# Patient Record
Sex: FEMALE | Race: WHITE | NOT HISPANIC OR LATINO | Employment: OTHER | URBAN - METROPOLITAN AREA
[De-identification: names, ages, dates, MRNs, and addresses within clinical notes are randomized per-mention and may not be internally consistent; named-entity substitution may affect disease eponyms.]

---

## 2017-01-03 ENCOUNTER — GENERIC CONVERSION - ENCOUNTER (OUTPATIENT)
Dept: OTHER | Facility: OTHER | Age: 77
End: 2017-01-03

## 2017-01-09 ENCOUNTER — APPOINTMENT (OUTPATIENT)
Dept: PREADMISSION TESTING | Facility: HOSPITAL | Age: 77
End: 2017-01-09
Payer: MEDICARE

## 2017-01-09 ENCOUNTER — TRANSCRIBE ORDERS (OUTPATIENT)
Dept: LAB | Facility: HOSPITAL | Age: 77
End: 2017-01-09

## 2017-01-09 ENCOUNTER — GENERIC CONVERSION - ENCOUNTER (OUTPATIENT)
Dept: OTHER | Facility: OTHER | Age: 77
End: 2017-01-09

## 2017-01-09 VITALS — WEIGHT: 148 LBS | BODY MASS INDEX: 23.78 KG/M2 | HEIGHT: 66 IN

## 2017-01-09 DIAGNOSIS — C50.911 MALIGNANT NEOPLASM OF RIGHT FEMALE BREAST, UNSPECIFIED SITE OF BREAST: Primary | ICD-10-CM

## 2017-01-09 DIAGNOSIS — C50.919 MALIGNANT NEOPLASM OF FEMALE BREAST (HCC): ICD-10-CM

## 2017-01-09 DIAGNOSIS — C50.911 MALIGNANT NEOPLASM OF RIGHT FEMALE BREAST, UNSPECIFIED SITE OF BREAST: ICD-10-CM

## 2017-01-09 LAB
ABO GROUP BLD: NORMAL
ALBUMIN SERPL BCP-MCNC: 3.8 G/DL (ref 3.5–5)
ALP SERPL-CCNC: 102 U/L (ref 46–116)
ALT SERPL W P-5'-P-CCNC: 20 U/L (ref 12–78)
ANION GAP SERPL CALCULATED.3IONS-SCNC: 7 MMOL/L (ref 4–13)
AST SERPL W P-5'-P-CCNC: 15 U/L (ref 5–45)
ATRIAL RATE: 60 BPM
BASOPHILS # BLD AUTO: 0.05 THOUSANDS/ΜL (ref 0–0.1)
BASOPHILS NFR BLD AUTO: 1 % (ref 0–1)
BILIRUB SERPL-MCNC: 0.63 MG/DL (ref 0.2–1)
BLD GP AB SCN SERPL QL: NEGATIVE
BUN SERPL-MCNC: 16 MG/DL (ref 5–25)
CALCIUM SERPL-MCNC: 9.5 MG/DL (ref 8.3–10.1)
CHLORIDE SERPL-SCNC: 101 MMOL/L (ref 100–108)
CO2 SERPL-SCNC: 30 MMOL/L (ref 21–32)
CREAT SERPL-MCNC: 0.86 MG/DL (ref 0.6–1.3)
EOSINOPHIL # BLD AUTO: 0.27 THOUSAND/ΜL (ref 0–0.61)
EOSINOPHIL NFR BLD AUTO: 3 % (ref 0–6)
ERYTHROCYTE [DISTWIDTH] IN BLOOD BY AUTOMATED COUNT: 14.6 % (ref 11.6–15.1)
EST. AVERAGE GLUCOSE BLD GHB EST-MCNC: 137 MG/DL
GFR SERPL CREATININE-BSD FRML MDRD: >60 ML/MIN/1.73SQ M
GLUCOSE SERPL-MCNC: 155 MG/DL (ref 65–140)
HBA1C MFR BLD: 6.4 % (ref 4.2–6.3)
HCT VFR BLD AUTO: 40.8 % (ref 34.8–46.1)
HGB BLD-MCNC: 13.8 G/DL (ref 11.5–15.4)
LYMPHOCYTES # BLD AUTO: 1.61 THOUSANDS/ΜL (ref 0.6–4.47)
LYMPHOCYTES NFR BLD AUTO: 19 % (ref 14–44)
MCH RBC QN AUTO: 29.3 PG (ref 26.8–34.3)
MCHC RBC AUTO-ENTMCNC: 33.8 G/DL (ref 31.4–37.4)
MCV RBC AUTO: 87 FL (ref 82–98)
MONOCYTES # BLD AUTO: 0.74 THOUSAND/ΜL (ref 0.17–1.22)
MONOCYTES NFR BLD AUTO: 9 % (ref 4–12)
NEUTROPHILS # BLD AUTO: 5.84 THOUSANDS/ΜL (ref 1.85–7.62)
NEUTS SEG NFR BLD AUTO: 68 % (ref 43–75)
NRBC BLD AUTO-RTO: 0 /100 WBCS
P AXIS: 91 DEGREES
PLATELET # BLD AUTO: 257 THOUSANDS/UL (ref 149–390)
PMV BLD AUTO: 10.5 FL (ref 8.9–12.7)
POTASSIUM SERPL-SCNC: 3.7 MMOL/L (ref 3.5–5.3)
PR INTERVAL: 234 MS
PROT SERPL-MCNC: 7.8 G/DL (ref 6.4–8.2)
QRS AXIS: 27 DEGREES
QRSD INTERVAL: 104 MS
QT INTERVAL: 434 MS
QTC INTERVAL: 434 MS
RBC # BLD AUTO: 4.71 MILLION/UL (ref 3.81–5.12)
RH BLD: POSITIVE
SODIUM SERPL-SCNC: 138 MMOL/L (ref 136–145)
T WAVE AXIS: 59 DEGREES
VENTRICULAR RATE: 60 BPM
WBC # BLD AUTO: 8.55 THOUSAND/UL (ref 4.31–10.16)

## 2017-01-09 PROCEDURE — 83036 HEMOGLOBIN GLYCOSYLATED A1C: CPT

## 2017-01-09 PROCEDURE — 86900 BLOOD TYPING SEROLOGIC ABO: CPT

## 2017-01-09 PROCEDURE — 93005 ELECTROCARDIOGRAM TRACING: CPT

## 2017-01-09 PROCEDURE — 80053 COMPREHEN METABOLIC PANEL: CPT

## 2017-01-09 PROCEDURE — 86901 BLOOD TYPING SEROLOGIC RH(D): CPT

## 2017-01-09 PROCEDURE — 86850 RBC ANTIBODY SCREEN: CPT

## 2017-01-09 PROCEDURE — 36415 COLL VENOUS BLD VENIPUNCTURE: CPT

## 2017-01-09 PROCEDURE — 85025 COMPLETE CBC W/AUTO DIFF WBC: CPT

## 2017-01-11 ENCOUNTER — GENERIC CONVERSION - ENCOUNTER (OUTPATIENT)
Dept: OTHER | Facility: OTHER | Age: 77
End: 2017-01-11

## 2017-01-11 ENCOUNTER — ANESTHESIA EVENT (OUTPATIENT)
Dept: PERIOP | Facility: HOSPITAL | Age: 77
End: 2017-01-11
Payer: MEDICARE

## 2017-01-13 ENCOUNTER — ANESTHESIA (OUTPATIENT)
Dept: PERIOP | Facility: HOSPITAL | Age: 77
End: 2017-01-13
Payer: MEDICARE

## 2017-01-13 ENCOUNTER — CONVERSION ENCOUNTER (OUTPATIENT)
Dept: RADIOLOGY | Facility: HOSPITAL | Age: 77
End: 2017-01-13

## 2017-01-13 ENCOUNTER — HOSPITAL ENCOUNTER (OUTPATIENT)
Dept: RADIOLOGY | Facility: HOSPITAL | Age: 77
Discharge: HOME | End: 2017-01-13
Attending: SURGERY
Payer: MEDICARE

## 2017-01-13 ENCOUNTER — HOSPITAL ENCOUNTER (OUTPATIENT)
Facility: HOSPITAL | Age: 77
Discharge: HOME | End: 2017-01-14
Attending: SURGERY | Admitting: SURGERY
Payer: MEDICARE

## 2017-01-13 ENCOUNTER — GENERIC CONVERSION - ENCOUNTER (OUTPATIENT)
Dept: OTHER | Facility: OTHER | Age: 77
End: 2017-01-13

## 2017-01-13 DIAGNOSIS — C77.9 RIGHT BREAST CANCER WITH MALIGNANT CELLS IN REGIONAL LYMPH NODES NO GREATER THAN 0.2 MM AND NO MORE THAN 200 CELLS (HCC): ICD-10-CM

## 2017-01-13 DIAGNOSIS — C50.911 RIGHT BREAST CANCER WITH MALIGNANT CELLS IN REGIONAL LYMPH NODES NO GREATER THAN 0.2 MM AND NO MORE THAN 200 CELLS (HCC): ICD-10-CM

## 2017-01-13 DIAGNOSIS — C50.919 MALIGNANT NEOPLASM OF FEMALE BREAST (HCC): ICD-10-CM

## 2017-01-13 PROBLEM — C50.111 MALIGNANT NEOPLASM OF CENTRAL PORTION OF RIGHT FEMALE BREAST (HCC): Status: ACTIVE | Noted: 2017-01-13

## 2017-01-13 LAB
GLUCOSE SERPL-MCNC: 123 MG/DL (ref 65–140)
GLUCOSE SERPL-MCNC: 127 MG/DL (ref 65–140)
GLUCOSE SERPL-MCNC: 139 MG/DL (ref 65–140)
GLUCOSE SERPL-MCNC: 141 MG/DL (ref 65–140)

## 2017-01-13 PROCEDURE — 88333 PATH CONSLTJ SURG CYTO XM 1: CPT | Performed by: SURGERY

## 2017-01-13 PROCEDURE — 78195 LYMPH SYSTEM IMAGING: CPT

## 2017-01-13 PROCEDURE — 88307 TISSUE EXAM BY PATHOLOGIST: CPT | Performed by: SURGERY

## 2017-01-13 PROCEDURE — 88334 PATH CONSLTJ SURG CYTO XM EA: CPT | Performed by: SURGERY

## 2017-01-13 PROCEDURE — 88342 IMHCHEM/IMCYTCHM 1ST ANTB: CPT | Performed by: SURGERY

## 2017-01-13 PROCEDURE — 88341 IMHCHEM/IMCYTCHM EA ADD ANTB: CPT | Performed by: SURGERY

## 2017-01-13 PROCEDURE — 94760 N-INVAS EAR/PLS OXIMETRY 1: CPT

## 2017-01-13 PROCEDURE — 82948 REAGENT STRIP/BLOOD GLUCOSE: CPT

## 2017-01-13 PROCEDURE — A9541 TC99M SULFUR COLLOID: HCPCS

## 2017-01-13 RX ORDER — SODIUM CHLORIDE, SODIUM LACTATE, POTASSIUM CHLORIDE, CALCIUM CHLORIDE 600; 310; 30; 20 MG/100ML; MG/100ML; MG/100ML; MG/100ML
50 INJECTION, SOLUTION INTRAVENOUS CONTINUOUS
Status: DISCONTINUED | OUTPATIENT
Start: 2017-01-13 | End: 2017-01-14 | Stop reason: HOSPADM

## 2017-01-13 RX ORDER — LABETALOL HYDROCHLORIDE 5 MG/ML
5 INJECTION, SOLUTION INTRAVENOUS
Status: DISCONTINUED | OUTPATIENT
Start: 2017-01-13 | End: 2017-01-13 | Stop reason: HOSPADM

## 2017-01-13 RX ORDER — ASCORBIC ACID 500 MG
500 TABLET ORAL DAILY
Status: DISCONTINUED | OUTPATIENT
Start: 2017-01-13 | End: 2017-01-14 | Stop reason: HOSPADM

## 2017-01-13 RX ORDER — ONDANSETRON 2 MG/ML
INJECTION INTRAMUSCULAR; INTRAVENOUS AS NEEDED
Status: DISCONTINUED | OUTPATIENT
Start: 2017-01-13 | End: 2017-01-13 | Stop reason: SURG

## 2017-01-13 RX ORDER — MAGNESIUM HYDROXIDE 1200 MG/15ML
LIQUID ORAL AS NEEDED
Status: DISCONTINUED | OUTPATIENT
Start: 2017-01-13 | End: 2017-01-13 | Stop reason: HOSPADM

## 2017-01-13 RX ORDER — HEPARIN SODIUM 5000 [USP'U]/ML
5000 INJECTION, SOLUTION INTRAVENOUS; SUBCUTANEOUS EVERY 8 HOURS SCHEDULED
Status: DISCONTINUED | OUTPATIENT
Start: 2017-01-13 | End: 2017-01-13

## 2017-01-13 RX ORDER — ZINC GLUCONATE 50 MG
50 TABLET ORAL DAILY
Status: DISCONTINUED | OUTPATIENT
Start: 2017-01-13 | End: 2017-01-14 | Stop reason: HOSPADM

## 2017-01-13 RX ORDER — FENTANYL CITRATE/PF 50 MCG/ML
25 SYRINGE (ML) INJECTION
Status: DISCONTINUED | OUTPATIENT
Start: 2017-01-13 | End: 2017-01-13 | Stop reason: HOSPADM

## 2017-01-13 RX ORDER — ONDANSETRON 2 MG/ML
4 INJECTION INTRAMUSCULAR; INTRAVENOUS ONCE AS NEEDED
Status: DISCONTINUED | OUTPATIENT
Start: 2017-01-13 | End: 2017-01-13 | Stop reason: HOSPADM

## 2017-01-13 RX ORDER — FENTANYL CITRATE 50 UG/ML
INJECTION, SOLUTION INTRAMUSCULAR; INTRAVENOUS AS NEEDED
Status: DISCONTINUED | OUTPATIENT
Start: 2017-01-13 | End: 2017-01-13 | Stop reason: SURG

## 2017-01-13 RX ORDER — MELATONIN
5000 DAILY
Status: DISCONTINUED | OUTPATIENT
Start: 2017-01-14 | End: 2017-01-14 | Stop reason: HOSPADM

## 2017-01-13 RX ORDER — LIDOCAINE HYDROCHLORIDE 10 MG/ML
INJECTION, SOLUTION INFILTRATION; PERINEURAL AS NEEDED
Status: DISCONTINUED | OUTPATIENT
Start: 2017-01-13 | End: 2017-01-13 | Stop reason: SURG

## 2017-01-13 RX ORDER — LISINOPRIL 20 MG/1
20 TABLET ORAL 2 TIMES DAILY
Status: DISCONTINUED | OUTPATIENT
Start: 2017-01-13 | End: 2017-01-14 | Stop reason: HOSPADM

## 2017-01-13 RX ORDER — HYDROCODONE BITARTRATE AND ACETAMINOPHEN 5; 325 MG/1; MG/1
1 TABLET ORAL EVERY 6 HOURS PRN
Status: DISCONTINUED | OUTPATIENT
Start: 2017-01-13 | End: 2017-01-14 | Stop reason: HOSPADM

## 2017-01-13 RX ORDER — ISOSULFAN BLUE 50 MG/5ML
INJECTION, SOLUTION SUBCUTANEOUS AS NEEDED
Status: DISCONTINUED | OUTPATIENT
Start: 2017-01-13 | End: 2017-01-13 | Stop reason: HOSPADM

## 2017-01-13 RX ORDER — MEPERIDINE HYDROCHLORIDE 25 MG/ML
12.5 INJECTION INTRAMUSCULAR; INTRAVENOUS; SUBCUTANEOUS AS NEEDED
Status: DISCONTINUED | OUTPATIENT
Start: 2017-01-13 | End: 2017-01-13 | Stop reason: HOSPADM

## 2017-01-13 RX ORDER — AMLODIPINE BESYLATE 5 MG/1
5 TABLET ORAL DAILY
Status: DISCONTINUED | OUTPATIENT
Start: 2017-01-14 | End: 2017-01-14 | Stop reason: HOSPADM

## 2017-01-13 RX ORDER — PROPRANOLOL HYDROCHLORIDE 20 MG/1
80 TABLET ORAL 3 TIMES DAILY
Status: DISCONTINUED | OUTPATIENT
Start: 2017-01-13 | End: 2017-01-14 | Stop reason: HOSPADM

## 2017-01-13 RX ORDER — CETIRIZINE HYDROCHLORIDE 10 MG/1
5 TABLET ORAL DAILY
Status: DISCONTINUED | OUTPATIENT
Start: 2017-01-13 | End: 2017-01-14

## 2017-01-13 RX ORDER — CLINDAMYCIN PHOSPHATE 900 MG/50ML
900 INJECTION INTRAVENOUS ONCE
Status: COMPLETED | OUTPATIENT
Start: 2017-01-13 | End: 2017-01-13

## 2017-01-13 RX ORDER — LORAZEPAM 2 MG/ML
0.5 INJECTION INTRAMUSCULAR ONCE
Status: COMPLETED | OUTPATIENT
Start: 2017-01-13 | End: 2017-01-13

## 2017-01-13 RX ORDER — SODIUM CHLORIDE, SODIUM LACTATE, POTASSIUM CHLORIDE, CALCIUM CHLORIDE 600; 310; 30; 20 MG/100ML; MG/100ML; MG/100ML; MG/100ML
INJECTION, SOLUTION INTRAVENOUS CONTINUOUS PRN
Status: DISCONTINUED | OUTPATIENT
Start: 2017-01-13 | End: 2017-01-13 | Stop reason: SURG

## 2017-01-13 RX ORDER — ONDANSETRON 2 MG/ML
4 INJECTION INTRAMUSCULAR; INTRAVENOUS EVERY 6 HOURS PRN
Status: DISCONTINUED | OUTPATIENT
Start: 2017-01-13 | End: 2017-01-14 | Stop reason: HOSPADM

## 2017-01-13 RX ORDER — CLONIDINE HYDROCHLORIDE 0.1 MG/1
0.1 TABLET, EXTENDED RELEASE ORAL DAILY
Status: DISCONTINUED | OUTPATIENT
Start: 2017-01-13 | End: 2017-01-13

## 2017-01-13 RX ORDER — EPHEDRINE SULFATE 50 MG/ML
INJECTION, SOLUTION INTRAVENOUS AS NEEDED
Status: DISCONTINUED | OUTPATIENT
Start: 2017-01-13 | End: 2017-01-13 | Stop reason: SURG

## 2017-01-13 RX ORDER — LORAZEPAM 0.5 MG/1
0.5 TABLET ORAL DAILY PRN
Status: DISCONTINUED | OUTPATIENT
Start: 2017-01-13 | End: 2017-01-14 | Stop reason: HOSPADM

## 2017-01-13 RX ORDER — PROPOFOL 10 MG/ML
INJECTION, EMULSION INTRAVENOUS AS NEEDED
Status: DISCONTINUED | OUTPATIENT
Start: 2017-01-13 | End: 2017-01-13 | Stop reason: SURG

## 2017-01-13 RX ORDER — CLONIDINE HYDROCHLORIDE 0.1 MG/1
0.1 TABLET ORAL EVERY 12 HOURS SCHEDULED
Status: DISCONTINUED | OUTPATIENT
Start: 2017-01-13 | End: 2017-01-14 | Stop reason: HOSPADM

## 2017-01-13 RX ADMIN — LIDOCAINE HYDROCHLORIDE 50 MG: 10 INJECTION, SOLUTION INFILTRATION; PERINEURAL at 11:01

## 2017-01-13 RX ADMIN — EPHEDRINE SULFATE 10 MG: 50 INJECTION, SOLUTION INTRAMUSCULAR; INTRAVENOUS; SUBCUTANEOUS at 11:32

## 2017-01-13 RX ADMIN — OXYCODONE HYDROCHLORIDE AND ACETAMINOPHEN 500 MG: 500 TABLET ORAL at 17:11

## 2017-01-13 RX ADMIN — PROPOFOL 50 MG: 10 INJECTION, EMULSION INTRAVENOUS at 11:02

## 2017-01-13 RX ADMIN — LISINOPRIL 20 MG: 20 TABLET ORAL at 17:09

## 2017-01-13 RX ADMIN — SODIUM CHLORIDE, SODIUM LACTATE, POTASSIUM CHLORIDE, AND CALCIUM CHLORIDE: .6; .31; .03; .02 INJECTION, SOLUTION INTRAVENOUS at 10:53

## 2017-01-13 RX ADMIN — METFORMIN HYDROCHLORIDE 500 MG: 500 TABLET ORAL at 17:08

## 2017-01-13 RX ADMIN — ENOXAPARIN SODIUM 40 MG: 40 INJECTION SUBCUTANEOUS at 22:27

## 2017-01-13 RX ADMIN — PROPRANOLOL HYDROCHLORIDE 80 MG: 20 TABLET ORAL at 23:07

## 2017-01-13 RX ADMIN — CLINDAMYCIN PHOSPHATE 900 MG: 18 INJECTION, SOLUTION INTRAVENOUS at 11:05

## 2017-01-13 RX ADMIN — PROPOFOL 150 MG: 10 INJECTION, EMULSION INTRAVENOUS at 11:01

## 2017-01-13 RX ADMIN — EPHEDRINE SULFATE 15 MG: 50 INJECTION, SOLUTION INTRAMUSCULAR; INTRAVENOUS; SUBCUTANEOUS at 11:16

## 2017-01-13 RX ADMIN — FENTANYL CITRATE 25 MCG: 50 INJECTION, SOLUTION INTRAMUSCULAR; INTRAVENOUS at 11:26

## 2017-01-13 RX ADMIN — EPHEDRINE SULFATE 5 MG: 50 INJECTION, SOLUTION INTRAMUSCULAR; INTRAVENOUS; SUBCUTANEOUS at 11:12

## 2017-01-13 RX ADMIN — ONDANSETRON 4 MG: 2 INJECTION INTRAMUSCULAR; INTRAVENOUS at 12:02

## 2017-01-13 RX ADMIN — FENTANYL CITRATE 25 MCG: 50 INJECTION, SOLUTION INTRAMUSCULAR; INTRAVENOUS at 11:11

## 2017-01-13 RX ADMIN — FENTANYL CITRATE 25 MCG: 50 INJECTION INTRAMUSCULAR; INTRAVENOUS at 13:15

## 2017-01-13 RX ADMIN — LORAZEPAM 0.5 MG: 2 INJECTION INTRAMUSCULAR; INTRAVENOUS at 12:38

## 2017-01-13 RX ADMIN — CLONIDINE HYDROCHLORIDE 0.1 MG: 0.1 TABLET ORAL at 22:27

## 2017-01-13 RX ADMIN — Medication 50 MG: at 18:54

## 2017-01-13 RX ADMIN — PROPRANOLOL HYDROCHLORIDE 80 MG: 20 TABLET ORAL at 18:53

## 2017-01-14 VITALS
WEIGHT: 145 LBS | HEIGHT: 67 IN | DIASTOLIC BLOOD PRESSURE: 69 MMHG | BODY MASS INDEX: 22.76 KG/M2 | HEART RATE: 76 BPM | OXYGEN SATURATION: 96 % | RESPIRATION RATE: 18 BRPM | TEMPERATURE: 97.9 F | SYSTOLIC BLOOD PRESSURE: 150 MMHG

## 2017-01-14 LAB — GLUCOSE SERPL-MCNC: 171 MG/DL (ref 65–140)

## 2017-01-14 PROCEDURE — 82948 REAGENT STRIP/BLOOD GLUCOSE: CPT

## 2017-01-14 RX ORDER — LORATADINE 10 MG/1
10 TABLET ORAL DAILY
Status: DISCONTINUED | OUTPATIENT
Start: 2017-01-14 | End: 2017-01-14 | Stop reason: HOSPADM

## 2017-01-14 RX ADMIN — LISINOPRIL 20 MG: 20 TABLET ORAL at 08:24

## 2017-01-14 RX ADMIN — OXYCODONE HYDROCHLORIDE AND ACETAMINOPHEN 500 MG: 500 TABLET ORAL at 08:24

## 2017-01-14 RX ADMIN — CLONIDINE HYDROCHLORIDE 0.1 MG: 0.1 TABLET ORAL at 08:26

## 2017-01-14 RX ADMIN — METFORMIN HYDROCHLORIDE 500 MG: 500 TABLET ORAL at 08:24

## 2017-01-14 RX ADMIN — AMLODIPINE BESYLATE 5 MG: 5 TABLET ORAL at 08:26

## 2017-01-14 RX ADMIN — LORAZEPAM 0.5 MG: 0.5 TABLET ORAL at 09:39

## 2017-01-14 RX ADMIN — LEVOTHYROXINE SODIUM 137 MCG: 112 TABLET ORAL at 05:18

## 2017-01-14 RX ADMIN — VITAMIN D, TAB 1000IU (100/BT) 5000 UNITS: 25 TAB at 08:24

## 2017-01-14 RX ADMIN — Medication 50 MG: at 08:24

## 2017-02-01 ENCOUNTER — ALLSCRIPTS OFFICE VISIT (OUTPATIENT)
Dept: OTHER | Facility: OTHER | Age: 77
End: 2017-02-01

## 2017-02-08 ENCOUNTER — GENERIC CONVERSION - ENCOUNTER (OUTPATIENT)
Dept: OTHER | Facility: OTHER | Age: 77
End: 2017-02-08

## 2017-02-08 ENCOUNTER — ALLSCRIPTS OFFICE VISIT (OUTPATIENT)
Dept: OTHER | Facility: OTHER | Age: 77
End: 2017-02-08

## 2017-02-08 DIAGNOSIS — C50.911 MALIGNANT NEOPLASM OF RIGHT FEMALE BREAST (HCC): ICD-10-CM

## 2017-02-22 ENCOUNTER — ALLSCRIPTS OFFICE VISIT (OUTPATIENT)
Dept: OTHER | Facility: OTHER | Age: 77
End: 2017-02-22

## 2017-02-24 ENCOUNTER — GENERIC CONVERSION - ENCOUNTER (OUTPATIENT)
Dept: OTHER | Facility: OTHER | Age: 77
End: 2017-02-24

## 2017-03-01 ENCOUNTER — ALLSCRIPTS OFFICE VISIT (OUTPATIENT)
Dept: OTHER | Facility: OTHER | Age: 77
End: 2017-03-01

## 2017-03-24 ENCOUNTER — ALLSCRIPTS OFFICE VISIT (OUTPATIENT)
Dept: OTHER | Facility: OTHER | Age: 77
End: 2017-03-24

## 2017-04-05 ENCOUNTER — ALLSCRIPTS OFFICE VISIT (OUTPATIENT)
Dept: OTHER | Facility: OTHER | Age: 77
End: 2017-04-05

## 2017-07-26 ENCOUNTER — ALLSCRIPTS OFFICE VISIT (OUTPATIENT)
Dept: OTHER | Facility: OTHER | Age: 77
End: 2017-07-26

## 2017-09-12 ENCOUNTER — HOSPITAL ENCOUNTER (OUTPATIENT)
Dept: RADIOLOGY | Facility: HOSPITAL | Age: 77
Discharge: HOME | End: 2017-09-12
Payer: MEDICARE

## 2017-09-12 ENCOUNTER — TRANSCRIBE ORDERS (OUTPATIENT)
Dept: RADIOLOGY | Facility: HOSPITAL | Age: 77
End: 2017-09-12

## 2017-09-12 DIAGNOSIS — M54.5 LOW BACK PAIN, UNSPECIFIED BACK PAIN LATERALITY, UNSPECIFIED CHRONICITY, WITH SCIATICA PRESENCE UNSPECIFIED: ICD-10-CM

## 2017-09-12 DIAGNOSIS — M54.6 PAIN IN THORACIC SPINE: Primary | ICD-10-CM

## 2017-09-12 DIAGNOSIS — M54.6 PAIN IN THORACIC SPINE: ICD-10-CM

## 2017-09-12 PROCEDURE — 72110 X-RAY EXAM L-2 SPINE 4/>VWS: CPT

## 2017-09-12 PROCEDURE — 72070 X-RAY EXAM THORAC SPINE 2VWS: CPT

## 2018-01-10 NOTE — MISCELLANEOUS
Message  Received a call from Dr Regino King regarding patients abnormal EKG  She will have to get a clearance from her PCP she has no Cardiologist at this time  Spoke with patient is she will call to make an appointment asap and I will fax over the clearance form, labs and EKG to PCP's office  Active Problems    1  Depression (311) (F32 9)   2  Diabetes mellitus (250 00) (E11 9)   3  Hypertension (401 9) (I10)   4  Hypothyroid (244 9) (E03 9)   5  Malignant neoplasm of breast (174 9) (C50 919)   6  Ventral hernia (553 20) (K43 9)    Current Meds   1  AmLODIPine Besylate 5 MG Oral Tablet; TAKE 1 TABLET DAILY AS DIRECTED; Therapy: (Recorded:30Trz2417) to Recorded   2  CloNIDine HCl - 0 1 MG Oral Tablet; daily; Therapy: (Recorded:62Lci9968) to Recorded   3  Levothyroxine Sodium 137 MCG Oral Tablet; TAKE 1 TABLET DAILY; Therapy: (Recorded:65Wpz0780) to Recorded   4  Lisinopril 20 MG Oral Tablet; TAKE 1 TABLET TWICE DAILY; Therapy: (Recorded:09Zcq9916) to Recorded   5  LORazepam 0 5 MG Oral Tablet; TAKE 1 TABLET TWICE DAILY; Therapy: (Recorded:74Ksq1137) to Recorded   6  MetFORMIN HCl - 500 MG Oral Tablet; TAKE 1 TABLET EVERY 12 HOURS WITH FOOD; Therapy: (Recorded:07Omh3362) to Recorded   7  Nortriptyline HCl - 25 MG Oral Capsule; (3)TABS TO EQUAL 75MG DAILY; Therapy: (Recorded:52Bsn1692) to Recorded   8  Propranolol HCl - 40 MG Oral Tablet; 2TABS, TID; Therapy: (Recorded:11Izg8308) to Recorded   9  Vitamin D3 1000 UNIT Oral Capsule; TAKE AS DIRECTED; Therapy: (Recorded:31Ajn4410) to Recorded    Allergies    1   No Known Drug Allergies    Signatures   Electronically signed by : Aria Katz OM; Jan 9 2017  3:57PM EST                       (Author)

## 2018-01-12 VITALS
DIASTOLIC BLOOD PRESSURE: 64 MMHG | WEIGHT: 146.13 LBS | HEIGHT: 66 IN | SYSTOLIC BLOOD PRESSURE: 126 MMHG | BODY MASS INDEX: 23.48 KG/M2

## 2018-01-12 VITALS — WEIGHT: 146 LBS | BODY MASS INDEX: 23.46 KG/M2 | HEIGHT: 66 IN

## 2018-01-13 VITALS
SYSTOLIC BLOOD PRESSURE: 158 MMHG | HEART RATE: 80 BPM | DIASTOLIC BLOOD PRESSURE: 72 MMHG | WEIGHT: 146.13 LBS | HEIGHT: 66 IN | TEMPERATURE: 98.2 F | RESPIRATION RATE: 12 BRPM | BODY MASS INDEX: 23.48 KG/M2

## 2018-01-13 VITALS
RESPIRATION RATE: 12 BRPM | BODY MASS INDEX: 23.5 KG/M2 | DIASTOLIC BLOOD PRESSURE: 64 MMHG | HEART RATE: 68 BPM | WEIGHT: 146.25 LBS | HEIGHT: 66 IN | SYSTOLIC BLOOD PRESSURE: 126 MMHG | TEMPERATURE: 97.2 F

## 2018-01-13 VITALS
WEIGHT: 145 LBS | BODY MASS INDEX: 21.98 KG/M2 | SYSTOLIC BLOOD PRESSURE: 108 MMHG | DIASTOLIC BLOOD PRESSURE: 62 MMHG | HEIGHT: 68 IN

## 2018-01-14 VITALS
RESPIRATION RATE: 16 BRPM | WEIGHT: 146.25 LBS | TEMPERATURE: 97.9 F | SYSTOLIC BLOOD PRESSURE: 128 MMHG | BODY MASS INDEX: 22.17 KG/M2 | DIASTOLIC BLOOD PRESSURE: 58 MMHG | HEART RATE: 76 BPM | HEIGHT: 68 IN

## 2018-01-14 VITALS
WEIGHT: 146 LBS | DIASTOLIC BLOOD PRESSURE: 64 MMHG | SYSTOLIC BLOOD PRESSURE: 162 MMHG | BODY MASS INDEX: 23.46 KG/M2 | TEMPERATURE: 97.6 F | HEIGHT: 66 IN

## 2018-01-15 NOTE — MISCELLANEOUS
Message  Patient had questions regarding radiation with her surgery type  Per Coit Fidencio if she has a lumpectomy she would need radiation if mastectomy usually no radiation and Tamoxafin would be taken after surgery  Informed patient she is aware and understood  Active Problems    1  Depression (311) (F32 9)   2  Diabetes mellitus (250 00) (E11 9)   3  Hypertension (401 9) (I10)   4  Hypothyroid (244 9) (E03 9)   5  Malignant neoplasm of breast (174 9) (C50 919)   6  Ventral hernia (553 20) (K43 9)    Current Meds   1  AmLODIPine Besylate 5 MG Oral Tablet; TAKE 1 TABLET DAILY AS DIRECTED; Therapy: (Recorded:52Bmo1118) to Recorded   2  CloNIDine HCl - 0 1 MG Oral Tablet; daily; Therapy: (Recorded:70Mqz0255) to Recorded   3  Levothyroxine Sodium 137 MCG Oral Tablet; TAKE 1 TABLET DAILY; Therapy: (Recorded:04Afb0496) to Recorded   4  Lisinopril 20 MG Oral Tablet; TAKE 1 TABLET TWICE DAILY; Therapy: (Recorded:00Shu7170) to Recorded   5  LORazepam 0 5 MG Oral Tablet; TAKE 1 TABLET TWICE DAILY; Therapy: (Recorded:07Lqx7558) to Recorded   6  MetFORMIN HCl - 500 MG Oral Tablet; TAKE 1 TABLET EVERY 12 HOURS WITH FOOD; Therapy: (Recorded:15Qqm1331) to Recorded   7  Nortriptyline HCl - 25 MG Oral Capsule; (3)TABS TO EQUAL 75MG DAILY; Therapy: (Recorded:74Goc5706) to Recorded   8  Propranolol HCl - 40 MG Oral Tablet; 2TABS, TID; Therapy: (Recorded:26Ykl4169) to Recorded   9  Vitamin D3 1000 UNIT Oral Capsule; TAKE AS DIRECTED; Therapy: (Recorded:91Qqk5269) to Recorded    Allergies    1   No Known Drug Allergies    Signatures   Electronically signed by : Helena Hammer OM; Andrew  3 2017  3:49PM EST                       (Author)

## 2018-01-15 NOTE — RESULT NOTES
Verified Results  NM LYMPHATIC BREAST [id number] 09:01AM Nasrin Johnson     Test Name Result Flag Reference   NM LYMPHATIC BREAST (Report)     SENTINEL NODE LYMPHOSCINTIGRAPHY     INDICATION: Right breast carcinoma     FINDINGS:     0 44 mCi Tc-99m sulfur colloid (0 8 cc volume) was administered in divided doses by Dr Fredy Vargas in the right periareolar region  Scintigraphic images were obtained over the right hemithorax and axilla in multiple projections  Right axillary sentinel node    identified  Using scintigraphic guidance, the corresponding skin site was marked with an indelible marker  The patient was transferred to the operating room in satisfactory condition  IMPRESSION:     [place] lymph node localized to right axilla         Workstation performed: [id number]     Signed by:   Aislinn Fernandez MD   1/13/17

## 2018-01-15 NOTE — MISCELLANEOUS
Message   Recorded as Task   Date: 02/24/2017 11:44 AM, Created By: Macie Spear   Task Name: Call Back   Assigned To: Nora Sanabria   Regarding Patient: Max Downs, Status: Active   Comment:    Rossi Cummins - 24 Feb 2017 11:44 AM     TASK CREATED  Caller: Self; [phone] (Home); [phone] x,,,,, (Work)  Pt said since she has been on the Anastrazole her b/p is up, her blood sugars are all over, legs & feet are numb, she is nauseous and depressed and no energy  Would like to discuss  Nora Sanabria - 24 Feb 2017 11:52 AM     TASK EDITED  spoke with patient  her b/p is 176/114  legs and feet are numb and swollen  blood sugar 200 fasting  nauseated  patient instructed to go to ED, she verbalizes understanding        Active Problems    1  Depression (311) (F32 9)   2  Diabetes mellitus (250 00) (E11 9)   3  Hypertension (401 9) (I10)   4  Hypothyroid (244 9) (E03 9)   5  Malignant neoplasm of breast (174 9) (C50 919)   6  Malignant neoplasm of right breast, stage 1, estrogen receptor positive (174 9,V86 0)   (C50 911,Z17 0)   7  Postoperative examination (V67 00) (Z09)   8  Ventral hernia (553 20) (K43 9)    Current Meds   1  AmLODIPine Besylate 5 MG Oral Tablet; TAKE 1 TABLET DAILY AS DIRECTED; Therapy: (Recorded:73Zot4323) to Recorded   2  Anastrozole 1 MG Oral Tablet; TAKE 1 TABLET DAILY; Therapy: 89NMU7324 to (Evaluate:29Rwm8418)  Requested for: 32FLG9432; Last   Rx:36Cqm1177 Ordered   3  CloNIDine HCl - 0 1 MG Oral Tablet; daily; Therapy: (Recorded:58Tys4935) to Recorded   4  Levothyroxine Sodium 137 MCG Oral Tablet; TAKE 1 TABLET DAILY; Therapy: (Recorded:47Nup4961) to Recorded   5  Lisinopril 20 MG Oral Tablet; TAKE 1 TABLET TWICE DAILY; Therapy: (Recorded:73Mke9164) to Recorded   6  LORazepam 0 5 MG Oral Tablet; TAKE 1 TABLET TWICE DAILY; Therapy: (Recorded:49Dtj6379) to Recorded   7  MetFORMIN HCl - 500 MG Oral Tablet; TAKE 1 TABLET EVERY 12 HOURS WITH FOOD;    Therapy: (Recorded:74Oms5095) to Recorded   8  Nortriptyline HCl - 25 MG Oral Capsule; (3)TABS TO EQUAL 75MG DAILY; Therapy: (Recorded:13Nqk5896) to Recorded   9  Propranolol HCl - 40 MG Oral Tablet; 2TABS, TID; Therapy: (Recorded:89Cjj9596) to Recorded   10  Vitamin D3 1000 UNIT Oral Capsule; TAKE AS DIRECTED; Therapy: (Recorded:37Ujt4326) to Recorded    Allergies    1   No Known Drug Allergies    Signatures   Electronically signed by : Esther Pérez, ; Feb 24 2017 11:53AM EST                       (Author)

## 2018-01-17 NOTE — PROGRESS NOTES
Discussion/Summary  Social Work-Discussion Summary [facility]: Patient is being seen for a distress screenning assessment  LSW reviewed pt's distress thermometer completed by pt on 2/8/2017  Pt rated their distress as a 8/10 and denied psychosocial problems  LSW introduced her role and discussed available cancer support services  LSW assessed pt's distress  Pt denied current issues coping with her recent diagnosis of breast cancer for the second time  Pt appeared clear about that types of cancer treatment she would refuse  Pt reports she does not want chemotherapy or radiation treatment  Pt stated she was "grateful" she lived in remission of her breast cancer for 22 years  Pt reports her family is supportive  LSW provided pt supportive counseling and contact information for cancer care counselors  Pt denied requiring social work assistance at this time  LSW is available to provide assistance as needed          Signatures   Electronically signed by : JONNY Velez; Feb 8 2017  2:13PM EST                       (Author)

## 2018-02-01 ENCOUNTER — APPOINTMENT (EMERGENCY)
Dept: RADIOLOGY | Facility: HOSPITAL | Age: 78
DRG: 329 | End: 2018-02-01
Payer: MEDICARE

## 2018-02-01 ENCOUNTER — HOSPITAL ENCOUNTER (INPATIENT)
Facility: HOSPITAL | Age: 78
LOS: 16 days | Discharge: HOME HEALTH | DRG: 329 | End: 2018-02-17
Attending: EMERGENCY MEDICINE | Admitting: SURGERY
Payer: MEDICARE

## 2018-02-01 DIAGNOSIS — K56.609 SMALL BOWEL OBSTRUCTION (HCC): ICD-10-CM

## 2018-02-01 DIAGNOSIS — R00.2 HEART PALPITATIONS: ICD-10-CM

## 2018-02-01 DIAGNOSIS — K56.609 BOWEL OBSTRUCTION (HCC): ICD-10-CM

## 2018-02-01 DIAGNOSIS — R41.0 DELIRIUM: ICD-10-CM

## 2018-02-01 DIAGNOSIS — I48.91 TRANSIENT ATRIAL FIBRILLATION (HCC): Primary | ICD-10-CM

## 2018-02-01 LAB
ALBUMIN SERPL BCP-MCNC: 3.8 G/DL (ref 3.5–5)
ALP SERPL-CCNC: 65 U/L (ref 46–116)
ALT SERPL W P-5'-P-CCNC: 21 U/L (ref 12–78)
ANION GAP SERPL CALCULATED.3IONS-SCNC: 10 MMOL/L (ref 4–13)
AST SERPL W P-5'-P-CCNC: 23 U/L (ref 5–45)
BASOPHILS # BLD AUTO: 0.02 THOUSANDS/ΜL (ref 0–0.1)
BASOPHILS NFR BLD AUTO: 0 % (ref 0–1)
BILIRUB SERPL-MCNC: 1 MG/DL (ref 0.2–1)
BUN SERPL-MCNC: 60 MG/DL (ref 5–25)
CALCIUM SERPL-MCNC: 9.9 MG/DL (ref 8.3–10.1)
CHLORIDE SERPL-SCNC: 94 MMOL/L (ref 100–108)
CO2 SERPL-SCNC: 30 MMOL/L (ref 21–32)
CREAT SERPL-MCNC: 1.57 MG/DL (ref 0.6–1.3)
EOSINOPHIL # BLD AUTO: 0.05 THOUSAND/ΜL (ref 0–0.61)
EOSINOPHIL NFR BLD AUTO: 0 % (ref 0–6)
ERYTHROCYTE [DISTWIDTH] IN BLOOD BY AUTOMATED COUNT: 14 % (ref 11.6–15.1)
GFR SERPL CREATININE-BSD FRML MDRD: 31 ML/MIN/1.73SQ M
GLUCOSE SERPL-MCNC: 129 MG/DL (ref 65–140)
HCT VFR BLD AUTO: 37 % (ref 34.8–46.1)
HGB BLD-MCNC: 13.1 G/DL (ref 11.5–15.4)
LIPASE SERPL-CCNC: 128 U/L (ref 73–393)
LYMPHOCYTES # BLD AUTO: 1.31 THOUSANDS/ΜL (ref 0.6–4.47)
LYMPHOCYTES NFR BLD AUTO: 12 % (ref 14–44)
MCH RBC QN AUTO: 30.3 PG (ref 26.8–34.3)
MCHC RBC AUTO-ENTMCNC: 35.4 G/DL (ref 31.4–37.4)
MCV RBC AUTO: 86 FL (ref 82–98)
MONOCYTES # BLD AUTO: 1.37 THOUSAND/ΜL (ref 0.17–1.22)
MONOCYTES NFR BLD AUTO: 12 % (ref 4–12)
NEUTROPHILS # BLD AUTO: 8.53 THOUSANDS/ΜL (ref 1.85–7.62)
NEUTS SEG NFR BLD AUTO: 76 % (ref 43–75)
NRBC BLD AUTO-RTO: 0 /100 WBCS
PLATELET # BLD AUTO: 256 THOUSANDS/UL (ref 149–390)
PMV BLD AUTO: 10 FL (ref 8.9–12.7)
POTASSIUM SERPL-SCNC: 4 MMOL/L (ref 3.5–5.3)
PROT SERPL-MCNC: 7.7 G/DL (ref 6.4–8.2)
RBC # BLD AUTO: 4.33 MILLION/UL (ref 3.81–5.12)
SODIUM SERPL-SCNC: 134 MMOL/L (ref 136–145)
WBC # BLD AUTO: 11.31 THOUSAND/UL (ref 4.31–10.16)

## 2018-02-01 PROCEDURE — 83690 ASSAY OF LIPASE: CPT | Performed by: EMERGENCY MEDICINE

## 2018-02-01 PROCEDURE — 99222 1ST HOSP IP/OBS MODERATE 55: CPT | Performed by: SURGERY

## 2018-02-01 PROCEDURE — 85025 COMPLETE CBC W/AUTO DIFF WBC: CPT | Performed by: EMERGENCY MEDICINE

## 2018-02-01 PROCEDURE — 96361 HYDRATE IV INFUSION ADD-ON: CPT

## 2018-02-01 PROCEDURE — 93005 ELECTROCARDIOGRAM TRACING: CPT | Performed by: EMERGENCY MEDICINE

## 2018-02-01 PROCEDURE — 96375 TX/PRO/DX INJ NEW DRUG ADDON: CPT

## 2018-02-01 PROCEDURE — 74176 CT ABD & PELVIS W/O CONTRAST: CPT

## 2018-02-01 PROCEDURE — 80053 COMPREHEN METABOLIC PANEL: CPT | Performed by: EMERGENCY MEDICINE

## 2018-02-01 PROCEDURE — 36415 COLL VENOUS BLD VENIPUNCTURE: CPT

## 2018-02-01 PROCEDURE — 96374 THER/PROPH/DIAG INJ IV PUSH: CPT

## 2018-02-01 RX ORDER — MIDAZOLAM HYDROCHLORIDE 1 MG/ML
1 INJECTION INTRAMUSCULAR; INTRAVENOUS ONCE
Status: COMPLETED | OUTPATIENT
Start: 2018-02-01 | End: 2018-02-01

## 2018-02-01 RX ORDER — LIDOCAINE HYDROCHLORIDE 20 MG/ML
JELLY TOPICAL ONCE
Status: COMPLETED | OUTPATIENT
Start: 2018-02-01 | End: 2018-02-01

## 2018-02-01 RX ORDER — ONDANSETRON 2 MG/ML
4 INJECTION INTRAMUSCULAR; INTRAVENOUS ONCE
Status: COMPLETED | OUTPATIENT
Start: 2018-02-01 | End: 2018-02-01

## 2018-02-01 RX ADMIN — MIDAZOLAM 1 MG: 1 INJECTION INTRAMUSCULAR; INTRAVENOUS at 22:40

## 2018-02-01 RX ADMIN — LIDOCAINE HYDROCHLORIDE: 20 JELLY TOPICAL at 22:41

## 2018-02-01 RX ADMIN — SODIUM CHLORIDE 1000 ML: 0.9 INJECTION, SOLUTION INTRAVENOUS at 19:48

## 2018-02-01 RX ADMIN — ONDANSETRON 4 MG: 2 INJECTION INTRAMUSCULAR; INTRAVENOUS at 19:48

## 2018-02-02 PROBLEM — N17.9 ACUTE KIDNEY INJURY (HCC): Status: ACTIVE | Noted: 2018-02-02

## 2018-02-02 PROBLEM — K56.609 SMALL BOWEL OBSTRUCTION (HCC): Status: ACTIVE | Noted: 2018-02-02

## 2018-02-02 LAB
ANION GAP SERPL CALCULATED.3IONS-SCNC: 8 MMOL/L (ref 4–13)
ATRIAL RATE: 73 BPM
BASOPHILS # BLD AUTO: 0.02 THOUSANDS/ΜL (ref 0–0.1)
BASOPHILS NFR BLD AUTO: 0 % (ref 0–1)
BUN SERPL-MCNC: 57 MG/DL (ref 5–25)
CALCIUM SERPL-MCNC: 8.7 MG/DL (ref 8.3–10.1)
CHLORIDE SERPL-SCNC: 98 MMOL/L (ref 100–108)
CO2 SERPL-SCNC: 29 MMOL/L (ref 21–32)
CREAT SERPL-MCNC: 1.18 MG/DL (ref 0.6–1.3)
EOSINOPHIL # BLD AUTO: 0.09 THOUSAND/ΜL (ref 0–0.61)
EOSINOPHIL NFR BLD AUTO: 1 % (ref 0–6)
ERYTHROCYTE [DISTWIDTH] IN BLOOD BY AUTOMATED COUNT: 14.1 % (ref 11.6–15.1)
GFR SERPL CREATININE-BSD FRML MDRD: 44 ML/MIN/1.73SQ M
GLUCOSE SERPL-MCNC: 152 MG/DL (ref 65–140)
GLUCOSE SERPL-MCNC: 79 MG/DL (ref 65–140)
GLUCOSE SERPL-MCNC: 93 MG/DL (ref 65–140)
HCT VFR BLD AUTO: 36.6 % (ref 34.8–46.1)
HGB BLD-MCNC: 12.5 G/DL (ref 11.5–15.4)
LYMPHOCYTES # BLD AUTO: 1.65 THOUSANDS/ΜL (ref 0.6–4.47)
LYMPHOCYTES NFR BLD AUTO: 14 % (ref 14–44)
MAGNESIUM SERPL-MCNC: 1.9 MG/DL (ref 1.6–2.6)
MCH RBC QN AUTO: 29.6 PG (ref 26.8–34.3)
MCHC RBC AUTO-ENTMCNC: 34.2 G/DL (ref 31.4–37.4)
MCV RBC AUTO: 87 FL (ref 82–98)
MONOCYTES # BLD AUTO: 1.84 THOUSAND/ΜL (ref 0.17–1.22)
MONOCYTES NFR BLD AUTO: 16 % (ref 4–12)
NEUTROPHILS # BLD AUTO: 7.85 THOUSANDS/ΜL (ref 1.85–7.62)
NEUTS SEG NFR BLD AUTO: 69 % (ref 43–75)
NRBC BLD AUTO-RTO: 0 /100 WBCS
P AXIS: 88 DEGREES
PHOSPHATE SERPL-MCNC: 3.3 MG/DL (ref 2.3–4.1)
PLATELET # BLD AUTO: 232 THOUSANDS/UL (ref 149–390)
PLATELET # BLD AUTO: 245 THOUSANDS/UL (ref 149–390)
PMV BLD AUTO: 10 FL (ref 8.9–12.7)
PMV BLD AUTO: 9.5 FL (ref 8.9–12.7)
POTASSIUM SERPL-SCNC: 3.5 MMOL/L (ref 3.5–5.3)
PR INTERVAL: 184 MS
QRS AXIS: 62 DEGREES
QRSD INTERVAL: 98 MS
QT INTERVAL: 402 MS
QTC INTERVAL: 442 MS
RBC # BLD AUTO: 4.22 MILLION/UL (ref 3.81–5.12)
SODIUM SERPL-SCNC: 135 MMOL/L (ref 136–145)
T WAVE AXIS: 67 DEGREES
VENTRICULAR RATE: 73 BPM
WBC # BLD AUTO: 11.48 THOUSAND/UL (ref 4.31–10.16)

## 2018-02-02 PROCEDURE — 85049 AUTOMATED PLATELET COUNT: CPT | Performed by: STUDENT IN AN ORGANIZED HEALTH CARE EDUCATION/TRAINING PROGRAM

## 2018-02-02 PROCEDURE — 85025 COMPLETE CBC W/AUTO DIFF WBC: CPT | Performed by: STUDENT IN AN ORGANIZED HEALTH CARE EDUCATION/TRAINING PROGRAM

## 2018-02-02 PROCEDURE — 84100 ASSAY OF PHOSPHORUS: CPT | Performed by: STUDENT IN AN ORGANIZED HEALTH CARE EDUCATION/TRAINING PROGRAM

## 2018-02-02 PROCEDURE — 93010 ELECTROCARDIOGRAM REPORT: CPT | Performed by: INTERNAL MEDICINE

## 2018-02-02 PROCEDURE — 83735 ASSAY OF MAGNESIUM: CPT | Performed by: STUDENT IN AN ORGANIZED HEALTH CARE EDUCATION/TRAINING PROGRAM

## 2018-02-02 PROCEDURE — C9113 INJ PANTOPRAZOLE SODIUM, VIA: HCPCS | Performed by: STUDENT IN AN ORGANIZED HEALTH CARE EDUCATION/TRAINING PROGRAM

## 2018-02-02 PROCEDURE — 99285 EMERGENCY DEPT VISIT HI MDM: CPT

## 2018-02-02 PROCEDURE — 82948 REAGENT STRIP/BLOOD GLUCOSE: CPT

## 2018-02-02 PROCEDURE — 99232 SBSQ HOSP IP/OBS MODERATE 35: CPT | Performed by: SURGERY

## 2018-02-02 PROCEDURE — 80048 BASIC METABOLIC PNL TOTAL CA: CPT | Performed by: STUDENT IN AN ORGANIZED HEALTH CARE EDUCATION/TRAINING PROGRAM

## 2018-02-02 RX ORDER — LORAZEPAM 2 MG/ML
0.5 INJECTION INTRAMUSCULAR DAILY PRN
Status: DISCONTINUED | OUTPATIENT
Start: 2018-02-02 | End: 2018-02-03

## 2018-02-02 RX ORDER — DEXTROSE, SODIUM CHLORIDE, AND POTASSIUM CHLORIDE 5; .45; .15 G/100ML; G/100ML; G/100ML
36 INJECTION INTRAVENOUS CONTINUOUS
Status: DISCONTINUED | OUTPATIENT
Start: 2018-02-02 | End: 2018-02-10

## 2018-02-02 RX ORDER — POTASSIUM CHLORIDE 14.9 MG/ML
20 INJECTION INTRAVENOUS ONCE
Status: COMPLETED | OUTPATIENT
Start: 2018-02-02 | End: 2018-02-02

## 2018-02-02 RX ORDER — DEXTROSE, SODIUM CHLORIDE, AND POTASSIUM CHLORIDE 5; .45; .15 G/100ML; G/100ML; G/100ML
100 INJECTION INTRAVENOUS CONTINUOUS
Status: DISCONTINUED | OUTPATIENT
Start: 2018-02-02 | End: 2018-02-02

## 2018-02-02 RX ORDER — PANTOPRAZOLE SODIUM 40 MG/1
40 INJECTION, POWDER, FOR SOLUTION INTRAVENOUS
Status: DISCONTINUED | OUTPATIENT
Start: 2018-02-02 | End: 2018-02-17 | Stop reason: HOSPADM

## 2018-02-02 RX ORDER — MAGNESIUM SULFATE HEPTAHYDRATE 40 MG/ML
2 INJECTION, SOLUTION INTRAVENOUS ONCE
Status: COMPLETED | OUTPATIENT
Start: 2018-02-02 | End: 2018-02-02

## 2018-02-02 RX ORDER — DEXTROSE, SODIUM CHLORIDE, AND POTASSIUM CHLORIDE 5; .9; .15 G/100ML; G/100ML; G/100ML
100 INJECTION INTRAVENOUS CONTINUOUS
Status: DISCONTINUED | OUTPATIENT
Start: 2018-02-02 | End: 2018-02-02

## 2018-02-02 RX ORDER — POTASSIUM CHLORIDE 14.9 MG/ML
20 INJECTION INTRAVENOUS
Status: DISPENSED | OUTPATIENT
Start: 2018-02-02 | End: 2018-02-02

## 2018-02-02 RX ORDER — HEPARIN SODIUM 5000 [USP'U]/ML
5000 INJECTION, SOLUTION INTRAVENOUS; SUBCUTANEOUS EVERY 8 HOURS SCHEDULED
Status: DISCONTINUED | OUTPATIENT
Start: 2018-02-02 | End: 2018-02-17 | Stop reason: HOSPADM

## 2018-02-02 RX ORDER — SODIUM CHLORIDE 9 MG/ML
125 INJECTION, SOLUTION INTRAVENOUS CONTINUOUS
Status: DISCONTINUED | OUTPATIENT
Start: 2018-02-02 | End: 2018-02-02

## 2018-02-02 RX ORDER — METOPROLOL TARTRATE 5 MG/5ML
5 INJECTION INTRAVENOUS EVERY 6 HOURS PRN
Status: DISCONTINUED | OUTPATIENT
Start: 2018-02-02 | End: 2018-02-14

## 2018-02-02 RX ORDER — LEVOTHYROXINE SODIUM ANHYDROUS 100 UG/5ML
70 INJECTION, POWDER, LYOPHILIZED, FOR SOLUTION INTRAVENOUS DAILY
Status: DISCONTINUED | OUTPATIENT
Start: 2018-02-02 | End: 2018-02-09 | Stop reason: ALTCHOICE

## 2018-02-02 RX ORDER — SODIUM CHLORIDE, SODIUM LACTATE, POTASSIUM CHLORIDE, CALCIUM CHLORIDE 600; 310; 30; 20 MG/100ML; MG/100ML; MG/100ML; MG/100ML
100 INJECTION, SOLUTION INTRAVENOUS CONTINUOUS
Status: DISCONTINUED | OUTPATIENT
Start: 2018-02-02 | End: 2018-02-02

## 2018-02-02 RX ORDER — ONDANSETRON 2 MG/ML
4 INJECTION INTRAMUSCULAR; INTRAVENOUS EVERY 6 HOURS PRN
Status: DISCONTINUED | OUTPATIENT
Start: 2018-02-02 | End: 2018-02-12

## 2018-02-02 RX ADMIN — METOPROLOL TARTRATE 5 MG: 1 INJECTION, SOLUTION INTRAVENOUS at 01:59

## 2018-02-02 RX ADMIN — METOPROLOL TARTRATE 5 MG: 1 INJECTION, SOLUTION INTRAVENOUS at 18:38

## 2018-02-02 RX ADMIN — POTASSIUM CHLORIDE 20 MEQ: 200 INJECTION, SOLUTION INTRAVENOUS at 08:46

## 2018-02-02 RX ADMIN — POTASSIUM CHLORIDE, DEXTROSE MONOHYDRATE AND SODIUM CHLORIDE 100 ML/HR: 150; 5; 450 INJECTION, SOLUTION INTRAVENOUS at 19:59

## 2018-02-02 RX ADMIN — POTASSIUM CHLORIDE 20 MEQ: 200 INJECTION, SOLUTION INTRAVENOUS at 13:40

## 2018-02-02 RX ADMIN — HEPARIN SODIUM 5000 UNITS: 5000 INJECTION, SOLUTION INTRAVENOUS; SUBCUTANEOUS at 01:59

## 2018-02-02 RX ADMIN — HEPARIN SODIUM 5000 UNITS: 5000 INJECTION, SOLUTION INTRAVENOUS; SUBCUTANEOUS at 08:52

## 2018-02-02 RX ADMIN — SODIUM CHLORIDE 1000 ML: 0.9 INJECTION, SOLUTION INTRAVENOUS at 07:07

## 2018-02-02 RX ADMIN — PANTOPRAZOLE SODIUM 40 MG: 40 INJECTION, POWDER, FOR SOLUTION INTRAVENOUS at 08:47

## 2018-02-02 RX ADMIN — MAGNESIUM SULFATE HEPTAHYDRATE 2 G: 40 INJECTION, SOLUTION INTRAVENOUS at 11:24

## 2018-02-02 RX ADMIN — POTASSIUM CHLORIDE 20 MEQ: 200 INJECTION, SOLUTION INTRAVENOUS at 17:20

## 2018-02-02 RX ADMIN — LEVOTHYROXINE SODIUM ANHYDROUS 70 MCG: 100 INJECTION, POWDER, LYOPHILIZED, FOR SOLUTION INTRAVENOUS at 08:47

## 2018-02-02 RX ADMIN — HEPARIN SODIUM 5000 UNITS: 5000 INJECTION, SOLUTION INTRAVENOUS; SUBCUTANEOUS at 14:59

## 2018-02-02 RX ADMIN — HEPARIN SODIUM 5000 UNITS: 5000 INJECTION, SOLUTION INTRAVENOUS; SUBCUTANEOUS at 21:56

## 2018-02-02 RX ADMIN — SODIUM CHLORIDE 125 ML/HR: 0.9 INJECTION, SOLUTION INTRAVENOUS at 01:25

## 2018-02-02 RX ADMIN — POTASSIUM CHLORIDE, DEXTROSE MONOHYDRATE AND SODIUM CHLORIDE 100 ML/HR: 150; 5; 450 INJECTION, SOLUTION INTRAVENOUS at 09:02

## 2018-02-02 RX ADMIN — LORAZEPAM 0.5 MG: 2 INJECTION INTRAMUSCULAR; INTRAVENOUS at 21:56

## 2018-02-02 NOTE — CASE MANAGEMENT
Initial Clinical Review    Admission: Date/Time/Statement: 2/1/18 @ 2328     Orders Placed This Encounter   Procedures    Inpatient Admission     Standing Status:   Standing     Number of Occurrences:   1     Order Specific Question:   Admitting Physician     Answer:   Elyse Lagos     Order Specific Question:   Level of Care     Answer:   Med Surg [16]     Order Specific Question:   Estimated length of stay     Answer:   More than 2 Midnights     Order Specific Question:   Certification     Answer:   I certify that inpatient services are medically necessary for this patient for a duration of greater than two midnights  See H&P and MD Progress Notes for additional information about the patient's course of treatment  ED: Date/Time/Mode of Arrival:   ED Arrival Information     Expected Arrival Acuity Means of Arrival Escorted By Service Admission Type    - 2/1/2018 17:20 Urgent Walk-In Self Surgery-General Urgent    Arrival Complaint    flu like           Chief Complaint:   Chief Complaint   Patient presents with    Vomiting     Pt has vomiting for the last couple days unable to keep anything down, pt states shes been vomiting as much as 7 times a day for the last 3 days  Pt denies abdominal pain, complains of generalized weakness        History of Illness: 76 y o  female who presents with pmh Left breast cancer s/p mastectomy, Depression, DM, HTN, hypothyroid, ventral hernia, cholecystectomy, knee replacement, 1/13 Right Simple mastectomy, [facility]  She has been having nausea and vomiting is starting three days ago  It has progressed to the point where she can't keep anything down, she is tender in her right lower quadrant  Her vomitus is brown in color, and she finally felt that enough that she presented to the emergency department  Last flatus and bowel movement was 3 days ago    She has past medical history of an open cholecystectomy and abdominal aortic surgery which she has a healed midline laparotomy scar from xiphoid to pubis she said was 10 years ago  CT scan shows a high-grade small-bowel obstruction with a transition point in the right lower quadrant  ED Vital Signs:   ED Triage Vitals [02/01/18 1732]   Temperature Pulse Respirations Blood Pressure SpO2   97 7 °F (36 5 °C) 75 18 118/62 95 %      Temp Source Heart Rate Source Patient Position - Orthostatic VS BP Location FiO2 (%)   Oral Monitor Sitting Left arm --      Pain Score       No Pain        Wt Readings from Last 1 Encounters:   02/02/18 61 5 kg (135 lb 9 3 oz)       Vital Signs (abnormal): B/P = 176/79, 172/74    Abnormal Labs:    02/01/18 1742    WBC 4 31 - 10 16 Thousand/uL 11 31        02/01/18 1742    Sodium 136 - 145 mmol/L 134     Potassium 3 5 - 5 3 mmol/L 4 0    Chloride 100 - 108 mmol/L 94     CO2 21 - 32 mmol/L 30    Anion Gap 4 - 13 mmol/L 10    BUN 5 - 25 mg/dL 60     Creatinine 0 60 - 1 30 mg/dL 1 57       Diagnostic Test Results: CT Abd/ Pelvis - Findings consistent with high-grade small bowel obstruction with transition in the right lower pelvis  ED Treatment:   Medication Administration from 02/01/2018 1719 to 02/02/2018 0103       Date/Time Order Dose Route Action     02/01/2018 1948 sodium chloride 0 9 % bolus 1,000 mL 1,000 mL Intravenous New Bag     02/01/2018 1948 ondansetron (ZOFRAN) injection 4 mg 4 mg Intravenous Given     02/01/2018 2241 lidocaine (URO-JET) 2 % topical gel   Topical Given     02/01/2018 2240 midazolam (VERSED) injection 1 mg 1 mg Intravenous Given          Past Medical/Surgical History:    Active Ambulatory Problems     Diagnosis Date Noted    Heart palpitations 07/11/2016    Nicotine abuse 07/11/2016    Transient atrial fibrillation ([facility] ) 07/11/2016    Diabetes mellitus type 2 in nonobese ([facility] ) 07/11/2016    Hypertension, essential, benign 07/11/2016    Acquired hypothyroidism 07/11/2016    Malignant neoplasm of central portion of right female breast ([facility] ) 01/13/2017     Resolved Ambulatory Problems     Diagnosis Date Noted    No Resolved Ambulatory Problems     Past Medical History:   Diagnosis Date    Anxiety     [facility]     Depression     Diabetes mellitus ([facility] )     Hypertension     Hypothyroidism        Admitting Diagnosis: Vomiting [R11 10]    Age/Sex: 76 y o  female    Assessment:  Patient is 49-year-old female with a high-grade small-bowel obstruction  Plan:  NPO/IVF  FERNANDO [email or web address]  Prn pain control  Replete electrolytes  SQH  Admit to general surgery service       Admission Orders:  NPO; Sips with meds  NGT    Scheduled Meds:   Current Facility-Administered Medications:  dextrose 5 % and sodium chloride 0 45 % with KCl 20 mEq/L 100 mL/hr Intravenous Continuous   heparin (porcine) 5,000 Units Subcutaneous Q8H [address]   HYDROmorphone 0 2 mg Intravenous Q3H PRN   levothyroxine 70 mcg Intravenous Daily   metoprolol 5 mg Intravenous Q6H PRN   ondansetron 4 mg Intravenous Q6H PRN   pantoprazole 40 mg Intravenous Q24H YDAIRA   phenol 1 spray Mouth/Throat Q2H PRN     Continuous Infusions:   dextrose 5 % and sodium chloride 0 45 % with KCl 20 mEq/L 100 mL/hr Last Rate: 100 mL/hr (02/02/18 0902)     PRN Meds: HYDROmorphone    metoprolol    ondansetron    phenol

## 2018-02-02 NOTE — ED NOTES
Patient not tolerating PO fluids  Dr Martínez Offer aware, stated to inform CT that patient will not require PO contrast  CT aware        Ingrid Ambrocio RN  02/01/18 0922

## 2018-02-02 NOTE — PROGRESS NOTES
Patient care rounds were completed with the patient's nurse today, Aditya Mejia  We discussed the plan is to keep her NPO with an NG tube in place to suction for bowel decompression  Continue IV fluid resuscitation/hydration; monitor BMP further renal function which is improving  Monitor abdominal exam and await return of bowel function  Replete potassium today and continue to monitor electrolytes  Monitor strict I&Os  We reviewed all of the invasive devices/lines/telemetry orders  N/A  Pain Assessment / Plan:  - Continue current pain medication regimen  Mobility Assessment / Plan:  - Out of bed as tolerated  - May clamp NG tube for ambulation  Goals / Barriers for discharge:  - Awaiting resolution of bowel obstruction   - Case management following  All questions and concerns were addressed  I spent greater than 10 minutes reviewing the plan with the patient and the nurse, and coordinating her care for the day      Aniceto Pereyra PA-C  2/2/2018 08:15 AM

## 2018-02-02 NOTE — PLAN OF CARE
Problem: PAIN - ADULT  Goal: Verbalizes/displays adequate comfort level or baseline comfort level  Interventions:  - Encourage patient to monitor pain and request assistance  - Assess pain using appropriate pain scale  - Administer analgesics based on type and severity of pain and evaluate response  - Implement non-pharmacological measures as appropriate and evaluate response  - Consider cultural and social influences on pain and pain management  - Notify physician/advanced practitioner if interventions unsuccessful or patient reports new pain  Outcome: Progressing      Problem: INFECTION - ADULT  Goal: Absence or prevention of progression during hospitalization  INTERVENTIONS:  - Assess and monitor for signs and symptoms of infection  - Monitor lab/diagnostic results  - Monitor all insertion sites, i e  indwelling lines, tubes, and drains  - Monitor endotracheal (as able) and nasal secretions for changes in amount and color  - [place] appropriate cooling/warming therapies per order  - Administer medications as ordered  - Instruct and encourage patient and family to use good hand hygiene technique  - Identify and instruct in appropriate isolation precautions for identified infection/condition  Outcome: Progressing    Goal: Absence of fever/infection during neutropenic period  INTERVENTIONS:  - Monitor WBC  - Implement neutropenic guidelines  Outcome: Progressing      Problem: SAFETY ADULT  Goal: Patient will remain free of falls  INTERVENTIONS:  - Assess patient frequently for physical needs  -  Identify cognitive and physical deficits and behaviors that affect risk of falls    -  [place] fall precautions as indicated by assessment   - Educate patient/family on patient safety including physical limitations  - Instruct patient to call for assistance with activity based on assessment  - Modify environment to reduce risk of injury  - Consider OT/PT consult to assist with strengthening/mobility  Outcome: Progressing    Goal: Maintain or return to baseline ADL function  INTERVENTIONS:  -  Assess patient's ability to carry out ADLs; assess patient's baseline for ADL function and identify physical deficits which impact ability to perform ADLs (bathing, care of mouth/teeth, toileting, grooming, dressing, etc )  - Assess/evaluate cause of self-care deficits   - Assess range of motion  - Assess patient's mobility; develop plan if impaired  - Assess patient's need for assistive devices and provide as appropriate  - Encourage maximum independence but intervene and supervise when necessary  ¯ Involve family in performance of ADLs  ¯ Assess for home care needs following discharge   ¯ Request OT consult to assist with ADL evaluation and planning for discharge  ¯ Provide patient education as appropriate  Outcome: Progressing    Goal: Maintain or return mobility status to optimal level  INTERVENTIONS:  - Assess patient's baseline mobility status (ambulation, transfers, stairs, etc )    - Identify cognitive and physical deficits and behaviors that affect mobility  - Identify mobility aids required to assist with transfers and/or ambulation (gait belt, sit-to-stand, lift, walker, cane, etc )  - [place] fall precautions as indicated by assessment  - Record patient progress and toleration of activity level on Mobility SBAR; progress patient to next Phase/Stage  - Instruct patient to call for assistance with activity based on assessment  - Request Rehabilitation consult to assist with strengthening/weightbearing, etc   Outcome: Progressing

## 2018-02-02 NOTE — ED PROVIDER NOTES
History  Chief Complaint   Patient presents with    Vomiting     Pt has vomiting for the last couple days unable to keep anything down, pt states shes been vomiting as much as 7 times a day for the last 3 days  Pt denies abdominal pain, complains of generalized weakness      78-year-old female presenting to the ER today with a chief complaint of nausea and vomiting  Patient stated she began to experience the symptoms on Monday  States she is vomiting multiple times  States she is unable to keep any liquids down  When symptoms worsened patient came to the ER today to be evaluated  Complains of some abdominal cramping with vomiting  Denies fevers, chills  Denies diarrhea  Acknowledges decreased urination during this time  History provided by:  Patient   used: No    Vomiting   Severity:  Severe  Duration:  4 days  Timing:  Constant  Quality:  Unable to specify  Progression:  Worsening  Chronicity:  New  Recent urination:  Decreased  Relieved by:  Nothing  Worsened by:  Nothing  Associated symptoms: abdominal pain    Associated symptoms: no chills, no diarrhea and no fever        Prior to Admission Medications   Prescriptions Last Dose Informant Patient Reported? Taking? Cholecalciferol (VITAMIN D3) 5000 UNITS TABS   Yes Yes   Sig: Take 5,000 Units by mouth daily  CloNIDine HCl ER 0 1 MG TB12   Yes Yes   Sig: Take by mouth   LEVOTHYROXINE SODIUM PO   Yes Yes   Sig: Take 135 mcg by mouth daily  LORazepam (ATIVAN) 0 5 mg tablet   Yes Yes   Sig: Take 0 5 mg by mouth daily as needed for anxiety  Zinc 50 MG CAPS   Yes Yes   Sig: Take by mouth   amLODIPine (NORVASC) 5 mg tablet   Yes Yes   Sig: Take 5 mg by mouth daily  ascorbic acid (VITAMIN C) 500 mg tablet   Yes Yes   Sig: Take 500 mg by mouth daily   lisinopril (ZESTRIL) 20 mg tablet   Yes Yes   Sig: Take 20 mg by mouth 2 (two) times a day     metFORMIN (GLUCOPHAGE) 500 mg tablet   Yes Yes   Sig: Take 500 mg by mouth 2 (two) times a day with meals  propranolol (INDERAL) 40 mg tablet   Yes Yes   Sig: Take 80 mg by mouth 3 (three) times a day Indications: High Blood Pressure  Facility-Administered Medications: None       Past Medical History:   Diagnosis Date    Anxiety     Cancer ([facility] )     LEFT BREAST CA 22 YEARS AGO     Depression     Diabetes mellitus ([facility] )     Hypertension     Hypothyroidism        Past Surgical History:   Procedure Laterality Date    BREAST SURGERY      CHOLECYSTECTOMY      JOINT REPLACEMENT      LEFT KNEE REPLACEMENT     MASTECTOMY      NH BIOPSY/EXCISION, LYMPH NODE(S) Right 1/13/2017    Procedure: SENTINEL LYMPH NODE BIOPSY RIGHT AXILLA; Surgeon: Garret Borrego MD;  [facility];  Service: General    NH MASTECTOMY, SIMPLE, COMPLETE Right 1/13/2017    Procedure: MASTECTOMY SIMPLE;  Surgeon: Garret Borrego MD;  [facility];  Service: General       History reviewed  No pertinent family history  I have reviewed and agree with the history as documented  Social History   Substance Use Topics    Smoking status: Current Every Day Smoker     Packs/day: 1 00     Types: Cigarettes    Smokeless tobacco: Never Used    Alcohol use No        Review of Systems   Constitutional: Negative  Negative for appetite change, chills, diaphoresis, fatigue and fever  HENT: Negative  Eyes: Negative  Respiratory: Negative  Cardiovascular: Negative  Gastrointestinal: Positive for abdominal pain, nausea and vomiting  Negative for blood in stool, constipation and diarrhea  Endocrine: Negative  Genitourinary: Negative for decreased urine volume, difficulty urinating, dyspareunia, dysuria, flank pain, frequency, hematuria, pelvic pain, urgency, vaginal bleeding, vaginal discharge and vaginal pain  Musculoskeletal: Negative  Skin: Negative  Allergic/Immunologic: Negative  Neurological: Negative  Psychiatric/Behavioral: Negative      All other systems reviewed and are negative  Physical Exam  ED Triage Vitals [02/01/18 1732]   Temperature Pulse Respirations Blood Pressure SpO2   97 7 °F (36 5 °C) 75 18 118/62 95 %      Temp Source Heart Rate Source Patient Position - Orthostatic VS BP Location FiO2 (%)   Oral Monitor Sitting Left arm --      Pain Score       No Pain           Orthostatic Vital Signs  Vitals:    02/01/18 2238 02/01/18 2245 02/01/18 2300 02/01/18 2330   BP: 169/74 (!) 179/71 151/74 (!) 177/73   Pulse: 84 86 86 84   Patient Position - Orthostatic VS: Lying Sitting Sitting Lying       Physical Exam   Constitutional: She is oriented to person, place, and time  She appears well-developed and well-nourished  HENT:   Head: Normocephalic and atraumatic  Right Ear: External ear normal    Left Ear: External ear normal    Nose: Nose normal    Mouth/Throat: Oropharynx is clear and moist    Eyes: Conjunctivae and EOM are normal  Pupils are equal, round, and reactive to light  Neck: Normal range of motion  Neck supple  No JVD present  No tracheal deviation present  No thyromegaly present  Cardiovascular: Normal rate, regular rhythm, normal heart sounds and intact distal pulses  Exam reveals no gallop and no friction rub  No murmur heard  Pulmonary/Chest: Effort normal and breath sounds normal  No stridor  No respiratory distress  She has no wheezes  She has no rales  She exhibits no tenderness  Abdominal: Soft  Bowel sounds are normal  She exhibits distension  She exhibits no mass  There is tenderness  There is no rebound and no guarding  No hernia  Musculoskeletal: Normal range of motion  She exhibits no edema, tenderness or deformity  Lymphadenopathy:     She has no cervical adenopathy  Neurological: She is alert and oriented to person, place, and time  She has normal reflexes  She displays normal reflexes  No cranial nerve deficit  She exhibits normal muscle tone  Coordination normal    Skin: Skin is warm  No rash noted  No erythema  No pallor  Psychiatric: She has a normal mood and affect  Her behavior is normal  Judgment and thought content normal    Nursing note and vitals reviewed  ED Medications  Medications   ondansetron (ZOFRAN) injection 4 mg (not administered)   sodium chloride 0 9 % infusion (not administered)   heparin (porcine) subcutaneous injection 5,000 Units (not administered)   HYDROmorphone (DILAUDID) injection 0 2 mg (not administered)   levothyroxine injection 70 mcg (not administered)   metoprolol (LOPRESSOR) injection 5 mg (not administered)   sodium chloride 0 9 % bolus 1,000 mL (0 mL Intravenous Stopped 2/1/18 2048)   ondansetron (ZOFRAN) injection 4 mg (4 mg Intravenous Given 2/1/18 1948)   lidocaine (URO-JET) 2 % topical gel ( Topical Given 2/1/18 2241)   midazolam (VERSED) injection 1 mg (1 mg Intravenous Given 2/1/18 2240)       Diagnostic Studies  Results Reviewed     Procedure Component Value Units Date/Time    Comprehensive metabolic panel [[id number]]  (Abnormal) Collected:  02/01/18 1742    Lab Status:  Final result Specimen:  Blood from Arm, Left Updated:  02/01/18 1814     Sodium 134 (L) mmol/L      Potassium 4 0 mmol/L      Chloride 94 (L) mmol/L      CO2 30 mmol/L      Anion Gap 10 mmol/L      BUN 60 (H) mg/dL      Creatinine 1 57 (H) mg/dL      Glucose 129 mg/dL      Calcium 9 9 mg/dL      AST 23 U/L      ALT 21 U/L      Alkaline Phosphatase 65 U/L      Total Protein 7 7 g/dL      Albumin 3 8 g/dL      Total Bilirubin 1 00 mg/dL      eGFR 31 ml/min/1 73sq m     Narrative:         National Kidney Disease Education Program recommendations are as follows:  GFR calculation is accurate only with a steady state creatinine  Chronic Kidney disease less than 60 ml/min/1 73 sq  meters  Kidney failure less than 15 ml/min/1 73 sq  meters      Lipase [[id number]]  (Normal) Collected:  02/01/18 1742    Lab Status:  Final result Specimen:  Blood from Arm, Left Updated:  02/01/18 1814     Lipase 128 u/L     CBC and differential [[id number]]  (Abnormal) Collected:  02/01/18 1742    Lab Status:  Final result Specimen:  Blood from Arm, Left Updated:  02/01/18 1759     WBC 11 31 (H) Thousand/uL      RBC 4 33 Million/uL      Hemoglobin 13 1 g/dL      Hematocrit 37 0 %      MCV 86 fL      MCH 30 3 pg      MCHC 35 4 g/dL      RDW 14 0 %      MPV 10 0 fL      Platelets 579 Thousands/uL      nRBC 0 /100 WBCs      Neutrophils Relative 76 (H) %      Lymphocytes Relative 12 (L) %      Monocytes Relative 12 %      Eosinophils Relative 0 %      Basophils Relative 0 %      Neutrophils Absolute 8 53 (H) Thousands/µL      Lymphocytes Absolute 1 31 Thousands/µL      Monocytes Absolute 1 37 (H) Thousand/µL      Eosinophils Absolute 0 05 Thousand/µL      Basophils Absolute 0 02 Thousands/µL                  CT abdomen pelvis wo contrast   Final Result by Tonya Stone DO (02/01 2212)      Findings consistent with high-grade small bowel obstruction with transition in the right lower pelvis  Limited study without oral or IV contrast        I personally discussed this study with Trinidad Benjamin on 2/1/2018 10:12 PM          Workstation performed: [id number]               Procedures  Procedures      Phone Consults  ED Phone Contact    ED Course  ED Course as of Feb 02 0109   Thu Feb 01, 2018   1934 BUN: (!) 60   1935 Creatinine: (!) 1 57   1935 WBC: (!) 11 31   2221   Red surgery is been paged  NG tube will be going in  Identification of Seniors at [address] [place] [address] Most Recent Value   (ISAR) Identification of Seniors at Risk   Before the illness or injury that brought you to the Emergency, did you need someone to help you on a regular basis? 0 Filed at: 02/01/2018 1733   In the last 24 hours, have you needed more help than usual?  1 Filed at: 02/01/2018 1733   Have you been hospitalized for one or more nights during the past 6 months?   0 Filed at: 02/01/2018 1733   In general, do you see well?  0 Filed at: 02/01/2018 1733   In general, do you have serious problems with your memory? 0 Filed at: 02/01/2018 1733   Do you take more than three different medications every day? 1 Filed at: 02/01/2018 1733   ISAR Score  2 Filed at: 02/01/2018 1733                          [facility]  Number of Diagnoses or Management Options  Bowel obstruction: new and requires workup  Diagnosis management comments: Assessment:  69-year-old female presenting to the ER today with nausea and vomiting x4 days  Plan:  -CT scan to rule out bowel obstruction  - CBC, CMP, urinalysis  - will treat patient's symptoms IV fluids and Zofran  - re-evaluation         Amount and/or Complexity of Data Reviewed  Clinical lab tests: ordered and reviewed  Tests in the radiology section of CPT®: ordered and reviewed  Tests in the medicine section of CPT®: reviewed and ordered  Decide to obtain previous medical records or to obtain history from someone other than the patient: yes  Independent visualization of images, tracings, or specimens: yes    Patient Progress  Patient progress: stable    CritCare Time    Disposition  Final diagnoses: Bowel obstruction     Time reflects when diagnosis was documented in both MDM as applicable and the Disposition within this note     Time User Action Codes Description Comment    2/2/2018  1:09 AM Andrea Pompa Add [K58 609] Bowel obstruction       ED Disposition     ED Disposition Condition Comment    Admit  Case was discussed with Michelle and the patient's admission status was agreed to be Admission Status: observation status to the service of Dr Harry Trotter   Follow-up Information    None       Current Discharge Medication List      CONTINUE these medications which have NOT CHANGED    Details   amLODIPine (NORVASC) 5 mg tablet Take 5 mg by mouth daily  ascorbic acid (VITAMIN C) 500 mg tablet Take 500 mg by mouth daily      Cholecalciferol (VITAMIN D3) 5000 UNITS TABS Take 5,000 Units by mouth daily        CloNIDine HCl ER 0 1 MG TB12 Take by mouth LEVOTHYROXINE SODIUM PO Take 135 mcg by mouth daily  lisinopril (ZESTRIL) 20 mg tablet Take 20 mg by mouth 2 (two) times a day  LORazepam (ATIVAN) 0 5 mg tablet Take 0 5 mg by mouth daily as needed for anxiety  metFORMIN (GLUCOPHAGE) 500 mg tablet Take 500 mg by mouth 2 (two) times a day with meals  propranolol (INDERAL) 40 mg tablet Take 80 mg by mouth 3 (three) times a day Indications: High Blood Pressure  Zinc 50 MG CAPS Take by mouth           No discharge procedures on file  ED Provider  Attending physically available and evaluated Tess Sat  I managed the patient along with the ED Attending      Electronically Signed by         Jose F Casas DO  02/02/18 8901

## 2018-02-02 NOTE — ED ATTENDING ATTESTATION
Coty Quintanilla DO, saw and evaluated the patient  I have discussed the patient with the resident/non-physician practitioner and agree with the resident's/non-physician practitioner's findings, Plan of Care, and MDM as documented in the resident's/non-physician practitioner's note, except where noted  All available labs and Radiology studies were reviewed  At this point I agree with the current assessment done in the Emergency Department  I have conducted an independent evaluation of this patient a history and physical is as follows:    77 yo female presents for evaluation of n/v starting Monday  States she is unable to tolerate PO  Describes emesis as brown  Has some associated abd distention, diffuse pain  Has hx of multiple abd surgeries  Imp: abd pain and distention, vomiting  Concern for possible obstruction  plan:  CT abd/pelvis, labs, IVF, reassess        Critical Care Time  CritCare Time    Procedures

## 2018-02-02 NOTE — PLAN OF CARE
Problem: PAIN - ADULT  Goal: Verbalizes/displays adequate comfort level or baseline comfort level  Interventions:  - Encourage patient to monitor pain and request assistance  - Assess pain using appropriate pain scale  - Administer analgesics based on type and severity of pain and evaluate response  - Implement non-pharmacological measures as appropriate and evaluate response  - Consider cultural and social influences on pain and pain management  - Notify physician/advanced practitioner if interventions unsuccessful or patient reports new pain   Outcome: Progressing      Problem: INFECTION - ADULT  Goal: Absence or prevention of progression during hospitalization  INTERVENTIONS:  - Assess and monitor for signs and symptoms of infection  - Monitor lab/diagnostic results  - Monitor all insertion sites, i e  indwelling lines, tubes, and drains  - Monitor endotracheal (as able) and nasal secretions for changes in amount and color  - Errol appropriate cooling/warming therapies per order  - Administer medications as ordered  - Instruct and encourage patient and family to use good hand hygiene technique  - Identify and instruct in appropriate isolation precautions for identified infection/condition   Outcome: Progressing    Goal: Absence of fever/infection during neutropenic period  INTERVENTIONS:  - Monitor WBC  - Implement neutropenic guidelines   Outcome: Progressing      Problem: SAFETY ADULT  Goal: Patient will remain free of falls  INTERVENTIONS:  - Assess patient frequently for physical needs  -  Identify cognitive and physical deficits and behaviors that affect risk of falls    -  Errol fall precautions as indicated by assessment   - Educate patient/family on patient safety including physical limitations  - Instruct patient to call for assistance with activity based on assessment  - Modify environment to reduce risk of injury  - Consider OT/PT consult to assist with strengthening/mobility   Outcome: Progressing    Goal: Maintain or return to baseline ADL function  INTERVENTIONS:  -  Assess patient's ability to carry out ADLs; assess patient's baseline for ADL function and identify physical deficits which impact ability to perform ADLs (bathing, care of mouth/teeth, toileting, grooming, dressing, etc )  - Assess/evaluate cause of self-care deficits   - Assess range of motion  - Assess patient's mobility; develop plan if impaired  - Assess patient's need for assistive devices and provide as appropriate  - Encourage maximum independence but intervene and supervise when necessary  ¯ Involve family in performance of ADLs  ¯ Assess for home care needs following discharge   ¯ Request OT consult to assist with ADL evaluation and planning for discharge  ¯ Provide patient education as appropriate   Outcome: Progressing    Goal: Maintain or return mobility status to optimal level  INTERVENTIONS:  - Assess patient's baseline mobility status (ambulation, transfers, stairs, etc )    - Identify cognitive and physical deficits and behaviors that affect mobility  - Identify mobility aids required to assist with transfers and/or ambulation (gait belt, sit-to-stand, lift, walker, cane, etc )  - Agency fall precautions as indicated by assessment  - Record patient progress and toleration of activity level on Mobility SBAR; progress patient to next Phase/Stage  - Instruct patient to call for assistance with activity based on assessment  - Request Rehabilitation consult to assist with strengthening/weightbearing, etc    Outcome: Progressing

## 2018-02-02 NOTE — RESTORATIVE TECHNICIAN NOTE
Restorative Specialist Mobility Note       Activity: Ambulate in caldera, Chair     Assistive Device: Other (Comment) (Pt pushed IV pole for support)     Ambulation Response: Tolerated fairly well (Pt felt a little weak and unsteady while ambulating, but did well )  Repositioned: Sitting, Up in chair     Range of Motion: Active, All extremities     Anti-Embolism Device Off: Other (Comment) (pt currently washing up)  Pt left sitting up in chair, currently washing  Pt aware she must ambulate 3x/day, in agreement

## 2018-02-02 NOTE — PROGRESS NOTES
Pt care rounding with Saud James- plan is to continue ng tube   Encourage walking and replace electrolytes iv

## 2018-02-02 NOTE — H&P
H&P Exam - General Surgery   Sayda Varma 76 y o  female MRN: [id number]  Unit/Bed#: ED 11 Encounter: [id number]    Assessment/Plan     Assessment:  Patient is 13-year-old female with a high-grade small-bowel obstruction  Plan:  NPO/IVF  FERNANDO [email or web address]  Prn pain control  Replete electrolytes  SQH  Admit to general surgery service    History of Present Illness      HPI:  Sayda Varma is a 76 y o  female who presents with pmh Left breast cancer s/p mastectomy, Depression, DM, HTN, hypothyroid, ventral hernia, cholecystectomy, knee replacement, 1/13 Right Simple mastectomy, [facility]  She has been having nausea and vomiting is starting three days ago  It has progressed to the point where she can't keep anything down, she is tender in her right lower quadrant  Her vomitus is brown in color, and she finally felt that enough that she presented to the emergency department  Last flatus and bowel movement was 3 days ago  She has past medical history of an open cholecystectomy and abdominal aortic surgery which she has a healed midline laparotomy scar from xiphoid to pubis she said was 10 years ago  CT scan shows a high-grade small-bowel obstruction with a transition point in the right lower quadrant  Review of Systems   Constitutional: Positive for appetite change and fatigue  Negative for activity change  HENT: Negative  Eyes: Negative  Respiratory: Negative  Cardiovascular: Negative  Gastrointestinal: Positive for abdominal distention, abdominal pain, nausea and vomiting  Negative for diarrhea  Endocrine: Negative  Genitourinary: Negative  Musculoskeletal: Negative  Neurological: Negative  Hematological: Negative  Psychiatric/Behavioral: Negative          Historical Information   Past Medical History:   Diagnosis Date    Anxiety     Cancer ([facility] )     LEFT BREAST CA 22 YEARS AGO     Depression     [facility]     Hypertension     Hypothyroidism      Past Surgical History:   Procedure Laterality Date    BREAST SURGERY      CHOLECYSTECTOMY      JOINT REPLACEMENT      LEFT KNEE REPLACEMENT     MASTECTOMY      OR BIOPSY/EXCISION, LYMPH NODE(S) Right 1/13/2017    Procedure: SENTINEL LYMPH NODE BIOPSY RIGHT AXILLA;   Surgeon: Garret Borrego MD;  [facility];  Service: General    OR MASTECTOMY, SIMPLE, COMPLETE Right 1/13/2017    Procedure: MASTECTOMY SIMPLE;  Surgeon: Garret Borrego MD;  [facility];  Service: General     Social History   History   Alcohol Use No     History   Drug Use No     History   Smoking Status    Current Every Day Smoker    Packs/day: 1 00    Types: Cigarettes   Smokeless Tobacco    Never Used     Family History: non-contributory    Meds/Allergies   all medications and allergies reviewed  Allergies   Allergen Reactions    Morphine GI Intolerance    Penicillins     Percocet [Oxycodone-Acetaminophen]        Objective   First Vitals:   Blood Pressure: 118/62 (02/01/18 1732)  Pulse: 75 (02/01/18 1732)  Temperature: 97 7 °F (36 5 °C) (02/01/18 1732)  Temp Source: Oral (02/01/18 1732)  Respirations: 18 (02/01/18 1732)  Height: 5' 7 5" (171 5 cm) (02/01/18 1732)  Weight - Scale: 65 8 kg (145 lb) (02/01/18 1948)  SpO2: 95 % (02/01/18 1732)    Current Vitals:   Blood Pressure: (!) 179/71 (02/01/18 2245)  Pulse: 86 (02/01/18 2245)  Temperature: 97 7 °F (36 5 °C) (02/01/18 1732)  Temp Source: Oral (02/01/18 1732)  Respirations: 18 (02/01/18 2245)  Height: 5' 7 5" (171 5 cm) (02/01/18 1732)  Weight - Scale: 65 8 kg (145 lb) (02/01/18 1948)  SpO2: 94 % (02/01/18 2245)      Intake/Output Summary (Last 24 hours) at 02/01/18 2256  Last data filed at 02/01/18 2048   Gross per 24 hour   Intake             1000 ml   Output                0 ml   Net             1000 ml       Invasive Devices     Peripheral Intravenous Line            Peripheral IV 02/01/18 Left Forearm less than 1 day          Drain            NG/OG/Enteral Tube 18 Fr Right nares less than 1 day                Physical Exam   Constitutional: She is oriented to person, place, and time  She appears well-developed and well-nourished  HENT:   Head: Normocephalic  Eyes: Pupils are equal, round, and reactive to light  Neck: Normal range of motion  Cardiovascular: Normal rate  Pulmonary/Chest: Effort normal  No respiratory distress  Abdominal: Soft  She exhibits distension  There is tenderness  There is no rebound and no guarding  RLQ abdominal pain to palpation, mild left sided abdominal pain   Musculoskeletal: She exhibits no edema or deformity  Neurological: She is alert and oriented to person, place, and time  Skin: Skin is warm and dry  Psychiatric: She has a normal mood and affect  Lab Results:   I have personally reviewed pertinent lab results  , CBC:   Lab Results   Component Value Date    WBC 11 31 (H) 02/01/2018    HGB 13 1 02/01/2018    HCT 37 0 02/01/2018    MCV 86 02/01/2018     02/01/2018    MCH 30 3 02/01/2018    MCHC 35 4 02/01/2018    RDW 14 0 02/01/2018    MPV 10 0 02/01/2018    NRBC 0 02/01/2018   , CMP:   Lab Results   Component Value Date     (L) 02/01/2018    K 4 0 02/01/2018    CL 94 (L) 02/01/2018    CO2 30 02/01/2018    ANIONGAP 10 02/01/2018    BUN 60 (H) 02/01/2018    CREATININE 1 57 (H) 02/01/2018    GLUCOSE 129 02/01/2018    CALCIUM 9 9 02/01/2018    AST 23 02/01/2018    ALT 21 02/01/2018    ALKPHOS 65 02/01/2018    PROT 7 7 02/01/2018    BILITOT 1 00 02/01/2018    EGFR 31 02/01/2018     Imaging: I have personally reviewed pertinent reports  EKG, Pathology, and Other Studies: I have personally reviewed pertinent reports        Code Status: Prior  Advance Directive and Living Will:      Power of :    POLST:

## 2018-02-02 NOTE — PROGRESS NOTES
Progress Note - General Surgery   Alex Hubbard 76 y o  female MRN: [id number]  Unit/Bed#: [facility] 923-01 Encounter: [id number]    Assessment:  77F w/high grade SBO    Plan:  - NPO/NGT  - switch to maintenance IVF  - prn pain control  - OOB/ambulate  - PT/OT  - SQH/SCDs      Subjective/Objective   Subjective: feels better since NGT inserted, no flatus or BM    Objective:    Blood pressure (!) 172/74, pulse 75, temperature 98 8 °F (37 1 °C), temperature source Oral, resp  rate 18, height 5' 6" (1 676 m), weight 61 5 kg (135 lb 9 3 oz), SpO2 92 %  ,Body mass index is 21 88 kg/m²  I/O last 24 hours: In: 1520 8 [I V :520 8;  IV Piggyback:1000]  Out: 1375 [Emesis/NG output:1375]    Invasive Devices     Peripheral Intravenous Line            Peripheral IV 02/01/18 Left Forearm less than 1 day          Drain            NG/OG/Enteral Tube 18 Fr Right nares less than 1 day                Physical Exam:   NAD  Norm resp effort  RRR  Abd soft, mildly tender to palpation, ND  NGT in place    Lab, Imaging and other studies:  Lab Results   Component Value Date    WBC 11 48 (H) 02/02/2018    HGB 12 5 02/02/2018    HCT 36 6 02/02/2018    MCV 87 02/02/2018     02/02/2018     02/02/2018      Lab Results   Component Value Date    GLUCOSE 79 02/02/2018    CALCIUM 8 7 02/02/2018     (L) 02/02/2018    K 3 5 02/02/2018    CO2 29 02/02/2018    CL 98 (L) 02/02/2018    BUN 57 (H) 02/02/2018    CREATININE 1 18 02/02/2018       VTE Pharmacologic Prophylaxis: Heparin  VTE Mechanical Prophylaxis: sequential compression device

## 2018-02-03 ENCOUNTER — APPOINTMENT (INPATIENT)
Dept: RADIOLOGY | Facility: HOSPITAL | Age: 78
DRG: 329 | End: 2018-02-03
Payer: MEDICARE

## 2018-02-03 LAB
ANION GAP SERPL CALCULATED.3IONS-SCNC: 6 MMOL/L (ref 4–13)
ATRIAL RATE: 108 BPM
BUN SERPL-MCNC: 26 MG/DL (ref 5–25)
CALCIUM SERPL-MCNC: 8.2 MG/DL (ref 8.3–10.1)
CHLORIDE SERPL-SCNC: 101 MMOL/L (ref 100–108)
CO2 SERPL-SCNC: 28 MMOL/L (ref 21–32)
CREAT SERPL-MCNC: 0.75 MG/DL (ref 0.6–1.3)
GFR SERPL CREATININE-BSD FRML MDRD: 76 ML/MIN/1.73SQ M
GLUCOSE SERPL-MCNC: 134 MG/DL (ref 65–140)
GLUCOSE SERPL-MCNC: 154 MG/DL (ref 65–140)
GLUCOSE SERPL-MCNC: 158 MG/DL (ref 65–140)
GLUCOSE SERPL-MCNC: 172 MG/DL (ref 65–140)
MAGNESIUM SERPL-MCNC: 2.1 MG/DL (ref 1.6–2.6)
P AXIS: 53 DEGREES
POTASSIUM SERPL-SCNC: 4.2 MMOL/L (ref 3.5–5.3)
PR INTERVAL: 202 MS
QRS AXIS: -14 DEGREES
QRSD INTERVAL: 86 MS
QT INTERVAL: 336 MS
QTC INTERVAL: 450 MS
SODIUM SERPL-SCNC: 135 MMOL/L (ref 136–145)
T WAVE AXIS: 68 DEGREES
VENTRICULAR RATE: 108 BPM

## 2018-02-03 PROCEDURE — 93005 ELECTROCARDIOGRAM TRACING: CPT | Performed by: SURGERY

## 2018-02-03 PROCEDURE — 82948 REAGENT STRIP/BLOOD GLUCOSE: CPT

## 2018-02-03 PROCEDURE — 99232 SBSQ HOSP IP/OBS MODERATE 35: CPT | Performed by: SURGERY

## 2018-02-03 PROCEDURE — 80048 BASIC METABOLIC PNL TOTAL CA: CPT | Performed by: STUDENT IN AN ORGANIZED HEALTH CARE EDUCATION/TRAINING PROGRAM

## 2018-02-03 PROCEDURE — 74250 X-RAY XM SM INT 1CNTRST STD: CPT

## 2018-02-03 PROCEDURE — 93010 ELECTROCARDIOGRAM REPORT: CPT | Performed by: INTERNAL MEDICINE

## 2018-02-03 PROCEDURE — C9113 INJ PANTOPRAZOLE SODIUM, VIA: HCPCS | Performed by: STUDENT IN AN ORGANIZED HEALTH CARE EDUCATION/TRAINING PROGRAM

## 2018-02-03 PROCEDURE — 83735 ASSAY OF MAGNESIUM: CPT | Performed by: STUDENT IN AN ORGANIZED HEALTH CARE EDUCATION/TRAINING PROGRAM

## 2018-02-03 RX ORDER — HYDRALAZINE HYDROCHLORIDE 20 MG/ML
5 INJECTION INTRAMUSCULAR; INTRAVENOUS EVERY 6 HOURS PRN
Status: DISCONTINUED | OUTPATIENT
Start: 2018-02-03 | End: 2018-02-09

## 2018-02-03 RX ORDER — HALOPERIDOL 5 MG/ML
1 INJECTION INTRAMUSCULAR ONCE
Status: DISCONTINUED | OUTPATIENT
Start: 2018-02-03 | End: 2018-02-03

## 2018-02-03 RX ADMIN — IOHEXOL 300 ML: 350 INJECTION, SOLUTION INTRAVENOUS at 12:30

## 2018-02-03 RX ADMIN — HYDRALAZINE HYDROCHLORIDE 5 MG: 20 INJECTION INTRAMUSCULAR; INTRAVENOUS at 23:50

## 2018-02-03 RX ADMIN — ONDANSETRON 4 MG: 2 INJECTION INTRAMUSCULAR; INTRAVENOUS at 00:25

## 2018-02-03 RX ADMIN — LEVOTHYROXINE SODIUM ANHYDROUS 70 MCG: 100 INJECTION, POWDER, LYOPHILIZED, FOR SOLUTION INTRAVENOUS at 09:45

## 2018-02-03 RX ADMIN — POTASSIUM CHLORIDE, DEXTROSE MONOHYDRATE AND SODIUM CHLORIDE 100 ML/HR: 150; 5; 450 INJECTION, SOLUTION INTRAVENOUS at 16:10

## 2018-02-03 RX ADMIN — METOPROLOL TARTRATE 5 MG: 1 INJECTION, SOLUTION INTRAVENOUS at 00:25

## 2018-02-03 RX ADMIN — HYDROMORPHONE HYDROCHLORIDE 0.2 MG: 1 INJECTION, SOLUTION INTRAMUSCULAR; INTRAVENOUS; SUBCUTANEOUS at 00:25

## 2018-02-03 RX ADMIN — ONDANSETRON 4 MG: 2 INJECTION INTRAMUSCULAR; INTRAVENOUS at 20:15

## 2018-02-03 RX ADMIN — PANTOPRAZOLE SODIUM 40 MG: 40 INJECTION, POWDER, FOR SOLUTION INTRAVENOUS at 09:45

## 2018-02-03 RX ADMIN — HEPARIN SODIUM 5000 UNITS: 5000 INJECTION, SOLUTION INTRAVENOUS; SUBCUTANEOUS at 23:07

## 2018-02-03 RX ADMIN — HEPARIN SODIUM 5000 UNITS: 5000 INJECTION, SOLUTION INTRAVENOUS; SUBCUTANEOUS at 15:43

## 2018-02-03 RX ADMIN — POTASSIUM CHLORIDE, DEXTROSE MONOHYDRATE AND SODIUM CHLORIDE 100 ML/HR: 150; 5; 450 INJECTION, SOLUTION INTRAVENOUS at 05:29

## 2018-02-03 RX ADMIN — HYDROMORPHONE HYDROCHLORIDE 0.2 MG: 1 INJECTION, SOLUTION INTRAMUSCULAR; INTRAVENOUS; SUBCUTANEOUS at 18:50

## 2018-02-03 NOTE — PROGRESS NOTES
Progress Note - General Surgery  Ebony Pickard 76 y o  female MRN: [id number]  Unit/Bed#: [facility] 923-01 Encounter: [id number]    Assessment:  76y o -year-old female with high-grade SBO secondary to laparotomies in past    Plan:  1  Small bowel obstruction   - small bowel follow through   - cont NGT to sxn for now, ok to clamp during SBFT    2  Delirium    - possibly due to ativan   - will check EKG and write for haldol prn for anxiety    3  Hypertension   - will add hydralazine PRN, continue metoprolol    4  DVT Prophylaxis   - SQH   - SCDs    5  Disposition   - med surg    Freddie Liz MD PGY-4  5:27 AM  02/03/18      Subjective:  No flatus, possibly some belching  Was delirious overnight and it really scared the patient, per the patient herself  Objective:  Patient Vitals for the past 24 hrs:   BP Temp Temp src Pulse Resp SpO2   02/03/18 0318 (!) 174/70 98 °F (36 7 °C) Oral 97 18 95 %   02/03/18 0002 (!) 182/70 98 1 °F (36 7 °C) Oral 99 18 97 %   02/02/18 2056 (!) 188/80 - - - - -   02/02/18 1836 (!) 180/78 - - 92 - -   02/02/18 1450 170/74 97 6 °F (36 4 °C) Oral 98 18 99 %   02/02/18 0722 158/70 98 2 °F (36 8 °C) Oral 91 18 95 %          Diet Orders            Start     Ordered    02/02/18 0105  Diet NPO; Sips with meds  Diet effective now     Question Answer Comment   Diet Type NPO    NPO Except: Sips with meds    RD to adjust diet per protocol?  No        02/02/18 0104        Intake/Output Summary (Last 24 hours) at 02/03/18 0527  Last data filed at 02/02/18 2155   Gross per 24 hour   Intake          2105 41 ml   Output             1140 ml   Net           965 41 ml    + x1   (100 overnight)     Physical Exam:  General: NAD  Cardiovascular: RRR  Respiratory: breath sounds b/l  Abdomen: soft, NT, mild distension  Extremities: no edema    Medications:    Current Facility-Administered Medications:  dextrose 5 % and sodium chloride 0 45 % with KCl 20 mEq/L 100 mL/hr Intravenous Continuous Stefani Kenny MD Last Rate: 100 mL/hr (02/02/18 1959)   heparin (porcine) 5,000 Units Subcutaneous [facility] Kamran León MD    HYDROmorphone 0 2 mg Intravenous Q3H PRN Kamran León MD    levothyroxine 70 mcg Intravenous Daily Kamran León MD    LORazepam 0 5 mg Intravenous Daily PRN Anthony Monae MD    metoprolol 5 mg Intravenous Q6H PRN Kamran León MD    ondansetron 4 mg Intravenous Q6H PRN Kamran León MD    pantoprazole 40 mg Intravenous Q24H Layla Cedeño MD    phenol 1 spray Mouth/Throat Q2H PRN Stefani Kenny MD      dextrose 5 % and sodium chloride 0 45 % with KCl 20 mEq/L 100 mL/hr Last Rate: 100 mL/hr (02/02/18 1959)     HYDROmorphone 0 2 mg Q3H PRN   LORazepam 0 5 mg Daily PRN   metoprolol 5 mg Q6H PRN   ondansetron 4 mg Q6H PRN   phenol 1 spray Q2H PRN     Laboratory results:   CBC: No results found for: WBC, HGB, HCT, MCV, PLT, ADJUSTEDWBC, MCH, MCHC, RDW, MPV, NRBC, CMP:   Lab Results   Component Value Date     (L) 02/03/2018    K 4 2 02/03/2018     02/03/2018    CO2 28 02/03/2018    ANIONGAP 6 02/03/2018    BUN 26 (H) 02/03/2018    CREATININE 0 75 02/03/2018    GLUCOSE 158 (H) 02/03/2018    CALCIUM 8 2 (L) 02/03/2018    EGFR 76 02/03/2018   , Coagulation: No results found for: PT, INR, APTT, Urinalysis: No results found for: COLORU, CLARITYU, SPECGRAV, PHUR, LEUKOCYTESUR, NITRITE, PROTEINUA, GLUCOSEU, KETONESU, BILIRUBINUR, BLOODU, Amylase: No results found for: AMYLASE, Lipase: No results found for: LIPASE    VTE Pharmacologic Prophylaxis: Heparin  VTE Mechanical Prophylaxis: sequential compression device

## 2018-02-04 ENCOUNTER — ANESTHESIA (INPATIENT)
Dept: PERIOP | Facility: HOSPITAL | Age: 78
DRG: 329 | End: 2018-02-04
Payer: MEDICARE

## 2018-02-04 ENCOUNTER — ANESTHESIA EVENT (INPATIENT)
Dept: PERIOP | Facility: HOSPITAL | Age: 78
DRG: 329 | End: 2018-02-04
Payer: MEDICARE

## 2018-02-04 ENCOUNTER — APPOINTMENT (INPATIENT)
Dept: RADIOLOGY | Facility: HOSPITAL | Age: 78
DRG: 329 | End: 2018-02-04
Payer: MEDICARE

## 2018-02-04 PROBLEM — K56.609 BOWEL OBSTRUCTION (HCC): Status: ACTIVE | Noted: 2018-02-01

## 2018-02-04 LAB
ABO GROUP BLD: NORMAL
ANION GAP SERPL CALCULATED.3IONS-SCNC: 6 MMOL/L (ref 4–13)
BASOPHILS # BLD AUTO: 0.03 THOUSANDS/ΜL (ref 0–0.1)
BASOPHILS NFR BLD AUTO: 0 % (ref 0–1)
BLD GP AB SCN SERPL QL: NEGATIVE
BUN SERPL-MCNC: 20 MG/DL (ref 5–25)
CALCIUM SERPL-MCNC: 8.6 MG/DL (ref 8.3–10.1)
CHLORIDE SERPL-SCNC: 101 MMOL/L (ref 100–108)
CO2 SERPL-SCNC: 28 MMOL/L (ref 21–32)
CREAT SERPL-MCNC: 0.89 MG/DL (ref 0.6–1.3)
EOSINOPHIL # BLD AUTO: 0.04 THOUSAND/ΜL (ref 0–0.61)
EOSINOPHIL NFR BLD AUTO: 0 % (ref 0–6)
ERYTHROCYTE [DISTWIDTH] IN BLOOD BY AUTOMATED COUNT: 14.4 % (ref 11.6–15.1)
GFR SERPL CREATININE-BSD FRML MDRD: 62 ML/MIN/1.73SQ M
GLUCOSE SERPL-MCNC: 142 MG/DL (ref 65–140)
GLUCOSE SERPL-MCNC: 146 MG/DL (ref 65–140)
GLUCOSE SERPL-MCNC: 151 MG/DL (ref 65–140)
GLUCOSE SERPL-MCNC: 159 MG/DL (ref 65–140)
GLUCOSE SERPL-MCNC: 237 MG/DL (ref 65–140)
GLUCOSE SERPL-MCNC: 263 MG/DL (ref 65–140)
HCT VFR BLD AUTO: 40.6 % (ref 34.8–46.1)
HGB BLD-MCNC: 13.6 G/DL (ref 11.5–15.4)
LYMPHOCYTES # BLD AUTO: 1.5 THOUSANDS/ΜL (ref 0.6–4.47)
LYMPHOCYTES NFR BLD AUTO: 12 % (ref 14–44)
MCH RBC QN AUTO: 29.8 PG (ref 26.8–34.3)
MCHC RBC AUTO-ENTMCNC: 33.5 G/DL (ref 31.4–37.4)
MCV RBC AUTO: 89 FL (ref 82–98)
MONOCYTES # BLD AUTO: 1.94 THOUSAND/ΜL (ref 0.17–1.22)
MONOCYTES NFR BLD AUTO: 15 % (ref 4–12)
NEUTROPHILS # BLD AUTO: 9.27 THOUSANDS/ΜL (ref 1.85–7.62)
NEUTS SEG NFR BLD AUTO: 73 % (ref 43–75)
NRBC BLD AUTO-RTO: 0 /100 WBCS
PLATELET # BLD AUTO: 253 THOUSANDS/UL (ref 149–390)
PMV BLD AUTO: 9.8 FL (ref 8.9–12.7)
POTASSIUM SERPL-SCNC: 4 MMOL/L (ref 3.5–5.3)
RBC # BLD AUTO: 4.57 MILLION/UL (ref 3.81–5.12)
RH BLD: POSITIVE
SODIUM SERPL-SCNC: 135 MMOL/L (ref 136–145)
SPECIMEN EXPIRATION DATE: NORMAL
WBC # BLD AUTO: 12.9 THOUSAND/UL (ref 4.31–10.16)

## 2018-02-04 PROCEDURE — 82330 ASSAY OF CALCIUM: CPT

## 2018-02-04 PROCEDURE — 88307 TISSUE EXAM BY PATHOLOGIST: CPT | Performed by: PATHOLOGY

## 2018-02-04 PROCEDURE — 82803 BLOOD GASES ANY COMBINATION: CPT

## 2018-02-04 PROCEDURE — 85025 COMPLETE CBC W/AUTO DIFF WBC: CPT | Performed by: SURGERY

## 2018-02-04 PROCEDURE — 84295 ASSAY OF SERUM SODIUM: CPT

## 2018-02-04 PROCEDURE — 88307 TISSUE EXAM BY PATHOLOGIST: CPT | Performed by: SURGERY

## 2018-02-04 PROCEDURE — 86850 RBC ANTIBODY SCREEN: CPT | Performed by: SURGERY

## 2018-02-04 PROCEDURE — 86923 COMPATIBILITY TEST ELECTRIC: CPT

## 2018-02-04 PROCEDURE — 99232 SBSQ HOSP IP/OBS MODERATE 35: CPT | Performed by: SURGERY

## 2018-02-04 PROCEDURE — 74018 RADEX ABDOMEN 1 VIEW: CPT

## 2018-02-04 PROCEDURE — C9113 INJ PANTOPRAZOLE SODIUM, VIA: HCPCS | Performed by: STUDENT IN AN ORGANIZED HEALTH CARE EDUCATION/TRAINING PROGRAM

## 2018-02-04 PROCEDURE — 86901 BLOOD TYPING SEROLOGIC RH(D): CPT | Performed by: SURGERY

## 2018-02-04 PROCEDURE — 82948 REAGENT STRIP/BLOOD GLUCOSE: CPT

## 2018-02-04 PROCEDURE — 86900 BLOOD TYPING SEROLOGIC ABO: CPT | Performed by: SURGERY

## 2018-02-04 PROCEDURE — 80048 BASIC METABOLIC PNL TOTAL CA: CPT | Performed by: SURGERY

## 2018-02-04 PROCEDURE — 84132 ASSAY OF SERUM POTASSIUM: CPT

## 2018-02-04 PROCEDURE — 85014 HEMATOCRIT: CPT

## 2018-02-04 PROCEDURE — 82947 ASSAY GLUCOSE BLOOD QUANT: CPT

## 2018-02-04 RX ORDER — GLYCOPYRROLATE 0.2 MG/ML
INJECTION INTRAMUSCULAR; INTRAVENOUS AS NEEDED
Status: DISCONTINUED | OUTPATIENT
Start: 2018-02-04 | End: 2018-02-04 | Stop reason: SURG

## 2018-02-04 RX ORDER — ONDANSETRON 2 MG/ML
4 INJECTION INTRAMUSCULAR; INTRAVENOUS EVERY 4 HOURS PRN
Status: DISCONTINUED | OUTPATIENT
Start: 2018-02-04 | End: 2018-02-17 | Stop reason: HOSPADM

## 2018-02-04 RX ORDER — ALBUMIN, HUMAN INJ 5% 5 %
SOLUTION INTRAVENOUS CONTINUOUS PRN
Status: DISCONTINUED | OUTPATIENT
Start: 2018-02-04 | End: 2018-02-04 | Stop reason: SURG

## 2018-02-04 RX ORDER — FENTANYL CITRATE/PF 50 MCG/ML
12.5 SYRINGE (ML) INJECTION
Status: DISCONTINUED | OUTPATIENT
Start: 2018-02-04 | End: 2018-02-04 | Stop reason: HOSPADM

## 2018-02-04 RX ORDER — LABETALOL HYDROCHLORIDE 5 MG/ML
INJECTION, SOLUTION INTRAVENOUS AS NEEDED
Status: DISCONTINUED | OUTPATIENT
Start: 2018-02-04 | End: 2018-02-04 | Stop reason: SURG

## 2018-02-04 RX ORDER — ONDANSETRON 2 MG/ML
INJECTION INTRAMUSCULAR; INTRAVENOUS AS NEEDED
Status: DISCONTINUED | OUTPATIENT
Start: 2018-02-04 | End: 2018-02-04 | Stop reason: SURG

## 2018-02-04 RX ORDER — PROPOFOL 10 MG/ML
INJECTION, EMULSION INTRAVENOUS AS NEEDED
Status: DISCONTINUED | OUTPATIENT
Start: 2018-02-04 | End: 2018-02-04 | Stop reason: SURG

## 2018-02-04 RX ORDER — ONDANSETRON 2 MG/ML
4 INJECTION INTRAMUSCULAR; INTRAVENOUS ONCE AS NEEDED
Status: DISCONTINUED | OUTPATIENT
Start: 2018-02-04 | End: 2018-02-04 | Stop reason: HOSPADM

## 2018-02-04 RX ORDER — FENTANYL CITRATE 50 UG/ML
INJECTION, SOLUTION INTRAMUSCULAR; INTRAVENOUS AS NEEDED
Status: DISCONTINUED | OUTPATIENT
Start: 2018-02-04 | End: 2018-02-04 | Stop reason: SURG

## 2018-02-04 RX ORDER — SUCCINYLCHOLINE CHLORIDE 20 MG/ML
INJECTION INTRAMUSCULAR; INTRAVENOUS AS NEEDED
Status: DISCONTINUED | OUTPATIENT
Start: 2018-02-04 | End: 2018-02-04 | Stop reason: SURG

## 2018-02-04 RX ORDER — MAGNESIUM HYDROXIDE 1200 MG/15ML
LIQUID ORAL AS NEEDED
Status: DISCONTINUED | OUTPATIENT
Start: 2018-02-04 | End: 2018-02-04 | Stop reason: HOSPADM

## 2018-02-04 RX ORDER — CALCIUM CHLORIDE 100 MG/ML
INJECTION INTRAVENOUS; INTRAVENTRICULAR AS NEEDED
Status: DISCONTINUED | OUTPATIENT
Start: 2018-02-04 | End: 2018-02-04 | Stop reason: SURG

## 2018-02-04 RX ORDER — METOPROLOL TARTRATE 5 MG/5ML
INJECTION INTRAVENOUS AS NEEDED
Status: DISCONTINUED | OUTPATIENT
Start: 2018-02-04 | End: 2018-02-04 | Stop reason: SURG

## 2018-02-04 RX ORDER — ROCURONIUM BROMIDE 10 MG/ML
INJECTION, SOLUTION INTRAVENOUS AS NEEDED
Status: DISCONTINUED | OUTPATIENT
Start: 2018-02-04 | End: 2018-02-04 | Stop reason: SURG

## 2018-02-04 RX ORDER — SODIUM CHLORIDE, SODIUM LACTATE, POTASSIUM CHLORIDE, CALCIUM CHLORIDE 600; 310; 30; 20 MG/100ML; MG/100ML; MG/100ML; MG/100ML
20 INJECTION, SOLUTION INTRAVENOUS CONTINUOUS
Status: DISCONTINUED | OUTPATIENT
Start: 2018-02-04 | End: 2018-02-04

## 2018-02-04 RX ORDER — SODIUM CHLORIDE, SODIUM LACTATE, POTASSIUM CHLORIDE, CALCIUM CHLORIDE 600; 310; 30; 20 MG/100ML; MG/100ML; MG/100ML; MG/100ML
INJECTION, SOLUTION INTRAVENOUS CONTINUOUS PRN
Status: DISCONTINUED | OUTPATIENT
Start: 2018-02-04 | End: 2018-02-04 | Stop reason: SURG

## 2018-02-04 RX ORDER — SODIUM CHLORIDE 9 MG/ML
INJECTION, SOLUTION INTRAVENOUS CONTINUOUS PRN
Status: DISCONTINUED | OUTPATIENT
Start: 2018-02-04 | End: 2018-02-04 | Stop reason: SURG

## 2018-02-04 RX ADMIN — ROCURONIUM BROMIDE 30 MG: 10 INJECTION INTRAVENOUS at 12:00

## 2018-02-04 RX ADMIN — FENTANYL CITRATE 25 MCG: 50 INJECTION, SOLUTION INTRAMUSCULAR; INTRAVENOUS at 13:30

## 2018-02-04 RX ADMIN — POTASSIUM CHLORIDE, DEXTROSE MONOHYDRATE AND SODIUM CHLORIDE 100 ML/HR: 150; 5; 450 INJECTION, SOLUTION INTRAVENOUS at 14:55

## 2018-02-04 RX ADMIN — FENTANYL CITRATE 12.5 MCG: 50 INJECTION INTRAMUSCULAR; INTRAVENOUS at 14:24

## 2018-02-04 RX ADMIN — SODIUM CHLORIDE: 0.9 INJECTION, SOLUTION INTRAVENOUS at 12:01

## 2018-02-04 RX ADMIN — SODIUM CHLORIDE 500 ML: 0.9 INJECTION, SOLUTION INTRAVENOUS at 15:55

## 2018-02-04 RX ADMIN — LABETALOL HYDROCHLORIDE 10 MG: 5 INJECTION, SOLUTION INTRAVENOUS at 12:19

## 2018-02-04 RX ADMIN — LABETALOL HYDROCHLORIDE 10 MG: 5 INJECTION, SOLUTION INTRAVENOUS at 13:40

## 2018-02-04 RX ADMIN — POTASSIUM CHLORIDE, DEXTROSE MONOHYDRATE AND SODIUM CHLORIDE 100 ML/HR: 150; 5; 450 INJECTION, SOLUTION INTRAVENOUS at 00:57

## 2018-02-04 RX ADMIN — HEPARIN SODIUM 5000 UNITS: 5000 INJECTION, SOLUTION INTRAVENOUS; SUBCUTANEOUS at 23:35

## 2018-02-04 RX ADMIN — FENTANYL CITRATE 25 MCG: 50 INJECTION, SOLUTION INTRAMUSCULAR; INTRAVENOUS at 11:55

## 2018-02-04 RX ADMIN — FENTANYL CITRATE 25 MCG: 50 INJECTION, SOLUTION INTRAMUSCULAR; INTRAVENOUS at 13:13

## 2018-02-04 RX ADMIN — LIDOCAINE HYDROCHLORIDE 100 MG: 20 INJECTION, SOLUTION INTRAVENOUS at 11:50

## 2018-02-04 RX ADMIN — PROPOFOL 150 MG: 10 INJECTION, EMULSION INTRAVENOUS at 11:50

## 2018-02-04 RX ADMIN — LABETALOL HYDROCHLORIDE 10 MG: 5 INJECTION, SOLUTION INTRAVENOUS at 13:55

## 2018-02-04 RX ADMIN — PANTOPRAZOLE SODIUM 40 MG: 40 INJECTION, POWDER, FOR SOLUTION INTRAVENOUS at 08:42

## 2018-02-04 RX ADMIN — METRONIDAZOLE 500 MG: 500 INJECTION, SOLUTION INTRAVENOUS at 12:06

## 2018-02-04 RX ADMIN — FENTANYL CITRATE 25 MCG: 50 INJECTION, SOLUTION INTRAMUSCULAR; INTRAVENOUS at 12:00

## 2018-02-04 RX ADMIN — CEFAZOLIN SODIUM 1000 MG: 1 SOLUTION INTRAVENOUS at 12:00

## 2018-02-04 RX ADMIN — SODIUM CHLORIDE, SODIUM LACTATE, POTASSIUM CHLORIDE, AND CALCIUM CHLORIDE: .6; .31; .03; .02 INJECTION, SOLUTION INTRAVENOUS at 11:30

## 2018-02-04 RX ADMIN — GLYCOPYRROLATE 0.5 MG: 0.2 INJECTION, SOLUTION INTRAMUSCULAR; INTRAVENOUS at 13:40

## 2018-02-04 RX ADMIN — NEOSTIGMINE METHYLSULFATE 3 MG: 1 INJECTION, SOLUTION INTRAMUSCULAR; INTRAVENOUS; SUBCUTANEOUS at 13:40

## 2018-02-04 RX ADMIN — CALCIUM CHLORIDE 0.5 G: 100 INJECTION PARENTERAL at 13:29

## 2018-02-04 RX ADMIN — METOPROLOL TARTRATE 2.5 MG: 1 INJECTION, SOLUTION INTRAVENOUS at 12:16

## 2018-02-04 RX ADMIN — DEXAMETHASONE SODIUM PHOSPHATE 5 MG: 10 INJECTION INTRAMUSCULAR; INTRAVENOUS at 12:20

## 2018-02-04 RX ADMIN — HYDROMORPHONE HYDROCHLORIDE 0.4 MG: 1 INJECTION, SOLUTION INTRAMUSCULAR; INTRAVENOUS; SUBCUTANEOUS at 13:37

## 2018-02-04 RX ADMIN — HEPARIN SODIUM 5000 UNITS: 5000 INJECTION, SOLUTION INTRAVENOUS; SUBCUTANEOUS at 06:04

## 2018-02-04 RX ADMIN — ALBUMIN HUMAN: 0.05 INJECTION, SOLUTION INTRAVENOUS at 12:15

## 2018-02-04 RX ADMIN — HYDROMORPHONE HYDROCHLORIDE 0.6 MG: 1 INJECTION, SOLUTION INTRAMUSCULAR; INTRAVENOUS; SUBCUTANEOUS at 13:45

## 2018-02-04 RX ADMIN — LABETALOL HYDROCHLORIDE 10 MG: 5 INJECTION, SOLUTION INTRAVENOUS at 13:15

## 2018-02-04 RX ADMIN — ONDANSETRON 4 MG: 2 INJECTION INTRAMUSCULAR; INTRAVENOUS at 16:05

## 2018-02-04 RX ADMIN — SUCCINYLCHOLINE CHLORIDE 120 MG: 20 INJECTION, SOLUTION INTRAMUSCULAR; INTRAVENOUS at 11:50

## 2018-02-04 RX ADMIN — INSULIN LISPRO 2 UNITS: 100 INJECTION, SOLUTION INTRAVENOUS; SUBCUTANEOUS at 20:16

## 2018-02-04 RX ADMIN — ONDANSETRON 4 MG: 2 INJECTION INTRAMUSCULAR; INTRAVENOUS at 13:30

## 2018-02-04 RX ADMIN — ALBUMIN HUMAN: 0.05 INJECTION, SOLUTION INTRAVENOUS at 12:30

## 2018-02-04 NOTE — PROGRESS NOTES
Progress Note - General Surgery  Alex Hubbard 76 y o  female MRN: [id number]  Unit/Bed#: [facility] 923-01 Encounter: [id number]    Assessment:  76y o -year-old female with high-grade SBO secondary to laparotomies in past including aorto bi-iliac bypass     Plan:  1  Small bowel obstruction   - increased pain over right lower quadrant   - NG tube continues to drain, and patient vomited when NG tube was not connected to suction   - contrast in SBFT and KUB today showed that contrast is still failing to reach colon   - discussed situation with patient, who is amenable to proceed with operation to address bowel obstruction   - OR today for exploratory laparotomy, lysis of adhesions, possible bowel resection, possible ostomy   - ancef/flagyl on hold for OR   - type and screen    2  Delirium    -  Haldol PRN for agitation   - QT interval 336 on EKG 2/3    3  Hypertension   - hydralazine PRN, continue metoprolol    4  DM2   - add insulin correction scale algorithm 2    5  DVT Prophylaxis   - SQH   - SCDs    6  Disposition   - med surg    Gloria Junior MD PGY-4  10:08 AM  02/04/18      Subjective:  No flatus  Pain is still present, if not increasing  Vomited around NG tube when it was not connected to wall suction  Objective:  Patient Vitals for the past 24 hrs:   BP Temp Temp src Pulse Resp SpO2   02/04/18 0721 (!) 192/74 99 1 °F (37 3 °C) - (!) 117 20 96 %   02/04/18 0325 (!) 175/70 - - - - -   02/04/18 0057 168/72 - - - - -   02/03/18 2335 (!) 180/85 98 3 °F (36 8 °C) Oral 104 18 96 %   02/03/18 1500 170/72 97 8 °F (36 6 °C) Oral 101 18 95 %          Diet Orders            Start     Ordered    02/02/18 0105  Diet NPO; Sips with meds  Diet effective now     Question Answer Comment   Diet Type NPO    NPO Except: Sips with meds    RD to adjust diet per protocol?  No        02/02/18 0104          Intake/Output Summary (Last 24 hours) at 02/04/18 1008  Last data filed at 02/04/18 0900   Gross per 24 hour   Intake 1976 67 ml   Output             1950 ml   Net            26 67 ml    + x2  NGT 1,600    Physical Exam:  General: NAD  Cardiovascular: RRR  Respiratory: breath sounds b/l  Abdomen: soft, tender right lower quadrant, mild distension, healed laparotomy incision  Extremities: no edema    Medications:    Current Facility-Administered Medications:  dextrose 5 % and sodium chloride 0 45 % with KCl 20 mEq/L 100 mL/hr Intravenous Continuous Maureen Fajardo MD Last Rate: 100 mL/hr (02/04/18 0057)   heparin (porcine) 5,000 Units Subcutaneous Q8H [facility] Rico Monroe MD    hydrALAZINE 5 mg Intravenous Q6H PRN Car Novoa MD    HYDROmorphone 0 2 mg Intravenous Q3H PRN Rico Monroe MD    levothyroxine 70 mcg Intravenous Daily Rico Monroe MD    metoprolol 5 mg Intravenous Q6H PRN Rico Monroe MD    ondansetron 4 mg Intravenous Q6H PRN Rico Monroe MD    pantoprazole 40 mg Intravenous Q24H An Swanson MD    phenol 1 spray Mouth/Throat Q2H PRN Maureen Fajardo MD        dextrose 5 % and sodium chloride 0 45 % with KCl 20 mEq/L 100 mL/hr Last Rate: 100 mL/hr (02/04/18 0057)       hydrALAZINE 5 mg Q6H PRN   HYDROmorphone 0 2 mg Q3H PRN   metoprolol 5 mg Q6H PRN   ondansetron 4 mg Q6H PRN   phenol 1 spray Q2H PRN     Laboratory results:   CBC:   Lab Results   Component Value Date    WBC 12 90 (H) 02/04/2018    HGB 13 6 02/04/2018    HCT 40 6 02/04/2018    MCV 89 02/04/2018     02/04/2018    MCH 29 8 02/04/2018    MCHC 33 5 02/04/2018    RDW 14 4 02/04/2018    MPV 9 8 02/04/2018    NRBC 0 02/04/2018   , CMP:   Lab Results   Component Value Date     (L) 02/04/2018    K 4 0 02/04/2018     02/04/2018    CO2 28 02/04/2018    ANIONGAP 6 02/04/2018    BUN 20 02/04/2018    CREATININE 0 89 02/04/2018    GLUCOSE 146 (H) 02/04/2018    CALCIUM 8 6 02/04/2018    EGFR 62 02/04/2018   , Coagulation: No results found for: PT, INR, APTT, Urinalysis: No results found for: Luis Briggs, Dougherty Bath, LEUKOCYTESUR, NITRITE, PROTEINUA, GLUCOSEU, KETONESU, BILIRUBINUR, BLOODU, Amylase: No results found for: AMYLASE, Lipase: No results found for: LIPASE    VTE Pharmacologic Prophylaxis: Heparin  VTE Mechanical Prophylaxis: sequential compression device

## 2018-02-04 NOTE — ANESTHESIA POSTPROCEDURE EVALUATION
Post-Op Assessment Note      CV Status:  Stable    Mental Status:  Alert and awake    Hydration Status:  Euvolemic    PONV Controlled:  Controlled    Airway Patency:  Patent    Post Op Vitals Reviewed: Yes          Staff: JEN           BP (!) 188/60 (02/04/18 1357)    Temp 99 5 °F (37 5 °C) (02/04/18 1357)    Pulse 79 (02/04/18 1357)   Resp 14 (02/04/18 1357)    SpO2 100 % (02/04/18 1357)

## 2018-02-04 NOTE — ANESTHESIA PREPROCEDURE EVALUATION
Review of Systems/Medical History  Patient summary reviewed  Chart reviewed      Cardiovascular  Hypertension controlled,    Pulmonary       GI/Hepatic      Comment: Now with SBO          Endo/Other  Diabetes well controlled type 2 Oral agent, History of thyroid disease , hypothyroidism,      GYN       Hematology   Musculoskeletal       Neurology   Psychology           Physical Exam    Airway    Mallampati score: I  TM Distance: <3 FB  Neck ROM: limited     Dental   No notable dental hx upper dentures and lower dentures,     Cardiovascular  Rhythm: regular, Rate: abnormal,     Pulmonary  Pulmonary exam normal Breath sounds clear to auscultation,     Other Findings        Anesthesia Plan  ASA Score- 3 Emergent    Anesthesia Type- general with ASA Monitors  Additional Monitors:   Airway Plan: ETT  Plan Factors-    Induction- intravenous  Postoperative Plan- Plan for postoperative opioid use  Informed Consent- Anesthetic plan and risks discussed with patient and healthcare power of   I personally reviewed this patient with the CRNA  Discussed and agreed on the Anesthesia Plan with the CRNA  Binta Dunn

## 2018-02-04 NOTE — PROGRESS NOTES
Radiology came back to do final xray of series, asked me why patient was back on suction  Told them the nurse prior to me put her back on  I was not told anything about them coming back

## 2018-02-04 NOTE — PROGRESS NOTES
Progress Note - General Surgery  Chloe Ormond 76 y o  female MRN: [id number]  [facility] Encounter: [id number]    Assessment:  76y o -year-old female with high-grade SBO secondary to laparotomies in past    Plan:  - NPO/NGT  - prn pain control  - f/u geriatrics consult  - KUB at [address]  - IVF  - OOB/ambulate  - SQH/SCDs  - likely to need operative intervention      Subjective:  +Belching/hiccups, -flatus/BM  Did have one episode yesterday of vomiting when off suction for SBFT series  Now c/o sore throat  No respiratory issues  SBFT showed relatively unchanged obstruction  Objective:  Patient Vitals for the past 24 hrs:   BP Temp Temp src Pulse Resp SpO2   02/04/18 0325 (!) 175/70 - - - - -   02/04/18 0057 168/72 - - - - -   02/03/18 2335 (!) 180/85 98 3 °F (36 8 °C) Oral 104 18 96 %   02/03/18 1500 170/72 97 8 °F (36 6 °C) Oral 101 18 95 %   02/03/18 0858 - - - - - 96 %   02/03/18 0726 158/62 98 8 °F (37 1 °C) - 98 18 95 %          Diet Orders            Start     Ordered    02/02/18 0105  Diet NPO; Sips with meds  Diet effective now     Question Answer Comment   Diet Type NPO    NPO Except: Sips with meds    RD to adjust diet per protocol?  No        02/02/18 0104          Intake/Output Summary (Last 24 hours) at 02/04/18 0621  Last data filed at 02/04/18 1194   Gross per 24 hour   Intake          1736 67 ml   Output             1800 ml   Net           -63 33 ml      (100 overnight)     Physical Exam:  NAD  Norm resp effort  RRR  Abd soft, increased distension, NT  -c/c/e  NGT in place to suction      Medications:    Current Facility-Administered Medications:  dextrose 5 % and sodium chloride 0 45 % with KCl 20 mEq/L 100 mL/hr Intravenous Continuous Autumn Arce MD Last Rate: 100 mL/hr (02/04/18 0057)   heparin (porcine) 5,000 Units Subcutaneous [facility] Spencer Velazquez MD    hydrALAZINE 5 mg Intravenous Q6H PRN Abdiel Mccullough MD    HYDROmorphone 0 2 mg Intravenous Q3H PRN Spencer Velazquez, MD    levothyroxine 70 mcg Intravenous Daily Issac Merlin, MD    metoprolol 5 mg Intravenous Q6H PRN Issac Merlin, MD    ondansetron 4 mg Intravenous Q6H PRN Issac Merlin, MD    pantoprazole 40 mg Intravenous Q24H Billy Gaviria MD    phenol 1 spray Mouth/Throat Q2H PRN Za Dewitt MD        dextrose 5 % and sodium chloride 0 45 % with KCl 20 mEq/L 100 mL/hr Last Rate: 100 mL/hr (02/04/18 0057)       hydrALAZINE 5 mg Q6H PRN   HYDROmorphone 0 2 mg Q3H PRN   metoprolol 5 mg Q6H PRN   ondansetron 4 mg Q6H PRN   phenol 1 spray Q2H PRN     Laboratory results:   CBC:   Lab Results   Component Value Date    WBC 12 90 (H) 02/04/2018    HGB 13 6 02/04/2018    HCT 40 6 02/04/2018    MCV 89 02/04/2018     02/04/2018    MCH 29 8 02/04/2018    MCHC 33 5 02/04/2018    RDW 14 4 02/04/2018    MPV 9 8 02/04/2018    NRBC 0 02/04/2018   , CMP:   Lab Results   Component Value Date     (L) 02/04/2018    K 4 0 02/04/2018     02/04/2018    CO2 28 02/04/2018    ANIONGAP 6 02/04/2018    BUN 20 02/04/2018    CREATININE 0 89 02/04/2018    GLUCOSE 146 (H) 02/04/2018    CALCIUM 8 6 02/04/2018    EGFR 62 02/04/2018   , Coagulation: No results found for: PT, INR, APTT, Urinalysis: No results found for: Tedra Corrina, SPECGRAV, PHUR, LEUKOCYTESUR, NITRITE, PROTEINUA, GLUCOSEU, KETONESU, BILIRUBINUR, BLOODU, Amylase: No results found for: AMYLASE, Lipase: No results found for: LIPASE    VTE Pharmacologic Prophylaxis: Heparin  VTE Mechanical Prophylaxis: sequential compression device

## 2018-02-05 PROBLEM — R41.0 DELIRIUM: Status: ACTIVE | Noted: 2018-02-05

## 2018-02-05 PROBLEM — R26.2 AMBULATORY DYSFUNCTION: Status: ACTIVE | Noted: 2018-02-05

## 2018-02-05 PROBLEM — F41.9 ANXIETY: Status: ACTIVE | Noted: 2018-02-05

## 2018-02-05 PROBLEM — Z91.81 HX OF FALL: Status: ACTIVE | Noted: 2018-02-05

## 2018-02-05 PROBLEM — R53.81 PHYSICAL DECONDITIONING: Status: ACTIVE | Noted: 2018-02-05

## 2018-02-05 LAB
ANION GAP SERPL CALCULATED.3IONS-SCNC: 6 MMOL/L (ref 4–13)
BASE EXCESS BLDA CALC-SCNC: 0 MMOL/L (ref -2–3)
BASE EXCESS BLDA CALC-SCNC: 2 MMOL/L (ref -2–3)
BASOPHILS # BLD AUTO: 0.02 THOUSANDS/ΜL (ref 0–0.1)
BASOPHILS NFR BLD AUTO: 0 % (ref 0–1)
BUN SERPL-MCNC: 17 MG/DL (ref 5–25)
CA-I BLD-SCNC: 1.03 MMOL/L (ref 1.12–1.32)
CA-I BLD-SCNC: 1.07 MMOL/L (ref 1.12–1.32)
CALCIUM SERPL-MCNC: 7.9 MG/DL (ref 8.3–10.1)
CHLORIDE SERPL-SCNC: 105 MMOL/L (ref 100–108)
CO2 SERPL-SCNC: 26 MMOL/L (ref 21–32)
CREAT SERPL-MCNC: 0.84 MG/DL (ref 0.6–1.3)
EOSINOPHIL # BLD AUTO: 0 THOUSAND/ΜL (ref 0–0.61)
EOSINOPHIL NFR BLD AUTO: 0 % (ref 0–6)
ERYTHROCYTE [DISTWIDTH] IN BLOOD BY AUTOMATED COUNT: 14.5 % (ref 11.6–15.1)
GFR SERPL CREATININE-BSD FRML MDRD: 66 ML/MIN/1.73SQ M
GLUCOSE SERPL-MCNC: 144 MG/DL (ref 65–140)
GLUCOSE SERPL-MCNC: 149 MG/DL (ref 65–140)
GLUCOSE SERPL-MCNC: 169 MG/DL (ref 65–140)
GLUCOSE SERPL-MCNC: 170 MG/DL (ref 65–140)
GLUCOSE SERPL-MCNC: 193 MG/DL (ref 65–140)
GLUCOSE SERPL-MCNC: 219 MG/DL (ref 65–140)
GLUCOSE SERPL-MCNC: 241 MG/DL (ref 65–140)
HCO3 BLDA-SCNC: 24.9 MMOL/L (ref 22–28)
HCO3 BLDA-SCNC: 26.2 MMOL/L (ref 22–28)
HCT VFR BLD AUTO: 36.2 % (ref 34.8–46.1)
HCT VFR BLD CALC: 29 % (ref 34.8–46.1)
HCT VFR BLD CALC: 32 % (ref 34.8–46.1)
HGB BLD-MCNC: 12 G/DL (ref 11.5–15.4)
HGB BLDA-MCNC: 10.9 G/DL (ref 11.5–15.4)
HGB BLDA-MCNC: 9.9 G/DL (ref 11.5–15.4)
LYMPHOCYTES # BLD AUTO: 1.01 THOUSANDS/ΜL (ref 0.6–4.47)
LYMPHOCYTES NFR BLD AUTO: 6 % (ref 14–44)
MAGNESIUM SERPL-MCNC: 1.8 MG/DL (ref 1.6–2.6)
MCH RBC QN AUTO: 29.7 PG (ref 26.8–34.3)
MCHC RBC AUTO-ENTMCNC: 33.1 G/DL (ref 31.4–37.4)
MCV RBC AUTO: 90 FL (ref 82–98)
MONOCYTES # BLD AUTO: 1.8 THOUSAND/ΜL (ref 0.17–1.22)
MONOCYTES NFR BLD AUTO: 11 % (ref 4–12)
NEUTROPHILS # BLD AUTO: 14.16 THOUSANDS/ΜL (ref 1.85–7.62)
NEUTS SEG NFR BLD AUTO: 83 % (ref 43–75)
NRBC BLD AUTO-RTO: 0 /100 WBCS
PCO2 BLD: 26 MMOL/L (ref 21–32)
PCO2 BLD: 27 MMOL/L (ref 21–32)
PCO2 BLD: 38.4 MM HG (ref 36–44)
PCO2 BLD: 39.3 MM HG (ref 36–44)
PH BLD: 7.42 [PH] (ref 7.35–7.45)
PH BLD: 7.43 [PH] (ref 7.35–7.45)
PHOSPHATE SERPL-MCNC: 1 MG/DL (ref 2.3–4.1)
PLATELET # BLD AUTO: 238 THOUSANDS/UL (ref 149–390)
PMV BLD AUTO: 9.7 FL (ref 8.9–12.7)
PO2 BLD: 207 MM HG (ref 75–129)
PO2 BLD: 257 MM HG (ref 75–129)
POTASSIUM BLD-SCNC: 3.9 MMOL/L (ref 3.5–5.3)
POTASSIUM BLD-SCNC: 4.2 MMOL/L (ref 3.5–5.3)
POTASSIUM SERPL-SCNC: 4.6 MMOL/L (ref 3.5–5.3)
PREALB SERPL-MCNC: 7.6 MG/DL (ref 18–40)
RBC # BLD AUTO: 4.04 MILLION/UL (ref 3.81–5.12)
SAO2 % BLD FROM PO2: 100 % (ref 95–98)
SAO2 % BLD FROM PO2: 100 % (ref 95–98)
SODIUM BLD-SCNC: 137 MMOL/L (ref 136–145)
SODIUM BLD-SCNC: 137 MMOL/L (ref 136–145)
SODIUM SERPL-SCNC: 137 MMOL/L (ref 136–145)
SPECIMEN SOURCE: ABNORMAL
SPECIMEN SOURCE: ABNORMAL
WBC # BLD AUTO: 17.15 THOUSAND/UL (ref 4.31–10.16)

## 2018-02-05 PROCEDURE — 80048 BASIC METABOLIC PNL TOTAL CA: CPT | Performed by: STUDENT IN AN ORGANIZED HEALTH CARE EDUCATION/TRAINING PROGRAM

## 2018-02-05 PROCEDURE — 84100 ASSAY OF PHOSPHORUS: CPT | Performed by: STUDENT IN AN ORGANIZED HEALTH CARE EDUCATION/TRAINING PROGRAM

## 2018-02-05 PROCEDURE — 99024 POSTOP FOLLOW-UP VISIT: CPT | Performed by: SURGERY

## 2018-02-05 PROCEDURE — 85025 COMPLETE CBC W/AUTO DIFF WBC: CPT | Performed by: STUDENT IN AN ORGANIZED HEALTH CARE EDUCATION/TRAINING PROGRAM

## 2018-02-05 PROCEDURE — 84134 ASSAY OF PREALBUMIN: CPT | Performed by: SURGERY

## 2018-02-05 PROCEDURE — 82948 REAGENT STRIP/BLOOD GLUCOSE: CPT

## 2018-02-05 PROCEDURE — 99232 SBSQ HOSP IP/OBS MODERATE 35: CPT | Performed by: FAMILY MEDICINE

## 2018-02-05 PROCEDURE — 0DNB0ZZ RELEASE ILEUM, OPEN APPROACH: ICD-10-PCS | Performed by: SURGERY

## 2018-02-05 PROCEDURE — 83735 ASSAY OF MAGNESIUM: CPT | Performed by: STUDENT IN AN ORGANIZED HEALTH CARE EDUCATION/TRAINING PROGRAM

## 2018-02-05 PROCEDURE — C9113 INJ PANTOPRAZOLE SODIUM, VIA: HCPCS | Performed by: STUDENT IN AN ORGANIZED HEALTH CARE EDUCATION/TRAINING PROGRAM

## 2018-02-05 PROCEDURE — 0DBB0ZZ EXCISION OF ILEUM, OPEN APPROACH: ICD-10-PCS | Performed by: SURGERY

## 2018-02-05 PROCEDURE — 44120 REMOVAL OF SMALL INTESTINE: CPT | Performed by: SURGERY

## 2018-02-05 RX ORDER — MINERAL OIL AND PETROLATUM 150; 830 MG/G; MG/G
OINTMENT OPHTHALMIC 2 TIMES DAILY
Status: DISCONTINUED | OUTPATIENT
Start: 2018-02-05 | End: 2018-02-07

## 2018-02-05 RX ORDER — MAGNESIUM SULFATE HEPTAHYDRATE 40 MG/ML
2 INJECTION, SOLUTION INTRAVENOUS ONCE
Status: COMPLETED | OUTPATIENT
Start: 2018-02-05 | End: 2018-02-05

## 2018-02-05 RX ADMIN — POTASSIUM CHLORIDE, DEXTROSE MONOHYDRATE AND SODIUM CHLORIDE 84 ML/HR: 150; 5; 450 INJECTION, SOLUTION INTRAVENOUS at 10:38

## 2018-02-05 RX ADMIN — INSULIN LISPRO 2 UNITS: 100 INJECTION, SOLUTION INTRAVENOUS; SUBCUTANEOUS at 00:06

## 2018-02-05 RX ADMIN — POTASSIUM CHLORIDE, DEXTROSE MONOHYDRATE AND SODIUM CHLORIDE 84 ML/HR: 150; 5; 450 INJECTION, SOLUTION INTRAVENOUS at 22:06

## 2018-02-05 RX ADMIN — HYDROMORPHONE HYDROCHLORIDE 0.5 MG: 1 INJECTION, SOLUTION INTRAMUSCULAR; INTRAVENOUS; SUBCUTANEOUS at 22:10

## 2018-02-05 RX ADMIN — LEVOTHYROXINE SODIUM ANHYDROUS 70 MCG: 100 INJECTION, POWDER, LYOPHILIZED, FOR SOLUTION INTRAVENOUS at 08:40

## 2018-02-05 RX ADMIN — INSULIN LISPRO 1 UNITS: 100 INJECTION, SOLUTION INTRAVENOUS; SUBCUTANEOUS at 18:26

## 2018-02-05 RX ADMIN — HEPARIN SODIUM 5000 UNITS: 5000 INJECTION, SOLUTION INTRAVENOUS; SUBCUTANEOUS at 21:57

## 2018-02-05 RX ADMIN — INSULIN LISPRO 1 UNITS: 100 INJECTION, SOLUTION INTRAVENOUS; SUBCUTANEOUS at 11:36

## 2018-02-05 RX ADMIN — MAGNESIUM SULFATE HEPTAHYDRATE 2 G: 40 INJECTION, SOLUTION INTRAVENOUS at 08:40

## 2018-02-05 RX ADMIN — POTASSIUM PHOSPHATE, MONOBASIC AND POTASSIUM PHOSPHATE, DIBASIC 30 MMOL: 224; 236 INJECTION, SOLUTION INTRAVENOUS at 11:09

## 2018-02-05 RX ADMIN — MINERAL OIL AND WHITE PETROLATUM: 150; 830 OINTMENT OPHTHALMIC at 08:40

## 2018-02-05 RX ADMIN — POTASSIUM CHLORIDE, DEXTROSE MONOHYDRATE AND SODIUM CHLORIDE 100 ML/HR: 150; 5; 450 INJECTION, SOLUTION INTRAVENOUS at 00:34

## 2018-02-05 RX ADMIN — INSULIN LISPRO 1 UNITS: 100 INJECTION, SOLUTION INTRAVENOUS; SUBCUTANEOUS at 08:40

## 2018-02-05 RX ADMIN — HEPARIN SODIUM 5000 UNITS: 5000 INJECTION, SOLUTION INTRAVENOUS; SUBCUTANEOUS at 13:40

## 2018-02-05 RX ADMIN — HEPARIN SODIUM 5000 UNITS: 5000 INJECTION, SOLUTION INTRAVENOUS; SUBCUTANEOUS at 05:25

## 2018-02-05 RX ADMIN — PANTOPRAZOLE SODIUM 40 MG: 40 INJECTION, POWDER, FOR SOLUTION INTRAVENOUS at 08:40

## 2018-02-05 NOTE — OP NOTE
OPERATIVE REPORT  PATIENT NAME: Trenton Daniels    :  1940  MRN: [id number]  Pt Location: BE OR ROOM 07    SURGERY DATE: 2018    Surgeon(s) and Role:     * Bridget Carias DO - Primary     * Veronica Manzo MD - Assisting    Preop Diagnosis:  Bowel obstruction [K56 609]    Post-Op Diagnosis Codes: * Bowel obstruction [K56 609]    Procedure(s) (LRB):  LAPAROTOMY EXPLORATORY, (N/A)  LYSIS ADHESIONS (N/A)  RESECTION SMALL BOWEL (N/A)    Specimen(s):  ID Type Source Tests Collected by Time Destination   1 : distal small bowel  Tissue Small Bowel, NOS TISSUE EXAM Bridget Carias DO 2018 1252        Estimated Blood Loss:   200 mL    Drains:  NG/OG/Enteral Tube 18 Fr Right nares (Active)   Placement Reverification Auscultation 2018  8:00 PM   Site Assessment Clean;Dry; Intact 2018  7:43 AM   Status Suction-low continuous 2018  7:43 AM   Drainage Appearance Moncho Jhonny; Thick 2018  7:43 AM   Intake (mL) 60 mL 2018 12:20 AM   Output (mL) 100 mL 2018  4:01 AM   Number of days: 4       [REMOVED] Urethral Catheter Latex 16 Fr  (Removed)   Removed 18 0900   Site Assessment Clean;Skin intact 2018  7:43 AM   Collection Container Standard drainage bag 2018  7:43 AM   Securement Method Other (Comment) 2018  7:43 AM   Output (mL) 325 mL 2018  4:01 AM   Number of days: 1       Anesthesia Type:   General    Operative Indications: Bowel obstruction [K56 609]      Operative Findings:  Distal small-bowel obstruction with micro perforation  Extensive adhesions throughout the abdomen    Complications:   None    Procedure and Technique:  Patient is a 23-year-old female he has had several days of abdominal distention and bowel obstruction  She has not resolved with conservative management  Her distention and pain has worsened over last 24 hours  Because of this was felt she needed operative intervention  All risks benefits alternatives were explained to the patient and her children  She is agreeable to proceed with exploration  She was identified in preoperative holding and then brought the operating suite placed under general anesthesia by the anesthesia department  A Stafford catheter was placed  Her entire abdomen was prepped and draped in usual sterile fashion  She already had an NG tube  Preoperative antibiotics were given  At this time all members of the team stopped and a time-out was performed  Everyone was in agreement with the above plan  A midline vertical incision was then made  Subcutaneous tissue was divided using electrocautery  The fascia was identified and grasped with 2 Kocher clamps  It was opened with the Metzenbaum scissors  The peritoneum was identified and opened with the Metzenbaum scissors  There was extensive adhesions in the abdomen  Adhesional lysis took greater than 60 minutes  Ultimately a band on the distal ileum was identified and taken down  The adhesional lysis was performed with sharp and blunt dissection  The distal ileum did have a micro perforation at the level of the adhesive band  Because of this a small-bowel resection was performed  Approximately 4 cm proximal and distal to the micro perforation a small hole was made in the mesentery and a 75 mm GI stapler was placed across the bowel and fired  The anti mesenteric borders were then opened and 1/2 of the KAYLEE stapler was placed in each segment and then fired  A KAYLEE stapler was placed across the ends of the small bowel and fired  This completed the anastomosis  The staple line was reinforced using 3 0 silk sutures  The small bowel was then reinspected where all the adhesiolysis was performed  Du to the density of the adhesions, the overall lysis of adhesions was difficult  As expected, there was serosal tear is during the takedown of the densely adherent adhesions  Two serosal tears were repaired using 3 0 Vicryl sutures  The abdomen was then copiously irrigated with warm saline   The midline incision was then closed using #1  Looped PDS sutures for the fascia and surgical staples for the skin  The fascia was markedly thin and attenuated  A sterile dressing was placed over the wound  She tolerated procedure well there was no apparent complications and she went to the recovery room in satisfactory condition  I was present for the entire procedure  All needle instrument sponge counts were correct at the end of the procedure    I did discuss the findings with the patient's family after the procedure was completed    Patient Disposition:  PACU     SIGNATURE: Puneet Luis DO  DATE: February 5, 2018  TIME: 9:38 AM

## 2018-02-05 NOTE — PLAN OF CARE
Problem: DISCHARGE PLANNING - CARE MANAGEMENT  Goal: Discharge to post-acute care or home with appropriate resources  INTERVENTIONS:  - Conduct assessment to determine patient/family and health care team treatment goals, and need for post-acute services based on payer coverage, community resources, and patient preferences, and barriers to discharge  - Address psychosocial, clinical, and financial barriers to discharge as identified in assessment in conjunction with the patient/family and health care team  - Arrange appropriate level of post-acute services according to patient's   needs and preference and payer coverage in collaboration with the physician and health care team  - Communicate with and update the patient/family, physician, and health care team regarding progress on the discharge plan  - Arrange appropriate transportation to post-acute venues  - Reilly Persaud anticipates returning home independently at time of discharge  PT will evaluate for any therapy needs  CM continues to follow     Outcome: Progressing

## 2018-02-05 NOTE — PROGRESS NOTES
Progress Note - General Surgery  Justin Arteaga 76 y o  female MRN: [id number]  Unit/Bed#: [facility] 802-01 Encounter: [id number]    Assessment:  76y o -year-old female with high-grade SBO secondary to adhesions s/p laparotomy w/ SBR 2/4    No bowel function  NGT output still bilious    Plan:  - NPO/NGT  - prn pain control  - IVF  - F/u geriatrics  - d/c danielle  - Pulm toilet, ambulation     Subjective:  No bowel function    Objective:  Patient Vitals for the past 24 hrs:   BP Temp Temp src Pulse Resp SpO2 Height Weight   02/04/18 2300 151/72 98 3 °F (36 8 °C) Oral 86 18 99 % - -   02/04/18 1830 157/70 98 5 °F (36 9 °C) Oral 84 18 99 % - -   02/04/18 1730 168/66 98 4 °F (36 9 °C) Oral 84 20 98 % - -   02/04/18 1630 130/60 97 7 °F (36 5 °C) Oral 83 16 97 % - -   02/04/18 1530 94/52 97 5 °F (36 4 °C) Oral (!) 128 18 95 % 5' 6 5" (1 689 m) 64 7 kg (142 lb 10 2 oz)   02/04/18 1445 161/76 98 8 °F (37 1 °C) - 76 14 100 % - -   02/04/18 1430 (!) 175/76 - - 78 15 100 % - -   02/04/18 1415 (!) 171/78 - - 76 21 100 % - -   02/04/18 1400 - - - 78 13 100 % - -   02/04/18 1357 (!) 188/60 99 5 °F (37 5 °C) Temporal 79 14 100 % - -   02/04/18 1356 (!) 188/60 99 5 °F (37 5 °C) - 75 14 100 % - -   02/04/18 0848 - - - - - 96 % - -   02/04/18 0721 (!) 192/74 99 1 °F (37 3 °C) - (!) 117 20 96 % - -          Diet Orders            Start     Ordered    02/02/18 0105  Diet NPO; Sips with meds  Diet effective now     Question Answer Comment   Diet Type NPO    NPO Except: Sips with meds    RD to adjust diet per protocol?  No        02/02/18 0104          Intake/Output Summary (Last 24 hours) at 02/05/18 0625  Last data filed at 02/05/18 0401   Gross per 24 hour   Intake             3845 ml   Output             1825 ml   Net             2020 ml       Physical Exam:  NAD  Norm resp effort  RRR  Abd soft, distended, incision c/d/i  NGT in place to suction, bilious output      Medications:    Current Facility-Administered Medications:  artificial tear  Both Eyes BID Jed Abdullahi MD    dextrose 5 % and sodium chloride 0 45 % with KCl 20 mEq/L 100 mL/hr Intravenous Continuous Radha Montelongo MD Last Rate: 100 mL/hr (02/05/18 0034)   heparin (porcine) 5,000 Units Subcutaneous Q8H [facility] Steffi Magdaleno MD    hydrALAZINE 5 mg Intravenous Q6H PRN Jed Abdullahi MD    HYDROmorphone 0 5 mg Intravenous Q2H PRN Magdiel Buchanan MD    HYDROmorphone 1 mg Intravenous Q3H PRN Magdiel Buchanan MD    insulin lispro 1-5 Units Subcutaneous Q6H [facility] Jed Abdullahi MD    levothyroxine 70 mcg Intravenous Daily Steffi Magdaleno MD    metoprolol 5 mg Intravenous Q6H PRN Steffi Magdaleno MD    ondansetron 4 mg Intravenous Q6H PRN Steffi Magdaleno MD    ondansetron 4 mg Intravenous Q4H PRN Jed Abdullahi MD    pantoprazole 40 mg Intravenous Q24H Rufus Rivers MD    phenol 1 spray Mouth/Throat Q2H PRN Radha Montelongo MD    sodium chloride 500 mL Intravenous Once Jed Abdullahi MD        dextrose 5 % and sodium chloride 0 45 % with KCl 20 mEq/L 100 mL/hr Last Rate: 100 mL/hr (02/05/18 0034)       hydrALAZINE 5 mg Q6H PRN   HYDROmorphone 0 5 mg Q2H PRN   HYDROmorphone 1 mg Q3H PRN   metoprolol 5 mg Q6H PRN   ondansetron 4 mg Q6H PRN   ondansetron 4 mg Q4H PRN   phenol 1 spray Q2H PRN     Laboratory results:   CBC:   Lab Results   Component Value Date    WBC 17 15 (H) 02/05/2018    HGB 12 0 02/05/2018    HCT 36 2 02/05/2018    MCV 90 02/05/2018     02/05/2018    MCH 29 7 02/05/2018    MCHC 33 1 02/05/2018    RDW 14 5 02/05/2018    MPV 9 7 02/05/2018    NRBC 0 02/05/2018   , CMP:   No results found for: NA, K, CL, CO2, ANIONGAP, BUN, CREATININE, GLUCOSE, CALCIUM, AST, ALT, ALKPHOS, PROT, ALBUMIN, BILITOT, EGFR, Coagulation: No results found for: PT, INR, APTT, Urinalysis: No results found for: COLORU, CLARITYU, SPECGRAV, PHUR, LEUKOCYTESUR, NITRITE, PROTEINUA, GLUCOSEU, KETONESU, BILIRUBINUR, BLOODU, Amylase: No results found for: AMYLASE, Lipase: No results found for: LIPASE    VTE Pharmacologic Prophylaxis: Heparin  VTE Mechanical Prophylaxis: sequential compression device

## 2018-02-05 NOTE — PLAN OF CARE
DISCHARGE PLANNING     Discharge to home or other facility with appropriate resources Progressing        GASTROINTESTINAL - ADULT     Minimal or absence of nausea and/or vomiting Progressing     Maintains or returns to baseline bowel function Progressing        INFECTION - ADULT     Absence or prevention of progression during hospitalization Progressing     Absence of fever/infection during neutropenic period Progressing        Knowledge Deficit     Patient/family/caregiver demonstrates understanding of disease process, treatment plan, medications, and discharge instructions Progressing        METABOLIC, FLUID AND ELECTROLYTES - ADULT     Electrolytes maintained within normal limits Progressing     Fluid balance maintained Progressing        PAIN - ADULT     Verbalizes/displays adequate comfort level or baseline comfort level Progressing        Potential for Falls     Patient will remain free of falls Progressing        SAFETY ADULT     Patient will remain free of falls Progressing     Maintain or return to baseline ADL function Progressing     Maintain or return mobility status to optimal level Progressing

## 2018-02-05 NOTE — PROGRESS NOTES
Post-Op Check - General Surgery  Khalida Hudson 76 y o  female MRN: [id number]  Unit/Bed#: [facility] 802-01 Encounter: [id number]    Assessment:  76y o -year-old female with high-grade SBO secondary to laparotomies in past including aorto bi-iliac bypass s/p exploratory laparotomy, small bowel resection for adhesions 2/4    Plan:  1  Small bowel obstruction   - cont NGT/NPO   - check labs in AM    2  Delirium    - Haldol PRN for agitation   - QT interval 336 on EKG 2/3    3  Hypertension   - hydralazine PRN, metoprolol PRN    4  DM2   - insulin correction scale algorithm 2    5  DVT Prophylaxis   - SQH   - SCDs    6  Disposition   - med surg    Sy Pedersen MD PGY-4  7:50 PM  02/04/18      Subjective:  Doing well post-op  Was hypotensive initially, but this has improved with a bolus of 500mL normal saline      Objective:  Patient Vitals for the past 24 hrs:   BP Temp Temp src Pulse Resp SpO2 Height Weight   02/04/18 1830 157/70 98 5 °F (36 9 °C) Oral 84 18 99 % - -   02/04/18 1730 168/66 98 4 °F (36 9 °C) Oral 84 20 98 % - -   02/04/18 1630 130/60 97 7 °F (36 5 °C) Oral 83 16 97 % - -   02/04/18 1530 94/52 97 5 °F (36 4 °C) Oral (!) 128 18 95 % 5' 6 5" (1 689 m) 64 7 kg (142 lb 10 2 oz)   02/04/18 1445 161/76 98 8 °F (37 1 °C) - 76 14 100 % - -   02/04/18 1430 (!) 175/76 - - 78 15 100 % - -   02/04/18 1415 (!) 171/78 - - 76 21 100 % - -   02/04/18 1400 - - - 78 13 100 % - -   02/04/18 1357 (!) 188/60 99 5 °F (37 5 °C) Temporal 79 14 100 % - -   02/04/18 1356 (!) 188/60 99 5 °F (37 5 °C) - 75 14 100 % - -   02/04/18 0848 - - - - - 96 % - -   02/04/18 0721 (!) 192/74 99 1 °F (37 3 °C) - (!) 117 20 96 % - -   02/04/18 0325 (!) 175/70 - - - - - - -   02/04/18 0057 168/72 - - - - - - -   02/03/18 2335 (!) 180/85 98 3 °F (36 8 °C) Oral 104 18 96 % - -          Diet Orders            Start     Ordered    02/02/18 0105  Diet NPO; Sips with meds  Diet effective now     Question Answer Comment   Diet Type NPO    NPO Except: Sips with meds    RD to adjust diet per protocol?  No        02/02/18 0104          Intake/Output Summary (Last 24 hours) at 02/04/18 1950  Last data filed at 02/04/18 1457   Gross per 24 hour   Intake             2825 ml   Output             1450 ml   Net             1375 ml       Physical Exam:  General: NAD  Cardiovascular: RRR  Respiratory: breath sounds b/l  Abdomen: soft, ND, mild tenderness, dressing in place  Extremities: no edema    Medications:    Current Facility-Administered Medications:  dextrose 5 % and sodium chloride 0 45 % with KCl 20 mEq/L 100 mL/hr Intravenous Continuous Marlon Deras MD Last Rate: 100 mL/hr (02/04/18 1455)   heparin (porcine) 5,000 Units Subcutaneous [facility] Nely Castillo MD    hydrALAZINE 5 mg Intravenous Q6H PRN Denny Michele MD    HYDROmorphone 0 5 mg Intravenous Q2H PRN Aretha Garces MD    HYDROmorphone 1 mg Intravenous Q3H PRN Aretha Garces MD    insulin lispro 1-5 Units Subcutaneous Q6H [address] Denny Michele MD    lactated ringers 20 mL/hr Intravenous Continuous Joe Barnes CRNA Last Rate: Stopped (02/04/18 1455)   levothyroxine 70 mcg Intravenous Daily Nely Castillo MD    metoprolol 5 mg Intravenous Q6H PRN Nely Castillo MD    ondansetron 4 mg Intravenous Q6H PRN Nely Castillo MD    ondansetron 4 mg Intravenous Q4H PRN Denny Michele MD    pantoprazole 40 mg Intravenous Q24H Bartolome Stern MD    phenol 1 spray Mouth/Throat Q2H PRN Marlon Deras MD    sodium chloride 500 mL Intravenous Once Denny Michele MD        dextrose 5 % and sodium chloride 0 45 % with KCl 20 mEq/L 100 mL/hr Last Rate: 100 mL/hr (02/04/18 1455)   lactated ringers 20 mL/hr Last Rate: Stopped (02/04/18 1455)       hydrALAZINE 5 mg Q6H PRN   HYDROmorphone 0 5 mg Q2H PRN   HYDROmorphone 1 mg Q3H PRN   metoprolol 5 mg Q6H PRN   ondansetron 4 mg Q6H PRN   ondansetron 4 mg Q4H PRN   phenol 1 spray Q2H PRN     Laboratory results:   CBC:   Lab Results   Component Value Date    WBC 12 90 (H) 02/04/2018    HGB 13 6 02/04/2018    HCT 40 6 02/04/2018    MCV 89 02/04/2018     02/04/2018    MCH 29 8 02/04/2018    MCHC 33 5 02/04/2018    RDW 14 4 02/04/2018    MPV 9 8 02/04/2018    NRBC 0 02/04/2018   , CMP:   Lab Results   Component Value Date     (L) 02/04/2018    K 4 0 02/04/2018     02/04/2018    CO2 28 02/04/2018    ANIONGAP 6 02/04/2018    BUN 20 02/04/2018    CREATININE 0 89 02/04/2018    GLUCOSE 146 (H) 02/04/2018    CALCIUM 8 6 02/04/2018    EGFR 62 02/04/2018   , Coagulation: No results found for: PT, INR, APTT, Urinalysis: No results found for: COLORU, CLARITYU, SPECGRAV, PHUR, LEUKOCYTESUR, NITRITE, PROTEINUA, GLUCOSEU, KETONESU, BILIRUBINUR, BLOODU, Amylase: No results found for: AMYLASE, Lipase: No results found for: LIPASE    VTE Pharmacologic Prophylaxis: Heparin  VTE Mechanical Prophylaxis: sequential compression device

## 2018-02-05 NOTE — SOCIAL WORK
CM met with patient this morning to discuss CM's role in her care and DC plans  Patient presents as alert  Patient reports living independently in a 3rd floor senior apartment with elevator access  Patient reports driving herself to doctor appointments and the grocery store  She occasionally uses a cane outside of the home  Preferred pharmacy is [facility]  Hx of VNA s/p knee replacement but she cannot recall the name of the agency  No hx of STR  No living will or POA  No history of D+A treatment  Patient reports having depression s/p divorce 50+ years ago but no hospitalizations  She receives SSI as primary income  Patient educated on the importance of understanding their medical course and encouraged to ask questions of the medical team  [address] [place] [address] discussed  Patient's family available to assist around the home  Patient plans on returning home at time of discharge  DC checklist introduced and provided to patient  Emergency Contact: Justin Marr (Dtr) [phone]    CM continues to follow for any discharge needs

## 2018-02-05 NOTE — CASE MANAGEMENT
Continued Stay Review    Date/POD#: 2/5/18 Operative Day     Vital Signs: /80 (BP Location: Right arm)   Pulse 102   Temp 98 8 °F (37 1 °C) (Oral)   Resp 18   Ht 5' 6 5" (1 689 m)   Wt 64 7 kg (142 lb 10 2 oz)   SpO2 99%   BMI 22 68 kg/m²     Medication:   Scheduled Meds:   Current Facility-Administered Medications:  artificial tear  Both Eyes BID Prisca Tafoya MD    dextrose 5 % and sodium chloride 0 45 % with KCl 20 mEq/L 84 mL/hr Intravenous Continuous Za Dewitt MD Last Rate: 84 mL/hr (02/05/18 1038)   heparin (porcine) 5,000 Units Subcutaneous Q8H [facility] Issac Merlin, MD    hydrALAZINE 5 mg Intravenous Q6H PRN Prisca Tafoya MD    HYDROmorphone 0 5 mg Intravenous Q2H PRN Cachorro Gonzalez MD    HYDROmorphone 1 mg Intravenous Q3H PRN Cachorro Gonzalez MD    insulin lispro 1-5 Units Subcutaneous Q6H [facility] Prisca Tafoya MD    levothyroxine 70 mcg Intravenous Daily Issac Merlin, MD    metoprolol 5 mg Intravenous Q6H PRN Issac Merlin, MD    ondansetron 4 mg Intravenous Q6H PRN Issac Merlin, MD    ondansetron 4 mg Intravenous Q4H PRN Prisca Tafoya MD    pantoprazole 40 mg Intravenous Q24H Billy Gaviria MD    phenol 1 spray Mouth/Throat Q2H PRN Za Dewitt MD    potassium phosphate 30 mmol Intravenous Once Za Dewitt MD Last Rate: 30 mmol (02/05/18 1109)   sodium chloride 500 mL Intravenous Once Prisca Tafoya MD      Continuous Infusions:   dextrose 5 % and sodium chloride 0 45 % with KCl 20 mEq/L 84 mL/hr Last Rate: 84 mL/hr (02/05/18 1038)     PRN Meds: hydrALAZINE    HYDROmorphone    HYDROmorphone    metoprolol    ondansetron    ondansetron    phenol    Abnormal Labs/Diagnostic Results:   Glucose 65 - 140 mg/dL 241     Calcium 8 3 - 10 1 mg/dL 7 9     Phos 1 0  Prealbumin 7 6  WBC 17 15  POC glucose 219, 169, 193    Age/Sex: 76 y o  female     Assessment/Plan:   2/5 Surgery Progress Note  Assessment:  76y o -year-old female with high-grade SBO secondary to adhesions s/p laparotomy w/ SBR 2/4     No bowel function  NGT output still bilious     Plan:  - NPO/NGT  - prn pain control  - IVF  - F/u geriatrics  - d/c danielle  - Pulm toilet, ambulation   ____________________________  2/5 Geriatrics Consult   Assessment/Plan  1  Delirium  Patient noted to be deliriou per EMR  Patient does take Ativan routinely at home, because of this, recommend restarting Ativan 0 25 mg IV p r n  daily, as abrupt discontinuation of benzodiazepines can cause rebound delirium  Avoid use of  tramadol, Benadryl  Avoid Haldol at this time as patient's QTC interval is borderline prolonged  Consider reducing Dilaudid to 0 2 mg IV p r n  Q 4 for moderate pain  Consider adding lidoderm patch if applicable  Refer to geriatric pain med order set   When able to take p o , recommend Tylenol 650 mg Q 8  Monitor for urinary retention, BS/SC PRN in light of catheter being removed this AM  Redirect unwanted patient behaviors as first line tx  Reorient patient frequently  Good sleep hygiene important, limit night time interruptions  Encourage patient to stay awake during the day  Ensure adequate hydration/nutrition  Mobilize often      2  Deconditioning  Mobilize frequently to prevent further decline  PT, OT  Patient will likely need rehab to improve endurance and strength     3  No signs of obvious cognitive impairment  Patient is 10/10 for orientation, 3/5 for abstract thinking, 3/3 on delayed recall, 2/2 on naming, 1 on 1 on language, she denies feeling forgetful  Patient encouraged to keep her mind active to prevent decline  Patient may f/u with [facility] CPA at d/c for formal cognitive assessment if she feels concerned over her memory at any point  Continue supportive care     4  Ambulatory dysfunction  PT/OT  Continue with home vitamin D supplement  Patient's it will need rehab to improve gait stability, endurance, strength upon discharge     5    History of fall  Patient reports suffering fall 2 years ago  Fall precautions  Patient instructed that ideally she should try to wean herself off of the Ativan, recommend 0 25 mg p r n  daily for week, then stop  Patient not agreeable to weaning off of Ativan     6  Anxiety  Patient reports well managed with lorazepam, educated patient on side effects, and risks, she reports that she still wants to continue to take her lorazepam  Recommend restarting lorazepam due to chronic use, recommend restarting at 0 25 mg p r n  daily     7  SBO  S/p ex lap  Lysis of adhesions  Surgery following and managing     8  LITA  Has since resolved  Encourage po food and fluid intake when applicable  ___________________________  2/5 Operative Report   Post-Op Diagnosis Codes: * Bowel obstruction [K56 609]     Procedure(s) (LRB):  LAPAROTOMY EXPLORATORY, (N/A)  LYSIS ADHESIONS (N/A)  RESECTION SMALL BOWEL (N/A)   findings: Distal small-bowel obstruction with micro perforation    Extensive adhesions throughout the abdomen    Discharge Plan: TBD

## 2018-02-05 NOTE — CONSULTS
Consultation - Mu Tanner 76 y o  female MRN: [id number]  Unit/Bed#: [facility] 802-01 Encounter: [id number]      Assessment/Plan  1  Delirium  Patient noted to be deliriou per EMR  Patient does take Ativan routinely at home, because of this, recommend restarting Ativan 0 25 mg IV p r n  daily, as abrupt discontinuation of benzodiazepines can cause rebound delirium  Avoid use of  tramadol, Benadryl  Avoid Haldol at this time as patient's QTC interval is borderline prolonged  Consider reducing Dilaudid to 0 2 mg IV p r n  Q 4 for moderate pain  Consider adding lidoderm patch if applicable  Refer to geriatric pain med order set   When able to take p o , recommend Tylenol 650 mg Q 8  Monitor for urinary retention, BS/SC PRN in light of catheter being removed this AM  Redirect unwanted patient behaviors as first line tx  Reorient patient frequently  Good sleep hygiene important, limit night time interruptions  Encourage patient to stay awake during the day  Ensure adequate hydration/nutrition  Mobilize often     2  Deconditioning  Mobilize frequently to prevent further decline  PT, OT  Patient will likely need rehab to improve endurance and strength    3  No signs of obvious cognitive impairment  Patient is 10/10 for orientation, 3/5 for abstract thinking, 3/3 on delayed recall, 2/2 on naming, 1 on 1 on language, she denies feeling forgetful  Patient encouraged to keep her mind active to prevent decline  Patient may f/u with [facility] at d/c for formal cognitive assessment if she feels concerned over her memory at any point  Continue supportive care    4  Ambulatory dysfunction  PT/OT  Continue with home vitamin D supplement  Patient's it will need rehab to improve gait stability, endurance, strength upon discharge    5  History of fall  Patient reports suffering fall 2 years ago  Fall precautions  Patient instructed that ideally she should try to wean herself off of the Ativan, recommend 0 25 mg p r n  daily for week, then stop  Patient not agreeable to weaning off of Ativan    6  Anxiety  Patient reports well managed with lorazepam, educated patient on side effects, and risks, she reports that she still wants to continue to take her lorazepam  Recommend restarting lorazepam due to chronic use, recommend restarting at 0 25 mg p r n  daily    7  SBO  S/p ex lap  Lysis of adhesions  Surgery following and managing    8  LITA  Has since resolved  Encourage po food and fluid intake when applicable          History of Present Illness   Physician Requesting Consult: Boris Clark MD  Reason for Consult / Principal Problem: delirium  Hx and PE limited by: n/a  HPI: Ramiro Lopez is a 76y o  year old female with pmh depression, hypothyroid, who presents to [facility] with chief complaint of nausea and vomiting  Patient was Found to have a small-bowel obstruction, hyponatremia, azotemia, a KI  She was admitted under the surgery team, NGT placed  Patient received Ativan on 2/2, noted to be delirious on 2/3, was placed on haldol prn but has not received  Patient went to OR on 02/04/2018 for exploratory laparotomy, lysis of the adhesions  Prior to arrival patient lives at home by herself in a senior apartment community  She was independent with ADLs and IADLs  Occasionally she uses a cane  She still drives  She reports 1 fall 2 years ago  She reports no issues with her memory  Inpatient consult to Gerontology  Consult performed by: Abigail Sherman ordered by: Satinder Thomason          Review of Systems   Constitutional: Positive for fatigue  Respiratory: Negative for chest tightness and shortness of breath  Cardiovascular: Negative for chest pain and palpitations  Gastrointestinal: Positive for abdominal pain  Negative for constipation, diarrhea, nausea and vomiting  Musculoskeletal: Positive for myalgias  Neurological: Positive for headaches     Psychiatric/Behavioral: Positive for confusion (Reports some fogginess since being in hospital )  The patient is nervous/anxious  All other systems reviewed and are negative  Historical Information   Past Medical History:   Diagnosis Date    Anxiety     Cancer ([facility] [address] )     LEFT BREAST CA 22 YEARS AGO     Depression     Diabetes mellitus ([facility] [address] )     Hypertension     Hypothyroidism      Past Surgical History:   Procedure Laterality Date    BREAST SURGERY      CHOLECYSTECTOMY      JOINT REPLACEMENT      LEFT KNEE REPLACEMENT     MASTECTOMY      AR BIOPSY/EXCISION, LYMPH NODE(S) Right 1/13/2017    Procedure: SENTINEL LYMPH NODE BIOPSY RIGHT AXILLA;   Surgeon: Tammie Stoddard MD;  Location: [facility];  Service: General    AR MASTECTOMY, SIMPLE, COMPLETE Right 1/13/2017    Procedure: MASTECTOMY SIMPLE;  Surgeon: Tammie Stoddard MD;  Location: [facility];  Service: General     Social History   History   Alcohol Use No     History   Drug Use No     History   Smoking Status    Current Every Day Smoker    Packs/day: 1 00    Types: Cigarettes   Smokeless Tobacco    Never Used         Family History: non-contributory    Meds/Allergies   Current meds:   Current Facility-Administered Medications   Medication Dose Route Frequency    artificial tear (LUBRIFRESH P M ) ophthalmic ointment   Both Eyes BID    dextrose 5 % and sodium chloride 0 45 % with KCl 20 mEq/L infusion  100 mL/hr Intravenous Continuous    heparin (porcine) subcutaneous injection 5,000 Units  5,000 Units Subcutaneous Q8H [address]    hydrALAZINE (APRESOLINE) injection 5 mg  5 mg Intravenous Q6H PRN    HYDROmorphone (DILAUDID) injection 0 5 mg  0 5 mg Intravenous Q2H PRN    HYDROmorphone (DILAUDID) injection 1 mg  1 mg Intravenous Q3H PRN    insulin lispro (HumaLOG) 100 units/mL subcutaneous injection 1-5 Units  1-5 Units Subcutaneous Q6H [address]    levothyroxine injection 70 mcg  70 mcg Intravenous Daily    magnesium sulfate 2 g/50 mL IVPB (premix) 2 g  2 g Intravenous Once  metoprolol (LOPRESSOR) injection 5 mg  5 mg Intravenous Q6H PRN    ondansetron (ZOFRAN) injection 4 mg  4 mg Intravenous Q6H PRN    ondansetron (ZOFRAN) injection 4 mg  4 mg Intravenous Q4H PRN    pantoprazole (PROTONIX) injection 40 mg  40 mg Intravenous Q24H YADIRA    phenol (CHLORASEPTIC) 1 4 % mucosal liquid 1 spray  1 spray Mouth/Throat Q2H PRN    potassium phosphate 30 mmol in sodium chloride 0 9 % 250 mL infusion  30 mmol Intravenous Once    sodium chloride 0 9 % bolus 500 mL  500 mL Intravenous Once      Current PTA meds:  Prescriptions Prior to Admission   Medication    amLODIPine (NORVASC) 5 mg tablet    ascorbic acid (VITAMIN C) 500 mg tablet    Cholecalciferol (VITAMIN D3) 5000 UNITS TABS    CloNIDine HCl ER 0 1 MG TB12    LEVOTHYROXINE SODIUM PO    lisinopril (ZESTRIL) 20 mg tablet    LORazepam (ATIVAN) 0 5 mg tablet    metFORMIN (GLUCOPHAGE) 500 mg tablet    propranolol (INDERAL) 40 mg tablet    Zinc 50 MG CAPS        Allergies   Allergen Reactions    Morphine GI Intolerance    Penicillins     Percocet [Oxycodone-Acetaminophen]        Objective   Vitals: Blood pressure 133/80, pulse 102, temperature 98 8 °F (37 1 °C), temperature source Oral, resp  rate 18, height 5' 6 5" (1 689 m), weight 64 7 kg (142 lb 10 2 oz), SpO2 99 %  ,Body mass index is 22 68 kg/m²  Physical Exam   Constitutional: She is oriented to person, place, and time  She appears well-developed and well-nourished  No distress  HENT:   Head: Normocephalic and atraumatic  Mouth/Throat: No oropharyngeal exudate  Eyes: Conjunctivae and EOM are normal  No scleral icterus  Neck: Neck supple  Cardiovascular: Normal rate  Pulmonary/Chest: Effort normal and breath sounds normal  She has no wheezes  She has no rales  Abdominal: Soft  Bowel sounds are normal  She exhibits distension  There is tenderness  Musculoskeletal: She exhibits no edema     Neurological: She is alert and oriented to person, place, and time    Skin: Skin is warm and dry  Psychiatric: She is not agitated and not actively hallucinating  Patient alert oriented x4  Scores 10 on a 10 on orientation, 3/5 on abstract thinking, 3/3 on delayed recall, 2/2 on naming, 1/1 on language  No obvious sign of cognitive impairment  No signs of delirium  Patient did have delirious episode earlier in hospital stay  Patient reports anxiety  Nursing note and vitals reviewed  Lab Results:   Results from last 7 days  Lab Units 02/05/18  0451   WBC Thousand/uL 17 15*   HEMOGLOBIN g/dL 12 0   HEMATOCRIT % 36 2   PLATELETS Thousands/uL 238        Results from last 7 days  Lab Units 02/05/18  0451  02/01/18  1742   SODIUM mmol/L 137  < > 134*   POTASSIUM mmol/L 4 6  < > 4 0   CHLORIDE mmol/L 105  < > 94*   CO2 mmol/L 26  < > 30   BUN mg/dL 17  < > 60*   CREATININE mg/dL 0 84  < > 1 57*   CALCIUM mg/dL 7 9*  < > 9 9   TOTAL PROTEIN g/dL  --   --  7 7   BILIRUBIN TOTAL mg/dL  --   --  1 00   ALK PHOS U/L  --   --  65   ALT U/L  --   --  21   AST U/L  --   --  23   GLUCOSE RANDOM mg/dL 241*  < > 129   < > = values in this interval not displayed  Imaging Studies: I have personally reviewed pertinent reports  EKG, Pathology, and Other Studies: I have personally reviewed pertinent reports  VTE Prophylaxis: Sequential compression device (Venodyne)     Code Status: Level 1 - Full Code      Counseling/Coordination of Care: Total floor / unit time spent today 25 minutes  Greater than 50% of total time was spent with the patient and / or family counseling and / or coordination of care  A description of the counseling / coordination of care: Assessing and examining the patient, reviewing EMR meds, speaking to nursing staff, assessing patient's cognition  Speaking with patient about anxiety, medications side effects

## 2018-02-05 NOTE — PROGRESS NOTES
Patient had change in vital signs, bp 141/71  RR 22 low grade temp at 100 1 contacted [facility] spoke with Barbara España, patient does have moist productive cough will encourage incentative and obtain sputum culture  Will continue to monitor the patient

## 2018-02-06 ENCOUNTER — APPOINTMENT (INPATIENT)
Dept: RADIOLOGY | Facility: HOSPITAL | Age: 78
DRG: 329 | End: 2018-02-06
Payer: MEDICARE

## 2018-02-06 LAB
ALBUMIN SERPL BCP-MCNC: 2.6 G/DL (ref 3.5–5)
ALP SERPL-CCNC: 64 U/L (ref 46–116)
ALT SERPL W P-5'-P-CCNC: 23 U/L (ref 12–78)
ANION GAP SERPL CALCULATED.3IONS-SCNC: 8 MMOL/L (ref 4–13)
AST SERPL W P-5'-P-CCNC: 25 U/L (ref 5–45)
BACTERIA UR QL AUTO: ABNORMAL /HPF
BASOPHILS # BLD AUTO: 0.06 THOUSANDS/ΜL (ref 0–0.1)
BASOPHILS NFR BLD AUTO: 0 % (ref 0–1)
BILIRUB DIRECT SERPL-MCNC: 0.15 MG/DL (ref 0–0.2)
BILIRUB SERPL-MCNC: 0.67 MG/DL (ref 0.2–1)
BILIRUB UR QL STRIP: ABNORMAL
BUN SERPL-MCNC: 15 MG/DL (ref 5–25)
CALCIUM SERPL-MCNC: 8 MG/DL (ref 8.3–10.1)
CHLORIDE SERPL-SCNC: 106 MMOL/L (ref 100–108)
CLARITY UR: CLEAR
CO2 SERPL-SCNC: 24 MMOL/L (ref 21–32)
COARSE GRAN CASTS URNS QL MICRO: ABNORMAL /LPF
COLOR UR: ABNORMAL
CREAT SERPL-MCNC: 0.68 MG/DL (ref 0.6–1.3)
EOSINOPHIL # BLD AUTO: 0.13 THOUSAND/ΜL (ref 0–0.61)
EOSINOPHIL NFR BLD AUTO: 1 % (ref 0–6)
ERYTHROCYTE [DISTWIDTH] IN BLOOD BY AUTOMATED COUNT: 14.5 % (ref 11.6–15.1)
GFR SERPL CREATININE-BSD FRML MDRD: 83 ML/MIN/1.73SQ M
GLUCOSE SERPL-MCNC: 122 MG/DL (ref 65–140)
GLUCOSE SERPL-MCNC: 124 MG/DL (ref 65–140)
GLUCOSE SERPL-MCNC: 153 MG/DL (ref 65–140)
GLUCOSE SERPL-MCNC: 163 MG/DL (ref 65–140)
GLUCOSE UR STRIP-MCNC: NEGATIVE MG/DL
HCT VFR BLD AUTO: 34.2 % (ref 34.8–46.1)
HGB BLD-MCNC: 11.4 G/DL (ref 11.5–15.4)
HGB UR QL STRIP.AUTO: ABNORMAL
KETONES UR STRIP-MCNC: NEGATIVE MG/DL
LEUKOCYTE ESTERASE UR QL STRIP: NEGATIVE
LYMPHOCYTES # BLD AUTO: 1.74 THOUSANDS/ΜL (ref 0.6–4.47)
LYMPHOCYTES NFR BLD AUTO: 10 % (ref 14–44)
MAGNESIUM SERPL-MCNC: 1.9 MG/DL (ref 1.6–2.6)
MCH RBC QN AUTO: 29.1 PG (ref 26.8–34.3)
MCHC RBC AUTO-ENTMCNC: 33.3 G/DL (ref 31.4–37.4)
MCV RBC AUTO: 87 FL (ref 82–98)
MONOCYTES # BLD AUTO: 1.91 THOUSAND/ΜL (ref 0.17–1.22)
MONOCYTES NFR BLD AUTO: 10 % (ref 4–12)
NEUTROPHILS # BLD AUTO: 14.14 THOUSANDS/ΜL (ref 1.85–7.62)
NEUTS SEG NFR BLD AUTO: 79 % (ref 43–75)
NITRITE UR QL STRIP: NEGATIVE
NON-SQ EPI CELLS URNS QL MICRO: ABNORMAL /HPF
NRBC BLD AUTO-RTO: 0 /100 WBCS
OTHER STN SPEC: ABNORMAL
PH UR STRIP.AUTO: 5.5 [PH] (ref 4.5–8)
PHOSPHATE SERPL-MCNC: 1.3 MG/DL (ref 2.3–4.1)
PLATELET # BLD AUTO: 244 THOUSANDS/UL (ref 149–390)
PMV BLD AUTO: 9.9 FL (ref 8.9–12.7)
POTASSIUM SERPL-SCNC: 4.6 MMOL/L (ref 3.5–5.3)
PROT SERPL-MCNC: 6.1 G/DL (ref 6.4–8.2)
PROT UR STRIP-MCNC: ABNORMAL MG/DL
RBC # BLD AUTO: 3.92 MILLION/UL (ref 3.81–5.12)
RBC #/AREA URNS AUTO: ABNORMAL /HPF
SODIUM SERPL-SCNC: 138 MMOL/L (ref 136–145)
SP GR UR STRIP.AUTO: 1.02 (ref 1–1.03)
TRIGL SERPL-MCNC: 71 MG/DL
UROBILINOGEN UR QL STRIP.AUTO: 0.2 E.U./DL
WBC # BLD AUTO: 18.33 THOUSAND/UL (ref 4.31–10.16)
WBC #/AREA URNS AUTO: ABNORMAL /HPF

## 2018-02-06 PROCEDURE — 74018 RADEX ABDOMEN 1 VIEW: CPT

## 2018-02-06 PROCEDURE — 85025 COMPLETE CBC W/AUTO DIFF WBC: CPT | Performed by: STUDENT IN AN ORGANIZED HEALTH CARE EDUCATION/TRAINING PROGRAM

## 2018-02-06 PROCEDURE — 99024 POSTOP FOLLOW-UP VISIT: CPT | Performed by: SURGERY

## 2018-02-06 PROCEDURE — 36569 INSJ PICC 5 YR+ W/O IMAGING: CPT

## 2018-02-06 PROCEDURE — 80076 HEPATIC FUNCTION PANEL: CPT | Performed by: STUDENT IN AN ORGANIZED HEALTH CARE EDUCATION/TRAINING PROGRAM

## 2018-02-06 PROCEDURE — 84478 ASSAY OF TRIGLYCERIDES: CPT | Performed by: STUDENT IN AN ORGANIZED HEALTH CARE EDUCATION/TRAINING PROGRAM

## 2018-02-06 PROCEDURE — 3E0336Z INTRODUCTION OF NUTRITIONAL SUBSTANCE INTO PERIPHERAL VEIN, PERCUTANEOUS APPROACH: ICD-10-PCS | Performed by: SURGERY

## 2018-02-06 PROCEDURE — 81001 URINALYSIS AUTO W/SCOPE: CPT | Performed by: SURGERY

## 2018-02-06 PROCEDURE — 80048 BASIC METABOLIC PNL TOTAL CA: CPT | Performed by: STUDENT IN AN ORGANIZED HEALTH CARE EDUCATION/TRAINING PROGRAM

## 2018-02-06 PROCEDURE — 82948 REAGENT STRIP/BLOOD GLUCOSE: CPT

## 2018-02-06 PROCEDURE — C9113 INJ PANTOPRAZOLE SODIUM, VIA: HCPCS | Performed by: STUDENT IN AN ORGANIZED HEALTH CARE EDUCATION/TRAINING PROGRAM

## 2018-02-06 PROCEDURE — C1751 CATH, INF, PER/CENT/MIDLINE: HCPCS

## 2018-02-06 PROCEDURE — 05HC33Z INSERTION OF INFUSION DEVICE INTO LEFT BASILIC VEIN, PERCUTANEOUS APPROACH: ICD-10-PCS | Performed by: SURGERY

## 2018-02-06 PROCEDURE — 83735 ASSAY OF MAGNESIUM: CPT | Performed by: STUDENT IN AN ORGANIZED HEALTH CARE EDUCATION/TRAINING PROGRAM

## 2018-02-06 PROCEDURE — 77001 FLUOROGUIDE FOR VEIN DEVICE: CPT

## 2018-02-06 PROCEDURE — 84100 ASSAY OF PHOSPHORUS: CPT | Performed by: STUDENT IN AN ORGANIZED HEALTH CARE EDUCATION/TRAINING PROGRAM

## 2018-02-06 PROCEDURE — 76937 US GUIDE VASCULAR ACCESS: CPT

## 2018-02-06 RX ORDER — MAGNESIUM SULFATE HEPTAHYDRATE 40 MG/ML
2 INJECTION, SOLUTION INTRAVENOUS ONCE
Status: COMPLETED | OUTPATIENT
Start: 2018-02-06 | End: 2018-02-06

## 2018-02-06 RX ORDER — LORAZEPAM 2 MG/ML
0.25 INJECTION INTRAMUSCULAR DAILY PRN
Status: DISCONTINUED | OUTPATIENT
Start: 2018-02-06 | End: 2018-02-08

## 2018-02-06 RX ADMIN — HEPARIN SODIUM 5000 UNITS: 5000 INJECTION, SOLUTION INTRAVENOUS; SUBCUTANEOUS at 05:36

## 2018-02-06 RX ADMIN — HYDRALAZINE HYDROCHLORIDE 5 MG: 20 INJECTION INTRAMUSCULAR; INTRAVENOUS at 07:50

## 2018-02-06 RX ADMIN — POTASSIUM CHLORIDE, DEXTROSE MONOHYDRATE AND SODIUM CHLORIDE 84 ML/HR: 150; 5; 450 INJECTION, SOLUTION INTRAVENOUS at 05:36

## 2018-02-06 RX ADMIN — INSULIN LISPRO 1 UNITS: 100 INJECTION, SOLUTION INTRAVENOUS; SUBCUTANEOUS at 00:07

## 2018-02-06 RX ADMIN — MINERAL OIL AND WHITE PETROLATUM: 150; 830 OINTMENT OPHTHALMIC at 08:03

## 2018-02-06 RX ADMIN — HEPARIN SODIUM 5000 UNITS: 5000 INJECTION, SOLUTION INTRAVENOUS; SUBCUTANEOUS at 13:17

## 2018-02-06 RX ADMIN — CALCIUM GLUCONATE: 94 INJECTION, SOLUTION INTRAVENOUS at 23:29

## 2018-02-06 RX ADMIN — HEPARIN SODIUM 5000 UNITS: 5000 INJECTION, SOLUTION INTRAVENOUS; SUBCUTANEOUS at 22:17

## 2018-02-06 RX ADMIN — PANTOPRAZOLE SODIUM 40 MG: 40 INJECTION, POWDER, FOR SOLUTION INTRAVENOUS at 08:02

## 2018-02-06 RX ADMIN — MAGNESIUM SULFATE HEPTAHYDRATE 2 G: 40 INJECTION, SOLUTION INTRAVENOUS at 07:54

## 2018-02-06 RX ADMIN — INSULIN LISPRO 1 UNITS: 100 INJECTION, SOLUTION INTRAVENOUS; SUBCUTANEOUS at 18:09

## 2018-02-06 RX ADMIN — POTASSIUM CHLORIDE, DEXTROSE MONOHYDRATE AND SODIUM CHLORIDE 84 ML/HR: 150; 5; 450 INJECTION, SOLUTION INTRAVENOUS at 16:51

## 2018-02-06 RX ADMIN — INSULIN LISPRO 1 UNITS: 100 INJECTION, SOLUTION INTRAVENOUS; SUBCUTANEOUS at 12:17

## 2018-02-06 RX ADMIN — SODIUM PHOSPHATE, MONOBASIC, MONOHYDRATE 30 MMOL: 276; 142 INJECTION, SOLUTION INTRAVENOUS at 09:26

## 2018-02-06 RX ADMIN — INSULIN LISPRO 1 UNITS: 100 INJECTION, SOLUTION INTRAVENOUS; SUBCUTANEOUS at 05:37

## 2018-02-06 RX ADMIN — LEVOTHYROXINE SODIUM ANHYDROUS 70 MCG: 100 INJECTION, POWDER, LYOPHILIZED, FOR SOLUTION INTRAVENOUS at 08:02

## 2018-02-06 NOTE — SOCIAL WORK
Cm reviewed patient during care coordination rounds  Pt to IR today to place PICC  OT to be ordered  Medical team aware  Awaiting PT/OT recommendations for home vs STR  Cm continues to follow for discharge plan

## 2018-02-06 NOTE — PROCEDURES
Insert PICC line  Date/Time: 2/6/2018 4:00 PM  Performed by: Moses Sandifer by: Christal Barbosa     Patient location:  IR  Other Assisting Provider: Yes (comment)    Consent:     Consent obtained:  Written    Consent given by:  Patient    Risks discussed:  Arterial puncture, incorrect placement, nerve damage, infection and bleeding    Alternatives discussed:  No treatment, delayed treatment and alternative treatment  Universal protocol:     Procedure explained and questions answered to patient or proxy's satisfaction: yes      Relevant documents present and verified: yes      Test results available and properly labeled: yes      Imaging studies available: yes      Required blood products, implants, devices, and special equipment available: yes      Site/side marked: yes      Immediately prior to procedure, a time out was called: yes      Patient identity confirmed:  Verbally with patient and arm band  Pre-procedure details:     Hand hygiene: Hand hygiene performed prior to insertion      Sterile barrier technique: All elements of maximal sterile technique followed      Skin preparation:  ChloraPrep    Skin preparation agent: Skin preparation agent completely dried prior to procedure    Indications:     PICC line indications: total parenteral nutrition    Anesthesia (see MAR for exact dosages):      Anesthesia method:  Local infiltration    Local anesthetic:  Lidocaine 1% w/o epi  Procedure details:     Location:  Basilic    Vessel type: vein      Laterality:  Left    Approach: percutaneous technique used      Patient position:  Flat    Procedural supplies:  Double lumen    Catheter size:  5 Fr    Landmarks identified: yes      Ultrasound guidance: yes      Sterile ultrasound techniques: Sterile gel and sterile probe covers were used      Number of attempts:  1    Successful placement: yes      Total catheter length (cm):  44    Catheter out on skin (cm):  0    Max flow rate:  999ml/hr    Arm circumference:  30  Post-procedure details:     Post-procedure:  Securement device placed    Assessment:  Blood return through all ports, placement verified by x-ray and free fluid flow    Post-procedure complications: none      Patient tolerance of procedure:   Tolerated well, no immediate complications    Observer: Yes      Observer name:  Simone Stephens

## 2018-02-06 NOTE — PLAN OF CARE
DISCHARGE PLANNING     Discharge to home or other facility with appropriate resources Progressing        DISCHARGE PLANNING - CARE MANAGEMENT     Discharge to post-acute care or home with appropriate resources Progressing        GASTROINTESTINAL - ADULT     Minimal or absence of nausea and/or vomiting Progressing     Maintains or returns to baseline bowel function Progressing        INFECTION - ADULT     Absence or prevention of progression during hospitalization Progressing     Absence of fever/infection during neutropenic period Progressing        Knowledge Deficit     Patient/family/caregiver demonstrates understanding of disease process, treatment plan, medications, and discharge instructions Progressing        METABOLIC, FLUID AND ELECTROLYTES - ADULT     Electrolytes maintained within normal limits Progressing     Fluid balance maintained Progressing        Nutrition/Hydration-ADULT     Nutrient/Hydration intake appropriate for improving, restoring or maintaining nutritional needs Progressing        PAIN - ADULT     Verbalizes/displays adequate comfort level or baseline comfort level Progressing        Potential for Falls     Patient will remain free of falls Progressing        SAFETY ADULT     Patient will remain free of falls Progressing     Maintain or return to baseline ADL function Progressing     Maintain or return mobility status to optimal level Progressing

## 2018-02-06 NOTE — PROGRESS NOTES
Progress Note - General Surgery  Danny Duran 76 y o  female MRN: [id number]  Unit/Bed#: [facility] 802-01 Encounter: [id number]    Assessment:  76y o -year-old female with high-grade SBO secondary to laparotomies in past  Now s/p ex lap w/SBR on 2/4    Plan:  - NPO/NGT  - prn pain control  - ativan restarted  - IVF  - PICC/TPN today  - OOB/ambulate  - SQH/SCDs      Subjective:  Having intermittent belching and hiccups  Denies nausea, but no flatus or BM  Has not been ambulating much  Objective:  Patient Vitals for the past 24 hrs:   BP Temp Temp src Pulse Resp SpO2   02/05/18 2300 150/62 99 6 °F (37 6 °C) Oral 92 20 95 %   02/05/18 2000 - 100 °F (37 8 °C) Oral 88 20 -   02/05/18 1500 141/71 100 1 °F (37 8 °C) Oral (!) 116 22 96 %   02/05/18 0736 133/80 98 8 °F (37 1 °C) Oral 102 18 99 %          Diet Orders            Start     Ordered    02/02/18 0105  Diet NPO; Sips with meds  Diet effective now     Question Answer Comment   Diet Type NPO    NPO Except: Sips with meds    RD to adjust diet per protocol?  No        02/02/18 0104          Intake/Output Summary (Last 24 hours) at 02/06/18 0715  Last data filed at 02/06/18 0535   Gross per 24 hour   Intake           2501 8 ml   Output              700 ml   Net           1801 8 ml       Physical Exam:  NAD  Norm resp effort  RRR  Abd soft, distended, tympanitic at upper quadrants, min tender over incision, midline dsg cdi  -c/c/e  NGT in place to suction, functioning well      Medications:    Current Facility-Administered Medications:  artificial tear  Both Eyes BID Sujit Honeycutt MD    dextrose 5 % and sodium chloride 0 45 % with KCl 20 mEq/L 84 mL/hr Intravenous Continuous Alexx Livingston MD Last Rate: 84 mL/hr (02/06/18 0536)   heparin (porcine) 5,000 Units Subcutaneous Q8H [address] Michelle Levi MD    hydrALAZINE 5 mg Intravenous Q6H PRN Sujit Honeycutt MD    HYDROmorphone 0 5 mg Intravenous Q2H PRN Kirby Mayfield MD    HYDROmorphone 1 mg Intravenous Q3H PRN Nani More Bryce Kulkarni MD    insulin lispro 1-5 Units Subcutaneous Q6H [address] Dillan Face, MD    levothyroxine 70 mcg Intravenous Daily Lamberto Ramos MD    metoprolol 5 mg Intravenous Q6H PRN Lamberto Ramos MD    ondansetron 4 mg Intravenous Q6H PRN Lamberto Ramos MD    ondansetron 4 mg Intravenous Q4H PRN Dillan Peña, MD    pantoprazole 40 mg Intravenous Q24H Alireza Martinez MD    phenol 1 spray Mouth/Throat Q2H PRN Dary Frey MD    sodium chloride 500 mL Intravenous Once Gormania Face, MD        dextrose 5 % and sodium chloride 0 45 % with KCl 20 mEq/L 84 mL/hr Last Rate: 84 mL/hr (02/06/18 0536)       hydrALAZINE 5 mg Q6H PRN   HYDROmorphone 0 5 mg Q2H PRN   HYDROmorphone 1 mg Q3H PRN   metoprolol 5 mg Q6H PRN   ondansetron 4 mg Q6H PRN   ondansetron 4 mg Q4H PRN   phenol 1 spray Q2H PRN     Laboratory results:   CBC:   Lab Results   Component Value Date    WBC 18 33 (H) 02/06/2018    HGB 11 4 (L) 02/06/2018    HCT 34 2 (L) 02/06/2018    MCV 87 02/06/2018     02/06/2018    MCH 29 1 02/06/2018    MCHC 33 3 02/06/2018    RDW 14 5 02/06/2018    MPV 9 9 02/06/2018    NRBC 0 02/06/2018   , CMP:   Lab Results   Component Value Date     02/06/2018    K 4 6 02/06/2018     02/06/2018    CO2 24 02/06/2018    ANIONGAP 8 02/06/2018    BUN 15 02/06/2018    CREATININE 0 68 02/06/2018    GLUCOSE 124 02/06/2018    CALCIUM 8 0 (L) 02/06/2018    EGFR 83 02/06/2018   , Coagulation: No results found for: PT, INR, APTT, Urinalysis:   Lab Results   Component Value Date    COLORU Dk Yellow 02/06/2018    CLARITYU Clear 02/06/2018    SPECGRAV 1 021 02/06/2018    PHUR 5 5 02/06/2018    LEUKOCYTESUR Negative 02/06/2018    NITRITE Negative 02/06/2018    PROTEINUA 100 (2+) (A) 02/06/2018    GLUCOSEU Negative 02/06/2018    KETONESU Negative 02/06/2018    BILIRUBINUR Interference- unable to analyze (A) 02/06/2018    BLOODU Trace (A) 02/06/2018   , Amylase: No results found for: AMYLASE, Lipase: No results found for: LIPASE    VTE Pharmacologic Prophylaxis: Heparin  VTE Mechanical Prophylaxis: sequential compression device

## 2018-02-06 NOTE — PROGRESS NOTES
Pt still awaiting PICC to be placed in IR, called and left message with them inquiring when this would happen  Callback number left also  Awaiting callback

## 2018-02-06 NOTE — PROGRESS NOTES
Evaluated for a bedside PICC line  Pt has a PMH of bilateral mastectomies  Unable to use Right arm Left arm evaluated  Basilic vein is too small for a 5 Fr picc unable to visualize brachial or cephalic veins  Primary RN made aware suggest PICC donna in IR

## 2018-02-07 LAB
ABO GROUP BLD BPU: NORMAL
ABO GROUP BLD BPU: NORMAL
ANION GAP SERPL CALCULATED.3IONS-SCNC: 7 MMOL/L (ref 4–13)
BASOPHILS # BLD AUTO: 0.03 THOUSANDS/ΜL (ref 0–0.1)
BASOPHILS NFR BLD AUTO: 0 % (ref 0–1)
BPU ID: NORMAL
BPU ID: NORMAL
BUN SERPL-MCNC: 11 MG/DL (ref 5–25)
CALCIUM SERPL-MCNC: 8 MG/DL (ref 8.3–10.1)
CHLORIDE SERPL-SCNC: 105 MMOL/L (ref 100–108)
CO2 SERPL-SCNC: 25 MMOL/L (ref 21–32)
CREAT SERPL-MCNC: 0.57 MG/DL (ref 0.6–1.3)
EOSINOPHIL # BLD AUTO: 0.15 THOUSAND/ΜL (ref 0–0.61)
EOSINOPHIL NFR BLD AUTO: 1 % (ref 0–6)
ERYTHROCYTE [DISTWIDTH] IN BLOOD BY AUTOMATED COUNT: 14.5 % (ref 11.6–15.1)
GFR SERPL CREATININE-BSD FRML MDRD: 88 ML/MIN/1.73SQ M
GLUCOSE SERPL-MCNC: 169 MG/DL (ref 65–140)
GLUCOSE SERPL-MCNC: 173 MG/DL (ref 65–140)
GLUCOSE SERPL-MCNC: 174 MG/DL (ref 65–140)
GLUCOSE SERPL-MCNC: 202 MG/DL (ref 65–140)
HCT VFR BLD AUTO: 33.9 % (ref 34.8–46.1)
HGB BLD-MCNC: 11.5 G/DL (ref 11.5–15.4)
LYMPHOCYTES # BLD AUTO: 1.19 THOUSANDS/ΜL (ref 0.6–4.47)
LYMPHOCYTES NFR BLD AUTO: 8 % (ref 14–44)
MAGNESIUM SERPL-MCNC: 2.1 MG/DL (ref 1.6–2.6)
MCH RBC QN AUTO: 29.9 PG (ref 26.8–34.3)
MCHC RBC AUTO-ENTMCNC: 33.9 G/DL (ref 31.4–37.4)
MCV RBC AUTO: 88 FL (ref 82–98)
MONOCYTES # BLD AUTO: 1.75 THOUSAND/ΜL (ref 0.17–1.22)
MONOCYTES NFR BLD AUTO: 11 % (ref 4–12)
NEUTROPHILS # BLD AUTO: 12.09 THOUSANDS/ΜL (ref 1.85–7.62)
NEUTS SEG NFR BLD AUTO: 80 % (ref 43–75)
NRBC BLD AUTO-RTO: 0 /100 WBCS
PHOSPHATE SERPL-MCNC: 1.7 MG/DL (ref 2.3–4.1)
PLATELET # BLD AUTO: 245 THOUSANDS/UL (ref 149–390)
PMV BLD AUTO: 9.8 FL (ref 8.9–12.7)
POTASSIUM SERPL-SCNC: 3.8 MMOL/L (ref 3.5–5.3)
RBC # BLD AUTO: 3.85 MILLION/UL (ref 3.81–5.12)
SODIUM SERPL-SCNC: 137 MMOL/L (ref 136–145)
UNIT DISPENSE STATUS: NORMAL
UNIT DISPENSE STATUS: NORMAL
UNIT PRODUCT CODE: NORMAL
UNIT PRODUCT CODE: NORMAL
UNIT RH: NORMAL
UNIT RH: NORMAL
WBC # BLD AUTO: 15.38 THOUSAND/UL (ref 4.31–10.16)

## 2018-02-07 PROCEDURE — 85025 COMPLETE CBC W/AUTO DIFF WBC: CPT | Performed by: STUDENT IN AN ORGANIZED HEALTH CARE EDUCATION/TRAINING PROGRAM

## 2018-02-07 PROCEDURE — 82948 REAGENT STRIP/BLOOD GLUCOSE: CPT

## 2018-02-07 PROCEDURE — 97163 PT EVAL HIGH COMPLEX 45 MIN: CPT

## 2018-02-07 PROCEDURE — 80048 BASIC METABOLIC PNL TOTAL CA: CPT | Performed by: STUDENT IN AN ORGANIZED HEALTH CARE EDUCATION/TRAINING PROGRAM

## 2018-02-07 PROCEDURE — 84100 ASSAY OF PHOSPHORUS: CPT | Performed by: STUDENT IN AN ORGANIZED HEALTH CARE EDUCATION/TRAINING PROGRAM

## 2018-02-07 PROCEDURE — 97166 OT EVAL MOD COMPLEX 45 MIN: CPT

## 2018-02-07 PROCEDURE — G8979 MOBILITY GOAL STATUS: HCPCS

## 2018-02-07 PROCEDURE — C9113 INJ PANTOPRAZOLE SODIUM, VIA: HCPCS | Performed by: STUDENT IN AN ORGANIZED HEALTH CARE EDUCATION/TRAINING PROGRAM

## 2018-02-07 PROCEDURE — 99024 POSTOP FOLLOW-UP VISIT: CPT | Performed by: SURGERY

## 2018-02-07 PROCEDURE — G8987 SELF CARE CURRENT STATUS: HCPCS

## 2018-02-07 PROCEDURE — G8988 SELF CARE GOAL STATUS: HCPCS

## 2018-02-07 PROCEDURE — G8978 MOBILITY CURRENT STATUS: HCPCS

## 2018-02-07 PROCEDURE — 83735 ASSAY OF MAGNESIUM: CPT | Performed by: STUDENT IN AN ORGANIZED HEALTH CARE EDUCATION/TRAINING PROGRAM

## 2018-02-07 RX ORDER — METOPROLOL TARTRATE 5 MG/5ML
5 INJECTION INTRAVENOUS EVERY 6 HOURS SCHEDULED
Status: DISCONTINUED | OUTPATIENT
Start: 2018-02-07 | End: 2018-02-14

## 2018-02-07 RX ORDER — METOPROLOL TARTRATE 5 MG/5ML
5 INJECTION INTRAVENOUS EVERY 6 HOURS
Status: CANCELLED | OUTPATIENT
Start: 2018-02-07

## 2018-02-07 RX ORDER — POLYVINYL ALCOHOL 14 MG/ML
1 SOLUTION/ DROPS OPHTHALMIC
Status: DISCONTINUED | OUTPATIENT
Start: 2018-02-07 | End: 2018-02-17 | Stop reason: HOSPADM

## 2018-02-07 RX ADMIN — INSULIN LISPRO 1 UNITS: 100 INJECTION, SOLUTION INTRAVENOUS; SUBCUTANEOUS at 18:12

## 2018-02-07 RX ADMIN — POTASSIUM PHOSPHATE, MONOBASIC AND POTASSIUM PHOSPHATE, DIBASIC 30 MMOL: 224; 236 INJECTION, SOLUTION INTRAVENOUS at 09:38

## 2018-02-07 RX ADMIN — METOPROLOL TARTRATE 5 MG: 1 INJECTION, SOLUTION INTRAVENOUS at 18:13

## 2018-02-07 RX ADMIN — HEPARIN SODIUM 5000 UNITS: 5000 INJECTION, SOLUTION INTRAVENOUS; SUBCUTANEOUS at 14:29

## 2018-02-07 RX ADMIN — HEPARIN SODIUM 5000 UNITS: 5000 INJECTION, SOLUTION INTRAVENOUS; SUBCUTANEOUS at 22:28

## 2018-02-07 RX ADMIN — PANTOPRAZOLE SODIUM 40 MG: 40 INJECTION, POWDER, FOR SOLUTION INTRAVENOUS at 08:28

## 2018-02-07 RX ADMIN — METOPROLOL TARTRATE 5 MG: 1 INJECTION, SOLUTION INTRAVENOUS at 05:03

## 2018-02-07 RX ADMIN — POTASSIUM CHLORIDE, DEXTROSE MONOHYDRATE AND SODIUM CHLORIDE 84 ML/HR: 150; 5; 450 INJECTION, SOLUTION INTRAVENOUS at 05:08

## 2018-02-07 RX ADMIN — HYDRALAZINE HYDROCHLORIDE 5 MG: 20 INJECTION INTRAMUSCULAR; INTRAVENOUS at 16:03

## 2018-02-07 RX ADMIN — CALCIUM GLUCONATE: 94 INJECTION, SOLUTION INTRAVENOUS at 22:28

## 2018-02-07 RX ADMIN — POLYVINYL ALCOHOL 1 DROP: 14 SOLUTION/ DROPS OPHTHALMIC at 22:28

## 2018-02-07 RX ADMIN — HEPARIN SODIUM 5000 UNITS: 5000 INJECTION, SOLUTION INTRAVENOUS; SUBCUTANEOUS at 05:06

## 2018-02-07 RX ADMIN — LEVOTHYROXINE SODIUM ANHYDROUS 70 MCG: 100 INJECTION, POWDER, LYOPHILIZED, FOR SOLUTION INTRAVENOUS at 08:28

## 2018-02-07 RX ADMIN — METOPROLOL TARTRATE 5 MG: 1 INJECTION, SOLUTION INTRAVENOUS at 11:29

## 2018-02-07 RX ADMIN — INSULIN LISPRO 1 UNITS: 100 INJECTION, SOLUTION INTRAVENOUS; SUBCUTANEOUS at 12:46

## 2018-02-07 RX ADMIN — INSULIN LISPRO 1 UNITS: 100 INJECTION, SOLUTION INTRAVENOUS; SUBCUTANEOUS at 05:07

## 2018-02-07 NOTE — PLAN OF CARE
Problem: OCCUPATIONAL THERAPY ADULT  Goal: Performs self-care activities at highest level of function for planned discharge setting  See evaluation for individualized goals  Treatment Interventions: ADL retraining, Functional transfer training, UE strengthening/ROM, Endurance training, Patient/family training, Equipment evaluation/education, Compensatory technique education, Continued evaluation, Energy conservation, Activityengagement  Equipment Recommended: Bedside commode       See flowsheet documentation for full assessment, interventions and recommendations  Limitation: Decreased ADL status, Decreased UE strength, Decreased UE ROM, Decreased Safe judgement during ADL, Decreased endurance, Decreased self-care trans, Decreased high-level ADLs  Prognosis: Fair  Assessment: Pt is a 77 yo F admit to [facility] w/ nausea, vomiting x 3 days dx'd w/ small bowel obstruction s/p exploratory laparotomy, small bowel resection seen for OT eval  Pt w/ PMHx significant for breast CA s/p mastectomy, depression, DM, HTN, hypothyroidism, ventral hernia, cholecystectomy, L knee replacement  Pt w/ active OT orders and activity orders (up as tolerated)  PTA, pt was living alone, ambulated w/ cane PRN, no additional DME  Pt w/ Ind w/ ADLs, IADLs, driving  At time of OT eval, pt requiring SBA assistance for grooming, Min A UB self-care, Min A LB self-care, Min A transfers and functional mobility w/ Rw  Pt demonstrating deficits in baseline areas of occupation including decreased ADL performance, IADL performance, and functional mobility including transfers and RW management 2* generalized weakness, deconditioning, decreased mobility status, decreased activity tolerance, decreased dynamic balance, decreased safety w/ RW management, medical status       OT Discharge Recommendation: Home OT  OT - OK to Discharge:  (pending progress and medical stability)

## 2018-02-07 NOTE — PROGRESS NOTES
Progress Note - General Surgery  [place] Median 76 y o  female MRN: [id number]  Unit/Bed#: [facility] 802-01 Encounter: [id number]    Assessment:  76y o -year-old female with high-grade SBO secondary to laparotomies in past  Now s/p ex lap w/SBR on 2/4    Plan:  - NPO/NGT  - prn pain control  - IVF + TPN = 84  - OOB/ambulate  - SQH/SCDs      Subjective: Intermittent nausea  Has been ambulating  Denies BM/flatus/fever/chills    Objective:  Patient Vitals for the past 24 hrs:   BP Temp Temp src Pulse Resp SpO2   02/07/18 0500 (!) 184/72 - - - - -   02/06/18 2300 (!) 180/69 98 3 °F (36 8 °C) Oral (!) 112 18 96 %   02/06/18 1829 - - - (!) 114 - -   02/06/18 1500 154/76 97 9 °F (36 6 °C) Oral (!) 123 18 97 %   02/06/18 0910 170/70 - - - - -   02/06/18 0735 (!) 218/80 99 1 °F (37 3 °C) Oral (!) 111 18 96 %          Diet Orders            Start     Ordered    02/02/18 0105  Diet NPO; Sips with meds  Diet effective now     Question Answer Comment   Diet Type NPO    NPO Except: Sips with meds    RD to adjust diet per protocol?  No        02/02/18 0104          Intake/Output Summary (Last 24 hours) at 02/07/18 0710  Last data filed at 02/07/18 4614   Gross per 24 hour   Intake          2593 27 ml   Output             1475 ml   Net          1118 27 ml       Physical Exam:  NAD  Norm resp effort  RRR  Abd soft, more distended, tympanitic at upper quadrants, min tender over incision, midline dressing cdi  -c/c/e  NGT in place to suction, functioning well, flushing appropriately      Medications:    Current Facility-Administered Medications:  Adult TPN (STANDARD BASE/STANDARD ELECTROLYTE)  Intravenous Continuous Za Dewitt MD Last Rate: 48 6 mL/hr at 02/06/18 2329   artificial tear  Both Eyes BID Prisca Tafoya MD    dextrose 5 % and sodium chloride 0 45 % with KCl 20 mEq/L 36 mL/hr Intravenous Continuous Za Dewitt MD Last Rate: 36 mL/hr (02/07/18 0620)   heparin (porcine) 5,000 Units Subcutaneous formerly [facility] Issac Merlin, MD    hydrALAZINE 5 mg Intravenous Q6H PRN Jeferson Golden MD    HYDROmorphone 0 5 mg Intravenous Q2H PRN Yana Fabian MD    HYDROmorphone 1 mg Intravenous Q3H PRN Yana Fabian MD    insulin lispro 1-5 Units Subcutaneous Q6H [facility] Jeferson Golden MD    levothyroxine 70 mcg Intravenous Daily Nataliya Cook MD    LORazepam 0 25 mg Intravenous Daily PRN Hever Cam MD    metoprolol 5 mg Intravenous Q6H PRN Nataliya Cook MD    metoprolol 5 mg Intravenous Q6H [facility] Stanly Rubinstein, MD    ondansetron 4 mg Intravenous Q6H PRN Nataliya Cook MD    ondansetron 4 mg Intravenous Q4H PRN Jeferson Golden MD    pantoprazole 40 mg Intravenous Q24H Amanda Koenig MD    phenol 1 spray Mouth/Throat Q2H PRN Hever Cam MD    sodium chloride 500 mL Intravenous Once Jeferson Golden MD        Adult TPN (STANDARD BASE/STANDARD ELECTROLYTE)  Last Rate: 48 6 mL/hr at 02/06/18 2329   dextrose 5 % and sodium chloride 0 45 % with KCl 20 mEq/L 36 mL/hr Last Rate: 36 mL/hr (02/07/18 0620)       hydrALAZINE 5 mg Q6H PRN   HYDROmorphone 0 5 mg Q2H PRN   HYDROmorphone 1 mg Q3H PRN   LORazepam 0 25 mg Daily PRN   metoprolol 5 mg Q6H PRN   ondansetron 4 mg Q6H PRN   ondansetron 4 mg Q4H PRN   phenol 1 spray Q2H PRN     Laboratory results:   CBC:   Lab Results   Component Value Date    WBC 15 38 (H) 02/07/2018    HGB 11 5 02/07/2018    HCT 33 9 (L) 02/07/2018    MCV 88 02/07/2018     02/07/2018    MCH 29 9 02/07/2018    MCHC 33 9 02/07/2018    RDW 14 5 02/07/2018    MPV 9 8 02/07/2018    NRBC 0 02/07/2018   , CMP:   Lab Results   Component Value Date     02/07/2018    K 3 8 02/07/2018     02/07/2018    CO2 25 02/07/2018    ANIONGAP 7 02/07/2018    BUN 11 02/07/2018    CREATININE 0 57 (L) 02/07/2018    GLUCOSE 174 (H) 02/07/2018    CALCIUM 8 0 (L) 02/07/2018    EGFR 88 02/07/2018   , Coagulation: No results found for: PT, INR, APTT, Urinalysis:   No results found for: Aiden Ice, [address], PHUR, LEUKOCYTESUR, NITRITE, PROTEINUA, GLUCOSEU, KETONESU, BILIRUBINUR, BLOODU, Amylase: No results found for: AMYLASE, Lipase: No results found for: LIPASE    VTE Pharmacologic Prophylaxis: Heparin  VTE Mechanical Prophylaxis: sequential compression device

## 2018-02-07 NOTE — OCCUPATIONAL THERAPY NOTE
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 [facility]    Small bowel obstruction    Acute kidney injury ([facility] )    Bowel obstruction    Delirium    Physical deconditioning    Ambulatory dysfunction    Hx of fall    Anxiety       Past Medical History:   Diagnosis Date    Anxiety     Cancer ([place] [address] )     LEFT BREAST CA 22 YEARS AGO     Depression     Diabetes mellitus ([place] [address] )     Hypertension     Hypothyroidism        Past Surgical History:   Procedure Laterality Date    ABDOMINAL ADHESION SURGERY N/A 2/4/2018    Procedure: LYSIS ADHESIONS;  Surgeon: Martin Davila DO;  [facility];  Service: General    BREAST SURGERY      CHOLECYSTECTOMY      JOINT REPLACEMENT      LEFT KNEE REPLACEMENT     LAPAROTOMY N/A 2/4/2018    Procedure: LAPAROTOMY EXPLORATORY,;  Surgeon: Martin Davila DO;  [facility];  Service: General    MASTECTOMY      CA BIOPSY/EXCISION, LYMPH NODE(S) Right 1/13/2017    Procedure: SENTINEL LYMPH NODE BIOPSY RIGHT AXILLA;   Surgeon: Lilo Rae MD;  [facility];  Service: General    CA MASTECTOMY, SIMPLE, COMPLETE Right 1/13/2017    Procedure: MASTECTOMY SIMPLE;  Surgeon: Lilo Rae MD;  [facility];  Service: General    SMALL INTESTINE SURGERY N/A 2/4/2018    Procedure: RESECTION SMALL BOWEL;  Surgeon: Martin Davlia DO;  [facility];  Service: General      02/07/18 1108   Note Type   Note type Eval/Treat   Restrictions/Precautions   Weight Bearing Precautions Per Order No   Other Precautions Fall Risk;Multiple lines   Pain Assessment   Pain Assessment No/denies pain   Pain Score No Pain   Home Living   Type of Home Apartment  (Senior Living, 3rd floor apartment, elevator access)   Home Layout One level   Bathroom Shower/Tub Tub/shower unit   [address] Other (Comment)  (pt states she would like a commode chair at d/c)   [address]   Prior Function   Level of [place] Independent with ADLs and functional mobility   Lives With Win Diaz in the last 6 months 1 to 4   Vocational Retired   Lifestyle   Autonomy Pt was Ind w/ ADLs, IADLs, driving PTA  Pt lives alone in 3rd floor senior living apt with elevator access  Reciprocal Relationships Pt reports having a daughter who assists as needed  Service to Others Pt is a retired   Intrinsic Gratification Pt enjoys making pictures, cross-stitch and embroidery  Psychosocial   Psychosocial (WDL) WDL   Subjective   Subjective "Washing up feels like a million bucks!"   ADL   Where Assessed Chair   Eating Assistance 5  Supervision/Setup   Grooming Assistance 5  Supervision/Setup   [address] Deficit (pt able to don/doff socks w/ SBA)   Toileting Assistance  4  Minimal Assistance   Functional Assistance 4  Minimal Assistance   Additional Comments Pt requiring assistance for dynamic balance, steadying, line and RW management  Bed Mobility   Additional Comments not assessed, pt seated in chair  following session, all needs within reach  Transfers   Sit to Stand 4  Minimal assistance   Additional items Assist x 1; Increased time required;Verbal cues;Armrests   Stand to Sit 4  Minimal assistance   Additional items Assist x 1; Armrests; Increased time required;Verbal cues   Toilet transfer 4  Minimal assistance   Additional items Assist x 1;Increased time required;Verbal cues; Commode   Functional Mobility   Functional Mobility 4  Minimal assistance   Additional items Rolling walker   Balance   Static Sitting Fair +   Dynamic Sitting Fair   Static Standing Fair   Dynamic Standing Fair -   Ambulatory Fair -   Activity Tolerance   Activity Tolerance Patient limited by fatigue;Patient tolerated treatment well   Medical Staff Made Aware will update CM   Nurse Made Aware spoke w/ TANK Roberson   RUE Assessment   RUE Assessment WFL   LUE Assessment   LUE Assessment WFL   Hand Function   Gross Motor Coordination Functional   Fine Motor Coordination Functional   Cognition   Overall Cognitive Status WFL   Arousal/Participation Alert; Cooperative   Attention Within functional limits   Orientation Level Oriented X4   Memory Decreased recall of precautions   Following Commands Follows multistep commands with increased time or repetition   Comments Pt agreeable to participate in OT session  Assessment   Limitation Decreased ADL status; Decreased UE strength;Decreased UE ROM; Decreased Safe judgement during ADL;Decreased endurance;Decreased self-care trans;Decreased high-level ADLs   Prognosis Fair   Assessment Pt is a 77 yo F admit to [facility] w/ nausea, vomiting x 3 days dx'd w/ small bowel obstruction s/p exploratory laparotomy, small bowel resection seen for OT eval  Pt w/ PMHx significant for breast CA s/p mastectomy, depression, DM, HTN, hypothyroidism, ventral hernia, cholecystectomy, L knee replacement  Pt w/ active OT orders and activity orders (up as tolerated)  PTA, pt was living alone, ambulated w/ cane PRN, no additional DME  Pt w/ Ind w/ ADLs, IADLs, driving  At time of OT eval, pt requiring SBA assistance for grooming, Min A UB self-care, Min A LB self-care, Min A transfers and functional mobility w/ Rw   Pt demonstrating deficits in baseline areas of occupation including decreased ADL performance, IADL performance, and functional mobility including transfers and RW management 2* generalized weakness, deconditioning, decreased mobility status, decreased activity tolerance, decreased dynamic balance, decreased safety w/ RW management, medical status  Goals   Patient Goals to return home   LTG Time Frame 7-10   Plan   Treatment Interventions ADL retraining;Functional transfer training;UE strengthening/ROM; Endurance training;Patient/family training;Equipment evaluation/education; Compensatory technique education;Continued evaluation; Energy conservation; Activityengagement   Goal Expiration Date 02/17/18   OT Frequency 3-5x/wk   Recommendation   OT Discharge Recommendation Home OT   Equipment Recommended Bedside commode   OT - OK to Discharge (pending progress and medical stability)   Barthel Index   Feeding 5   Bathing 0   Grooming Score 5   Dressing Score 5   Bladder Score 10   Bowels Score 10   Toilet Use Score 5   Transfers (Bed/Chair) Score 5   Mobility (Level Surface) Score 10   Stairs Score 0   Barthel Index Score 55   Modified [place] Scale   Modified [place] Scale 4   Goals to be met in 7-10 days:    Pt will complete transfers w/ Mod I to facilitate independence and participation in ADLs and IADLs  Pt will perform grooming tasks while standing at the sink w/ G balance w/ Mod I  Pt will complete UB/LB self-care activities w/ G balance w/ Mod I  Pt will complete toileting tasks w/ Mod I w/ G hygiene/thoroughness to promote increased independence  Pt will complete simulated IADL tasks including functional reaching while demonstrating G balance w/ Mod I  Pt will demonstrate G standing balance and activity tolerance for 30 min during ADL and leisure tasks w/ Mod I  Pt will demonstrate G safety and problem solving while completing ADL tasks w/ Mod I      Documentation Completed by Julieta Tapia MS, OTR/L

## 2018-02-07 NOTE — PHYSICAL THERAPY NOTE
PT EVALUATION     02/07/18 0904   Note Type   Note type Eval only   Pain Assessment   Pain Assessment No/denies pain   Pain Score No Pain   Home Living   Type of Home Apartment;Assisted living  (3RD LEVEL WITH ELEVATOR ACCESS)   Home Layout One level   Bathroom Equipment (PATIENT STATES SHE WILL NEED A COMMODE CHAIR FOR D/C )   Home Equipment Cane   Prior Function   Level of [place] Independent with ADLs and functional mobility   Lives With Alone   Receives Help From Family   ADL Assistance Independent   IADLs Independent   Falls in the last 6 months 1 to 4  (X1)   Vocational Retired   Restrictions/Precautions   [facility] Bearing Precautions Per Order No   Braces or Orthoses (NONE)   Other Precautions Multiple lines; Fall Risk   General   Family/Caregiver Present No   Cognition   Overall Cognitive Status WFL   RUE Assessment   RUE Assessment WFL   LUE Assessment   LUE Assessment WFL   RLE Assessment   RLE Assessment X   Strength RLE   RLE Overall Strength 3/5   LLE Assessment   LLE Assessment X   Strength LLE   LLE Overall Strength 3/5   Bed Mobility   Supine to Sit Unable to assess   Sit to Supine Unable to assess   Additional Comments PATIENT IN CHAIR AT BEGINNING AND END OF EVAL   Transfers   Sit to Stand 4  Minimal assistance   Additional items Assist x 1; Increased time required;Verbal cues;Armrests   Stand to Sit 4  Minimal assistance   Additional items Assist x 1; Increased time required;Verbal cues;Armrests   Ambulation/Elevation   Gait pattern Excessively slow; Inconsistent any;Decreased foot clearance   Gait Assistance 4  Minimal assist  (CG)   Additional items Assist x 1   Assistive Device Rolling walker   Distance 200 FT    Stair Management Assistance Not tested   Balance   Static Sitting Fair +   Dynamic Sitting Fair   Static Standing Fair   Dynamic Standing Fair   Ambulatory Fair -   Endurance Deficit   Endurance Deficit Yes   Endurance Deficit Description SOB AND DECREASED ACTIVITY TOLERANCE Activity Tolerance   Activity Tolerance Patient limited by fatigue   Nurse Made Aware RN JANE    Assessment   Prognosis Good   Problem List Decreased strength;Decreased endurance; Impaired balance;Decreased mobility   Assessment PT COMPLETED EVALUATION OF 76YEAR OLD FEMALE [facility] WITH SYMPTOMS OF NAUSEA, VOMITING, AND R-LOWER QUADRANT PAIN  CURRENT DIAGNOSES INCLUDE SMALL BOWEL OBSTRUCTION  UNDERWENT EXPLORATORY LAPAROTOMY, LYSIS ADHESIONS, AND RESECTION OF SMALL BOWEL ON 2/4/18  CURRENT MEDICAL AND PHYSICAL INSTABILITIES INCLUDE MULTIPLE LINES, NG TUBE, ONGOING MONITORING OF VITAL SIGNS,FALLS RISK, AND A REGRESSION IN FUNCTIONAL STATUS FROM BASELINE  PMH IS SIGNIFICANT FOR HTN, DIABETES, ANXIETY, DEPRESSION, L-KNEE REPLACEMENT, CHOLECYSTECTOMY, L-BREAST CANCER S/P MASTECTOMY, AND SIMPLE MASTECTOMY OF R-BREAST ON 1/13/17  PRIOR TO THIS ADMISSION, PATIENT RESIDED ALONE IN A 3RD FLOOR SENIOR APARTMENT (WITH ELEVATOR ACCESS) AND WAS PREVIOUSLY INDEPENDENT WITH ADLS, IADLS, AND MOBILITY (OCCASSIONALLY USED A [address])  CURRENT IMPAIRMENTS INCLUDE DECREASED STRENGTH, DECREASED BALANCE, DECREASED ACTIVITY TOLERANCE, GAIT DEVIATIONS, AND FALLS RISK  DURING PT EVALUATION, PATIENT REQUIRED MIN-A X1 FOR SIT<-->STAND TRANSFERS AND AMBULATION (200 FT CG W/ RW)  NEXT SESSION, PLAN TO INCREASE AMBULATION DISTANCE AND INTRODUCE SOME LE STRENGTHENING EXERCISES  AT THIS POINT IN TIME, PATIENT WOULD BENEFIT FROM D/C HOME WITH USE OF RW AND COMMODE CHAIR AND HOME PT  PATIENT WOULD ALSO BENEFIT FROM CONTINUED SKILLED INPT PT THIS ADMISSION TO ACHIEVE MAXIMAL FUNCTION AND SAFETY      Barriers to Discharge Inaccessible home environment  (NO RW OR COMMODE CHAIR )   Goals   Patient Goals TO WALK   LTG Expiration Date 02/21/18   Long Term Goal #1 10-14 DAYS: 1) MOD-I FOR BED MOBILITY; 2) MOD-I FOR SIT<-->STAND TRANSFERS; 3) SUPERVISION TO AMBUALTE 400 FT W/ RW; 4) INCREASE B/L LE STRENGTH BY 1/2 GRADE; 5) INCREASE DYNAMIC BALANCE BY 1/2 GRADE; 6) INCREASE ACTIVITY TOLERANCE TO 1 HOUR     Treatment Day 0   Plan   Treatment/Interventions Functional transfer training;LE strengthening/ROM; Therapeutic exercise; Endurance training;Bed mobility;Gait training;Spoke to nursing   PT Frequency 5x/wk   Recommendation   Recommendation Home independently;Home PT  (W/ USE OF RW AND COMMODE CHAIR )   Equipment Recommended Walker  (COMMODE CHAIR )   PT - OK to Discharge Yes  (TO HOME W/ USE OF RW & COMMODE CHAIR WHEN MEDICALLY STABLE )   Barthel Index   Feeding 0   Bathing 0   Grooming Score 5   Dressing Score 5   Bladder Score 10   Bowels Score 10   Toilet Use Score 5   Transfers (Bed/Chair) Score 10   Mobility (Level Surface) Score 10   Stairs Score 0   Barthel Index Score 55     Tania Teague,SPT

## 2018-02-07 NOTE — NUTRITION
02/07/18 1215   Recommendations/Interventions   Summary Patient remains NPO x6 days, NGT in place  PN initiated and will provide 100% nutritional needs  Awaiting return of bowel function  Interventions PN change admixture   Nutrition Recommendations Adjust EN/PN;Lab - consider order (specify)  (Suggest increase PN to provide 100% nutritional needs  Recommend: 800 ml 10% aa, 800 ml 30% dex, 250 ml 20% lipids (1636 kcal, 80 gms pro, 1850 ml tv)   Monitor electrolytes and phosphorus  )

## 2018-02-07 NOTE — SOCIAL WORK
Met with pt and explained PT OT is recommending home PT/OT  Pt offered choice and is agreeable to [facility] and ref made  Pt states she does not have a roller walker or commode  Pt agreeable to [facility] and ref made

## 2018-02-07 NOTE — PLAN OF CARE
Problem: PHYSICAL THERAPY ADULT  Goal: Performs mobility at highest level of function for planned discharge setting  See evaluation for individualized goals  Treatment/Interventions: Functional transfer training, LE strengthening/ROM, Therapeutic exercise, Endurance training, Bed mobility, Gait training, Spoke to nursing  Equipment Recommended: Chava Frey ([employer] [facility] )       See flowsheet documentation for full assessment, interventions and recommendations  Prognosis: Good  Problem List: Decreased strength, Decreased endurance, Impaired balance, Decreased mobility  Assessment: PT COMPLETED EVALUATION OF 76YEAR OLD FEMALE [facility] [place] [facility]18 WITH SYMPTOMS OF NAUSEA, VOMITING, AND R-LOWER QUADRANT PAIN  CURRENT DIAGNOSES INCLUDE SMALL BOWEL OBSTRUCTION  UNDERWENT EXPLORATORY LAPAROTOMY, LYSIS ADHESIONS, AND RESECTION OF SMALL BOWEL ON 2/4/18  CURRENT MEDICAL AND PHYSICAL INSTABILITIES INCLUDE MULTIPLE LINES, NG TUBE, ONGOING MONITORING OF VITAL SIGNS,FALLS RISK, AND A REGRESSION IN FUNCTIONAL STATUS FROM BASELINE  PMH IS SIGNIFICANT FOR HTN, DIABETES, ANXIETY, DEPRESSION, L-KNEE REPLACEMENT, CHOLECYSTECTOMY, L-BREAST CANCER S/P MASTECTOMY, AND SIMPLE MASTECTOMY OF R-BREAST ON 1/13/17  PRIOR TO THIS ADMISSION, PATIENT RESIDED ALONE IN A 3RD FLOOR SENIOR APARTMENT (WITH ELEVATOR ACCESS) AND WAS PREVIOUSLY INDEPENDENT WITH ADLS, IADLS, AND MOBILITY (OCCASSIONALLY USED A [address])  CURRENT IMPAIRMENTS INCLUDE DECREASED STRENGTH, DECREASED BALANCE, DECREASED ACTIVITY TOLERANCE, GAIT DEVIATIONS, AND FALLS RISK  DURING PT EVALUATION, PATIENT REQUIRED MIN-A X1 FOR SIT<-->STAND TRANSFERS AND AMBULATION (200 FT CG W/ RW)  NEXT SESSION, PLAN TO INCREASE AMBULATION DISTANCE AND INTRODUCE SOME LE STRENGTHENING EXERCISES  AT THIS POINT IN TIME, PATIENT WOULD BENEFIT FROM D/C HOME WITH USE OF RW AND COMMODE CHAIR AND HOME PT   PATIENT WOULD ALSO BENEFIT FROM CONTINUED SKILLED INPT PT THIS ADMISSION TO ACHIEVE MAXIMAL FUNCTION AND SAFETY  Barriers to Discharge: Inaccessible home environment     Recommendation: (S) Home independently, Home PT (W/ USE OF RW AND COMMODE CHAIR )     PT - OK to Discharge: (S) Yes ([address] )    See flowsheet documentation for full assessment

## 2018-02-08 ENCOUNTER — APPOINTMENT (INPATIENT)
Dept: RADIOLOGY | Facility: HOSPITAL | Age: 78
DRG: 329 | End: 2018-02-08
Payer: MEDICARE

## 2018-02-08 LAB
ANION GAP SERPL CALCULATED.3IONS-SCNC: 8 MMOL/L (ref 4–13)
BASOPHILS # BLD AUTO: 0.02 THOUSANDS/ΜL (ref 0–0.1)
BASOPHILS NFR BLD AUTO: 0 % (ref 0–1)
BUN SERPL-MCNC: 10 MG/DL (ref 5–25)
CALCIUM SERPL-MCNC: 8.5 MG/DL (ref 8.3–10.1)
CHLORIDE SERPL-SCNC: 103 MMOL/L (ref 100–108)
CO2 SERPL-SCNC: 24 MMOL/L (ref 21–32)
CREAT SERPL-MCNC: 0.55 MG/DL (ref 0.6–1.3)
EOSINOPHIL # BLD AUTO: 0.15 THOUSAND/ΜL (ref 0–0.61)
EOSINOPHIL NFR BLD AUTO: 1 % (ref 0–6)
ERYTHROCYTE [DISTWIDTH] IN BLOOD BY AUTOMATED COUNT: 14.3 % (ref 11.6–15.1)
GFR SERPL CREATININE-BSD FRML MDRD: 89 ML/MIN/1.73SQ M
GLUCOSE SERPL-MCNC: 166 MG/DL (ref 65–140)
GLUCOSE SERPL-MCNC: 172 MG/DL (ref 65–140)
GLUCOSE SERPL-MCNC: 179 MG/DL (ref 65–140)
GLUCOSE SERPL-MCNC: 191 MG/DL (ref 65–140)
GLUCOSE SERPL-MCNC: 192 MG/DL (ref 65–140)
GLUCOSE SERPL-MCNC: 198 MG/DL (ref 65–140)
GLUCOSE SERPL-MCNC: 218 MG/DL (ref 65–140)
HCT VFR BLD AUTO: 33 % (ref 34.8–46.1)
HGB BLD-MCNC: 11.1 G/DL (ref 11.5–15.4)
LYMPHOCYTES # BLD AUTO: 1.51 THOUSANDS/ΜL (ref 0.6–4.47)
LYMPHOCYTES NFR BLD AUTO: 11 % (ref 14–44)
MAGNESIUM SERPL-MCNC: 1.8 MG/DL (ref 1.6–2.6)
MCH RBC QN AUTO: 29.4 PG (ref 26.8–34.3)
MCHC RBC AUTO-ENTMCNC: 33.6 G/DL (ref 31.4–37.4)
MCV RBC AUTO: 88 FL (ref 82–98)
MONOCYTES # BLD AUTO: 1.93 THOUSAND/ΜL (ref 0.17–1.22)
MONOCYTES NFR BLD AUTO: 15 % (ref 4–12)
NEUTROPHILS # BLD AUTO: 9.5 THOUSANDS/ΜL (ref 1.85–7.62)
NEUTS SEG NFR BLD AUTO: 73 % (ref 43–75)
NRBC BLD AUTO-RTO: 0 /100 WBCS
PHOSPHATE SERPL-MCNC: 2.8 MG/DL (ref 2.3–4.1)
PLATELET # BLD AUTO: 223 THOUSANDS/UL (ref 149–390)
PMV BLD AUTO: 9.4 FL (ref 8.9–12.7)
POTASSIUM SERPL-SCNC: 3.4 MMOL/L (ref 3.5–5.3)
RBC # BLD AUTO: 3.77 MILLION/UL (ref 3.81–5.12)
SODIUM SERPL-SCNC: 135 MMOL/L (ref 136–145)
WBC # BLD AUTO: 13.26 THOUSAND/UL (ref 4.31–10.16)

## 2018-02-08 PROCEDURE — 97535 SELF CARE MNGMENT TRAINING: CPT

## 2018-02-08 PROCEDURE — 85025 COMPLETE CBC W/AUTO DIFF WBC: CPT | Performed by: STUDENT IN AN ORGANIZED HEALTH CARE EDUCATION/TRAINING PROGRAM

## 2018-02-08 PROCEDURE — 83735 ASSAY OF MAGNESIUM: CPT | Performed by: STUDENT IN AN ORGANIZED HEALTH CARE EDUCATION/TRAINING PROGRAM

## 2018-02-08 PROCEDURE — 74022 RADEX COMPL AQT ABD SERIES: CPT

## 2018-02-08 PROCEDURE — 82948 REAGENT STRIP/BLOOD GLUCOSE: CPT

## 2018-02-08 PROCEDURE — 84100 ASSAY OF PHOSPHORUS: CPT | Performed by: STUDENT IN AN ORGANIZED HEALTH CARE EDUCATION/TRAINING PROGRAM

## 2018-02-08 PROCEDURE — 80048 BASIC METABOLIC PNL TOTAL CA: CPT | Performed by: STUDENT IN AN ORGANIZED HEALTH CARE EDUCATION/TRAINING PROGRAM

## 2018-02-08 PROCEDURE — C9113 INJ PANTOPRAZOLE SODIUM, VIA: HCPCS | Performed by: STUDENT IN AN ORGANIZED HEALTH CARE EDUCATION/TRAINING PROGRAM

## 2018-02-08 RX ORDER — MAGNESIUM SULFATE HEPTAHYDRATE 40 MG/ML
2 INJECTION, SOLUTION INTRAVENOUS ONCE
Status: COMPLETED | OUTPATIENT
Start: 2018-02-08 | End: 2018-02-12

## 2018-02-08 RX ORDER — POTASSIUM CHLORIDE 29.8 MG/ML
40 INJECTION INTRAVENOUS ONCE
Status: COMPLETED | OUTPATIENT
Start: 2018-02-08 | End: 2018-02-12

## 2018-02-08 RX ADMIN — HEPARIN SODIUM 5000 UNITS: 5000 INJECTION, SOLUTION INTRAVENOUS; SUBCUTANEOUS at 14:28

## 2018-02-08 RX ADMIN — METOPROLOL TARTRATE 5 MG: 1 INJECTION, SOLUTION INTRAVENOUS at 17:37

## 2018-02-08 RX ADMIN — LEVOTHYROXINE SODIUM ANHYDROUS 70 MCG: 100 INJECTION, POWDER, LYOPHILIZED, FOR SOLUTION INTRAVENOUS at 08:29

## 2018-02-08 RX ADMIN — METOPROLOL TARTRATE 5 MG: 1 INJECTION, SOLUTION INTRAVENOUS at 11:16

## 2018-02-08 RX ADMIN — MAGNESIUM SULFATE HEPTAHYDRATE 2 G: 40 INJECTION, SOLUTION INTRAVENOUS at 08:30

## 2018-02-08 RX ADMIN — HEPARIN SODIUM 5000 UNITS: 5000 INJECTION, SOLUTION INTRAVENOUS; SUBCUTANEOUS at 05:10

## 2018-02-08 RX ADMIN — INSULIN LISPRO 1 UNITS: 100 INJECTION, SOLUTION INTRAVENOUS; SUBCUTANEOUS at 14:28

## 2018-02-08 RX ADMIN — INSULIN LISPRO 1 UNITS: 100 INJECTION, SOLUTION INTRAVENOUS; SUBCUTANEOUS at 06:21

## 2018-02-08 RX ADMIN — POTASSIUM CHLORIDE 40 MEQ: 400 INJECTION, SOLUTION INTRAVENOUS at 16:46

## 2018-02-08 RX ADMIN — METOPROLOL TARTRATE 5 MG: 1 INJECTION, SOLUTION INTRAVENOUS at 05:10

## 2018-02-08 RX ADMIN — INSULIN LISPRO 2 UNITS: 100 INJECTION, SOLUTION INTRAVENOUS; SUBCUTANEOUS at 23:14

## 2018-02-08 RX ADMIN — INSULIN LISPRO 1 UNITS: 100 INJECTION, SOLUTION INTRAVENOUS; SUBCUTANEOUS at 17:37

## 2018-02-08 RX ADMIN — METOPROLOL TARTRATE 5 MG: 1 INJECTION, SOLUTION INTRAVENOUS at 23:14

## 2018-02-08 RX ADMIN — INSULIN LISPRO 1 UNITS: 100 INJECTION, SOLUTION INTRAVENOUS; SUBCUTANEOUS at 00:36

## 2018-02-08 RX ADMIN — POTASSIUM PHOSPHATE, MONOBASIC AND POTASSIUM PHOSPHATE, DIBASIC 30 MMOL: 224; 236 INJECTION, SOLUTION INTRAVENOUS at 10:51

## 2018-02-08 RX ADMIN — METOPROLOL TARTRATE 5 MG: 1 INJECTION, SOLUTION INTRAVENOUS at 00:36

## 2018-02-08 RX ADMIN — POTASSIUM CHLORIDE, DEXTROSE MONOHYDRATE AND SODIUM CHLORIDE 36 ML/HR: 150; 5; 450 INJECTION, SOLUTION INTRAVENOUS at 06:30

## 2018-02-08 RX ADMIN — PANTOPRAZOLE SODIUM 40 MG: 40 INJECTION, POWDER, FOR SOLUTION INTRAVENOUS at 08:27

## 2018-02-08 RX ADMIN — HEPARIN SODIUM 5000 UNITS: 5000 INJECTION, SOLUTION INTRAVENOUS; SUBCUTANEOUS at 21:51

## 2018-02-08 RX ADMIN — CALCIUM GLUCONATE: 94 INJECTION, SOLUTION INTRAVENOUS at 21:51

## 2018-02-08 NOTE — PLAN OF CARE
Problem: OCCUPATIONAL THERAPY ADULT  Goal: Performs self-care activities at highest level of function for planned discharge setting  See evaluation for individualized goals  Treatment Interventions: ADL retraining, Functional transfer training, UE strengthening/ROM, Endurance training, Patient/family training, Equipment evaluation/education, Compensatory technique education, Continued evaluation, Energy conservation, Activityengagement  Equipment Recommended: Bedside commode       See flowsheet documentation for full assessment, interventions and recommendations  Outcome: Progressing  Limitation: Decreased ADL status, Decreased UE strength, Decreased UE ROM, Decreased Safe judgement during ADL, Decreased endurance, Decreased self-care trans, Decreased high-level ADLs  Prognosis: Fair  Assessment: Pt seen for AM OT treatment session focusing on morning ADLs and functional mobility  Pt completed sit<>stand transfers w/ SBA  Pt able to transfer on/off commode using grab bars to A w/ SBA  Pt completing toileting tasks w/ SBA  Pt ambulated to and from bathroom w/ Min A to manage lines  Pt completed grooming standing at sink w/ SBA  Pt requiring Min A for UB bathing to assist w/ lines  Pt able to bathe LB w/ wipes while seated w/ SBA  Pt able to change socks and don hospital pants w/ SBA  Pt demonstrating improvements in ADL status and w/ RW management and transfers, however requiring occasional VC for safety w/ lines and RW  Following OT session, pt seated in recliner chair, all needs within reach  Continue to follow pt 3-5x/wk to promote increased independence and safety w/ ADLs and functional mobility  Continue to recommend d/c home w/ home OT to address DME needs and safety with functional mobility and ADLs/IADLs in home environment        OT Discharge Recommendation: Home OT  OT - OK to Discharge: Yes (when medically appropriate)

## 2018-02-08 NOTE — OCCUPATIONAL THERAPY NOTE
Occupational Therapy Treatment Note      Ramiro Lopez    2/8/2018    Patient Active Problem List   Diagnosis    Heart palpitations    Nicotine abuse    Transient atrial fibrillation ([facility] )    Diabetes mellitus type 2 in nonobese ([facility] )    Hypertension, essential, benign    Acquired hypothyroidism    Malignant neoplasm of central portion of right female breast ([facility] )    Small bowel obstruction    Acute kidney injury ([facility] )    Bowel obstruction    Delirium    Physical deconditioning    Ambulatory dysfunction    Hx of fall    Anxiety       Past Medical History:   Diagnosis Date    Anxiety     Cancer ([facility] )     LEFT BREAST CA 22 YEARS AGO     Depression     Diabetes mellitus ([facility] )     Hypertension     Hypothyroidism        Past Surgical History:   Procedure Laterality Date    ABDOMINAL ADHESION SURGERY N/A 2/4/2018    Procedure: LYSIS ADHESIONS;  Surgeon: Stacey Graff DO;  [facility];  Service: General    BREAST SURGERY      CHOLECYSTECTOMY      JOINT REPLACEMENT      LEFT KNEE REPLACEMENT     LAPAROTOMY N/A 2/4/2018    Procedure: LAPAROTOMY EXPLORATORY,;  Surgeon: Stacey Graff DO;  [facility];  Service: General    MASTECTOMY      WA BIOPSY/EXCISION, LYMPH NODE(S) Right 1/13/2017    Procedure: SENTINEL LYMPH NODE BIOPSY RIGHT AXILLA; Surgeon: Huang Duarte MD;  [facility];  Service: General    WA MASTECTOMY, SIMPLE, COMPLETE Right 1/13/2017    Procedure: MASTECTOMY SIMPLE;  Surgeon: Huang Duarte MD;  [facility];  Service: General    SMALL INTESTINE SURGERY N/A 2/4/2018    Procedure: RESECTION SMALL BOWEL;  Surgeon: Stacey Graff DO;  [facility];  Service: General        02/08/18 1005   Restrictions/Precautions   Weight Bearing Precautions Per Order No   Other Precautions Multiple lines; Fall Risk   Pain Assessment   Pain Assessment No/denies pain   Pain Score No Pain   ADL   Grooming Assistance 5  Supervision/Setup   Grooming Deficit Standing with assistive device; Wash/dry hands; Wash/dry face;Brushing hair;Supervision/safety   Grooming Comments standing at sink   UB Bathing Assistance 4  Minimal Assistance   UB Bathing Deficit Supervision/safety; Increased time to complete   LB Bathing Assistance 5  Supervision/Setup   LB Bathing Deficit Supervision/safety; Increased time to complete   UB Dressing Assistance 4  Minimal Assistance   UB Dressing Deficit Verbal cueing; Increased time to complete   UB Dressing Comments don/doff hospital gown, assist w/ lines   LB Dressing Assistance 5  Supervision/Setup   LB Dressing Deficit Requires assistive device for steadying;Supervision/safety   LB Dressing Comments doff/don socks, hospital pants   Toileting Assistance  5  Supervision/Setup   Toileting Deficit Verbal cueing   Bed Mobility   Additional Comments pt seated in chair beginning and end of treatment   Transfers   Sit to Stand 5  Supervision   Additional items Increased time required;Armrests; Verbal cues   Stand to Sit 5  Supervision   Additional items Increased time required;Armrests; Verbal cues   Toilet transfer 5  Supervision   Additional items Commode;Armrests; Verbal cues   Functional Mobility   Functional Mobility 5  Supervision   Additional Comments VC for technique   Additional items Rolling walker   Cognition   Overall Cognitive Status WFL   Arousal/Participation Alert; Cooperative   Attention Within functional limits   Orientation Level Oriented X4   Memory Within functional limits   Following Commands Follows multistep commands with increased time or repetition   Activity Tolerance   Activity Tolerance Patient limited by fatigue   Medical Staff Made Aware spoke w/ TANK Sinclair   Assessment   Assessment Pt seen for AM OT treatment session focusing on morning ADLs and functional mobility  Pt completed sit<>stand transfers w/ SBA  Pt able to transfer on/off commode using grab bars to A w/ SBA  Pt completing toileting tasks w/ SBA   Pt ambulated to and from bathroom w/ Min A to manage lines  Pt completed grooming standing at sink w/ SBA  Pt requiring Min A for UB bathing to assist w/ lines  Pt able to bathe LB w/ wipes while seated w/ SBA  Pt able to change socks and don hospital pants w/ SBA  Pt demonstrating improvements in ADL status and w/ RW management and transfers, however requiring occasional VC for safety w/ lines and RW  Following OT session, pt seated in recliner chair, all needs within reach  Continue to follow pt 3-5x/wk to promote increased independence and safety w/ ADLs and functional mobility  Continue to recommend d/c home w/ home OT to address DME needs and safety with functional mobility and ADLs/IADLs in home environment  Plan   Treatment Interventions ADL retraining;Functional transfer training;UE strengthening/ROM; Endurance training;Patient/family training;Equipment evaluation/education; Compensatory technique education;Continued evaluation; Energy conservation; Activityengagement   Goal Expiration Date 02/17/18   Treatment Day 1   OT Frequency 3-5x/wk   Recommendation   OT Discharge Recommendation Home OT   Equipment Recommended Bedside commode   OT - OK to Discharge Yes  (when medically appropriate)   Barthel Index   Feeding 10   Bathing 0   Grooming Score 5   Dressing Score 5   Bladder Score 10   Bowels Score 10   Toilet Use Score 5   Transfers (Bed/Chair) Score 10   Mobility (Level Surface) Score 10   Stairs Score 0   Barthel Index Score 65   Modified Montgomery Scale   Modified Montgomery Scale 3     Documentation Completed by Lisette Samuel MS, OTR/L

## 2018-02-08 NOTE — SOCIAL WORK
Cm reviewed patient during care coordination rounds  Pt is not medically stable for discharge today  Patient has bowel obstruction and TPN  NG Tube to possibly be pulled  [facility] able to accept at time of discharge  Medical team to sign DME script for RW & commode  Cm continues to follow and work on patient's discharge plan

## 2018-02-08 NOTE — PROGRESS NOTES
Progress Note - General Surgery  Viet Waldrop 76 y o  female MRN: [id number]  Unit/Bed#: [facility] 802-01 Encounter: [id number]    Assessment:  76y o -year-old female with high-grade SBO secondary to laparotomies in past  Now s/p ex lap w/SBR on 2/4    Plan:  - NPO/NGT  - prn pain control  - IVF + TPN = 84  - TPN per nutrition recs  - OOB/ambulate  - SQH/SCDs      Subjective:  Less burping today, sill no BM or flatus    Objective:  Patient Vitals for the past 24 hrs:   BP Temp Temp src Pulse Resp SpO2   02/08/18 0301 - 99 4 °F (37 4 °C) Oral - - -   02/07/18 2357 136/80 99 °F (37 2 °C) Oral (!) 118 16 97 %   02/07/18 1911 110/76 - - - - -   02/07/18 1531 (!) 176/82 98 9 °F (37 2 °C) Oral 76 20 97 %   02/07/18 0900 (!) 190/86 98 4 °F (36 9 °C) Oral 91 (!) 24 97 %          Diet Orders            Start     Ordered    02/02/18 0105  Diet NPO; Sips with meds  Diet effective now     Question Answer Comment   Diet Type NPO    NPO Except: Sips with meds    RD to adjust diet per protocol?  No        02/02/18 0104          Intake/Output Summary (Last 24 hours) at 02/08/18 0740  Last data filed at 02/08/18 2390   Gross per 24 hour   Intake          2967 06 ml   Output             2450 ml   Net           517 06 ml       Physical Exam:  NAD  Norm resp effort  RRR  Abd soft, distended, tympanitic at upper quadrants, min tender over incision, midline dressing cdi  -c/c/e  NGT in place to suction, functioning well, flushing appropriately      Medications:    Current Facility-Administered Medications:  Adult TPN (STANDARD BASE/STANDARD ELECTROLYTE)  Intravenous Continuous Sulma Treadwell MD Last Rate: 48 6 mL/hr at 02/07/18 2228   dextrose 5 % and sodium chloride 0 45 % with KCl 20 mEq/L 36 mL/hr Intravenous Continuous Tyler Srivastava MD Last Rate: 36 mL/hr (02/07/18 0620)   heparin (porcine) 5,000 Units Subcutaneous [facility] Jann Monique MD    hydrALAZINE 5 mg Intravenous Q6H PRN Deandre Randhawa MD    HYDROmorphone 0 5 mg Intravenous Q2H PRN Pauly Whitehead MD    HYDROmorphone 1 mg Intravenous Q3H PRN Pauly Whitehead MD    insulin lispro 1-5 Units Subcutaneous Q6H [address] Lea Gupta MD    levothyroxine 70 mcg Intravenous Daily Leo Browne MD    LORazepam 0 25 mg Intravenous Daily PRN Sydney Bonilla MD    metoprolol 5 mg Intravenous Q6H PRN Leo Browne MD    metoprolol 5 mg Intravenous Q6H [address] Soraya Renae MD    ondansetron 4 mg Intravenous Q6H PRN Leo Browne MD    ondansetron 4 mg Intravenous Q4H PRN Lea Gupta MD    pantoprazole 40 mg Intravenous Q24H [address] Sydney Bonilla MD    phenol 1 spray Mouth/Throat Q2H PRN Sydney Bonilla MD    polyvinyl alcohol 1 drop Both Eyes Q3H PRN Soraya Renae MD    sodium chloride 500 mL Intravenous Once Lea Gupta MD        Adult TPN (STANDARD BASE/STANDARD ELECTROLYTE)  Last Rate: 48 6 mL/hr at 02/07/18 2228   dextrose 5 % and sodium chloride 0 45 % with KCl 20 mEq/L 36 mL/hr Last Rate: 36 mL/hr (02/07/18 0620)       hydrALAZINE 5 mg Q6H PRN   HYDROmorphone 0 5 mg Q2H PRN   HYDROmorphone 1 mg Q3H PRN   LORazepam 0 25 mg Daily PRN   metoprolol 5 mg Q6H PRN   ondansetron 4 mg Q6H PRN   ondansetron 4 mg Q4H PRN   phenol 1 spray Q2H PRN   polyvinyl alcohol 1 drop Q3H PRN     Laboratory results:   CBC:   Lab Results   Component Value Date    WBC 13 26 (H) 02/08/2018    HGB 11 1 (L) 02/08/2018    HCT 33 0 (L) 02/08/2018    MCV 88 02/08/2018     02/08/2018    MCH 29 4 02/08/2018    MCHC 33 6 02/08/2018    RDW 14 3 02/08/2018    MPV 9 4 02/08/2018    NRBC 0 02/08/2018   , CMP:   Lab Results   Component Value Date     (L) 02/08/2018    K 3 4 (L) 02/08/2018     02/08/2018    CO2 24 02/08/2018    ANIONGAP 8 02/08/2018    BUN 10 02/08/2018    CREATININE 0 55 (L) 02/08/2018    GLUCOSE 172 (H) 02/08/2018    CALCIUM 8 5 02/08/2018    EGFR 89 02/08/2018   , Coagulation: No results found for: PT, INR, APTT, Urinalysis:   No results found for: Lorrie Garvin, LEUKOCYTESUR, NITRITE, PROTEINUA, GLUCOSEU, KETONESU, BILIRUBINUR, BLOODU, Amylase: No results found for: AMYLASE, Lipase: No results found for: LIPASE    VTE Pharmacologic Prophylaxis: Heparin  VTE Mechanical Prophylaxis: sequential compression device

## 2018-02-09 ENCOUNTER — APPOINTMENT (INPATIENT)
Dept: RADIOLOGY | Facility: HOSPITAL | Age: 78
DRG: 329 | End: 2018-02-09
Payer: MEDICARE

## 2018-02-09 ENCOUNTER — APPOINTMENT (INPATIENT)
Dept: NON INVASIVE DIAGNOSTICS | Facility: HOSPITAL | Age: 78
DRG: 329 | End: 2018-02-09
Payer: MEDICARE

## 2018-02-09 LAB
ANION GAP SERPL CALCULATED.3IONS-SCNC: 7 MMOL/L (ref 4–13)
ANION GAP SERPL CALCULATED.3IONS-SCNC: 7 MMOL/L (ref 4–13)
ATRIAL RATE: 110 BPM
BASOPHILS # BLD AUTO: 0.03 THOUSANDS/ΜL (ref 0–0.1)
BASOPHILS NFR BLD AUTO: 0 % (ref 0–1)
BUN SERPL-MCNC: 11 MG/DL (ref 5–25)
BUN SERPL-MCNC: 17 MG/DL (ref 5–25)
CALCIUM SERPL-MCNC: 7.6 MG/DL (ref 8.3–10.1)
CALCIUM SERPL-MCNC: 8.2 MG/DL (ref 8.3–10.1)
CHLORIDE SERPL-SCNC: 102 MMOL/L (ref 100–108)
CHLORIDE SERPL-SCNC: 106 MMOL/L (ref 100–108)
CO2 SERPL-SCNC: 24 MMOL/L (ref 21–32)
CO2 SERPL-SCNC: 24 MMOL/L (ref 21–32)
CREAT SERPL-MCNC: 0.52 MG/DL (ref 0.6–1.3)
CREAT SERPL-MCNC: 0.62 MG/DL (ref 0.6–1.3)
EOSINOPHIL # BLD AUTO: 0.12 THOUSAND/ΜL (ref 0–0.61)
EOSINOPHIL NFR BLD AUTO: 1 % (ref 0–6)
ERYTHROCYTE [DISTWIDTH] IN BLOOD BY AUTOMATED COUNT: 14 % (ref 11.6–15.1)
ERYTHROCYTE [DISTWIDTH] IN BLOOD BY AUTOMATED COUNT: 14.7 % (ref 11.6–15.1)
GFR SERPL CREATININE-BSD FRML MDRD: 86 ML/MIN/1.73SQ M
GFR SERPL CREATININE-BSD FRML MDRD: 91 ML/MIN/1.73SQ M
GLUCOSE SERPL-MCNC: 189 MG/DL (ref 65–140)
GLUCOSE SERPL-MCNC: 203 MG/DL (ref 65–140)
GLUCOSE SERPL-MCNC: 208 MG/DL (ref 65–140)
GLUCOSE SERPL-MCNC: 215 MG/DL (ref 65–140)
GLUCOSE SERPL-MCNC: 230 MG/DL (ref 65–140)
GLUCOSE SERPL-MCNC: 246 MG/DL (ref 65–140)
HCT VFR BLD AUTO: 28.2 % (ref 34.8–46.1)
HCT VFR BLD AUTO: 30.9 % (ref 34.8–46.1)
HGB BLD-MCNC: 10.7 G/DL (ref 11.5–15.4)
HGB BLD-MCNC: 9.6 G/DL (ref 11.5–15.4)
LYMPHOCYTES # BLD AUTO: 1.09 THOUSANDS/ΜL (ref 0.6–4.47)
LYMPHOCYTES NFR BLD AUTO: 8 % (ref 14–44)
MAGNESIUM SERPL-MCNC: 1.8 MG/DL (ref 1.6–2.6)
MAGNESIUM SERPL-MCNC: 2.4 MG/DL (ref 1.6–2.6)
MCH RBC QN AUTO: 29.4 PG (ref 26.8–34.3)
MCH RBC QN AUTO: 30.7 PG (ref 26.8–34.3)
MCHC RBC AUTO-ENTMCNC: 34 G/DL (ref 31.4–37.4)
MCHC RBC AUTO-ENTMCNC: 34.6 G/DL (ref 31.4–37.4)
MCV RBC AUTO: 86 FL (ref 82–98)
MCV RBC AUTO: 89 FL (ref 82–98)
MONOCYTES # BLD AUTO: 1.46 THOUSAND/ΜL (ref 0.17–1.22)
MONOCYTES NFR BLD AUTO: 11 % (ref 4–12)
NEUTROPHILS # BLD AUTO: 10.19 THOUSANDS/ΜL (ref 1.85–7.62)
NEUTS SEG NFR BLD AUTO: 80 % (ref 43–75)
NRBC BLD AUTO-RTO: 0 /100 WBCS
PHOSPHATE SERPL-MCNC: 3.3 MG/DL (ref 2.3–4.1)
PLATELET # BLD AUTO: 216 THOUSANDS/UL (ref 149–390)
PLATELET # BLD AUTO: 255 THOUSANDS/UL (ref 149–390)
PMV BLD AUTO: 9.4 FL (ref 8.9–12.7)
PMV BLD AUTO: 9.4 FL (ref 8.9–12.7)
POTASSIUM SERPL-SCNC: 4.1 MMOL/L (ref 3.5–5.3)
POTASSIUM SERPL-SCNC: 4.3 MMOL/L (ref 3.5–5.3)
QRS AXIS: 6 DEGREES
QRSD INTERVAL: 86 MS
QT INTERVAL: 304 MS
QTC INTERVAL: 459 MS
RBC # BLD AUTO: 3.27 MILLION/UL (ref 3.81–5.12)
RBC # BLD AUTO: 3.49 MILLION/UL (ref 3.81–5.12)
SODIUM SERPL-SCNC: 133 MMOL/L (ref 136–145)
SODIUM SERPL-SCNC: 137 MMOL/L (ref 136–145)
T WAVE AXIS: 15 DEGREES
TROPONIN I SERPL-MCNC: 0.05 NG/ML
TROPONIN I SERPL-MCNC: 0.05 NG/ML
TROPONIN I SERPL-MCNC: 0.06 NG/ML
VENTRICULAR RATE: 137 BPM
WBC # BLD AUTO: 13.09 THOUSAND/UL (ref 4.31–10.16)
WBC # BLD AUTO: 15.28 THOUSAND/UL (ref 4.31–10.16)

## 2018-02-09 PROCEDURE — 74177 CT ABD & PELVIS W/CONTRAST: CPT

## 2018-02-09 PROCEDURE — 80048 BASIC METABOLIC PNL TOTAL CA: CPT | Performed by: STUDENT IN AN ORGANIZED HEALTH CARE EDUCATION/TRAINING PROGRAM

## 2018-02-09 PROCEDURE — 83735 ASSAY OF MAGNESIUM: CPT | Performed by: STUDENT IN AN ORGANIZED HEALTH CARE EDUCATION/TRAINING PROGRAM

## 2018-02-09 PROCEDURE — 71275 CT ANGIOGRAPHY CHEST: CPT

## 2018-02-09 PROCEDURE — 99024 POSTOP FOLLOW-UP VISIT: CPT | Performed by: SURGERY

## 2018-02-09 PROCEDURE — 97530 THERAPEUTIC ACTIVITIES: CPT | Performed by: STUDENT IN AN ORGANIZED HEALTH CARE EDUCATION/TRAINING PROGRAM

## 2018-02-09 PROCEDURE — 80048 BASIC METABOLIC PNL TOTAL CA: CPT | Performed by: SURGERY

## 2018-02-09 PROCEDURE — 84484 ASSAY OF TROPONIN QUANT: CPT | Performed by: SURGERY

## 2018-02-09 PROCEDURE — 93005 ELECTROCARDIOGRAM TRACING: CPT

## 2018-02-09 PROCEDURE — 93010 ELECTROCARDIOGRAM REPORT: CPT | Performed by: INTERNAL MEDICINE

## 2018-02-09 PROCEDURE — 94762 N-INVAS EAR/PLS OXIMTRY CONT: CPT

## 2018-02-09 PROCEDURE — 82948 REAGENT STRIP/BLOOD GLUCOSE: CPT

## 2018-02-09 PROCEDURE — 83735 ASSAY OF MAGNESIUM: CPT | Performed by: SURGERY

## 2018-02-09 PROCEDURE — 85025 COMPLETE CBC W/AUTO DIFF WBC: CPT | Performed by: STUDENT IN AN ORGANIZED HEALTH CARE EDUCATION/TRAINING PROGRAM

## 2018-02-09 PROCEDURE — 99222 1ST HOSP IP/OBS MODERATE 55: CPT | Performed by: INTERNAL MEDICINE

## 2018-02-09 PROCEDURE — 93306 TTE W/DOPPLER COMPLETE: CPT

## 2018-02-09 PROCEDURE — 84100 ASSAY OF PHOSPHORUS: CPT | Performed by: SURGERY

## 2018-02-09 PROCEDURE — 85027 COMPLETE CBC AUTOMATED: CPT | Performed by: SURGERY

## 2018-02-09 PROCEDURE — C9113 INJ PANTOPRAZOLE SODIUM, VIA: HCPCS | Performed by: STUDENT IN AN ORGANIZED HEALTH CARE EDUCATION/TRAINING PROGRAM

## 2018-02-09 RX ORDER — LANOLIN ALCOHOL/MO/W.PET/CERES
3 CREAM (GRAM) TOPICAL
Status: DISCONTINUED | OUTPATIENT
Start: 2018-02-09 | End: 2018-02-17 | Stop reason: HOSPADM

## 2018-02-09 RX ORDER — MAGNESIUM SULFATE HEPTAHYDRATE 40 MG/ML
2 INJECTION, SOLUTION INTRAVENOUS ONCE
Status: COMPLETED | OUTPATIENT
Start: 2018-02-09 | End: 2018-02-09

## 2018-02-09 RX ORDER — ACETAMINOPHEN 160 MG/5ML
650 SUSPENSION, ORAL (FINAL DOSE FORM) ORAL EVERY 4 HOURS PRN
Status: DISCONTINUED | OUTPATIENT
Start: 2018-02-09 | End: 2018-02-17 | Stop reason: HOSPADM

## 2018-02-09 RX ORDER — HYDRALAZINE HYDROCHLORIDE 20 MG/ML
10 INJECTION INTRAMUSCULAR; INTRAVENOUS EVERY 6 HOURS PRN
Status: DISCONTINUED | OUTPATIENT
Start: 2018-02-09 | End: 2018-02-09

## 2018-02-09 RX ORDER — DILTIAZEM HYDROCHLORIDE 5 MG/ML
10 INJECTION INTRAVENOUS ONCE
Status: COMPLETED | OUTPATIENT
Start: 2018-02-09 | End: 2018-02-09

## 2018-02-09 RX ADMIN — HEPARIN SODIUM 5000 UNITS: 5000 INJECTION, SOLUTION INTRAVENOUS; SUBCUTANEOUS at 21:01

## 2018-02-09 RX ADMIN — SODIUM CHLORIDE 500 ML: 0.9 INJECTION, SOLUTION INTRAVENOUS at 16:12

## 2018-02-09 RX ADMIN — INSULIN LISPRO 2 UNITS: 100 INJECTION, SOLUTION INTRAVENOUS; SUBCUTANEOUS at 06:28

## 2018-02-09 RX ADMIN — HEPARIN SODIUM 5000 UNITS: 5000 INJECTION, SOLUTION INTRAVENOUS; SUBCUTANEOUS at 05:32

## 2018-02-09 RX ADMIN — CALCIUM GLUCONATE: 94 INJECTION, SOLUTION INTRAVENOUS at 21:46

## 2018-02-09 RX ADMIN — MAGNESIUM SULFATE HEPTAHYDRATE 2 G: 40 INJECTION, SOLUTION INTRAVENOUS at 08:25

## 2018-02-09 RX ADMIN — POLYVINYL ALCOHOL 1 DROP: 14 SOLUTION/ DROPS OPHTHALMIC at 19:54

## 2018-02-09 RX ADMIN — IOHEXOL 100 ML: 350 INJECTION, SOLUTION INTRAVENOUS at 18:15

## 2018-02-09 RX ADMIN — MELATONIN TAB 3 MG 3 MG: 3 TAB at 23:58

## 2018-02-09 RX ADMIN — DILTIAZEM HYDROCHLORIDE 10 MG: 5 INJECTION INTRAVENOUS at 16:07

## 2018-02-09 RX ADMIN — HEPARIN SODIUM 5000 UNITS: 5000 INJECTION, SOLUTION INTRAVENOUS; SUBCUTANEOUS at 13:18

## 2018-02-09 RX ADMIN — METOPROLOL TARTRATE 5 MG: 1 INJECTION, SOLUTION INTRAVENOUS at 19:00

## 2018-02-09 RX ADMIN — HYDRALAZINE HYDROCHLORIDE 10 MG: 20 INJECTION INTRAMUSCULAR; INTRAVENOUS at 08:16

## 2018-02-09 RX ADMIN — PANTOPRAZOLE SODIUM 40 MG: 40 INJECTION, POWDER, FOR SOLUTION INTRAVENOUS at 08:14

## 2018-02-09 RX ADMIN — INSULIN LISPRO 2 UNITS: 100 INJECTION, SOLUTION INTRAVENOUS; SUBCUTANEOUS at 12:04

## 2018-02-09 RX ADMIN — METOPROLOL TARTRATE 5 MG: 1 INJECTION, SOLUTION INTRAVENOUS at 11:11

## 2018-02-09 RX ADMIN — LEVOTHYROXINE SODIUM ANHYDROUS 70 MCG: 100 INJECTION, POWDER, LYOPHILIZED, FOR SOLUTION INTRAVENOUS at 08:15

## 2018-02-09 RX ADMIN — ONDANSETRON 4 MG: 2 INJECTION INTRAMUSCULAR; INTRAVENOUS at 17:01

## 2018-02-09 RX ADMIN — ONDANSETRON 4 MG: 2 INJECTION INTRAMUSCULAR; INTRAVENOUS at 01:13

## 2018-02-09 RX ADMIN — INSULIN LISPRO 2 UNITS: 100 INJECTION, SOLUTION INTRAVENOUS; SUBCUTANEOUS at 19:08

## 2018-02-09 RX ADMIN — ACETAMINOPHEN 650 MG: 160 SUSPENSION ORAL at 11:07

## 2018-02-09 RX ADMIN — POTASSIUM CHLORIDE, DEXTROSE MONOHYDRATE AND SODIUM CHLORIDE 36 ML/HR: 150; 5; 450 INJECTION, SOLUTION INTRAVENOUS at 08:30

## 2018-02-09 RX ADMIN — METOPROLOL TARTRATE 5 MG: 1 INJECTION, SOLUTION INTRAVENOUS at 05:30

## 2018-02-09 RX ADMIN — MELATONIN TAB 3 MG 3 MG: 3 TAB at 02:27

## 2018-02-09 RX ADMIN — DILTIAZEM HYDROCHLORIDE 5 MG/HR: 5 INJECTION INTRAVENOUS at 17:48

## 2018-02-09 NOTE — CASE MANAGEMENT
Continued Stay Review    Date:2/9/18 POD #5    Vital Signs: /63 (BP Location: Left arm)   Pulse (!) 138   Temp 99 3 °F (37 4 °C) (Oral)   Resp 18   Ht 5' 6 5" (1 689 m)   Wt 64 7 kg (142 lb 10 2 oz)   SpO2 100%   BMI 22 68 kg/m²     Medications:   Scheduled Meds:   Current Facility-Administered Medications:  acetaminophen 650 mg Oral Q4H PRN Clinton García PA-C    Adult TPN (CUSTOM BASE/CUSTOM ELECTROLYTE)  Intravenous Continuous Timothy Carlin MD Last Rate: 80 4 mL/hr at 02/08/18 2151   Adult TPN (CUSTOM BASE/CUSTOM ELECTROLYTE)  Intravenous Continuous Timothy Carlin MD    dextrose 5 % and sodium chloride 0 45 % with KCl 20 mEq/L 36 mL/hr Intravenous Continuous Timothy Carlin MD Last Rate: 36 mL/hr (02/09/18 0830)   heparin (porcine) 5,000 Units Subcutaneous Q8H [facility] Clinton Moya MD    hydrALAZINE 10 mg Intravenous Q6H PRN Timothy Carlin MD    HYDROmorphone 0 5 mg Intravenous Q2H PRN Natalie Daly MD    insulin lispro 1-5 Units Subcutaneous Q6H [facility] Isis Chapman MD    levothyroxine 70 mcg Intravenous Daily Clinton Moya MD    melatonin 3 mg Oral HS Violetta Woodruff MD    metoprolol 5 mg Intravenous Q6H PRN Clinton Moya MD    metoprolol 5 mg Intravenous Q6H [facility] Jocelyn Sanchez MD    ondansetron 4 mg Intravenous Q6H PRN Clinton Moya MD    ondansetron 4 mg Intravenous Q4H PRN Isis Chapman MD    pantoprazole 40 mg Intravenous Q24H Radha Anne MD    phenol 1 spray Mouth/Throat Q2H PRN Timothy Carlin MD    polyvinyl alcohol 1 drop Both Eyes Q3H PRN Jocelyn Sanchez MD    sodium chloride 500 mL Intravenous Once Isis Chapman MD      Continuous Infusions:   Adult TPN (CUSTOM BASE/CUSTOM ELECTROLYTE)  Last Rate: 80 4 mL/hr at 02/08/18 2151   Adult TPN (CUSTOM BASE/CUSTOM ELECTROLYTE)     dextrose 5 % and sodium chloride 0 45 % with KCl 20 mEq/L 36 mL/hr Last Rate: 36 mL/hr (02/09/18 0830)     PRN Meds:   Acetaminophen x1 today    hydrALAZINE x1    HYDROmorphone    metoprolol    Ondansetron x1    ondansetron    phenol    polyvinyl alcohol    Abnormal Labs/Diagnostic Results:   Creat 0 52  Glucose 203  Calcium 7 6  Wbc 13 09  H/H 9 6/28 2   POC glucose 230, 246    Age/Sex: 76 y o  female     Assessment/Plan:   2/9 Surgery Progress Note  76 F with high grade small bowel obstruction, s/p ex-alp, SBR POD 5     Plan:  Continue NPO/NGT  Awaiting ROBF  TPN and IVF to total 84 cc/hr    Discharge Plan: home when stable

## 2018-02-09 NOTE — CONSULTS
Consultation - Cardiology   Leeann Kimbrough 76 y o  female MRN: [id number]  Unit/Bed#: [facility] 802-01 Encounter: [id number]  02/09/18  5:39 PM    Assessment/ Plan:  1  New-onset atrial fibrillation- this is in the post-operative setting  She is asymptomatic from the tachycardia  She denies any history of AF in the past  Given her baseline thyroid dysfunction we will avoid amiodarone  Start IV diltiazem gtt and can transition to PO once rates better controlled  Her [id number] is 4  Would start IV heparin gtt and eventual DOAC once cleared by surgery  Check echocardiogram      2  SBO s/p EX-lap and small bowel resection POD#5    3  Hypertension- cont diltiazem     4  Hypothyroidism- on synthroid    5  DM2    6  History of breast cancer      History of Present Illness   Physician Requesting Consult: Candida Grant MD  Reason for Consult / Principal Problem: AF  HPI: Leeann Kimbrough is a 76y o  year old female with history of hypertension and DM2 who presented on 2/1 with nausea and vomiting for 3 days  CT scan revealed a high grade Small bowel obstruction  Her distentions and pain progressively worsened as an inpatient and she underwent an ex-lap and was found to have a micro-perforation and extensive adhesion and underwent and small bowel resection  On POD #5 she was found to be in AF with RVR  We are consulted for further management  She has no known history of AF or structural heart disease  She does have hypothyroidism secondary to radiation and is on synthroid            Consults    EKG: AF with rvr      Review of Systems:    Review of Systems    Historical Information   Past Medical History:   Diagnosis Date    Anxiety     [facility]     LEFT BREAST CA 22 YEARS AGO     Depression     [facility]     Hypertension     Hypothyroidism      Past Surgical History:   Procedure Laterality Date    ABDOMINAL ADHESION SURGERY N/A 2/4/2018    Procedure: LYSIS ADHESIONS;  Surgeon: Carol Rivera DO; [facility];  Service: General    BREAST SURGERY      CHOLECYSTECTOMY      JOINT REPLACEMENT      LEFT KNEE REPLACEMENT     LAPAROTOMY N/A 2/4/2018    Procedure: LAPAROTOMY EXPLORATORY,;  Surgeon: Maryann Simpson DO;  [facility];  Service: General    MASTECTOMY      MO BIOPSY/EXCISION, LYMPH NODE(S) Right 1/13/2017    Procedure: SENTINEL LYMPH NODE BIOPSY RIGHT AXILLA; Surgeon: Troy Garsia MD;  [facility];  Service: General    MO MASTECTOMY, SIMPLE, COMPLETE Right 1/13/2017    Procedure: MASTECTOMY SIMPLE;  Surgeon: Troy Garsia MD;  [facility];  Service: General    SMALL INTESTINE SURGERY N/A 2/4/2018    Procedure: RESECTION SMALL BOWEL;  Surgeon: Maryann Simpson DO;  [facility];  Service: General     History   Alcohol Use No     History   Drug Use No     History   Smoking Status    Current Every Day Smoker    Packs/day: 1 00    Types: Cigarettes   Smokeless Tobacco    Never Used       Family History: History reviewed  No pertinent family history  Meds/Allergies   all current active meds have been reviewed  Allergies   Allergen Reactions    Morphine GI Intolerance    Penicillins     Percocet [Oxycodone-Acetaminophen]        Objective   Vitals: Blood pressure 141/75, pulse (!) 120, temperature 97 8 °F (36 6 °C), temperature source Oral, resp  rate 18, height 5' 6 5" (1 689 m), weight 64 7 kg (142 lb 10 2 oz), SpO2 100 %  , Body mass index is 22 68 kg/m² , Orthostatic Blood Pressures    Flowsheet Row Most Recent Value   Blood Pressure  141/75 filed at 02/09/2018 1648   Patient Position - Orthostatic VS  Lying filed at 02/09/2018 4849          Systolic (19DTE), FZJ:857 , Min:132 , FBO:534     Diastolic (86LHG), XXJ:26, Min:63, Max:85        Intake/Output Summary (Last 24 hours) at 02/09/18 1739  Last data filed at 02/09/18 1301   Gross per 24 hour   Intake            775 4 ml   Output             1300 ml   Net           -524 6 ml       Invasive Devices     Peripherally Inserted Central Catheter Line            PICC Line 02/06/18 3 days          Drain            NG/OG/Enteral Tube Nasogastric 18 Fr Left nares less than 1 day                    Physical Exam:  GEN: Alert and oriented x 3, in no acute distress  HEENT: Sclera anicteric, conjunctivae pink, mucous membranes moist   NECK: Supple, no carotid bruits, no significant JVD  HEART: irregularly irregular, normal S1 and S2, no murmurs, clicks, gallops or rubs  LUNGS: Clear to auscultation bilaterally; no wheezes, rales, or rhonchi   EXTREMITIES: Skin warm and well perfused, no clubbing, cyanosis, or edema  Lab Results:     Troponins:   Results from last 7 days  Lab Units 02/09/18  1604   TROPONIN I ng/mL 0 05*       CBC with diff:   Results from last 7 days  Lab Units 02/09/18  1604 02/09/18  0442 02/08/18  0507 02/07/18  0452 02/06/18  0534 02/05/18  0451 02/04/18  1327  02/04/18  0445   WBC Thousand/uL 15 28* 13 09* 13 26* 15 38* 18 33* 17 15*  --   --  12 90*   HEMOGLOBIN g/dL 10 7* 9 6* 11 1* 11 5 11 4* 12 0  --   --  13 6   I STAT HEMOGLOBIN g/dl  --   --   --   --   --   --  9 9*  < >  --    HEMATOCRIT % 30 9* 28 2* 33 0* 33 9* 34 2* 36 2  --   --  40 6   MCV fL 89 86 88 88 87 90  --   --  89   PLATELETS Thousands/uL 255 216 223 245 244 238  --   --  253   MCH pg 30 7 29 4 29 4 29 9 29 1 29 7  --   --  29 8   MCHC g/dL 34 6 34 0 33 6 33 9 33 3 33 1  --   --  33 5   RDW % 14 7 14 0 14 3 14 5 14 5 14 5  --   --  14 4   MPV fL 9 4 9 4 9 4 9 8 9 9 9 7  --   --  9 8   NRBC AUTO /100 WBCs  --  0 0 0 0 0  --   --  0   < > = values in this interval not displayed        CMP:   Results from last 7 days  Lab Units 02/09/18  0442 02/08/18  0507 02/07/18  9362 02/06/18  0534 02/05/18  0451 02/04/18  1327 02/04/18  1237 02/04/18  0445 02/03/18  0507   SODIUM mmol/L 137 135* 137 138 137  --   --  135* 135*   POTASSIUM mmol/L 4 1 3 4* 3 8 4 6 4 6  --   --  4 0 4 2   CHLORIDE mmol/L 106 103 105 106 105 --   --  101 101   CO2 mmol/L 24 24 25 24 26  --   --  28 28   ANION GAP mmol/L 7 8 7 8 6  --   --  6 6   BUN mg/dL 11 10 11 15 17  --   --  20 26*   CREATININE mg/dL 0 52* 0 55* 0 57* 0 68 0 84  --   --  0 89 0 75   GLUCOSE RANDOM mg/dL 203* 172* 174* 124 241*  --   --  146* 158*   GLUCOSE, ISTAT mg/dl  --   --   --   --   --  149* 144*  --   --    CALCIUM mg/dL 7 6* 8 5 8 0* 8 0* 7 9*  --   --  8 6 8 2*   AST U/L  --   --   --  25  --   --   --   --   --    ALT U/L  --   --   --  23  --   --   --   --   --    ALK PHOS U/L  --   --   --  64  --   --   --   --   --    TOTAL PROTEIN g/dL  --   --   --  6 1*  --   --   --   --   --    BILIRUBIN TOTAL mg/dL  --   --   --  0 67  --   --   --   --   --    EGFR ml/min/1 73sq m 91 89 88 83 66  --   --  62 76           Counseling / Coordination of Care  Total floor / unit time spent today 60 minutes  Greater than 50% of total time was spent with the patient and / or family counseling and / or coordination of care  A description of the counseling / coordination of care: 30

## 2018-02-09 NOTE — PROGRESS NOTES
Progress Note - Acute Care Surgery   Jane Gao 76 y o  female MRN: [id number]  Unit/Bed#: [facility] 802-01 Encounter: [id number]    Assessment:  76 F with high grade small bowel obstruction, s/p ex-alp, SBR POD 5    Plan:  Continue NPO/NGT  Awaiting ROBF  TPN and IVF to total 84 cc/hr    Subjective/Objective     Subjective: NGT fell out overnight and replaced  Otherwise no acute events  No gas or bowel movements yet  Currently complaining of nasal congestion  Objective:    Blood pressure (!) 192/85, pulse (!) 118, temperature 99 3 °F (37 4 °C), temperature source Oral, resp  rate 18, height 5' 6 5" (1 689 m), weight 64 7 kg (142 lb 10 2 oz), SpO2 99 %  ,Body mass index is 22 68 kg/m²  Intake/Output Summary (Last 24 hours) at 02/09/18 0845  Last data filed at 02/09/18 0258   Gross per 24 hour   Intake           484 96 ml   Output              900 ml   Net          -415 04 ml       Invasive Devices     Peripherally Inserted Central Catheter Line            PICC Line 02/06/18 2 days          Drain            NG/OG/Enteral Tube Nasogastric 18 Fr Left nares less than 1 day                Physical Exam:   General: NAD, AAOx3  CV: RRR +S1/S2  Chest: breath sounds bilaterally  Abdomen: round, distended, tympanitic   Incision c/d/i with Meplex  Extremities: atraumatic, no edema        Results from last 7 days  Lab Units 02/09/18  0442 02/08/18  0507 02/07/18  0452   WBC Thousand/uL 13 09* 13 26* 15 38*   HEMOGLOBIN g/dL 9 6* 11 1* 11 5   HEMATOCRIT % 28 2* 33 0* 33 9*   PLATELETS Thousands/uL 216 223 245       Results from last 7 days  Lab Units 02/09/18  0442 02/08/18  0507 02/07/18  0452   SODIUM mmol/L 137 135* 137   POTASSIUM mmol/L 4 1 3 4* 3 8   CHLORIDE mmol/L 106 103 105   CO2 mmol/L 24 24 25   BUN mg/dL 11 10 11   CREATININE mg/dL 0 52* 0 55* 0 57*   GLUCOSE RANDOM mg/dL 203* 172* 174*   CALCIUM mg/dL 7 6* 8 5 8 0*

## 2018-02-09 NOTE — PLAN OF CARE
Problem: OCCUPATIONAL THERAPY ADULT  Goal: Performs self-care activities at highest level of function for planned discharge setting  See evaluation for individualized goals  Treatment Interventions: ADL retraining, Functional transfer training, UE strengthening/ROM, Endurance training, Patient/family training, Equipment evaluation/education, Compensatory technique education, Continued evaluation, Energy conservation, Activityengagement  Equipment Recommended: Bedside commode       See flowsheet documentation for full assessment, interventions and recommendations  Outcome: Progressing  Limitation: Decreased ADL status, Decreased UE strength, Decreased UE ROM, Decreased Safe judgement during ADL, Decreased endurance, Decreased self-care trans, Decreased high-level ADLs  Prognosis: Fair  Assessment: Pt participates in OT session with focus on standing tolerance, bed mobility, and activity tolerance to increase I for d/c  Pt supervision supine to sit EOB  Pt supervision sit to stand for tabletop activity with puzzle  Pt supervision standing tolerance and stood for 16 min 20 sec and 10 min durations with rest breaks to sit in between  Pt completed puzzle and returned to supine position with HOB elevated  Pt will continue to benefit from activity tolerance and adls       OT Discharge Recommendation: Home OT  OT - OK to Discharge: Yes (when medically appropriate)

## 2018-02-09 NOTE — OCCUPATIONAL THERAPY NOTE
02/09/18 1429   Restrictions/Precautions   Weight Bearing Precautions Per Order No   Other Precautions Multiple lines; Fall Risk   Pain Assessment   Pain Assessment No/denies pain   Pain Score No Pain   ADL   Where Assessed Edge of bed   Bed Mobility   Supine to Sit 5  Supervision   Sit to Supine 5  Supervision   Transfers   Sit to Stand 5  Supervision   Stand to Sit 5  Supervision   Cognition   Overall Cognitive Status [facility]   Arousal/Participation Alert   Attention Within functional limits   Orientation Level Oriented X4   Memory Within functional limits   Following Commands Follows multistep commands without difficulty   Activity Tolerance   Activity Tolerance Patient tolerated treatment well   Assessment   Assessment Pt participates in OT session with focus on standing tolerance, bed mobility, and activity tolerance to increase I for d/c  Pt supervision supine to sit EOB  Pt supervision sit to stand for tabletop activity with puzzle  Pt supervision standing tolerance and stood for 16 min 20 sec and 10 min durations with rest breaks to sit in between  Pt completed puzzle and returned to supine position with HOB elevated  Pt will continue to benefit from activity tolerance and adls  Plan   Treatment Interventions Functional transfer training; Endurance training; Activityengagement   Goal Expiration Date 02/17/18   Treatment Day 2   OT Frequency 3-5x/wk   Recommendation   OT Discharge Recommendation Home OT

## 2018-02-09 NOTE — SOCIAL WORK
Cm reviewed patient during care coordination rounds  Pt is not medically stable for discharge today  Pt has bowel obstruction  No anticipated weekend discharge  [facility] able to accept and DME referral placed for patient's discharge

## 2018-02-09 NOTE — PROGRESS NOTES
Patient care rounds were completed with the patient's nurse today, Micaela Brown  We discussed the plan is to keep her NPO and with NG tube for bowel decompression until return of normal bowel function  She did note passing little bit of flatus overnight, but still felt bloated and nauseous after going for a walk this morning  Continue TPN for nutritional support while NPO  Add liquid Tylenol for headache this morning  We reviewed all of the invasive devices/lines/telemetry orders   - Right-sided PICC line in place for IV access and TPN; anticipate discontinuing line prior to discharge  Pain Assessment / Plan:  - Continue current pain medication regimen and add Tylenol for headache  Mobility Assessment / Plan:  - May continue to be out of bed and ambulating as tolerated  - PT and OT recommending home when medically appropriate with home therapy services  Goals / Barriers for discharge:  - Awaiting return of bowel function   - Case management following  All questions and concerns were addressed  I spent greater than 20 minutes reviewing the plan with the patient and the nurse, and coordinating her care for the day      Matias Yuen PA-C  2/9/2018 10:36 AM

## 2018-02-10 LAB
ANION GAP SERPL CALCULATED.3IONS-SCNC: 7 MMOL/L (ref 4–13)
BACTERIA UR QL AUTO: ABNORMAL /HPF
BASOPHILS # BLD MANUAL: 0 THOUSAND/UL (ref 0–0.1)
BASOPHILS NFR MAR MANUAL: 0 % (ref 0–1)
BILIRUB UR QL STRIP: NEGATIVE
BUN SERPL-MCNC: 18 MG/DL (ref 5–25)
CALCIUM SERPL-MCNC: 8.5 MG/DL (ref 8.3–10.1)
CHLORIDE SERPL-SCNC: 102 MMOL/L (ref 100–108)
CLARITY UR: CLEAR
CO2 SERPL-SCNC: 23 MMOL/L (ref 21–32)
COLOR UR: YELLOW
CREAT SERPL-MCNC: 0.61 MG/DL (ref 0.6–1.3)
EOSINOPHIL # BLD MANUAL: 0 THOUSAND/UL (ref 0–0.4)
EOSINOPHIL NFR BLD MANUAL: 0 % (ref 0–6)
ERYTHROCYTE [DISTWIDTH] IN BLOOD BY AUTOMATED COUNT: 14.5 % (ref 11.6–15.1)
GFR SERPL CREATININE-BSD FRML MDRD: 87 ML/MIN/1.73SQ M
GLUCOSE SERPL-MCNC: 184 MG/DL (ref 65–140)
GLUCOSE SERPL-MCNC: 211 MG/DL (ref 65–140)
GLUCOSE SERPL-MCNC: 215 MG/DL (ref 65–140)
GLUCOSE SERPL-MCNC: 244 MG/DL (ref 65–140)
GLUCOSE SERPL-MCNC: 252 MG/DL (ref 65–140)
GLUCOSE UR STRIP-MCNC: NEGATIVE MG/DL
HCT VFR BLD AUTO: 29.9 % (ref 34.8–46.1)
HGB BLD-MCNC: 10.4 G/DL (ref 11.5–15.4)
HGB UR QL STRIP.AUTO: NEGATIVE
KETONES UR STRIP-MCNC: NEGATIVE MG/DL
LEUKOCYTE ESTERASE UR QL STRIP: NEGATIVE
LYMPHOCYTES # BLD AUTO: 1 THOUSAND/UL (ref 0.6–4.47)
LYMPHOCYTES # BLD AUTO: 6 % (ref 14–44)
MCH RBC QN AUTO: 30.2 PG (ref 26.8–34.3)
MCHC RBC AUTO-ENTMCNC: 34.8 G/DL (ref 31.4–37.4)
MCV RBC AUTO: 87 FL (ref 82–98)
MONOCYTES # BLD AUTO: 0.66 THOUSAND/UL (ref 0–1.22)
MONOCYTES NFR BLD: 4 % (ref 4–12)
MYELOCYTES NFR BLD MANUAL: 1 % (ref 0–1)
NEUTROPHILS # BLD MANUAL: 14.77 THOUSAND/UL (ref 1.85–7.62)
NEUTS BAND NFR BLD MANUAL: 1 % (ref 0–8)
NEUTS SEG NFR BLD AUTO: 88 % (ref 43–75)
NITRITE UR QL STRIP: NEGATIVE
NON-SQ EPI CELLS URNS QL MICRO: ABNORMAL /HPF
NRBC BLD AUTO-RTO: 0 /100 WBCS
PH UR STRIP.AUTO: 5 [PH] (ref 4.5–8)
PLATELET # BLD AUTO: 260 THOUSANDS/UL (ref 149–390)
PLATELET BLD QL SMEAR: ADEQUATE
PMV BLD AUTO: 9.4 FL (ref 8.9–12.7)
POTASSIUM SERPL-SCNC: 4.4 MMOL/L (ref 3.5–5.3)
PROT UR STRIP-MCNC: ABNORMAL MG/DL
RBC # BLD AUTO: 3.44 MILLION/UL (ref 3.81–5.12)
RBC #/AREA URNS AUTO: ABNORMAL /HPF
RBC MORPH BLD: NORMAL
SODIUM SERPL-SCNC: 132 MMOL/L (ref 136–145)
SP GR UR STRIP.AUTO: 1.02 (ref 1–1.03)
UROBILINOGEN UR QL STRIP.AUTO: 1 E.U./DL
WBC # BLD AUTO: 16.6 THOUSAND/UL (ref 4.31–10.16)
WBC #/AREA URNS AUTO: ABNORMAL /HPF

## 2018-02-10 PROCEDURE — 99024 POSTOP FOLLOW-UP VISIT: CPT | Performed by: SURGERY

## 2018-02-10 PROCEDURE — C9113 INJ PANTOPRAZOLE SODIUM, VIA: HCPCS | Performed by: STUDENT IN AN ORGANIZED HEALTH CARE EDUCATION/TRAINING PROGRAM

## 2018-02-10 PROCEDURE — 99233 SBSQ HOSP IP/OBS HIGH 50: CPT | Performed by: INTERNAL MEDICINE

## 2018-02-10 PROCEDURE — 80048 BASIC METABOLIC PNL TOTAL CA: CPT | Performed by: PHYSICIAN ASSISTANT

## 2018-02-10 PROCEDURE — 93306 TTE W/DOPPLER COMPLETE: CPT | Performed by: INTERNAL MEDICINE

## 2018-02-10 PROCEDURE — 85027 COMPLETE CBC AUTOMATED: CPT | Performed by: PHYSICIAN ASSISTANT

## 2018-02-10 PROCEDURE — 82948 REAGENT STRIP/BLOOD GLUCOSE: CPT

## 2018-02-10 PROCEDURE — 85007 BL SMEAR W/DIFF WBC COUNT: CPT | Performed by: PHYSICIAN ASSISTANT

## 2018-02-10 PROCEDURE — 81001 URINALYSIS AUTO W/SCOPE: CPT | Performed by: STUDENT IN AN ORGANIZED HEALTH CARE EDUCATION/TRAINING PROGRAM

## 2018-02-10 PROCEDURE — 94762 N-INVAS EAR/PLS OXIMTRY CONT: CPT

## 2018-02-10 RX ORDER — LIDOCAINE HYDROCHLORIDE 20 MG/ML
JELLY TOPICAL ONCE
Status: DISCONTINUED | OUTPATIENT
Start: 2018-02-10 | End: 2018-02-17 | Stop reason: HOSPADM

## 2018-02-10 RX ADMIN — HEPARIN SODIUM 5000 UNITS: 5000 INJECTION, SOLUTION INTRAVENOUS; SUBCUTANEOUS at 05:00

## 2018-02-10 RX ADMIN — METOPROLOL TARTRATE 5 MG: 1 INJECTION, SOLUTION INTRAVENOUS at 12:58

## 2018-02-10 RX ADMIN — MELATONIN TAB 3 MG 3 MG: 3 TAB at 23:22

## 2018-02-10 RX ADMIN — PANTOPRAZOLE SODIUM 40 MG: 40 INJECTION, POWDER, FOR SOLUTION INTRAVENOUS at 08:02

## 2018-02-10 RX ADMIN — POTASSIUM CHLORIDE, DEXTROSE MONOHYDRATE AND SODIUM CHLORIDE 36 ML/HR: 150; 5; 450 INJECTION, SOLUTION INTRAVENOUS at 12:49

## 2018-02-10 RX ADMIN — DILTIAZEM HYDROCHLORIDE 12.5 MG/HR: 5 INJECTION INTRAVENOUS at 23:57

## 2018-02-10 RX ADMIN — METOPROLOL TARTRATE 5 MG: 1 INJECTION, SOLUTION INTRAVENOUS at 23:18

## 2018-02-10 RX ADMIN — METOPROLOL TARTRATE 5 MG: 1 INJECTION, SOLUTION INTRAVENOUS at 00:02

## 2018-02-10 RX ADMIN — INSULIN LISPRO 1 UNITS: 100 INJECTION, SOLUTION INTRAVENOUS; SUBCUTANEOUS at 00:05

## 2018-02-10 RX ADMIN — METOPROLOL TARTRATE 5 MG: 1 INJECTION, SOLUTION INTRAVENOUS at 05:04

## 2018-02-10 RX ADMIN — CALCIUM GLUCONATE: 94 INJECTION, SOLUTION INTRAVENOUS at 22:12

## 2018-02-10 RX ADMIN — DILTIAZEM HYDROCHLORIDE 12.5 MG/HR: 5 INJECTION INTRAVENOUS at 03:07

## 2018-02-10 RX ADMIN — HEPARIN SODIUM 5000 UNITS: 5000 INJECTION, SOLUTION INTRAVENOUS; SUBCUTANEOUS at 22:13

## 2018-02-10 RX ADMIN — METOPROLOL TARTRATE 5 MG: 1 INJECTION, SOLUTION INTRAVENOUS at 20:26

## 2018-02-10 RX ADMIN — INSULIN LISPRO 2 UNITS: 100 INJECTION, SOLUTION INTRAVENOUS; SUBCUTANEOUS at 05:00

## 2018-02-10 RX ADMIN — INSULIN LISPRO 2 UNITS: 100 INJECTION, SOLUTION INTRAVENOUS; SUBCUTANEOUS at 12:50

## 2018-02-10 RX ADMIN — INSULIN LISPRO 2 UNITS: 100 INJECTION, SOLUTION INTRAVENOUS; SUBCUTANEOUS at 17:33

## 2018-02-10 RX ADMIN — INSULIN LISPRO 2 UNITS: 100 INJECTION, SOLUTION INTRAVENOUS; SUBCUTANEOUS at 23:20

## 2018-02-10 RX ADMIN — HEPARIN SODIUM 5000 UNITS: 5000 INJECTION, SOLUTION INTRAVENOUS; SUBCUTANEOUS at 15:00

## 2018-02-10 RX ADMIN — METOPROLOL TARTRATE 5 MG: 1 INJECTION, SOLUTION INTRAVENOUS at 17:33

## 2018-02-10 NOTE — PROGRESS NOTES
Heart Failure Service Progress Note - Shantanu Martínez 76 y o  female MRN: [id number]    Unit/Bed#: [facility] 802-01 Encounter: [id number]      Assessment:    Principal Problem:    Small bowel obstruction  Active Problems:    Acute kidney injury ([address] )    Bowel obstruction    Delirium    Physical deconditioning    Ambulatory dysfunction    Hx of fall    Anxiety    # New onset AFib: [id number] 4  Likely post-op AFib, but cannot be sure as patient does not feel it  Given thyroid dysfunction, amiodarone would be higher risk  --TTE pending  --Continue IV diltiazem gtt; once tolerating POs can change to oral  --Consider IV hep gtt to decrease risk of CVA; will transition to DOAC once ok from surgical perspective and taking PO    Subjective:   Patient seen and examined  No significant events overnight  Objective:       [employer] (day, reason): Stafford catheter (day, reason):    Vitals: Blood pressure 163/72, pulse 91, temperature 98 °F (36 7 °C), temperature source Oral, resp  rate 18, height 5' 6 5" (1 689 m), weight 64 7 kg (142 lb 10 2 oz), SpO2 97 %  , Body mass index is 22 68 kg/m² , I/O last 3 completed shifts: In: 4647 6 [P O :30; I V :1397 8; NG/GT:980; IV Piggyback:550; TPN:1689 7]  Out: 3050 [Urine:1850; Emesis/NG output:1200]  I/O this shift:  In: 1845 6 [I V :468 1]  Out: 150 [Emesis/NG output:150]  Wt Readings from Last 3 Encounters:   02/04/18 64 7 kg (142 lb 10 2 oz)   07/26/17 65 8 kg (145 lb)   07/25/17 66 7 kg (147 lb)       Intake/Output Summary (Last 24 hours) at 02/10/18 1612  Last data filed at 02/10/18 1300   Gross per 24 hour   Intake          5717 77 ml   Output             2100 ml   Net          3617 77 ml     I/O last 3 completed shifts: In: 4647 6 [P O :30; I V :1397 8; NG/GT:980; IV Piggyback:550; TPN:1689 7]  Out: 5226 [Urine:1850; Emesis/NG output:1200]    No significant arrhythmias seen on telemetry review   AFib      Physical Exam:  Vitals:    02/10/18 0747 02/10/18 0800 02/10/18 1100 02/10/18 1500   BP:  148/62 164/77 163/72   BP Location:  Left arm Left arm Left arm   Pulse:  72 87 91   Resp:  20 20 18   Temp:   97 8 °F (36 6 °C) 98 °F (36 7 °C)   TempSrc:   Axillary Oral   SpO2: 97% 96% 96% 97%   Weight:       Height:           GEN: Janina Tanner appears well, alert and oriented x 3, pleasant and cooperative   HEENT: pupils equal, round, and reactive to light; extraocular muscles intact  NECK: supple, no carotid bruits   HEART: irregular rhythm, normal S1 and S2, no murmurs, clicks, gallops or rubs, JVD is flat    LUNGS: clear to auscultation bilaterally; no wheezes, rales, or rhonchi   ABDOMEN: normal bowel sounds, soft, no tenderness, no distention  EXTREMITIES: peripheral pulses normal; no clubbing, cyanosis, or edema  NEURO: no focal findings   SKIN: normal without suspicious lesions on exposed skin      Current Facility-Administered Medications:     acetaminophen (TYLENOL) oral suspension 650 mg, 650 mg, Oral, Q4H PRN, Mikki Han PA-C, 650 mg at 02/09/18 1107    Adult 3-in-1 TPN (custom base / custom electrolytes), , Intravenous, Continuous, Tayler Brito MD, Last Rate: 80 4 mL/hr at 02/09/18 2146    Adult 3-in-1 TPN (custom base / custom electrolytes), , Intravenous, Continuous, Jesse Norris MD    Adult 3-in-1 TPN (custom base / custom electrolytes), , Intravenous, Continuous, Tayler Brito MD    diltiazem (CARDIZEM) 125 mg in sodium chloride 0 9 % 125 mL infusion, 1-15 mg/hr, Intravenous, Titrated, Bebe Myles MD, Last Rate: 12 5 mL/hr at 02/10/18 0307, 12 5 mg/hr at 02/10/18 0307    heparin (porcine) subcutaneous injection 5,000 Units, 5,000 Units, Subcutaneous, Q8H [address], 5,000 Units at 02/10/18 1500 **AND** Platelet count, , , Once, Pineda Gallego MD    HYDROmorphone (DILAUDID) injection 0 5 mg, 0 5 mg, Intravenous, Q2H PRN, Gosia Eastern, MD, 0 5 mg at 02/05/18 5690    insulin lispro (HumaLOG) 100 units/mL subcutaneous injection 1-5 Units, 1-5 Units, Subcutaneous, Q6H [address], 2 Units at 02/10/18 1250 **AND** Fingerstick Glucose (POCT), , , Q6H, Jed Abdullahi MD    iohexol (OMNIPAQUE) 240 MG/ML solution 50 mL, 50 mL, Oral, Once in imaging, Magdiel Buchanan MD    lidocaine (URO-JET) 2 % topical gel, , Topical, Once, Radha Montelongo MD    melatonin tablet 3 mg, 3 mg, Oral, HS, Lion Zelaya MD, 3 mg at 02/09/18 2358    metoprolol (LOPRESSOR) injection 5 mg, 5 mg, Intravenous, Q6H PRN, Magdiel Buchanan MD, 5 mg at 02/07/18 0503    metoprolol (LOPRESSOR) injection 5 mg, 5 mg, Intravenous, Q6H [address], Trae Cazares MD, 5 mg at 02/10/18 1258    ondansetron [facility]) injection 4 mg, 4 mg, Intravenous, Q6H PRN, Steffi Magdaleon MD, 4 mg at 02/09/18 0113    ondansetron [facility]) injection 4 mg, 4 mg, Intravenous, Q4H PRN, Jed Abdullahi MD, 4 mg at 02/09/18 1701    pantoprazole (PROTONIX) injection 40 mg, 40 mg, Intravenous, Q24H [address], Radha Montelongo MD, 40 mg at 02/10/18 0802    phenol (CHLORASEPTIC) 1 4 % mucosal liquid 1 spray, 1 spray, Mouth/Throat, Q2H PRN, Radha Montelongo MD    polyvinyl alcohol (LIQUIFILM TEARS) 1 4 % ophthalmic solution 1 drop, 1 drop, Both Eyes, Q3H PRN, Trae Cazares MD, 1 drop at 02/09/18 1954    sodium chloride 0 9 % bolus 500 mL, 500 mL, Intravenous, Once, Jed Abdullahi MD      Labs & Results:      Results from last 7 days  Lab Units 02/09/18  2158 02/09/18  1906 02/09/18  1604   TROPONIN I ng/mL 0 05* 0 06* 0 05*     Results from last 7 days  Lab Units 02/10/18  0449 02/09/18  1604 02/09/18  0442   WBC Thousand/uL 16 60* 15 28* 13 09*   HEMOGLOBIN g/dL 10 4* 10 7* 9 6*   HEMATOCRIT % 29 9* 30 9* 28 2*   PLATELETS Thousands/uL 260 255 216           Results from last 7 days  Lab Units 02/10/18  0449 02/09/18  1906 02/09/18  0442  02/06/18  0534   SODIUM mmol/L 132* 133* 137  < > 138   POTASSIUM mmol/L 4 4 4 3 4 1  < > 4 6   CHLORIDE mmol/L 102 102 106  < > 106   CO2 mmol/L 23 24 24  < > 24   BUN mg/dL 18 17 11  < > 15   CREATININE mg/dL 0 61 0  62 0 52*  < > 0 68   CALCIUM mg/dL 8 5 8 2* 7 6*  < > 8 0*   TOTAL PROTEIN g/dL  --   --   --   --  6 1*   BILIRUBIN TOTAL mg/dL  --   --   --   --  0 67   ALK PHOS U/L  --   --   --   --  64   ALT U/L  --   --   --   --  23   AST U/L  --   --   --   --  25   GLUCOSE RANDOM mg/dL 184* 189* 203*  < > 124   < > = values in this interval not displayed  EKG personally reviewed by Hardeep Poole MD      Counseling / Coordination of Care  Total floor / unit time spent today 40 minutes  Greater than 50% of total time was spent with the patient and / or family counseling and / or coordination of care  A description of the counseling / coordination of care: 25 min  Thank you for the opportunity to participate in the care of this patient      Hardeep Poole MD, PhD   Sanjay Cardoso

## 2018-02-11 LAB
ANION GAP SERPL CALCULATED.3IONS-SCNC: 9 MMOL/L (ref 4–13)
BASOPHILS # BLD MANUAL: 0 THOUSAND/UL (ref 0–0.1)
BASOPHILS NFR MAR MANUAL: 0 % (ref 0–1)
BUN SERPL-MCNC: 19 MG/DL (ref 5–25)
CALCIUM SERPL-MCNC: 8.8 MG/DL (ref 8.3–10.1)
CHLORIDE SERPL-SCNC: 100 MMOL/L (ref 100–108)
CO2 SERPL-SCNC: 23 MMOL/L (ref 21–32)
CREAT SERPL-MCNC: 0.65 MG/DL (ref 0.6–1.3)
EOSINOPHIL # BLD MANUAL: 0 THOUSAND/UL (ref 0–0.4)
EOSINOPHIL NFR BLD MANUAL: 0 % (ref 0–6)
ERYTHROCYTE [DISTWIDTH] IN BLOOD BY AUTOMATED COUNT: 14.3 % (ref 11.6–15.1)
GFR SERPL CREATININE-BSD FRML MDRD: 85 ML/MIN/1.73SQ M
GLUCOSE SERPL-MCNC: 171 MG/DL (ref 65–140)
GLUCOSE SERPL-MCNC: 196 MG/DL (ref 65–140)
GLUCOSE SERPL-MCNC: 203 MG/DL (ref 65–140)
GLUCOSE SERPL-MCNC: 213 MG/DL (ref 65–140)
GLUCOSE SERPL-MCNC: 238 MG/DL (ref 65–140)
HCT VFR BLD AUTO: 29.8 % (ref 34.8–46.1)
HGB BLD-MCNC: 10.4 G/DL (ref 11.5–15.4)
LYMPHOCYTES # BLD AUTO: 0.33 THOUSAND/UL (ref 0.6–4.47)
LYMPHOCYTES # BLD AUTO: 2 % (ref 14–44)
MAGNESIUM SERPL-MCNC: 1.9 MG/DL (ref 1.6–2.6)
MCH RBC QN AUTO: 29.9 PG (ref 26.8–34.3)
MCHC RBC AUTO-ENTMCNC: 34.9 G/DL (ref 31.4–37.4)
MCV RBC AUTO: 86 FL (ref 82–98)
MONOCYTES # BLD AUTO: 0.49 THOUSAND/UL (ref 0–1.22)
MONOCYTES NFR BLD: 3 % (ref 4–12)
NEUTROPHILS # BLD MANUAL: 15.34 THOUSAND/UL (ref 1.85–7.62)
NEUTS SEG NFR BLD AUTO: 93 % (ref 43–75)
NRBC BLD AUTO-RTO: 0 /100 WBCS
PHOSPHATE SERPL-MCNC: 4.1 MG/DL (ref 2.3–4.1)
PLATELET # BLD AUTO: 319 THOUSANDS/UL (ref 149–390)
PLATELET BLD QL SMEAR: ADEQUATE
PMV BLD AUTO: 9.2 FL (ref 8.9–12.7)
POIKILOCYTOSIS BLD QL SMEAR: PRESENT
POTASSIUM SERPL-SCNC: 4.4 MMOL/L (ref 3.5–5.3)
RBC # BLD AUTO: 3.48 MILLION/UL (ref 3.81–5.12)
RBC MORPH BLD: PRESENT
SODIUM SERPL-SCNC: 132 MMOL/L (ref 136–145)
VARIANT LYMPHS # BLD AUTO: 2 %
WBC # BLD AUTO: 16.49 THOUSAND/UL (ref 4.31–10.16)

## 2018-02-11 PROCEDURE — 99233 SBSQ HOSP IP/OBS HIGH 50: CPT | Performed by: INTERNAL MEDICINE

## 2018-02-11 PROCEDURE — 94762 N-INVAS EAR/PLS OXIMTRY CONT: CPT

## 2018-02-11 PROCEDURE — 82948 REAGENT STRIP/BLOOD GLUCOSE: CPT

## 2018-02-11 PROCEDURE — 80048 BASIC METABOLIC PNL TOTAL CA: CPT | Performed by: STUDENT IN AN ORGANIZED HEALTH CARE EDUCATION/TRAINING PROGRAM

## 2018-02-11 PROCEDURE — C9113 INJ PANTOPRAZOLE SODIUM, VIA: HCPCS | Performed by: STUDENT IN AN ORGANIZED HEALTH CARE EDUCATION/TRAINING PROGRAM

## 2018-02-11 PROCEDURE — 99024 POSTOP FOLLOW-UP VISIT: CPT | Performed by: SURGERY

## 2018-02-11 PROCEDURE — 84100 ASSAY OF PHOSPHORUS: CPT | Performed by: STUDENT IN AN ORGANIZED HEALTH CARE EDUCATION/TRAINING PROGRAM

## 2018-02-11 PROCEDURE — 83735 ASSAY OF MAGNESIUM: CPT | Performed by: STUDENT IN AN ORGANIZED HEALTH CARE EDUCATION/TRAINING PROGRAM

## 2018-02-11 PROCEDURE — 85007 BL SMEAR W/DIFF WBC COUNT: CPT | Performed by: STUDENT IN AN ORGANIZED HEALTH CARE EDUCATION/TRAINING PROGRAM

## 2018-02-11 PROCEDURE — 85027 COMPLETE CBC AUTOMATED: CPT | Performed by: STUDENT IN AN ORGANIZED HEALTH CARE EDUCATION/TRAINING PROGRAM

## 2018-02-11 RX ADMIN — HEPARIN SODIUM 5000 UNITS: 5000 INJECTION, SOLUTION INTRAVENOUS; SUBCUTANEOUS at 05:05

## 2018-02-11 RX ADMIN — CALCIUM GLUCONATE: 94 INJECTION, SOLUTION INTRAVENOUS at 21:44

## 2018-02-11 RX ADMIN — DILTIAZEM HYDROCHLORIDE 12.5 MG/HR: 5 INJECTION INTRAVENOUS at 22:00

## 2018-02-11 RX ADMIN — PANTOPRAZOLE SODIUM 40 MG: 40 INJECTION, POWDER, FOR SOLUTION INTRAVENOUS at 08:14

## 2018-02-11 RX ADMIN — INSULIN LISPRO 1 UNITS: 100 INJECTION, SOLUTION INTRAVENOUS; SUBCUTANEOUS at 18:10

## 2018-02-11 RX ADMIN — METOPROLOL TARTRATE 5 MG: 1 INJECTION, SOLUTION INTRAVENOUS at 18:02

## 2018-02-11 RX ADMIN — HEPARIN SODIUM 5000 UNITS: 5000 INJECTION, SOLUTION INTRAVENOUS; SUBCUTANEOUS at 13:51

## 2018-02-11 RX ADMIN — METOPROLOL TARTRATE 5 MG: 1 INJECTION, SOLUTION INTRAVENOUS at 05:00

## 2018-02-11 RX ADMIN — HEPARIN SODIUM 5000 UNITS: 5000 INJECTION, SOLUTION INTRAVENOUS; SUBCUTANEOUS at 21:45

## 2018-02-11 RX ADMIN — METOPROLOL TARTRATE 5 MG: 1 INJECTION, SOLUTION INTRAVENOUS at 12:05

## 2018-02-11 RX ADMIN — MELATONIN TAB 3 MG 3 MG: 3 TAB at 21:45

## 2018-02-11 RX ADMIN — INSULIN LISPRO 2 UNITS: 100 INJECTION, SOLUTION INTRAVENOUS; SUBCUTANEOUS at 12:05

## 2018-02-11 RX ADMIN — INSULIN LISPRO 1 UNITS: 100 INJECTION, SOLUTION INTRAVENOUS; SUBCUTANEOUS at 05:07

## 2018-02-11 NOTE — PROGRESS NOTES
Progress Note - Acute Care Surgery   Hali Canseco 76 y o  female MRN: [id number]  Unit/Bed#: [facility] 802-01 Encounter: [id number]    Assessment:  76 F with high grade small bowel obstruction, s/p ex-lap and SBR  Plan:  - possible clamp trial today  - NPO  - TPN  - UA negative  - trend wbc  - SQH/SCDs  - PT/OT      Subjective/Objective     Subjective: Had a bowel movement this morning  Has been ambulating less since being started on cardizem, denies palp/CP    Objective:    Blood pressure (!) 172/68, pulse 86, temperature 97 6 °F (36 4 °C), temperature source Axillary, resp  rate 18, height 5' 6 5" (1 689 m), weight 64 7 kg (142 lb 10 2 oz), SpO2 96 %  ,Body mass index is 22 68 kg/m²        Intake/Output Summary (Last 24 hours) at 02/11/18 0745  Last data filed at 02/11/18 0501   Gross per 24 hour   Intake          2016 64 ml   Output             1350 ml   Net           666 64 ml       Invasive Devices     Peripherally Inserted Central Catheter Line            PICC Line 02/06/18 4 days          Drain            NG/OG/Enteral Tube Nasogastric 18 Fr Right nares less than 1 day                Physical Exam:   NAD  Norm resp effort  RRR  Abd soft, distended, incision with staples cdi, some surrounding erythema, NT        Results from last 7 days  Lab Units 02/11/18  0447 02/10/18  0449 02/09/18  1604   WBC Thousand/uL 16 49* 16 60* 15 28*   HEMOGLOBIN g/dL 10 4* 10 4* 10 7*   HEMATOCRIT % 29 8* 29 9* 30 9*   PLATELETS Thousands/uL 319 260 255       Results from last 7 days  Lab Units 02/11/18  0447 02/10/18  0449 02/09/18  1906   SODIUM mmol/L 132* 132* 133*   POTASSIUM mmol/L 4 4 4 4 4 3   CHLORIDE mmol/L 100 102 102   CO2 mmol/L 23 23 24   BUN mg/dL 19 18 17   CREATININE mg/dL 0 65 0 61 0 62   GLUCOSE RANDOM mg/dL 171* 184* 189*   CALCIUM mg/dL 8 8 8 5 8 2*

## 2018-02-11 NOTE — PROGRESS NOTES
Heart Failure Service Progress Note - Tess Sat 76 y o  female MRN: [id number]    Unit/Bed#: [facility] 802-01 Encounter: [id number]      Assessment:    Principal Problem:    Small bowel obstruction  Active Problems:    Acute kidney injury ([address] )    Bowel obstruction    Delirium    Physical deconditioning    Ambulatory dysfunction    Hx of fall    Anxiety    # New onset AFib: [id number] 4  Likely post-op AFib, but cannot be sure as patient does not feel it  Given thyroid dysfunction, amiodarone would be higher risk  Rates ~ 100 bpm    --TTE w/ LVEF ~70% w/ mild cLVH  --Continue IV diltiazem gtt; once tolerating POs can change to oral  --Consider IV hep gtt to decrease risk of CVA; will transition to DOAC once ok from surgical perspective and taking PO    # HTN: Consider addition of hydralazine 10 mg IV q6hrs prn SBP>150 mmHg  # Persistent leukocytosis: Per primary team    Subjective:   Patient seen and examined  No significant events overnight  Objective:       [facility] (day, reason): Stafford catheter (day, reason):    Vitals: Blood pressure 147/67, pulse 95, temperature 98 1 °F (36 7 °C), temperature source Oral, resp  rate 18, height 5' 6 5" (1 689 m), weight 64 7 kg (142 lb 10 2 oz), SpO2 98 %  , Body mass index is 22 68 kg/m² , I/O last 3 completed shifts: In: 2393 4 [I V :1015 9; TPN:1377 5]  Out: 3250 [Urine:1750; Emesis/NG output:1500]  I/O this shift:  In: 119 4 [I V :119 4]  Out: -   Wt Readings from Last 3 Encounters:   02/04/18 64 7 kg (142 lb 10 2 oz)   07/26/17 65 8 kg (145 lb)   07/25/17 66 7 kg (147 lb)       Intake/Output Summary (Last 24 hours) at 02/11/18 1147  Last data filed at 02/11/18 1114   Gross per 24 hour   Intake           548 22 ml   Output             1650 ml   Net         -1101 78 ml     I/O last 3 completed shifts: In: 2393 4 [I V :1015 9; TPN:1377 5]  Out: 3250 [Urine:1750; Emesis/NG output:1500]    No significant arrhythmias seen on telemetry review   AFib    Physical Exam:  Vitals:    02/11/18 0501 02/11/18 0700 02/11/18 1051 02/11/18 1100   BP: (!) 172/68 (!) 187/81  147/67   BP Location:  Left arm  Left arm   Pulse: 86 93  95   Resp:  18  18   Temp:  98 1 °F (36 7 °C)  98 1 °F (36 7 °C)   TempSrc:  Oral  Oral   SpO2:  94% 98% 98%   Weight:       Height:           GEN: Janina Tanner appears well, alert and oriented x 3, pleasant and cooperative   NGT in place  HEENT: pupils equal, round, and reactive to light; extraocular muscles intact  NECK: supple, no carotid bruits   HEART: regular rhythm, normal S1 and S2, no murmurs, clicks, gallops or rubs, JVD is flat    LUNGS: clear to auscultation bilaterally; no wheezes, rales, or rhonchi   ABDOMEN: normal bowel sounds, soft, no tenderness, no distention  EXTREMITIES: peripheral pulses normal; no clubbing, cyanosis, or edema  NEURO: no focal findings   SKIN: normal without suspicious lesions on exposed skin      Current Facility-Administered Medications:     acetaminophen (TYLENOL) oral suspension 650 mg, 650 mg, Oral, Q4H PRN, Raman Bauer PA-C, 650 mg at 02/09/18 1107    Adult 3-in-1 TPN (custom base / custom electrolytes), , Intravenous, Continuous, Marla Morin MD, Last Rate: 80 8 mL/hr at 02/10/18 2212    Adult 3-in-1 TPN (custom base / custom electrolytes), , Intravenous, Continuous, Roxanna Perez MD    diltiazem (CARDIZEM) 125 mg in sodium chloride 0 9 % 125 mL infusion, 1-15 mg/hr, Intravenous, Titrated, Lay Nettles MD, Last Rate: 12 5 mL/hr at 02/11/18 1115, 12 5 mg/hr at 02/11/18 1115    heparin (porcine) subcutaneous injection 5,000 Units, 5,000 Units, Subcutaneous, Q8H [facility], 5,000 Units at 02/11/18 0505 **AND** Platelet count, , , Once, Baljeet Rivera MD    HYDROmorphone (DILAUDID) injection 0 5 mg, 0 5 mg, Intravenous, Q2H PRN, Librado Villa MD, 0 5 mg at 02/05/18 4358    insulin lispro (HumaLOG) 100 units/mL subcutaneous injection 1-5 Units, 1-5 Units, Subcutaneous, Q6H [facility], 1 Units at 02/11/18 7565 **AND** Fingerstick Glucose (POCT), , , Q6H, Denny Michele MD    iohexol (OMNIPAQUE) 240 MG/ML solution 50 mL, 50 mL, Oral, Once in imaging, Aretha Garces MD    lidocaine (URO-JET) 2 % topical gel, , Topical, Once, Marlon Deras MD    melatonin tablet 3 mg, 3 mg, Oral, HS, Alfredo Marroquin MD, 3 mg at 02/10/18 2322    metoprolol (LOPRESSOR) injection 5 mg, 5 mg, Intravenous, Q6H PRN, Aretha Garces MD, 5 mg at 02/10/18 2026    metoprolol (LOPRESSOR) injection 5 mg, 5 mg, Intravenous, Q6H [address], Irena Rae MD, 5 mg at 02/11/18 0500    ondansetron [facility]) injection 4 mg, 4 mg, Intravenous, Q6H PRN, Nely Castillo MD, 4 mg at 02/09/18 0113    ondansetron [facility]) injection 4 mg, 4 mg, Intravenous, Q4H PRN, Denny Michele MD, 4 mg at 02/09/18 1701    pantoprazole (PROTONIX) injection 40 mg, 40 mg, Intravenous, Q24H [address], Marlon Deras MD, 40 mg at 02/11/18 0814    phenol (CHLORASEPTIC) 1 4 % mucosal liquid 1 spray, 1 spray, Mouth/Throat, Q2H PRN, Marlon Deras MD    polyvinyl alcohol (LIQUIFILM TEARS) 1 4 % ophthalmic solution 1 drop, 1 drop, Both Eyes, Q3H PRN, Irena Rae MD, 1 drop at 02/09/18 1954    sodium chloride 0 9 % bolus 500 mL, 500 mL, Intravenous, Once, Denny Michele MD      Labs & Results:      Results from last 7 days  Lab Units 02/09/18  2158 02/09/18  1906 02/09/18  1604   TROPONIN I ng/mL 0 05* 0 06* 0 05*       Results from last 7 days  Lab Units 02/11/18  0447 02/10/18  0449 02/09/18  1604   WBC Thousand/uL 16 49* 16 60* 15 28*   HEMOGLOBIN g/dL 10 4* 10 4* 10 7*   HEMATOCRIT % 29 8* 29 9* 30 9*   PLATELETS Thousands/uL 319 260 255           Results from last 7 days  Lab Units 02/11/18  0447 02/10/18  0449 02/09/18  1906  02/06/18  0534   SODIUM mmol/L 132* 132* 133*  < > 138   POTASSIUM mmol/L 4 4 4 4 4 3  < > 4 6   CHLORIDE mmol/L 100 102 102  < > 106   CO2 mmol/L 23 23 24  < > 24   BUN mg/dL 19 18 17  < > 15   CREATININE mg/dL 0 65 0 61 0 62  < > 0 68   CALCIUM mg/dL 8 8 8 5 8 2*  < > 8 0*   TOTAL PROTEIN g/dL  --   --   --   --  6 1*   BILIRUBIN TOTAL mg/dL  --   --   --   --  0 67   ALK PHOS U/L  --   --   --   --  64   ALT U/L  --   --   --   --  23   AST U/L  --   --   --   --  25   GLUCOSE RANDOM mg/dL 171* 184* 189*  < > 124   < > = values in this interval not displayed  EKG personally reviewed by Cisco Valdovinos MD      Counseling / Coordination of Care  Total floor / unit time spent today 40 minutes  Greater than 50% of total time was spent with the patient and / or family counseling and / or coordination of care  A description of the counseling / coordination of care: 25 min  Thank you for the opportunity to participate in the care of this patient      Cisco Valdovinos MD, PhD   Parminder Hughes

## 2018-02-12 ENCOUNTER — APPOINTMENT (INPATIENT)
Dept: RADIOLOGY | Facility: HOSPITAL | Age: 78
DRG: 329 | End: 2018-02-12
Attending: SURGERY
Payer: MEDICARE

## 2018-02-12 ENCOUNTER — APPOINTMENT (INPATIENT)
Dept: RADIOLOGY | Facility: HOSPITAL | Age: 78
DRG: 329 | End: 2018-02-12
Payer: MEDICARE

## 2018-02-12 LAB
ANION GAP SERPL CALCULATED.3IONS-SCNC: 7 MMOL/L (ref 4–13)
BASOPHILS # BLD MANUAL: 0 THOUSAND/UL (ref 0–0.1)
BASOPHILS NFR MAR MANUAL: 0 % (ref 0–1)
BUN SERPL-MCNC: 15 MG/DL (ref 5–25)
CALCIUM SERPL-MCNC: 8.5 MG/DL (ref 8.3–10.1)
CHLORIDE SERPL-SCNC: 104 MMOL/L (ref 100–108)
CO2 SERPL-SCNC: 24 MMOL/L (ref 21–32)
CREAT SERPL-MCNC: 0.58 MG/DL (ref 0.6–1.3)
EOSINOPHIL # BLD MANUAL: 0 THOUSAND/UL (ref 0–0.4)
EOSINOPHIL NFR BLD MANUAL: 0 % (ref 0–6)
ERYTHROCYTE [DISTWIDTH] IN BLOOD BY AUTOMATED COUNT: 14.3 % (ref 11.6–15.1)
GFR SERPL CREATININE-BSD FRML MDRD: 88 ML/MIN/1.73SQ M
GLUCOSE SERPL-MCNC: 204 MG/DL (ref 65–140)
GLUCOSE SERPL-MCNC: 212 MG/DL (ref 65–140)
GLUCOSE SERPL-MCNC: 212 MG/DL (ref 65–140)
GLUCOSE SERPL-MCNC: 235 MG/DL (ref 65–140)
HCT VFR BLD AUTO: 29.2 % (ref 34.8–46.1)
HGB BLD-MCNC: 10.1 G/DL (ref 11.5–15.4)
LYMPHOCYTES # BLD AUTO: 0.98 THOUSAND/UL (ref 0.6–4.47)
LYMPHOCYTES # BLD AUTO: 7 % (ref 14–44)
MAGNESIUM SERPL-MCNC: 1.9 MG/DL (ref 1.6–2.6)
MCH RBC QN AUTO: 29.4 PG (ref 26.8–34.3)
MCHC RBC AUTO-ENTMCNC: 34.6 G/DL (ref 31.4–37.4)
MCV RBC AUTO: 85 FL (ref 82–98)
METAMYELOCYTES NFR BLD MANUAL: 2 % (ref 0–1)
MONOCYTES # BLD AUTO: 0.42 THOUSAND/UL (ref 0–1.22)
MONOCYTES NFR BLD: 3 % (ref 4–12)
NEUTROPHILS # BLD MANUAL: 12.36 THOUSAND/UL (ref 1.85–7.62)
NEUTS SEG NFR BLD AUTO: 88 % (ref 43–75)
NRBC BLD AUTO-RTO: 0 /100 WBCS
PLATELET # BLD AUTO: 318 THOUSANDS/UL (ref 149–390)
PLATELET BLD QL SMEAR: ADEQUATE
PMV BLD AUTO: 8.7 FL (ref 8.9–12.7)
POIKILOCYTOSIS BLD QL SMEAR: PRESENT
POTASSIUM SERPL-SCNC: 4.2 MMOL/L (ref 3.5–5.3)
PREALB SERPL-MCNC: 9.1 MG/DL (ref 18–40)
RBC # BLD AUTO: 3.43 MILLION/UL (ref 3.81–5.12)
RBC MORPH BLD: PRESENT
SODIUM SERPL-SCNC: 135 MMOL/L (ref 136–145)
TOXIC GRANULES BLD QL SMEAR: PRESENT
WBC # BLD AUTO: 14.04 THOUSAND/UL (ref 4.31–10.16)

## 2018-02-12 PROCEDURE — 97110 THERAPEUTIC EXERCISES: CPT

## 2018-02-12 PROCEDURE — 99024 POSTOP FOLLOW-UP VISIT: CPT | Performed by: SURGERY

## 2018-02-12 PROCEDURE — 85007 BL SMEAR W/DIFF WBC COUNT: CPT | Performed by: SURGERY

## 2018-02-12 PROCEDURE — 97535 SELF CARE MNGMENT TRAINING: CPT

## 2018-02-12 PROCEDURE — 82948 REAGENT STRIP/BLOOD GLUCOSE: CPT

## 2018-02-12 PROCEDURE — 74018 RADEX ABDOMEN 1 VIEW: CPT

## 2018-02-12 PROCEDURE — 84134 ASSAY OF PREALBUMIN: CPT | Performed by: SURGERY

## 2018-02-12 PROCEDURE — 83735 ASSAY OF MAGNESIUM: CPT | Performed by: SURGERY

## 2018-02-12 PROCEDURE — C9113 INJ PANTOPRAZOLE SODIUM, VIA: HCPCS | Performed by: STUDENT IN AN ORGANIZED HEALTH CARE EDUCATION/TRAINING PROGRAM

## 2018-02-12 PROCEDURE — 85027 COMPLETE CBC AUTOMATED: CPT | Performed by: SURGERY

## 2018-02-12 PROCEDURE — 97116 GAIT TRAINING THERAPY: CPT

## 2018-02-12 PROCEDURE — 94762 N-INVAS EAR/PLS OXIMTRY CONT: CPT

## 2018-02-12 PROCEDURE — 80048 BASIC METABOLIC PNL TOTAL CA: CPT | Performed by: SURGERY

## 2018-02-12 PROCEDURE — 99232 SBSQ HOSP IP/OBS MODERATE 35: CPT | Performed by: INTERNAL MEDICINE

## 2018-02-12 PROCEDURE — 94760 N-INVAS EAR/PLS OXIMETRY 1: CPT

## 2018-02-12 RX ORDER — MAGNESIUM SULFATE HEPTAHYDRATE 40 MG/ML
2 INJECTION, SOLUTION INTRAVENOUS ONCE
Status: COMPLETED | OUTPATIENT
Start: 2018-02-12 | End: 2018-02-13

## 2018-02-12 RX ADMIN — CALCIUM GLUCONATE: 94 INJECTION, SOLUTION INTRAVENOUS at 21:43

## 2018-02-12 RX ADMIN — INSULIN LISPRO 2 UNITS: 100 INJECTION, SOLUTION INTRAVENOUS; SUBCUTANEOUS at 18:35

## 2018-02-12 RX ADMIN — DILTIAZEM HYDROCHLORIDE 12.5 MG/HR: 5 INJECTION INTRAVENOUS at 10:47

## 2018-02-12 RX ADMIN — ONDANSETRON 4 MG: 2 INJECTION INTRAMUSCULAR; INTRAVENOUS at 09:12

## 2018-02-12 RX ADMIN — METOPROLOL TARTRATE 5 MG: 1 INJECTION, SOLUTION INTRAVENOUS at 12:20

## 2018-02-12 RX ADMIN — HEPARIN SODIUM 5000 UNITS: 5000 INJECTION, SOLUTION INTRAVENOUS; SUBCUTANEOUS at 06:27

## 2018-02-12 RX ADMIN — MELATONIN TAB 3 MG 3 MG: 3 TAB at 21:45

## 2018-02-12 RX ADMIN — MAGNESIUM SULFATE HEPTAHYDRATE 2 G: 40 INJECTION, SOLUTION INTRAVENOUS at 09:03

## 2018-02-12 RX ADMIN — INSULIN LISPRO 2 UNITS: 100 INJECTION, SOLUTION INTRAVENOUS; SUBCUTANEOUS at 12:20

## 2018-02-12 RX ADMIN — INSULIN LISPRO 2 UNITS: 100 INJECTION, SOLUTION INTRAVENOUS; SUBCUTANEOUS at 00:06

## 2018-02-12 RX ADMIN — METOPROLOL TARTRATE 5 MG: 1 INJECTION, SOLUTION INTRAVENOUS at 06:27

## 2018-02-12 RX ADMIN — PANTOPRAZOLE SODIUM 40 MG: 40 INJECTION, POWDER, FOR SOLUTION INTRAVENOUS at 09:03

## 2018-02-12 RX ADMIN — METOPROLOL TARTRATE 5 MG: 1 INJECTION, SOLUTION INTRAVENOUS at 18:39

## 2018-02-12 RX ADMIN — INSULIN LISPRO 2 UNITS: 100 INJECTION, SOLUTION INTRAVENOUS; SUBCUTANEOUS at 07:07

## 2018-02-12 RX ADMIN — METOPROLOL TARTRATE 5 MG: 1 INJECTION, SOLUTION INTRAVENOUS at 00:07

## 2018-02-12 RX ADMIN — HEPARIN SODIUM 5000 UNITS: 5000 INJECTION, SOLUTION INTRAVENOUS; SUBCUTANEOUS at 21:45

## 2018-02-12 RX ADMIN — DILTIAZEM HYDROCHLORIDE 12.5 MG/HR: 5 INJECTION INTRAVENOUS at 22:09

## 2018-02-12 RX ADMIN — HEPARIN SODIUM 5000 UNITS: 5000 INJECTION, SOLUTION INTRAVENOUS; SUBCUTANEOUS at 13:35

## 2018-02-12 NOTE — PHYSICAL THERAPY NOTE
Physical Therapy Tx Session:    0515-2100 for gait/ther ex       02/12/18 1215   Pain Assessment   Pain Assessment No/denies pain   Pain Score No Pain   Restrictions/Precautions   Other Precautions Fall Risk;Multiple lines   General   Chart Reviewed Yes   Family/Caregiver Present No   Cognition   Overall Cognitive Status WFL   Arousal/Participation Alert; Cooperative   Attention Within functional limits   Orientation Level Oriented X4   Following Commands Follows one step commands without difficulty   Subjective   Subjective pt sitting in chair upon arrival resting comfortably;pt willing and agreeable to work with PT and to participate in therapy intervention;"I just want to get out of here"   Bed Mobility   Supine to Sit Unable to assess  (pt sitting in chair pre and post mobility)   Transfers   Sit to Stand 5  Supervision   Additional items Assist x 1; Armrests; Verbal cues   Stand to Sit 5  Supervision   Additional items Assist x 1; Armrests; Verbal cues   Ambulation/Elevation   Gait pattern Poor UE support; Short stride; Ataxia   Gait Assistance 4  Minimal assist   Additional items Assist x 1;Verbal cues; Tactile cues  (minAX1 without DME and S level of A with SPC)   Assistive Device SPC  ( feet and no  feet;cont to use SPC for mobilit)   Distance 250 feet total;100 feet without use of DME and 150 feet with use of SPC on tile and carpet surface;recommend cont use of SPC for upright mobility   Balance   Static Sitting Good   Dynamic Sitting Fair   Static Standing Fair   Dynamic Standing Fair   Ambulatory Fair   Endurance Deficit   Endurance Deficit Yes   Endurance Deficit Description fatigue,SOB following mobility   Activity Tolerance   Activity Tolerance (good)   Medical Staff Made Aware CM   Nurse Made Aware yes   Exercises   Hip Flexion Standing;15 reps;AROM; Bilateral  (with support of RW in front)   Hip Abduction Standing;15 reps;AROM; Bilateral  (with support of RW in front)   Hip Adduction Standing;15 reps;AROM; Bilateral  (with support of RW in front)   Ankle Pumps Standing;15 reps;AROM; Bilateral  (with support of RW in front;heel toe raises)   Squat Standing;15 reps;AROM; Bilateral  (with support of RW in front)   Marching Standing;15 reps;AROM; Bilateral  (with support of RW in front)   Equipment Use   Comments pt needs several static stand rest breaks inbetween static stand ther ex lasting less than 15 seconds   Assessment   Prognosis Good   Problem List Decreased strength;Decreased endurance; Impaired balance;Decreased mobility; Decreased skin integrity   Assessment Pt able to ambulate a total of 250 feet on various usrfaces needing S level of A  150 feet with use of SPC and 100 feet without use of DME  Recommend use of SPC for all upright mobility,no LOB noted and/or observed during mobility  Pt reports "feeling more comfortable" with use of SPC  Pt able to perform sit to stand transfers needing S level of A  Pt able to perform BLE ther ex static stand ther ex  AROM with support of RW in front  Pt reports minimal BURNS and fatigue following and during mobility and ther ex program  Pt would cont to benefit from skilled inpt PT services to maximize functional independence   Goals   Patient Goals to go home soon and to not get the flu   LTG Expiration Date 02/21/18   Treatment Day 1   Plan   Treatment/Interventions Functional transfer training;LE strengthening/ROM; Therapeutic exercise; Endurance training;Patient/family training;Equipment eval/education;Gait training;Spoke to nursing;Spoke to case management   Progress Progressing toward goals   PT Frequency 5x/wk   Recommendation   Recommendation Home PT   Equipment Recommended Cane  [facility])

## 2018-02-12 NOTE — SOCIAL WORK
Cm reviewed patient during care coordination rounds  Pt not medically stable for discharge today  Pt to continue IV controlling agents, per cardiology progress note  Patient to switch to oral medications when able to tolerate  Patient will need long-term anticoagulation for thromboembolic protection  No rebound with bowels  Cm continues to follow and work on patient's discharge plan

## 2018-02-12 NOTE — PROGRESS NOTES
Progress Note - Acute Care Surgery   [place] 76 y o  female MRN: [id number]  Unit/Bed#: [facility] 802-01 Encounter: [id number]    Assessment:  76 F with high grade small bowel obstruction, s/p ex-lap and SBR    Persistent ileus    Has some bowel function however severely distended    Plan:  - Cont NPO/NGT  - Serial exams  - TPN  - UA negative  - trend wbc  - SQH/SCDs  - PT/OT      Subjective/Objective     Subjective: +BM and flatus, however persistently nauseated    Objective:    Blood pressure 139/84, pulse 87, temperature 97 6 °F (36 4 °C), temperature source Oral, resp  rate 18, height 5' 6 5" (1 689 m), weight 64 7 kg (142 lb 10 2 oz), SpO2 97 %  ,Body mass index is 22 68 kg/m²        Intake/Output Summary (Last 24 hours) at 02/12/18 0911  Last data filed at 02/12/18 7730   Gross per 24 hour   Intake          2647 43 ml   Output             1150 ml   Net          1497 43 ml       Invasive Devices     Peripherally Inserted Central Catheter Line            PICC Line 02/06/18 5 days          Drain            NG/OG/Enteral Tube Nasogastric 18 Fr Right nares 1 day                Physical Exam:   NAD  Norm resp effort  RRR  Abd soft, distended, incision with staples w/ minimal drainage and surrounding erythema, minimally tender        Results from last 7 days  Lab Units 02/12/18  0627 02/11/18 0447 02/10/18  0449   WBC Thousand/uL 14 04* 16 49* 16 60*   HEMOGLOBIN g/dL 10 1* 10 4* 10 4*   HEMATOCRIT % 29 2* 29 8* 29 9*   PLATELETS Thousands/uL 318 319 260       Results from last 7 days  Lab Units 02/12/18  0627 02/11/18 0447 02/10/18  0449   SODIUM mmol/L 135* 132* 132*   POTASSIUM mmol/L 4 2 4 4 4 4   CHLORIDE mmol/L 104 100 102   CO2 mmol/L 24 23 23   BUN mg/dL 15 19 18   CREATININE mg/dL 0 58* 0 65 0 61   GLUCOSE RANDOM mg/dL 204* 171* 184*   CALCIUM mg/dL 8 5 8 8 8 5

## 2018-02-12 NOTE — OCCUPATIONAL THERAPY NOTE
Occupational Therapy Treatment Note      Danny Byrnesa    2/12/2018    Patient Active Problem List   Diagnosis    Heart palpitations    Nicotine abuse    [facility]    Diabetes mellitus type 2 in nonobese ([facility] )    Hypertension, essential, benign    Acquired hypothyroidism    Malignant ne[facility] of central portion of right [facility]    Small bowel obstruction    Acute kidney injury ([facility] )    Bowel obstruction    Delirium    Physical deconditioning    Ambulatory dysfunction    Hx of fall    Anxiety       Past Medical History:   Diagnosis Date    Anxiety     Cancer ([place] [address] )     LEFT BREAST CA 22 YEARS AGO     Depression     Diabetes mellitus ([place] [address] )     Hypertension     Hypothyroidism        Past Surgical History:   Procedure Laterality Date    ABDOMINAL ADHESION SURGERY N/A 2/4/2018    Procedure: LYSIS ADHESIONS;  Surgeon: Aniya Ordoñez DO;  [facility];  Service: General    BREAST SURGERY      CHOLECYSTECTOMY      JOINT REPLACEMENT      LEFT KNEE REPLACEMENT     LAPAROTOMY N/A 2/4/2018    Procedure: LAPAROTOMY EXPLORATORY,;  Surgeon: Aniya Ordoñez DO;  [facility];  Service: General    MASTECTOMY      MA BIOPSY/EXCISION, LYMPH NODE(S) Right 1/13/2017    Procedure: SENTINEL LYMPH NODE BIOPSY RIGHT AXILLA;   Surgeon: Tomas Vitale MD;  [facility];  Service: General    MA MASTECTOMY, SIMPLE, COMPLETE Right 1/13/2017    Procedure: MASTECTOMY SIMPLE;  Surgeon: Tomas Vitale MD;  [facility];  Service: General    SMALL INTESTINE SURGERY N/A 2/4/2018    Procedure: RESECTION SMALL BOWEL;  Surgeon: Aniya Ordoñez DO;  [facility];  Service: General        02/12/18 0945   Restrictions/Precautions   Weight Bearing Precautions Per Order No   Other Precautions Fall Risk;Telemetry;Multiple lines   Pain Assessment   Pain Assessment No/denies pain   Pain Score No Pain   ADL   Where Assessed Chair   LB Dressing Assistance 5 Supervision/Setup   LB Dressing Deficit Requires assistive device for steadying;Supervision/safety   Functional Standing Tolerance   Time 15 minutes   Activity functional reaching and simulated IADLs   Transfers   Sit to Stand 5  Supervision   Stand to Sit 5  Supervision   Functional Mobility   Functional Mobility 5  Supervision   Additional Comments assist x 1 to manage lines   Additional items Rolling walker   Cognition   Overall Cognitive Status [facility]   Arousal/Participation Alert; Cooperative   Attention Within functional limits   Orientation Level Oriented X4   Memory Within functional limits   Following Commands Follows multistep commands with increased time or repetition   Comments Pt reports feeling tired and frustrated w/ NG tube but agreeable to participate in OT  Activity Tolerance   Activity Tolerance Patient limited by fatigue   Medical Staff Made Aware spoke w/ TANK Jeffers   Assessment   Assessment Patient participated in Skilled OT session this date with interventions consisting of ADL re training with the use of correct body mechnaics, ADL/IADL simulation including functional reaching, and safety awareness and RW management techniques   Patient agreeable to OT treatment session, upon arrival patient was found seated OOB to Chair, following OT session patient seated OOB to chair, all needs within reach  In comparison to previous session, patient with improvements in transfers, mobility, and ADL independence  Patient requiring verbal cues for safety and assistance to manage lines  Patient completed transfers and LB dressing w/ supervision  Patient continues to be functioning below baseline level, occupational performance remains limited secondary to factors listed above and increased risk for falls and injury  From OT standpoint, recommendation at time of d/c would be Home OT to promote increased independence and safety w/ ADL/IADL routine in home environment and address DME needs   Patient to benefit from continued Occupational Therapy treatment while in the hospital to address deficits as defined above and maximize level of functional independence with ADLs and functional mobility  Plan   Treatment Interventions ADL retraining;Functional transfer training;UE strengthening/ROM; Endurance training;Patient/family training;Equipment evaluation/education; Compensatory technique education;Continued evaluation; Energy conservation; Activityengagement   Goal Expiration Date 02/17/18   Treatment Day 3   OT Frequency 3-5x/wk   Recommendation   OT Discharge Recommendation Home OT   Equipment Recommended Bedside commode   OT - OK to Discharge (when medically appropriate)   Barthel Index   Feeding 10   Bathing 0   Grooming Score 5   Dressing Score 5   Bladder Score 10   Bowels Score 10   Toilet Use Score 10   Transfers (Bed/Chair) Score 10   Mobility (Level Surface) Score 10   Stairs Score 0   Barthel Index Score 70   Modified [place] Scale   Modified [place] Scale 4   Documentation Completed by Olivier Pizano MS, OTR/L

## 2018-02-12 NOTE — PROGRESS NOTES
Patient care rounds were completed with the patient's nurse today, Ayad Short  We discussed the plan is to continue to keep her NPO with NG tube to suction  KUB appears to indicate NG tube tip is within the stomach  Continue TPN for nutritional support while awaiting continued return of bowel function  Anticipate transition to oral diet within the next 24-48 hours if bowel function continues to return  Continue Cardizem drip for heart rate control secondary to new onset atrial fibrillation with plan to transition to oral Cardizem was able to tolerate oral diet  Anticipate transition to oral anticoagulation when able  Appreciate cardiology recommendations  We reviewed all of the invasive devices/lines/telemetry orders   - Continue PICC line for TPN  Pain Assessment / Plan:  - Continue current analgesic regimen  Mobility Assessment / Plan:  - Out of bed and activity as tolerated  - PT and OT treatment and evaluation as indicated with recommendations for home therapy  Goals / Barriers for discharge:  - Awaiting return normal bowel function and toleration of oral diet  Awaiting transition to oral Cardizem  - Case management following  All questions and concerns were addressed  I spent greater than 15 minutes reviewing the plan with the patient and the nurse, and coordinating her care for the day      Nerissa Cummings PA-C  2/12/2018 10:35 AM

## 2018-02-12 NOTE — PROGRESS NOTES
Cardiology Progress Note - Keny Hanson 76 y o  female MRN: [id number]    Unit/Bed#: [facility] 802-01 Encounter: [id number]  Assessment and plan  1  Postop atrial fibrillation  2  Hypertension  3  Leukocytosis    Recommendations:  Overall she is doing well from cardiac standpoint continue IV rate controlling agents  When she is able to take oral medications will transition to p  O  Cardizem and metoprolol  Will need eventual long-term anticoagulation for thromboembolic protection  Subjective:    No significant events overnight  Denies chest pain or dyspnea  ROS    Objective:   Vitals: Blood pressure 141/63, pulse 87, temperature 98 6 °F (37 °C), temperature source Oral, resp  rate 16, height 5' 6 5" (1 689 m), weight 64 7 kg (142 lb 10 2 oz), SpO2 98 %  , Body mass index is 22 68 kg/m² , Orthostatic Blood Pressures    Flowsheet Row Most Recent Value   Blood Pressure  141/63 filed at 02/12/2018 1500   Patient Position - Orthostatic VS  Sitting filed at 02/12/2018 3763         Systolic (10EZK), DFS:115 , Min:139 , VTR:518     Diastolic (78OGD), WFO:25, Min:63, Max:84      Intake/Output Summary (Last 24 hours) at 02/12/18 1633  Last data filed at 02/12/18 1632   Gross per 24 hour   Intake          3296 52 ml   Output             1750 ml   Net          1546 52 ml     Weight (last 2 days)     None            Telemetry Review: No significant arrhythmias seen on telemetry review  EKG personally reviewed by Fercho Murphy DO  Physical Exam   Constitutional: She is oriented to person, place, and time  She appears well-nourished  No distress  HENT:   Head: Atraumatic  Eyes: Conjunctivae are normal  Pupils are equal, round, and reactive to light  Neck: Neck supple  Cardiovascular: Normal rate  An irregular rhythm present  Exam reveals no friction rub  Murmur heard     Systolic murmur is present with a grade of 1/6   Pulmonary/Chest: Effort normal and breath sounds normal  No respiratory distress  She has no wheezes  She has no rales  Abdominal: Bowel sounds are normal  She exhibits no distension  There is no tenderness  There is no rebound  Musculoskeletal: Normal range of motion  She exhibits no edema  Neurological: She is alert and oriented to person, place, and time  No cranial nerve deficit  Skin: Skin is warm and dry  No erythema  Nursing note and vitals reviewed          Laboratory Results:    Results from last 7 days  Lab Units 02/09/18  2158 02/09/18  1906 02/09/18  1604   TROPONIN I ng/mL 0 05* 0 06* 0 05*       CBC with diff:   Results from last 7 days  Lab Units 02/12/18  0627 02/11/18  0447 02/10/18  0449 02/09/18  1604 02/09/18  0442 02/08/18  0507 02/07/18  0452 02/06/18  0534   WBC Thousand/uL 14 04* 16 49* 16 60* 15 28* 13 09* 13 26* 15 38* 18 33*   HEMOGLOBIN g/dL 10 1* 10 4* 10 4* 10 7* 9 6* 11 1* 11 5 11 4*   HEMATOCRIT % 29 2* 29 8* 29 9* 30 9* 28 2* 33 0* 33 9* 34 2*   MCV fL 85 86 87 89 86 88 88 87   PLATELETS Thousands/uL 318 319 260 255 216 223 245 244   MCH pg 29 4 29 9 30 2 30 7 29 4 29 4 29 9 29 1   MCHC g/dL 34 6 34 9 34 8 34 6 34 0 33 6 33 9 33 3   RDW % 14 3 14 3 14 5 14 7 14 0 14 3 14 5 14 5   MPV fL 8 7* 9 2 9 4 9 4 9 4 9 4 9 8 9 9   NRBC AUTO /100 WBCs 0 0 0  --  0 0 0 0         CMP:  Results from last 7 days  Lab Units 02/12/18  0627 02/11/18  0447 02/10/18  0449 02/09/18  1906 02/09/18  0442 02/08/18  0507 02/07/18  0452 02/06/18  0534   SODIUM mmol/L 135* 132* 132* 133* 137 135* 137 138   POTASSIUM mmol/L 4 2 4 4 4 4 4 3 4 1 3 4* 3 8 4 6   CHLORIDE mmol/L 104 100 102 102 106 103 105 106   CO2 mmol/L 24 23 23 24 24 24 25 24   ANION GAP mmol/L 7 9 7 7 7 8 7 8   BUN mg/dL 15 19 18 17 11 10 11 15   CREATININE mg/dL 0 58* 0 65 0 61 0 62 0 52* 0 55* 0 57* 0 68   GLUCOSE RANDOM mg/dL 204* 171* 184* 189* 203* 172* 174* 124   CALCIUM mg/dL 8 5 8 8 8 5 8 2* 7 6* 8 5 8 0* 8 0*   AST U/L  --   --   --   --   --   --   --  25   ALT U/L  --   --   --   --   --   -- --  23   ALK PHOS U/L  --   --   --   --   --   --   --  64   TOTAL PROTEIN g/dL  --   --   --   --   --   --   --  6 1*   BILIRUBIN TOTAL mg/dL  --   --   --   --   --   --   --  0 67   EGFR ml/min/1 73sq m 88 85 87 86 91 89 88 83         BMP:  Results from last 7 days  Lab Units 02/12/18  0627 02/11/18  0447 02/10/18  0449 02/09/18  1906 02/09/18  0442 02/08/18  0507 18  0452   SODIUM mmol/L 135* 132* 132* 133* 137 135* 137   POTASSIUM mmol/L 4 2 4 4 4 4 4 3 4 1 3 4* 3 8   CHLORIDE mmol/L 104 100 102 102 106 103 105   CO2 mmol/L 24 23 23 24 24 24 25   BUN mg/dL 15 19 18 17 11 10 11   CREATININE mg/dL 0 58* 0 65 0 61 0 62 0 52* 0 55* 0 57*   GLUCOSE RANDOM mg/dL 204* 171* 184* 189* 203* 172* 174*   CALCIUM mg/dL 8 5 8 8 8 5 8 2* 7 6* 8 5 8 0*       BNP: No results for input(s): BNP in the last 72 hours      Magnesium:   Results from last 7 days  Lab Units 182 18  0507 18  0452 18  0534   MAGNESIUM mg/dL 1 9 1 9 2 4 1 8 1 8 2 1 1 9       Coags:       TSH:        Hemoglobin A1C       Lipid Profile:   Results from last 7 days  Lab Units 18  0534   TRIGLYCERIDES mg/dL 71       Cardiac testing:   Results for orders placed during the hospital encounter of 18   Echo complete with contrast if indicated    Bimal Beauchamp [address] [place] [address]  [phone]    Transthoracic Echocardiogram  2D, M-mode, Doppler, and Color Doppler    Study date:  2018    Patient: Mertha Babinski  MR number: [id number]  Account number: [de-identified]  : 1940  Age: 76 years  Gender: Female  Status: Inpatient  Location: Bedside  Height: 66 in  Weight: 142 lb  BP: 132/ 63 mmHg    Indications: Atrial fibrillation    Diagnoses: I48 0 - Atrial fibrillation    Sonographer:  CAROLA Chaney, RDCS  Primary Physician:  Hector Starkey DO  Referring Physician:  Ab Chris MD  Group:    [facility]  Interpreting Physician:  Michael Arrieta MD    SUMMARY    LEFT VENTRICLE:  Systolic function was hyperdynamic by visual assessment  Ejection fraction was estimated to be 70 %  There were no regional wall motion abnormalities  Wall thickness was mildly increased  Left ventricular diastolic function parameters were normal for atrial fibrillation  RIGHT VENTRICLE:  The size was normal   Systolic function was normal     MITRAL VALVE:  There was mild regurgitation  TRICUSPID VALVE:  There was trace regurgitation  HISTORY: PRIOR HISTORY: Hypertension; Hypothryoid; Diabetes Mellitus; Left breast cancer (22years ago); Smoker    PROCEDURE: The procedure was performed at the bedside  This was a routine study  The transthoracic approach was used  The study included complete 2D imaging, M-mode, complete spectral Doppler, and color Doppler  Images were obtained from  the parasternal, apical, subcostal, and suprasternal notch acoustic windows  Echocardiographic views were limited due to poor acoustic window availability  Image quality was adequate  LEFT VENTRICLE: Size was normal  Systolic function was hyperdynamic by visual assessment  Ejection fraction was estimated to be 70 %  There were no regional wall motion abnormalities  Wall thickness was mildly increased  DOPPLER: Left  ventricular diastolic function parameters were normal for atrial fibrillation  RIGHT VENTRICLE: The size was normal  Systolic function was normal  Wall thickness was normal     LEFT ATRIUM: Size was normal     RIGHT ATRIUM: Size was normal     MITRAL VALVE: Valve structure was normal  There was normal leaflet separation  DOPPLER: The transmitral velocity was within the normal range  There was no evidence for stenosis  There was mild regurgitation  AORTIC VALVE: The valve was trileaflet  Leaflets exhibited normal thickness and normal cuspal separation   DOPPLER: Transaortic velocity was within the normal range  There was no evidence for stenosis  There was no significant  regurgitation  TRICUSPID VALVE: The valve structure was normal  There was normal leaflet separation  DOPPLER: The transtricuspid velocity was within the normal range  There was no evidence for stenosis  There was trace regurgitation  Estimated peak PA  pressure was 37 mmHg  PULMONIC VALVE: Leaflets exhibited normal thickness, no calcification, and normal cuspal separation  DOPPLER: The transpulmonic velocity was within the normal range  There was no significant regurgitation  PERICARDIUM: There was no pericardial effusion  The pericardium was normal in appearance  AORTA: The root exhibited normal size  SYSTEMIC VEINS: IVC: The inferior vena cava was normal in size  Respirophasic changes were normal     MEASUREMENT TABLES    OTHER ECHO MEASUREMENTS  (Reference normals)  Estimated CVP   5 mmHg   (--)    SYSTEM MEASUREMENT TABLES    2D  %FS: 29 99 %  Ao Diam: 3 19 cm  EDV(Teich): 71 54 ml  EF(Teich): 57 73 %  ESV(Teich): 30 24 ml  IVSd: 1 13 cm  LA Area: 16 73 cm2  LA Diam: 3 38 cm  LVEDV MOD A4C: 48 ml  LVEF MOD A4C: 71 93 %  LVESV MOD A4C: 13 48 ml  LVIDd: 4 04 cm  LVIDs: 2 83 cm  LVLd A4C: 6 47 cm  LVLs A4C: 5 29 cm  LVPWd: 1 06 cm  RA Area: 13 16 cm2  RVIDd: 3 45 cm  SV MOD A4C: 34 53 ml  SV(Teich): 41 3 ml    CW  TR Vmax: 2 91 m/s  TR maxP 78 mmHg    MM  TAPSE: 1 72 cm    PW  E': 0 09 m/s  E/E': 12 4  MV A Mika: 0 m/s  MV Dec Phelps: 5 33 m/s2  MV DecT: 199 99 ms  MV E Mika: 1 07 m/s  MV E/A Ratio: 1088  2  MV PHT: 58 ms  MVA By PHT: 3 79 cm2    [employer] Accredited Echocardiography Laboratory    Prepared and electronically signed by    Zayra Hazel MD  Signed 10-Feb-2018 18:24:16       No results found for this or any previous visit  No results found for this or any previous visit  No results found for this or any previous visit      Meds/Allergies   all current active meds have been reviewed  Prescriptions Prior to Admission   Medication    amLODIPine (NORVASC) 5 mg tablet    ascorbic acid (VITAMIN C) 500 mg tablet    Cholecalciferol (VITAMIN D3) 5000 UNITS TABS    CloNIDine HCl ER 0 1 MG TB12    LEVOTHYROXINE SODIUM PO    lisinopril (ZESTRIL) 20 mg tablet    LORazepam (ATIVAN) 0 5 mg tablet    metFORMIN (GLUCOPHAGE) 500 mg tablet    propranolol (INDERAL) 40 mg tablet    Zinc 50 MG CAPS         Adult TPN (CUSTOM BASE/CUSTOM ELECTROLYTE)  Last Rate: 80 8 mL/hr at 02/11/18 2144   Adult TPN (CUSTOM BASE/CUSTOM ELECTROLYTE)     diltiazem 1-15 mg/hr Last Rate: 12 5 mg/hr (02/12/18 1047)     Assessment:  Principal Problem:    Small bowel obstruction  Active Problems:    Acute kidney injury ([facility] [address] )    Bowel obstruction    Delirium    Physical deconditioning    Ambulatory dysfunction    Hx of fall    Anxiety

## 2018-02-12 NOTE — PLAN OF CARE
Problem: PHYSICAL THERAPY ADULT  Goal: Performs mobility at highest level of function for planned discharge setting  See evaluation for individualized goals  Treatment/Interventions: Functional transfer training, LE strengthening/ROM, Therapeutic exercise, Endurance training, Bed mobility, Gait training, Spoke to nursing  Equipment Recommended: Obie Castleman ([facility] )       See flowsheet documentation for full assessment, interventions and recommendations  Outcome: Progressing  Prognosis: Good  Problem List: Decreased strength, Decreased endurance, Impaired balance, Decreased mobility, Decreased skin integrity  Assessment: Pt able to ambulate a total of 250 feet on various usrfaces needing S level of A  150 feet with use of SPC and 100 feet without use of DME  Recommend use of SPC for all upright mobility,no LOB noted and/or observed during mobility  Pt reports "feeling more comfortable" with use of SPC  Pt able to perform sit to stand transfers needing S level of A  Pt able to perform BLE ther ex static stand ther ex  AROM with support of RW in front  Pt reports minimal BURNS and fatigue following and during mobility and ther ex program  Pt would cont to benefit from skilled inpt PT services to maximize functional independence  Barriers to Discharge: Inaccessible home environment     Recommendation: Home PT     PT - OK to Discharge: (S) Yes ([address] )    See flowsheet documentation for full assessment

## 2018-02-12 NOTE — PLAN OF CARE
Problem: OCCUPATIONAL THERAPY ADULT  Goal: Performs self-care activities at highest level of function for planned discharge setting  See evaluation for individualized goals  Treatment Interventions: ADL retraining, Functional transfer training, UE strengthening/ROM, Endurance training, Patient/family training, Equipment evaluation/education, Compensatory technique education, Continued evaluation, Energy conservation, Activityengagement  Equipment Recommended: Bedside commode       See flowsheet documentation for full assessment, interventions and recommendations  Outcome: Progressing  Limitation: Decreased ADL status, Decreased UE strength, Decreased UE ROM, Decreased Safe judgement during ADL, Decreased endurance, Decreased self-care trans, Decreased high-level ADLs  Prognosis: Fair  Assessment: Patient participated in Skilled OT session this date with interventions consisting of ADL re training with the use of correct body mechnaics, ADL/IADL simulation including functional reaching, and safety awareness and RW management techniques   Patient agreeable to OT treatment session, upon arrival patient was found seated OOB to Chair, following OT session patient seated OOB to chair, all needs within reach  In comparison to previous session, patient with improvements in transfers, mobility, and ADL independence  Patient requiring verbal cues for safety and assistance to manage lines  Patient completed transfers and LB dressing w/ supervision  Patient continues to be functioning below baseline level, occupational performance remains limited secondary to factors listed above and increased risk for falls and injury  From OT standpoint, recommendation at time of d/c would be Home OT to promote increased independence and safety w/ ADL/IADL routine in home environment and address DME needs   Patient to benefit from continued Occupational Therapy treatment while in the hospital to address deficits as defined above and maximize level of functional independence with ADLs and functional mobility        OT Discharge Recommendation: Home OT  OT - OK to Discharge:  (when medically appropriate)

## 2018-02-13 LAB
ANION GAP SERPL CALCULATED.3IONS-SCNC: 7 MMOL/L (ref 4–13)
BASOPHILS # BLD AUTO: 0.03 THOUSANDS/ΜL (ref 0–0.1)
BASOPHILS NFR BLD AUTO: 0 % (ref 0–1)
BUN SERPL-MCNC: 20 MG/DL (ref 5–25)
CALCIUM SERPL-MCNC: 8.5 MG/DL (ref 8.3–10.1)
CHLORIDE SERPL-SCNC: 106 MMOL/L (ref 100–108)
CO2 SERPL-SCNC: 25 MMOL/L (ref 21–32)
CREAT SERPL-MCNC: 0.6 MG/DL (ref 0.6–1.3)
EOSINOPHIL # BLD AUTO: 0.17 THOUSAND/ΜL (ref 0–0.61)
EOSINOPHIL NFR BLD AUTO: 1 % (ref 0–6)
ERYTHROCYTE [DISTWIDTH] IN BLOOD BY AUTOMATED COUNT: 14.5 % (ref 11.6–15.1)
GFR SERPL CREATININE-BSD FRML MDRD: 87 ML/MIN/1.73SQ M
GLUCOSE SERPL-MCNC: 164 MG/DL (ref 65–140)
GLUCOSE SERPL-MCNC: 166 MG/DL (ref 65–140)
GLUCOSE SERPL-MCNC: 182 MG/DL (ref 65–140)
GLUCOSE SERPL-MCNC: 185 MG/DL (ref 65–140)
GLUCOSE SERPL-MCNC: 188 MG/DL (ref 65–140)
GLUCOSE SERPL-MCNC: 214 MG/DL (ref 65–140)
HCT VFR BLD AUTO: 29.1 % (ref 34.8–46.1)
HGB BLD-MCNC: 10 G/DL (ref 11.5–15.4)
LYMPHOCYTES # BLD AUTO: 1.21 THOUSANDS/ΜL (ref 0.6–4.47)
LYMPHOCYTES NFR BLD AUTO: 8 % (ref 14–44)
MAGNESIUM SERPL-MCNC: 2.2 MG/DL (ref 1.6–2.6)
MCH RBC QN AUTO: 29.7 PG (ref 26.8–34.3)
MCHC RBC AUTO-ENTMCNC: 34.4 G/DL (ref 31.4–37.4)
MCV RBC AUTO: 86 FL (ref 82–98)
MONOCYTES # BLD AUTO: 1.07 THOUSAND/ΜL (ref 0.17–1.22)
MONOCYTES NFR BLD AUTO: 7 % (ref 4–12)
NEUTROPHILS # BLD AUTO: 12.51 THOUSANDS/ΜL (ref 1.85–7.62)
NEUTS SEG NFR BLD AUTO: 84 % (ref 43–75)
NRBC BLD AUTO-RTO: 0 /100 WBCS
PLATELET # BLD AUTO: 369 THOUSANDS/UL (ref 149–390)
PMV BLD AUTO: 9.5 FL (ref 8.9–12.7)
POTASSIUM SERPL-SCNC: 4.3 MMOL/L (ref 3.5–5.3)
RBC # BLD AUTO: 3.37 MILLION/UL (ref 3.81–5.12)
SODIUM SERPL-SCNC: 138 MMOL/L (ref 136–145)
WBC # BLD AUTO: 15.22 THOUSAND/UL (ref 4.31–10.16)

## 2018-02-13 PROCEDURE — 85025 COMPLETE CBC W/AUTO DIFF WBC: CPT | Performed by: STUDENT IN AN ORGANIZED HEALTH CARE EDUCATION/TRAINING PROGRAM

## 2018-02-13 PROCEDURE — C9113 INJ PANTOPRAZOLE SODIUM, VIA: HCPCS | Performed by: STUDENT IN AN ORGANIZED HEALTH CARE EDUCATION/TRAINING PROGRAM

## 2018-02-13 PROCEDURE — 83735 ASSAY OF MAGNESIUM: CPT | Performed by: STUDENT IN AN ORGANIZED HEALTH CARE EDUCATION/TRAINING PROGRAM

## 2018-02-13 PROCEDURE — 94760 N-INVAS EAR/PLS OXIMETRY 1: CPT

## 2018-02-13 PROCEDURE — 99024 POSTOP FOLLOW-UP VISIT: CPT | Performed by: SURGERY

## 2018-02-13 PROCEDURE — 97535 SELF CARE MNGMENT TRAINING: CPT

## 2018-02-13 PROCEDURE — 80048 BASIC METABOLIC PNL TOTAL CA: CPT | Performed by: STUDENT IN AN ORGANIZED HEALTH CARE EDUCATION/TRAINING PROGRAM

## 2018-02-13 PROCEDURE — 82948 REAGENT STRIP/BLOOD GLUCOSE: CPT

## 2018-02-13 PROCEDURE — 99232 SBSQ HOSP IP/OBS MODERATE 35: CPT | Performed by: INTERNAL MEDICINE

## 2018-02-13 RX ADMIN — INSULIN LISPRO 1 UNITS: 100 INJECTION, SOLUTION INTRAVENOUS; SUBCUTANEOUS at 00:18

## 2018-02-13 RX ADMIN — METOPROLOL TARTRATE 5 MG: 1 INJECTION, SOLUTION INTRAVENOUS at 17:34

## 2018-02-13 RX ADMIN — INSULIN LISPRO 1 UNITS: 100 INJECTION, SOLUTION INTRAVENOUS; SUBCUTANEOUS at 05:09

## 2018-02-13 RX ADMIN — INSULIN LISPRO 2 UNITS: 100 INJECTION, SOLUTION INTRAVENOUS; SUBCUTANEOUS at 11:56

## 2018-02-13 RX ADMIN — HEPARIN SODIUM 5000 UNITS: 5000 INJECTION, SOLUTION INTRAVENOUS; SUBCUTANEOUS at 14:33

## 2018-02-13 RX ADMIN — DILTIAZEM HYDROCHLORIDE 12.5 MG/HR: 5 INJECTION INTRAVENOUS at 17:37

## 2018-02-13 RX ADMIN — METOPROLOL TARTRATE 5 MG: 1 INJECTION, SOLUTION INTRAVENOUS at 23:41

## 2018-02-13 RX ADMIN — CALCIUM GLUCONATE: 94 INJECTION, SOLUTION INTRAVENOUS at 22:00

## 2018-02-13 RX ADMIN — INSULIN LISPRO 1 UNITS: 100 INJECTION, SOLUTION INTRAVENOUS; SUBCUTANEOUS at 17:43

## 2018-02-13 RX ADMIN — PANTOPRAZOLE SODIUM 40 MG: 40 INJECTION, POWDER, FOR SOLUTION INTRAVENOUS at 08:21

## 2018-02-13 RX ADMIN — HEPARIN SODIUM 5000 UNITS: 5000 INJECTION, SOLUTION INTRAVENOUS; SUBCUTANEOUS at 05:00

## 2018-02-13 RX ADMIN — HEPARIN SODIUM 5000 UNITS: 5000 INJECTION, SOLUTION INTRAVENOUS; SUBCUTANEOUS at 22:00

## 2018-02-13 RX ADMIN — DILTIAZEM HYDROCHLORIDE 12.5 MG/HR: 5 INJECTION INTRAVENOUS at 08:25

## 2018-02-13 RX ADMIN — INSULIN LISPRO 1 UNITS: 100 INJECTION, SOLUTION INTRAVENOUS; SUBCUTANEOUS at 23:40

## 2018-02-13 RX ADMIN — METOPROLOL TARTRATE 5 MG: 1 INJECTION, SOLUTION INTRAVENOUS at 00:18

## 2018-02-13 RX ADMIN — METOPROLOL TARTRATE 5 MG: 1 INJECTION, SOLUTION INTRAVENOUS at 05:00

## 2018-02-13 RX ADMIN — MELATONIN TAB 3 MG 3 MG: 3 TAB at 22:00

## 2018-02-13 RX ADMIN — METOPROLOL TARTRATE 5 MG: 1 INJECTION, SOLUTION INTRAVENOUS at 11:55

## 2018-02-13 NOTE — PLAN OF CARE
DISCHARGE PLANNING     Discharge to home or other facility with appropriate resources Progressing        DISCHARGE PLANNING - CARE MANAGEMENT     Discharge to post-acute care or home with appropriate resources Progressing        GASTROINTESTINAL - ADULT     Minimal or absence of nausea and/or vomiting Progressing     Maintains or returns to baseline bowel function Progressing        INFECTION - ADULT     Absence or prevention of progression during hospitalization Progressing     Absence of fever/infection during neutropenic period Progressing        Knowledge Deficit     Patient/family/caregiver demonstrates understanding of disease process, treatment plan, medications, and discharge instructions Progressing        METABOLIC, FLUID AND ELECTROLYTES - ADULT     Electrolytes maintained within normal limits Progressing     Fluid balance maintained Progressing        Nutrition/Hydration-ADULT     Nutrient/Hydration intake appropriate for improving, restoring or maintaining nutritional needs Progressing        PAIN - ADULT     Verbalizes/displays adequate comfort level or baseline comfort level Progressing        Potential for Falls     Patient will remain free of falls Progressing        Prexisting or High Potential for Compromised Skin Integrity     Skin integrity is maintained or improved Progressing        SAFETY ADULT     Patient will remain free of falls Progressing     Maintain or return to baseline ADL function Progressing     Maintain or return mobility status to optimal level Progressing

## 2018-02-13 NOTE — SOCIAL WORK
Cm reviewed patient during care coordination rounds  Pt not medically stable for discharge today  Patient is stepdown  Bowel obstruction  Patient has PICC line & TPN  Discharge NG tube today and switch to PO ATB  [facility] able to accept patient for home therapy upon discharge  Cm continues to follow

## 2018-02-13 NOTE — CASE MANAGEMENT
Continued Stay Review    Date: 2/13    Vital Signs: BP (!) 171/77 (BP Location: Left arm)   Pulse 92   Temp 98 9 °F (37 2 °C) (Oral)   Resp 18   Ht 5' 6 5" (1 689 m)   Wt 64 7 kg (142 lb 10 2 oz)   SpO2 98%   BMI 22 68 kg/m²     Medications:   Scheduled Meds:   Current Facility-Administered Medications:  heparin (porcine) 5,000 Units Subcutaneous Q8H [address]   insulin lispro 1-5 Units Subcutaneous Q6H [address]   melatonin 3 mg Oral HS   metoprolol 5 mg Intravenous Q6H [address]   pantoprazole 40 mg Intravenous Q24H [address]     Continuous Infusions:   Adult TPN (CUSTOM BASE/CUSTOM ELECTROLYTE)     diltiazem 1-15 mg/hr Last Rate: 12 5 mg/hr (02/13/18 0825)     PRN Meds:   acetaminophen    HYDROmorphone    iohexol    metoprolol    ondansetron    phenol    polyvinyl alcohol    Abnormal Labs/Diagnostic Results:   02/13/18 0508     WBC 4 31 - 10 16 Thousand/uL 15 22     RBC 3 81 - 5 12 Million/uL 3 37     Hemoglobin 11 5 - 15 4 g/dL 10 0     Hematocrit 34 8 - 46 1 % 29 1         Age/Sex: 76 y o  female     Assessment:  76 F with high grade small bowel obstruction, s/p ex-lap and SBR   output     Plan:  Possible d/c NG tube today  PICC/TPN  If placed on diet transition to oral cardizem/metroprolol  Cm or NOAC  PT/OT    Discharge Plan: TBD

## 2018-02-13 NOTE — PROGRESS NOTES
Progress Note - Acute Care Surgery   Bhumikacatrachita Borja 76 y o  female MRN: [id number]  Unit/Bed#: [facility] 802-01 Encounter: [id number]    Assessment:  76 F with high grade small bowel obstruction, s/p ex-lap and SBR   output    Plan:  Possible d/c NG tube today  PICC/TPN  If placed on diet transition to oral cardizem/metroprolol  Cm or NOAC  PT/OT      Subjective/Objective     Subjective: +BM-loose, +flatus, OOB  No N/V  Objective:    Blood pressure 160/70, pulse 90, temperature 99 2 °F (37 3 °C), temperature source Oral, resp  rate 16, height 5' 6 5" (1 689 m), weight 64 7 kg (142 lb 10 2 oz), SpO2 97 %  ,Body mass index is 22 68 kg/m²        Intake/Output Summary (Last 24 hours) at 02/13/18 0505  Last data filed at 02/13/18 0301   Gross per 24 hour   Intake          2150 86 ml   Output             1340 ml   Net           810 86 ml       Invasive Devices     Peripherally Inserted Central Catheter Line            PICC Line 02/06/18 6 days          Drain            NG/OG/Enteral Tube Nasogastric 18 Fr Right nares 2 days                Physical Exam:   AAOx3  NAD  Normal respiratory effort  Soft, NT, distended, incisions c/d/i with erythema along staple line, superior incision with minimal drainage        Results from last 7 days  Lab Units 02/12/18 0627 02/11/18 0447 02/10/18  0449   WBC Thousand/uL 14 04* 16 49* 16 60*   HEMOGLOBIN g/dL 10 1* 10 4* 10 4*   HEMATOCRIT % 29 2* 29 8* 29 9*   PLATELETS Thousands/uL 318 319 260       Results from last 7 days  Lab Units 02/12/18  0627 02/11/18 0447 02/10/18  0449   SODIUM mmol/L 135* 132* 132*   POTASSIUM mmol/L 4 2 4 4 4 4   CHLORIDE mmol/L 104 100 102   CO2 mmol/L 24 23 23   BUN mg/dL 15 19 18   CREATININE mg/dL 0 58* 0 65 0 61   GLUCOSE RANDOM mg/dL 204* 171* 184*   CALCIUM mg/dL 8 5 8 8 8 5

## 2018-02-13 NOTE — PROGRESS NOTES
Patient care rounds were completed with the patient's nurse today, Ivory Rice     We discussed the plan is to discontinue NG tube and initiate clear liquid diet today  Continue TPN for 1 more day until ensured the patient is tolerating an oral diet  Once tolerating a regular diet, transitioned off of IV Cardizem and Lopressor to oral medication regimen for Cardiology for new onset atrial fibrillation  No plan to initiate anticoagulation despite new onset AFib per patient  Continue to monitor abdominal exam and bowel function  We reviewed all of the invasive devices/lines/telemetry orders   - Continue PICC line TPN and IV access for at least 24 more hours  Pain Assessment / Plan:  - Continue current analgesic regimen and begin transitioning to oral analgesic regimen  Mobility Assessment / Plan:  Continue PT and OT evaluation and treatment as indicated with recommendations for home therapy on discharge  Goals / Barriers for discharge:  - Awaiting resumption and toleration of oral diet, weaning off of TPN, and transitioned to oral cardiac medication regimen as well as oral pain medication regimen     - Case management following  All questions and concerns were addressed  I spent greater than 15 minutes reviewing the plan with the patient and the nurse, and coordinating her care for the day      Garo Rodrigues PA-C  2/13/2018 11:12 AM

## 2018-02-13 NOTE — PLAN OF CARE
Problem: OCCUPATIONAL THERAPY ADULT  Goal: Performs self-care activities at highest level of function for planned discharge setting  See evaluation for individualized goals  Treatment Interventions: ADL retraining, Functional transfer training, UE strengthening/ROM, Endurance training, Patient/family training, Equipment evaluation/education, Compensatory technique education, Continued evaluation, Energy conservation, Activityengagement  Equipment Recommended: Bedside commode       See flowsheet documentation for full assessment, interventions and recommendations  Outcome: Progressing  Limitation: Decreased ADL status, Decreased UE strength, Decreased UE ROM, Decreased Safe judgement during ADL, Decreased endurance, Decreased self-care trans, Decreased high-level ADLs  Prognosis: Fair  Assessment: Pt seen for OT session this date focusing on ADL independence including toileting, UB/LB bathing and grooming at sink  Pt able to complete grooming w/ SBA  Pt completing UB bathing while standing at sink w/ Vc amnd SBA  Pt completing LB bathing while seated w/ SBA  Pt able to complete UB/LB dressing w/ SBA  Pt ambulated using SPC w/ SBA and assist to manage IV pole  Pt completing toileting w/ supervision  Pt w/ improvements in functional mobility, transfer status, ADL independence, medical status  Recommend continued OT while in hospital to maximize functional independence before d/c home  Pt educated on safety during self-care routine at home, recommending commode and shower seat  OT recommending home OT at d/c to address DME needs and promote increased independence and safety w/ ADLs and IADLs in home environment        OT Discharge Recommendation: Home OT  OT - OK to Discharge: Yes (when medically cleared)

## 2018-02-13 NOTE — PROGRESS NOTES
Cardiology Progress Note - Ramiro Lopez 76 y o  female MRN: [id number]    Unit/Bed#: [facility] 802-01 Encounter: [id number]    Assessment and plan  1  Postop atrial fibrillation  2  Hypertension  3  Leukocytosis     Recommendations:  Overall she is doing well from cardiac standpoint  She has started taking orals today  Continue IV Cardizem and IV Lopressor until on a regular oral diet  I talked to her for a while today about long-term anticoagulation she does not want Coumadin or a novel anticoagulant would like to use aspirin alone  She is aware of increased risk of stroke  Subjective:    No significant events overnight  Denies chest pain or dyspnea telemetry shows rates controlled  ROS    Objective:   Vitals: Blood pressure 170/83, pulse 88, temperature 98 4 °F (36 9 °C), temperature source Oral, resp  rate 18, height 5' 6 5" (1 689 m), weight 64 7 kg (142 lb 10 2 oz), SpO2 98 %  , Body mass index is 22 68 kg/m² , Orthostatic Blood Pressures    Flowsheet Row Most Recent Value   Blood Pressure  170/83 filed at 02/13/2018 0700   Patient Position - Orthostatic VS  Lying filed at 02/13/2018 7756         Systolic (07LJY), IWV:946 , Min:141 , ZFQ:459     Diastolic (43OEG), FRP:37, Min:63, Max:83      Intake/Output Summary (Last 24 hours) at 02/13/18 0932  Last data filed at 02/13/18 0501   Gross per 24 hour   Intake          2337 46 ml   Output             1290 ml   Net          1047 46 ml     Weight (last 2 days)     None            Telemetry Review: No significant arrhythmias seen on telemetry review  EKG personally reviewed by Bebe Schilling DO  Physical Exam   Constitutional: She is oriented to person, place, and time  She appears well-nourished  No distress  HENT:   Head: Atraumatic  Eyes: Conjunctivae are normal  Pupils are equal, round, and reactive to light  Neck: Neck supple  Cardiovascular: Normal rate, S1 normal and normal heart sounds  An irregularly irregular rhythm present  Exam reveals no friction rub  No murmur heard  Pulmonary/Chest: Effort normal and breath sounds normal  No respiratory distress  She has no wheezes  She has no rales  Abdominal: Bowel sounds are normal  She exhibits no distension  There is no tenderness  There is no rebound  Musculoskeletal: Normal range of motion  She exhibits no edema or deformity  Neurological: She is alert and oriented to person, place, and time  No cranial nerve deficit  Skin: Skin is warm and dry  No erythema  Nursing note and vitals reviewed          Laboratory Results:    Results from last 7 days  Lab Units 02/09/18  2158 02/09/18  1906 02/09/18  1604   TROPONIN I ng/mL 0 05* 0 06* 0 05*       CBC with diff:   Results from last 7 days  Lab Units 02/13/18  0508 02/12/18  0627 02/11/18  0447 02/10/18  0449 02/09/18  1604 02/09/18  0442 02/08/18  0507 02/07/18  0452   WBC Thousand/uL 15 22* 14 04* 16 49* 16 60* 15 28* 13 09* 13 26* 15 38*   HEMOGLOBIN g/dL 10 0* 10 1* 10 4* 10 4* 10 7* 9 6* 11 1* 11 5   HEMATOCRIT % 29 1* 29 2* 29 8* 29 9* 30 9* 28 2* 33 0* 33 9*   MCV fL 86 85 86 87 89 86 88 88   PLATELETS Thousands/uL 369 318 319 260 255 216 223 245   MCH pg 29 7 29 4 29 9 30 2 30 7 29 4 29 4 29 9   MCHC g/dL 34 4 34 6 34 9 34 8 34 6 34 0 33 6 33 9   RDW % 14 5 14 3 14 3 14 5 14 7 14 0 14 3 14 5   MPV fL 9 5 8 7* 9 2 9 4 9 4 9 4 9 4 9 8   NRBC AUTO /100 WBCs 0 0 0 0  --  0 0 0         CMP:  Results from last 7 days  Lab Units 02/13/18  0508 02/12/18  0627 02/11/18  0447 02/10/18  0449 02/09/18  1906 02/09/18  0442 02/08/18  0507   SODIUM mmol/L 138 135* 132* 132* 133* 137 135*   POTASSIUM mmol/L 4 3 4 2 4 4 4 4 4 3 4 1 3 4*   CHLORIDE mmol/L 106 104 100 102 102 106 103   CO2 mmol/L 25 24 23 23 24 24 24   ANION GAP mmol/L 7 7 9 7 7 7 8   BUN mg/dL 20 15 19 18 17 11 10   CREATININE mg/dL 0 60 0 58* 0 65 0 61 0 62 0 52* 0 55*   GLUCOSE RANDOM mg/dL 164* 204* 171* 184* 189* 203* 172*   CALCIUM mg/dL 8 5 8 5 8 8 8 5 8 2* 7 6* 8 5 EGFR ml/min/1 73sq m 87 88 85 87 86 91 89         BMP:  Results from last 7 days  Lab Units 18  0508 18  0627 18  0447 02/10/18  0449 18  1906 18  0442 18  0507   SODIUM mmol/L 138 135* 132* 132* 133* 137 135*   POTASSIUM mmol/L 4 3 4 2 4 4 4 4 4 3 4 1 3 4*   CHLORIDE mmol/L 106 104 100 102 102 106 103   CO2 mmol/L 25 24 23 23 24 24 24   BUN mg/dL 20 15 19 18 17 11 10   CREATININE mg/dL 0 60 0 58* 0 65 0 61 0 62 0 52* 0 55*   GLUCOSE RANDOM mg/dL 164* 204* 171* 184* 189* 203* 172*   CALCIUM mg/dL 8 5 8 5 8 8 8 5 8 2* 7 6* 8 5       BNP: No results for input(s): BNP in the last 72 hours  Magnesium:   Results from last 7 days  Lab Units 18  0508 18  0627 187 18  19018  0442 18  0507 18  0452   MAGNESIUM mg/dL 2 2 1 9 1 9 2 4 1 8 1 8 2 1       Coags:       TSH:        Hemoglobin A1C       Lipid Profile:       Cardiac testing:   Results for orders placed during the hospital encounter of 18   Echo complete with contrast if indicated    Bimal Beauchamp [address]  [phone]    Transthoracic Echocardiogram  2D, M-mode, Doppler, and Color Doppler    Study date:  2018    Patient: Yovanny Cordero  MR number: [id number]  Account number: [de-identified]  : 1940  Age: 76 years  Gender: Female  Status: Inpatient  Location: Bedside  Height: 66 in  Weight: 142 lb  BP: 132/ 63 mmHg    Indications: Atrial fibrillation    Diagnoses: I48 0 - Atrial fibrillation    Sonographer:  CAROLA Alberto, RDCS  Primary Physician:  Farzad Childress DO  Referring Physician:  Bret Londono MD  [facility]  [facility]  Interpreting Physician:  Christelle Villafana MD    SUMMARY    LEFT VENTRICLE:  Systolic function was hyperdynamic by visual assessment  Ejection fraction was estimated to be 70 %  There were no regional wall motion abnormalities    Justin Arriaga thickness was mildly increased  Left ventricular diastolic function parameters were normal for atrial fibrillation  RIGHT VENTRICLE:  The size was normal   Systolic function was normal     MITRAL VALVE:  There was mild regurgitation  TRICUSPID VALVE:  There was trace regurgitation  HISTORY: PRIOR HISTORY: Hypertension; Hypothryoid; Diabetes Mellitus; Left breast cancer (22years ago); Smoker    PROCEDURE: The procedure was performed at the bedside  This was a routine study  The transthoracic approach was used  The study included complete 2D imaging, M-mode, complete spectral Doppler, and color Doppler  Images were obtained from  the parasternal, apical, subcostal, and suprasternal notch acoustic windows  Echocardiographic views were limited due to poor acoustic window availability  Image quality was adequate  LEFT VENTRICLE: Size was normal  Systolic function was hyperdynamic by visual assessment  Ejection fraction was estimated to be 70 %  There were no regional wall motion abnormalities  Wall thickness was mildly increased  DOPPLER: Left  ventricular diastolic function parameters were normal for atrial fibrillation  RIGHT VENTRICLE: The size was normal  Systolic function was normal  Wall thickness was normal     LEFT ATRIUM: Size was normal     RIGHT ATRIUM: Size was normal     MITRAL VALVE: Valve structure was normal  There was normal leaflet separation  DOPPLER: The transmitral velocity was within the normal range  There was no evidence for stenosis  There was mild regurgitation  AORTIC VALVE: The valve was trileaflet  Leaflets exhibited normal thickness and normal cuspal separation  DOPPLER: Transaortic velocity was within the normal range  There was no evidence for stenosis  There was no significant  regurgitation  TRICUSPID VALVE: The valve structure was normal  There was normal leaflet separation  DOPPLER: The transtricuspid velocity was within the normal range   There was no evidence for stenosis  There was trace regurgitation  Estimated peak PA  pressure was 37 mmHg  PULMONIC VALVE: Leaflets exhibited normal thickness, no calcification, and normal cuspal separation  DOPPLER: The transpulmonic velocity was within the normal range  There was no significant regurgitation  PERICARDIUM: There was no pericardial effusion  The pericardium was normal in appearance  AORTA: The root exhibited normal size  SYSTEMIC VEINS: IVC: The inferior vena cava was normal in size  Respirophasic changes were normal     MEASUREMENT TABLES    OTHER ECHO MEASUREMENTS  (Reference normals)  Estimated CVP   5 mmHg   (--)    SYSTEM MEASUREMENT TABLES    2D  %FS: 29 99 %  Ao Diam: 3 19 cm  EDV(Teich): 71 54 ml  EF(Teich): 57 73 %  ESV(Teich): 30 24 ml  IVSd: 1 13 cm  LA Area: 16 73 cm2  LA Diam: 3 38 cm  LVEDV MOD A4C: 48 ml  LVEF MOD A4C: 71 93 %  LVESV MOD A4C: 13 48 ml  LVIDd: 4 04 cm  LVIDs: 2 83 cm  LVLd A4C: 6 47 cm  LVLs A4C: 5 29 cm  LVPWd: 1 06 cm  RA Area: 13 16 cm2  RVIDd: 3 45 cm  SV MOD A4C: 34 53 ml  SV(Teich): 41 3 ml    CW  TR Vmax: 2 91 m/s  TR maxP 78 mmHg    MM  TAPSE: 1 72 cm    PW  E': 0 09 m/s  E/E': 12 4  MV A Mika: 0 m/s  MV Dec [place]: 5 33 m/s2  MV DecT: 199 99 ms  MV E Mika: 1 07 m/s  MV E/A Ratio: 1088  2  MV PHT: 58 ms  MVA By PHT: 3 79 cm2    [employer] Accredited Echocardiography Laboratory    Prepared and electronically signed by    Beverly Wooten MD  Signed 10-Feb-2018 18:24:16       No results found for this or any previous visit  No results found for this or any previous visit  No results found for this or any previous visit      Meds/Allergies   all current active meds have been reviewed  Prescriptions Prior to Admission   Medication    amLODIPine (NORVASC) 5 mg tablet    ascorbic acid (VITAMIN C) 500 mg tablet    Cholecalciferol (VITAMIN D3) 5000 UNITS TABS    CloNIDine HCl ER 0 1 MG TB12    LEVOTHYROXINE SODIUM PO    lisinopril (ZESTRIL) 20 mg tablet    LORazepam (ATIVAN) 0 5 mg tablet    metFORMIN (GLUCOPHAGE) 500 mg tablet    propranolol (INDERAL) 40 mg tablet    Zinc 50 MG CAPS         Adult TPN (CUSTOM BASE/CUSTOM ELECTROLYTE)  Last Rate: 80 8 mL/hr at 02/12/18 2143   Adult TPN (CUSTOM BASE/CUSTOM ELECTROLYTE)     diltiazem 1-15 mg/hr Last Rate: 12 5 mg/hr (02/13/18 0825)     Assessment:  Principal Problem:    Small bowel obstruction  Active Problems:    Acute kidney injury ([facility] )    Bowel obstruction    Delirium    Physical deconditioning    Ambulatory dysfunction    Hx of fall    Anxiety

## 2018-02-13 NOTE — OCCUPATIONAL THERAPY NOTE
Occupational Therapy Treatment Note      Manuel Nicholas    2/13/2018    Patient Active Problem List   Diagnosis    Heart palpitations    Nicotine abuse    Transient atrial fibrillation ([facility] )    Diabetes mellitus type 2 in nonobese ([facility] )    Hypertension, essential, benign    Acquired hypothyroidism    [facility] central [facility] right [facility]    Small bowel obstruction    Acute kidney injury ([facility] )    Bowel obstruction    Delirium    Physical deconditioning    Ambulatory dysfunction    Hx of fall    Anxiety       Past Medical History:   Diagnosis Date    Anxiety     Cancer ([place] [address] )     LEFT BREAST CA 22 YEARS AGO     Depression     Diabetes mellitus ([place] [address] )     Hypertension     Hypothyroidism        Past Surgical History:   Procedure Laterality Date    ABDOMINAL ADHESION SURGERY N/A 2/4/2018    Procedure: LYSIS ADHESIONS;  Surgeon: Velvet Grigsby DO;  [facility];  Service: General    BREAST SURGERY      CHOLECYSTECTOMY      JOINT REPLACEMENT      LEFT KNEE REPLACEMENT     LAPAROTOMY N/A 2/4/2018    Procedure: LAPAROTOMY EXPLORATORY,;  Surgeon: Velvet Grigsby DO;  [facility];  Service: General    MASTECTOMY      PA BIOPSY/EXCISION, LYMPH NODE(S) Right 1/13/2017    Procedure: SENTINEL LYMPH NODE BIOPSY RIGHT AXILLA; Surgeon: Larisa Muñiz MD;  [facility];  Service: General    PA MASTECTOMY, SIMPLE, COMPLETE Right 1/13/2017    Procedure: MASTECTOMY SIMPLE;  Surgeon: Larisa Muñiz MD;  [facility];  Service: General    SMALL INTESTINE SURGERY N/A 2/4/2018    Procedure: RESECTION SMALL BOWEL;  Surgeon: Velvet Grigsby DO;  [facility];  Service: General        02/13/18 1402   Restrictions/Precautions   Weight Bearing Precautions Per Order No   Other Precautions Telemetry;Multiple lines; Fall Risk   Pain Assessment   Pain Assessment No/denies pain   Pain Score No Pain   ADL   Where Assessed Standing at sink   Grooming Assistance 5  Supervision/Setup   Grooming Deficit Brushing hair;Wash/dry hands; Wash/dry face   UB Bathing Assistance 5  Supervision/Setup   UB Bathing Deficit Increased time to complete;Verbal cueing   LB Bathing Assistance 5  Supervision/Setup   LB Bathing Deficit Supervision/safety; Increased time to complete   UB Dressing Assistance 5  Supervision/Setup   UB Dressing Comments assist to manage lines/gown   LB Dressing Assistance 5  Supervision/Setup   LB Dressing Deficit Increased time to complete   Toileting Assistance  5  Supervision/Setup   Toileting Deficit Use of adaptive equipment   Functional Standing Tolerance   Time 15 minutes   Activity standing at sink for self-care   Transfers   Sit to Stand 5  Supervision   Additional items Assist x 1  (assist to manage lines)   Stand to Sit 5  Supervision   Additional items Assist x 1  (assist to manage lines)   Toilet transfer 5  Supervision   Additional items Commode   Functional Mobility   Functional Mobility 5  Supervision   Additional items SPC   Cognition   Overall Cognitive Status [facility]   Arousal/Participation Alert; Cooperative   Attention Within functional limits   Orientation Level Oriented X4   Memory Decreased recall of recent events   Following Commands Follows all commands and directions without difficulty   Comments pt agreeable to participate in OT   Activity Tolerance   Activity Tolerance Patient tolerated treatment well   Medical Staff Made Aware spoke w/ RN   Assessment   Assessment Pt seen for OT session this date focusing on ADL independence including toileting, UB/LB bathing and grooming at sink  Pt able to complete grooming w/ SBA  Pt completing UB bathing while standing at sink w/ Vc amnd SBA  Pt completing LB bathing while seated w/ SBA  Pt able to complete UB/LB dressing w/ SBA  Pt ambulated using SPC w/ SBA and assist to manage IV pole  Pt completing toileting w/ supervision   Pt w/ improvements in functional mobility, transfer status, ADL independence, medical status  Recommend continued OT while in hospital to maximize functional independence before d/c home  Pt educated on safety during self-care routine at home, recommending commode and shower seat  OT recommending home OT at d/c to address DME needs and promote increased independence and safety w/ ADLs and IADLs in home environment  Plan   Treatment Interventions ADL retraining;Functional transfer training;UE strengthening/ROM; Endurance training;Patient/family training;Equipment evaluation/education;Continued evaluation; Energy conservation; Activityengagement   Goal Expiration Date 02/17/18   Treatment Day 4   OT Frequency 3-5x/wk   Recommendation   OT Discharge Recommendation Home OT   Equipment Recommended Bedside commode   OT - OK to Discharge Yes  (when medically cleared)   Barthel Index   Feeding 10   Bathing 0   Grooming Score 5   Dressing Score 10   Bladder Score 10   Bowels Score 10   Toilet Use Score 10   Transfers (Bed/Chair) Score 10   Mobility (Level Surface) Score 10   Stairs Score 0   Barthel Index Score 75   Modified Rowland Scale   Modified Rowland Scale 3   Documentation Completed by Maggie Chawla MS, OTR/L

## 2018-02-14 LAB
ANION GAP SERPL CALCULATED.3IONS-SCNC: 8 MMOL/L (ref 4–13)
BASOPHILS # BLD AUTO: 0.04 THOUSANDS/ΜL (ref 0–0.1)
BASOPHILS NFR BLD AUTO: 0 % (ref 0–1)
BUN SERPL-MCNC: 19 MG/DL (ref 5–25)
CALCIUM SERPL-MCNC: 8.3 MG/DL (ref 8.3–10.1)
CHLORIDE SERPL-SCNC: 106 MMOL/L (ref 100–108)
CO2 SERPL-SCNC: 24 MMOL/L (ref 21–32)
CREAT SERPL-MCNC: 0.6 MG/DL (ref 0.6–1.3)
EOSINOPHIL # BLD AUTO: 0.19 THOUSAND/ΜL (ref 0–0.61)
EOSINOPHIL NFR BLD AUTO: 1 % (ref 0–6)
ERYTHROCYTE [DISTWIDTH] IN BLOOD BY AUTOMATED COUNT: 14.4 % (ref 11.6–15.1)
GFR SERPL CREATININE-BSD FRML MDRD: 87 ML/MIN/1.73SQ M
GLUCOSE SERPL-MCNC: 184 MG/DL (ref 65–140)
GLUCOSE SERPL-MCNC: 191 MG/DL (ref 65–140)
GLUCOSE SERPL-MCNC: 193 MG/DL (ref 65–140)
GLUCOSE SERPL-MCNC: 213 MG/DL (ref 65–140)
GLUCOSE SERPL-MCNC: 230 MG/DL (ref 65–140)
GLUCOSE SERPL-MCNC: 244 MG/DL (ref 65–140)
HCT VFR BLD AUTO: 29.1 % (ref 34.8–46.1)
HGB BLD-MCNC: 10 G/DL (ref 11.5–15.4)
LYMPHOCYTES # BLD AUTO: 1.42 THOUSANDS/ΜL (ref 0.6–4.47)
LYMPHOCYTES NFR BLD AUTO: 10 % (ref 14–44)
MCH RBC QN AUTO: 29.5 PG (ref 26.8–34.3)
MCHC RBC AUTO-ENTMCNC: 34.4 G/DL (ref 31.4–37.4)
MCV RBC AUTO: 86 FL (ref 82–98)
MONOCYTES # BLD AUTO: 1.12 THOUSAND/ΜL (ref 0.17–1.22)
MONOCYTES NFR BLD AUTO: 8 % (ref 4–12)
NEUTROPHILS # BLD AUTO: 12.1 THOUSANDS/ΜL (ref 1.85–7.62)
NEUTS SEG NFR BLD AUTO: 81 % (ref 43–75)
NRBC BLD AUTO-RTO: 0 /100 WBCS
PLATELET # BLD AUTO: 431 THOUSANDS/UL (ref 149–390)
PMV BLD AUTO: 9.1 FL (ref 8.9–12.7)
POTASSIUM SERPL-SCNC: 4.6 MMOL/L (ref 3.5–5.3)
RBC # BLD AUTO: 3.39 MILLION/UL (ref 3.81–5.12)
SODIUM SERPL-SCNC: 138 MMOL/L (ref 136–145)
WBC # BLD AUTO: 15 THOUSAND/UL (ref 4.31–10.16)

## 2018-02-14 PROCEDURE — 82948 REAGENT STRIP/BLOOD GLUCOSE: CPT

## 2018-02-14 PROCEDURE — 99024 POSTOP FOLLOW-UP VISIT: CPT | Performed by: SURGERY

## 2018-02-14 PROCEDURE — 99232 SBSQ HOSP IP/OBS MODERATE 35: CPT | Performed by: INTERNAL MEDICINE

## 2018-02-14 PROCEDURE — 80048 BASIC METABOLIC PNL TOTAL CA: CPT | Performed by: STUDENT IN AN ORGANIZED HEALTH CARE EDUCATION/TRAINING PROGRAM

## 2018-02-14 PROCEDURE — C9113 INJ PANTOPRAZOLE SODIUM, VIA: HCPCS | Performed by: STUDENT IN AN ORGANIZED HEALTH CARE EDUCATION/TRAINING PROGRAM

## 2018-02-14 PROCEDURE — 94762 N-INVAS EAR/PLS OXIMTRY CONT: CPT

## 2018-02-14 PROCEDURE — 85025 COMPLETE CBC W/AUTO DIFF WBC: CPT | Performed by: STUDENT IN AN ORGANIZED HEALTH CARE EDUCATION/TRAINING PROGRAM

## 2018-02-14 RX ORDER — DILTIAZEM HYDROCHLORIDE 60 MG/1
60 TABLET, FILM COATED ORAL EVERY 6 HOURS SCHEDULED
Status: DISCONTINUED | OUTPATIENT
Start: 2018-02-14 | End: 2018-02-15

## 2018-02-14 RX ORDER — METOPROLOL TARTRATE 5 MG/5ML
5 INJECTION INTRAVENOUS EVERY 6 HOURS PRN
Status: DISCONTINUED | OUTPATIENT
Start: 2018-02-14 | End: 2018-02-17 | Stop reason: HOSPADM

## 2018-02-14 RX ADMIN — INSULIN LISPRO 2 UNITS: 100 INJECTION, SOLUTION INTRAVENOUS; SUBCUTANEOUS at 11:52

## 2018-02-14 RX ADMIN — HEPARIN SODIUM 5000 UNITS: 5000 INJECTION, SOLUTION INTRAVENOUS; SUBCUTANEOUS at 21:34

## 2018-02-14 RX ADMIN — MELATONIN TAB 3 MG 3 MG: 3 TAB at 21:34

## 2018-02-14 RX ADMIN — METOPROLOL TARTRATE 25 MG: 25 TABLET ORAL at 21:34

## 2018-02-14 RX ADMIN — INSULIN LISPRO 2 UNITS: 100 INJECTION, SOLUTION INTRAVENOUS; SUBCUTANEOUS at 21:32

## 2018-02-14 RX ADMIN — DILTIAZEM HYDROCHLORIDE 60 MG: 60 TABLET, FILM COATED ORAL at 11:52

## 2018-02-14 RX ADMIN — DILTIAZEM HYDROCHLORIDE 60 MG: 60 TABLET, FILM COATED ORAL at 17:55

## 2018-02-14 RX ADMIN — METOPROLOL TARTRATE 5 MG: 1 INJECTION, SOLUTION INTRAVENOUS at 06:20

## 2018-02-14 RX ADMIN — METOPROLOL TARTRATE 25 MG: 25 TABLET ORAL at 09:15

## 2018-02-14 RX ADMIN — LEVOTHYROXINE SODIUM 137 MCG: 112 TABLET ORAL at 09:15

## 2018-02-14 RX ADMIN — HEPARIN SODIUM 5000 UNITS: 5000 INJECTION, SOLUTION INTRAVENOUS; SUBCUTANEOUS at 06:17

## 2018-02-14 RX ADMIN — PANTOPRAZOLE SODIUM 40 MG: 40 INJECTION, POWDER, FOR SOLUTION INTRAVENOUS at 08:43

## 2018-02-14 RX ADMIN — INSULIN LISPRO 1 UNITS: 100 INJECTION, SOLUTION INTRAVENOUS; SUBCUTANEOUS at 06:30

## 2018-02-14 RX ADMIN — METOPROLOL TARTRATE 5 MG: 1 INJECTION, SOLUTION INTRAVENOUS at 04:07

## 2018-02-14 RX ADMIN — INSULIN LISPRO 2 UNITS: 100 INJECTION, SOLUTION INTRAVENOUS; SUBCUTANEOUS at 17:56

## 2018-02-14 RX ADMIN — DILTIAZEM HYDROCHLORIDE 12.5 MG/HR: 5 INJECTION INTRAVENOUS at 03:57

## 2018-02-14 RX ADMIN — HEPARIN SODIUM 5000 UNITS: 5000 INJECTION, SOLUTION INTRAVENOUS; SUBCUTANEOUS at 14:02

## 2018-02-14 NOTE — PROGRESS NOTES
Cardiology Progress Note - Tess Sat 76 y o  female MRN: [id number]    Unit/Bed#: [facility] 802-01 Encounter: [id number]    Assessment and plan  1   Postop atrial fibrillation  2   Hypertension  3   Leukocytosis     Recommendations:  Overall she is doing well from cardiac standpoint  She has started taking orals today  Start oral Cardizem 60 mg p o  Q 6 hours and discontinue IV Cardizem 1 hour after oral dose  Discontinue IV Lopressor start metoprolol tartrate 25 mg b i d  Use IV Lopressor p r n  heart rate greater than 120  I would recommend we keep for 1 more night to see the adequacy of heart rate control on this medical regimen, if doing well could change to long-acting meds and discharge her tomorrow  I talked to her for a while today about long-term anticoagulation she does not want Coumadin or a novel anticoagulant would like to use aspirin alone  Start aspirin 81 daily when okay with surgery  She is aware of increased risk of stroke        Subjective:    No significant events overnight  Denies chest pain or dyspnea  ROS    Objective:   Vitals: Blood pressure 170/99, pulse 75, temperature 98 2 °F (36 8 °C), temperature source Oral, resp  rate 18, height 5' 6 5" (1 689 m), weight 64 7 kg (142 lb 10 2 oz), SpO2 99 %  , Body mass index is 22 68 kg/m² , Orthostatic Blood Pressures    Flowsheet Row Most Recent Value   Blood Pressure  170/99 filed at 02/14/2018 0700   Patient Position - Orthostatic VS  Lying filed at 02/14/2018 0675         Systolic (39LBJ), MARIEL:623 , Min:167 , MUE:175     Diastolic (08ZFG), PCA:07, Min:74, Max:99      Intake/Output Summary (Last 24 hours) at 02/14/18 0901  Last data filed at 02/14/18 0500   Gross per 24 hour   Intake              820 ml   Output             1200 ml   Net             -380 ml     Weight (last 2 days)     None            Telemetry Review: No significant arrhythmias seen on telemetry review     EKG personally reviewed by Estevan Singh DO  Physical Exam   Constitutional: She is oriented to person, place, and time  She appears well-nourished  No distress  HENT:   Head: Atraumatic  Eyes: Conjunctivae are normal  Pupils are equal, round, and reactive to light  Neck: Neck supple  Cardiovascular: Normal rate, S1 normal and normal heart sounds  An irregularly irregular rhythm present  Exam reveals no friction rub  No murmur heard  Pulmonary/Chest: Effort normal and breath sounds normal  No respiratory distress  She has no wheezes  She has no rales  Abdominal: Bowel sounds are normal  She exhibits no distension  There is no tenderness  There is no rebound  Musculoskeletal: She exhibits no edema  Neurological: She is alert and oriented to person, place, and time  No cranial nerve deficit  Skin: Skin is warm and dry  No erythema  Nursing note and vitals reviewed          Laboratory Results:    Results from last 7 days  Lab Units 02/09/18  2158 02/09/18  1906 02/09/18  1604   TROPONIN I ng/mL 0 05* 0 06* 0 05*       CBC with diff:   Results from last 7 days  Lab Units 02/14/18  0615 02/13/18  0508 02/12/18  0627 02/11/18  0447 02/10/18  0449 02/09/18  1604 02/09/18  0442 02/08/18  0507   WBC Thousand/uL 15 00* 15 22* 14 04* 16 49* 16 60* 15 28* 13 09* 13 26*   HEMOGLOBIN g/dL 10 0* 10 0* 10 1* 10 4* 10 4* 10 7* 9 6* 11 1*   HEMATOCRIT % 29 1* 29 1* 29 2* 29 8* 29 9* 30 9* 28 2* 33 0*   MCV fL 86 86 85 86 87 89 86 88   PLATELETS Thousands/uL 431* 369 318 319 260 255 216 223   MCH pg 29 5 29 7 29 4 29 9 30 2 30 7 29 4 29 4   MCHC g/dL 34 4 34 4 34 6 34 9 34 8 34 6 34 0 33 6   RDW % 14 4 14 5 14 3 14 3 14 5 14 7 14 0 14 3   MPV fL 9 1 9 5 8 7* 9 2 9 4 9 4 9 4 9 4   NRBC AUTO /100 WBCs 0 0 0 0 0  --  0 0         CMP:  Results from last 7 days  Lab Units 02/14/18  0615 02/13/18  0508 02/12/18  0627 02/11/18  0447 02/10/18  0449 02/09/18  1906 02/09/18  0442   SODIUM mmol/L 138 138 135* 132* 132* 133* 137   POTASSIUM mmol/L 4 6 4 3 4 2 4 4 4 4 4 3 4 1   CHLORIDE mmol/L 106 106 104 100 102 102 106   CO2 mmol/L 24 25 24 23 23 24 24   ANION GAP mmol/L 8 7 7 9 7 7 7   BUN mg/dL 19 20 15 19 18 17 11   CREATININE mg/dL 0 60 0 60 0 58* 0 65 0 61 0 62 0 52*   GLUCOSE RANDOM mg/dL 184* 164* 204* 171* 184* 189* 203*   CALCIUM mg/dL 8 3 8 5 8 5 8 8 8 5 8 2* 7 6*   EGFR ml/min/1 73sq m 87 87 88 85 87 86 91         BMP:  Results from last 7 days  Lab Units 18  0615 18  0508 18  0627 18  0447 02/10/18  0449 18  1906 18  0442   SODIUM mmol/L 138 138 135* 132* 132* 133* 137   POTASSIUM mmol/L 4 6 4 3 4 2 4 4 4 4 4 3 4 1   CHLORIDE mmol/L 106 106 104 100 102 102 106   CO2 mmol/L 24 25 24 23 23 24 24   BUN mg/dL 19 20 15 19 18 17 11   CREATININE mg/dL 0 60 0 60 0 58* 0 65 0 61 0 62 0 52*   GLUCOSE RANDOM mg/dL 184* 164* 204* 171* 184* 189* 203*   CALCIUM mg/dL 8 3 8 5 8 5 8 8 8 5 8 2* 7 6*       BNP: No results for input(s): BNP in the last 72 hours      Magnesium:   Results from last 7 days  Lab Units 18  0508 18  0627 18  0447 18  1906 18  0442 18  0507   MAGNESIUM mg/dL 2 2 1 9 1 9 2 4 1 8 1 8       Coags:       TSH:        Hemoglobin A1C       Lipid Profile:       Cardiac testing:   Results for orders placed during the hospital encounter of 18   Echo complete with contrast if indicated    Bimal Beauchamp [address]  [facility] [address]  [phone]    Transthoracic Echocardiogram  2D, M-mode, Doppler, and Color Doppler    Study date:  2018    Patient: Remy Kellogg  MR number: [id number]  Account number: [de-identified]  : 1940  Age: 76 years  Gender: Female  Status: Inpatient  Location: Bedside  Height: 66 in  Weight: 142 lb  BP: 132/ 63 mmHg    Indications: Atrial fibrillation    Diagnoses: I48 0 - Atrial fibrillation    Sonographer:  CAROLA Bardales, RDCS  Primary Physician:  Luis Chery DO  Referring Physician: Ila Mchugh MD  Group:  [facility]  Interpreting Physician:  Sunny Driver MD    SUMMARY    LEFT VENTRICLE:  Systolic function was hyperdynamic by visual assessment  Ejection fraction was estimated to be 70 %  There were no regional wall motion abnormalities  Wall thickness was mildly increased  Left ventricular diastolic function parameters were normal for atrial fibrillation  RIGHT VENTRICLE:  The size was normal   Systolic function was normal     MITRAL VALVE:  There was mild regurgitation  TRICUSPID VALVE:  There was trace regurgitation  HISTORY: PRIOR HISTORY: Hypertension; Hypothryoid; Diabetes Mellitus; Left breast cancer (22years ago); Smoker    PROCEDURE: The procedure was performed at the bedside  This was a routine study  The transthoracic approach was used  The study included complete 2D imaging, M-mode, complete spectral Doppler, and color Doppler  Images were obtained from  the parasternal, apical, subcostal, and suprasternal notch acoustic windows  Echocardiographic views were limited due to poor acoustic window availability  Image quality was adequate  LEFT VENTRICLE: Size was normal  Systolic function was hyperdynamic by visual assessment  Ejection fraction was estimated to be 70 %  There were no regional wall motion abnormalities  Wall thickness was mildly increased  DOPPLER: Left  ventricular diastolic function parameters were normal for atrial fibrillation  RIGHT VENTRICLE: The size was normal  Systolic function was normal  Wall thickness was normal     LEFT ATRIUM: Size was normal     RIGHT ATRIUM: Size was normal     MITRAL VALVE: Valve structure was normal  There was normal leaflet separation  DOPPLER: The transmitral velocity was within the normal range  There was no evidence for stenosis  There was mild regurgitation  AORTIC VALVE: The valve was trileaflet  Leaflets exhibited normal thickness and normal cuspal separation   DOPPLER: Transaortic velocity was within the normal range  There was no evidence for stenosis  There was no significant  regurgitation  TRICUSPID VALVE: The valve structure was normal  There was normal leaflet separation  DOPPLER: The transtricuspid velocity was within the normal range  There was no evidence for stenosis  There was trace regurgitation  Estimated peak PA  pressure was 37 mmHg  PULMONIC VALVE: Leaflets exhibited normal thickness, no calcification, and normal cuspal separation  DOPPLER: The transpulmonic velocity was within the normal range  There was no significant regurgitation  PERICARDIUM: There was no pericardial effusion  The pericardium was normal in appearance  AORTA: The root exhibited normal size  SYSTEMIC VEINS: IVC: The inferior vena cava was normal in size  Respirophasic changes were normal     MEASUREMENT TABLES    OTHER ECHO MEASUREMENTS  (Reference normals)  Estimated CVP   5 mmHg   (--)    SYSTEM MEASUREMENT TABLES    2D  %FS: 29 99 %  Ao Diam: 3 19 cm  EDV(Teich): 71 54 ml  EF(Teich): 57 73 %  ESV(Teich): 30 24 ml  IVSd: 1 13 cm  LA Area: 16 73 cm2  LA Diam: 3 38 cm  LVEDV MOD A4C: 48 ml  LVEF MOD A4C: 71 93 %  LVESV MOD A4C: 13 48 ml  LVIDd: 4 04 cm  LVIDs: 2 83 cm  LVLd A4C: 6 47 cm  LVLs A4C: 5 29 cm  LVPWd: 1 06 cm  RA Area: 13 16 cm2  RVIDd: 3 45 cm  SV MOD A4C: 34 53 ml  SV(Teich): 41 3 ml    CW  TR Vmax: 2 91 m/s  TR maxP 78 mmHg    MM  TAPSE: 1 72 cm    PW  E': 0 09 m/s  E/E': 12 4  MV A Mika: 0 m/s  MV Dec Childress: 5 33 m/s2  MV DecT: 199 99 ms  MV E Mika: 1 07 m/s  MV E/A Ratio: 1088  2  MV PHT: 58 ms  MVA By PHT: 3 79 cm2    [employer] Accredited Echocardiography Laboratory    Prepared and electronically signed by    Nawaf Velazquez MD  Signed 10-Feb-2018 18:24:16       No results found for this or any previous visit  No results found for this or any previous visit  No results found for this or any previous visit      Meds/Allergies   all current active meds have been reviewed  Prescriptions Prior to Admission   Medication    amLODIPine (NORVASC) 5 mg tablet    ascorbic acid (VITAMIN C) 500 mg tablet    Cholecalciferol (VITAMIN D3) 5000 UNITS TABS    CloNIDine HCl ER 0 1 MG TB12    LEVOTHYROXINE SODIUM PO    lisinopril (ZESTRIL) 20 mg tablet    LORazepam (ATIVAN) 0 5 mg tablet    metFORMIN (GLUCOPHAGE) 500 mg tablet    propranolol (INDERAL) 40 mg tablet    Zinc 50 MG CAPS         Adult TPN (CUSTOM BASE/CUSTOM ELECTROLYTE)  Last Rate: 80 8 mL/hr at 02/13/18 2200     Assessment:  Principal Problem:    Small bowel obstruction  Active Problems:    Acute kidney injury ([facility] [address] )    Bowel obstruction    Delirium    Physical deconditioning    Ambulatory dysfunction    Hx of fall    Anxiety

## 2018-02-14 NOTE — SOCIAL WORK
Cm reviewed patient during care coordination rounds  Patient is not stable for discharge today  Anticipated for discharge tomorrow  Patient's diet to be advanced today  TPN  [facility] able to accept upon discharge  Cm to follow

## 2018-02-14 NOTE — PROGRESS NOTES
Progress Note - Acute Care Surgery   De Jt 76 y o  female MRN: [id number]  Unit/Bed#: [facility] 802-01 Encounter: [id number]    Assessment:  76 F with high grade small bowel obstruction, s/p ex-lap and SBR    Plan:  - advance to soft diet  - let TPN run out  - oral cardizem/metoprolol today, patient refusing coumadin  - possible dc home today        Subjective/Objective     Subjective: Multiple BM and flatus  Denies n/v, tolerating clears toast crackers  No abdominal pain    Objective:    Blood pressure 167/77, pulse 89, temperature 98 5 °F (36 9 °C), temperature source Oral, resp  rate 18, height 5' 6 5" (1 689 m), weight 64 7 kg (142 lb 10 2 oz), SpO2 96 %  ,Body mass index is 22 68 kg/m²        Intake/Output Summary (Last 24 hours) at 02/14/18 0623  Last data filed at 02/14/18 0500   Gross per 24 hour   Intake             1270 ml   Output             1600 ml   Net             -330 ml       Invasive Devices     Peripherally Inserted Central Catheter Line            PICC Line 02/06/18 7 days                Physical Exam:   NAD  Norm resp effort  RRR  Abd soft, NT, min distended, incisions c/d/i, some erythema around middle portion        Results from last 7 days  Lab Units 02/13/18  0508 02/12/18  0627 02/11/18  0447   WBC Thousand/uL 15 22* 14 04* 16 49*   HEMOGLOBIN g/dL 10 0* 10 1* 10 4*   HEMATOCRIT % 29 1* 29 2* 29 8*   PLATELETS Thousands/uL 369 318 319       Results from last 7 days  Lab Units 02/13/18  0508 02/12/18  0627 02/11/18  0447   SODIUM mmol/L 138 135* 132*   POTASSIUM mmol/L 4 3 4 2 4 4   CHLORIDE mmol/L 106 104 100   CO2 mmol/L 25 24 23   BUN mg/dL 20 15 19   CREATININE mg/dL 0 60 0 58* 0 65   GLUCOSE RANDOM mg/dL 164* 204* 171*   CALCIUM mg/dL 8 5 8 5 8 8

## 2018-02-14 NOTE — NUTRITION
02/14/18 1257   Recommendations/Interventions   Nutrition Recommendations Other (specify)  (Suggest advance diet to Low fiber, low residue, CCD1  )

## 2018-02-15 LAB
GLUCOSE SERPL-MCNC: 102 MG/DL (ref 65–140)
GLUCOSE SERPL-MCNC: 112 MG/DL (ref 65–140)
GLUCOSE SERPL-MCNC: 125 MG/DL (ref 65–140)
GLUCOSE SERPL-MCNC: 154 MG/DL (ref 65–140)
GLUCOSE SERPL-MCNC: 163 MG/DL (ref 65–140)

## 2018-02-15 PROCEDURE — 97110 THERAPEUTIC EXERCISES: CPT

## 2018-02-15 PROCEDURE — C9113 INJ PANTOPRAZOLE SODIUM, VIA: HCPCS | Performed by: STUDENT IN AN ORGANIZED HEALTH CARE EDUCATION/TRAINING PROGRAM

## 2018-02-15 PROCEDURE — 97116 GAIT TRAINING THERAPY: CPT

## 2018-02-15 PROCEDURE — 99232 SBSQ HOSP IP/OBS MODERATE 35: CPT | Performed by: INTERNAL MEDICINE

## 2018-02-15 PROCEDURE — 99024 POSTOP FOLLOW-UP VISIT: CPT | Performed by: SURGERY

## 2018-02-15 PROCEDURE — 82948 REAGENT STRIP/BLOOD GLUCOSE: CPT

## 2018-02-15 PROCEDURE — 97530 THERAPEUTIC ACTIVITIES: CPT

## 2018-02-15 RX ORDER — DILTIAZEM HYDROCHLORIDE 120 MG/1
240 CAPSULE, COATED, EXTENDED RELEASE ORAL DAILY
Status: DISCONTINUED | OUTPATIENT
Start: 2018-02-15 | End: 2018-02-17 | Stop reason: HOSPADM

## 2018-02-15 RX ADMIN — HEPARIN SODIUM 5000 UNITS: 5000 INJECTION, SOLUTION INTRAVENOUS; SUBCUTANEOUS at 22:00

## 2018-02-15 RX ADMIN — DILTIAZEM HYDROCHLORIDE 240 MG: 120 CAPSULE, COATED, EXTENDED RELEASE ORAL at 11:41

## 2018-02-15 RX ADMIN — METOPROLOL TARTRATE 25 MG: 25 TABLET ORAL at 22:00

## 2018-02-15 RX ADMIN — INSULIN LISPRO 1 UNITS: 100 INJECTION, SOLUTION INTRAVENOUS; SUBCUTANEOUS at 22:00

## 2018-02-15 RX ADMIN — LEVOTHYROXINE SODIUM 137 MCG: 112 TABLET ORAL at 05:09

## 2018-02-15 RX ADMIN — METOPROLOL TARTRATE 25 MG: 25 TABLET ORAL at 08:23

## 2018-02-15 RX ADMIN — HEPARIN SODIUM 5000 UNITS: 5000 INJECTION, SOLUTION INTRAVENOUS; SUBCUTANEOUS at 14:38

## 2018-02-15 RX ADMIN — MELATONIN TAB 3 MG 3 MG: 3 TAB at 22:00

## 2018-02-15 RX ADMIN — HEPARIN SODIUM 5000 UNITS: 5000 INJECTION, SOLUTION INTRAVENOUS; SUBCUTANEOUS at 05:10

## 2018-02-15 RX ADMIN — PANTOPRAZOLE SODIUM 40 MG: 40 INJECTION, POWDER, FOR SOLUTION INTRAVENOUS at 08:24

## 2018-02-15 RX ADMIN — DILTIAZEM HYDROCHLORIDE 60 MG: 60 TABLET, FILM COATED ORAL at 00:04

## 2018-02-15 RX ADMIN — DILTIAZEM HYDROCHLORIDE 60 MG: 60 TABLET, FILM COATED ORAL at 05:09

## 2018-02-15 RX ADMIN — INSULIN LISPRO 1 UNITS: 100 INJECTION, SOLUTION INTRAVENOUS; SUBCUTANEOUS at 16:28

## 2018-02-15 NOTE — PROGRESS NOTES
Cardiology Progress Note - Keny Hanson 76 y o  female MRN: [id number]    Unit/Bed#: [facility] 802-01 Encounter: [id number]    Assessment and plan  1   Postop atrial fibrillation  2   Hypertension  3   Leukocytosis     Recommendations:  Overall she is doing well from cardiac standpoint   Change Cardizem to  daily   Continue metoprolol 25 mg PO b i d  heart rate is well controlled  Start aspirin 81 mg daily when cleared from a surgical standpoint  She understands the risk of stroke  Patient is declining full anticoagulation  She can follow up with me in the office in several weeks       Subjective:    No significant events overnight  Denies chest pain or shortness of breath complaints of diarrhea  ROS    Objective:   Vitals: Blood pressure 143/70, pulse 100, temperature 97 9 °F (36 6 °C), temperature source Oral, resp  rate 16, height 5' 6 5" (1 689 m), weight 64 7 kg (142 lb 10 2 oz), SpO2 98 %  , Body mass index is 22 68 kg/m² , Orthostatic Blood Pressures    Flowsheet Row Most Recent Value   Blood Pressure  143/70 filed at 02/15/2018 0704   Patient Position - Orthostatic VS  Sitting filed at 02/15/2018 4308         Systolic (17JGC), UFL:462 , Min:139 , ABILIO:538     Diastolic (47GYL), CJD:62, Min:66, Max:86      Intake/Output Summary (Last 24 hours) at 02/15/18 0941  Last data filed at 02/15/18 4089   Gross per 24 hour   Intake              720 ml   Output              150 ml   Net              570 ml     Weight (last 2 days)     None            Telemetry Review: No significant arrhythmias seen on telemetry review  EKG personally reviewed by Fercho Murphy DO  Physical Exam   Constitutional: She is oriented to person, place, and time  She appears well-nourished  No distress  HENT:   Head: Atraumatic  Eyes: Conjunctivae are normal  Pupils are equal, round, and reactive to light  Neck: Neck supple  Cardiovascular: Normal rate, S1 normal and normal heart sounds    An irregularly irregular rhythm present  Exam reveals no friction rub  No murmur heard  Pulmonary/Chest: Effort normal  No respiratory distress  She has decreased breath sounds  She has no wheezes  She has no rales  Abdominal: Bowel sounds are normal  She exhibits no distension  There is no tenderness  There is no rebound  Musculoskeletal: She exhibits no edema  Neurological: She is alert and oriented to person, place, and time  No cranial nerve deficit  Skin: Skin is warm and dry  No erythema  Nursing note and vitals reviewed          Laboratory Results:    Results from last 7 days  Lab Units 02/09/18  2158 02/09/18  1906 02/09/18  1604   TROPONIN I ng/mL 0 05* 0 06* 0 05*       CBC with diff:   Results from last 7 days  Lab Units 02/14/18  0615 02/13/18  0508 02/12/18  0627 02/11/18  0447 02/10/18  0449 02/09/18  1604 02/09/18  0442   WBC Thousand/uL 15 00* 15 22* 14 04* 16 49* 16 60* 15 28* 13 09*   HEMOGLOBIN g/dL 10 0* 10 0* 10 1* 10 4* 10 4* 10 7* 9 6*   HEMATOCRIT % 29 1* 29 1* 29 2* 29 8* 29 9* 30 9* 28 2*   MCV fL 86 86 85 86 87 89 86   PLATELETS Thousands/uL 431* 369 318 319 260 255 216   MCH pg 29 5 29 7 29 4 29 9 30 2 30 7 29 4   MCHC g/dL 34 4 34 4 34 6 34 9 34 8 34 6 34 0   RDW % 14 4 14 5 14 3 14 3 14 5 14 7 14 0   MPV fL 9 1 9 5 8 7* 9 2 9 4 9 4 9 4   NRBC AUTO /100 WBCs 0 0 0 0 0  --  0         CMP:  Results from last 7 days  Lab Units 02/14/18  0615 02/13/18  0508 02/12/18  0627 02/11/18  0447 02/10/18  0449 02/09/18  1906 02/09/18  0442   SODIUM mmol/L 138 138 135* 132* 132* 133* 137   POTASSIUM mmol/L 4 6 4 3 4 2 4 4 4 4 4 3 4 1   CHLORIDE mmol/L 106 106 104 100 102 102 106   CO2 mmol/L 24 25 24 23 23 24 24   ANION GAP mmol/L 8 7 7 9 7 7 7   BUN mg/dL 19 20 15 19 18 17 11   CREATININE mg/dL 0 60 0 60 0 58* 0 65 0 61 0 62 0 52*   GLUCOSE RANDOM mg/dL 184* 164* 204* 171* 184* 189* 203*   CALCIUM mg/dL 8 3 8 5 8 5 8 8 8 5 8 2* 7 6*   EGFR ml/min/1 73sq m 87 87 88 85 87 86 91         BMP:  Results from last 7 days  Lab Units 18  0615 18  0508 18  0627 18  0447 02/10/18  0449 18  1906 18  0442   SODIUM mmol/L 138 138 135* 132* 132* 133* 137   POTASSIUM mmol/L 4 6 4 3 4 2 4 4 4 4 4 3 4 1   CHLORIDE mmol/L 106 106 104 100 102 102 106   CO2 mmol/L 24 25 24 23 23 24 24   BUN mg/dL 19 20 15 19 18 17 11   CREATININE mg/dL 0 60 0 60 0 58* 0 65 0 61 0 62 0 52*   GLUCOSE RANDOM mg/dL 184* 164* 204* 171* 184* 189* 203*   CALCIUM mg/dL 8 3 8 5 8 5 8 8 8 5 8 2* 7 6*       BNP: No results for input(s): BNP in the last 72 hours  Magnesium:   Results from last 7 days  Lab Units 18  0508 18  0627 187 18  1906 18  0442   MAGNESIUM mg/dL 2 2 1 9 1 9 2 4 1 8       Coags:       TSH:        Hemoglobin A1C       Lipid Profile:       Cardiac testing:   Results for orders placed during the hospital encounter of 18   Echo complete with contrast if indicated    Narrative [facility] 175  [address] [facility]  [address] [facility]  [phone]    Transthoracic Echocardiogram  2D, M-mode, Doppler, and Color Doppler    Study date:  2018    Patient: Sneha Browne  MR number: [id number]  Account number: [de-identified]  : 1940  Age: 76 years  Gender: Female  Status: Inpatient  Location: Bedside  Height: 66 in  Weight: 142 lb  BP: 132/ 63 mmHg    Indications: Atrial fibrillation    Diagnoses: I48 0 - Atrial fibrillation    Sonographer:  CAROLA Yu, RDCS  Primary Physician:  Kaitlynn Nichols DO  Referring Physician:  Mary Jeffery MD  Group:  [facility]  Interpreting Physician:  Wesley Bell MD    SUMMARY    LEFT VENTRICLE:  Systolic function was hyperdynamic by visual assessment  Ejection fraction was estimated to be 70 %  There were no regional wall motion abnormalities  Wall thickness was mildly increased    Left ventricular diastolic function parameters were normal for atrial fibrillation  RIGHT VENTRICLE:  The size was normal   Systolic function was normal     MITRAL VALVE:  There was mild regurgitation  TRICUSPID VALVE:  There was trace regurgitation  HISTORY: PRIOR HISTORY: Hypertension; Hypothryoid; Diabetes Mellitus; Left breast cancer (22years ago); Smoker    PROCEDURE: The procedure was performed at the bedside  This was a routine study  The transthoracic approach was used  The study included complete 2D imaging, M-mode, complete spectral Doppler, and color Doppler  Images were obtained from  the parasternal, apical, subcostal, and suprasternal notch acoustic windows  Echocardiographic views were limited due to poor acoustic window availability  Image quality was adequate  LEFT VENTRICLE: Size was normal  Systolic function was hyperdynamic by visual assessment  Ejection fraction was estimated to be 70 %  There were no regional wall motion abnormalities  Wall thickness was mildly increased  DOPPLER: Left  ventricular diastolic function parameters were normal for atrial fibrillation  RIGHT VENTRICLE: The size was normal  Systolic function was normal  Wall thickness was normal     LEFT ATRIUM: Size was normal     RIGHT ATRIUM: Size was normal     MITRAL VALVE: Valve structure was normal  There was normal leaflet separation  DOPPLER: The transmitral velocity was within the normal range  There was no evidence for stenosis  There was mild regurgitation  AORTIC VALVE: The valve was trileaflet  Leaflets exhibited normal thickness and normal cuspal separation  DOPPLER: Transaortic velocity was within the normal range  There was no evidence for stenosis  There was no significant  regurgitation  TRICUSPID VALVE: The valve structure was normal  There was normal leaflet separation  DOPPLER: The transtricuspid velocity was within the normal range  There was no evidence for stenosis  There was trace regurgitation  Estimated peak PA  pressure was 37 mmHg      PULMONIC VALVE: Leaflets exhibited normal thickness, no calcification, and normal cuspal separation  DOPPLER: The transpulmonic velocity was within the normal range  There was no significant regurgitation  PERICARDIUM: There was no pericardial effusion  The pericardium was normal in appearance  AORTA: The root exhibited normal size  SYSTEMIC VEINS: IVC: The inferior vena cava was normal in size  Respirophasic changes were normal     MEASUREMENT TABLES    OTHER ECHO MEASUREMENTS  (Reference normals)  Estimated CVP   5 mmHg   (--)    SYSTEM MEASUREMENT TABLES    2D  %FS: 29 99 %  Ao Diam: 3 19 cm  EDV(Teich): 71 54 ml  EF(Teich): 57 73 %  ESV(Teich): 30 24 ml  IVSd: 1 13 cm  LA Area: 16 73 cm2  LA Diam: 3 38 cm  LVEDV MOD A4C: 48 ml  LVEF MOD A4C: 71 93 %  LVESV MOD A4C: 13 48 ml  LVIDd: 4 04 cm  LVIDs: 2 83 cm  LVLd A4C: 6 47 cm  LVLs A4C: 5 29 cm  LVPWd: 1 06 cm  RA Area: 13 16 cm2  RVIDd: 3 45 cm  SV MOD A4C: 34 53 ml  SV(Teich): 41 3 ml    CW  TR Vmax: 2 91 m/s  TR maxP 78 mmHg    MM  TAPSE: 1 72 cm    PW  E': 0 09 m/s  E/E': 12 4  MV A Mika: 0 m/s  MV Dec [place]: 5 33 m/s2  MV DecT: 199 99 ms  MV E Mika: 1 07 m/s  MV E/A Ratio: 1088  2  MV PHT: 58 ms  MVA By PHT: 3 79 cm2    [facility] Accredited Echocardiography Laboratory    Prepared and electronically signed by    Yury Erickson MD  Signed 10-Feb-2018 18:24:16       No results found for this or any previous visit  No results found for this or any previous visit  No results found for this or any previous visit      Meds/Allergies   all current active meds have been reviewed  Prescriptions Prior to Admission   Medication    amLODIPine (NORVASC) 5 mg tablet    ascorbic acid (VITAMIN C) 500 mg tablet    Cholecalciferol (VITAMIN D3) 5000 UNITS TABS    CloNIDine HCl ER 0 1 MG TB12    LEVOTHYROXINE SODIUM PO    lisinopril (ZESTRIL) 20 mg tablet    LORazepam (ATIVAN) 0 5 mg tablet    metFORMIN (GLUCOPHAGE) 500 mg tablet    propranolol (INDERAL) 40 mg tablet    Zinc 50 MG CAPS          Assessment:  Principal Problem:    Small bowel obstruction  Active Problems:    Acute kidney injury ([address] )    Bowel obstruction    Delirium    Physical deconditioning    Ambulatory dysfunction    Hx of fall    Anxiety

## 2018-02-15 NOTE — SOCIAL WORK
Cm reviewed patient during care coordination rounds  Pt is not stable for discharge today  Patient anticipated for discharge tomorrow  Pt to discharge home with NEHEMIAH  RW & hector ordered  Cm continues to follow and work on patient's discharge plan

## 2018-02-15 NOTE — PROGRESS NOTES
Patient called nursing staff into the room stating that her abdominal dressing feels wet  Upon assessment, the abdominal incision was draining a moderate amount of brown/pus like drainage  The abdominal incision appears to be reddened  Red surgery notified  They will come assess the incision soon  No further orders  Will continue to monitor

## 2018-02-15 NOTE — PROGRESS NOTES
Patient care rounds were completed with the patient's nurse today, Nathalia Moscoso  We discussed the plan is to continue oral diet as tolerated  Monitor heart rate with changed oral medications by Cardiology  Monitor bowel function and discuss bedside commode for discharge  We reviewed all of the invasive devices/lines/telemetry orders   - PICC line to be continued for IV access; anticipate discontinuing line prior to discharge  Pain Assessment / Plan:  - Continue current analgesic regimen  Mobility Assessment / Plan:  Continue out of bed and ambulation as tolerated with PT and OT recommending home therapy  Goals / Barriers for discharge:  - Patient is still nauseous this morning and with significant amount of loose stools  Anticipate discharge in the next 24 hours if she continues to tolerate her diet and her bowel function is manageable  - Case management following  All questions and concerns were addressed  I spent greater than 15 minutes reviewing the plan with the patient and the nurse, and coordinating her care for the day      Mikki Han PA-C  2/15/2018 11:20 AM

## 2018-02-15 NOTE — PHYSICAL THERAPY NOTE
Physical Therapy Progress Note     02/15/18 0820   Pain Assessment   Pain Assessment No/denies pain   Pain Score No Pain   Restrictions/Precautions   Weight Bearing Precautions Per Order No   Other Precautions Fall Risk   General   Chart Reviewed Yes   Family/Caregiver Present No   Subjective   Subjective Pt agrees to participate   Bed Mobility   Supine to Sit 5  Supervision   Additional items Assist x 1;HOB elevated   Sit to Supine 5  Supervision   Additional items Assist x 1;HOB elevated   Transfers   Sit to Stand 5  Supervision   Additional items Assist x 1;Verbal cues   Stand to Sit 5  Supervision   Additional items Assist x 1;Verbal cues   Ambulation/Elevation   Gait pattern Short stride; Foward flexed   Gait Assistance 5  Supervision   Additional items Assist x 1;Verbal cues   Assistive Device [facility]   Distance 250   Balance   Static Sitting Good   Static Standing Fair   Ambulatory Fair   Endurance Deficit   Endurance Deficit Yes   Endurance Deficit Description fatigue/weakness   Activity Tolerance   Activity Tolerance Patient limited by fatigue   Nurse Made Aware Tuyet Perez RN aware   Exercises   THR Supine;20 reps;AROM; Bilateral   Assessment   Prognosis Good   Problem List Decreased strength;Decreased endurance; Impaired balance;Decreased mobility   Assessment Pt is progressing well with use of straight cane  Requires cues for safety with transfers  Required additional time to day to assist with bowel management in retrieving appropriate items/products  Steady today with straight cane and without loss of balance  Pt would benefit from continued physical therapy to maximize functional mobility  Barriers to Discharge Inaccessible home environment   Goals   Patient Goals "I hope this stuff ends soon "   LTG Expiration Date 02/21/18   Treatment Day 2   Plan   Treatment/Interventions Functional transfer training;LE strengthening/ROM; Therapeutic exercise; Endurance training;Patient/family training;Bed mobility;Gait training   Progress Progressing toward goals   PT Frequency 5x/wk   Recommendation   Recommendation Home PT   Equipment Recommended Shellie Limon, PTA

## 2018-02-15 NOTE — PROGRESS NOTES
Progress Note - Acute Care Surgery   Ramiro Lopez 76 y o  female MRN: [id number]  [facility] Encounter: [id number]    Assessment:  76 F with high grade small bowel obstruction, s/p ex-lap and SBR    Plan:  - soft diet as tolerated  - oral cardizem/metoprolol per cardiology  - if cards signs off on d/c possible discharge home today  - prn pain control  - OOB/ambulate  - possible dc home today        Subjective/Objective     Subjective: Tolerating diet, having loose BM  Objective:    Blood pressure 139/79, pulse (!) 110, temperature 98 8 °F (37 1 °C), temperature source Oral, resp  rate 15, height 5' 6 5" (1 689 m), weight 64 7 kg (142 lb 10 2 oz), SpO2 97 %  ,Body mass index is 22 68 kg/m²        Intake/Output Summary (Last 24 hours) at 02/15/18 0618  Last data filed at 02/14/18 1830   Gross per 24 hour   Intake              480 ml   Output              150 ml   Net              330 ml       Invasive Devices     Peripherally Inserted Central Catheter Line            PICC Line 02/06/18 8 days                Physical Exam:   NAD  Norm resp effort  RRR  Abd soft, NT, min distended, incisions c/d/i, some erythema around middle portion        Results from last 7 days  Lab Units 02/14/18  0615 02/13/18  0508 02/12/18  0627   WBC Thousand/uL 15 00* 15 22* 14 04*   HEMOGLOBIN g/dL 10 0* 10 0* 10 1*   HEMATOCRIT % 29 1* 29 1* 29 2*   PLATELETS Thousands/uL 431* 369 318       Results from last 7 days  Lab Units 02/14/18  0615 02/13/18  0508 02/12/18  0627   SODIUM mmol/L 138 138 135*   POTASSIUM mmol/L 4 6 4 3 4 2   CHLORIDE mmol/L 106 106 104   CO2 mmol/L 24 25 24   BUN mg/dL 19 20 15   CREATININE mg/dL 0 60 0 60 0 58*   GLUCOSE RANDOM mg/dL 184* 164* 204*   CALCIUM mg/dL 8 3 8 5 8 5

## 2018-02-15 NOTE — PLAN OF CARE
Problem: PHYSICAL THERAPY ADULT  Goal: Performs mobility at highest level of function for planned discharge setting  See evaluation for individualized goals  Treatment/Interventions: Functional transfer training, LE strengthening/ROM, Therapeutic exercise, Endurance training, Bed mobility, Gait training, Spoke to nursing  Equipment Recommended: Minnie ([facility] )       See flowsheet documentation for full assessment, interventions and recommendations  Outcome: Progressing  Prognosis: Good  Problem List: Decreased strength, Decreased endurance, Impaired balance, Decreased mobility  Assessment: Pt is progressing well with use of straight cane  Requires cues for safety with transfers  Required additional time to day to assist with bowel management in retrieving appropriate items/products  Steady today with straight cane and without loss of balance  Pt would benefit from continued physical therapy to maximize functional mobility  Barriers to Discharge: Inaccessible home environment     Recommendation: Home PT     PT - OK to Discharge: (S) Yes (TO Domitila Lawton 92 )    See flowsheet documentation for full assessment

## 2018-02-16 ENCOUNTER — APPOINTMENT (INPATIENT)
Dept: RADIOLOGY | Facility: HOSPITAL | Age: 78
DRG: 329 | End: 2018-02-16
Payer: MEDICARE

## 2018-02-16 LAB
ANION GAP SERPL CALCULATED.3IONS-SCNC: 8 MMOL/L (ref 4–13)
BASOPHILS # BLD AUTO: 0.04 THOUSANDS/ΜL (ref 0–0.1)
BASOPHILS NFR BLD AUTO: 0 % (ref 0–1)
BUN SERPL-MCNC: 16 MG/DL (ref 5–25)
CALCIUM SERPL-MCNC: 8.2 MG/DL (ref 8.3–10.1)
CHLORIDE SERPL-SCNC: 105 MMOL/L (ref 100–108)
CO2 SERPL-SCNC: 25 MMOL/L (ref 21–32)
CREAT SERPL-MCNC: 0.77 MG/DL (ref 0.6–1.3)
EOSINOPHIL # BLD AUTO: 0.11 THOUSAND/ΜL (ref 0–0.61)
EOSINOPHIL NFR BLD AUTO: 1 % (ref 0–6)
ERYTHROCYTE [DISTWIDTH] IN BLOOD BY AUTOMATED COUNT: 14.9 % (ref 11.6–15.1)
GFR SERPL CREATININE-BSD FRML MDRD: 74 ML/MIN/1.73SQ M
GLUCOSE SERPL-MCNC: 144 MG/DL (ref 65–140)
GLUCOSE SERPL-MCNC: 146 MG/DL (ref 65–140)
GLUCOSE SERPL-MCNC: 155 MG/DL (ref 65–140)
GLUCOSE SERPL-MCNC: 172 MG/DL (ref 65–140)
GLUCOSE SERPL-MCNC: 202 MG/DL (ref 65–140)
HCT VFR BLD AUTO: 27.2 % (ref 34.8–46.1)
HGB BLD-MCNC: 9.4 G/DL (ref 11.5–15.4)
LYMPHOCYTES # BLD AUTO: 1.12 THOUSANDS/ΜL (ref 0.6–4.47)
LYMPHOCYTES NFR BLD AUTO: 11 % (ref 14–44)
MCH RBC QN AUTO: 29.8 PG (ref 26.8–34.3)
MCHC RBC AUTO-ENTMCNC: 34.6 G/DL (ref 31.4–37.4)
MCV RBC AUTO: 86 FL (ref 82–98)
MONOCYTES # BLD AUTO: 1.03 THOUSAND/ΜL (ref 0.17–1.22)
MONOCYTES NFR BLD AUTO: 10 % (ref 4–12)
NEUTROPHILS # BLD AUTO: 7.73 THOUSANDS/ΜL (ref 1.85–7.62)
NEUTS SEG NFR BLD AUTO: 78 % (ref 43–75)
NRBC BLD AUTO-RTO: 0 /100 WBCS
PLATELET # BLD AUTO: 418 THOUSANDS/UL (ref 149–390)
PMV BLD AUTO: 9 FL (ref 8.9–12.7)
POTASSIUM SERPL-SCNC: 4 MMOL/L (ref 3.5–5.3)
RBC # BLD AUTO: 3.15 MILLION/UL (ref 3.81–5.12)
SODIUM SERPL-SCNC: 138 MMOL/L (ref 136–145)
WBC # BLD AUTO: 10.1 THOUSAND/UL (ref 4.31–10.16)

## 2018-02-16 PROCEDURE — 3E10X8Z IRRIGATION OF SKIN AND MUCOUS MEMBRANES USING IRRIGATING SUBSTANCE: ICD-10-PCS | Performed by: SURGERY

## 2018-02-16 PROCEDURE — 85025 COMPLETE CBC W/AUTO DIFF WBC: CPT | Performed by: STUDENT IN AN ORGANIZED HEALTH CARE EDUCATION/TRAINING PROGRAM

## 2018-02-16 PROCEDURE — 80048 BASIC METABOLIC PNL TOTAL CA: CPT | Performed by: STUDENT IN AN ORGANIZED HEALTH CARE EDUCATION/TRAINING PROGRAM

## 2018-02-16 PROCEDURE — 74177 CT ABD & PELVIS W/CONTRAST: CPT

## 2018-02-16 PROCEDURE — C9113 INJ PANTOPRAZOLE SODIUM, VIA: HCPCS | Performed by: STUDENT IN AN ORGANIZED HEALTH CARE EDUCATION/TRAINING PROGRAM

## 2018-02-16 PROCEDURE — 99024 POSTOP FOLLOW-UP VISIT: CPT | Performed by: SURGERY

## 2018-02-16 PROCEDURE — 82948 REAGENT STRIP/BLOOD GLUCOSE: CPT

## 2018-02-16 RX ADMIN — INSULIN LISPRO 1 UNITS: 100 INJECTION, SOLUTION INTRAVENOUS; SUBCUTANEOUS at 17:06

## 2018-02-16 RX ADMIN — INSULIN LISPRO 1 UNITS: 100 INJECTION, SOLUTION INTRAVENOUS; SUBCUTANEOUS at 21:11

## 2018-02-16 RX ADMIN — HEPARIN SODIUM 5000 UNITS: 5000 INJECTION, SOLUTION INTRAVENOUS; SUBCUTANEOUS at 05:48

## 2018-02-16 RX ADMIN — PANTOPRAZOLE SODIUM 40 MG: 40 INJECTION, POWDER, FOR SOLUTION INTRAVENOUS at 08:16

## 2018-02-16 RX ADMIN — METOPROLOL TARTRATE 5 MG: 1 INJECTION, SOLUTION INTRAVENOUS at 03:20

## 2018-02-16 RX ADMIN — METOPROLOL TARTRATE 25 MG: 25 TABLET ORAL at 08:16

## 2018-02-16 RX ADMIN — IOHEXOL 100 ML: 350 INJECTION, SOLUTION INTRAVENOUS at 10:58

## 2018-02-16 RX ADMIN — HEPARIN SODIUM 5000 UNITS: 5000 INJECTION, SOLUTION INTRAVENOUS; SUBCUTANEOUS at 21:06

## 2018-02-16 RX ADMIN — LEVOTHYROXINE SODIUM 137 MCG: 112 TABLET ORAL at 05:47

## 2018-02-16 RX ADMIN — DILTIAZEM HYDROCHLORIDE 240 MG: 120 CAPSULE, COATED, EXTENDED RELEASE ORAL at 08:16

## 2018-02-16 RX ADMIN — HEPARIN SODIUM 5000 UNITS: 5000 INJECTION, SOLUTION INTRAVENOUS; SUBCUTANEOUS at 13:18

## 2018-02-16 RX ADMIN — MELATONIN TAB 3 MG 3 MG: 3 TAB at 21:06

## 2018-02-16 RX ADMIN — METOPROLOL TARTRATE 25 MG: 25 TABLET ORAL at 21:06

## 2018-02-16 RX ADMIN — INSULIN LISPRO 1 UNITS: 100 INJECTION, SOLUTION INTRAVENOUS; SUBCUTANEOUS at 11:11

## 2018-02-16 NOTE — PROGRESS NOTES
Progress Note - Acute Care Surgery   Mertie Simpler 76 y o  female MRN: [id number]  Unit/Bed#: [facility] 802-01 Encounter: [id number]    Assessment:  76 F with high grade small bowel obstruction, s/p ex-lap and SBR    Has RBF  Wound open and probed w/ drainage of fat necrosis    Plan:  - Diet as tolerated  - oral cardizem/metoprolol per cardiology  - PRN pain control  - Local wound care  - Home w/ [facility] today      Subjective/Objective     Subjective: Tolerating diet, having loose BM  Objective:    Blood pressure 150/64, pulse 74, temperature 98 2 °F (36 8 °C), temperature source Oral, resp  rate 18, height 5' 6 5" (1 689 m), weight 64 7 kg (142 lb 10 2 oz), SpO2 96 %  ,Body mass index is 22 68 kg/m²        Intake/Output Summary (Last 24 hours) at 02/16/18 0625  Last data filed at 02/15/18 2201   Gross per 24 hour   Intake              480 ml   Output              650 ml   Net             -170 ml       Invasive Devices     Peripherally Inserted Central Catheter Line            PICC Line 02/06/18 9 days                Physical Exam:   NAD  Norm resp effort  RRR  Abd soft, NT, min distended, incision opened in middle; draining        Results from last 7 days  Lab Units 02/14/18  0615 02/13/18  0508 02/12/18  0627   WBC Thousand/uL 15 00* 15 22* 14 04*   HEMOGLOBIN g/dL 10 0* 10 0* 10 1*   HEMATOCRIT % 29 1* 29 1* 29 2*   PLATELETS Thousands/uL 431* 369 318       Results from last 7 days  Lab Units 02/14/18  0615 02/13/18  0508 02/12/18  0627   SODIUM mmol/L 138 138 135*   POTASSIUM mmol/L 4 6 4 3 4 2   CHLORIDE mmol/L 106 106 104   CO2 mmol/L 24 25 24   BUN mg/dL 19 20 15   CREATININE mg/dL 0 60 0 60 0 58*   GLUCOSE RANDOM mg/dL 184* 164* 204*   CALCIUM mg/dL 8 3 8 5 8 5

## 2018-02-16 NOTE — PROGRESS NOTES
General Surgery Update    Packing removed midline abdominal wound  One staple above and below packing area was removed  There is copious amounts of fluid draining from the wound  Is slightly concerned because the fluid is a reddish salmon-colored, however upon probing the fascia that is not appear to be dehiscence  I irrigated the wound with saline and then repacked with a single Lopez wrap  We will continue to observe      Pamela Copeland MD PGY-4  2:38 AM  02/16/18

## 2018-02-16 NOTE — SOCIAL WORK
Cm spoke with [facility], who is waiting for attending to determine patient's discharge  Anticipated for discharge today  [facility] able to accept  RW & commode were ordered for patient  Cm to follow

## 2018-02-16 NOTE — MEDICAL STUDENT
Patient informed nursing that dressing feels wet, nursing let us know  Upon assessing the dressing and midline incision, the incision was draining red-brown serous fluid with some pus noted on the abdominal pad overlying the staple line  This pus could be expelled from the wound, mostly around the 2 staples immediately superior to the umbilicus  The staples were removed, and the opening was probed with a q-tip and approximately 10mL of red-brown serous fluid was expelled  The tract extends inferiorly to the bottom of the incision  The tract and opening were loosly packed with 1/4 inch packing, and the incision was covered with a 4x4 gauze and an abdominal pad  Please continue to monitor

## 2018-02-17 VITALS
TEMPERATURE: 97.7 F | BODY MASS INDEX: 22.39 KG/M2 | HEART RATE: 80 BPM | RESPIRATION RATE: 16 BRPM | OXYGEN SATURATION: 98 % | HEIGHT: 67 IN | SYSTOLIC BLOOD PRESSURE: 160 MMHG | DIASTOLIC BLOOD PRESSURE: 70 MMHG | WEIGHT: 142.64 LBS

## 2018-02-17 LAB — GLUCOSE SERPL-MCNC: 155 MG/DL (ref 65–140)

## 2018-02-17 PROCEDURE — C9113 INJ PANTOPRAZOLE SODIUM, VIA: HCPCS | Performed by: STUDENT IN AN ORGANIZED HEALTH CARE EDUCATION/TRAINING PROGRAM

## 2018-02-17 PROCEDURE — 99024 POSTOP FOLLOW-UP VISIT: CPT | Performed by: SURGERY

## 2018-02-17 PROCEDURE — 82948 REAGENT STRIP/BLOOD GLUCOSE: CPT

## 2018-02-17 RX ORDER — DILTIAZEM HYDROCHLORIDE 240 MG/1
240 CAPSULE, COATED, EXTENDED RELEASE ORAL DAILY
Qty: 30 CAPSULE | Refills: 0 | Status: SHIPPED | OUTPATIENT
Start: 2018-02-17 | End: 2018-03-16 | Stop reason: SDUPTHER

## 2018-02-17 RX ADMIN — DILTIAZEM HYDROCHLORIDE 240 MG: 120 CAPSULE, COATED, EXTENDED RELEASE ORAL at 10:39

## 2018-02-17 RX ADMIN — HEPARIN SODIUM 5000 UNITS: 5000 INJECTION, SOLUTION INTRAVENOUS; SUBCUTANEOUS at 05:07

## 2018-02-17 RX ADMIN — LEVOTHYROXINE SODIUM 137 MCG: 112 TABLET ORAL at 05:07

## 2018-02-17 RX ADMIN — METOPROLOL TARTRATE 25 MG: 25 TABLET ORAL at 10:40

## 2018-02-17 RX ADMIN — INSULIN LISPRO 1 UNITS: 100 INJECTION, SOLUTION INTRAVENOUS; SUBCUTANEOUS at 10:40

## 2018-02-17 RX ADMIN — PANTOPRAZOLE SODIUM 40 MG: 40 INJECTION, POWDER, FOR SOLUTION INTRAVENOUS at 10:39

## 2018-02-17 NOTE — DISCHARGE SUMMARY
Discharge Summary - Bhumika Borja 76 y o  female MRN: [id number]    [facility] Encounter: [id number]    Admission Date: 2/1/2018     Admitting Diagnosis: Vomiting [R11 10]    HPI: 76 y o  female who presents with 3 days of acute onset nausea and vomiting  Patient had surgical history significant for ventral hernia repair as well as cholecystectomy  Patient underwent CT scan in the ED that demonstrated high-grade bowel obstruction  An NG tube was placed and patient was admitted for conservative management of small-bowel obstruction  Procedures Performed:   2/5/2018 - Exploratory laparotomy, small bowel resection - Dr Ryan Velasco Course: The patient was initially treated with a course of NG tube decompression and IV fluid resuscitation  By hospital day 4, patient had failure to resolve her small bowel obstruction  At that point, the decision was made to take her to the operating room for exploratory laparotomy where 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  She subsequently had a small bowel resection with primary anastomosis  The patient was subsequently transferred back to the [facility] for further workup  The patient's postoperative course was complicated by atrial fibrillation as well as prolonged ileus  CT scan at that time showed no leak  A PICC line was placed and TPN was ordered for nutrition  Cardiology was consulted for atrial fibrillation  They recommended Cardizem drip as well as anticoagulation  After detailed discussions with the patient, she declined anticoagulation due to fall risk  The patient's heart rate was subsequently rate controlled and she was transitioned to p o  Cardizem  She was seen evaluated by PT OT was cleared for discharge to home with PT OT  She did have return of bowel function her NGT was removed  She was advanced to regular diet for which he was tolerating with out difficulty   On hospitalization day 14, she began having purulent drainage from her midline  Staples were removed and her wound was probed that demonstrated fat necrosis within the incision, without evidence of fascial dehiscence  CT scan of the abdomen and pelvis confirmed this finding  There were no other acute intra-abdominal findings  At that point, the patient was cleared for discharge to home with nursing and home PT  She was seen evaluated and discharged to home on 02/17/2018  Significant Findings, Care, Treatment and Services Provided:  As above    Complications:  Atrial fibrillation, wound infection    Discharge Diagnosis: Small bowel obstruction    Resolved Problems  Date Reviewed: 2/15/2018    None          Condition at Discharge: good     Discharge instructions/Information to patient and family:   See after visit summary for information provided to patient and family  Provisions for Follow-Up Care:  See after visit summary for information related to follow-up care and any pertinent home health orders  Disposition: Home    Planned Readmission: No    Discharge Statement   I spent 30 minutes discharging the patient  This time was spent on the day of discharge  I had direct contact with the patient on the day of discharge  Additional documentation is required if more than 30 minutes were spent on discharge  Discharge Medications:  See after visit summary for reconciled discharge medications provided to patient and family

## 2018-02-17 NOTE — PROGRESS NOTES
Progress Note - Acute Care Surgery   Yana Qureshi 76 y o  female MRN: [id number]  Unit/Bed#: [facility] 802-01 Encounter: [id number]    Assessment:  76 F with high grade small bowel obstruction, s/p ex-lap and SBR    Cont packing to wound  Fascia intact  No acute CT findings    Plan:  - Diet as tolerated  - oral cardizem/metoprolol per cardiology  - PRN pain control  - Local wound care  - Home w/ [facility] today      Subjective/Objective     Subjective: Tolerating diet    Objective:    Blood pressure 160/70, pulse 80, temperature 97 7 °F (36 5 °C), temperature source Oral, resp  rate 16, height 5' 6 5" (1 689 m), weight 64 7 kg (142 lb 10 2 oz), SpO2 98 %  ,Body mass index is 22 68 kg/m²        Intake/Output Summary (Last 24 hours) at 02/17/18 0956  Last data filed at 02/17/18 0900   Gross per 24 hour   Intake             1200 ml   Output                0 ml   Net             1200 ml       Invasive Devices     Peripherally Inserted Central Catheter Line            PICC Line 02/06/18 10 days                Physical Exam:   NAD  Norm resp effort  RRR  Abd soft, NT, min distended, incision opened in middle; draining        Results from last 7 days  Lab Units 02/16/18  0818 02/14/18  0615 02/13/18  0508   WBC Thousand/uL 10 10 15 00* 15 22*   HEMOGLOBIN g/dL 9 4* 10 0* 10 0*   HEMATOCRIT % 27 2* 29 1* 29 1*   PLATELETS Thousands/uL 418* 431* 369       Results from last 7 days  Lab Units 02/16/18  0818 02/14/18  0615 02/13/18  0508   SODIUM mmol/L 138 138 138   POTASSIUM mmol/L 4 0 4 6 4 3   CHLORIDE mmol/L 105 106 106   CO2 mmol/L 25 24 25   BUN mg/dL 16 19 20   CREATININE mg/dL 0 77 0 60 0 60   GLUCOSE RANDOM mg/dL 146* 184* 164*   CALCIUM mg/dL 8 2* 8 3 8 5

## 2018-02-17 NOTE — DISCHARGE INSTRUCTIONS
Acute Care Surgery Discharge Instructions    Please follow-up as instructed  If you do not already have a follow-up appointment, please call the office when you leave to schedule an appointment to be seen in 2-3 weeks for post-operative re-evaluation  Activity:  - PT and OT evaluation and treatment as indicated  - No lifting greater than 20 pounds or strenuous physical activity or exercise for at least 4 weeks  - Walking and normal light activities are encouraged  - Normal daily activities including climbing steps are okay  - No driving until no longer using pain medications  Diet:    - You may resume your normal diet  Wound Care:  - May shower daily  No tub baths or swimming until cleared by your surgeon   - Wash incision gently with soap and water and pat dry  - Do not apply any creams or ointments unless instructed to do so by your surgeon   - Please change packing to wound daily    Medications:    - You may resume all of your regular medications, including blood thinners and aspirin, after going home unless otherwise instructed  Please refer to your discharge medication list for further details  - Please take the pain medications as directed  - You are encouraged to use non-narcotic pain medications first and whenever possible  Reserve the use of narcotic pain medication for moderate to severe pain not controlled by non-narcotic medications   - No driving while taking narcotic pain medications  - You may become constipated, especially if taking pain medications  You may take any over the counter stool softeners or laxatives as needed  Examples: Milk of Magnesia, Colace, Senna  Additional Instructions:  - If you have any questions or concerns after discharge please call the office   - Call office or return to ER if fever greater than 101, chills, persistent nausea/vomiting, worsening/uncontrollable pain, and/or increasing redness or purulent/foul smelling drainage from incision(s)  Peripherally Inserted Central Catheter     WHAT YOU NEED TO KNOW:   A PICC is an IV placed into a large blood vessel near your heart  It is usually inserted through a blood vessel in your arm  Your PICC may have multiple ports  Ports are tubes where you can inject medicine  A PICC can stay in place for several weeks or months  You may need a PICC to get nutrition, medicine, or fluids  Blood samples can be removed from your PICC and sent to the lab for tests  DISCHARGE INSTRUCTIONS:    [id number] patients,    Contact Interventional Radiology at [phone] PATIENTS: Contact Interventional Radiology at [phone]   Shaheed Ledesma PATIENTS: Contact Interventional Radiology at [phone] if:  · Blood soaks through your bandage  · Your arm or leg feels warm, tender, and painful  It may look swollen and red  · You have trouble moving your arm  · Your catheter falls out  · You have a fever or swelling, redness, pain, or pus where the catheter was inserted  · Persistent nausea or vomiting  · You cannot flush your catheter, or you feel pain when you flush your catheter  · You see a hole or crack in the tubing of your catheter  · You see fluid leaking from the insertion site  · You run out of supplies to care for your catheter  · You have questions or concerns about your condition or care

## 2018-02-19 ENCOUNTER — TELEPHONE (OUTPATIENT)
Dept: CARDIOLOGY CLINIC | Facility: CLINIC | Age: 78
End: 2018-02-19

## 2018-02-21 ENCOUNTER — OFFICE VISIT (OUTPATIENT)
Dept: CARDIOLOGY CLINIC | Facility: CLINIC | Age: 78
End: 2018-02-21
Payer: MEDICARE

## 2018-02-21 VITALS
HEART RATE: 75 BPM | WEIGHT: 147 LBS | SYSTOLIC BLOOD PRESSURE: 160 MMHG | OXYGEN SATURATION: 96 % | DIASTOLIC BLOOD PRESSURE: 60 MMHG | BODY MASS INDEX: 23.37 KG/M2

## 2018-02-21 DIAGNOSIS — R00.2 PALPITATIONS: Primary | ICD-10-CM

## 2018-02-21 DIAGNOSIS — I50.32 CHRONIC DIASTOLIC HEART FAILURE (HCC): ICD-10-CM

## 2018-02-21 DIAGNOSIS — I10 ESSENTIAL HYPERTENSION: ICD-10-CM

## 2018-02-21 PROCEDURE — 93000 ELECTROCARDIOGRAM COMPLETE: CPT | Performed by: INTERNAL MEDICINE

## 2018-02-21 PROCEDURE — 99213 OFFICE O/P EST LOW 20 MIN: CPT | Performed by: INTERNAL MEDICINE

## 2018-02-21 RX ORDER — FUROSEMIDE 20 MG/1
20 TABLET ORAL DAILY
Qty: 45 TABLET | Refills: 3 | Status: SHIPPED | OUTPATIENT
Start: 2018-02-21 | End: 2019-07-19

## 2018-02-21 RX ORDER — POTASSIUM CHLORIDE 750 MG/1
10 TABLET, EXTENDED RELEASE ORAL DAILY
Qty: 45 TABLET | Refills: 3 | Status: ON HOLD | OUTPATIENT
Start: 2018-02-21 | End: 2019-07-21 | Stop reason: ALTCHOICE

## 2018-02-21 RX ORDER — LISINOPRIL 20 MG/1
20 TABLET ORAL 2 TIMES DAILY
Qty: 180 TABLET | Refills: 3 | Status: SHIPPED | OUTPATIENT
Start: 2018-02-21 | End: 2019-05-10 | Stop reason: SDUPTHER

## 2018-02-21 NOTE — LETTER
February 21, 2018     Kerby Cushing, MD  [address]    Patient: Raegan Jerome   YOB: 1940   Date of Visit: 2/21/2018       Dear Dr Sarah Naranjo: Thank you for referring Mildred Apley to me for evaluation  Below are my notes for this consultation  If you have questions, please do not hesitate to call me  I look forward to following your patient along with you  Sincerely,        Kerby Cushing, MD        CC: Van Kotyk, DO Kerby Cushing, MD  2/21/2018  2:55 PM  Sign at close encounter    Heart Failure Outpatient Progress Note - Raegan Jerome 76 y o  female MRN: [id number]    @ Encounter: [id number]    Assessment/Plan:    Patient Active Problem List    Diagnosis Date Noted    Delirium 02/05/2018     Priority: Low    Physical deconditioning 02/05/2018     Priority: Low    Ambulatory dysfunction 02/05/2018     Priority: Low    Hx of fall 02/05/2018     Priority: Low    Anxiety 02/05/2018     Priority: Low    Small bowel obstruction 02/02/2018     Priority: Low    Acute kidney injury ([facility] ) 02/02/2018     Priority: Low    Bowel obstruction 02/01/2018     Priority: Low    Malignant neoplasm of central portion of right female breast ([facility] ) 01/13/2017    Heart palpitations 07/11/2016    Nicotine abuse 07/11/2016    Transient atrial fibrillation ([facility] ) 07/11/2016    Diabetes mellitus type 2 in nonobese ([facility] ) 07/11/2016    Hypertension, essential, benign 07/11/2016    Acquired hypothyroidism 07/11/2016     # LE edema: 2+ B/L LE edema  Likely multifactorial from low oncotic pressure (albumin 2 6 on 1/9/74) +/- diastolic HF (mild cLVH on TTE suggestive)  --Start lasix 20 mg PO daily  --Start Kdur 10 mEq PO daily  --Continue meds as below, can consider D/C dilt in the future and uptitrate BB  --CMP in 1 week to also assess albumin and renal function    # New onset AFib: [id number] 4   Likely post-op AFib, but cannot be sure as patient does not feel it  Given thyroid dysfunction, amiodarone would be higher risk  TTE w/ LVEF ~70% w/ mild cLVH  In NSR today @ 75 bpm    --Continue metoprolol tartrate 25 mg PO BID  --Continue diltiazem  mg PO daily for now  --Continue ASA 81 mg PO daily as patient has declined oral anticoagulation     # HTN: Increase lisinopril 20 mg PO BID; BMP in 1 week to assess K and renal function  # s/p SBO requiring ex-lap s/p LIBORIO w/ small bowel resection w/ primary anastomosis  # DMII  # Hypothyroidism: TSH with free T4    F/U with Dr Butch Pineda    HPI: Very pleasant 77 y/o woman w/ PMHx of HTN s/p SBO requiring ex-lap s/p LIBORIO w/ small bowel resection w/ primary anastomosis  Her postop course was complicated by prolonged ileus, POD 14 w/ fat necrosis at the surgical incision site without fascial dehiscence confirmed by CT A/P, and post op AFib  She declined oral anticoagulation  Her rates were controlled with BB and Ca channel blocker  She comes in for f/u today  She states she has LE edema now  She denies CP, SOB, palpitations, presyncope, or syncope  She denies orthopnea, PND, or LE edema  Past Medical History:   Diagnosis Date    Anxiety     Cancer ([facility] )     LEFT BREAST CA 22 YEARS AGO     Depression     Diabetes mellitus ([facility] )     Hypertension     Hypothyroidism      Review of Systems - 12 point ROS was done and is negative, except as noted above  Allergies   Allergen Reactions    Morphine GI Intolerance    Penicillins     Percocet [Oxycodone-Acetaminophen]        Current Outpatient Prescriptions:     ascorbic acid (VITAMIN C) 500 mg tablet, Take 500 mg by mouth daily, Disp: , Rfl:     diltiazem (CARDIZEM CD) 240 mg 24 hr capsule, Take 1 capsule (240 mg total) by mouth daily for 30 days, Disp: 30 capsule, Rfl: 0    LEVOTHYROXINE SODIUM PO, Take 135 mcg by mouth daily  , Disp: , Rfl:     lisinopril (ZESTRIL) 20 mg tablet, Take 20 mg by mouth 2 (two) times a day , Disp: , Rfl:     LORazepam (ATIVAN) 0 5 mg tablet, Take 0 5 mg by mouth daily as needed for anxiety  , Disp: , Rfl:     metFORMIN (GLUCOPHAGE) 500 mg tablet, Take 500 mg by mouth 2 (two) times a day with meals  , Disp: , Rfl:     Omega-3 Fatty Acids (FISH OIL PO), Take 1 g by mouth, Disp: , Rfl:     Social History     Social History    Marital status:      Spouse name: N/A    Number of children: N/A    Years of education: N/A     Occupational History    Not on file  Social History Main Topics    Smoking status: Current Every Day Smoker     Packs/day: 1 00     Types: Cigarettes    Smokeless tobacco: Never Used    Alcohol use No    Drug use: No    Sexual activity: Not Currently     Other Topics Concern    Not on file     Social History Narrative    No narrative on file       No family history on file  Physical Exam:    Vitals: Blood pressure 160/60, pulse 75, weight 66 7 kg (147 lb), SpO2 96 %  , Body mass index is 23 37 kg/m² ,   Wt Readings from Last 3 Encounters:   02/21/18 66 7 kg (147 lb)   02/04/18 64 7 kg (142 lb 10 2 oz)   07/26/17 65 8 kg (145 lb)     Physical Exam:  Vitals:    02/21/18 1421   BP: 160/60   BP Location: Right arm   Patient Position: Sitting   Cuff Size: Standard   Pulse: 75   SpO2: 96%   Weight: 66 7 kg (147 lb)       GEN: Janina Tanner appears well, alert and oriented x 3, pleasant and cooperative   HEENT: pupils equal, round, and reactive to light; extraocular muscles intact  NECK: supple, no carotid bruits   HEART: regular rhythm, normal S1 and S2, no murmurs, clicks, gallops or rubs, JVD is mildly elevated    LUNGS: clear to auscultation bilaterally; no wheezes, rales, or rhonchi   ABDOMEN: normal bowel sounds, soft, no tenderness, no distention  EXTREMITIES: peripheral pulses normal; no clubbing, cyanosis; 2+ B/L LE edema  NEURO: no focal findings   SKIN: normal without suspicious lesions on exposed skin    Labs & Results:  Lab Results   Component Value Date    WBC 10 10 02/16/2018    HGB 9 4 (L) 02/16/2018    HCT 27 2 (L) 02/16/2018    MCV 86 02/16/2018     (H) 02/16/2018       Chemistry        Component Value Date/Time     02/16/2018 0818     09/29/2015 1034    K 4 0 02/16/2018 0818    K 4 1 09/29/2015 1034     02/16/2018 0818     09/29/2015 1034    CO2 25 02/16/2018 0818    CO2 28 09/29/2015 1034    BUN 16 02/16/2018 0818    BUN 14 09/29/2015 1034    CREATININE 0 77 02/16/2018 0818    CREATININE 0 89 09/29/2015 1034        Component Value Date/Time    CALCIUM 8 2 (L) 02/16/2018 0818    CALCIUM 9 2 09/29/2015 1034    ALKPHOS 64 02/06/2018 0534    AST 25 02/06/2018 0534    ALT 23 02/06/2018 0534    BILITOT 0 67 02/06/2018 0534        EKG personally reviewed by Stewart Tejeda MD      Counseling / Coordination of Care  Total floor / unit time spent today 40 minutes  Greater than 50% of total time was spent with the patient and / or family counseling and / or coordination of care  A description of the counseling / coordination of care: 20 min  Thank you for the opportunity to participate in the care of this patient      Stewart Tejeda MD, PhD   Angie Sebastian

## 2018-02-21 NOTE — PROGRESS NOTES
Heart Failure Outpatient Progress Note - Jake Median 76 y o  female MRN: [id number]    @ Encounter: [id number]    Assessment/Plan:    Patient Active Problem List    Diagnosis Date Noted    Delirium 02/05/2018     Priority: Low    Physical deconditioning 02/05/2018     Priority: Low    Ambulatory dysfunction 02/05/2018     Priority: Low    Hx of fall 02/05/2018     Priority: Low    Anxiety 02/05/2018     Priority: Low    Small bowel obstruction 02/02/2018     Priority: Low    Acute kidney injury ([facility] ) 02/02/2018     Priority: Low    Bowel obstruction 02/01/2018     Priority: Low    Malignant neoplasm of central portion of right female breast ([place] [address] ) 01/13/2017    Heart palpitations 07/11/2016    Nicotine abuse 07/11/2016    Transient atrial fibrillation ([place] [address] ) 07/11/2016    Diabetes mellitus type 2 in nonobese ([place] [address] ) 07/11/2016    Hypertension, essential, benign 07/11/2016    Acquired hypothyroidism 07/11/2016     # LE edema: 2+ B/L LE edema  Likely multifactorial from low oncotic pressure (albumin 2 6 on 2/7/97) +/- diastolic HF (mild cLVH on TTE suggestive)  --Start lasix 20 mg PO daily  --Start Kdur 10 mEq PO daily  --Continue meds as below, can consider D/C dilt in the future and uptitrate BB  --CMP in 1 week to also assess albumin and renal function    # New onset AFib: [id number] 4  Likely post-op AFib, but cannot be sure as patient does not feel it  Given thyroid dysfunction, amiodarone would be higher risk  TTE w/ LVEF ~70% w/ mild cLVH   In NSR today @ 75 bpm    --Continue metoprolol tartrate 25 mg PO BID  --Continue diltiazem  mg PO daily for now  --Continue ASA 81 mg PO daily as patient has declined oral anticoagulation     # HTN: Increase lisinopril 20 mg PO BID; BMP in 1 week to assess K and renal function  # s/p SBO requiring ex-lap s/p LIBORIO w/ small bowel resection w/ primary anastomosis  # DMII  # Hypothyroidism: TSH with free T4    F/U with Dr Karolina Thomas    HPI: Very pleasant 76 y/o woman w/ PMHx of HTN s/p SBO requiring ex-lap s/p LIBORIO w/ small bowel resection w/ primary anastomosis  Her postop course was complicated by prolonged ileus, POD 14 w/ fat necrosis at the surgical incision site without fascial dehiscence confirmed by CT A/P, and post op AFib  She declined oral anticoagulation  Her rates were controlled with BB and Ca channel blocker  She comes in for f/u today  She states she has LE edema now  She denies CP, SOB, palpitations, presyncope, or syncope  She denies orthopnea, PND, or LE edema  Past Medical History:   Diagnosis Date    Anxiety     Cancer ([facility] )     LEFT BREAST CA 22 YEARS AGO     Depression     Diabetes mellitus ([facility] )     Hypertension     Hypothyroidism      Review of Systems - 12 point ROS was done and is negative, except as noted above  Allergies   Allergen Reactions    Morphine GI Intolerance    Penicillins     Percocet [Oxycodone-Acetaminophen]        Current Outpatient Prescriptions:     ascorbic acid (VITAMIN C) 500 mg tablet, Take 500 mg by mouth daily, Disp: , Rfl:     diltiazem (CARDIZEM CD) 240 mg 24 hr capsule, Take 1 capsule (240 mg total) by mouth daily for 30 days, Disp: 30 capsule, Rfl: 0    LEVOTHYROXINE SODIUM PO, Take 135 mcg by mouth daily  , Disp: , Rfl:     lisinopril (ZESTRIL) 20 mg tablet, Take 20 mg by mouth 2 (two) times a day , Disp: , Rfl:     LORazepam (ATIVAN) 0 5 mg tablet, Take 0 5 mg by mouth daily as needed for anxiety  , Disp: , Rfl:     metFORMIN (GLUCOPHAGE) 500 mg tablet, Take 500 mg by mouth 2 (two) times a day with meals  , Disp: , Rfl:     Omega-3 Fatty Acids (FISH OIL PO), Take 1 g by mouth, Disp: , Rfl:     Social History     Social History    Marital status:      Spouse name: N/A    Number of children: N/A    Years of education: N/A     Occupational History    Not on file       Social History Main Topics    Smoking status: Current Every Day Smoker     Packs/day: 1 00     Types: Cigarettes  Smokeless tobacco: Never Used    Alcohol use No    Drug use: No    Sexual activity: Not Currently     Other Topics Concern    Not on file     Social History Narrative    No narrative on file       No family history on file  Physical Exam:    Vitals: Blood pressure 160/60, pulse 75, weight 66 7 kg (147 lb), SpO2 96 %  , Body mass index is 23 37 kg/m² ,   Wt Readings from Last 3 Encounters:   02/21/18 66 7 kg (147 lb)   02/04/18 64 7 kg (142 lb 10 2 oz)   07/26/17 65 8 kg (145 lb)     Physical Exam:  Vitals:    02/21/18 1421   BP: 160/60   BP Location: Right arm   Patient Position: Sitting   Cuff Size: Standard   Pulse: 75   SpO2: 96%   Weight: 66 7 kg (147 lb)       GEN: Janina Tanner appears well, alert and oriented x 3, pleasant and cooperative   HEENT: pupils equal, round, and reactive to light; extraocular muscles intact  NECK: supple, no carotid bruits   HEART: regular rhythm, normal S1 and S2, no murmurs, clicks, gallops or rubs, JVD is mildly elevated    LUNGS: clear to auscultation bilaterally; no wheezes, rales, or rhonchi   ABDOMEN: normal bowel sounds, soft, no tenderness, no distention  EXTREMITIES: peripheral pulses normal; no clubbing, cyanosis; 2+ B/L LE edema  NEURO: no focal findings   SKIN: normal without suspicious lesions on exposed skin    Labs & Results:  Lab Results   Component Value Date    WBC 10 10 02/16/2018    HGB 9 4 (L) 02/16/2018    HCT 27 2 (L) 02/16/2018    MCV 86 02/16/2018     (H) 02/16/2018       Chemistry        Component Value Date/Time     02/16/2018 0818     09/29/2015 1034    K 4 0 02/16/2018 0818    K 4 1 09/29/2015 1034     02/16/2018 0818     09/29/2015 1034    CO2 25 02/16/2018 0818    CO2 28 09/29/2015 1034    BUN 16 02/16/2018 0818    BUN 14 09/29/2015 1034    CREATININE 0 77 02/16/2018 0818    CREATININE 0 89 09/29/2015 1034        Component Value Date/Time    CALCIUM 8 2 (L) 02/16/2018 0818    CALCIUM 9 2 09/29/2015 1034 ALKPHOS 64 02/06/2018 0534    AST 25 02/06/2018 0534    ALT 23 02/06/2018 0534    BILITOT 0 67 02/06/2018 0534        EKG personally reviewed by Jacinta Griffin MD      Counseling / Coordination of Care  Total floor / unit time spent today 40 minutes  Greater than 50% of total time was spent with the patient and / or family counseling and / or coordination of care  A description of the counseling / coordination of care: 20 min  Thank you for the opportunity to participate in the care of this patient      Jacinta Griffin MD, PhD   Hassell Gilford

## 2018-02-21 NOTE — PATIENT INSTRUCTIONS
1) Start lasix 20 mg daily; start potassium (Kdur) 1 tablet with each lasix pill you take  2) Increase lisinopril to 20 mg twice a day  3) Check blood work in 1 week  4) Please call into clinic with an update on the swelling and weights in a few days

## 2018-02-28 ENCOUNTER — OFFICE VISIT (OUTPATIENT)
Dept: SURGERY | Facility: CLINIC | Age: 78
End: 2018-02-28

## 2018-02-28 VITALS — SYSTOLIC BLOOD PRESSURE: 164 MMHG | DIASTOLIC BLOOD PRESSURE: 82 MMHG | TEMPERATURE: 98.7 F

## 2018-02-28 DIAGNOSIS — Z09 POSTOP CHECK: Primary | ICD-10-CM

## 2018-02-28 PROCEDURE — 99024 POSTOP FOLLOW-UP VISIT: CPT | Performed by: SURGERY

## 2018-02-28 NOTE — PROGRESS NOTES
Office Visit - General Surgery  Bhumika Borja MRN: [id number]  Encounter: [id number]    Assessment and Plan    Problem List Items Addressed This Visit     None          Chief Complaint:  Bhumika Borja is a 76 y o  female who presents for Post-op (p/o exploratory lap, SBO)    Subjective        Past Medical History  Past Medical History:   Diagnosis Date    Anxiety     Cancer ([facility] )     LEFT BREAST CA 22 YEARS AGO     Depression     Diabetes mellitus ([facility] )     Hypertension     Hypothyroidism        Past Surgical History  Past Surgical History:   Procedure Laterality Date    ABDOMINAL ADHESION SURGERY N/A 2/4/2018    Procedure: LYSIS ADHESIONS;  Surgeon: Army Beny DO;  [facility];  Service: General    BREAST SURGERY      CHOLECYSTECTOMY      JOINT REPLACEMENT      LEFT KNEE REPLACEMENT     LAPAROTOMY N/A 2/4/2018    Procedure: LAPAROTOMY EXPLORATORY,;  Surgeon: Army Beny DO;  [facility];  Service: General    MASTECTOMY      MT BIOPSY/EXCISION, LYMPH NODE(S) Right 1/13/2017    Procedure: SENTINEL LYMPH NODE BIOPSY RIGHT AXILLA; Surgeon: Tammie Stoddard MD;  [facility];  Service: General    MT MASTECTOMY, SIMPLE, COMPLETE Right 1/13/2017    Procedure: MASTECTOMY SIMPLE;  Surgeon: Tammie Stoddard MD;  [facility];  Service: General    SMALL INTESTINE SURGERY N/A 2/4/2018    Procedure: RESECTION SMALL BOWEL;  Surgeon: Army Beny DO;  [facility];  Service: General       Family History  History reviewed  No pertinent family history      Medications  Current Outpatient Prescriptions on File Prior to Visit   Medication Sig Dispense Refill    ascorbic acid (VITAMIN C) 500 mg tablet Take 500 mg by mouth daily      diltiazem (CARDIZEM CD) 240 mg 24 hr capsule Take 1 capsule (240 mg total) by mouth daily for 30 days 30 capsule 0    furosemide (LASIX) 20 mg tablet Take 1 tablet (20 mg total) by mouth daily 45 tablet 3    LEVOTHYROXINE SODIUM PO Take 135 mcg by mouth daily   lisinopril (ZESTRIL) 20 mg tablet Take 1 tablet (20 mg total) by mouth 2 (two) times a day 180 tablet 3    LORazepam (ATIVAN) 0 5 mg tablet Take 0 5 mg by mouth daily as needed for anxiety   metFORMIN (GLUCOPHAGE) 500 mg tablet Take 500 mg by mouth 2 (two) times a day with meals   Omega-3 Fatty Acids (FISH OIL PO) Take 1 g by mouth      potassium chloride (K-DUR,KLOR-CON) 10 mEq tablet Take 1 tablet (10 mEq total) by mouth daily 45 tablet 3     No current facility-administered medications on file prior to visit          Allergies  Allergies   Allergen Reactions    Morphine GI Intolerance    Penicillins     Percocet [Oxycodone-Acetaminophen]        Review of Systems    Objective  Vitals:    02/28/18 1120   BP: 164/82   Temp: 98 7 °F (37 1 °C)       Physical Exam

## 2018-03-01 ENCOUNTER — LAB REQUISITION (OUTPATIENT)
Dept: LAB | Facility: HOSPITAL | Age: 78
End: 2018-03-01
Payer: MEDICARE

## 2018-03-01 DIAGNOSIS — I15.0 RENOVASCULAR HYPERTENSION: ICD-10-CM

## 2018-03-01 DIAGNOSIS — I48.91 TRANSIENT ATRIAL FIBRILLATION (HCC): ICD-10-CM

## 2018-03-01 LAB
ANION GAP SERPL CALCULATED.3IONS-SCNC: 8 MMOL/L (ref 4–13)
BUN SERPL-MCNC: 18 MG/DL (ref 5–25)
CALCIUM SERPL-MCNC: 9.5 MG/DL (ref 8.3–10.1)
CHLORIDE SERPL-SCNC: 97 MMOL/L (ref 100–108)
CO2 SERPL-SCNC: 32 MMOL/L (ref 21–32)
CREAT SERPL-MCNC: 0.91 MG/DL (ref 0.6–1.3)
GFR SERPL CREATININE-BSD FRML MDRD: 60 ML/MIN/1.73SQ M
GLUCOSE P FAST SERPL-MCNC: 156 MG/DL (ref 65–99)
POTASSIUM SERPL-SCNC: 3.8 MMOL/L (ref 3.5–5.3)
SODIUM SERPL-SCNC: 137 MMOL/L (ref 136–145)

## 2018-03-01 PROCEDURE — 80048 BASIC METABOLIC PNL TOTAL CA: CPT | Performed by: INTERNAL MEDICINE

## 2018-03-02 PROBLEM — K56.609 BOWEL OBSTRUCTION (HCC): Status: RESOLVED | Noted: 2018-02-01 | Resolved: 2018-03-02

## 2018-03-02 PROBLEM — K56.609 SMALL BOWEL OBSTRUCTION (HCC): Status: RESOLVED | Noted: 2018-02-02 | Resolved: 2018-03-02

## 2018-03-02 PROBLEM — Z09 POSTOP CHECK: Status: ACTIVE | Noted: 2018-03-02

## 2018-03-07 NOTE — PROCEDURES
Procedure    Surgeon: DR Leonel Marroquin   Procedure: MASTECTOMY- RIGHT WITH BX   Cognitive Assessment   Cognitive Assessment: Mini- Cog Total Score: Sandrine JAMES   Depression Screening   Depression Screening: Total Score: Sandrine JAMES   Cardiac Risk Factors Include:   1) HEART PALPITATION    2) AFIB TRANSIENT    3) HTN    4) DM      Pulmonary Risk Factors Include:   1) CURRENT 1 PACK A DAY SMOKER    Patient was provided and educated on an incentive spirometer  Functional/Performance Assessment   The patient answered ZERO of the 8 questions with "No"  Able to stand up from a chair by themselves and on the first try  Able to get dressed by self  Able to bathe by self  Able to make own meals  Able to get to bathroom by self  There is a bathroom in the home on the same floor they will sepnd most of their time in after surgery  Patient is able to obtain assistance after surgery in their home from a relative or other caregiver that does not reside with them  Home health services have not been utilized in the past year   ~He/he owns and uses adaptive equipment      Frailty Assessment   Frailty Score: PER-FRAIL   Gait & Mobility Assessment The patient did not require more than 15 seconds to complete TGUT  Nutritional Assessment r /rs  had not experienced unexplained weightloss in the past year  Caregiver burden score: (0) No burden at all   Summary: CC: THIS PATIENT HAD GERIATRIC SCREENING RT AGE (76Y O ) AND HAVING SURGERY WITH DR Leonel Marroquin ON 01/13/2017  PATIENT STATES SHE IS HERE TO PREP FOR A LEFT MASTECTOMY   THE PATIENT WALKED INTO THE PAT CENTER INDEPENDENTLY AND DID WELL  HPI: THIS PATIENT HAS A HX OF RIGHT BREAST CA, PATIENT STATES SHE HAD A RIGHT MASTECTOMY 22 YEARS AGO WITH 37 ROUNDS OF RADIATION  PATIENT HAS BEING DOING VERY WELL SINCE  SHE STATES THAT IN DEC 2016 SHE WAS FOUND TO HAVE A LUMP IN THE LEFT BREAST  PATIENT IS HERE TODAY TO PREP FOR SURGERY   SIGNIFICANT HX OF PALPITATIONS, PAROXYSMAL AFIB, HTN, DM AND DEPRESSION  IMPRESSION  1  COGNITIVE THERE ARE NO ACTIVE CONCERNS    2  DEPRESSION- NO ACTIVE CONCERNS  PATIENT DOES HAVE A HX OF ANXIETY AND DEPRESSION  NO ACTIVE CONCERNS WITH DEPRESSION  PATIENT STATES SHE IS VERY ANXIOUS ABOUT NEW DIAGNOSIS OF THE LEFT SIDE  ENCOURAGED TO USE HER PRN LORAZEPAM AS NEEDED   3  FUNCTIONAL PERFORMANCE- HIGH FALL RISK RT ADMIT MULTIPLE FALLS LAST YEAR IN 2016  PATIENT COULD NOT EXPLAIN EXACLTY WHAT CAUSES THE FALLS  DENIES DIZZINESS, DENIES LOC  GERIATRIC SCREENING HAS REVEALED THAT PATIENT IS A HIGH FALL RISK  PATIENT SHOULD BE PLACED ON FALL [address]  STANDARD PT OT EVAL AFTER SURGERY  THIS PATIENT WAS OFFERED PRE-SURGICAL PT EVAL AND TREAT HOWEVER REFUSED  STATES THAT SHE LIVES IN A SENIOR COMMUNITY AND FEELS SAFE      4, NUTRITION- DENIES CURRENT WT  LOSS     5  CAREGIVER BURDEN- NO  PATIENT WANTS TO RETURN HOME AFTER SURGERY  SHE LIVES IN A SENIOR APARTMENT WHERE SHE HAS ALL HIS FACILITIES ON A FIRST FLOOR SET UP  NO STAIRS  DAUGHTER IS ABLE TO HELP IF NEEDED  THANKS        Active Problems    1  Depression (311) (F32 9)   2  Diabetes mellitus (250 00) (E11 9)   3  Hypertension (401 9) (I10)   4  Hypothyroid (244 9) (E03 9)   5  Malignant neoplasm of breast (174 9) (C50 919)   6  Ventral hernia (553 20) (K43 9)    Current Meds    1  AmLODIPine Besylate 5 MG Oral Tablet; TAKE 1 TABLET DAILY AS DIRECTED; Therapy: (Recorded:[id number]) to Recorded   2  CloNIDine HCl - 0 1 MG Oral Tablet; daily; Therapy: (Recorded:[id number]) to Recorded   3  Levothyroxine Sodium 137 MCG Oral Tablet; TAKE 1 TABLET DAILY; Therapy: (Recorded:[id number]) to Recorded   4  Lisinopril 20 MG Oral Tablet; TAKE 1 TABLET TWICE DAILY; Therapy: (Recorded:[id number]) to Recorded   5  LORazepam 0 5 MG Oral Tablet; TAKE 1 TABLET TWICE DAILY; Therapy: (Recorded:[id number]) to Recorded   6   MetFORMIN HCl - 500 MG Oral Tablet; TAKE 1 TABLET EVERY 12 HOURS WITH FOOD;   Therapy: (Recorded:[id number]) to Recorded   7  Nortriptyline HCl - 25 MG Oral Capsule; (3)TABS TO EQUAL 75MG DAILY; Therapy: (Recorded:[id number]) to Recorded   8  Propranolol HCl - 40 MG Oral Tablet; 2TABS, TID; Therapy: (Recorded:[id number]) to Recorded   9  Vitamin D3 1000 UNIT Oral Capsule; TAKE AS DIRECTED; Therapy: (Recorded:[id number]) to Recorded    Allergies    1   No Known Drug Allergies    Signatures   Electronically signed by : ADIS Avery; Jan 9 2017 10:07AM EST                       (Author)    Electronically signed by : Isaak Jesus MD; Jan 9 2017  3:41PM EST                       (Author)

## 2018-03-09 ENCOUNTER — OFFICE VISIT (OUTPATIENT)
Dept: CARDIOLOGY CLINIC | Facility: CLINIC | Age: 78
End: 2018-03-09
Payer: MEDICARE

## 2018-03-09 VITALS
HEIGHT: 67 IN | WEIGHT: 140 LBS | DIASTOLIC BLOOD PRESSURE: 84 MMHG | SYSTOLIC BLOOD PRESSURE: 162 MMHG | HEART RATE: 75 BPM | BODY MASS INDEX: 21.97 KG/M2

## 2018-03-09 DIAGNOSIS — I48.91 TRANSIENT ATRIAL FIBRILLATION (HCC): ICD-10-CM

## 2018-03-09 DIAGNOSIS — I10 HYPERTENSION, ESSENTIAL, BENIGN: Chronic | ICD-10-CM

## 2018-03-09 DIAGNOSIS — I10 HYPERTENSION, UNSPECIFIED TYPE: Primary | ICD-10-CM

## 2018-03-09 DIAGNOSIS — E11.9 DIABETES MELLITUS TYPE 2 IN NONOBESE (HCC): Chronic | ICD-10-CM

## 2018-03-09 PROCEDURE — 99214 OFFICE O/P EST MOD 30 MIN: CPT | Performed by: INTERNAL MEDICINE

## 2018-03-09 PROCEDURE — 93000 ELECTROCARDIOGRAM COMPLETE: CPT | Performed by: INTERNAL MEDICINE

## 2018-03-09 RX ORDER — CARVEDILOL 6.25 MG/1
6.25 TABLET ORAL 2 TIMES DAILY WITH MEALS
Qty: 60 TABLET | Refills: 3 | Status: SHIPPED | OUTPATIENT
Start: 2018-03-09 | End: 2018-04-02 | Stop reason: SDUPTHER

## 2018-03-09 NOTE — PROGRESS NOTES
Cardiology Follow Up    Raul Muller  1940  [id number]  [address] [facility]  [address]    1  Hypertension, unspecified type  POCT ECG   2  Transient atrial fibrillation (HCC)  carvedilol (COREG) 6 25 mg tablet   3  Hypertension, essential, benign     4  Diabetes mellitus type 2 in nonobese [facility])         Discussion/Summary:She currently remains in sinus rhythm  She denies any palpitations  Continue current dose of Cardizem  Blood pressures still remain elevated discontinue metoprolol changed to carvedilol 6 25 b i d  She has had some mild nausea and upset stomach some of this may be due to the diuretic and potassium  We will go to every other day on these medications  No further heart testing at this point time  I will see her back in 3 months  Of note she has refused anticoagulation and wants to remain on 81 mg aspirin alone  Interval History:   Feeling much better  Lower extremity edema has resolved with the addition of the diuretic  Denies any chest pain, shortness of breath, palpitations  There has been some mild nausea  Denies any diarrhea or abdominal pain  She follows with the surgeons  She has been taking all medications as prescribed  Does have some generalized fatigue as well      Problem List     Acute kidney injury [facility])    Delirium    Physical deconditioning    Ambulatory dysfunction    Hx of fall    Anxiety    Heart palpitations    Nicotine abuse    Transient atrial fibrillation (HCC)    Diabetes mellitus type 2 in nonobese (HCC) (Chronic)    Hypertension, essential, benign (Chronic)    Acquired hypothyroidism (Chronic)    Malignant neoplasm of central portion of right female breast [facility])    Postop check        Past Medical History:   Diagnosis Date    Anxiety     Bowel obstruction     Cancer ([address] )     LEFT BREAST CA 22 YEARS AGO     Depression     Diabetes mellitus ([facility] [address] )     Hypertension     Hypothyroidism      Social History     Social History    Marital status:      Spouse name: N/A    Number of children: N/A    Years of education: N/A     Occupational History    Not on file  Social History Main Topics    Smoking status: Current Every Day Smoker     Packs/day: 1 00     Types: Cigarettes    Smokeless tobacco: Never Used    Alcohol use No    Drug use: No    Sexual activity: Not Currently     Other Topics Concern    Not on file     Social History Narrative    No narrative on file      Family History   Problem Relation Age of Onset    Cancer Mother      Past Surgical History:   Procedure Laterality Date    ABDOMINAL ADHESION SURGERY N/A 2/4/2018    Procedure: LYSIS ADHESIONS;  Surgeon: Jose Valderrama DO;  [facility];  Service: General    BREAST SURGERY      CHOLECYSTECTOMY      EXPLORATORY LAPAROTOMY      JOINT REPLACEMENT      LEFT KNEE REPLACEMENT     LAPAROTOMY N/A 2/4/2018    Procedure: LAPAROTOMY EXPLORATORY,;  Surgeon: Jose Valderrama DO;  [facility];  Service: General    MASTECTOMY      [place] BIOPSY/EXCISION, LYMPH NODE(S) Right 1/13/2017    Procedure: SENTINEL LYMPH NODE BIOPSY RIGHT AXILLA;   Surgeon: Clinton Coello MD;  [facility];  Service: General    [place] MASTECTOMY, SIMPLE, COMPLETE Right 1/13/2017    Procedure: MASTECTOMY SIMPLE;  Surgeon: Clinton Coello MD;  [facility];  Service: General    SMALL INTESTINE SURGERY N/A 2/4/2018    Procedure: RESECTION SMALL BOWEL;  Surgeon: Jose Valderrama DO;  [facility];  Service: General       Current Outpatient Prescriptions:     ascorbic acid (VITAMIN C) 500 mg tablet, Take 500 mg by mouth daily, Disp: , Rfl:     diltiazem (CARDIZEM CD) 240 mg 24 hr capsule, Take 1 capsule (240 mg total) by mouth daily for 30 days, Disp: 30 capsule, Rfl: 0    furosemide (LASIX) 20 mg tablet, Take 1 tablet (20 mg total) by mouth daily, Disp: 45 tablet, Rfl: 3    Levothyroxine Sodium 137 MCG CAPS, Take 137 mcg by mouth daily  , Disp: , Rfl:     lisinopril (ZESTRIL) 20 mg tablet, Take 1 tablet (20 mg total) by mouth 2 (two) times a day, Disp: 180 tablet, Rfl: 3    LORazepam (ATIVAN) 0 5 mg tablet, Take 0 5 mg by mouth daily as needed for anxiety  , Disp: , Rfl:     metFORMIN (GLUCOPHAGE) 500 mg tablet, Take 500 mg by mouth 2 (two) times a day with meals  , Disp: , Rfl:     Omega-3 Fatty Acids (FISH OIL PO), Take 1 g by mouth, Disp: , Rfl:     potassium chloride (K-DUR,KLOR-CON) 10 mEq tablet, Take 1 tablet (10 mEq total) by mouth daily, Disp: 45 tablet, Rfl: 3    carvedilol (COREG) 6 25 mg tablet, Take 1 tablet (6 25 mg total) by mouth 2 (two) times a day with meals, Disp: 60 tablet, Rfl: 3  Allergies   Allergen Reactions    Morphine GI Intolerance    Penicillins     Percocet [Oxycodone-Acetaminophen]        Labs:     Chemistry        Component Value Date/Time     03/01/2018 1700     09/29/2015 1034    K 3 8 03/01/2018 1700    K 4 1 09/29/2015 1034    CL 97 (L) 03/01/2018 1700     09/29/2015 1034    CO2 32 03/01/2018 1700    CO2 28 09/29/2015 1034    BUN 18 03/01/2018 1700    BUN 14 09/29/2015 1034    CREATININE 0 91 03/01/2018 1700    CREATININE 0 89 09/29/2015 1034        Component Value Date/Time    CALCIUM 9 5 03/01/2018 1700    CALCIUM 9 2 09/29/2015 1034    ALKPHOS 64 02/06/2018 0534    AST 25 02/06/2018 0534    ALT 23 02/06/2018 0534    BILITOT 0 67 02/06/2018 0534            Lab Results   Component Value Date    CHOL 146 07/12/2016     Lab Results   Component Value Date    HDL 45 07/12/2016     Lab Results   Component Value Date    LDLCALC 85 07/12/2016     Lab Results   Component Value Date    TRIG 71 02/06/2018    TRIG 81 07/12/2016     No components found for: CHOLHDL    Imaging: Xr Abdomen 1 View Kub    Result Date: 2/12/2018  Narrative: ABDOMEN INDICATION:  Reassessment of small bowel obstruction COMPARISON:  Abdominal CT dated 2/9/2018 and abdominal x-rays to 8/20/2018 VIEWS:  AP supine IMAGES:  2 FINDINGS: A nasogastric tube is again present  Contrast agent remains within ascending colon and distal small bowel  There is partial diminishment of proximal small bowel dilatation  No obvious free air  Surgical clips are present in the right upper quadrant  Impression: Partially diminished small bowel distention Workstation performed: [id number]     Xr Abdomen Obstruction Series    Result Date: 2/8/2018  Narrative: OBSTRUCTION SERIES INDICATION:  Bowel obstruction, reassessment COMPARISON: 2/6/2018 VIEWS:  (Supine, erect abdomen and upright chest) IMAGES:  4 FINDINGS: There has been little change in the bowel gas pattern since the previous examination  Oral contrast is again noted within ascending colon and distal small bowel  Mild dilatation of proximal small bowel loops noted  Distal small bowel loop caliber appearing normal  No free air  Nasogastric tube side port and tip in stomach  There is a PICC line with its tip at the cavoatrial junction Osseous structures are unremarkable  Examination of the chest reveals a normal cardiomediastinal silhouette  Lungs are clear  Impression: 1  No significant change in bowel gas pattern since prior study  Persistent mild dilatation of proximal small bowel loops noted  2   No free air  3   Clear lungs  Workstation performed: [id number]     Cta Chest Ct Abdomen Pelvis W Contrast    Result Date: 2/9/2018  Narrative: CT PULMONARY ANGIOGRAM OF THE CHEST AND CT ABDOMEN AND PELVIS WITH INTRAVENOUS CONTRAST INDICATION: "New-onset atrial fibrillation- this is in the post-operative setting  She is asymptomatic from the tachycardia "77 F with high grade small bowel obstruction, s/p ex-alp, SBR POD 5" COMPARISON: CT abdomen/pelvis 2/1/2018  Obstruction series 2/8/2018  TECHNIQUE:  CT examination of the chest, abdomen and pelvis was performed    Thin section CT angiographic technique was used in the chest in order to evaluate for pulmonary embolus and coronal 3D MIP postprocessing was performed on the acquisition scanner  Reformatted images were created in axial, sagittal, and coronal planes  Radiation dose length product (DLP) for this visit:  1059 41 mGy-cm   This examination, like all CT scans performed in the [facility], was performed utilizing techniques to minimize radiation dose exposure, including the use of iterative reconstruction and automated exposure control  IV Contrast:  100 mL of iohexol (OMNIPAQUE)  350 Multi-dose Enteric Contrast:  Enteric contrast was not administered  FINDINGS: CHEST PULMONARY ARTERIAL TREE:  No pulmonary embolus is seen  LUNGS:  Lungs are clear  There is no tracheal or endobronchial lesion  PLEURA:  Small bibasilar pleural effusions  HEART/AORTA:  No thoracic aortic aneurysm  Diffuse atherosclerotic changes with mild ulcerating plaque/mural thrombus throughout the descending portion  MEDIASTINUM AND JEFFREY:  Unremarkable  CHEST WALL AND LOWER NECK: Unremarkable  ABDOMEN LIVER/BILIARY TREE:  Unremarkable  GALLBLADDER:  Gallbladder is surgically absent  SPLEEN:  Unremarkable  PANCREAS:  Unremarkable  ADRENAL GLANDS: Unremarkable  KIDNEYS/URETERS:  Simple appearing cyst   No hydronephrosis or perinephric collection  STOMACH AND BOWEL:  Residual diffuse small bowel dilation likely on the basis of ileus  The oral contrast column has transited into the colon  No areas of oral contrast extravasation to indicate a bowel defect  Prominent fat stranding about an anastomotic sutures of the small bowel located in the right mid abdomen  No discrete collection to indicate an abscess  APPENDIX:  A normal appendix was visualized  ABDOMINOPELVIC CAVITY:  Small to moderate amount of abdominal and pelvic ascites  No free air  No lymphadenopathy  VESSELS:  Atherosclerotic changes are present  No evidence of aneurysm  Patent iliac grafts   PELVIS REPRODUCTIVE ORGANS: Numerous uterine calcifications in keeping with fibroids  URINARY BLADDER:  Unremarkable  ABDOMINAL WALL/INGUINAL REGIONS:  Midline anterior abdominal skin sutures  No collection at the incision site  OSSEOUS STRUCTURES:  No acute fracture or destructive osseous lesion  No associated collection or oral contrast extravasation  Impression: Small bibasilar pleural effusions without other acute findings within the chest; no pulmonary arterial embolism in this patient with newly diagnosis atrial fibrillation  Persistent small bowel dilation without obstruction on the basis of postoperative ileus; the oral contrast column has transited into the colon  There are inflammatory changes about the right mid abdominal bowel anastomotic site  Small to moderate amount of abdominal and pelvic ascites Workstation performed: [id number]     Ct Abdomen Pelvis W Contrast    Result Date: 2/16/2018  Narrative: CT ABDOMEN AND PELVIS WITH IV CONTRAST INDICATION: possible fascial dehiscience  History taken directly from the electronic ordering system  COMPARISON: 2/9/2018 TECHNIQUE:  CT examination of the abdomen and pelvis was performed  Axial, sagittal, and coronal 2D reformatted images were created from the source data and submitted for interpretation  Radiation dose length product (DLP) for this visit:  431 02 mGy-cm   This examination, like all CT scans performed in the [facility], was performed utilizing techniques to minimize radiation dose exposure, including the use of iterative  reconstruction and automated exposure control  IV Contrast:  100 mL of iohexol (OMNIPAQUE) Enteric Contrast:  Enteric contrast was administered  FINDINGS: ABDOMEN LOWER CHEST: Small bilateral pleural effusions have increased in size  Bibasilar atelectasis is also present  LIVER/BILIARY TREE:  Unremarkable  GALLBLADDER:  Gallbladder is surgically absent  SPLEEN:  Unremarkable  PANCREAS:  Unremarkable  ADRENAL GLANDS:  Unremarkable  KIDNEYS/URETERS:  Hypoattenuating lesions that are too small to characterize but likely represents cyst are present  There are no calculi or hydronephrosis  STOMACH AND BOWEL:  Small bowel dilatation has improved  There is mild wall thickening within small bowel loops within the mid and lower abdomen  Postsurgical changes of partial bowel resection and anastomosis in the right lower quadrant is noted  There is no extraluminal oral contrast  APPENDIX:  No findings to suggest appendicitis  ABDOMINOPELVIC CAVITY: Intra-abdominal and pelvic ascites is again seen  Pelvic ascites is slightly decreased  Mesenteric edema is also present  No loculated fluid collection is identified  There is no free intraperitoneal air or lymphadenopathy  VESSELS:  Focal ectasia of the infrarenal abdominal aorta is again noted and measures 2 8 x 2 7 cm  Biiliac stent grafts appear patent  PELVIS REPRODUCTIVE ORGANS:  Multiple calcifications are again seen within the uterus likely represent calcified fibroids  URINARY BLADDER:  Unremarkable  ABDOMINAL WALL/INGUINAL REGIONS:  Skin staples are present  There is an open defect within the anterior subcutaneous tissues with adjacent inflammatory stranding  There is no well-formed fluid collection is area  Diastases is noted with ascites extending into the defect  OSSEOUS STRUCTURES:  No acute fracture or destructive osseous lesion  T12 compression fracture is again noted  Impression: 1  Skin staples with a defect within the anterior subcutaneous tissues and adjacent inflammatory stranding  No well-formed fluid collection identified  2   Postsurgical changes of bowel resection and anastomosis in the right lower quadrant  There is resolution of small bowel dilatation  There is new wall thickening of small bowel loops within the mid and lower abdomen, which may represent reactive inflammation due to ascites    While ischemia cannot be entirely excluded, there is no pneumatosis, portal venous gas, or free intraperitoneal air  There is no extraluminal contrast  3   Intra-abdominal and pelvic ascites with slight decrease in pelvic ascites  4   Slight increase in size of small bilateral pleural effusions  Workstation performed: [id number]       ECG:    Sinus rhythm with PACs      ROS    Vitals:    03/09/18 1349   BP: 162/84   Pulse: 75     Vitals:    03/09/18 1349   Weight: 63 5 kg (140 lb)     Height: 5' 6 5" (168 9 cm)   Body mass index is 22 26 kg/m²      Physical Exam:  And PVCs vital signs reviewed  General appearance:  Appears stated age, alert, well appearing and in no distress  HEENT:  PERRLA, EOMI, no scleral icterus, no conjunctival pallor  NECK:  Supple, No elevated JVP, no thyromegaly, no carotid bruits  HEART:  Regular rate and rhythm, normal S1/S2, no S3/S4, no murmur or rub  LUNGS:  Clear to auscultation bilaterally, no wheezes rales or rhonchi  ABDOMEN:  Soft, non-tender, positive bowel sounds, no rebound or guarding, no organomegaly   EXTREMITIES:  No edema, normal range of motion  VASCULAR:  Normal pedal pulses, good pulse volume   SKIN: No lesions or rashes on exposed skin  NEURO:  CN II-XII intact, no focal deficits

## 2018-03-16 DIAGNOSIS — I48.91 TRANSIENT ATRIAL FIBRILLATION (HCC): ICD-10-CM

## 2018-03-16 RX ORDER — DILTIAZEM HYDROCHLORIDE 240 MG/1
240 CAPSULE, COATED, EXTENDED RELEASE ORAL DAILY
Qty: 30 CAPSULE | Refills: 0 | Status: SHIPPED | OUTPATIENT
Start: 2018-03-16 | End: 2018-04-02 | Stop reason: ALTCHOICE

## 2018-03-28 ENCOUNTER — OFFICE VISIT (OUTPATIENT)
Dept: SURGERY | Facility: CLINIC | Age: 78
End: 2018-03-28

## 2018-03-28 VITALS
BODY MASS INDEX: 22.66 KG/M2 | SYSTOLIC BLOOD PRESSURE: 156 MMHG | DIASTOLIC BLOOD PRESSURE: 82 MMHG | TEMPERATURE: 97.5 F | WEIGHT: 141 LBS | HEIGHT: 66 IN

## 2018-03-28 DIAGNOSIS — Z09 POSTOP CHECK: Primary | ICD-10-CM

## 2018-03-28 PROCEDURE — 99024 POSTOP FOLLOW-UP VISIT: CPT | Performed by: SURGERY

## 2018-03-28 NOTE — PROGRESS NOTES
Office Visit - General Surgery  Justin Arteaga MRN: [id number]  Encounter: [id number]    Assessment and Plan    Problem List Items Addressed This Visit        Other    Postop check - Primary     Doing well, will stop VNA services  See back in 6 months for breast cancer follow up               Chief Complaint:  Justin Arteaga is a 76 y o  female who presents for Follow-up (2nd f/u sbo )    Subjective  76 jaz old female s/p laparotomy returns for follow up  Midline wound improving  Slowly getting back to regular diet  No abdominal pain  Past Medical History  Past Medical History:   Diagnosis Date    Anxiety     Bowel obstruction     Cancer [address] [address] )     LEFT BREAST CA 22 YEARS AGO     Depression     Diabetes mellitus [address] [address] )     Hypertension     Hypothyroidism        Past Surgical History  Past Surgical History:   Procedure Laterality Date    ABDOMINAL ADHESION SURGERY N/A 2/4/2018    Procedure: LYSIS ADHESIONS;  Surgeon: Natalie Estrada DO;  [facility];  Service: General    BREAST SURGERY      CHOLECYSTECTOMY      EXPLORATORY LAPAROTOMY      JOINT REPLACEMENT      LEFT KNEE REPLACEMENT     LAPAROTOMY N/A 2/4/2018    Procedure: LAPAROTOMY EXPLORATORY,;  Surgeon: Natalie Estrada DO;  [facility];  Service: General    MASTECTOMY      IN BIOPSY/EXCISION, LYMPH NODE(S) Right 1/13/2017    Procedure: SENTINEL LYMPH NODE BIOPSY RIGHT AXILLA;   Surgeon: David Zuniga MD;  [facility];  Service: General    IN MASTECTOMY, SIMPLE, COMPLETE Right 1/13/2017    Procedure: MASTECTOMY SIMPLE;  Surgeon: David Zuniga MD;  [facility];  Service: General    SMALL INTESTINE SURGERY N/A 2/4/2018    Procedure: RESECTION SMALL BOWEL;  Surgeon: Natalie Estrada DO;  [facility];  Service: General       Family History  Family History   Problem Relation Age of Onset    Cancer Mother        Medications  Current Outpatient Prescriptions on File Prior to Visit   Medication Sig Dispense Refill    ascorbic acid (VITAMIN C) 500 mg tablet Take 500 mg by mouth daily      carvedilol (COREG) 6 25 mg tablet Take 1 tablet (6 25 mg total) by mouth 2 (two) times a day with meals 60 tablet 3    furosemide (LASIX) 20 mg tablet Take 1 tablet (20 mg total) by mouth daily 45 tablet 3    Levothyroxine Sodium 137 MCG CAPS Take 137 mcg by mouth daily        lisinopril (ZESTRIL) 20 mg tablet Take 1 tablet (20 mg total) by mouth 2 (two) times a day 180 tablet 3    LORazepam (ATIVAN) 0 5 mg tablet Take 0 5 mg by mouth daily as needed for anxiety   metFORMIN (GLUCOPHAGE) 500 mg tablet Take 500 mg by mouth 2 (two) times a day with meals   Omega-3 Fatty Acids (FISH OIL PO) Take 1 g by mouth      potassium chloride (K-DUR,KLOR-CON) 10 mEq tablet Take 1 tablet (10 mEq total) by mouth daily 45 tablet 3    diltiazem (CARDIZEM CD) 240 mg 24 hr capsule TAKE 1 CAPSULE (240 MG TOTAL) BY MOUTH DAILY FOR 30 DAYS 30 capsule 0     No current facility-administered medications on file prior to visit  Allergies  Allergies   Allergen Reactions    Morphine GI Intolerance    Penicillins     Percocet [Oxycodone-Acetaminophen]        Review of Systems    Objective  Vitals:    03/28/18 1058   BP: 156/82   Temp: 97 5 °F (36 4 °C)       Physical Exam   Abdomen: midline incision healing well except for very small open area about 2x2x4 mm  Pink tissue present, periwound clean    Remaining abdomen is soft and non tender

## 2018-03-29 ENCOUNTER — HOSPITAL ENCOUNTER (EMERGENCY)
Facility: HOSPITAL | Age: 78
Discharge: HOME | End: 2018-03-29
Attending: EMERGENCY MEDICINE
Payer: MEDICARE

## 2018-03-29 VITALS
SYSTOLIC BLOOD PRESSURE: 214 MMHG | BODY MASS INDEX: 22.68 KG/M2 | TEMPERATURE: 97.7 F | HEART RATE: 68 BPM | WEIGHT: 141.09 LBS | RESPIRATION RATE: 18 BRPM | DIASTOLIC BLOOD PRESSURE: 86 MMHG | HEIGHT: 66 IN | OXYGEN SATURATION: 97 %

## 2018-03-29 DIAGNOSIS — I10 ASYMPTOMATIC HYPERTENSION: Primary | ICD-10-CM

## 2018-03-29 LAB
ATRIAL RATE: 163 BPM
QRS AXIS: -68 DEGREES
QRSD INTERVAL: 86 MS
QT INTERVAL: 382 MS
QTC INTERVAL: 406 MS
T WAVE AXIS: 37 DEGREES
VENTRICULAR RATE: 68 BPM

## 2018-03-29 PROCEDURE — 93005 ELECTROCARDIOGRAM TRACING: CPT

## 2018-03-29 PROCEDURE — 99284 EMERGENCY DEPT VISIT MOD MDM: CPT

## 2018-03-29 NOTE — DISCHARGE INSTRUCTIONS
Chronic Hypertension, Ambulatory Care   GENERAL INFORMATION:   Chronic hypertension  is a long-term condition in which your blood pressure (BP) is higher than normal  Your BP is the force of your blood moving against the walls of your arteries  Hypertension is a BP of 140/90 or higher  Common symptoms include the following:   · Headache     · Blurred vision    · Chest pain     · Dizziness or weakness     · Trouble breathing     · Nosebleeds  Seek immediate care for the following symptoms:   · Severe headache or vision loss    · Weakness in an arm or leg    · Confusion or difficulty speaking    · Discomfort in your chest that feels like squeezing, pressure, fullness, or pain    · Suddenly feeling lightheaded or trouble breathing    · Pain or discomfort in your back, neck, jaw, stomach, or arm  Treatment for chronic hypertension  may include medicine to lower your BP  You may also need to make lifestyle changes  Take your medicine exactly as directed  Manage chronic hypertension:   · Take your BP at home  Sit and rest for 5 minutes before you take your BP  Extend your arm and support it on a flat surface  Your arm should be at the same level as your heart  Follow the directions that came with your BP monitor  If possible, take at least 2 BP readings each time  Take your BP at least twice a day at the same times each day, such as morning and evening  Keep a log of your BP readings and bring it to your follow-up visits  · Eat less sodium (salt)  Do not add sodium to your food  Limit foods that are high in sodium, such as canned foods, potato chips, and cold cuts  Your healthcare provider may suggest that you follow the [address] [place] [address] Street  The plan is low in sodium, unhealthy fats, and total fat  It is high in potassium, calcium, and fiber  · Exercise regularly  Exercise at least 30 minutes per day, on most days of the week  This will help decrease your BP   Ask your healthcare provider about the best exercise plan for you  · Limit alcohol  Women should limit alcohol to 1 drink a day  Men should limit alcohol to 2 drinks a day  A drink of alcohol is 12 ounces of beer, 5 ounces of wine, or 1½ ounces of liquor  · Do not smoke  If you smoke, it is never too late to quit  Smoking can increase your BP  Smoking also worsens other health conditions you may have that can increase your risk for hypertension  Ask your healthcare provider for information if you need help quitting  Follow up with your healthcare provider as directed: You will need to return to have your BP checked and to have other lab tests done  Write down your questions so you remember to ask them during your visits  CARE AGREEMENT:   You have the right to help plan your care  Learn about your health condition and how it may be treated  Discuss treatment options with your caregivers to decide what care you want to receive  You always have the right to refuse treatment  The above information is an  only  It is not intended as medical advice for individual conditions or treatments  Talk to your doctor, nurse or pharmacist before following any medical regimen to see if it is safe and effective for you  © 2014 [address] is for End User's use only and may not be sold, redistributed or otherwise used for commercial purposes  All illustrations and images included in CareNotes® are the copyrighted property of A D A M , Inc  or Ibrahima Amaro

## 2018-03-29 NOTE — ED PROCEDURE NOTE
PROCEDURE  ECG 12 Lead Documentation  Date/Time: 3/29/2018 1:23 PM  Performed by: Zoila Godinez  Authorized by: Zoila Godinez     Indications / Diagnosis:  Asymptomatic HTN  ECG reviewed by me, the ED Provider: yes    Patient location:  ED  Previous ECG:     Previous ECG:  Compared to current    Similarity:  No change  Interpretation:     Interpretation: normal    Rate:     ECG rate assessment: normal    Rhythm:     Rhythm: sinus rhythm    Ectopy:     Ectopy: none    QRS:     QRS axis:  Normal  Conduction:     Conduction: normal    ST segments:     ST segments:  Normal  T waves:     T waves: normal           Denver Colander, DO  03/29/18 1323

## 2018-03-29 NOTE — ED PROVIDER NOTES
History  Chief Complaint   Patient presents with    Hypertension     hypertension at routine PCP visit for urinary frequency  denies blurred vision, headaches, dizziness  76-year-old female with chronic long-standing uncontrolled hypertension presents for evaluation of asymptomatic hypertension noted during a routine primary care visit today for increased urinary frequency yesterday which has currently resolved  Patient denies any headache, visual changes, speech changes, focal weakness, numbness, tingling, chest pain, shortness of breath, nausea vomiting, back pain or abdominal pain  Patient was recently hospitalized for small bowel obstruction status post resection which is currently healing well and patient is recovering appropriately  During her visit and her blood pressure was uncontrolled as well and Dr Jes Mendieta has been attempting to change her regimen in order to better control her blood pressure however patient states that her blood pressure continues to be elevated at times  Had discussion with the patient regarding the ACEP clinical guidelines for treatment of asymptomatic hypertension at this time I do not recommend any treatment or tests and patient encouraged to follow up with Dr Jes Mendieta as planned for continued long-term control of her elevated blood pressure  Patient expresses verbal understanding and is aware that she needs to return if she develops any symptoms of chest pain, focal weakness, numbness, tingling, severe headache, change in vision or speech  Muscle strength is 5 5 in all extremities  No ataxia or drift  There is normal venous pulsations bilateral fundi  Prior to Admission Medications   Prescriptions Last Dose Informant Patient Reported? Taking? LORazepam (ATIVAN) 0 5 mg tablet  Self Yes Yes   Sig: Take 0 5 mg by mouth daily as needed for anxiety     Levothyroxine Sodium 137 MCG CAPS 3/29/2018 at Unknown time Self Yes Yes   Sig: Take 137 mcg by mouth daily     Omega-3 Fatty Acids (FISH OIL PO) 3/29/2018 at Unknown time Self Yes Yes   Sig: Take 1 g by mouth   ascorbic acid (VITAMIN C) 500 mg tablet 3/29/2018 at Unknown time Self Yes Yes   Sig: Take 500 mg by mouth daily   carvedilol (COREG) 6 25 mg tablet 3/29/2018 at Unknown time  No Yes   Sig: Take 1 tablet (6 25 mg total) by mouth 2 (two) times a day with meals   diltiazem (CARDIZEM CD) 240 mg 24 hr capsule 3/29/2018 at Unknown time  No Yes   Sig: TAKE 1 CAPSULE (240 MG TOTAL) BY MOUTH DAILY FOR 30 DAYS   furosemide (LASIX) 20 mg tablet 3/29/2018 at Unknown time Self No Yes   Sig: Take 1 tablet (20 mg total) by mouth daily   lisinopril (ZESTRIL) 20 mg tablet 3/29/2018 at Unknown time Self No Yes   Sig: Take 1 tablet (20 mg total) by mouth 2 (two) times a day   metFORMIN (GLUCOPHAGE) 500 mg tablet 3/29/2018 at Unknown time Self Yes Yes   Sig: Take 500 mg by mouth 2 (two) times a day with meals  potassium chloride (K-DUR,KLOR-CON) 10 mEq tablet 3/29/2018 at Unknown time Self No Yes   Sig: Take 1 tablet (10 mEq total) by mouth daily      Facility-Administered Medications: None       Past Medical History:   Diagnosis Date    Anxiety     Bowel obstruction     Cancer ([facility] )     LEFT BREAST CA 22 YEARS AGO     Depression     Diabetes mellitus ([facility] )     Hypertension     Hypothyroidism        Past Surgical History:   Procedure Laterality Date    ABDOMINAL ADHESION SURGERY N/A 2/4/2018    Procedure: LYSIS ADHESIONS;  Surgeon: Jeaneth Guevara DO;  [facility];  Service: General    BREAST SURGERY      CHOLECYSTECTOMY      EXPLORATORY LAPAROTOMY      JOINT REPLACEMENT      LEFT KNEE REPLACEMENT     LAPAROTOMY N/A 2/4/2018    Procedure: LAPAROTOMY EXPLORATORY,;  Surgeon: Jeaneth Guevara DO;  [facility];  Service: General    MASTECTOMY      FL BIOPSY/EXCISION, LYMPH NODE(S) Right 1/13/2017    Procedure: SENTINEL LYMPH NODE BIOPSY RIGHT AXILLA;   Surgeon: Kemar Wick MD;  [facility];  Service: General    AL MASTECTOMY, SIMPLE, COMPLETE Right 1/13/2017    Procedure: MASTECTOMY SIMPLE;  Surgeon: Emmie Cortez MD;  [facility];  Service: General    SMALL INTESTINE SURGERY N/A 2/4/2018    Procedure: RESECTION SMALL BOWEL;  Surgeon: Louise Manrique DO;  [facility];  Service: General       Family History   Problem Relation Age of Onset    Cancer Mother      I have reviewed and agree with the history as documented  Social History   Substance Use Topics    Smoking status: Current Every Day Smoker     Packs/day: 1 00     Types: Cigarettes    Smokeless tobacco: Never Used    Alcohol use No        Review of Systems   Constitutional: Negative for fatigue  HENT: Negative for nosebleeds  Eyes: Negative for photophobia and visual disturbance  Respiratory: Negative for cough, chest tightness and shortness of breath  Cardiovascular: Negative for chest pain and leg swelling  Gastrointestinal: Negative for nausea and vomiting  Musculoskeletal: Negative for back pain and neck pain  Skin: Negative for pallor and rash  Neurological: Negative for dizziness, tremors, seizures, syncope, facial asymmetry, speech difficulty, weakness, light-headedness, numbness and headaches  All other systems reviewed and are negative  Physical Exam  ED Triage Vitals [03/29/18 1231]   Temperature Pulse Respirations Blood Pressure SpO2   97 7 °F (36 5 °C) 73 20 (!) 232/82 98 %      Temp Source Heart Rate Source Patient Position - Orthostatic VS BP Location FiO2 (%)   Oral Monitor Sitting Left arm --      Pain Score       No Pain           Orthostatic Vital Signs  Vitals:    03/29/18 1231 03/29/18 1300   BP: (!) 232/82 (!) 214/86   Pulse: 73 68   Patient Position - Orthostatic VS: Sitting        Physical Exam   Constitutional: She is oriented to person, place, and time  She appears well-developed and well-nourished  HENT:   Head: Normocephalic and atraumatic     Right Ear: External ear normal    Left Ear: External ear normal    Nose: Nose normal    Mouth/Throat: Oropharynx is clear and moist    Eyes: Conjunctivae and EOM are normal  Pupils are equal, round, and reactive to light  Neck: Normal range of motion  Neck supple  No JVD present  Cardiovascular: Normal rate, regular rhythm, normal heart sounds and intact distal pulses  Exam reveals no friction rub  No murmur heard  Pulmonary/Chest: Effort normal and breath sounds normal  No respiratory distress  She has no wheezes  She has no rales  She exhibits no tenderness  S/p mastectomy   Abdominal: Soft  Bowel sounds are normal  There is no tenderness  There is no guarding  Musculoskeletal: Normal range of motion  She exhibits no edema or tenderness  Neurological: She is alert and oriented to person, place, and time  She exhibits normal muscle tone  Skin: Skin is warm and dry  Capillary refill takes less than 2 seconds  No pallor  Psychiatric: She has a normal mood and affect  Nursing note and vitals reviewed  ED Medications  Medications - No data to display    Diagnostic Studies  Results Reviewed     None                 No orders to display              Procedures  Procedures       Phone Contacts  ED Phone Contact    ED Course  ED Course            Identification of Seniors at [address] [place] [address] Most Recent Value   (ISAR) Identification of Seniors at Risk   Before the illness or injury that brought you to the Emergency, did you need someone to help you on a regular basis? 0 Filed at: 03/29/2018 1233   In the last 24 hours, have you needed more help than usual?  1 Filed at: 03/29/2018 1233   Have you been hospitalized for one or more nights during the past 6 months? 1 Filed at: 03/29/2018 1233   In general, do you see well? 1 Filed at: 03/29/2018 1233   In general, do you have serious problems with your memory? 0 Filed at: 03/29/2018 1233   Do you take more than three different medications every day?   1 Filed at: 03/29/2018 1233   ISAR Score  4 Filed at: 03/29/2018 1233                          MDM  CritCare Time    Disposition  Final diagnoses:   Asymptomatic hypertension     Time reflects when diagnosis was documented in both MDM as applicable and the Disposition within this note     Time User Action Codes Description Comment    3/29/2018  1:10 PM Pester, Lillette Goodpasture Asymptomatic hypertension       ED Disposition     ED Disposition Condition Comment    Discharge  Chevy Tanner discharge to home/self care  Condition at discharge: Good        Follow-up Information     Follow up With Specialties Details Why [address] [facility],  Internal Medicine   [address] [place] [address],Third Floor, [address] [facility]  [phone]          Patient's Medications   Discharge Prescriptions    No medications on file     No discharge procedures on file      ED Provider  Electronically Signed by           Damien Quintanilla DO  03/29/18 6445

## 2018-03-30 LAB
ATRIAL RATE: 192 BPM
P AXIS: 0 DEGREES
QRS AXIS: 268 DEGREES
QRSD INTERVAL: 112 MS
QT INTERVAL: 344 MS
QTC INTERVAL: 418 MS
T WAVE AXIS: 50 DEGREES
VENTRICULAR RATE: 89 BPM

## 2018-04-02 ENCOUNTER — CLINICAL SUPPORT (OUTPATIENT)
Dept: CARDIOLOGY CLINIC | Facility: CLINIC | Age: 78
End: 2018-04-02
Payer: MEDICARE

## 2018-04-02 ENCOUNTER — TELEPHONE (OUTPATIENT)
Dept: CARDIOLOGY CLINIC | Facility: CLINIC | Age: 78
End: 2018-04-02

## 2018-04-02 VITALS
SYSTOLIC BLOOD PRESSURE: 224 MMHG | OXYGEN SATURATION: 98 % | WEIGHT: 137.8 LBS | HEART RATE: 123 BPM | BODY MASS INDEX: 22.14 KG/M2 | DIASTOLIC BLOOD PRESSURE: 110 MMHG | HEIGHT: 66 IN

## 2018-04-02 DIAGNOSIS — I48.91 TRANSIENT ATRIAL FIBRILLATION (HCC): ICD-10-CM

## 2018-04-02 DIAGNOSIS — R94.31 EKG ABNORMALITIES: Primary | ICD-10-CM

## 2018-04-02 DIAGNOSIS — R00.0 PULSE FAST: ICD-10-CM

## 2018-04-02 PROCEDURE — 93000 ELECTROCARDIOGRAM COMPLETE: CPT

## 2018-04-02 RX ORDER — CARVEDILOL 12.5 MG/1
12.5 TABLET ORAL 2 TIMES DAILY WITH MEALS
Qty: 60 TABLET | Refills: 3 | Status: SHIPPED | OUTPATIENT
Start: 2018-04-02 | End: 2018-06-04 | Stop reason: SDUPTHER

## 2018-04-02 RX ORDER — AMLODIPINE BESYLATE 5 MG/1
5 TABLET ORAL DAILY
Qty: 30 TABLET | Refills: 6 | Status: SHIPPED | OUTPATIENT
Start: 2018-04-02 | End: 2018-04-17 | Stop reason: SDUPTHER

## 2018-04-02 NOTE — PROGRESS NOTES
patient at the office for nurse visit due to high blood pressure for around two months, patient estate she feel tired of this situation with the blood pressure, mention Dr Jannie Cerrato been prescribe some medications but didn't work, also wonder if her thyroid should be chechek it by blood test   I perform EKG that was check by Dr Jannie Cerrato, also patient got evaluated by the Dr, this afternoon

## 2018-04-02 NOTE — TELEPHONE ENCOUNTER
Pt asking you to review last ER note  /82, 220/95 today  Pt stating she is concerned of elevated Bp's     Please advise

## 2018-04-04 ENCOUNTER — APPOINTMENT (OUTPATIENT)
Dept: LAB | Facility: CLINIC | Age: 78
End: 2018-04-04
Payer: MEDICARE

## 2018-04-04 LAB — TSH SERPL DL<=0.05 MIU/L-ACNC: 0.76 UIU/ML (ref 0.36–3.74)

## 2018-04-04 PROCEDURE — 84443 ASSAY THYROID STIM HORMONE: CPT

## 2018-04-04 PROCEDURE — 36415 COLL VENOUS BLD VENIPUNCTURE: CPT

## 2018-04-16 RX ORDER — ONDANSETRON 4 MG/1
TABLET, FILM COATED ORAL
Refills: 3 | COMMUNITY
Start: 2018-03-19 | End: 2018-04-17 | Stop reason: ALTCHOICE

## 2018-04-16 RX ORDER — CIPROFLOXACIN 250 MG/1
TABLET, FILM COATED ORAL
Refills: 0 | COMMUNITY
Start: 2018-03-30 | End: 2018-04-17 | Stop reason: ALTCHOICE

## 2018-04-17 ENCOUNTER — OFFICE VISIT (OUTPATIENT)
Dept: CARDIOLOGY CLINIC | Facility: CLINIC | Age: 78
End: 2018-04-17
Payer: MEDICARE

## 2018-04-17 ENCOUNTER — HOSPITAL ENCOUNTER (INPATIENT)
Facility: HOSPITAL | Age: 78
LOS: 4 days | Discharge: HOME HEALTH | DRG: 308 | End: 2018-04-21
Attending: EMERGENCY MEDICINE | Admitting: INTERNAL MEDICINE
Payer: MEDICARE

## 2018-04-17 ENCOUNTER — APPOINTMENT (EMERGENCY)
Dept: RADIOLOGY | Facility: HOSPITAL | Age: 78
DRG: 308 | End: 2018-04-17
Payer: MEDICARE

## 2018-04-17 VITALS
SYSTOLIC BLOOD PRESSURE: 180 MMHG | DIASTOLIC BLOOD PRESSURE: 80 MMHG | HEART RATE: 78 BPM | BODY MASS INDEX: 22 KG/M2 | OXYGEN SATURATION: 80 % | HEIGHT: 66 IN | WEIGHT: 136.9 LBS

## 2018-04-17 DIAGNOSIS — R94.31 EKG ABNORMALITIES: ICD-10-CM

## 2018-04-17 DIAGNOSIS — I48.91 TRANSIENT ATRIAL FIBRILLATION (HCC): ICD-10-CM

## 2018-04-17 DIAGNOSIS — Z01.30 BLOOD PRESSURE CHECK: Primary | ICD-10-CM

## 2018-04-17 DIAGNOSIS — J18.9 PNEUMONIA: ICD-10-CM

## 2018-04-17 DIAGNOSIS — R55 NEAR SYNCOPE: Primary | ICD-10-CM

## 2018-04-17 DIAGNOSIS — I10 HYPERTENSION, ESSENTIAL, BENIGN: Primary | Chronic | ICD-10-CM

## 2018-04-17 DIAGNOSIS — I10 HYPERTENSION, ESSENTIAL, BENIGN: Chronic | ICD-10-CM

## 2018-04-17 LAB
ALBUMIN SERPL BCP-MCNC: 3.7 G/DL (ref 3.5–5)
ALP SERPL-CCNC: 90 U/L (ref 46–116)
ALT SERPL W P-5'-P-CCNC: 16 U/L (ref 12–78)
ANION GAP SERPL CALCULATED.3IONS-SCNC: 5 MMOL/L (ref 4–13)
AST SERPL W P-5'-P-CCNC: 17 U/L (ref 5–45)
BASOPHILS # BLD AUTO: 0.03 THOUSANDS/ΜL (ref 0–0.1)
BASOPHILS NFR BLD AUTO: 0 % (ref 0–1)
BILIRUB SERPL-MCNC: 0.58 MG/DL (ref 0.2–1)
BUN SERPL-MCNC: 22 MG/DL (ref 5–25)
CALCIUM SERPL-MCNC: 9.7 MG/DL (ref 8.3–10.1)
CHLORIDE SERPL-SCNC: 99 MMOL/L (ref 100–108)
CO2 SERPL-SCNC: 31 MMOL/L (ref 21–32)
CREAT SERPL-MCNC: 1.15 MG/DL (ref 0.6–1.3)
EOSINOPHIL # BLD AUTO: 0.36 THOUSAND/ΜL (ref 0–0.61)
EOSINOPHIL NFR BLD AUTO: 5 % (ref 0–6)
ERYTHROCYTE [DISTWIDTH] IN BLOOD BY AUTOMATED COUNT: 13.5 % (ref 11.6–15.1)
GFR SERPL CREATININE-BSD FRML MDRD: 45 ML/MIN/1.73SQ M
GLUCOSE SERPL-MCNC: 149 MG/DL (ref 65–140)
HCT VFR BLD AUTO: 35 % (ref 34.8–46.1)
HGB BLD-MCNC: 11.4 G/DL (ref 11.5–15.4)
LYMPHOCYTES # BLD AUTO: 1.21 THOUSANDS/ΜL (ref 0.6–4.47)
LYMPHOCYTES NFR BLD AUTO: 17 % (ref 14–44)
MCH RBC QN AUTO: 28.8 PG (ref 26.8–34.3)
MCHC RBC AUTO-ENTMCNC: 32.6 G/DL (ref 31.4–37.4)
MCV RBC AUTO: 88 FL (ref 82–98)
MONOCYTES # BLD AUTO: 0.84 THOUSAND/ΜL (ref 0.17–1.22)
MONOCYTES NFR BLD AUTO: 12 % (ref 4–12)
NEUTROPHILS # BLD AUTO: 4.55 THOUSANDS/ΜL (ref 1.85–7.62)
NEUTS SEG NFR BLD AUTO: 66 % (ref 43–75)
NRBC BLD AUTO-RTO: 0 /100 WBCS
PLATELET # BLD AUTO: 278 THOUSANDS/UL (ref 149–390)
PMV BLD AUTO: 9 FL (ref 8.9–12.7)
POTASSIUM SERPL-SCNC: 3.6 MMOL/L (ref 3.5–5.3)
PROT SERPL-MCNC: 8.2 G/DL (ref 6.4–8.2)
RBC # BLD AUTO: 3.96 MILLION/UL (ref 3.81–5.12)
SODIUM SERPL-SCNC: 135 MMOL/L (ref 136–145)
TROPONIN I SERPL-MCNC: <0.02 NG/ML
TSH SERPL DL<=0.05 MIU/L-ACNC: 0.39 UIU/ML (ref 0.36–3.74)
WBC # BLD AUTO: 7 THOUSAND/UL (ref 4.31–10.16)

## 2018-04-17 PROCEDURE — 87449 NOS EACH ORGANISM AG IA: CPT | Performed by: INTERNAL MEDICINE

## 2018-04-17 PROCEDURE — 84484 ASSAY OF TROPONIN QUANT: CPT | Performed by: EMERGENCY MEDICINE

## 2018-04-17 PROCEDURE — 93005 ELECTROCARDIOGRAM TRACING: CPT

## 2018-04-17 PROCEDURE — 94640 AIRWAY INHALATION TREATMENT: CPT

## 2018-04-17 PROCEDURE — 93000 ELECTROCARDIOGRAM COMPLETE: CPT | Performed by: INTERNAL MEDICINE

## 2018-04-17 PROCEDURE — 84443 ASSAY THYROID STIM HORMONE: CPT | Performed by: EMERGENCY MEDICINE

## 2018-04-17 PROCEDURE — 83036 HEMOGLOBIN GLYCOSYLATED A1C: CPT | Performed by: INTERNAL MEDICINE

## 2018-04-17 PROCEDURE — 36415 COLL VENOUS BLD VENIPUNCTURE: CPT | Performed by: EMERGENCY MEDICINE

## 2018-04-17 PROCEDURE — 85025 COMPLETE CBC W/AUTO DIFF WBC: CPT | Performed by: EMERGENCY MEDICINE

## 2018-04-17 PROCEDURE — 80053 COMPREHEN METABOLIC PANEL: CPT | Performed by: EMERGENCY MEDICINE

## 2018-04-17 PROCEDURE — 87081 CULTURE SCREEN ONLY: CPT | Performed by: INTERNAL MEDICINE

## 2018-04-17 PROCEDURE — 71046 X-RAY EXAM CHEST 2 VIEWS: CPT

## 2018-04-17 RX ORDER — MAGNESIUM HYDROXIDE/ALUMINUM HYDROXICE/SIMETHICONE 120; 1200; 1200 MG/30ML; MG/30ML; MG/30ML
15 SUSPENSION ORAL EVERY 6 HOURS PRN
Status: DISCONTINUED | OUTPATIENT
Start: 2018-04-17 | End: 2018-04-21 | Stop reason: HOSPADM

## 2018-04-17 RX ORDER — AMLODIPINE BESYLATE 5 MG/1
5 TABLET ORAL 2 TIMES DAILY
Status: DISCONTINUED | OUTPATIENT
Start: 2018-04-18 | End: 2018-04-19

## 2018-04-17 RX ORDER — AMLODIPINE BESYLATE 5 MG/1
5 TABLET ORAL 2 TIMES DAILY
Qty: 60 TABLET | Refills: 6 | Status: SHIPPED | OUTPATIENT
Start: 2018-04-17 | End: 2018-04-21 | Stop reason: HOSPADM

## 2018-04-17 RX ORDER — DOCUSATE SODIUM 100 MG/1
100 CAPSULE, LIQUID FILLED ORAL 2 TIMES DAILY PRN
Status: DISCONTINUED | OUTPATIENT
Start: 2018-04-17 | End: 2018-04-21 | Stop reason: HOSPADM

## 2018-04-17 RX ORDER — ASCORBIC ACID 500 MG
500 TABLET ORAL DAILY
Status: DISCONTINUED | OUTPATIENT
Start: 2018-04-18 | End: 2018-04-21 | Stop reason: HOSPADM

## 2018-04-17 RX ORDER — LABETALOL HYDROCHLORIDE 5 MG/ML
10 INJECTION, SOLUTION INTRAVENOUS ONCE
Status: COMPLETED | OUTPATIENT
Start: 2018-04-17 | End: 2018-04-17

## 2018-04-17 RX ORDER — POTASSIUM CHLORIDE 750 MG/1
10 TABLET, EXTENDED RELEASE ORAL DAILY
Status: DISCONTINUED | OUTPATIENT
Start: 2018-04-18 | End: 2018-04-21 | Stop reason: HOSPADM

## 2018-04-17 RX ORDER — SODIUM CHLORIDE AND POTASSIUM CHLORIDE .9; .15 G/100ML; G/100ML
75 SOLUTION INTRAVENOUS ONCE
Status: COMPLETED | OUTPATIENT
Start: 2018-04-17 | End: 2018-04-18

## 2018-04-17 RX ORDER — LISINOPRIL 20 MG/1
20 TABLET ORAL 2 TIMES DAILY
Status: DISCONTINUED | OUTPATIENT
Start: 2018-04-18 | End: 2018-04-21 | Stop reason: HOSPADM

## 2018-04-17 RX ORDER — NICOTINE 21 MG/24HR
1 PATCH, TRANSDERMAL 24 HOURS TRANSDERMAL DAILY
Status: DISCONTINUED | OUTPATIENT
Start: 2018-04-18 | End: 2018-04-21 | Stop reason: HOSPADM

## 2018-04-17 RX ORDER — ONDANSETRON 2 MG/ML
4 INJECTION INTRAMUSCULAR; INTRAVENOUS EVERY 6 HOURS PRN
Status: DISCONTINUED | OUTPATIENT
Start: 2018-04-17 | End: 2018-04-21 | Stop reason: HOSPADM

## 2018-04-17 RX ORDER — CARVEDILOL 3.12 MG/1
3.12 TABLET ORAL 2 TIMES DAILY WITH MEALS
Status: DISCONTINUED | OUTPATIENT
Start: 2018-04-17 | End: 2018-04-17

## 2018-04-17 RX ORDER — ACETAMINOPHEN 325 MG/1
650 TABLET ORAL EVERY 6 HOURS PRN
Status: DISCONTINUED | OUTPATIENT
Start: 2018-04-17 | End: 2018-04-21 | Stop reason: HOSPADM

## 2018-04-17 RX ORDER — CHLORAL HYDRATE 500 MG
1000 CAPSULE ORAL DAILY
Status: DISCONTINUED | OUTPATIENT
Start: 2018-04-18 | End: 2018-04-21 | Stop reason: HOSPADM

## 2018-04-17 RX ORDER — SODIUM CHLORIDE FOR INHALATION 0.9 %
VIAL, NEBULIZER (ML) INHALATION
Status: COMPLETED
Start: 2018-04-17 | End: 2018-04-17

## 2018-04-17 RX ORDER — ALBUTEROL SULFATE 2.5 MG/3ML
2.5 SOLUTION RESPIRATORY (INHALATION) EVERY 4 HOURS PRN
Status: DISCONTINUED | OUTPATIENT
Start: 2018-04-17 | End: 2018-04-18

## 2018-04-17 RX ORDER — CARVEDILOL 12.5 MG/1
12.5 TABLET ORAL 2 TIMES DAILY WITH MEALS
Status: DISCONTINUED | OUTPATIENT
Start: 2018-04-18 | End: 2018-04-21 | Stop reason: HOSPADM

## 2018-04-17 RX ORDER — HYDRALAZINE HYDROCHLORIDE 20 MG/ML
15 INJECTION INTRAMUSCULAR; INTRAVENOUS EVERY 4 HOURS PRN
Status: DISCONTINUED | OUTPATIENT
Start: 2018-04-17 | End: 2018-04-21 | Stop reason: HOSPADM

## 2018-04-17 RX ORDER — AZITHROMYCIN 250 MG/1
250 TABLET, FILM COATED ORAL EVERY 24 HOURS
Status: DISCONTINUED | OUTPATIENT
Start: 2018-04-18 | End: 2018-04-21 | Stop reason: HOSPADM

## 2018-04-17 RX ORDER — AMLODIPINE BESYLATE 5 MG/1
5 TABLET ORAL ONCE
Status: COMPLETED | OUTPATIENT
Start: 2018-04-17 | End: 2018-04-17

## 2018-04-17 RX ORDER — POTASSIUM CHLORIDE 20 MEQ/1
20 TABLET, EXTENDED RELEASE ORAL ONCE
Status: COMPLETED | OUTPATIENT
Start: 2018-04-17 | End: 2018-04-17

## 2018-04-17 RX ORDER — CARVEDILOL 12.5 MG/1
12.5 TABLET ORAL 2 TIMES DAILY WITH MEALS
Status: DISCONTINUED | OUTPATIENT
Start: 2018-04-17 | End: 2018-04-17

## 2018-04-17 RX ORDER — CARVEDILOL 12.5 MG/1
12.5 TABLET ORAL 2 TIMES DAILY WITH MEALS
Status: DISCONTINUED | OUTPATIENT
Start: 2018-04-17 | End: 2018-04-18 | Stop reason: SDUPTHER

## 2018-04-17 RX ORDER — LORAZEPAM 0.5 MG/1
0.5 TABLET ORAL DAILY PRN
Status: DISCONTINUED | OUTPATIENT
Start: 2018-04-17 | End: 2018-04-20

## 2018-04-17 RX ORDER — AMLODIPINE BESYLATE 5 MG/1
5 TABLET ORAL DAILY
Status: DISCONTINUED | OUTPATIENT
Start: 2018-04-18 | End: 2018-04-17

## 2018-04-17 RX ADMIN — ISODIUM CHLORIDE 3 ML: 0.03 SOLUTION RESPIRATORY (INHALATION) at 20:42

## 2018-04-17 RX ADMIN — POTASSIUM CHLORIDE 20 MEQ: 1500 TABLET, EXTENDED RELEASE ORAL at 20:42

## 2018-04-17 RX ADMIN — LABETALOL 20 MG/4 ML (5 MG/ML) INTRAVENOUS SYRINGE 10 MG: at 20:43

## 2018-04-17 RX ADMIN — AMLODIPINE BESYLATE 5 MG: 5 TABLET ORAL at 18:41

## 2018-04-17 RX ADMIN — CEFTRIAXONE 1000 MG: 1 INJECTION, SOLUTION INTRAVENOUS at 18:23

## 2018-04-17 RX ADMIN — CARVEDILOL 12.5 MG: 12.5 TABLET, FILM COATED ORAL at 18:41

## 2018-04-17 RX ADMIN — AZITHROMYCIN MONOHYDRATE 500 MG: 500 INJECTION, POWDER, LYOPHILIZED, FOR SOLUTION INTRAVENOUS at 19:25

## 2018-04-17 RX ADMIN — ALBUTEROL SULFATE 2.5 MG: 2.5 SOLUTION RESPIRATORY (INHALATION) at 20:42

## 2018-04-17 NOTE — PROGRESS NOTES
HTN followup:  BP better 170/84  Increase norvasc to 5mg BID and check renal art doppler    F/U 2-3 weeks

## 2018-04-17 NOTE — PROGRESS NOTES
Patient at the office for 2 week BP check, patient estate to feel anxious this morning, also she is concern that since she is on Furosemide 20mg she notice to be loosing weight  Patient got evaluated by Dr Yari Cox this morning

## 2018-04-17 NOTE — ED PROVIDER NOTES
History  Chief Complaint   Patient presents with    Dizziness     Per EMS pt reported feeling lightheaded upon standing from a sitting position  EMS states pt's BP was 147/94 sitting and 172/98 upon standing  HPI    76 y o  F w/ HTN, hx of transient Afib, presenting for evaluation of lightheadedness that began at 1 pm today  Patient states she was standing and making coffee when she began to feel lightheaded  She sat down and took her Bp, noted it to be 90s/40s  Her pulse was noted to be 126  EMS arrived, did orthostatics, 147/94 sitting, 172/98 standing  She also had some neck and shoulder pain on both sides that was transient lasted a few min, has since resolved  She was at her cardiologist's office this morning, is continuing her amlodipine and carvedilol, which she took this morning  Denying any other symptoms on ROS, no chest pain, lightheadedness is resolved currently while sitting, no cough, abdominal pain, changes to urine or stool  Patient was admitted in Feb for obstruction, had a partial colon resection  At that time she states a similar event happened while in hospital  Is not on thinners  Prior to Admission Medications   Prescriptions Last Dose Informant Patient Reported? Taking? LORazepam (ATIVAN) 0 5 mg tablet  Self Yes Yes   Sig: Take 0 5 mg by mouth daily as needed for anxiety     Levothyroxine Sodium 137 MCG CAPS  Self Yes Yes   Sig: Take 137 mcg by mouth daily     Omega-3 Fatty Acids (FISH OIL PO)  Self Yes Yes   Sig: Take 1 g by mouth   amLODIPine (NORVASC) 5 mg tablet   No Yes   Sig: Take 1 tablet (5 mg total) by mouth 2 (two) times a day   ascorbic acid (VITAMIN C) 500 mg tablet  Self Yes Yes   Sig: Take 500 mg by mouth daily   carvedilol (COREG) 12 5 mg tablet   No Yes   Sig: Take 1 tablet (12 5 mg total) by mouth 2 (two) times a day with meals   furosemide (LASIX) 20 mg tablet  Self No No   Sig: Take 1 tablet (20 mg total) by mouth daily   lisinopril (ZESTRIL) 20 mg tablet  Self No Yes   Sig: Take 1 tablet (20 mg total) by mouth 2 (two) times a day   metFORMIN (GLUCOPHAGE) 500 mg tablet  Self Yes Yes   Sig: Take 500 mg by mouth 2 (two) times a day with meals  potassium chloride (K-DUR,KLOR-CON) 10 mEq tablet  Self No No   Sig: Take 1 tablet (10 mEq total) by mouth daily      Facility-Administered Medications: None       Past Medical History:   Diagnosis Date    Anxiety     Atrial fibrillation (HCC)     Bowel obstruction (HCC)     Cancer ([facility] )     LEFT BREAST CA 22 YEARS AGO     Depression     Diabetes mellitus ([facility] )     Hypertension     Hypothyroidism        Past Surgical History:   Procedure Laterality Date    ABDOMINAL ADHESION SURGERY N/A 2/4/2018    Procedure: LYSIS ADHESIONS;  Surgeon: Juan Pablo Flynn DO;  [facility];  Service: General    BREAST SURGERY      CHOLECYSTECTOMY      EXPLORATORY LAPAROTOMY      JOINT REPLACEMENT      LEFT KNEE REPLACEMENT     LAPAROTOMY N/A 2/4/2018    Procedure: LAPAROTOMY EXPLORATORY,;  Surgeon: Juan Pablo Flynn DO;  [facility];  Service: General    MASTECTOMY      OH BIOPSY/EXCISION, LYMPH NODE(S) Right 1/13/2017    Procedure: SENTINEL LYMPH NODE BIOPSY RIGHT AXILLA; Surgeon: Jaycob Liu MD;  [facility];  Service: General    OH MASTECTOMY, SIMPLE, COMPLETE Right 1/13/2017    Procedure: MASTECTOMY SIMPLE;  Surgeon: Jaycob Liu MD;  [facility];  Service: General    SMALL INTESTINE SURGERY N/A 2/4/2018    Procedure: RESECTION SMALL BOWEL;  Surgeon: Juan Pablo Flynn DO;  [facility];  Service: General       Family History   Problem Relation Age of Onset    Cancer Mother      I have reviewed and agree with the history as documented      Social History   Substance Use Topics    Smoking status: Current Every Day Smoker     Packs/day: 1 00     Types: Cigarettes    Smokeless tobacco: Never Used    Alcohol use No        Review of Systems   Constitutional: Negative for chills, fatigue and fever    HENT: Negative for sore throat  Eyes: Negative for redness and visual disturbance  Respiratory: Negative for shortness of breath  Cardiovascular: Positive for palpitations  Negative for chest pain  Gastrointestinal: Negative for abdominal pain, diarrhea and nausea  Genitourinary: Negative for difficulty urinating, dysuria and pelvic pain  Musculoskeletal: Negative for back pain  Skin: Negative for rash  Neurological: Positive for light-headedness  Negative for syncope, weakness and headaches  All other systems reviewed and are negative  Physical Exam  ED Triage Vitals   Temperature Pulse Respirations Blood Pressure SpO2   04/17/18 1609 04/17/18 1609 04/17/18 1609 04/17/18 1609 04/17/18 1609   98 °F (36 7 °C) 84 20 (!) 176/92 97 %      Temp Source Heart Rate Source Patient Position - Orthostatic VS BP Location FiO2 (%)   04/17/18 1609 04/17/18 1612 04/17/18 1609 04/17/18 1612 --   Oral Monitor Lying - Orthostatic VS Left arm       Pain Score       04/17/18 2210       2           Orthostatic Vital Signs  Vitals:    04/18/18 0015 04/18/18 0045 04/18/18 0115 04/18/18 0138   BP: 153/98 167/81 (!) 183/97 (!) 182/89   Pulse: 74 84 90 (!) 125   Patient Position - Orthostatic VS: Sitting Sitting Sitting Sitting       Physical Exam   Constitutional: She is oriented to person, place, and time  She appears well-developed and well-nourished  No distress  HENT:   Head: Normocephalic and atraumatic  Eyes: Conjunctivae are normal  Pupils are equal, round, and reactive to light  Cardiovascular: Normal rate, regular rhythm and normal heart sounds  No murmur heard  Pulse jumps from 80s sinus, to 124 sinus intermittently while examining patient  Pulmonary/Chest: Effort normal and breath sounds normal  No respiratory distress  Abdominal: Soft  Bowel sounds are normal  She exhibits no mass  There is no tenderness  There is no guarding  Musculoskeletal: Normal range of motion  Neurological: She is alert and oriented to person, place, and time  No cranial nerve deficit or sensory deficit  She exhibits normal muscle tone  Coordination normal    Mental Status: Alert and oriented to person place time and situation, language fluent with good comprehension and repetition  CN: PERRLA, Face symmetrical with full sensation  Hearing in tact to bilateral finger rub  Tongue protrudes midline and palate elevates symmetrically  Sternocleidomastoid and trapezius muscle have full strength bilaterally  Motor: Normal muscle bulk and tone throughout 5/5 strength in upper and lower extremities throughout  Sensory: Sensation intact to light touch  Coordination: Able to perform finger to nose to finger, heel to chin to knee without dysmetria, rapid alternating movements in tact  Skin: Skin is warm and dry  Capillary refill takes less than 2 seconds  No rash noted  Psychiatric: She has a normal mood and affect  Nursing note and vitals reviewed        ED Medications  Medications   carvedilol (COREG) tablet 12 5 mg (12 5 mg Oral Given 4/17/18 1841)   amLODIPine (NORVASC) tablet 5 mg (not administered)   ascorbic acid (VITAMIN C) tablet 500 mg (not administered)   carvedilol (COREG) tablet 12 5 mg (not administered)   levothyroxine tablet 137 mcg (not administered)   lisinopril (ZESTRIL) tablet 20 mg (not administered)   LORazepam (ATIVAN) tablet 0 5 mg (not administered)   metFORMIN (GLUCOPHAGE) tablet 500 mg (not administered)   fish oil capsule 1,000 mg (not administered)   potassium chloride (K-DUR,KLOR-CON) CR tablet 10 mEq (not administered)   acetaminophen (TYLENOL) tablet 650 mg (not administered)   docusate sodium (COLACE) capsule 100 mg (not administered)   ondansetron (ZOFRAN) injection 4 mg (not administered)   aluminum-magnesium hydroxide-simethicone (MYLANTA) 200-200-20 mg/5 mL oral suspension 15 mL (not administered)   nicotine (NICODERM CQ) 14 mg/24hr TD 24 hr patch 1 patch (not administered)   enoxaparin (LOVENOX) subcutaneous injection 40 mg (not administered)   insulin lispro (HumaLOG) 100 units/mL subcutaneous injection 1-5 Units (not administered)   insulin lispro (HumaLOG) 100 units/mL subcutaneous injection 1-5 Units (0 Units Subcutaneous Not Given 4/17/18 2353)   cefTRIAXone (ROCEPHIN) IVPB (premix) 1,000 mg (not administered)     And   azithromycin (ZITHROMAX) tablet 250 mg (not administered)   hydrALAZINE (APRESOLINE) injection 15 mg (15 mg Intravenous Given 4/18/18 0005)   cefTRIAXone (ROCEPHIN) IVPB (premix) 1,000 mg (0 mg Intravenous Stopped 4/17/18 1925)   azithromycin (ZITHROMAX) 500 mg in sodium chloride 0 9% 250mL IVPB 500 mg (0 mg Intravenous Stopped 4/17/18 2036)   amLODIPine (NORVASC) tablet 5 mg (5 mg Oral Given 4/17/18 1841)   potassium chloride (K-DUR,KLOR-CON) CR tablet 20 mEq (20 mEq Oral Given 4/17/18 2042)   labetalol (NORMODYNE) injection 10 mg (10 mg Intravenous Given 4/17/18 2043)   sodium chloride 0 9 % with KCl 20 mEq/L infusion (premix) (75 mL/hr Intravenous New Bag 4/18/18 0006)   sodium chloride 0 9 % inhalation solution **AcuDose Override Pull** (3 mL  Given 4/17/18 2042)       Diagnostic Studies  Results Reviewed     Procedure Component Value Units Date/Time    Troponin I [[id number]]  (Normal) Collected:  04/18/18 0115    Lab Status:  Final result Specimen:  Blood from Arm, Left Updated:  04/18/18 0154     Troponin I <0 02 ng/mL     Narrative:         Siemens Chemistry analyzer 99% cutoff is > 0 04 ng/mL in network labs    o cTnI 99% cutoff is useful only when applied to patients in the clinical setting of myocardial ischemia  o cTnI 99% cutoff should be interpreted in the context of clinical history, ECG findings and possibly cardiac imaging to establish correct diagnosis  o cTnI 99% cutoff may be suggestive but clearly not indicative of a coronary event without the clinical setting of myocardial ischemia      Hemoglobin A1c w/EAG Estimation (Orders if not completed within the last 90 days) [[id number]]  (Abnormal) Collected:  04/17/18 2350    Lab Status:  Final result Specimen:  Blood from Arm, Left Updated:  04/18/18 0013     Hemoglobin A1C 6 4 (H) %       mg/dl     Strep Pneumoniae, Urine [[id number]] Collected:  04/17/18 2350    Lab Status: In process Specimen:  Urine from Urine, Clean Catch Updated:  04/17/18 2354    Legionella antigen, urine [[id number]] Collected:  04/17/18 2350    Lab Status: In process Specimen:  Urine from Urine, Catheter Updated:  04/17/18 2354    MRSA culture [[id number]] Collected:  04/17/18 2350    Lab Status: In process Specimen:  Nares from Nose Updated:  04/17/18 2354    Sputum culture and Gram stain [[id number]]     Lab Status:  No result Specimen:  Sputum     Comprehensive metabolic panel [[id number]]  (Abnormal) Collected:  04/17/18 1633    Lab Status:  Final result Specimen:  Blood from Arm, Left Updated:  04/17/18 1718     Sodium 135 (L) mmol/L      Potassium 3 6 mmol/L      Chloride 99 (L) mmol/L      CO2 31 mmol/L      Anion Gap 5 mmol/L      BUN 22 mg/dL      Creatinine 1 15 mg/dL      Glucose 149 (H) mg/dL      Calcium 9 7 mg/dL      AST 17 U/L      ALT 16 U/L      Alkaline Phosphatase 90 U/L      Total Protein 8 2 g/dL      Albumin 3 7 g/dL      Total Bilirubin 0 58 mg/dL      eGFR 45 ml/min/1 73sq m     Narrative:         National Kidney Disease Education Program recommendations are as follows:  GFR calculation is accurate only with a steady state creatinine  Chronic Kidney disease less than 60 ml/min/1 73 sq  meters  Kidney failure less than 15 ml/min/1 73 sq  meters  TSH [[id number]]  (Normal) Collected:  04/17/18 1633    Lab Status:  Final result Specimen:  Blood from Arm, Left Updated:  04/17/18 1718     TSH 3RD GENERATON 0 388 uIU/mL     Narrative:         Patients undergoing fluorescein dye angiography may retain small amounts of fluorescein in the body for 48-72 hours post procedure   Samples containing fluorescein can produce falsely depressed TSH values  If the patient had this procedure,a specimen should be resubmitted post fluorescein clearance  The recommended reference ranges for TSH during pregnancy are as follows:  First trimester 0 1 to 2 5 uIU/mL  Second trimester  0 2 to 3 0 uIU/mL  Third trimester 0 3 to 3 0 uIU/m      Troponin I [[id number]]  (Normal) Collected:  04/17/18 1633    Lab Status:  Final result Specimen:  Blood from Arm, Left Updated:  04/17/18 1710     Troponin I <0 02 ng/mL     Narrative:         S[employer] Chemistry analyzer 99% cutoff is > 0 04 ng/mL in network labs    o cTnI 99% cutoff is useful only when applied to patients in the clinical setting of myocardial ischemia  o cTnI 99% cutoff should be interpreted in the context of clinical history, ECG findings and possibly cardiac imaging to establish correct diagnosis  o cTnI 99% cutoff may be suggestive but clearly not indicative of a coronary event without the clinical setting of myocardial ischemia  CBC and differential [[id number]]  (Abnormal) Collected:  04/17/18 1633    Lab Status:  Final result Specimen:  Blood from Arm, Left Updated:  04/17/18 1653     WBC 7 00 Thousand/uL      RBC 3 96 Million/uL      Hemoglobin 11 4 (L) g/dL      Hematocrit 35 0 %      MCV 88 fL      MCH 28 8 pg      MCHC 32 6 g/dL      RDW 13 5 %      MPV 9 0 fL      Platelets 791 Thousands/uL      nRBC 0 /100 WBCs      Neutrophils Relative 66 %      Lymphocytes Relative 17 %      Monocytes Relative 12 %      Eosinophils Relative 5 %      Basophils Relative 0 %      Neutrophils Absolute 4 55 Thousands/µL      Lymphocytes Absolute 1 21 Thousands/µL      Monocytes Absolute 0 84 Thousand/µL      Eosinophils Absolute 0 36 Thousand/µL      Basophils Absolute 0 03 Thousands/µL                  XR chest 2 views   Final Result by Dori Carmen MD (04/17 1740)      Patchy consolidation in the right midlung field suspicious for pneumonia    Follow-up radiographs recommended in one month to ensure complete resolution  The study was marked in [facility] for immediate notification  Workstation performed: [id number]               Procedures  Procedures      Phone Consults  ED Phone Contact    ED Course  ED Course            Identification of Seniors at [facility] Most Recent Value   (ISAR) Identification of Seniors at Risk   Before the illness or injury that brought you to the Emergency, did you need someone to help you on a regular basis? 0 Filed at: 04/17/2018 1611   In the last 24 hours, have you needed more help than usual?  0 Filed at: 04/17/2018 1611   Have you been hospitalized for one or more nights during the past 6 months? 1 Filed at: 04/17/2018 1611   In general, do you see well?  0 Filed at: 04/17/2018 1611   In general, do you have serious problems with your memory? 0 Filed at: 04/17/2018 1611   Do you take more than three different medications every day? 1 Filed at: 04/17/2018 1611   ISAR Score  2 Filed at: 04/17/2018 1611            MDM  CritCare Time    76 y o  F w/ hx of transient afib presenting for evaluation of episodic lightheadedness, near syncope event  Likely secondary to patient's known afib, though EKG and monitor suggest patient has aflutter given rate of 124 with p waves, possibly 2 to 1 block  Cardiac work up, likely admit to [facility] for near syncope  Patient noted to have right middle lung field pneumonia  Will treat with ceftriaxone and azithromycin at this time, given low suspicion for pseudomonas or MRSA  Spoke to radiologist regarding findings, believes it may be an early pneumonia  Patient has mild non productive cough, otherwise denying any other symptoms  Admitted to [facility] for further management and stabilization      Disposition  Final diagnoses:   Near syncope   Transient atrial fibrillation (HCC)   Pneumonia     Time reflects when diagnosis was documented in both MDM as applicable and the Disposition within this note Time User Action Codes Description Comment    4/17/2018  5:55 PM Norm Juarez Add [R55] Near syncope     4/17/2018  5:55 PM Norm Juarez Add [I48 91] Transient atrial fibrillation ([address] )     4/17/2018  5:55 PM Norm Juarez Add [J18 9] Pneumonia       ED Disposition     ED Disposition Condition Comment    Admit  Case was discussed with [facility] and the patient's admission status was agreed to be Admission Status: inpatient status to the service of Dr Panfilo Diaz  Follow-up Information    None       Patient's Medications   Discharge Prescriptions    No medications on file     No discharge procedures on file  ED Provider  Attending physically available and evaluated Damion Gongora I managed the patient along with the ED Attending      Electronically Signed by         Shantanu Parnell MD  04/18/18 7455

## 2018-04-18 ENCOUNTER — TELEPHONE (OUTPATIENT)
Dept: CARDIOLOGY CLINIC | Facility: CLINIC | Age: 78
End: 2018-04-18

## 2018-04-18 ENCOUNTER — APPOINTMENT (INPATIENT)
Dept: NON INVASIVE DIAGNOSTICS | Facility: CLINIC | Age: 78
DRG: 308 | End: 2018-04-18
Payer: MEDICARE

## 2018-04-18 ENCOUNTER — APPOINTMENT (INPATIENT)
Dept: NON INVASIVE DIAGNOSTICS | Facility: HOSPITAL | Age: 78
DRG: 308 | End: 2018-04-18
Payer: MEDICARE

## 2018-04-18 LAB
ANION GAP SERPL CALCULATED.3IONS-SCNC: 8 MMOL/L (ref 4–13)
ATRIAL RATE: 115 BPM
ATRIAL RATE: 122 BPM
ATRIAL RATE: 122 BPM
ATRIAL RATE: 141 BPM
ATRIAL RATE: 159 BPM
ATRIAL RATE: 82 BPM
ATRIAL RATE: 84 BPM
ATRIAL RATE: 90 BPM
BASOPHILS # BLD AUTO: 0.04 THOUSANDS/ΜL (ref 0–0.1)
BASOPHILS NFR BLD AUTO: 0 % (ref 0–1)
BUN SERPL-MCNC: 20 MG/DL (ref 5–25)
CALCIUM SERPL-MCNC: 9.9 MG/DL (ref 8.3–10.1)
CHLORIDE SERPL-SCNC: 103 MMOL/L (ref 100–108)
CO2 SERPL-SCNC: 29 MMOL/L (ref 21–32)
CREAT SERPL-MCNC: 1.05 MG/DL (ref 0.6–1.3)
EOSINOPHIL # BLD AUTO: 0.5 THOUSAND/ΜL (ref 0–0.61)
EOSINOPHIL NFR BLD AUTO: 5 % (ref 0–6)
ERYTHROCYTE [DISTWIDTH] IN BLOOD BY AUTOMATED COUNT: 13.5 % (ref 11.6–15.1)
EST. AVERAGE GLUCOSE BLD GHB EST-MCNC: 137 MG/DL
GFR SERPL CREATININE-BSD FRML MDRD: 51 ML/MIN/1.73SQ M
GLUCOSE SERPL-MCNC: 135 MG/DL (ref 65–140)
GLUCOSE SERPL-MCNC: 146 MG/DL (ref 65–140)
GLUCOSE SERPL-MCNC: 153 MG/DL (ref 65–140)
GLUCOSE SERPL-MCNC: 173 MG/DL (ref 65–140)
GLUCOSE SERPL-MCNC: 188 MG/DL (ref 65–140)
HBA1C MFR BLD: 6.4 % (ref 4.2–6.3)
HCT VFR BLD AUTO: 38.3 % (ref 34.8–46.1)
HGB BLD-MCNC: 12.6 G/DL (ref 11.5–15.4)
L PNEUMO1 AG UR QL IA.RAPID: NEGATIVE
LYMPHOCYTES # BLD AUTO: 1.3 THOUSANDS/ΜL (ref 0.6–4.47)
LYMPHOCYTES NFR BLD AUTO: 12 % (ref 14–44)
MAGNESIUM SERPL-MCNC: 1.7 MG/DL (ref 1.6–2.6)
MCH RBC QN AUTO: 29.1 PG (ref 26.8–34.3)
MCHC RBC AUTO-ENTMCNC: 32.9 G/DL (ref 31.4–37.4)
MCV RBC AUTO: 89 FL (ref 82–98)
MONOCYTES # BLD AUTO: 1.26 THOUSAND/ΜL (ref 0.17–1.22)
MONOCYTES NFR BLD AUTO: 12 % (ref 4–12)
NEUTROPHILS # BLD AUTO: 7.39 THOUSANDS/ΜL (ref 1.85–7.62)
NEUTS SEG NFR BLD AUTO: 71 % (ref 43–75)
NRBC BLD AUTO-RTO: 0 /100 WBCS
P AXIS: 78 DEGREES
P AXIS: 85 DEGREES
P AXIS: 88 DEGREES
P AXIS: 92 DEGREES
P AXIS: 93 DEGREES
PLATELET # BLD AUTO: 314 THOUSANDS/UL (ref 149–390)
PMV BLD AUTO: 9.5 FL (ref 8.9–12.7)
POTASSIUM SERPL-SCNC: 3.8 MMOL/L (ref 3.5–5.3)
PR INTERVAL: 186 MS
PR INTERVAL: 192 MS
PR INTERVAL: 208 MS
PR INTERVAL: 208 MS
QRS AXIS: -16 DEGREES
QRS AXIS: -26 DEGREES
QRS AXIS: 22 DEGREES
QRS AXIS: 27 DEGREES
QRS AXIS: 32 DEGREES
QRS AXIS: 37 DEGREES
QRS AXIS: 4 DEGREES
QRS AXIS: 43 DEGREES
QRSD INTERVAL: 86 MS
QRSD INTERVAL: 88 MS
QRSD INTERVAL: 88 MS
QRSD INTERVAL: 90 MS
QRSD INTERVAL: 94 MS
QRSD INTERVAL: 96 MS
QRSD INTERVAL: 96 MS
QRSD INTERVAL: 98 MS
QT INTERVAL: 336 MS
QT INTERVAL: 338 MS
QT INTERVAL: 342 MS
QT INTERVAL: 342 MS
QT INTERVAL: 346 MS
QT INTERVAL: 378 MS
QT INTERVAL: 380 MS
QT INTERVAL: 386 MS
QTC INTERVAL: 441 MS
QTC INTERVAL: 456 MS
QTC INTERVAL: 457 MS
QTC INTERVAL: 462 MS
QTC INTERVAL: 473 MS
QTC INTERVAL: 478 MS
QTC INTERVAL: 485 MS
QTC INTERVAL: 493 MS
RBC # BLD AUTO: 4.33 MILLION/UL (ref 3.81–5.12)
S PNEUM AG UR QL: NEGATIVE
SODIUM SERPL-SCNC: 140 MMOL/L (ref 136–145)
T WAVE AXIS: 16 DEGREES
T WAVE AXIS: 71 DEGREES
T WAVE AXIS: 71 DEGREES
T WAVE AXIS: 73 DEGREES
T WAVE AXIS: 74 DEGREES
T WAVE AXIS: 75 DEGREES
T WAVE AXIS: 76 DEGREES
T WAVE AXIS: 76 DEGREES
TROPONIN I SERPL-MCNC: <0.02 NG/ML
VENTRICULAR RATE: 100 BPM
VENTRICULAR RATE: 115 BPM
VENTRICULAR RATE: 122 BPM
VENTRICULAR RATE: 122 BPM
VENTRICULAR RATE: 124 BPM
VENTRICULAR RATE: 84 BPM
VENTRICULAR RATE: 87 BPM
VENTRICULAR RATE: 90 BPM
WBC # BLD AUTO: 10.55 THOUSAND/UL (ref 4.31–10.16)

## 2018-04-18 PROCEDURE — 93010 ELECTROCARDIOGRAM REPORT: CPT | Performed by: INTERNAL MEDICINE

## 2018-04-18 PROCEDURE — 99285 EMERGENCY DEPT VISIT HI MDM: CPT

## 2018-04-18 PROCEDURE — 93005 ELECTROCARDIOGRAM TRACING: CPT

## 2018-04-18 PROCEDURE — 82948 REAGENT STRIP/BLOOD GLUCOSE: CPT

## 2018-04-18 PROCEDURE — 80048 BASIC METABOLIC PNL TOTAL CA: CPT | Performed by: INTERNAL MEDICINE

## 2018-04-18 PROCEDURE — 99223 1ST HOSP IP/OBS HIGH 75: CPT | Performed by: INTERNAL MEDICINE

## 2018-04-18 PROCEDURE — 83735 ASSAY OF MAGNESIUM: CPT | Performed by: INTERNAL MEDICINE

## 2018-04-18 PROCEDURE — 36415 COLL VENOUS BLD VENIPUNCTURE: CPT | Performed by: INTERNAL MEDICINE

## 2018-04-18 PROCEDURE — 99232 SBSQ HOSP IP/OBS MODERATE 35: CPT | Performed by: INTERNAL MEDICINE

## 2018-04-18 PROCEDURE — 85025 COMPLETE CBC W/AUTO DIFF WBC: CPT | Performed by: INTERNAL MEDICINE

## 2018-04-18 PROCEDURE — 84484 ASSAY OF TROPONIN QUANT: CPT | Performed by: INTERNAL MEDICINE

## 2018-04-18 RX ORDER — LORAZEPAM 2 MG/ML
0.5 INJECTION INTRAMUSCULAR ONCE
Status: COMPLETED | OUTPATIENT
Start: 2018-04-18 | End: 2018-04-18

## 2018-04-18 RX ORDER — LORAZEPAM 2 MG/ML
INJECTION INTRAMUSCULAR
Status: COMPLETED
Start: 2018-04-18 | End: 2018-04-18

## 2018-04-18 RX ORDER — HYDRALAZINE HYDROCHLORIDE 20 MG/ML
10 INJECTION INTRAMUSCULAR; INTRAVENOUS ONCE
Status: COMPLETED | OUTPATIENT
Start: 2018-04-18 | End: 2018-04-18

## 2018-04-18 RX ADMIN — HYDRALAZINE HYDROCHLORIDE 10 MG: 20 INJECTION INTRAMUSCULAR; INTRAVENOUS at 02:37

## 2018-04-18 RX ADMIN — AZITHROMYCIN 250 MG: 250 TABLET, FILM COATED ORAL at 23:43

## 2018-04-18 RX ADMIN — CARVEDILOL 12.5 MG: 12.5 TABLET, FILM COATED ORAL at 17:22

## 2018-04-18 RX ADMIN — AMLODIPINE BESYLATE 5 MG: 5 TABLET ORAL at 17:22

## 2018-04-18 RX ADMIN — Medication 1000 MG: at 08:25

## 2018-04-18 RX ADMIN — LEVOTHYROXINE SODIUM 137 MCG: 112 TABLET ORAL at 05:18

## 2018-04-18 RX ADMIN — POTASSIUM CHLORIDE 10 MEQ: 750 TABLET, EXTENDED RELEASE ORAL at 08:25

## 2018-04-18 RX ADMIN — HYDRALAZINE HYDROCHLORIDE 15 MG: 20 INJECTION INTRAMUSCULAR; INTRAVENOUS at 00:05

## 2018-04-18 RX ADMIN — OXYCODONE HYDROCHLORIDE AND ACETAMINOPHEN 500 MG: 500 TABLET ORAL at 08:25

## 2018-04-18 RX ADMIN — METFORMIN HYDROCHLORIDE 500 MG: 500 TABLET, FILM COATED ORAL at 08:25

## 2018-04-18 RX ADMIN — SODIUM CHLORIDE AND POTASSIUM CHLORIDE 75 ML/HR: .9; .15 SOLUTION INTRAVENOUS at 00:06

## 2018-04-18 RX ADMIN — LORAZEPAM 0.5 MG: 0.5 TABLET ORAL at 12:42

## 2018-04-18 RX ADMIN — NITROGLYCERIN 1 INCH: 20 OINTMENT TOPICAL at 02:38

## 2018-04-18 RX ADMIN — HYDRALAZINE HYDROCHLORIDE 15 MG: 20 INJECTION INTRAMUSCULAR; INTRAVENOUS at 12:01

## 2018-04-18 RX ADMIN — LORAZEPAM 0.5 MG: 2 INJECTION INTRAMUSCULAR; INTRAVENOUS at 02:54

## 2018-04-18 RX ADMIN — LISINOPRIL 20 MG: 20 TABLET ORAL at 08:25

## 2018-04-18 RX ADMIN — AMLODIPINE BESYLATE 5 MG: 5 TABLET ORAL at 08:25

## 2018-04-18 RX ADMIN — LORAZEPAM 0.5 MG: 2 INJECTION INTRAMUSCULAR at 02:54

## 2018-04-18 RX ADMIN — METFORMIN HYDROCHLORIDE 500 MG: 500 TABLET, FILM COATED ORAL at 17:22

## 2018-04-18 RX ADMIN — ENOXAPARIN SODIUM 40 MG: 40 INJECTION SUBCUTANEOUS at 08:25

## 2018-04-18 RX ADMIN — CEFTRIAXONE 1000 MG: 1 INJECTION, SOLUTION INTRAVENOUS at 23:43

## 2018-04-18 RX ADMIN — LISINOPRIL 20 MG: 20 TABLET ORAL at 17:22

## 2018-04-18 RX ADMIN — CARVEDILOL 12.5 MG: 12.5 TABLET, FILM COATED ORAL at 08:25

## 2018-04-18 NOTE — ASSESSMENT & PLAN NOTE
Patient comes in with a tachycardia that seems to be consistent with atrial fibrillation  In this regard, the patient needs rate control however would also benefit from lowering of blood pressure  The patient was just given Norvasc 5 mg by mouth x1 here in the emergency room  Patient will be given labetalol 10 mg x1  We will continue amlodipine and Coreg and watch the blood pressure while here in hospital   Also transient atrial fibrillation may also be secondary to mild hypokalemia, 3 6 is current potassium  Will give supplementation of potassium by mouth as well as in fluids  Will also give nebulizations to provide bronchodilation    Patient also because of the transient atrial fibrillation that was demonstrated here in the emergency room; would also get echocardiogram

## 2018-04-18 NOTE — ED NOTES
Patient care and report received from [facility] at this time        Noe Fraga RN  04/17/18 4120

## 2018-04-18 NOTE — SOCIAL WORK
Met with pt to discuss the role of CM and to discuss any help pt may need prior to dc  Pt lives alone in a senior highrise apartment with elevator accepted  Pt performed ADL's inpdtly pta, pt uses a cane  Pt drives  Pt's PCP is Dr Consuelo Walker  Pt's pharmacy preference is [facility] in [address] [facility]  Pt is treated for Anxiety through her PCP  No hx of D&A treatment  Pt is currently open to [facility] and requesting to continue services  [facility] referral sent to [facility]  CM added -[facility] to pts dc instructions  Contact: Mery Rodriguez (daughter) [phone]  Pt's family will transport home at dc  CM reviewed d/c planning process including the following: identifying help at home, patient preference for d/c planning needs, Discharge Lounge, [facility] Meds to Bed program, availability of treatment team to discuss questions or concerns patient and/or family may have regarding understanding medications and recognizing signs and symptoms once discharged  CM also encouraged patient to follow up with all recommended appointments after discharge  Patient advised of importance for patient and family to participate in managing patients medical well being  Patient/caregiver received discharge checklist  Content reviewed  Patient/caregiver encouraged to participate in discharge plan of care prior to discharge home

## 2018-04-18 NOTE — PLAN OF CARE
Problem: DISCHARGE PLANNING - CARE MANAGEMENT  Goal: Discharge to post-acute care or home with appropriate resources  INTERVENTIONS:  - Conduct assessment to determine patient/family and health care team treatment goals, and need for post-acute services based on payer coverage, community resources, and patient preferences, and barriers to discharge  - Address psychosocial, clinical, and financial barriers to discharge as identified in assessment in conjunction with the patient/family and health care team  - Arrange appropriate level of post-acute services according to patient's   needs and preference and payer coverage in collaboration with the physician and health care team  - Communicate with and update the patient/family, physician, and health care team regarding progress on the discharge plan  - Arrange appropriate transportation to post-acute venues  - Plan for discharge to home with [facility]    Outcome: Progressing

## 2018-04-18 NOTE — RESPIRATORY THERAPY NOTE
RT Protocol Note  Kendall Bryant 76 y o  female MRN: [id number]  Unit/Bed#: ED 26 Encounter: [id number]    Assessment    Active Problems:    Nicotine abuse    Transient atrial fibrillation (HCC)    Hypertension, essential, benign    Anxiety    Community acquired pneumonia of right middle lobe of lung ([facility] )      Home Pulmonary Medications:  NA       Past Medical History:   Diagnosis Date    Anxiety     Atrial fibrillation (HCC)     Bowel obstruction (HCC)     Cancer (HCC)     LEFT BREAST CA 22 YEARS AGO     Depression     Diabetes mellitus ([facility] )     Hypertension     Hypothyroidism      Social History     Social History    Marital status:      Spouse name: N/A    Number of children: N/A    Years of education: N/A     Social History Main Topics    Smoking status: Current Every Day Smoker     Packs/day: 1 00     Types: Cigarettes    Smokeless tobacco: Never Used    Alcohol use No    Drug use: No    Sexual activity: Not Currently     Other Topics Concern    None     Social History Narrative    None       Subjective         Objective    Physical Exam:   Assessment Type: (P) Assess only  General Appearance: (P) Alert, Awake  Respiratory Pattern: (P) Normal  Chest Assessment: (P) Chest expansion symmetrical  Bilateral Breath Sounds: (P) Diminished, Clear    Vitals:  Blood pressure 153/98, pulse 74, temperature 98 °F (36 7 °C), temperature source Oral, resp  rate 18, height 5' 6" (1 676 m), weight 61 7 kg (136 lb), SpO2 (P) 98 %  Imaging and other studies: I have personally reviewed pertinent reports  Plan    Respiratory Plan: (P) No distress/Pulmonary history        Resp Comments: (P) Pt not complaining of any shortness of breathe  No respiratory distress noted  Breathe sounds are diminished and clear  Pt has a history of smoking but quit 3 months ago  No home use of respiratory mediations  Was given a nebulizer in the er and stated that it didnt do her any good   Will discontinue protocol and prn udns at this time

## 2018-04-18 NOTE — ACP (ADVANCE CARE PLANNING)
Initial Clinical Review    Admission: Date/Time/Statement: 4/17/18 @ 1809     Orders Placed This Encounter   Procedures    Inpatient Admission (expected length of stay for this patient is greater than two midnights)     Standing Status:   Standing     Number of Occurrences:   1     Order Specific Question:   Admitting Physician     Answer:   Vickie Siu     Order Specific Question:   Level of Care     Answer:   Med Surg [16]     Order Specific Question:   Estimated length of stay     Answer:   More than 2 Midnights     Order Specific Question:   Certification     Answer:   I certify that inpatient services are medically necessary for this patient for a duration of greater than two midnights  See H&P and MD Progress Notes for additional information about the patient's course of treatment  ED: Date/Time/Mode of Arrival:   ED Arrival Information     Expected Arrival Acuity Means of Arrival Escorted By Service Admission Type    - 4/17/2018 15:58 Urgent Ambulance [facility] General Medicine Urgent    Arrival Complaint    dizzy          Chief Complaint:   Chief Complaint   Patient presents with    Dizziness     Per EMS pt reported feeling lightheaded upon standing from a sitting position  EMS states pt's BP was 147/94 sitting and 172/98 upon standing  History of Illness:   Landon Venegas is a 76 y o  female who has a past medical history significant for hypertension and transient atrial fibrillation  The patient reports that for the past 2 or 3 days she has been feeling lightheaded with some dizziness  She did not lose consciousness  She checks her blood pressure which was approximately in the 90s but at the same time at times her blood pressure may be very high  That said, the patient was very concerned and therefore was in touch with the cardiology office  Patient noted that she has some cough for the past 4 days or so  It is nonproductive    She denies any fever or chills  Because of that, she went to the emergency room to be evaluated      A chest x-ray revealed that she has the fullness on her right middle lobe and possibly be concerning for a pneumonia  She denies any dysphagia  There is no note of any choking episodes when she eats  She denies any sick contacts  Noted is that her blood pressure has been elevated and at time she also presents with anxiety and panic attacks  (Here in the emergency room she has demonstrated some crying episodes with note of feeling of doom as well as choking  She mentions that she might not see her daughter again  )  Patient has been treated with doses of hydralazine, a dose of labetalol as well as given her Norvasc  Also there were times that she had transient atrial fibrillation which resolved on its own    In any case her blood pressure has gone down from initially 200s to 150 after being common after given Ativan 0 5 mg intravenously              ED Vital Signs:   ED Triage Vitals   Temperature Pulse Respirations Blood Pressure SpO2   04/17/18 1609 04/17/18 1609 04/17/18 1609 04/17/18 1609 04/17/18 1609   98 °F (36 7 °C) 84 20 (!) 176/92 97 %      Temp Source Heart Rate Source Patient Position - Orthostatic VS BP Location FiO2 (%)   04/17/18 1609 04/17/18 1612 04/17/18 1609 04/17/18 1612 --   Oral Monitor Lying - Orthostatic VS Left arm       Pain Score       04/17/18 2210       2        Wt Readings from Last 1 Encounters:   04/18/18 61 6 kg (135 lb 12 9 oz)       Vital Signs (abnormal):    04/17/18 2045  --   116  18  150/86  97 %  None (Room air)  --   04/17/18 2015  --   116  20  162/96  98 %  None (Room air)  --   04/17/18 1945  --   118  20  161/83  98 %  None (Room air)  --   04/17/18 1929  --   122  18   184/100  93 %  None (Room air)  --   04/17/18 1841  --   121  20   195/93  95 %  None (Room air)  --   04/17/18 1807  --   123  18   186/99  97 %  None (Room air)  Lying   04/17/18 1713  --  91  18   200/90  99 %  None (Room air)  --   04/17/18 1645  --   124  20   181/90  98 %  None (Room air)  --   04/17/18 1614  --  88  --   185/93  98 %  None (Room air)  Standing - Orthostatic VS   04/17/18 1612  --  94  --   178/94  98 %  None (Room air)  Sitting - Orthostatic VS   04/17/18 1611  --   124  --  --  --  --  --     04/17/18 1609  98 °F (36 7 °C)  84  20   176/92  97 %  None (Room air)  Lying - Orthostatic       Abnormal Labs/Diagnostic Test Results:   Lab Units 04/17/18  1633   SODIUM mmol/L 135*   CHLORIDE mmol/L 99*   GLUCOSE RANDOM mg/dL 149*       HEMOGLOBIN g/dL 11 4*     4/17/2018  Narrative: CHEST INDICATION:   tachycardia and chest pain  Lightheadedness  Hypertension    History of breast cancer  Patchy consolidation in the right midlung field suspicious for pneumonia      ED Treatment:   Medication Administration from 04/17/2018 1558 to 04/18/2018 0321       Date/Time Order Dose Route     04/17/2018 1823 cefTRIAXone (ROCEPHIN) IVPB (premix) 1,000 mg 1,000 mg Intravenous     04/17/2018 1925 azithromycin (ZITHROMAX) 500 mg in sodium chloride 0 9% 250mL IVPB 500 mg 500 mg Intravenous     04/17/2018 1841 amLODIPine (NORVASC) tablet 5 mg 5 mg Oral     04/17/2018 1841 carvedilol (COREG) tablet 12 5 mg 12 5 mg Oral     04/17/2018 2042 potassium chloride (K-DUR,KLOR-CON) CR tablet 20 mEq 20 mEq Oral     04/17/2018 2043 labetalol (NORMODYNE) injection 10 mg 10 mg Intravenous     04/18/2018 0006 sodium chloride 0 9 % with KCl 20 mEq/L infusion (premix) 75 mL/hr Intravenous     04/17/2018 2042 albuterol inhalation solution 2 5 mg 2 5 mg Nebulization     04/17/2018 2042 sodium chloride 0 9 % inhalation solution **AcuDose Override Pull** 3 mL      04/18/2018 0005 hydrALAZINE (APRESOLINE) injection 15 mg 15 mg Intravenous     04/18/2018 0238 nitroglycerin (NITRO-BID) 2 % TD ointment 1 inch 1 inch Topical     04/18/2018 0237 hydrALAZINE (APRESOLINE) injection 10 mg 10 mg Intravenous     04/18/2018 0254 LORazepam (ATIVAN) 2 mg/mL injection 0 5 mg 0 5 mg Intravenous          Past Medical/Surgical History: Active Ambulatory Problems     Diagnosis Date Noted    Heart palpitations 07/11/2016    Nicotine abuse 07/11/2016    Transient atrial fibrillation ([facility] ) 07/11/2016    Diabetes mellitus type 2 in nonobese ([facility] ) 07/11/2016    Hypertension, essential, benign 07/11/2016    Acquired hypothyroidism 07/11/2016    Malignant neoplasm of central portion of right female breast ([facility] ) 01/13/2017    Acute kidney injury ([facility] ) 02/02/2018    Delirium 02/05/2018    Physical deconditioning 02/05/2018    Ambulatory dysfunction 02/05/2018    Hx of fall 02/05/2018    Anxiety 02/05/2018    Postop check 03/02/2018     Resolved Ambulatory Problems     Diagnosis Date Noted    Small bowel obstruction ([facility] ) 02/02/2018    Bowel obstruction ([facility] ) 02/01/2018     Past Medical History:    Anxiety     Atrial fibrillation (HCC)     Bowel obstruction (HCC)     Cancer ([facility] )     Depression     Diabetes mellitus ([facility] )     Hypertension     Hypothyroidism        Admitting Diagnosis: Pneumonia [J18 9]  Dizzy [R42]  Near syncope [R55]  Transient atrial fibrillation (HCC) [I48 91]    Age/Sex: 76 y o  female    Assessment/Plan:           Community acquired pneumonia of right middle lobe of lung ([facility] )   Assessment & Plan     Community-acquired pneumonia or bronchitis  The patient is a former smoker that recently quit approximately February of this year  Patient refused to be seen by Pulmonary  However will give nebulizations to open up airways  Also will continue with antibiotics  Anxiety may also play with patient's coughing fits  Will leave to the team that in the event that patient continues to cough; may probably need pulmonary consult    Also pneumonia protocol including sputum studies and cultures           Transient atrial fibrillation [facility])   Assessment & Plan     Patient comes in with a tachycardia that seems to be consistent with atrial fibrillation  In this regard, the patient needs rate control however would also benefit from lowering of blood pressure  The patient was just given Norvasc 5 mg by mouth x1 here in the emergency room  Patient will be given labetalol 10 mg x1  We will continue amlodipine and Coreg and watch the blood pressure while here in hospital   Also transient atrial fibrillation may also be secondary to mild hypokalemia, 3 6 is current potassium  Will give supplementation of potassium by mouth as well as in fluids  Will also give nebulizations to provide bronchodilation  Patient also because of the transient atrial fibrillation that was demonstrated here in the emergency room; would also get echocardiogram           Anxiety   Assessment & Plan     Patient is currently on Ativan and will continue  Likewise, patient expresses that she is feeling the presence of doom especially she thinks that her current functioning is not the way as it was before  Patient presents with extreme anxiety with note of panic attacks  Will give Ativan as noted we also given Ativan 0 5 mg x1           Hypertension, essential, benign   Assessment & Plan     Patient will continue with Coreg as well as amlodipine  Will continue to watch blood pressures noted  Patient is also given hydralazine p r n     The patient with extreme blood pressure was placed on nitropaste however seems to be feeling so anxious that it was removed  It seems that blood pressure is much better after given the Ativan 0 5 mg intravenously x1           Nicotine abuse   Assessment & Plan     Patient stop smoking since February of this year    Will give small dose of nicotine replacement               Admission Orders:    TREND TROPONIN   STREP PNEUMONIAE  LEGIONELLA  SPUTUM CULTURE   TELEMETRY  CONTACT ISOLATION  FINGERSTICK GLUCOSE BEFORE MEALS AND AT BED TIME  CONSISTENT CARB DIET   INCENTIVE SPIROMETRY     Scheduled Meds:   Current Facility-Administered Medications:  acetaminophen 650 mg Oral Q6H PRN   aluminum-magnesium hydroxide-simethicone 15 mL Oral Q6H PRN   amLODIPine 5 mg Oral BID   ascorbic acid 500 mg Oral Daily   cefTRIAXone 1,000 mg Intravenous Q24H   And      azithromycin 250 mg Oral Q24H   carvedilol 12 5 mg Oral BID With Meals   docusate sodium 100 mg Oral BID PRN   enoxaparin 40 mg Subcutaneous Daily   fish oil 1,000 mg Oral Daily   hydrALAZINE 15 mg Intravenous Q4H PRN   insulin lispro 1-5 Units Subcutaneous TID AC   insulin lispro 1-5 Units Subcutaneous HS   levothyroxine 137 mcg Oral Early Morning   lisinopril 20 mg Oral BID   LORazepam 0 5 mg Oral Daily PRN   metFORMIN 500 mg Oral BID With Meals   nicotine 1 patch Transdermal Daily   ondansetron 4 mg Intravenous Q6H PRN   potassium chloride 10 mEq Oral Daily     Continuous Infusions:    PRN Meds:   acetaminophen    aluminum-magnesium hydroxide-simethicone    docusate sodium    hydrALAZINE    LORazepam    ondansetron

## 2018-04-18 NOTE — ASSESSMENT & PLAN NOTE
Community-acquired pneumonia or bronchitis  The patient is a former smoker that recently quit approximately February of this year  Patient refused to be seen by Pulmonary  However will give nebulizations to open up airways  Also will continue with antibiotics  Anxiety may also play with patient's coughing fits  Cough appears to have improved, no need for pulmonary input yet  Also pneumonia protocol including sputum studies and cultures

## 2018-04-18 NOTE — PROGRESS NOTES
Pt c/o feeling palpatations and slight chest pressure  HR 120s, /50  Tele visualized several minutes ago and was in NSR in 70s  Pt was recently medicated (PRN) for HTN, SBP in 170s  Dr Bjorn Hicks present on floor and notified  Order received for EKG  EKG done and shown to Dr Bjorn Hicks  HR is in regular and tachycardic  Unable to identify P waves  VS: 97 5, 125, 20, 98% on RA  Pt denies any SOB or respiratory distress  Pt also c/o feeling anxious and like she "wants to scream"  PRN dose Ativan given per request  This nurse sat with pt and offered listening  Pt states she is stressed out because she does not know what is wrong with her  Will cont to monitor pt and promote relaxation

## 2018-04-18 NOTE — ASSESSMENT & PLAN NOTE
Patient is currently on Ativan and will continue  Patient has had episodes of severe anxiety but improved since admission  Single dose of Ativan given in the emergency room, continues to follow for now

## 2018-04-18 NOTE — ED NOTES
Dr Marisol Duran states OK for patient to go to inpatient assigned bed at this time   Aware of stabilized BP     Caracatrachita Schaffer RN  04/18/18 5205

## 2018-04-18 NOTE — ED NOTES
P7 Charge RN called for tele-box x1 at this time  States searching for a tele-box  ED Charge RN notified        Kartik Bro RN  04/17/18 2125

## 2018-04-18 NOTE — ED NOTES
Pt c/o chest pressure  Dr Bam Jaramillo made aware   Repeat EKG completed     Jadene Romberg, RN  04/18/18 0011

## 2018-04-18 NOTE — SOCIAL WORK
MCG Guide Used for Initial Round: Pneumonia, Community Acquired RRG  Optimal GLOS: 2  Hospital Day: 1 day  DC Readiness:   Discharge Readiness  Return to top of Pneumonia, Community Acquired RRG - ISC  · Discharge readiness is indicated by patient meeting Recovery Milestones, including ALL of the following:  ? Hemodynamic stability  ? Tachypnea absent  ? Hypoxemia absent  ? Afebrile, or temperature acceptable for next level of care  ? Oxygen absent or at baseline need  ? Mental status at baseline  ? Antibiotic regimen acceptable for next level of care  ? Ambulatory  ? Oral hydration, medications, and diet  ?  Discharge plans and education understood    Identified Barriers: Contiue on IV antibiotics  Discussion Date (Time): 04/18/18 with Dr Rodrick Meléndez

## 2018-04-18 NOTE — ASSESSMENT & PLAN NOTE
Community-acquired pneumonia or bronchitis  The patient is a former smoker that recently quit approximately February of this year  Patient refused to be seen by Pulmonary  However will give nebulizations to open up airways  Also will continue with antibiotics  Anxiety may also play with patient's coughing fits  Will leave to the team that in the event that patient continues to cough; may probably need pulmonary consult  Also pneumonia protocol including sputum studies and cultures

## 2018-04-18 NOTE — ED NOTES
P7 Charge RN called and states there was a bed change and pt will now be going to a different bed that is currently occupied on P7 at this time        Kartik Bro RN  04/17/18 8623

## 2018-04-18 NOTE — TELEPHONE ENCOUNTER
Pt's dtr Marcelino Alicia called, Pt is currently admitted to [facility] w/ [facility]  Marcelino Alicia feels Anupama Dove has bacterial pericarditis and wants her mother to have an MRI  [facility] is not addressing her mothers symptoms and wants something done     C/b # [phone]

## 2018-04-18 NOTE — ED NOTES
Pt states her diagnoses of atrial fibrillation is not new and she has a history of atrial fibrillation        Noe Fraga RN  04/18/18 6499

## 2018-04-18 NOTE — H&P
H&P- Jared Gross 1940, 76 y o  female MRN: [id number]    Unit/Bed#: [facility] 709-01 Encounter: [id number]    Primary Care Provider: Shayy Hoang DO   Date and time admitted to hospital: 4/17/2018  3:58 PM        Community acquired pneumonia of right middle lobe of lung ([address] )   Assessment & Plan    Community-acquired pneumonia or bronchitis  The patient is a former smoker that recently quit approximately February of this year  Patient refused to be seen by Pulmonary  However will give nebulizations to open up airways  Also will continue with antibiotics  Anxiety may also play with patient's coughing fits  Will leave to the team that in the event that patient continues to cough; may probably need pulmonary consult  Also pneumonia protocol including sputum studies and cultures  Transient atrial fibrillation [facility])   Assessment & Plan    Patient comes in with a tachycardia that seems to be consistent with atrial fibrillation  In this regard, the patient needs rate control however would also benefit from lowering of blood pressure  The patient was just given Norvasc 5 mg by mouth x1 here in the emergency room  Patient will be given labetalol 10 mg x1  We will continue amlodipine and Coreg and watch the blood pressure while here in hospital   Also transient atrial fibrillation may also be secondary to mild hypokalemia, 3 6 is current potassium  Will give supplementation of potassium by mouth as well as in fluids  Will also give nebulizations to provide bronchodilation  Patient also because of the transient atrial fibrillation that was demonstrated here in the emergency room; would also get echocardiogram         Anxiety   Assessment & Plan    Patient is currently on Ativan and will continue  Likewise, patient expresses that she is feeling the presence of doom especially she thinks that her current functioning is not the way as it was before    Patient presents with extreme anxiety with note of panic attacks  Will give Ativan as noted we also given Ativan 0 5 mg x1  Hypertension, essential, benign   Assessment & Plan    Patient will continue with Coreg as well as amlodipine  Will continue to watch blood pressures noted  Patient is also given hydralazine p r n     The patient with extreme blood pressure was placed on nitropaste however seems to be feeling so anxious that it was removed  It seems that blood pressure is much better after given the Ativan 0 5 mg intravenously x1  Nicotine abuse   Assessment & Plan    Patient stop smoking since February of this year  Will give small dose of nicotine replacement  VTE Prophylaxis: Enoxaparin (Lovenox)  / sequential compression device   Code Status: Level 1 - Full Code as discussed with patient  POLST: There is no POLST form on file for this patient (pre-hospital)    Anticipated Length of Stay:  Patient will be admitted on an Inpatient basis with an anticipated length of stay of  greater than 2 midnights  Justification for Hospital Stay: Please see detailed plans noted above  Chief Complaint:     Multiple complaints that involve cough, dizziness  History of Present Illness:  Spike Miller is a 76 y o  female who has a past medical history significant for hypertension and transient atrial fibrillation  The patient reports that for the past 2 or 3 days she has been feeling lightheaded with some dizziness  She did not lose consciousness  She checks her blood pressure which was approximately in the 90s but at the same time at times her blood pressure may be very high  That said, the patient was very concerned and therefore was in touch with the cardiology office  Patient noted that she has some cough for the past 4 days or so  It is nonproductive  She denies any fever or chills  Because of that, she went to the emergency room to be evaluated      A chest x-ray revealed that she has the fullness on her right middle lobe and possibly be concerning for a pneumonia  She denies any dysphagia  There is no note of any choking episodes when she eats  She denies any sick contacts  Noted is that her blood pressure has been elevated and at time she also presents with anxiety and panic attacks  (Here in the emergency room she has demonstrated some crying episodes with note of feeling of doom as well as choking  She mentions that she might not see her daughter again  )  Patient has been treated with doses of hydralazine, a dose of labetalol as well as given her Norvasc  Also there were times that she had transient atrial fibrillation which resolved on its own  In any case her blood pressure has gone down from initially 200s to 150 after being common after given Ativan 0 5 mg intravenously  Review of Systems:    Constitutional:  Denies fever or chills   Eyes:  Denies change in visual acuity   HENT:  Denies nasal congestion or sore throat   Respiratory:  Had some nonproductive cough within the past few days  Denies any shortness of breath  However during the time of anxiety she says that she is feeling as if she is choking however is able to breathe without problem and saturations remain at approximately 99%  Also her heart rate then was approximately 100    Cardiovascular:  Denies chest pain or edema   GI:  Denies abdominal pain, nausea, vomiting, bloody stools or diarrhea   :  Denies dysuria   Musculoskeletal:  Denies back pain or joint pain   Integument:  Denies rash   Neurologic:  Denies headache, focal weakness or sensory changes   Endocrine:  Denies polyuria or polydipsia   Lymphatic:  Denies swollen glands   Psychiatric:  Denies depression or anxiety     Past Medical and Surgical History:   Past Medical History:   Diagnosis Date    Anxiety     Atrial fibrillation ([facility] )     Bowel obstruction ([facility] )     Cancer ([facility] )     LEFT BREAST CA 22 YEARS AGO     Depression     Diabetes mellitus [facility] )     Hypertension     Hypothyroidism      Past Surgical History:   Procedure Laterality Date    ABDOMINAL ADHESION SURGERY N/A 2/4/2018    Procedure: LYSIS ADHESIONS;  Surgeon: Lily Casanova DO;  [facility];  Service: General    BREAST SURGERY      CHOLECYSTECTOMY      EXPLORATORY LAPAROTOMY      JOINT REPLACEMENT      LEFT KNEE REPLACEMENT     LAPAROTOMY N/A 2/4/2018    Procedure: LAPAROTOMY EXPLORATORY,;  Surgeon: Lily Casanova DO;  [facility];  Service: General    MASTECTOMY      MO BIOPSY/EXCISION, LYMPH NODE(S) Right 1/13/2017    Procedure: SENTINEL LYMPH NODE BIOPSY RIGHT AXILLA; Surgeon: Pmaela Grimm MD;  [facility];  Service: General    MO MASTECTOMY, SIMPLE, COMPLETE Right 1/13/2017    Procedure: MASTECTOMY SIMPLE;  Surgeon: Pamela Grimm MD;  [facility];  Service: General    SMALL INTESTINE SURGERY N/A 2/4/2018    Procedure: RESECTION SMALL BOWEL;  Surgeon: Lily Casanova DO;  [facility];  Service: General       Meds/Allergies:  Prescriptions Prior to Admission   Medication    amLODIPine (NORVASC) 5 mg tablet    ascorbic acid (VITAMIN C) 500 mg tablet    carvedilol (COREG) 12 5 mg tablet    Levothyroxine Sodium 137 MCG CAPS    lisinopril (ZESTRIL) 20 mg tablet    LORazepam (ATIVAN) 0 5 mg tablet    metFORMIN (GLUCOPHAGE) 500 mg tablet    Omega-3 Fatty Acids (FISH OIL PO)    furosemide (LASIX) 20 mg tablet    potassium chloride (K-DUR,KLOR-CON) 10 mEq tablet       Allergies:    Allergies   Allergen Reactions    Morphine GI Intolerance    Penicillins     Percocet [Oxycodone-Acetaminophen]      History:  Marital Status:    Occupation:  Retired  Patient Pre-hospital Living Situation:  Lives at home  Patient Pre-hospital Level of Mobility:  Mobile  Patient Pre-hospital Diet Restrictions:  Cardiac diabetic  Substance Use History:   History   Alcohol Use No     History   Smoking Status    Current Every Day Smoker    Packs/day: 1 00    Types: Cigarettes   Smokeless Tobacco    Never Used     History   Drug Use No       Family History:  Family History   Problem Relation Age of Onset    Cancer Mother        Physical Exam:     Vitals:   Blood Pressure: 157/86 (04/18/18 0315)  Pulse: 86 (04/18/18 0315)  Temperature: 98 °F (36 7 °C) (04/18/18 0138)  Temp Source: Tympanic (04/18/18 0138)  Respirations: 20 (04/18/18 0315)  Height: 5' 6" (167 6 cm) (04/17/18 1609)  Weight - Scale: 61 7 kg (136 lb) (04/17/18 1609)  SpO2: 95 % (04/18/18 0315)    Constitutional:  Well developed, well nourished, no acute distress, non-toxic appearance but is extremely anxious  Sometimes her emotions may be labile  Eyes:  PERRL, conjunctiva normal   HENT:  Atraumatic, external ears normal, nose normal, oropharynx moist, no pharyngeal exudates  Neck- normal range of motion, no tenderness, supple   Respiratory:  No respiratory distress, bronchial breath sounds s, no rales, no wheezing (noted at that time of extreme anxiety the patient states that she was not able to breathe as if she was being choked with noted tearful and crying episode)  Cardiovascular:  Normal rate, normal rhythm, no murmurs, no gallops, no rubs however during time of atrial fibrillation her heart rate can go as high as 130  GI:  Soft, nondistended, normal bowel sounds, nontender, no organomegaly, no mass, no rebound, no guarding   :  No costovertebral angle tenderness   Musculoskeletal:  No edema, no tenderness, no deformities  Back- no tenderness  Integument:  Well hydrated, no rash   Lymphatic:  No lymphadenopathy noted   Neurologic:  Alert &awake, communicative, CN 2-12 normal, normal motor function, normal sensory function, no focal deficits noted   Psychiatric:  Speech and behavior at times is labile and times tearful  She is also sometimes in a nonchalant mood      Lab Results: I have personally reviewed pertinent reports          Results from last 7 days  Lab Units 04/17/18  1633   WBC Thousand/uL 7 00 HEMOGLOBIN g/dL 11 4*   HEMATOCRIT % 35 0   PLATELETS Thousands/uL 278   NEUTROS PCT % 66   LYMPHS PCT % 17   MONOS PCT % 12   EOS PCT % 5       Results from last 7 days  Lab Units 04/17/18  1633   SODIUM mmol/L 135*   POTASSIUM mmol/L 3 6   CHLORIDE mmol/L 99*   CO2 mmol/L 31   BUN mg/dL 22   CREATININE mg/dL 1 15   CALCIUM mg/dL 9 7   TOTAL PROTEIN g/dL 8 2   BILIRUBIN TOTAL mg/dL 0 58   ALK PHOS U/L 90   ALT U/L 16   AST U/L 17   GLUCOSE RANDOM mg/dL 149*           EKG:  Multiple EKGs reveal sinus rhythm and at times atrial fibrillation  Imaging: I have personally reviewed pertinent reports  Xr Chest 2 Views    Result Date: 4/17/2018  Narrative: CHEST INDICATION:   tachycardia and chest pain  Lightheadedness  Hypertension  History of breast cancer  COMPARISON:  7/11/2016 EXAM PERFORMED/VIEWS:  XR CHEST PA & LATERAL FINDINGS: Cardiomediastinal silhouette appears stable  Patchy consolidation in the right midlung field suspicious for pneumonia  No pneumothorax or pleural effusion  Osseous structures appear within normal limits for patient age  Status post left mastectomy  Left axillary clips  Impression: Patchy consolidation in the right midlung field suspicious for pneumonia  Follow-up radiographs recommended in one month to ensure complete resolution  The study was marked in [facility] for immediate notification  Workstation performed: [id number]         ** Please Note: Dragon 360 Dictation voice to text software was used in the creation of this document   **

## 2018-04-18 NOTE — ASSESSMENT & PLAN NOTE
Patient is currently on Ativan and will continue  Likewise, patient expresses that she is feeling the presence of doom especially she thinks that her current functioning is not the way as it was before  Patient presents with extreme anxiety with note of panic attacks  Will give Ativan as noted we also given Ativan 0 5 mg x1

## 2018-04-18 NOTE — ED NOTES
[facility] charge states they do not have a telemetry box for the patient at this time       La Soriano RN  04/18/18 9494

## 2018-04-18 NOTE — ED NOTES
Pt c/o SOB  Nitro paste removed by MD Dr Karon Alvarenga at bedside to evaluate patient   Pt c/o feeling exhausted     Emily Frey RN  04/18/18 7802

## 2018-04-18 NOTE — PROGRESS NOTES
Progress Note - Bobby Sickle 1940, 76 y o  female MRN: [id number]    Unit/Bed#: [facility] 709-01 Encounter: [id number]    Primary Care Provider: Mayra Mortensen DO   Date and time admitted to hospital: 4/17/2018  3:58 PM        * Community acquired pneumonia of right middle lobe of lung ([address] )   Assessment & Plan    Community-acquired pneumonia or bronchitis  The patient is a former smoker that recently quit approximately February of this year  Patient refused to be seen by Pulmonary  However will give nebulizations to open up airways  Also will continue with antibiotics  Anxiety may also play with patient's coughing fits  Cough appears to have improved, no need for pulmonary input yet  Also pneumonia protocol including sputum studies and cultures  Transient atrial fibrillation [facility])   Assessment & Plan    Patient comes in with a tachycardia that seems to be consistent with atrial fibrillation  In this regard, the patient needs rate control however would also benefit from lowering of blood pressure  The patient was just given Norvasc 5 mg by mouth x1 here in the emergency room  Patient will be given labetalol 10 mg x1  We will continue amlodipine and Coreg and watch the blood pressure while here in hospital   Also transient atrial fibrillation may also be secondary to mild hypokalemia, 3 6 is current potassium  Will give supplementation of potassium by mouth as well as in fluids  Will also give nebulizations to provide bronchodilation  Patient also because of the transient atrial fibrillation that was demonstrated here in the emergency room; would also get echocardiogram         Anxiety   Assessment & Plan    Patient is currently on Ativan and will continue  Patient has had episodes of severe anxiety but improved since admission  Single dose of Ativan given in the emergency room, continues to follow for now          Nicotine abuse   Assessment & Plan    Patient stop smoking since February of this year  Will give small dose of nicotine replacement  Hypertension, essential, benign   Assessment & Plan    Patient will continue with Coreg as well as amlodipine  Will continue to watch blood pressures noted  Patient is also given hydralazine p r n     The patient with extreme blood pressure was placed on nitropaste however seems to be feeling so anxious that it was removed  It seems that blood pressure is much better after given the Ativan 0 5 mg intravenously x1  VTE Pharmacologic Prophylaxis:   Pharmacologic: Enoxaparin (Lovenox)  Mechanical VTE Prophylaxis in Place: Yes    Patient Centered Rounds: I have performed bedside rounds with nursing staff today  Current Length of Stay: 1 day(s)    Current Patient Status: Inpatient   Certification Statement: The patient will continue to require additional inpatient hospital stay due to Pneumonia    Code Status: Level 1 - Full Code      Subjective:   Patient feels better since last night  Objective:     Vitals:   Temp (24hrs), Av 6 °F (37 °C), Min:98 °F (36 7 °C), Max:98 9 °F (37 2 °C)    HR:  [] 85  Resp:  [18-32] 20  BP: ()/() 165/70  SpO2:  [93 %-100 %] 97 %  Body mass index is 21 92 kg/m²  Input and Output Summary (last 24 hours): Intake/Output Summary (Last 24 hours) at 18 1804  Last data filed at 18 1453   Gross per 24 hour   Intake          1108 75 ml   Output                0 ml   Net          1108 75 ml       Physical Exam:  General Appearance:    Alert, cooperative, no distress, appropriately responsive    Head:    Normocephalic, without obvious abnormality, atraumatic, mucous membranes moist    Eyes:    Conjunctiva/corneas clear, EOM's intact   Neck:   Supple, no JVD or bruits noted   Lungs:     Decreased breath sounds with occasional wheezing and rhonchi      Heart:   Regular without ectopy   Abdomen:     Soft, non-tender, bowel sounds active all four quadrants,     no masses, no organomegaly   Extremities:   Extremities normal, atraumatic, no cyanosis or edema   Neurologic:  nonfocal, A/O x 3           Additional Data:     Labs:      Results from last 7 days  Lab Units 04/18/18  0421   WBC Thousand/uL 10 55*   HEMOGLOBIN g/dL 12 6   HEMATOCRIT % 38 3   PLATELETS Thousands/uL 314   NEUTROS PCT % 71   LYMPHS PCT % 12*   MONOS PCT % 12   EOS PCT % 5       Results from last 7 days  Lab Units 04/18/18  0421 04/17/18  1633   SODIUM mmol/L 140 135*   POTASSIUM mmol/L 3 8 3 6   CHLORIDE mmol/L 103 99*   CO2 mmol/L 29 31   BUN mg/dL 20 22   CREATININE mg/dL 1 05 1 15   CALCIUM mg/dL 9 9 9 7   TOTAL PROTEIN g/dL  --  8 2   BILIRUBIN TOTAL mg/dL  --  0 58   ALK PHOS U/L  --  90   ALT U/L  --  16   AST U/L  --  17   GLUCOSE RANDOM mg/dL 153* 149*           * I Have Reviewed All Lab Data Listed Above  * Additional Pertinent Lab Tests Reviewed:  All Labs Within Last 24 Hours Reviewed    Cultures:   Blood Culture: No results found for: BLOODCX  Urine Culture: No results found for: URINECX  Sputum Culture: No components found for: SPUTUMCX  Wound Culture: No results found for: WOUNDCULT    Last 24 Hours Medication List:     Current Facility-Administered Medications:  acetaminophen 650 mg Oral Q6H PRN Ananth Silva MD   aluminum-magnesium hydroxide-simethicone 15 mL Oral Q6H PRN Ananth Silva MD   amLODIPine 5 mg Oral BID Ananth Silva MD   ascorbic acid 500 mg Oral Daily Ananth Silva MD   cefTRIAXone 1,000 mg Intravenous Q24H Ananth Silva MD   And       azithromycin 250 mg Oral Q24H Ananth Silva MD   carvedilol 12 5 mg Oral BID With Meals Ananth Silva MD   docusate sodium 100 mg Oral BID PRN Ananth Silva MD   enoxaparin 40 mg Subcutaneous Daily Ananth Silva MD   fish oil 1,000 mg Oral Daily Ananth Silva MD   hydrALAZINE 15 mg Intravenous Q4H PRN Ananth Silva MD   insulin lispro 1-5 Units Subcutaneous TID AC Ananth Silva MD   insulin lispro 1-5 Units Subcutaneous HS Dagoberto López MD   levothyroxine 137 mcg Oral Early Morning Dagoberto López MD   lisinopril 20 mg Oral BID Dagoberto López MD   LORazepam 0 5 mg Oral Daily PRN Dagoberto López MD   metFORMIN 500 mg Oral BID With Meals Dagoberto López MD   nicotine 1 patch Transdermal Daily Dagoberto López MD   ondansetron 4 mg Intravenous Q6H PRN Dagoberto López MD   potassium chloride 10 mEq Oral Daily Dagoberto López MD        Today, Patient Was Seen By: Jarred Cox DO    ** Please Note: Dragon 360 Dictation voice to text software may have been used in the creation of this document   **

## 2018-04-18 NOTE — ASSESSMENT & PLAN NOTE
Patient will continue with Coreg as well as amlodipine  Will continue to watch blood pressures noted  Patient is also given hydralazine p r n     The patient with extreme blood pressure was placed on nitropaste however seems to be feeling so anxious that it was removed  It seems that blood pressure is much better after given the Ativan 0 5 mg intravenously x1

## 2018-04-18 NOTE — ED NOTES
Dr Faith Purchase aware patient refused insulin  Aware of BP  States order for 15 mg hydralazine Q4HPRN for BP > 165        Karyn Perez RN  04/17/18 0524

## 2018-04-19 ENCOUNTER — TELEPHONE (OUTPATIENT)
Dept: CARDIOLOGY CLINIC | Facility: CLINIC | Age: 78
End: 2018-04-19

## 2018-04-19 LAB
GLUCOSE SERPL-MCNC: 120 MG/DL (ref 65–140)
GLUCOSE SERPL-MCNC: 141 MG/DL (ref 65–140)
GLUCOSE SERPL-MCNC: 170 MG/DL (ref 65–140)
GLUCOSE SERPL-MCNC: 266 MG/DL (ref 65–140)
MRSA NOSE QL CULT: NORMAL

## 2018-04-19 PROCEDURE — 99222 1ST HOSP IP/OBS MODERATE 55: CPT | Performed by: INTERNAL MEDICINE

## 2018-04-19 PROCEDURE — 99233 SBSQ HOSP IP/OBS HIGH 50: CPT | Performed by: INTERNAL MEDICINE

## 2018-04-19 PROCEDURE — 82948 REAGENT STRIP/BLOOD GLUCOSE: CPT

## 2018-04-19 RX ORDER — VERAPAMIL HYDROCHLORIDE 240 MG/1
240 TABLET, FILM COATED, EXTENDED RELEASE ORAL DAILY
Status: DISCONTINUED | OUTPATIENT
Start: 2018-04-19 | End: 2018-04-21 | Stop reason: HOSPADM

## 2018-04-19 RX ORDER — DILTIAZEM HYDROCHLORIDE 60 MG/1
60 TABLET, FILM COATED ORAL EVERY 6 HOURS SCHEDULED
Status: DISCONTINUED | OUTPATIENT
Start: 2018-04-19 | End: 2018-04-19

## 2018-04-19 RX ADMIN — AMLODIPINE BESYLATE 5 MG: 5 TABLET ORAL at 08:06

## 2018-04-19 RX ADMIN — LORAZEPAM 0.5 MG: 0.5 TABLET ORAL at 10:23

## 2018-04-19 RX ADMIN — CARVEDILOL 12.5 MG: 12.5 TABLET, FILM COATED ORAL at 17:10

## 2018-04-19 RX ADMIN — Medication 1000 MG: at 07:59

## 2018-04-19 RX ADMIN — LEVOTHYROXINE SODIUM 137 MCG: 112 TABLET ORAL at 05:10

## 2018-04-19 RX ADMIN — VERAPAMIL HYDROCHLORIDE 240 MG: 240 TABLET, FILM COATED, EXTENDED RELEASE ORAL at 15:50

## 2018-04-19 RX ADMIN — CARVEDILOL 12.5 MG: 12.5 TABLET, FILM COATED ORAL at 08:06

## 2018-04-19 RX ADMIN — LISINOPRIL 20 MG: 20 TABLET ORAL at 08:06

## 2018-04-19 RX ADMIN — ONDANSETRON 4 MG: 2 INJECTION INTRAMUSCULAR; INTRAVENOUS at 15:43

## 2018-04-19 RX ADMIN — DILTIAZEM HYDROCHLORIDE 30 MG: 30 TABLET, FILM COATED ORAL at 13:13

## 2018-04-19 RX ADMIN — LISINOPRIL 20 MG: 20 TABLET ORAL at 17:10

## 2018-04-19 RX ADMIN — POTASSIUM CHLORIDE 10 MEQ: 750 TABLET, EXTENDED RELEASE ORAL at 07:59

## 2018-04-19 RX ADMIN — OXYCODONE HYDROCHLORIDE AND ACETAMINOPHEN 500 MG: 500 TABLET ORAL at 07:59

## 2018-04-19 RX ADMIN — METFORMIN HYDROCHLORIDE 500 MG: 500 TABLET, FILM COATED ORAL at 17:10

## 2018-04-19 RX ADMIN — METFORMIN HYDROCHLORIDE 500 MG: 500 TABLET, FILM COATED ORAL at 07:59

## 2018-04-19 NOTE — ASSESSMENT & PLAN NOTE
Patient comes in with a tachycardia that seems to be consistent with atrial fibrillation  In this regard, the patient needs rate control however would also benefit from lowering of blood pressure  The patient was just given Norvasc 5 mg by mouth x1 here in the emergency room  Patient will be given labetalol 10 mg x1  Probable paroxysmal SVT  Routine Card consult ordered  Appreciate input   Switch to verapamil and follow closely

## 2018-04-19 NOTE — PROGRESS NOTES
Echo CANCELLED earlier in the AM d/t pt already have one done last 2/9  Daughter came in and asked for the result because the outpatient cardiology doctor Magno Odonnell wants one done during this admission  Addressed this with dr Robbin Basurto  Inpatient consult to cardiology ordered

## 2018-04-19 NOTE — PHYSICIAN ADVISOR
Current patient class: Inpatient  The patient is currently on Hospital Day: 2      The patient was admitted to the hospital at [address] [place] [address] on 4/17/18 for the following diagnosis:  Pneumonia [J18 9]  Dizzy [R42]  Near syncope [R55]  Transient atrial fibrillation ([address] ) [I48 91]       There is documentation in the medical record of an expected length of stay of at least 2 midnights  The patient is therefore expected to satisfy the 2 midnight benchmark and given the 2 midnight presumption is appropriate for INPATIENT ADMISSION  Given this expectation of a satisfying stay, CMS instructs us that the patient is most often appropriate for inpatient admission under part A provided medical necessity is documented in the chart  After review of the relevant documentation, labs, vital signs and test results, the patient is appropriate for INPATIENT ADMISSION  Admission to the hospital as an inpatient is a complex decision making process which requires the practitioner to consider the patients presenting complaint, history and physical examination and all relevant testing  With this in mind, in this case, the patient was deemed appropriate for INPATIENT ADMISSION  After review of the documentation and testing available at the time of the admission I concur with this clinical determination of medical necessity  Rationale is as follows: The patient is a 76 yrs old Female who presented to the ED at 4/17/2018  3:58 PM with a chief complaint of Dizziness (Per EMS pt reported feeling lightheaded upon standing from a sitting position  EMS states pt's BP was 147/94 sitting and 172/98 upon standing  )     Patient is admitted to the hospital for community-acquired pneumonia of the right middle lobe  Patient was also noted to have transient atrial fibrillation  At present time the patient is expected to remain hospitalized for at least a 2nd midnight for continued IV antibiotics, and management of the pneumonia    Given the patient's age, comorbid conditions, relative risk of adverse outcome, the patient is appropriate for inpatient admission  The patient will satisfy the 2 midnight benchmark, and continues to require acute medical care  The patients vitals on arrival were ED Triage Vitals   Temperature Pulse Respirations Blood Pressure SpO2   04/17/18 1609 04/17/18 1609 04/17/18 1609 04/17/18 1609 04/17/18 1609   98 °F (36 7 °C) 84 20 (!) 176/92 97 %      Temp Source Heart Rate Source Patient Position - Orthostatic VS BP Location FiO2 (%)   04/17/18 1609 04/17/18 1612 04/17/18 1609 04/17/18 1612 --   Oral Monitor Lying - Orthostatic VS Left arm       Pain Score       04/17/18 2210       2           Past Medical History:   Diagnosis Date    Anxiety     Atrial fibrillation (HCC)     Bowel obstruction (HCC)     Cancer ([facility] )     LEFT BREAST CA 22 YEARS AGO     Depression     Diabetes mellitus ([facility] )     Hypertension     Hypothyroidism      Past Surgical History:   Procedure Laterality Date    ABDOMINAL ADHESION SURGERY N/A 2/4/2018    Procedure: LYSIS ADHESIONS;  Surgeon: Myrna Parada DO;  [facility];  Service: General    BREAST SURGERY      CHOLECYSTECTOMY      EXPLORATORY LAPAROTOMY      JOINT REPLACEMENT      LEFT KNEE REPLACEMENT     LAPAROTOMY N/A 2/4/2018    Procedure: LAPAROTOMY EXPLORATORY,;  Surgeon: Myrna Parada DO;  [facility];  Service: General    MASTECTOMY      FL BIOPSY/EXCISION, LYMPH NODE(S) Right 1/13/2017    Procedure: SENTINEL LYMPH NODE BIOPSY RIGHT AXILLA;   Surgeon: Fredy You MD;  [facility];  Service: General    FL MASTECTOMY, SIMPLE, COMPLETE Right 1/13/2017    Procedure: MASTECTOMY SIMPLE;  Surgeon: Fredy You MD;  [facility];  Service: General    SMALL INTESTINE SURGERY N/A 2/4/2018    Procedure: RESECTION SMALL BOWEL;  Surgeon: Myrna Parada DO;  [facility];  Service: General           Consults have been placed to:   IP CONSULT TO CARDIOLOGY    Vitals:    04/18/18 1343 04/18/18 1407 04/18/18 1722 04/18/18 1900   BP: 108/62 140/76 165/70 163/74   BP Location:       Pulse: (!) 133 86 85 89   Resp:    18   Temp:    97 9 °F (36 6 °C)   TempSrc:    Oral   SpO2: 96% 97%  97%   Weight:       Height:           Most recent labs:    Recent Labs      04/17/18   1633   04/18/18   0421  04/18/18   0821   WBC  7 00   --   10 55*   --    HGB  11 4*   --   12 6   --    HCT  35 0   --   38 3   --    PLT  278   --   314   --    K  3 6   --   3 8   --    NA  135*   --   140   --    CALCIUM  9 7   --   9 9   --    BUN  22   --   20   --    CREATININE  1 15   --   1 05   --    TROPONINI  <0 02   < >  <0 02  <0 02   AST  17   --    --    --    ALT  16   --    --    --    ALKPHOS  90   --    --    --    BILITOT  0 58   --    --    --     < > = values in this interval not displayed         Scheduled Meds:  Current Facility-Administered Medications:  acetaminophen 650 mg Oral Q6H PRN Yordy Lima MD   aluminum-magnesium hydroxide-simethicone 15 mL Oral Q6H PRN Yordy Lima MD   amLODIPine 5 mg Oral BID Yordy Lima MD   ascorbic acid 500 mg Oral Daily Yordy Lima MD   cefTRIAXone 1,000 mg Intravenous Q24H Yordy Lima MD   And       azithromycin 250 mg Oral Q24H Yordy Lima MD   carvedilol 12 5 mg Oral BID With Meals Yordy Lima MD   docusate sodium 100 mg Oral BID PRN Yoryd Lima MD   enoxaparin 40 mg Subcutaneous Daily Yordy Lima MD   fish oil 1,000 mg Oral Daily Yordy Lima MD   hydrALAZINE 15 mg Intravenous Q4H PRN Yordy Lima MD   insulin lispro 1-5 Units Subcutaneous TID AC Yordy Lima MD   insulin lispro 1-5 Units Subcutaneous HS Yordy Lima MD   levothyroxine 137 mcg Oral Early Morning Yordy Lima MD   lisinopril 20 mg Oral BID Yordy Lima MD   LORazepam 0 5 mg Oral Daily PRN Yordy Lima MD   metFORMIN 500 mg Oral BID With Meals Yordy Lima MD   nicotine 1 patch Transdermal Daily Jeral Gain, MD   ondansetron 4 mg Intravenous Q6H PRN Jeral Gain, MD   potassium chloride 10 mEq Oral Daily Jeral Gain, MD     Continuous Infusions:   PRN Meds:   acetaminophen    aluminum-magnesium hydroxide-simethicone    docusate sodium    hydrALAZINE    LORazepam    ondansetron    Surgical procedures (if appropriate):

## 2018-04-19 NOTE — TELEPHONE ENCOUNTER
P/C from Dtr-Geena  Pt was admitted yesterday and was to have an echo & renal ultrasound; says somehow these orders were "deleted", and she is asking for them to be re-ordered  Pt is apparently very upset in the hospital and dtr is concerned  Thank you

## 2018-04-19 NOTE — ASSESSMENT & PLAN NOTE
Patient is currently on Ativan and will continue  Patient has had episodes of severe anxiety but improved since admission  Single dose of Ativan given in the emergency room, continue to follow for now

## 2018-04-19 NOTE — ASSESSMENT & PLAN NOTE
Community-acquired pneumonia or bronchitis  The patient is a former smoker that recently quit approximately February of this year  Patient refused to be seen by Pulmonary  However will give nebulizations to open up airways  Anxiety may also play with patient's coughing fits  Cough appears to have improved, no need for pulmonary input yet  Also pneumonia protocol including sputum studies and cultures   Will transition to PO abx, if stable

## 2018-04-19 NOTE — ASSESSMENT & PLAN NOTE
Continue present meds  Will continue to watch blood pressures  Patient is also given hydralazine p r n     The patient with extreme blood pressure was placed on nitropaste however seems to be feeling so anxious that it was removed  It seems that blood pressure is much better after given the Ativan 0 5 mg intravenously x1

## 2018-04-19 NOTE — RESTORATIVE TECHNICIAN NOTE
Restorative Specialist Mobility Note       Activity: Ambulate in caldera, Ambulate in room, Bathroom privileges, Chair, Dangle, Stand at bedside (Educated/encouraged pt to ambulate with assistance 3-4 x's/day  Bed alarm on   Pt callbell, phone/tray within reach )     Assistive Device: Joss [email or web address], Restorative Technician, [facility]

## 2018-04-19 NOTE — CONSULTS
Consultation - Cardiology Team One  Asif Anna 76 y o  female MRN: [id number]  Unit/Bed#: [facility] 709-01 Encounter: [id number]    Inpatient consult to Cardiology  Consult performed by: [address] [place], [place] [address]  ordered by: Lupe Rea          Physician Requesting Consult: Cliff Chappell DO     Reason for Consult / Principal Problem: atrial fibrillation    History of Present Illness      HPI: Asif Anna is a 76y o  year old female who has a history of HTN, paroxysmal atrial fibrillation not anticoagulated, former tobacco abuse quit 1 5 months ago  She follows with cardiologist Dr Meliza Car  Pt presented to ED on 4/17/18 with multiple medical complaints  Reports she had been feeling dizzy and lightheaded for a few days prior to her admission  She had been seen in our office earlier in the day for a BP check; her BP in office was 170/84; her Norvasc was increased to 5mg BID and a renal artery duplex was ordered  She felt fine in office, stopped at store on way home but got dizzy once she got home; she checked her BP on the home cuff and reported it was 90/60  She called her daughter who called EMS  In the ED:   Her presenting BP was 176/92  Her EKG a tachyarrhythmia possible atach vs aflutter with rate 124  The patient was reportedly very anxious and tearful as well  Her BP remained elevated, she was given IV labetalol and hydralazine as well as extra dose of 5mg Norvasc  Her BP eventually improved  A cxray showed patchy consolidation in the right midlung field patient for pneumonia  She has been treated with oral antibiotics  Last evening patient had an episode of tachycardia; possible atrial fibrillation for about 1 5 hours  Cardiology consultation was requested regarding atrial fibrillation  There was also some concern voiced by her daughter regarding pericarditis  Pt was seen this AM; she reports feeling well; she is not experiencing any chest pain   She reports constant palpitations  She has a mild NP cough  Denies any fever or chills  She has not been experiencing any pleuritic pain or sharp pain in her chest, she denies any sort of chest pain  She was recently dx with paroxysmal atrial fibrillation; in setting of bowel surgery  She had an echo that showed normal LV function with EF 70% without RWMA  She was seen as OP and had been maintaining SR; she is on coreg 12 5mg BID and was taking diltiazem 240mg daily but stopped a few weeks ago as she ran out of medications  She has refused AC but takes a daily ASA 81mg  Review of Systems   Constitution: Negative for decreased appetite, fever and weakness  HENT: Negative for congestion  Cardiovascular: Negative for chest pain, dyspnea on exertion, leg swelling, near-syncope, orthopnea, palpitations and syncope  Respiratory: Negative for cough, shortness of breath, sleep disturbances due to breathing and wheezing  Musculoskeletal: Negative for back pain  Gastrointestinal: Negative for abdominal pain, nausea and vomiting  Genitourinary: Negative for dysuria  Neurological: Negative for dizziness and light-headedness  Psychiatric/Behavioral: Negative for altered mental status  All other systems reviewed and are negative       Historical Information   Past Medical History:   Diagnosis Date    Anxiety     [facility]     Bowel obstruction [facility]     [facility]     LEFT BREAST CA 22 YEARS AGO     Depression     Diabetes mellitus ([facility]     Hypertension     Hypothyroidism      Past Surgical History:   Procedure Laterality Date    ABDOMINAL ADHESION SURGERY N/A 2/4/2018    Procedure: LYSIS ADHESIONS;  Surgeon: Lora Davila DO;  [facility];  Service: General    BREAST SURGERY      CHOLECYSTECTOMY      EXPLORATORY LAPAROTOMY      JOINT REPLACEMENT      LEFT KNEE REPLACEMENT     LAPAROTOMY N/A 2/4/2018    Procedure: LAPAROTOMY EXPLORATORY,;  Surgeon: Lora Davila DO;  [facility];  Service: General    MASTECTOMY      OR BIOPSY/EXCISION, LYMPH NODE(S) Right 1/13/2017    Procedure: SENTINEL LYMPH NODE BIOPSY RIGHT AXILLA;   Surgeon: Shila Hamilton MD;  [facility];  Service: General    OR MASTECTOMY, SIMPLE, COMPLETE Right 1/13/2017    Procedure: MASTECTOMY SIMPLE;  Surgeon: Shila Hamilton MD;  [facility];  Service: General    SMALL INTESTINE SURGERY N/A 2/4/2018    Procedure: RESECTION SMALL BOWEL;  Surgeon: Casandra Escobar DO;  [facility];  Service: General     History   Alcohol Use No     History   Drug Use No     History   Smoking Status    Current Every Day Smoker    Packs/day: 1 00    Types: Cigarettes   Smokeless Tobacco    Never Used     Family History:   Family History   Problem Relation Age of Onset    Cancer Mother        Meds/Allergies   current meds:   Current Facility-Administered Medications   Medication Dose Route Frequency    acetaminophen (TYLENOL) tablet 650 mg  650 mg Oral Q6H PRN    aluminum-magnesium hydroxide-simethicone (MYLANTA) 200-200-20 mg/5 mL oral suspension 15 mL  15 mL Oral Q6H PRN    amLODIPine (NORVASC) tablet 5 mg  5 mg Oral BID    ascorbic acid (VITAMIN C) tablet 500 mg  500 mg Oral Daily    cefTRIAXone (ROCEPHIN) IVPB (premix) 1,000 mg  1,000 mg Intravenous Q24H    And    azithromycin (ZITHROMAX) tablet 250 mg  250 mg Oral Q24H    carvedilol (COREG) tablet 12 5 mg  12 5 mg Oral BID With Meals    docusate sodium (COLACE) capsule 100 mg  100 mg Oral BID PRN    enoxaparin (LOVENOX) subcutaneous injection 40 mg  40 mg Subcutaneous Daily    fish oil capsule 1,000 mg  1,000 mg Oral Daily    hydrALAZINE (APRESOLINE) injection 15 mg  15 mg Intravenous Q4H PRN    insulin lispro (HumaLOG) 100 units/mL subcutaneous injection 1-5 Units  1-5 Units Subcutaneous TID AC    insulin lispro (HumaLOG) 100 units/mL subcutaneous injection 1-5 Units  1-5 Units Subcutaneous HS    levothyroxine tablet 137 mcg  137 mcg Oral Early Morning    lisinopril (ZESTRIL) tablet 20 mg  20 mg Oral BID    LORazepam (ATIVAN) tablet 0 5 mg  0 5 mg Oral Daily PRN    metFORMIN (GLUCOPHAGE) tablet 500 mg  500 mg Oral BID With Meals    nicotine (NICODERM CQ) 14 mg/24hr TD 24 hr patch 1 patch  1 patch Transdermal Daily    ondansetron (ZOFRAN) injection 4 mg  4 mg Intravenous Q6H PRN    potassium chloride (K-DUR,KLOR-CON) CR tablet 10 mEq  10 mEq Oral Daily          Allergies   Allergen Reactions    Morphine GI Intolerance    Penicillins     Percocet [Oxycodone-Acetaminophen]        Objective   Vitals: Blood pressure 170/81, pulse 78, temperature 97 9 °F (36 6 °C), temperature source Oral, resp  rate 16, height 5' 6" (1 676 m), weight 65 7 kg (144 lb 13 5 oz), SpO2 96 %  ,     Body mass index is 23 38 kg/m²  ,     Systolic (80MGA), FJC:698 , Min:99 , ESU:077     Diastolic (48ORK), GKB:97, Min:50, Max:81            Intake/Output Summary (Last 24 hours) at 04/19/18 1030  Last data filed at 04/19/18 0512   Gross per 24 hour   Intake          1628 75 ml   Output                0 ml   Net          1628 75 ml     Weight (last 2 days)     Date/Time   Weight    04/19/18 0600  65 7 (144 84)    04/18/18 0600  61 6 (135 8)    04/18/18 0344  61 6 (135 8)    04/17/18 1609  61 7 (136)            Invasive Devices     Peripheral Intravenous Line            Peripheral IV 04/17/18 Left Antecubital 1 day              Physical Exam   Constitutional: She is oriented to person, place, and time  No distress  Pt sitting up in bed in NAD, alert and cooperative   HENT:   Head: Normocephalic and atraumatic  Neck: No JVD present  Cardiovascular: Normal rate, regular rhythm, S1 normal and S2 normal     No murmur heard  No LE edema   Pulmonary/Chest: Effort normal and breath sounds normal  No respiratory distress  She has no wheezes  She has no rales  Abdominal: Soft  Musculoskeletal: She exhibits no edema     Neurological: She is alert and oriented to person, place, and time  Skin: Skin is warm and dry  She is not diaphoretic  Psychiatric: She has a normal mood and affect  Her behavior is normal    Nursing note and vitals reviewed      LABORATORY RESULTS:    Results from last 7 days  Lab Units 04/18/18  0821 04/18/18  0421 04/18/18  0115   TROPONIN I ng/mL <0 02 <0 02 <0 02     CBC with diff:   Results from last 7 days  Lab Units 04/18/18  0421 04/17/18  1633   WBC Thousand/uL 10 55* 7 00   HEMOGLOBIN g/dL 12 6 11 4*   HEMATOCRIT % 38 3 35 0   MCV fL 89 88   PLATELETS Thousands/uL 314 278   MCH pg 29 1 28 8   MCHC g/dL 32 9 32 6   RDW % 13 5 13 5   MPV fL 9 5 9 0   NRBC AUTO /100 WBCs 0 0     CMP:  Results from last 7 days  Lab Units 04/18/18  0421 04/17/18  1633   SODIUM mmol/L 140 135*   POTASSIUM mmol/L 3 8 3 6   CHLORIDE mmol/L 103 99*   CO2 mmol/L 29 31   ANION GAP mmol/L 8 5   BUN mg/dL 20 22   CREATININE mg/dL 1 05 1 15   GLUCOSE RANDOM mg/dL 153* 149*   CALCIUM mg/dL 9 9 9 7   AST U/L  --  17   ALT U/L  --  16   ALK PHOS U/L  --  90   TOTAL PROTEIN g/dL  --  8 2   BILIRUBIN TOTAL mg/dL  --  0 58   EGFR ml/min/1 73sq m 51 45     BMP:  Results from last 7 days  Lab Units 04/18/18  0421 04/17/18  1633   SODIUM mmol/L 140 135*   POTASSIUM mmol/L 3 8 3 6   CHLORIDE mmol/L 103 99*   CO2 mmol/L 29 31   BUN mg/dL 20 22   CREATININE mg/dL 1 05 1 15   GLUCOSE RANDOM mg/dL 153* 149*   CALCIUM mg/dL 9 9 9 7     Lab Results   Component Value Date    NTBNP 723 (H) 07/12/2016        Results from last 7 days  Lab Units 04/18/18  0421   MAGNESIUM mg/dL 1 7        Results from last 7 days  Lab Units 04/17/18  2350   HEMOGLOBIN A1C % 6 4*        Results from last 7 days  Lab Units 04/17/18  1633   TSH 3RD GENERATON uIU/mL 0 388         Lipid Profile:   Lab Results   Component Value Date    CHOL 146 07/12/2016     Lab Results   Component Value Date    HDL 45 07/12/2016     Lab Results   Component Value Date    LDLCALC 85 07/12/2016     Lab Results   Component Value Date    TRIG 71 2018    TRIG 81 2016     Cardiac testing:   Results for orders placed during the hospital encounter of 18   Echo complete with contrast if indicated    Narrative [address], [address] [facility]  [phone]    Transthoracic Echocardiogram  2D, M-mode, Doppler, and Color Doppler    Study date:  2018    Patient: Bin Yen  MR number: [id number]  Account number: [de-identified]  : 1940  Age: 76 years  Gender: Female  Status: Inpatient  Location: Bedside  Height: 66 in  Weight: 142 lb  BP: 132/ 63 mmHg    Indications: Atrial fibrillation    Diagnoses: I48 0 - Atrial fibrillation    Sonographer:  CAROLA Love, RDCS  Primary Physician:  Chetna Lazar DO  Referring Physician:  Tye Wong MD  Group:  [id number]  Interpreting Physician:  Radha Rai MD    SUMMARY    LEFT VENTRICLE:  Systolic function was hyperdynamic by visual assessment  Ejection fraction was estimated to be 70 %  There were no regional wall motion abnormalities  Wall thickness was mildly increased  Left ventricular diastolic function parameters were normal for atrial fibrillation  RIGHT VENTRICLE:  The size was normal   Systolic function was normal     MITRAL VALVE:  There was mild regurgitation  TRICUSPID VALVE:  There was trace regurgitation  HISTORY: PRIOR HISTORY: Hypertension; Hypothryoid; Diabetes Mellitus; Left breast cancer (22years ago); Smoker    PROCEDURE: The procedure was performed at the bedside  This was a routine study  The transthoracic approach was used  The study included complete 2D imaging, M-mode, complete spectral Doppler, and color Doppler  Images were obtained from  the parasternal, apical, subcostal, and suprasternal notch acoustic windows  Echocardiographic views were limited due to poor acoustic window availability  Image quality was adequate      LEFT VENTRICLE: Size was normal  Systolic function was hyperdynamic by visual assessment  Ejection fraction was estimated to be 70 %  There were no regional wall motion abnormalities  Wall thickness was mildly increased  DOPPLER: Left  ventricular diastolic function parameters were normal for atrial fibrillation  RIGHT VENTRICLE: The size was normal  Systolic function was normal  Wall thickness was normal     LEFT ATRIUM: Size was normal     RIGHT ATRIUM: Size was normal     MITRAL VALVE: Valve structure was normal  There was normal leaflet separation  DOPPLER: The transmitral velocity was within the normal range  There was no evidence for stenosis  There was mild regurgitation  AORTIC VALVE: The valve was trileaflet  Leaflets exhibited normal thickness and normal cuspal separation  DOPPLER: Transaortic velocity was within the normal range  There was no evidence for stenosis  There was no significant  regurgitation  TRICUSPID VALVE: The valve structure was normal  There was normal leaflet separation  DOPPLER: The transtricuspid velocity was within the normal range  There was no evidence for stenosis  There was trace regurgitation  Estimated peak PA  pressure was 37 mmHg  PULMONIC VALVE: Leaflets exhibited normal thickness, no calcification, and normal cuspal separation  DOPPLER: The transpulmonic velocity was within the normal range  There was no significant regurgitation  PERICARDIUM: There was no pericardial effusion  The pericardium was normal in appearance  AORTA: The root exhibited normal size  SYSTEMIC VEINS: IVC: The inferior vena cava was normal in size   Respirophasic changes were normal     MEASUREMENT TABLES    OTHER ECHO MEASUREMENTS  (Reference normals)  Estimated CVP   5 mmHg   (--)    SYSTEM MEASUREMENT TABLES    2D  %FS: 29 99 %  Ao Diam: 3 19 cm  EDV(Teich): 71 54 ml  EF(Teich): 57 73 %  ESV(Teich): 30 24 ml  IVSd: 1 13 cm  LA Area: 16 73 cm2  LA Diam: 3 38 cm  LVEDV MOD A4C: 48 ml  LVEF MOD A4C: 71 93 %  LVESV MOD A4C: 13 48 ml  LVIDd: 4 04 cm  LVIDs: 2 83 cm  LVLd A4C: 6 47 cm  LVLs A4C: 5 29 cm  LVPWd: 1 06 cm  RA Area: 13 16 cm2  RVIDd: 3 45 cm  SV MOD A4C: 34 53 ml  SV(Teich): 41 3 ml    CW  TR Vmax: 2 91 m/s  TR maxP 78 mmHg    MM  TAPSE: 1 72 cm    PW  E': 0 09 m/s  E/E': 12 4  MV A Mika: 0 m/s  MV Dec [place]: 5 33 m/s2  MV DecT: 199 99 ms  MV E Mika: 1 07 m/s  MV E/A Ratio: 1088  2  MV PHT: 58 ms  MVA By PHT: 3 79 cm2    [employer] Accredited Echocardiography Laboratory    Prepared and electronically signed by    Waldemar Coello MD  Signed 10-Feb-2018 18:24:16       Imaging: I have personally reviewed pertinent reports  Xr Chest 2 Views    Result Date: 2018  Narrative: CHEST INDICATION:   tachycardia and chest pain  Lightheadedness  Hypertension  History of breast cancer  COMPARISON:  2016 EXAM PERFORMED/VIEWS:  XR CHEST PA & LATERAL FINDINGS: Cardiomediastinal silhouette appears stable  Patchy consolidation in the right midlung field suspicious for pneumonia  No pneumothorax or pleural effusion  Osseous structures appear within normal limits for patient age  Status post left mastectomy  Left axillary clips  Impression: Patchy consolidation in the right midlung field suspicious for pneumonia  Follow-up radiographs recommended in one month to ensure complete resolution  The study was marked in [facility] for immediate notification  Workstation performed: [id number]     EKG reviewed personally:   2018  atach vs aflutter  Rate 123    Telemetry reviewed personally:   Currently SR; PAF with RVR yesterday evening 1 5 hours  Assessment  Principal Problem:    Community acquired pneumonia of right middle lobe of lung ([address] )  Active Problems:    Nicotine abuse    Transient atrial fibrillation (HCC)    Hypertension, essential, benign    Anxiety    Assessment/ Plan:    CAP: on Abx per primary team  No BC drawn and now on Abx; no leukocytosis on admission  She remains afebrile  Paroxysmal atrial fibrillation:  Pt with tachycardic episode last night; likely atrial fibrillation  ;Now back in SR; pt reports having constant palpitations but no tachycardia or arrhythmia on telemetry at time of exam   Will restart diltiazem  Can check limited echo, family concerned about pericarditis however pt is not having any symptoms consistent with this  HTN: BP control not adequate; could have been contributing to her symptoms of dizziness  OP medications PTA include Norvasc (increased to 5mg BID on say of admission), lisinopril 20mg daily, coreg 12 5mg daily; she was taking diltiazem 240mg daily for 30 days post discharge but stopped taking it as she didn't have refills  Will resume diltiazem; short acting and if tolerates well resume long acting tomorrow  Stop Norvasc to avoid dual calcium channel blockers  Pt continues to refuse anticoagulation, I did review risk of CVA and she understands risk  Thank you for allowing us to participate in this patient's care  This pt will follow up with Dr Julia Garner once discharged  Counseling / Coordination of Care  Total floor / unit time spent today 45 minutes  Greater than 50% of total time was spent with the patient and / or family counseling and / or coordination of care  A description of the counseling / coordination of care: Review of history, current assessment, development of a plan  Code Status: Level 1 - Full Code    ** Please Note: Dragon 360 Dictation voice to text software may have been used in the creation of this document   **

## 2018-04-19 NOTE — PROGRESS NOTES
Progress Note - Shiva Willoughby 1940, 76 y o  female MRN: [id number]    Unit/Bed#: [facility] 709-01 Encounter: [id number]    Primary Care Provider: Ken Allred DO   Date and time admitted to hospital: 4/17/2018  3:58 PM        * Community acquired pneumonia of right middle lobe of lung ([address] )   Assessment & Plan    Community-acquired pneumonia or bronchitis  The patient is a former smoker that recently quit approximately February of this year  Patient refused to be seen by Pulmonary  However will give nebulizations to open up airways  Anxiety may also play with patient's coughing fits  Cough appears to have improved, no need for pulmonary input yet  Also pneumonia protocol including sputum studies and cultures  Will transition to PO abx, if stable        Transient atrial fibrillation [facility])   Assessment & Plan    Patient comes in with a tachycardia that seems to be consistent with atrial fibrillation  In this regard, the patient needs rate control however would also benefit from lowering of blood pressure  The patient was just given Norvasc 5 mg by mouth x1 here in the emergency room  Patient will be given labetalol 10 mg x1  Probable paroxysmal SVT  Routine Card consult ordered  Appreciate input  Switch to verapamil and follow closely        Anxiety   Assessment & Plan    Patient is currently on Ativan and will continue  Patient has had episodes of severe anxiety but improved since admission  Single dose of Ativan given in the emergency room, continue to follow for now  Hypertension, essential, benign   Assessment & Plan    Continue present meds  Will continue to watch blood pressures  Patient is also given hydralazine p r n     The patient with extreme blood pressure was placed on nitropaste however seems to be feeling so anxious that it was removed  It seems that blood pressure is much better after given the Ativan 0 5 mg intravenously x1              VTE Pharmacologic Prophylaxis: Pharmacologic: Enoxaparin (Lovenox)  Mechanical VTE Prophylaxis in Place: No    Patient Centered Rounds: I have performed bedside rounds with nursing staff today  Current Length of Stay: 2 day(s)    Current Patient Status: Inpatient   Certification Statement: The patient will continue to require additional inpatient hospital stay due to svt    Code Status: Level 1 - Full Code      Subjective:   Pt still c/o cough and occasional palpitations    Objective:     Vitals:   Temp (24hrs), Av °F (36 7 °C), Min:97 9 °F (36 6 °C), Max:98 2 °F (36 8 °C)    HR:  [78-89] 78  Resp:  [16-18] 16  BP: (150-170)/(56-85) 170/85  SpO2:  [95 %-97 %] 97 %  Body mass index is 23 38 kg/m²  Input and Output Summary (last 24 hours):        Intake/Output Summary (Last 24 hours) at 18 1516  Last data filed at 18 1255   Gross per 24 hour   Intake             1120 ml   Output                0 ml   Net             1120 ml       Physical Exam:  General Appearance:    Alert, cooperative, no distress, appropriately responsive    Head:    Normocephalic, without obvious abnormality, atraumatic, mucous membranes moist    Eyes:    Conjunctiva/corneas clear, EOM's intact   Neck:   Supple, no JVD or bruits noted   Lungs:     Coarse BS    Heart:    Regular rate and rhythm, S1 and S2    Abdomen:     Soft, non-tender, bowel sounds active all four quadrants,     no masses, no organomegaly   Extremities:   Extremities normal, atraumatic, no cyanosis or edema   Neurologic:  nonfocal, A/O x 3           Additional Data:     Labs:      Results from last 7 days  Lab Units 18  0421   WBC Thousand/uL 10 55*   HEMOGLOBIN g/dL 12 6   HEMATOCRIT % 38 3   PLATELETS Thousands/uL 314   NEUTROS PCT % 71   LYMPHS PCT % 12*   MONOS PCT % 12   EOS PCT % 5       Results from last 7 days  Lab Units 18  0421 18  1633   SODIUM mmol/L 140 135*   POTASSIUM mmol/L 3 8 3 6   CHLORIDE mmol/L 103 99*   CO2 mmol/L 29 31   BUN mg/dL 20 22 CREATININE mg/dL 1 05 1 15   CALCIUM mg/dL 9 9 9 7   TOTAL PROTEIN g/dL  --  8 2   BILIRUBIN TOTAL mg/dL  --  0 58   ALK PHOS U/L  --  90   ALT U/L  --  16   AST U/L  --  17   GLUCOSE RANDOM mg/dL 153* 149*           * I Have Reviewed All Lab Data Listed Above  * Additional Pertinent Lab Tests Reviewed:  All Labs Within Last 24 Hours Reviewed    Cultures:   Blood Culture: No results found for: BLOODCX  Urine Culture: No results found for: URINECX  Sputum Culture: No components found for: SPUTUMCX  Wound Culture: No results found for: WOUNDCULT    Last 24 Hours Medication List:     Current Facility-Administered Medications:  acetaminophen 650 mg Oral Q6H PRN Dustin Neves MD    aluminum-magnesium hydroxide-simethicone 15 mL Oral Q6H PRN Dustin Neves MD    ascorbic acid 500 mg Oral Daily Dustin Neves MD    cefTRIAXone 1,000 mg Intravenous Q24H Dustin Neves MD Last Rate: 1,000 mg (04/18/18 5799)   And        azithromycin 250 mg Oral Q24H Dustin Neves MD    carvedilol 12 5 mg Oral BID With Meals Dustin Neves MD    docusate sodium 100 mg Oral BID PRN Dustin Neves MD    enoxaparin 40 mg Subcutaneous Daily Dustin Neves MD    fish oil 1,000 mg Oral Daily Dustin Neves MD    hydrALAZINE 15 mg Intravenous Q4H PRN Dustin Neves MD    insulin lispro 1-5 Units Subcutaneous TID AC Dustin Neves MD    insulin lispro 1-5 Units Subcutaneous HS Dustin Neves MD    levothyroxine 137 mcg Oral Early Morning Dustin Neves MD    lisinopril 20 mg Oral BID Dustin Neves MD    LORazepam 0 5 mg Oral Daily PRN Dustin Neves MD    metFORMIN 500 mg Oral BID With Meals Dustin Neves MD    nicotine 1 patch Transdermal Daily Dustin Neves MD    ondansetron 4 mg Intravenous Q6H PRN Dustin Neves MD    potassium chloride 10 mEq Oral Daily Dustin Neves MD    verapamil 240 mg Oral Daily Naif Madera MD         Today, Patient Was Seen By: Renetta Swann DO    ** Please Note: Dragon 360 Dictation voice to text software may have been used in the creation of this document   **

## 2018-04-20 ENCOUNTER — APPOINTMENT (INPATIENT)
Dept: NON INVASIVE DIAGNOSTICS | Facility: HOSPITAL | Age: 78
DRG: 308 | End: 2018-04-20
Payer: MEDICARE

## 2018-04-20 LAB
ANION GAP SERPL CALCULATED.3IONS-SCNC: 7 MMOL/L (ref 4–13)
BASOPHILS # BLD AUTO: 0.03 THOUSANDS/ΜL (ref 0–0.1)
BASOPHILS NFR BLD AUTO: 0 % (ref 0–1)
BUN SERPL-MCNC: 17 MG/DL (ref 5–25)
CALCIUM SERPL-MCNC: 9.3 MG/DL (ref 8.3–10.1)
CHLORIDE SERPL-SCNC: 102 MMOL/L (ref 100–108)
CO2 SERPL-SCNC: 27 MMOL/L (ref 21–32)
CREAT SERPL-MCNC: 0.9 MG/DL (ref 0.6–1.3)
EOSINOPHIL # BLD AUTO: 0.48 THOUSAND/ΜL (ref 0–0.61)
EOSINOPHIL NFR BLD AUTO: 6 % (ref 0–6)
ERYTHROCYTE [DISTWIDTH] IN BLOOD BY AUTOMATED COUNT: 13.6 % (ref 11.6–15.1)
GFR SERPL CREATININE-BSD FRML MDRD: 61 ML/MIN/1.73SQ M
GLUCOSE SERPL-MCNC: 104 MG/DL (ref 65–140)
GLUCOSE SERPL-MCNC: 117 MG/DL (ref 65–140)
GLUCOSE SERPL-MCNC: 136 MG/DL (ref 65–140)
GLUCOSE SERPL-MCNC: 153 MG/DL (ref 65–140)
GLUCOSE SERPL-MCNC: 228 MG/DL (ref 65–140)
HCT VFR BLD AUTO: 31.7 % (ref 34.8–46.1)
HGB BLD-MCNC: 10.3 G/DL (ref 11.5–15.4)
LYMPHOCYTES # BLD AUTO: 1.46 THOUSANDS/ΜL (ref 0.6–4.47)
LYMPHOCYTES NFR BLD AUTO: 17 % (ref 14–44)
MCH RBC QN AUTO: 29 PG (ref 26.8–34.3)
MCHC RBC AUTO-ENTMCNC: 32.5 G/DL (ref 31.4–37.4)
MCV RBC AUTO: 89 FL (ref 82–98)
MONOCYTES # BLD AUTO: 1.05 THOUSAND/ΜL (ref 0.17–1.22)
MONOCYTES NFR BLD AUTO: 12 % (ref 4–12)
NEUTROPHILS # BLD AUTO: 5.7 THOUSANDS/ΜL (ref 1.85–7.62)
NEUTS SEG NFR BLD AUTO: 65 % (ref 43–75)
NRBC BLD AUTO-RTO: 0 /100 WBCS
PLATELET # BLD AUTO: 268 THOUSANDS/UL (ref 149–390)
PMV BLD AUTO: 9.5 FL (ref 8.9–12.7)
POTASSIUM SERPL-SCNC: 3.9 MMOL/L (ref 3.5–5.3)
RBC # BLD AUTO: 3.55 MILLION/UL (ref 3.81–5.12)
SODIUM SERPL-SCNC: 136 MMOL/L (ref 136–145)
WBC # BLD AUTO: 8.74 THOUSAND/UL (ref 4.31–10.16)

## 2018-04-20 PROCEDURE — 82948 REAGENT STRIP/BLOOD GLUCOSE: CPT

## 2018-04-20 PROCEDURE — 99232 SBSQ HOSP IP/OBS MODERATE 35: CPT | Performed by: INTERNAL MEDICINE

## 2018-04-20 PROCEDURE — 93975 VASCULAR STUDY: CPT | Performed by: SURGERY

## 2018-04-20 PROCEDURE — 93321 DOPPLER ECHO F-UP/LMTD STD: CPT | Performed by: INTERNAL MEDICINE

## 2018-04-20 PROCEDURE — 93308 TTE F-UP OR LMTD: CPT

## 2018-04-20 PROCEDURE — 93975 VASCULAR STUDY: CPT

## 2018-04-20 PROCEDURE — 80048 BASIC METABOLIC PNL TOTAL CA: CPT | Performed by: INTERNAL MEDICINE

## 2018-04-20 PROCEDURE — 93308 TTE F-UP OR LMTD: CPT | Performed by: INTERNAL MEDICINE

## 2018-04-20 PROCEDURE — 85025 COMPLETE CBC W/AUTO DIFF WBC: CPT | Performed by: INTERNAL MEDICINE

## 2018-04-20 PROCEDURE — 93325 DOPPLER ECHO COLOR FLOW MAPG: CPT | Performed by: INTERNAL MEDICINE

## 2018-04-20 RX ORDER — LORAZEPAM 0.5 MG/1
0.5 TABLET ORAL DAILY
Status: DISCONTINUED | OUTPATIENT
Start: 2018-04-20 | End: 2018-04-21 | Stop reason: HOSPADM

## 2018-04-20 RX ORDER — CEFUROXIME AXETIL 250 MG/1
500 TABLET ORAL EVERY 12 HOURS SCHEDULED
Status: DISCONTINUED | OUTPATIENT
Start: 2018-04-20 | End: 2018-04-21 | Stop reason: HOSPADM

## 2018-04-20 RX ORDER — LANOLIN ALCOHOL/MO/W.PET/CERES
3 CREAM (GRAM) TOPICAL
Status: DISCONTINUED | OUTPATIENT
Start: 2018-04-20 | End: 2018-04-21 | Stop reason: HOSPADM

## 2018-04-20 RX ADMIN — ENOXAPARIN SODIUM 40 MG: 40 INJECTION SUBCUTANEOUS at 09:43

## 2018-04-20 RX ADMIN — CEFTRIAXONE 1000 MG: 1 INJECTION, SOLUTION INTRAVENOUS at 00:25

## 2018-04-20 RX ADMIN — AZITHROMYCIN 250 MG: 250 TABLET, FILM COATED ORAL at 00:25

## 2018-04-20 RX ADMIN — METFORMIN HYDROCHLORIDE 500 MG: 500 TABLET, FILM COATED ORAL at 17:10

## 2018-04-20 RX ADMIN — METFORMIN HYDROCHLORIDE 500 MG: 500 TABLET, FILM COATED ORAL at 06:58

## 2018-04-20 RX ADMIN — CEFUROXIME AXETIL 500 MG: 250 TABLET ORAL at 13:12

## 2018-04-20 RX ADMIN — INSULIN LISPRO 2 UNITS: 100 INJECTION, SOLUTION INTRAVENOUS; SUBCUTANEOUS at 11:12

## 2018-04-20 RX ADMIN — CARVEDILOL 12.5 MG: 12.5 TABLET, FILM COATED ORAL at 17:09

## 2018-04-20 RX ADMIN — POTASSIUM CHLORIDE 10 MEQ: 750 TABLET, EXTENDED RELEASE ORAL at 09:42

## 2018-04-20 RX ADMIN — LEVOTHYROXINE SODIUM 137 MCG: 112 TABLET ORAL at 06:58

## 2018-04-20 RX ADMIN — CEFUROXIME AXETIL 500 MG: 250 TABLET ORAL at 21:54

## 2018-04-20 RX ADMIN — MELATONIN TAB 3 MG 3 MG: 3 TAB at 23:24

## 2018-04-20 RX ADMIN — LISINOPRIL 20 MG: 20 TABLET ORAL at 09:42

## 2018-04-20 RX ADMIN — AZITHROMYCIN 250 MG: 250 TABLET, FILM COATED ORAL at 23:24

## 2018-04-20 RX ADMIN — CARVEDILOL 12.5 MG: 12.5 TABLET, FILM COATED ORAL at 06:58

## 2018-04-20 RX ADMIN — Medication 1000 MG: at 09:42

## 2018-04-20 RX ADMIN — LORAZEPAM 0.5 MG: 0.5 TABLET ORAL at 13:12

## 2018-04-20 RX ADMIN — OXYCODONE HYDROCHLORIDE AND ACETAMINOPHEN 500 MG: 500 TABLET ORAL at 09:42

## 2018-04-20 RX ADMIN — DOCUSATE SODIUM 100 MG: 100 CAPSULE, LIQUID FILLED ORAL at 23:24

## 2018-04-20 RX ADMIN — VERAPAMIL HYDROCHLORIDE 240 MG: 240 TABLET, FILM COATED, EXTENDED RELEASE ORAL at 09:47

## 2018-04-20 RX ADMIN — LISINOPRIL 20 MG: 20 TABLET ORAL at 17:09

## 2018-04-20 NOTE — RESTORATIVE TECHNICIAN NOTE
Restorative Specialist Mobility Note       Activity: Ambulate in caldera, Ambulate in room, Bathroom privileges, Chair, Dangle, Stand at bedside (Educated/encouraged pt to ambulate with assistance 3-4 x's/day   Pt callbell, phone/tray within reach )     Assistive Device: Duyen SRIVASTAVA, Restorative Technician, [employer]

## 2018-04-20 NOTE — PROGRESS NOTES
Progress Note - Juvenal Marks 1940, 76 y o  female MRN: [id number]    Unit/Bed#: [facility] 709-01 Encounter: [id number]    Primary Care Provider: Gia Gleason DO   Date and time admitted to hospital: 4/17/2018  3:58 PM          Community acquired pneumonia of right middle lobe of lung ([address] )   Assessment & Plan     Community-acquired pneumonia or bronchitis  The patient is a former smoker that recently quit approximately February of this year  Patient refused to be seen by Pulmonary  However will give nebulizations to open up airways  Anxiety may also play with patient's coughing fits  Cough appears to have improved, no need for pulmonary input yet  Also pneumonia protocol including sputum studies and cultures  Will transition to PO abx today          Transient atrial fibrillation [facility])   Assessment & Plan     Patient comes in with a tachycardia that seems to be consistent with atrial fibrillation  In this regard, the patient needs rate control however would also benefit from lowering of blood pressure  The patient was just given Norvasc 5 mg by mouth x1 here in the emergency room  Patient will be given labetalol 10 mg x1  Probable paroxysmal SVT  Routine Card consult ordered  Appreciate input  Switch to verapamil and follow closely          Anxiety   Assessment & Plan     Patient is currently on Ativan and will change to scheduled rather than p r n  Rpuinder Jara Patient has had episodes of severe anxiety in the past    more anxious and tearful today          Hypertension, essential, benign   Assessment & Plan     Continue present meds  Will continue to watch blood pressures  Patient is also given hydralazine p r n     The patient with extreme blood pressure was placed on nitropaste however seems to be feeling so anxious that it was removed    It seems that blood pressure is much better after given the Ativan 0 5 mg intravenously x1           VTE Pharmacologic Prophylaxis:   Pharmacologic: Enoxaparin (Lovenox)  Mechanical VTE Prophylaxis in Place: Yes    Patient Centered Rounds: I have performed bedside rounds with nursing staff today  Current Length of Stay: 3 day(s)    Current Patient Status: Inpatient   Certification Statement: The patient will continue to require additional inpatient hospital stay due to Sepsis    Code Status: Level 1 - Full Code      Subjective: Without complaints    Objective:     Vitals:   Temp (24hrs), Av 3 °F (36 8 °C), Min:98 °F (36 7 °C), Max:98 6 °F (37 °C)    HR:  [58-80] 68  Resp:  [20] 20  BP: (134-153)/(66-74) 139/66  SpO2:  [96 %-98 %] 98 %  Body mass index is 23 41 kg/m²  Input and Output Summary (last 24 hours):        Intake/Output Summary (Last 24 hours) at 18 1344  Last data filed at 18 1300   Gross per 24 hour   Intake              540 ml   Output                0 ml   Net              540 ml       Physical Exam:  General Appearance:    Alert, cooperative, no distress, appropriately responsive    Head:    Normocephalic, without obvious abnormality, atraumatic, mucous membranes moist    Eyes:    Conjunctiva/corneas clear, EOM's intact   Neck:   Supple, no JVD or bruits noted   Lungs:     Clear to auscultation bilaterally, respirations unlabored, no crackles or wheeze     Heart:    Regular rate and rhythm, S1 and S2    Abdomen:     Soft, non-tender, bowel sounds active all four quadrants,     no masses, no organomegaly   Extremities:   Extremities normal, atraumatic, no cyanosis or edema   Neurologic:  nonfocal, A/O x 3           Additional Data:     Labs:      Results from last 7 days  Lab Units 18  0530   WBC Thousand/uL 8 74   HEMOGLOBIN g/dL 10 3*   HEMATOCRIT % 31 7*   PLATELETS Thousands/uL 268   NEUTROS PCT % 65   LYMPHS PCT % 17   MONOS PCT % 12   EOS PCT % 6       Results from last 7 days  Lab Units 18  0530  18  1633   SODIUM mmol/L 136  < > 135*   POTASSIUM mmol/L 3 9  < > 3 6   CHLORIDE mmol/L 102  < > 99*   CO2 mmol/L 27 < > 31   BUN mg/dL 17  < > 22   CREATININE mg/dL 0 90  < > 1 15   CALCIUM mg/dL 9 3  < > 9 7   TOTAL PROTEIN g/dL  --   --  8 2   BILIRUBIN TOTAL mg/dL  --   --  0 58   ALK PHOS U/L  --   --  90   ALT U/L  --   --  16   AST U/L  --   --  17   GLUCOSE RANDOM mg/dL 104  < > 149*   < > = values in this interval not displayed  * I Have Reviewed All Lab Data Listed Above  * Additional Pertinent Lab Tests Reviewed:  All Labs Within Last 24 Hours Reviewed    Cultures:   Blood Culture: No results found for: BLOODCX  Urine Culture: No results found for: URINECX  Sputum Culture: No components found for: SPUTUMCX  Wound Culture: No results found for: WOUNDCULT    Last 24 Hours Medication List:     Current Facility-Administered Medications:  acetaminophen 650 mg Oral Q6H PRN Jamari Adam MD   aluminum-magnesium hydroxide-simethicone 15 mL Oral Q6H PRN Jamari Adam MD   ascorbic acid 500 mg Oral Daily Jamari Adam MD   azithromycin 250 mg Oral Q24H Jamari Adam MD   carvedilol 12 5 mg Oral BID With Meals Jamari Adam MD   cefuroxime 500 mg Oral Q12H [facility], DO   docusate sodium 100 mg Oral BID PRN Jamari Adam MD   enoxaparin 40 mg Subcutaneous Daily Jamari Adam MD   fish oil 1,000 mg Oral Daily Jamari Adam MD   hydrALAZINE 15 mg Intravenous Q4H PRN Jamari Adam MD   insulin lispro 1-5 Units Subcutaneous TID AC Jamari Adam MD   insulin lispro 1-5 Units Subcutaneous HS Jamari Adam MD   levothyroxine 137 mcg Oral Early Morning Jamari Adam MD   lisinopril 20 mg Oral BID Jamari Adam MD   LORazepam 0 5 mg Oral Daily Arden Carter DO   metFORMIN 500 mg Oral BID With Meals Jamari Adam MD   nicotine 1 patch Transdermal Daily Jamari Adam MD   ondansetron 4 mg Intravenous Q6H PRN Jamari Adam MD   potassium chloride 10 mEq Oral Daily Jamari Adam MD   verapamil 240 mg Oral Daily Joanna Campo MD        Today, Patient Was Seen By: Leeanne Moore Emma DO    ** Please Note: Dragon 360 Dictation voice to text software may have been used in the creation of this document   **

## 2018-04-20 NOTE — PROGRESS NOTES
General Cardiology   Progress Note -  [employer]   Spike Miller 76 y o  female MRN: [id number]    [facility] Encounter: [id number]      Subjective: Pt seen for follow up, no events overnight  She remains in SR past 24 hours and BP control improved  Pt reports feeling more fatigued today; she feels it is a side effect of the lovenox injections this AM, despite her fatigue and weakness she reports being able to ambulate in halls today without difficulty  She is not dizzy  Continues to feel intermittent palpitations but frequency has decreased  No chest pain, SOB or cough  Review of Systems   Constitution: Positive for weakness and malaise/fatigue  Negative for decreased appetite and fever  Cardiovascular: Positive for palpitations  Negative for chest pain, dyspnea on exertion, orthopnea and syncope  Respiratory: Negative for cough and shortness of breath  Gastrointestinal: Negative for abdominal pain, nausea and vomiting  Genitourinary: Negative for dysuria  Neurological: Negative for dizziness and light-headedness  Psychiatric/Behavioral: Negative for altered mental status  All other systems reviewed and are negative  Objective:   Vitals: Blood pressure 139/66, pulse 68, temperature 98 6 °F (37 °C), temperature source Oral, resp  rate 20, height 5' 6" (1 676 m), weight 65 8 kg (145 lb 1 oz), SpO2 98 %  ,     Body mass index is 23 41 kg/m²  ,     Systolic (86VXN), EWV:805 , Min:134 , XWH:678     Diastolic (07ZGW), ZRO:98, Min:66, Max:85      Intake/Output Summary (Last 24 hours) at 04/20/18 1107  Last data filed at 04/20/18 0900   Gross per 24 hour   Intake              480 ml   Output                0 ml   Net              480 ml     Weight (last 2 days)     Date/Time   Weight    04/20/18 0600  65 8 (145 06)    04/19/18 0600  65 7 (144 84)    04/18/18 0600  61 6 (135 8)    04/18/18 0344  61 6 (135 8)            Telemetry Review: No significant arrhythmias seen on telemetry review  Sinus rhythm, no afib last 24 hours    Physical Exam   Constitutional: She is oriented to person, place, and time  No distress  Pt sitting up at side of bed in NAD   HENT:   Head: Normocephalic and atraumatic  Cardiovascular: Normal rate, regular rhythm, S1 normal and S2 normal     No murmur heard  No LE edema   Pulmonary/Chest: Effort normal and breath sounds normal  No respiratory distress  She has no wheezes  She has no rales  LS CTA, no RA, no distress  No cough   Musculoskeletal: She exhibits no edema  Neurological: She is alert and oriented to person, place, and time  Skin: Skin is warm  She is not diaphoretic  Psychiatric:   Pt appears anxious   Nursing note and vitals reviewed      LABORATORY RESULTS    Results from last 7 days  Lab Units 04/18/18  0821 04/18/18 0421 04/18/18  0115   TROPONIN I ng/mL <0 02 <0 02 <0 02     CBC with diff:   Results from last 7 days  Lab Units 04/20/18  0530 04/18/18 0421 04/17/18  1633   WBC Thousand/uL 8 74 10 55* 7 00   HEMOGLOBIN g/dL 10 3* 12 6 11 4*   HEMATOCRIT % 31 7* 38 3 35 0   MCV fL 89 89 88   PLATELETS Thousands/uL 268 314 278   MCH pg 29 0 29 1 28 8   MCHC g/dL 32 5 32 9 32 6   RDW % 13 6 13 5 13 5   MPV fL 9 5 9 5 9 0   NRBC AUTO /100 WBCs 0 0 0     CMP:  Results from last 7 days  Lab Units 04/20/18  0530 04/18/18  0421 04/17/18  1633   SODIUM mmol/L 136 140 135*   POTASSIUM mmol/L 3 9 3 8 3 6   CHLORIDE mmol/L 102 103 99*   CO2 mmol/L 27 29 31   ANION GAP mmol/L 7 8 5   BUN mg/dL 17 20 22   CREATININE mg/dL 0 90 1 05 1 15   GLUCOSE RANDOM mg/dL 104 153* 149*   CALCIUM mg/dL 9 3 9 9 9 7   AST U/L  --   --  17   ALT U/L  --   --  16   ALK PHOS U/L  --   --  90   TOTAL PROTEIN g/dL  --   --  8 2   BILIRUBIN TOTAL mg/dL  --   --  0 58   EGFR ml/min/1 73sq m 61 51 45     BMP:  Results from last 7 days  Lab Units 04/20/18  0530 04/18/18  0421 04/17/18  1633   SODIUM mmol/L 136 140 135*   POTASSIUM mmol/L 3 9 3 8 3 6   CHLORIDE mmol/L 102 103 99*   CO2 mmol/L 27 29 31   BUN mg/dL 17 20 22   CREATININE mg/dL 0 90 1 05 1 15   GLUCOSE RANDOM mg/dL 104 153* 149*   CALCIUM mg/dL 9 3 9 9 9 7     Lab Results   Component Value Date    NTBNP 723 (H) 2016        Results from last 7 days  Lab Units 18  0421   MAGNESIUM mg/dL 1 7        Results from last 7 days  Lab Units 18  2350   HEMOGLOBIN A1C % 6 4*        Results from last 7 days  Lab Units 18  1633   TSH 3RD GENERATON uIU/mL 0 388     Lipid Profile:   Lab Results   Component Value Date    CHOL 146 2016     Lab Results   Component Value Date    HDL 45 2016     Lab Results   Component Value Date    LDLCALC 85 2016     Lab Results   Component Value Date    TRIG 71 2018    TRIG 81 2016     Cardiac testing:   Results for orders placed during the hospital encounter of 18   Echo complete with contrast if indicated    Narrative [facility]  [address]  [phone]    Transthoracic Echocardiogram  2D, M-mode, Doppler, and Color Doppler    Study date:  2018    Patient: Parul Miranda  MR number: [id number]  Account number: [de-identified]  : 1940  Age: 76 years  Gender: Female  Status: Inpatient  Location: Bedside  Height: 66 in  Weight: 142 lb  BP: 132/ 63 mmHg    Indications: Atrial fibrillation    Diagnoses: I48 0 - Atrial fibrillation    Sonographer:  CAROLA Garza, RDCS  Primary Physician:  Ana Rosa Oakes DO  Referring Physician:  Marcus Yoo MD  Group:  [facility]  Interpreting Physician:  Lois Sanchez MD    SUMMARY    LEFT VENTRICLE:  Systolic function was hyperdynamic by visual assessment  Ejection fraction was estimated to be 70 %  There were no regional wall motion abnormalities  Wall thickness was mildly increased  Left ventricular diastolic function parameters were normal for atrial fibrillation      RIGHT VENTRICLE:  The size was normal   Systolic function was normal     MITRAL VALVE:  There was mild regurgitation  TRICUSPID VALVE:  There was trace regurgitation  HISTORY: PRIOR HISTORY: Hypertension; Hypothryoid; Diabetes Mellitus; Left breast cancer (22years ago); Smoker    PROCEDURE: The procedure was performed at the bedside  This was a routine study  The transthoracic approach was used  The study included complete 2D imaging, M-mode, complete spectral Doppler, and color Doppler  Images were obtained from  the parasternal, apical, subcostal, and suprasternal notch acoustic windows  Echocardiographic views were limited due to poor acoustic window availability  Image quality was adequate  LEFT VENTRICLE: Size was normal  Systolic function was hyperdynamic by visual assessment  Ejection fraction was estimated to be 70 %  There were no regional wall motion abnormalities  Wall thickness was mildly increased  DOPPLER: Left  ventricular diastolic function parameters were normal for atrial fibrillation  RIGHT VENTRICLE: The size was normal  Systolic function was normal  Wall thickness was normal     LEFT ATRIUM: Size was normal     RIGHT ATRIUM: Size was normal     MITRAL VALVE: Valve structure was normal  There was normal leaflet separation  DOPPLER: The transmitral velocity was within the normal range  There was no evidence for stenosis  There was mild regurgitation  AORTIC VALVE: The valve was trileaflet  Leaflets exhibited normal thickness and normal cuspal separation  DOPPLER: Transaortic velocity was within the normal range  There was no evidence for stenosis  There was no significant  regurgitation  TRICUSPID VALVE: The valve structure was normal  There was normal leaflet separation  DOPPLER: The transtricuspid velocity was within the normal range  There was no evidence for stenosis  There was trace regurgitation  Estimated peak PA  pressure was 37 mmHg      PULMONIC VALVE: Leaflets exhibited normal thickness, no calcification, and normal cuspal separation  DOPPLER: The transpulmonic velocity was within the normal range  There was no significant regurgitation  PERICARDIUM: There was no pericardial effusion  The pericardium was normal in appearance  AORTA: The root exhibited normal size  SYSTEMIC VEINS: IVC: The inferior vena cava was normal in size  Respirophasic changes were normal     MEASUREMENT TABLES    OTHER ECHO MEASUREMENTS  (Reference normals)  Estimated CVP   5 mmHg   (--)    SYSTEM MEASUREMENT TABLES    2D  %FS: 29 99 %  Ao Diam: 3 19 cm  EDV(Teich): 71 54 ml  EF(Teich): 57 73 %  ESV(Teich): 30 24 ml  IVSd: 1 13 cm  LA Area: 16 73 cm2  LA Diam: 3 38 cm  LVEDV MOD A4C: 48 ml  LVEF MOD A4C: 71 93 %  LVESV MOD A4C: 13 48 ml  LVIDd: 4 04 cm  LVIDs: 2 83 cm  LVLd A4C: 6 47 cm  LVLs A4C: 5 29 cm  LVPWd: 1 06 cm  RA Area: 13 16 cm2  RVIDd: 3 45 cm  SV MOD A4C: 34 53 ml  SV(Teich): 41 3 ml    CW  TR Vmax: 2 91 m/s  TR maxP 78 mmHg    MM  TAPSE: 1 72 cm    PW  E': 0 09 m/s  E/E': 12 4  MV A Mika: 0 m/s  MV Dec [place]: 5 33 m/s2  MV DecT: 199 99 ms  MV E Mika: 1 07 m/s  MV E/A Ratio: 1088  2  MV PHT: 58 ms  MVA By PHT: 3 79 cm2    [facility] Accredited Echocardiography Laboratory    Prepared and electronically signed by    Mendel Coon, MD  Signed 10-Feb-2018 18:24:16       Meds/Allergies   current meds:   Current Facility-Administered Medications   Medication Dose Route Frequency    acetaminophen (TYLENOL) tablet 650 mg  650 mg Oral Q6H PRN    aluminum-magnesium hydroxide-simethicone (MYLANTA) 200-200-20 mg/5 mL oral suspension 15 mL  15 mL Oral Q6H PRN    ascorbic acid (VITAMIN C) tablet 500 mg  500 mg Oral Daily    azithromycin (ZITHROMAX) tablet 250 mg  250 mg Oral Q24H    carvedilol (COREG) tablet 12 5 mg  12 5 mg Oral BID With Meals    cefuroxime (CEFTIN) tablet 500 mg  500 mg Oral Q12H [facility]    docusate sodium (COLACE) capsule 100 mg  100 mg Oral BID PRN    enoxaparin (LOVENOX) subcutaneous injection 40 mg  40 mg Subcutaneous Daily    fish oil capsule 1,000 mg  1,000 mg Oral Daily    hydrALAZINE (APRESOLINE) injection 15 mg  15 mg Intravenous Q4H PRN    insulin lispro (HumaLOG) 100 units/mL subcutaneous injection 1-5 Units  1-5 Units Subcutaneous TID AC    insulin lispro (HumaLOG) 100 units/mL subcutaneous injection 1-5 Units  1-5 Units Subcutaneous HS    levothyroxine tablet 137 mcg  137 mcg Oral Early Morning    lisinopril (ZESTRIL) tablet 20 mg  20 mg Oral BID    LORazepam (ATIVAN) tablet 0 5 mg  0 5 mg Oral Daily    metFORMIN (GLUCOPHAGE) tablet 500 mg  500 mg Oral BID With Meals    nicotine (NICODERM CQ) 14 mg/24hr TD 24 hr patch 1 patch  1 patch Transdermal Daily    ondansetron (ZOFRAN) injection 4 mg  4 mg Intravenous Q6H PRN    potassium chloride (K-DUR,KLOR-CON) CR tablet 10 mEq  10 mEq Oral Daily    verapamil (CALAN-SR) CR tablet 240 mg  240 mg Oral Daily     Prescriptions Prior to Admission   Medication    amLODIPine (NORVASC) 5 mg tablet    ascorbic acid (VITAMIN C) 500 mg tablet    carvedilol (COREG) 12 5 mg tablet    Levothyroxine Sodium 137 MCG CAPS    lisinopril (ZESTRIL) 20 mg tablet    LORazepam (ATIVAN) 0 5 mg tablet    metFORMIN (GLUCOPHAGE) 500 mg tablet    Omega-3 Fatty Acids (FISH OIL PO)    furosemide (LASIX) 20 mg tablet    potassium chloride (K-DUR,KLOR-CON) 10 mEq tablet     Assessment:  Principal Problem:    Community acquired pneumonia of right middle lobe of lung (HCC)  Active Problems:    Nicotine abuse    Transient atrial fibrillation (HCC)    Hypertension, essential, benign    Anxiety    Assessment/ Plan:    Paroxysmal atrial fibrillation:  No further episodes of atrial fibrillation, pt still reporting occasional palpitations without corresponding arrhythmias  Her medications were adjusted; now on verapamil and BB  She continues to decline [facility] and is aware of risk of CVA  On ASA     A limited echo is ordered    HTN:  blood pressure control improved with medication changes made yesterday  She was started on verapamil, continue on beta-blocker, and ACE as well  She reports feeling fatigued today, this may be due to better BP control  No dizziness or falls  A renal artery duplex was done, this can be followed up as OP  CAP: on Abx per primary team  No BC drawn and now on Abx; no leukocytosis on admission  She remains afebrile          Counseling / Coordination of Care  Total floor / unit time spent today 30 minutes  Greater than 50% of total time was spent with the patient and / or family counseling and / or coordination of care  ** Please Note: Dragon 360 Dictation voice to text software may have been used in the creation of this document   **

## 2018-04-21 VITALS
TEMPERATURE: 98.8 F | RESPIRATION RATE: 18 BRPM | HEART RATE: 74 BPM | SYSTOLIC BLOOD PRESSURE: 172 MMHG | HEIGHT: 66 IN | OXYGEN SATURATION: 98 % | DIASTOLIC BLOOD PRESSURE: 78 MMHG | WEIGHT: 136.69 LBS | BODY MASS INDEX: 21.97 KG/M2

## 2018-04-21 LAB
GLUCOSE SERPL-MCNC: 109 MG/DL (ref 65–140)
GLUCOSE SERPL-MCNC: 219 MG/DL (ref 65–140)

## 2018-04-21 PROCEDURE — 99238 HOSP IP/OBS DSCHRG MGMT 30/<: CPT | Performed by: INTERNAL MEDICINE

## 2018-04-21 PROCEDURE — 82948 REAGENT STRIP/BLOOD GLUCOSE: CPT

## 2018-04-21 PROCEDURE — 99232 SBSQ HOSP IP/OBS MODERATE 35: CPT | Performed by: INTERNAL MEDICINE

## 2018-04-21 RX ORDER — CEFUROXIME AXETIL 500 MG/1
500 TABLET ORAL EVERY 12 HOURS SCHEDULED
Qty: 12 TABLET | Refills: 0 | Status: SHIPPED | OUTPATIENT
Start: 2018-04-21 | End: 2018-04-27

## 2018-04-21 RX ORDER — VERAPAMIL HYDROCHLORIDE 240 MG/1
240 TABLET, FILM COATED, EXTENDED RELEASE ORAL DAILY
Qty: 30 TABLET | Refills: 0 | Status: SHIPPED | OUTPATIENT
Start: 2018-04-22 | End: 2018-06-07

## 2018-04-21 RX ADMIN — CARVEDILOL 12.5 MG: 12.5 TABLET, FILM COATED ORAL at 10:18

## 2018-04-21 RX ADMIN — LISINOPRIL 20 MG: 20 TABLET ORAL at 10:19

## 2018-04-21 RX ADMIN — Medication 1000 MG: at 10:18

## 2018-04-21 RX ADMIN — POTASSIUM CHLORIDE 10 MEQ: 750 TABLET, EXTENDED RELEASE ORAL at 10:18

## 2018-04-21 RX ADMIN — METFORMIN HYDROCHLORIDE 500 MG: 500 TABLET, FILM COATED ORAL at 10:18

## 2018-04-21 RX ADMIN — LEVOTHYROXINE SODIUM 137 MCG: 112 TABLET ORAL at 06:30

## 2018-04-21 RX ADMIN — VERAPAMIL HYDROCHLORIDE 240 MG: 240 TABLET, FILM COATED, EXTENDED RELEASE ORAL at 10:22

## 2018-04-21 RX ADMIN — INSULIN LISPRO 2 UNITS: 100 INJECTION, SOLUTION INTRAVENOUS; SUBCUTANEOUS at 11:52

## 2018-04-21 RX ADMIN — OXYCODONE HYDROCHLORIDE AND ACETAMINOPHEN 500 MG: 500 TABLET ORAL at 10:18

## 2018-04-21 RX ADMIN — ACETAMINOPHEN 650 MG: 325 TABLET, FILM COATED ORAL at 06:33

## 2018-04-21 RX ADMIN — LORAZEPAM 0.5 MG: 0.5 TABLET ORAL at 10:21

## 2018-04-21 RX ADMIN — ENOXAPARIN SODIUM 40 MG: 40 INJECTION SUBCUTANEOUS at 10:20

## 2018-04-21 RX ADMIN — CEFUROXIME AXETIL 500 MG: 250 TABLET ORAL at 10:22

## 2018-04-21 NOTE — DISCHARGE SUMMARY
Resolved Problems  Date Reviewed: 4/18/2018    None          Consultations During Hospital Stay:  · Cardiology        Reason for Admission:  Dizziness    Hospital Course:     Fanny Wyman is a 76 y o  female who has a past medical history significant for hypertension and transient atrial fibrillation  The patient reports that for the past 2 or 3 days she has been feeling lightheaded with some dizziness  She did not lose consciousness  She checks her blood pressure which was approximately in the 90s but at the same time at times her blood pressure may be very high  That said, the patient was very concerned and therefore was in touch with the cardiology office  Patient noted that she has some cough for the past 4 days or so  It is nonproductive  She denies any fever or chills  Because of that, she went to the emergency room to be evaluated      A chest x-ray revealed that she has the fullness on her right middle lobe and possibly be concerning for a pneumonia  She denies any dysphagia  There is no note of any choking episodes when she eats  She denies any sick contacts  Noted is that her blood pressure has been elevated and at time she also presents with anxiety and panic attacks  (Here in the emergency room she has demonstrated some crying episodes with note of feeling of doom as well as choking  She mentions that she might not see her daughter again  )  Patient has been treated with doses of hydralazine, a dose of labetalol as well as given her Norvasc  Also there were times that she had transient atrial fibrillation which resolved on its own  In any case her blood pressure has gone down from initially 200s to 150 after being common after given Ativan 0 5 mg intravenously  The patient remain on antibiotics for several days  She slowly improved with decreased symptoms    In addition she was seen by Cardiology and echocardiogram was performed which revealed no significant changes in status  Medications were adjusted specifically, amlodipine was transitioned to verapamil with improvement in both atrial arrhythmias as well as hypertension  Patient was ambulated without difficulty  On day of discharge she was feeling close to baseline  She is to stop Zithromax after today and continue Ceftin for 6 more days  She will continue verapamil and follow up with her primary doctor as well as Cardiology as necessary  Please see above list of diagnoses and related plan for additional information  Condition at Discharge: fair     Discharge Day Visit / Exam:     Subjective:  Complains of mild sinus pressure but otherwise without complaints  Vitals: Blood Pressure: (!) 188/85 (04/21/18 0745)  Pulse: 74 (04/21/18 0745)  Temperature: 98 8 °F (37 1 °C) (04/21/18 0745)  Temp Source: Oral (04/21/18 0745)  Respirations: 18 (04/21/18 0745)  Height: 5' 6" (167 6 cm) (04/18/18 0344)  Weight - Scale: 62 kg (136 lb 11 oz) (04/21/18 0600)  SpO2: 98 % (04/21/18 0745)  Exam:   Physical Exam   Constitutional: She is oriented to person, place, and time  She appears well-developed and well-nourished  She is not intubated  HENT:   Head: Normocephalic and atraumatic  Neck: No JVD present  No thyromegaly present  Cardiovascular: Normal rate, regular rhythm, S1 normal and S2 normal     No murmur heard  Pulmonary/Chest: No tachypnea  She is not intubated  No respiratory distress  She has no wheezes  She has no rhonchi  She has no rales  Abdominal: Soft  Normal appearance and bowel sounds are normal  There is no hepatosplenomegaly  Musculoskeletal: Normal range of motion  Neurological: She is alert and oriented to person, place, and time  She has normal strength  Skin: Skin is warm and dry  No rash noted  Psychiatric: She has a normal mood and affect   Her behavior is normal        Discharge instructions/Information to patient and family:   See after visit summary for information provided to patient and family  Provisions for Follow-Up Care:  See after visit summary for information related to follow-up care and any pertinent home health orders  Disposition:     Home with VNA Services (Reminder: Complete face to face encounter)    For Discharges to [facility]:   · Not Applicable to this Patient - Not Applicable to this Patient       Discharge Statement:  I spent 25 minutes discharging the patient  This time was spent on the day of discharge  I had direct contact with the patient on the day of discharge  Greater than 50% of the total time was spent examining patient, answering all patient questions, arranging and discussing plan of care with patient as well as directly providing post-discharge instructions  Additional time then spent on discharge activities  Discharge Medications:  See after visit summary for reconciled discharge medications provided to patient and family        ** Please Note: This note has been constructed using a voice recognition system **

## 2018-04-21 NOTE — PROGRESS NOTES
On awakening patient complained of pain right sinus area under her right eye; extending into her ear;  Tylenol given with no relief

## 2018-04-21 NOTE — PROGRESS NOTES
General Cardiology   Progress Note -  Team One   Savage Dorsey 76 y o  female MRN: [id number]    Unit/Bed#: [facility] 709-01 Encounter: [id number]      Subjective: Pt seen for follow up, no events overnight  She remains in SR past 24 hours and BP control improved  Pt reports feeling more fatigued today; she feels it is a side effect of the lovenox injections this AM, despite her fatigue and weakness she reports being able to ambulate in halls today without difficulty  She is not dizzy  Continues to feel intermittent palpitations but frequency has decreased  No chest pain, SOB or cough  Review of Systems   Constitution: Positive for weakness and malaise/fatigue  Negative for decreased appetite and fever  Cardiovascular: Positive for palpitations  Negative for chest pain, dyspnea on exertion, orthopnea and syncope  Respiratory: Negative for cough and shortness of breath  Gastrointestinal: Negative for abdominal pain, nausea and vomiting  Genitourinary: Negative for dysuria  Neurological: Negative for dizziness and light-headedness  Psychiatric/Behavioral: Negative for altered mental status  All other systems reviewed and are negative  Objective:   Vitals: Blood pressure (!) 188/85, pulse 74, temperature 98 8 °F (37 1 °C), temperature source Oral, resp  rate 18, height 5' 6" (1 676 m), weight 62 kg (136 lb 11 oz), SpO2 98 %  ,     Body mass index is 22 06 kg/m²  ,     Systolic (11AFX), UFV:724 , Min:144 , KKD:107     Diastolic (20NKE), CLH:40, Min:54, Max:85      Intake/Output Summary (Last 24 hours) at 04/21/18 1348  Last data filed at 04/21/18 1311   Gross per 24 hour   Intake              660 ml   Output              800 ml   Net             -140 ml     Weight (last 2 days)     Date/Time   Weight    04/21/18 0600  62 (136 69)    04/20/18 0600  65 8 (145 06)    04/19/18 0600  65 7 (144 84)            Telemetry Review: No significant arrhythmias seen on telemetry review     Sinus rhythm, no afib last 24 hours    Physical Exam   Constitutional: She is oriented to person, place, and time  No distress  Pt sitting up at side of bed in NAD   HENT:   Head: Normocephalic and atraumatic  Cardiovascular: Normal rate, regular rhythm, S1 normal and S2 normal     No murmur heard  No LE edema   Pulmonary/Chest: Effort normal and breath sounds normal  No respiratory distress  She has no wheezes  She has no rales  LS CTA, no RA, no distress  No cough   Musculoskeletal: She exhibits no edema  Neurological: She is alert and oriented to person, place, and time  Skin: Skin is warm  She is not diaphoretic  Psychiatric:   Pt appears anxious   Nursing note and vitals reviewed      LABORATORY RESULTS    Results from last 7 days  Lab Units 04/18/18  0821 04/18/18  0421 04/18/18  0115   TROPONIN I ng/mL <0 02 <0 02 <0 02     CBC with diff:     Results from last 7 days  Lab Units 04/20/18  0530 04/18/18 0421 04/17/18  1633   WBC Thousand/uL 8 74 10 55* 7 00   HEMOGLOBIN g/dL 10 3* 12 6 11 4*   HEMATOCRIT % 31 7* 38 3 35 0   MCV fL 89 89 88   PLATELETS Thousands/uL 268 314 278   MCH pg 29 0 29 1 28 8   MCHC g/dL 32 5 32 9 32 6   RDW % 13 6 13 5 13 5   MPV fL 9 5 9 5 9 0   NRBC AUTO /100 WBCs 0 0 0     CMP:    Results from last 7 days  Lab Units 04/20/18  0530 04/18/18 0421 04/17/18  1633   SODIUM mmol/L 136 140 135*   POTASSIUM mmol/L 3 9 3 8 3 6   CHLORIDE mmol/L 102 103 99*   CO2 mmol/L 27 29 31   ANION GAP mmol/L 7 8 5   BUN mg/dL 17 20 22   CREATININE mg/dL 0 90 1 05 1 15   GLUCOSE RANDOM mg/dL 104 153* 149*   CALCIUM mg/dL 9 3 9 9 9 7   AST U/L  --   --  17   ALT U/L  --   --  16   ALK PHOS U/L  --   --  90   TOTAL PROTEIN g/dL  --   --  8 2   BILIRUBIN TOTAL mg/dL  --   --  0 58   EGFR ml/min/1 73sq m 61 51 45     BMP:    Results from last 7 days  Lab Units 04/20/18  0530 04/18/18  0421 04/17/18  1633   SODIUM mmol/L 136 140 135*   POTASSIUM mmol/L 3 9 3 8 3 6   CHLORIDE mmol/L 102 103 99*   CO2 mmol/L 27 29 31 BUN mg/dL 17 20 22   CREATININE mg/dL 0 90 1 05 1 15   GLUCOSE RANDOM mg/dL 104 153* 149*   CALCIUM mg/dL 9 3 9 9 9 7     Lab Results   Component Value Date    NTBNP 723 (H) 2016          Results from last 7 days  Lab Units 18  0421   MAGNESIUM mg/dL 1 7          Results from last 7 days  Lab Units 18  2350   HEMOGLOBIN A1C % 6 4*          Results from last 7 days  Lab Units 18  1633   TSH 3RD GENERATON uIU/mL 0 388     Lipid Profile:   Lab Results   Component Value Date    CHOL 146 2016     Lab Results   Component Value Date    HDL 45 2016     Lab Results   Component Value Date    LDLCALC 85 2016     Lab Results   Component Value Date    TRIG 71 2018    TRIG 81 2016     Cardiac testing:   Results for orders placed during the hospital encounter of 18   Echo complete with contrast if indicated    Narrative [address]  [address]  [phone]    Transthoracic Echocardiogram  2D, M-mode, Doppler, and Color Doppler    Study date:  2018    Patient: Roxanna Edwards  MR number: [id number]  Account number: [de-identified]  : 1940  Age: 76 years  Gender: Female  Status: Inpatient  Location: Bedside  Height: 66 in  Weight: 142 lb  BP: 132/ 63 mmHg    Indications: Atrial fibrillation    Diagnoses: I48 0 - Atrial fibrillation    Sonographer:  CAROLA Villegas, RDCS  Primary Physician:  Taylor Ly DO  Referring Physician:  Chary Johns MD  Group:  [facility] [id number]  Interpreting Physician:  Shivam Cortes MD    SUMMARY    LEFT VENTRICLE:  Systolic function was hyperdynamic by visual assessment  Ejection fraction was estimated to be 70 %  There were no regional wall motion abnormalities  Wall thickness was mildly increased  Left ventricular diastolic function parameters were normal for atrial fibrillation      RIGHT VENTRICLE:  The size was normal   Systolic function was normal     MITRAL VALVE:  There was mild regurgitation  TRICUSPID VALVE:  There was trace regurgitation  HISTORY: PRIOR HISTORY: Hypertension; Hypothryoid; Diabetes Mellitus; Left breast cancer (22years ago); Smoker    PROCEDURE: The procedure was performed at the bedside  This was a routine study  The transthoracic approach was used  The study included complete 2D imaging, M-mode, complete spectral Doppler, and color Doppler  Images were obtained from  the parasternal, apical, subcostal, and suprasternal notch acoustic windows  Echocardiographic views were limited due to poor acoustic window availability  Image quality was adequate  LEFT VENTRICLE: Size was normal  Systolic function was hyperdynamic by visual assessment  Ejection fraction was estimated to be 70 %  There were no regional wall motion abnormalities  Wall thickness was mildly increased  DOPPLER: Left  ventricular diastolic function parameters were normal for atrial fibrillation  RIGHT VENTRICLE: The size was normal  Systolic function was normal  Wall thickness was normal     LEFT ATRIUM: Size was normal     RIGHT ATRIUM: Size was normal     MITRAL VALVE: Valve structure was normal  There was normal leaflet separation  DOPPLER: The transmitral velocity was within the normal range  There was no evidence for stenosis  There was mild regurgitation  AORTIC VALVE: The valve was trileaflet  Leaflets exhibited normal thickness and normal cuspal separation  DOPPLER: Transaortic velocity was within the normal range  There was no evidence for stenosis  There was no significant  regurgitation  TRICUSPID VALVE: The valve structure was normal  There was normal leaflet separation  DOPPLER: The transtricuspid velocity was within the normal range  There was no evidence for stenosis  There was trace regurgitation  Estimated peak PA  pressure was 37 mmHg      PULMONIC VALVE: Leaflets exhibited normal thickness, no calcification, and normal cuspal separation  DOPPLER: The transpulmonic velocity was within the normal range  There was no significant regurgitation  PERICARDIUM: There was no pericardial effusion  The pericardium was normal in appearance  AORTA: The root exhibited normal size  SYSTEMIC VEINS: IVC: The inferior vena cava was normal in size  Respirophasic changes were normal     MEASUREMENT TABLES    OTHER ECHO MEASUREMENTS  (Reference normals)  Estimated CVP   5 mmHg   (--)    SYSTEM MEASUREMENT TABLES    2D  %FS: 29 99 %  Ao Diam: 3 19 cm  EDV(Teich): 71 54 ml  EF(Teich): 57 73 %  ESV(Teich): 30 24 ml  IVSd: 1 13 cm  LA Area: 16 73 cm2  LA Diam: 3 38 cm  LVEDV MOD A4C: 48 ml  LVEF MOD A4C: 71 93 %  LVESV MOD A4C: 13 48 ml  LVIDd: 4 04 cm  LVIDs: 2 83 cm  LVLd A4C: 6 47 cm  LVLs A4C: 5 29 cm  LVPWd: 1 06 cm  RA Area: 13 16 cm2  RVIDd: 3 45 cm  SV MOD A4C: 34 53 ml  SV(Teich): 41 3 ml    CW  TR Vmax: 2 91 m/s  TR maxP 78 mmHg    MM  TAPSE: 1 72 cm    PW  E': 0 09 m/s  E/E': 12 4  MV A Mika: 0 m/s  MV Dec [place]: 5 33 m/s2  MV DecT: 199 99 ms  MV E Mika: 1 07 m/s  MV E/A Ratio: 1088  2  MV PHT: 58 ms  MVA By PHT: 3 79 cm2    [facility]    Prepared and electronically signed by    Radha Rai MD  Signed 10-Feb-2018 18:24:16       Meds/Allergies   current meds:   Current Facility-Administered Medications   Medication Dose Route Frequency    acetaminophen (TYLENOL) tablet 650 mg  650 mg Oral Q6H PRN    aluminum-magnesium hydroxide-simethicone (MYLANTA) 200-200-20 mg/5 mL oral suspension 15 mL  15 mL Oral Q6H PRN    ascorbic acid (VITAMIN C) tablet 500 mg  500 mg Oral Daily    azithromycin (ZITHROMAX) tablet 250 mg  250 mg Oral Q24H    carvedilol (COREG) tablet 12 5 mg  12 5 mg Oral BID With Meals    cefuroxime (CEFTIN) tablet 500 mg  500 mg Oral Q12H [address]    docusate sodium (COLACE) capsule 100 mg  100 mg Oral BID PRN    enoxaparin (LOVENOX) subcutaneous injection 40 mg  40 mg Subcutaneous Daily    fish oil capsule 1,000 mg  1,000 mg Oral Daily    hydrALAZINE (APRESOLINE) injection 15 mg  15 mg Intravenous Q4H PRN    insulin lispro (HumaLOG) 100 units/mL subcutaneous injection 1-5 Units  1-5 Units Subcutaneous TID AC    insulin lispro (HumaLOG) 100 units/mL subcutaneous injection 1-5 Units  1-5 Units Subcutaneous HS    levothyroxine tablet 137 mcg  137 mcg Oral Early Morning    lisinopril (ZESTRIL) tablet 20 mg  20 mg Oral BID    LORazepam (ATIVAN) tablet 0 5 mg  0 5 mg Oral Daily    melatonin tablet 3 mg  3 mg Oral HS    metFORMIN (GLUCOPHAGE) tablet 500 mg  500 mg Oral BID With Meals    nicotine (NICODERM CQ) 14 mg/24hr TD 24 hr patch 1 patch  1 patch Transdermal Daily    ondansetron (ZOFRAN) injection 4 mg  4 mg Intravenous Q6H PRN    potassium chloride (K-DUR,KLOR-CON) CR tablet 10 mEq  10 mEq Oral Daily    verapamil (CALAN-SR) CR tablet 240 mg  240 mg Oral Daily     Prescriptions Prior to Admission   Medication    amLODIPine (NORVASC) 5 mg tablet    ascorbic acid (VITAMIN C) 500 mg tablet    carvedilol (COREG) 12 5 mg tablet    Levothyroxine Sodium 137 MCG CAPS    lisinopril (ZESTRIL) 20 mg tablet    LORazepam (ATIVAN) 0 5 mg tablet    metFORMIN (GLUCOPHAGE) 500 mg tablet    Omega-3 Fatty Acids (FISH OIL PO)    furosemide (LASIX) 20 mg tablet    potassium chloride (K-DUR,KLOR-CON) 10 mEq tablet     Assessment:  Principal Problem:    Community acquired pneumonia of right middle lobe of lung (HCC)  Active Problems:    Nicotine abuse    Transient atrial fibrillation (HCC)    Hypertension, essential, benign    Anxiety    Assessment/ Plan:    Paroxysmal atrial fibrillation:  No further episodes of atrial fibrillation, pt still reporting occasional palpitations without corresponding arrhythmias  Her medications were adjusted; now on verapamil and BB  She continues to decline [facility] and is aware of risk of CVA  On ASA     A limited echo is ordered    HTN:  blood pressure control improved with medication changes made yesterday  She was started on verapamil, continue on beta-blocker, and ACE as well  She reports feeling fatigued today, this may be due to better BP control  No dizziness or falls  A renal artery duplex was done, this can be followed up as OP  CAP: on Abx per primary team  No BC drawn and now on Abx; no leukocytosis on admission  She remains afebrile          Counseling / Coordination of Care  Total floor / unit time spent today 30 minutes  Greater than 50% of total time was spent with the patient and / or family counseling and / or coordination of care  ** Please Note: Dragon 360 Dictation voice to text software may have been used in the creation of this document   **

## 2018-04-26 NOTE — ED ATTENDING ATTESTATION
Glenn Corbin MD, saw and evaluated the patient  I have discussed the patient with the resident/non-physician practitioner and agree with the resident's/non-physician practitioner's findings, Plan of Care, and MDM as documented in the resident's/non-physician practitioner's note, except where noted  All available labs and Radiology studies were reviewed  At this point I agree with the current assessment done in the Emergency Department  I have conducted an independent evaluation of this patient a history and physical is as follows:    Patient presents after an episode where she felt lightheaded while standing  She took her blood pressure and found to be in the 90 use with that heart rate of 126  Upon EMS arrival, patient's blood pressure had improved  She does also report having some bilateral shoulder pain which was transient  Patient denies any current complaints or ever having any chest pain  No additional complaints  Exam: AAOx3, NAD, IRRR, CTA, S/NT/ND, no motor/sensory deficits  A/P:  Near syncope  Will check cardiac labs, EKG, chest x-ray, and likely admit for observation      Critical Care Time  CritCare Time    Procedures

## 2018-05-08 ENCOUNTER — CLINICAL SUPPORT (OUTPATIENT)
Dept: CARDIOLOGY CLINIC | Facility: CLINIC | Age: 78
End: 2018-05-08

## 2018-05-08 VITALS
HEIGHT: 66 IN | DIASTOLIC BLOOD PRESSURE: 70 MMHG | WEIGHT: 142.7 LBS | HEART RATE: 67 BPM | OXYGEN SATURATION: 97 % | SYSTOLIC BLOOD PRESSURE: 180 MMHG | BODY MASS INDEX: 22.93 KG/M2

## 2018-05-08 DIAGNOSIS — I10 HYPERTENSION, UNSPECIFIED TYPE: Primary | ICD-10-CM

## 2018-05-08 PROCEDURE — 99024 POSTOP FOLLOW-UP VISIT: CPT

## 2018-05-08 RX ORDER — CLONIDINE HYDROCHLORIDE 0.1 MG/1
0.1 TABLET ORAL ONCE
Qty: 30 TABLET | Refills: 3 | Status: SHIPPED | OUTPATIENT
Start: 2018-05-08 | End: 2018-08-05 | Stop reason: SDUPTHER

## 2018-05-08 NOTE — PROGRESS NOTES
Patient at the office for blood pressure check request by Dr Vicki Fang, patient was here on 04/17/2018 that the after left from here patient wasn't feeling good her BP low too much that she need to call ambulance and get into the hospital, they prescribe new medication Verapamil 240mg once daily, patient estate not feeling ok in that medication, today BP is 180/70, Dr Farnaz Ayala is prescribing clonidine 0 1mg once daily, and patient will follow up for BP check in three weeks

## 2018-05-24 ENCOUNTER — LAB REQUISITION (OUTPATIENT)
Dept: LAB | Facility: HOSPITAL | Age: 78
End: 2018-05-24
Payer: MEDICARE

## 2018-05-24 DIAGNOSIS — R39.15 URGENCY OF URINATION: ICD-10-CM

## 2018-05-24 LAB
BACTERIA UR QL AUTO: ABNORMAL /HPF
BILIRUB UR QL STRIP: NEGATIVE
CLARITY UR: CLEAR
COLOR UR: YELLOW
GLUCOSE UR STRIP-MCNC: NEGATIVE MG/DL
HGB UR QL STRIP.AUTO: NEGATIVE
HYALINE CASTS #/AREA URNS LPF: ABNORMAL /LPF
KETONES UR STRIP-MCNC: NEGATIVE MG/DL
LEUKOCYTE ESTERASE UR QL STRIP: NEGATIVE
NITRITE UR QL STRIP: NEGATIVE
NON-SQ EPI CELLS URNS QL MICRO: ABNORMAL /HPF
PH UR STRIP.AUTO: 5.5 [PH] (ref 4.5–8)
PROT UR STRIP-MCNC: ABNORMAL MG/DL
RBC #/AREA URNS AUTO: ABNORMAL /HPF
SP GR UR STRIP.AUTO: >=1.03 (ref 1–1.03)
UROBILINOGEN UR QL STRIP.AUTO: 0.2 E.U./DL
WBC #/AREA URNS AUTO: ABNORMAL /HPF

## 2018-05-24 PROCEDURE — 81001 URINALYSIS AUTO W/SCOPE: CPT | Performed by: INTERNAL MEDICINE

## 2018-05-29 ENCOUNTER — TELEPHONE (OUTPATIENT)
Dept: CARDIOLOGY CLINIC | Facility: CLINIC | Age: 78
End: 2018-05-29

## 2018-05-29 NOTE — TELEPHONE ENCOUNTER
PT called today to say that she thought about the warfarin and decided that she wants to take it    Can you give dosing start  For me  Thanks David Navarro

## 2018-05-31 DIAGNOSIS — I48.91 ATRIAL FIBRILLATION, UNSPECIFIED TYPE (HCC): Primary | ICD-10-CM

## 2018-05-31 RX ORDER — WARFARIN SODIUM 2.5 MG/1
TABLET ORAL
Qty: 60 TABLET | Refills: 11 | Status: SHIPPED | OUTPATIENT
Start: 2018-05-31 | End: 2018-06-07

## 2018-05-31 NOTE — TELEPHONE ENCOUNTER
SPOKE TO PT / Gabriel Lujan    I WILL SEND SCRIPT TO [facility] AND ALSO FAX ORDERS TO KEILA Butler

## 2018-06-04 DIAGNOSIS — I48.91 TRANSIENT ATRIAL FIBRILLATION (HCC): ICD-10-CM

## 2018-06-04 RX ORDER — CARVEDILOL 12.5 MG/1
12.5 TABLET ORAL 2 TIMES DAILY WITH MEALS
Qty: 60 TABLET | Refills: 1 | Status: SHIPPED | OUTPATIENT
Start: 2018-06-04 | End: 2018-07-31 | Stop reason: SDUPTHER

## 2018-06-05 ENCOUNTER — CLINICAL SUPPORT (OUTPATIENT)
Dept: CARDIOLOGY CLINIC | Facility: CLINIC | Age: 78
End: 2018-06-05
Payer: MEDICARE

## 2018-06-05 VITALS
DIASTOLIC BLOOD PRESSURE: 82 MMHG | OXYGEN SATURATION: 97 % | SYSTOLIC BLOOD PRESSURE: 210 MMHG | BODY MASS INDEX: 22.18 KG/M2 | WEIGHT: 138 LBS | HEIGHT: 66 IN | HEART RATE: 65 BPM

## 2018-06-05 DIAGNOSIS — I48.91 ATRIAL FIBRILLATION, UNSPECIFIED TYPE (HCC): Primary | ICD-10-CM

## 2018-06-05 PROCEDURE — 93000 ELECTROCARDIOGRAM COMPLETE: CPT

## 2018-06-05 NOTE — PROGRESS NOTES
Patient is here for a BP and blood pressure check per Dr Dawood Ambriz  Patient complaints of palpitations, nausea and high blood pressure  I reviewed EKG with Dr Galo Rojas today and spoke with Dr Rafi Kim regarding blood pressure  /82 pulse 65  Patient was advised to take Clonidine 0 1 mg 2 tabs in the am and 1 tab in the pm  Patient was scheduled for a follow up with Dr Jannie Cerrato on 06/07/18 to discuss BP and blood thinner

## 2018-06-07 ENCOUNTER — OFFICE VISIT (OUTPATIENT)
Dept: CARDIOLOGY CLINIC | Facility: CLINIC | Age: 78
End: 2018-06-07
Payer: MEDICARE

## 2018-06-07 VITALS
HEART RATE: 66 BPM | OXYGEN SATURATION: 96 % | WEIGHT: 141.9 LBS | BODY MASS INDEX: 22.8 KG/M2 | DIASTOLIC BLOOD PRESSURE: 80 MMHG | HEIGHT: 66 IN | SYSTOLIC BLOOD PRESSURE: 212 MMHG

## 2018-06-07 DIAGNOSIS — I48.91 TRANSIENT ATRIAL FIBRILLATION (HCC): ICD-10-CM

## 2018-06-07 DIAGNOSIS — I10 HYPERTENSION, ESSENTIAL, BENIGN: Chronic | ICD-10-CM

## 2018-06-07 DIAGNOSIS — I48.91 ATRIAL FIBRILLATION, UNSPECIFIED TYPE (HCC): Primary | ICD-10-CM

## 2018-06-07 DIAGNOSIS — R00.2 HEART PALPITATIONS: ICD-10-CM

## 2018-06-07 DIAGNOSIS — E11.9 DIABETES MELLITUS TYPE 2 IN NONOBESE (HCC): Chronic | ICD-10-CM

## 2018-06-07 PROCEDURE — 93000 ELECTROCARDIOGRAM COMPLETE: CPT | Performed by: INTERNAL MEDICINE

## 2018-06-07 PROCEDURE — 99214 OFFICE O/P EST MOD 30 MIN: CPT | Performed by: INTERNAL MEDICINE

## 2018-06-07 RX ORDER — AMLODIPINE BESYLATE 5 MG/1
5 TABLET ORAL DAILY
Qty: 30 TABLET | Refills: 3 | Status: SHIPPED | OUTPATIENT
Start: 2018-06-07 | End: 2019-03-20 | Stop reason: SDUPTHER

## 2018-06-07 RX ORDER — MELATONIN
1000 DAILY
COMMUNITY
End: 2019-07-19

## 2018-06-07 RX ORDER — NORTRIPTYLINE HYDROCHLORIDE 75 MG/1
75 CAPSULE ORAL
Status: ON HOLD | COMMUNITY
End: 2019-07-21 | Stop reason: ALTCHOICE

## 2018-06-07 RX ORDER — DIPHENOXYLATE HYDROCHLORIDE AND ATROPINE SULFATE 2.5; .025 MG/1; MG/1
1 TABLET ORAL DAILY
COMMUNITY

## 2018-06-07 NOTE — PROGRESS NOTES
Cardiology Follow Up    Enrrique Kincaid  1940  [id number]  [address]  [facility] [address]    1  Atrial fibrillation, unspecified type ([facility] )  POCT ECG    apixaban (ELIQUIS) 5 mg   2  Transient atrial fibrillation ([address] )     3  Hypertension, essential, benign  amLODIPine (NORVASC) 5 mg tablet   4  Diabetes mellitus type 2 in nonobese (HCC)     5  Heart palpitations         Discussion/Summary:  She is currently maintaining sinus rhythm she came to the office for an acute visit to discuss anticoagulation  Given her elevated chads Vasc risk score recommend starting Eliquis for thromboembolic protection  She does not want Coumadin  Blood pressure still elevated she is not taking verapamil will reinstitute to Norvasc at 5 mg per day continue other cardiac medication she will call me next week with blood pressure readings  Interval History:  38-year-old female with paroxysmal atrial fibrillation, diabetes, hypertension, hyperlipidemia presents to discuss anticoagulation  I had recommended Coumadin however she is fearful of the medicine and wants talk about other options  She is resting comfortably with no complaints  There has been no chest pain or shortness of breath  Mild palpitations are minimal   Blood pressure has been hard to control recent changes in medication seems to be somewhat confusing for the patient  She is here with her daughter to discuss things      Problem List     Heart palpitations    Nicotine abuse    Transient atrial fibrillation (HCC)    Diabetes mellitus type 2 in nonobese (HCC) (Chronic)    Hypertension, essential, benign (Chronic)    Acquired hypothyroidism (Chronic)    Malignant neoplasm of central portion of right female breast ([facility] )    Acute kidney injury ([facility] )    Delirium    Physical deconditioning    Ambulatory dysfunction    Hx of fall    Anxiety Postop check    Community acquired pneumonia of right middle lobe of lung ([facility] [address] )        Past Medical History:   Diagnosis Date    Anxiety     [facility]     Bowel obstruction (HCC)     Cancer (HCC)     LEFT BREAST CA 22 YEARS AGO     Depression     Diabetes mellitus ([facility] )     Hypertension     Hypothyroidism      Social History     Social History    Marital status:      Spouse name: N/A    Number of children: N/A    Years of education: N/A     Occupational History    Not on file  Social History Main Topics    Smoking status: Former Smoker     Packs/day: 1 00     Types: Cigarettes     Quit date: 02/2018    Smokeless tobacco: Never Used    Alcohol use No    Drug use: No    Sexual activity: Not Currently     Other Topics Concern    Not on file     Social History Narrative    No narrative on file      Family History   Problem Relation Age of Onset    Cancer Mother      Past Surgical History:   Procedure Laterality Date    ABDOMINAL ADHESION SURGERY N/A 2/4/2018    Procedure: LYSIS ADHESIONS;  Surgeon: Malia Mixon DO;  [facility]: [facility];  Service: General    BREAST SURGERY      CHOLECYSTECTOMY      EXPLORATORY LAPAROTOMY      JOINT REPLACEMENT      LEFT KNEE REPLACEMENT     LAPAROTOMY N/A 2/4/2018    Procedure: LAPAROTOMY EXPLORATORY,;  Surgeon: Malia Mixon DO;  [facility];  Service: General    MASTECTOMY      AR BIOPSY/EXCISION, LYMPH NODE(S) Right 1/13/2017    Procedure: SENTINEL LYMPH NODE BIOPSY RIGHT AXILLA;   Surgeon: Giovanny Chao MD;  [facility];  Service: General    AR MASTECTOMY, SIMPLE, COMPLETE Right 1/13/2017    Procedure: MASTECTOMY SIMPLE;  Surgeon: Giovanny Chao MD;  [facility];  Service: General    SMALL INTESTINE SURGERY N/A 2/4/2018    Procedure: RESECTION SMALL BOWEL;  Surgeon: Malia Mixon DO;  [facility];  Service: General       Current Outpatient Prescriptions:     ascorbic acid (VITAMIN C) 500 mg tablet, Take 500 mg by mouth daily, Disp: , Rfl:     aspirin 81 MG tablet, Take 81 mg by mouth, Disp: , Rfl:     carvedilol (COREG) 12 5 mg tablet, TAKE 1 TABLET (12 5 MG TOTAL) BY MOUTH 2 (TWO) TIMES A DAY WITH MEALS, Disp: 60 tablet, Rfl: 1    cholecalciferol (VITAMIN D3) 1,000 units tablet, Take 1,000 Units by mouth daily, Disp: , Rfl:     cloNIDine (CATAPRES) 0 1 mg tablet, Take 1 tablet (0 1 mg total) by mouth once for 1 dose, Disp: 30 tablet, Rfl: 3    furosemide (LASIX) 20 mg tablet, Take 1 tablet (20 mg total) by mouth daily, Disp: 45 tablet, Rfl: 3    Levothyroxine Sodium 137 MCG CAPS, Take 137 mcg by mouth daily  , Disp: , Rfl:     lisinopril (ZESTRIL) 20 mg tablet, Take 1 tablet (20 mg total) by mouth 2 (two) times a day, Disp: 180 tablet, Rfl: 3    LORazepam (ATIVAN) 0 5 mg tablet, Take 0 5 mg by mouth daily as needed for anxiety  , Disp: , Rfl:     metFORMIN (GLUCOPHAGE) 500 mg tablet, Take 500 mg by mouth 2 (two) times a day with meals  , Disp: , Rfl:     multivitamin (THERAGRAN) TABS, Take 1 tablet by mouth daily, Disp: , Rfl:     nortriptyline (PAMELOR) 75 MG capsule, Take 75 mg by mouth daily at bedtime, Disp: , Rfl:     Omega-3 Fatty Acids (FISH OIL PO), Take 1 g by mouth, Disp: , Rfl:     potassium chloride (K-DUR,KLOR-CON) 10 mEq tablet, Take 1 tablet (10 mEq total) by mouth daily, Disp: 45 tablet, Rfl: 3    amLODIPine (NORVASC) 5 mg tablet, Take 1 tablet (5 mg total) by mouth daily, Disp: 30 tablet, Rfl: 3    apixaban (ELIQUIS) 5 mg, Take 1 tablet (5 mg total) by mouth 2 (two) times a day, Disp: 60 tablet, Rfl: 3  Allergies   Allergen Reactions    Morphine GI Intolerance    Norvasc [Amlodipine] Edema    Penicillins     Percocet [Oxycodone-Acetaminophen]        Labs:     Chemistry        Component Value Date/Time     04/20/2018 0530     09/29/2015 1034    K 3 9 04/20/2018 0530    K 4 1 09/29/2015 1034     04/20/2018 0530     09/29/2015 1034    CO2 27 04/20/2018 0530    CO2 28 09/29/2015 1034    BUN 17 04/20/2018 0530    BUN 14 09/29/2015 1034    CREATININE 0 90 04/20/2018 0530    CREATININE 0 89 09/29/2015 1034        Component Value Date/Time    CALCIUM 9 3 04/20/2018 0530    CALCIUM 9 2 09/29/2015 1034    ALKPHOS 90 04/17/2018 1633    AST 17 04/17/2018 1633    ALT 16 04/17/2018 1633    BILITOT 0 58 04/17/2018 1633            Lab Results   Component Value Date    CHOL 146 07/12/2016     Lab Results   Component Value Date    HDL 45 07/12/2016     Lab Results   Component Value Date    LDLCALC 85 07/12/2016     Lab Results   Component Value Date    TRIG 71 02/06/2018    TRIG 81 07/12/2016     No components found for: CHOLHDL    Imaging: No results found  ECG:        ROS    Vitals:    06/07/18 1256   BP: (!) 212/80   Pulse: 66   SpO2: 96%     Vitals:    06/07/18 1256   Weight: 64 4 kg (141 lb 14 4 oz)     Height: 5' 6" (167 6 cm)   Body mass index is 22 9 kg/m²      Physical Exam:   General appearance:  Appears stated age, alert, well appearing and in no distress  HEENT:  PERRLA, EOMI, no scleral icterus, no conjunctival pallor  NECK:  Supple, No elevated JVP, no thyromegaly, no carotid bruits  HEART:  Regular rate and rhythm, normal S1/S2, no S3/S4, no murmur or rub  LUNGS:  Clear to auscultation bilaterally, no wheezes rales or rhonchi  ABDOMEN:  Soft, non-tender, positive bowel sounds, no rebound or guarding, no organomegaly   EXTREMITIES:  No edema, normal range of motion  VASCULAR:  Normal pedal pulses, good pulse volume   SKIN: No lesions or rashes on exposed skin  NEURO:  CN II-XII intact, no focal deficits

## 2018-06-13 ENCOUNTER — TELEPHONE (OUTPATIENT)
Dept: CARDIOLOGY CLINIC | Facility: CLINIC | Age: 78
End: 2018-06-13

## 2018-06-13 NOTE — TELEPHONE ENCOUNTER
Pt asking frequency of Clonidine? Pt was taking 2 tabs in the am and 1 tab in the pm, but her dtr stated it was changed in the office to 1 tab TID   Please advise

## 2018-06-14 NOTE — TELEPHONE ENCOUNTER
Visiting nurse stated they were advised to monitor BP's and call w/readings  Pt only recently increased to 2 in the am and 1 in pm  They will c/b next week with an update     6/8-188/70 6//72

## 2018-06-29 ENCOUNTER — OFFICE VISIT (OUTPATIENT)
Dept: CARDIOLOGY CLINIC | Facility: CLINIC | Age: 78
End: 2018-06-29
Payer: MEDICARE

## 2018-06-29 VITALS
SYSTOLIC BLOOD PRESSURE: 158 MMHG | BODY MASS INDEX: 23.06 KG/M2 | DIASTOLIC BLOOD PRESSURE: 80 MMHG | HEIGHT: 66 IN | WEIGHT: 143.5 LBS | HEART RATE: 64 BPM

## 2018-06-29 DIAGNOSIS — I48.91 TRANSIENT ATRIAL FIBRILLATION (HCC): ICD-10-CM

## 2018-06-29 DIAGNOSIS — I10 HYPERTENSION, ESSENTIAL, BENIGN: Primary | Chronic | ICD-10-CM

## 2018-06-29 DIAGNOSIS — R00.2 HEART PALPITATIONS: ICD-10-CM

## 2018-06-29 DIAGNOSIS — E11.9 DIABETES MELLITUS TYPE 2 IN NONOBESE (HCC): Chronic | ICD-10-CM

## 2018-06-29 PROCEDURE — 99214 OFFICE O/P EST MOD 30 MIN: CPT | Performed by: INTERNAL MEDICINE

## 2018-06-29 PROCEDURE — 93000 ELECTROCARDIOGRAM COMPLETE: CPT | Performed by: INTERNAL MEDICINE

## 2018-06-29 NOTE — PROGRESS NOTES
Cardiology Follow Up    Dionna Jacobs  1940  [id number]  [address]  [place] [facility] [address]    1  Hypertension, essential, benign  POCT ECG    Hemoglobin   2  Transient atrial fibrillation (HCC)  Hemoglobin   3  Heart palpitations     4  Diabetes mellitus type 2 in nonobese [facility])         Discussion/Summary: Following our last visit we started her on anticoagulation  She has been doing well tolerating this with the no adverse bleeding events  Blood pressure has been better controlled on checks from the home nurse  We added amlodipine back at the last visit  She tells me her home checks have been 980-374 systolic which is a big improvement for her  I have ordered a hemoglobin level she does complain of some generalized fatigue will make sure there is no anemia  She has significant abdominal bloating and is following up with her surgeon in the coming weeks  Blood pressure was little high today in the office but she had a very stressful morning will continue to observe on current regimen  Interval History:  78-year-old female with paroxysmal atrial fibrillation, diabetes, hypertension, hyperlipidemia presents to discuss anticoagulation  I had recommended Coumadin however she is fearful of the medicine and wants talk about other options  She is resting comfortably with no complaints  There has been no chest pain or shortness of breath  Mild palpitations are minimal   Blood pressure has been hard to control recent changes in medication seems to be somewhat confusing for the patient  She is here with her daughter to discuss things  Here for a return visit due to new start of anticoagulation and blood pressure monitoring  Overall she has been feeling much better  Pressures at home have been doing well  She is visiting nurse that checks are 2-3 times per week    She denies any chest pain or discomfort breathing has been stable  Palpitations are minimal   There has been no adverse bleeding events on anticoagulation  Problem List     Heart palpitations    Nicotine abuse    Transient atrial fibrillation (HCC)    Diabetes mellitus type 2 in nonobese (HCC) (Chronic)    Hypertension, essential, benign (Chronic)    Acquired hypothyroidism (Chronic)    Malignant neoplasm of central portion of right female breast ([facility] [address] )    Acute kidney injury ([facility] )    Delirium    Physical deconditioning    Ambulatory dysfunction    Hx of fall    Anxiety    Postop check    Community acquired pneumonia of right middle lobe of lung ([facility] )        Past Medical History:   Diagnosis Date    Anxiety     Atrial fibrillation (HCC)     Bowel obstruction (HCC)     Cancer ([facility] )     LEFT BREAST CA 22 YEARS AGO     Depression     Diabetes mellitus ([facility] )     Hypertension     Hypothyroidism      Social History     Social History    Marital status:      Spouse name: N/A    Number of children: N/A    Years of education: N/A     Occupational History    Not on file       Social History Main Topics    Smoking status: Former Smoker     Packs/day: 1 00     Types: Cigarettes     Quit date: 02/2018    Smokeless tobacco: Never Used    Alcohol use No    Drug use: No    Sexual activity: Not Currently     Other Topics Concern    Not on file     Social History Narrative    No narrative on file      Family History   Problem Relation Age of Onset    Cancer Mother      Past Surgical History:   Procedure Laterality Date    ABDOMINAL ADHESION SURGERY N/A 2/4/2018    Procedure: LYSIS ADHESIONS;  Surgeon: Lisandro Arguelles DO;  [facility];  Service: General    BREAST SURGERY      CHOLECYSTECTOMY      EXPLORATORY LAPAROTOMY      JOINT REPLACEMENT      LEFT KNEE REPLACEMENT     LAPAROTOMY N/A 2/4/2018    Procedure: LAPAROTOMY EXPLORATORY,;  Surgeon: Lisandro Arguelles DO;  [facility];  Service: General  MASTECTOMY      WI BIOPSY/EXCISION, LYMPH NODE(S) Right 1/13/2017    Procedure: SENTINEL LYMPH NODE BIOPSY RIGHT AXILLA; Surgeon: Shannon De Jesus MD;  [facility];  Service: General    WI MASTECTOMY, SIMPLE, COMPLETE Right 1/13/2017    Procedure: MASTECTOMY SIMPLE;  Surgeon: Shannon De Jesus MD;  [facility];  Service: General    SMALL INTESTINE SURGERY N/A 2/4/2018    Procedure: RESECTION SMALL BOWEL;  Surgeon: Louisa Chaudhry DO;  [facility];  Service: General       Current Outpatient Prescriptions:     amLODIPine (NORVASC) 5 mg tablet, Take 1 tablet (5 mg total) by mouth daily, Disp: 30 tablet, Rfl: 3    apixaban (ELIQUIS) 5 mg, Take 1 tablet (5 mg total) by mouth 2 (two) times a day, Disp: 60 tablet, Rfl: 3    ascorbic acid (VITAMIN C) 500 mg tablet, Take 500 mg by mouth daily, Disp: , Rfl:     carvedilol (COREG) 12 5 mg tablet, TAKE 1 TABLET (12 5 MG TOTAL) BY MOUTH 2 (TWO) TIMES A DAY WITH MEALS, Disp: 60 tablet, Rfl: 1    cholecalciferol (VITAMIN D3) 1,000 units tablet, Take 1,000 Units by mouth daily, Disp: , Rfl:     cloNIDine (CATAPRES) 0 1 mg tablet, Take 1 tablet (0 1 mg total) by mouth once for 1 dose (Patient taking differently: Take 0 1 mg by mouth every 12 (twelve) hours  ), Disp: 30 tablet, Rfl: 3    furosemide (LASIX) 20 mg tablet, Take 1 tablet (20 mg total) by mouth daily (Patient taking differently: Take 20 mg by mouth 3 (three) times a week  ), Disp: 45 tablet, Rfl: 3    Levothyroxine Sodium 137 MCG CAPS, Take 137 mcg by mouth daily  , Disp: , Rfl:     lisinopril (ZESTRIL) 20 mg tablet, Take 1 tablet (20 mg total) by mouth 2 (two) times a day, Disp: 180 tablet, Rfl: 3    LORazepam (ATIVAN) 0 5 mg tablet, Take 0 5 mg by mouth daily as needed for anxiety  , Disp: , Rfl:     metFORMIN (GLUCOPHAGE) 500 mg tablet, Take 500 mg by mouth 2 (two) times a day with meals  , Disp: , Rfl:     multivitamin (THERAGRAN) TABS, Take 1 tablet by mouth daily, Disp: , Rfl:     Omega-3 Fatty Acids (FISH OIL PO), Take 1 g by mouth, Disp: , Rfl:     potassium chloride (K-DUR,KLOR-CON) 10 mEq tablet, Take 1 tablet (10 mEq total) by mouth daily (Patient taking differently: Take 10 mEq by mouth 3 (three) times a week  ), Disp: 45 tablet, Rfl: 3    nortriptyline (PAMELOR) 75 MG capsule, Take 75 mg by mouth daily at bedtime, Disp: , Rfl:   Allergies   Allergen Reactions    Meloxicam GI Intolerance    Morphine GI Intolerance    Norvasc [Amlodipine] Edema    Penicillins     Percocet [Oxycodone-Acetaminophen]     Sitagliptin      SOB       Labs:     Chemistry        Component Value Date/Time     04/20/2018 0530     09/29/2015 1034    K 3 9 04/20/2018 0530    K 4 1 09/29/2015 1034     04/20/2018 0530     09/29/2015 1034    CO2 27 04/20/2018 0530    CO2 28 09/29/2015 1034    BUN 17 04/20/2018 0530    BUN 14 09/29/2015 1034    CREATININE 0 90 04/20/2018 0530    CREATININE 0 89 09/29/2015 1034        Component Value Date/Time    CALCIUM 9 3 04/20/2018 0530    CALCIUM 9 2 09/29/2015 1034    ALKPHOS 90 04/17/2018 1633    AST 17 04/17/2018 1633    ALT 16 04/17/2018 1633    BILITOT 0 58 04/17/2018 1633            Lab Results   Component Value Date    CHOL 146 07/12/2016     Lab Results   Component Value Date    HDL 45 07/12/2016     Lab Results   Component Value Date    LDLCALC 85 07/12/2016     Lab Results   Component Value Date    TRIG 71 02/06/2018    TRIG 81 07/12/2016     No components found for: CHOLHDL    Imaging: No results found  ECG:    Sinus rhythm with PVC      Review of Systems   Constitution: Negative  HENT: Negative  Eyes: Negative  Cardiovascular: Negative  Respiratory: Negative  Endocrine: Negative  Hematologic/Lymphatic: Negative  Skin: Negative  Musculoskeletal: Negative  Gastrointestinal: Negative  Genitourinary: Negative  Neurological: Negative  Psychiatric/Behavioral: Negative          Vitals:    06/29/18 1006   BP: 158/80   Pulse:      Vitals:    06/29/18 0941   Weight: 65 1 kg (143 lb 8 oz)     Height: 5' 5 5" (166 4 cm)   Body mass index is 23 52 kg/m²      Physical Exam:   General appearance:  Appears stated age, alert, well appearing and in no distress  HEENT:  PERRLA, EOMI, no scleral icterus, no conjunctival pallor  NECK:  Supple, No elevated JVP, no thyromegaly, no carotid bruits  HEART:  Regular rate and rhythm, normal S1/S2, no S3/S4, no murmur or rub  LUNGS:  Clear to auscultation bilaterally, no wheezes rales or rhonchi  ABDOMEN:  Soft, non-tender, positive bowel sounds, no rebound or guarding, no organomegaly   EXTREMITIES:  No edema, normal range of motion  VASCULAR:  Normal pedal pulses, good pulse volume   SKIN: No lesions or rashes on exposed skin  NEURO:  CN II-XII intact, no focal deficits

## 2018-07-10 ENCOUNTER — OFFICE VISIT (OUTPATIENT)
Dept: SURGERY | Facility: CLINIC | Age: 78
End: 2018-07-10
Payer: MEDICARE

## 2018-07-10 VITALS
DIASTOLIC BLOOD PRESSURE: 74 MMHG | WEIGHT: 141.2 LBS | HEIGHT: 65 IN | TEMPERATURE: 97.5 F | BODY MASS INDEX: 23.53 KG/M2 | SYSTOLIC BLOOD PRESSURE: 158 MMHG

## 2018-07-10 DIAGNOSIS — K43.2 INCISIONAL HERNIA, WITHOUT OBSTRUCTION OR GANGRENE: Primary | ICD-10-CM

## 2018-07-10 PROCEDURE — 99212 OFFICE O/P EST SF 10 MIN: CPT | Performed by: SURGERY

## 2018-07-10 NOTE — PROGRESS NOTES
Office Visit - General Surgery  Pancho Moreno MRN: [id number]  Encounter: [id number]    Assessment and Plan    Problem List Items Addressed This Visit        Other    Incisional hernia, without obstruction or gangrene - Primary     I told her it looks like she has an    incisional hernia  I told her I do not think this is related to her nausea  At this point time, she is not interested in any surgery and I think that is reasonable  We will try an abdominal binder to see if that helps her  She is asked come back sooner if she has any further problems  Otherwise see her for routine breast follow-up  Relevant Orders    Abdominal binder          Chief Complaint:  Pancho Moreno is a [de-identified] y o  female who presents for Post-op (exploratory lap in 2-4-18 c/o bloating and nausea)    Subjective  43-year-old female status post laparotomy for bowel obstruction  She has noticed that her abdomen is getting somewhat swift  She is eating well and her bowels are moving satisfactorily  Her only complaint is nausea    Past Medical History  Past Medical History:   Diagnosis Date    Anxiety     At[facility] fibrillation [facility] [address] )     Bowel obstruction (HCC)     Cancer ([facility] [address] )     LEFT BREAST CA 22 YEARS AGO     Depression     Diabetes mellitus ([facility] [address] [facility] )     Hypertension     Hypothyroidism        Past Surgical History  Past Surgical History:   Procedure Laterality Date    ABDOMINAL ADHESION SURGERY N/A 2/4/2018    Procedure: LYSIS ADHESIONS;  Surgeon: Candie Chowdhury DO;  [facility];  Service: General    BREAST SURGERY      CHOLECYSTECTOMY      EXPLORATORY LAPAROTOMY      JOINT REPLACEMENT      LEFT KNEE REPLACEMENT     LAPAROTOMY N/A 2/4/2018    Procedure: LAPAROTOMY EXPLORATORY,;  Surgeon: Candie Chowdhury DO;  [facility];  Service: General    MASTECTOMY      DE BIOPSY/EXCISION, LYMPH NODE(S) Right 1/13/2017    Procedure: SENTINEL LYMPH NODE BIOPSY RIGHT AXILLA;   Surgeon: Kendrick Camp MD; [facility];  Service: General    DC MASTECTOMY, SIMPLE, COMPLETE Right 1/13/2017    Procedure: MASTECTOMY SIMPLE;  Surgeon: Yasmine Ray MD;  [facility];  Service: General    SMALL INTESTINE SURGERY N/A 2/4/2018    Procedure: RESECTION SMALL BOWEL;  Surgeon: Royer Zhang DO;  [facility];  Service: General       Family History  Family History   Problem Relation Age of Onset    Cancer Mother        Medications  Current Outpatient Prescriptions on File Prior to Visit   Medication Sig Dispense Refill    amLODIPine (NORVASC) 5 mg tablet Take 1 tablet (5 mg total) by mouth daily 30 tablet 3    apixaban (ELIQUIS) 5 mg Take 1 tablet (5 mg total) by mouth 2 (two) times a day 60 tablet 3    ascorbic acid (VITAMIN C) 500 mg tablet Take 500 mg by mouth daily      carvedilol (COREG) 12 5 mg tablet TAKE 1 TABLET (12 5 MG TOTAL) BY MOUTH 2 (TWO) TIMES A DAY WITH MEALS 60 tablet 1    cholecalciferol (VITAMIN D3) 1,000 units tablet Take 1,000 Units by mouth daily      cloNIDine (CATAPRES) 0 1 mg tablet Take 1 tablet (0 1 mg total) by mouth once for 1 dose (Patient taking differently: Take 0 1 mg by mouth every 12 (twelve) hours  ) 30 tablet 3    furosemide (LASIX) 20 mg tablet Take 1 tablet (20 mg total) by mouth daily (Patient taking differently: Take 20 mg by mouth 3 (three) times a week  ) 45 tablet 3    Levothyroxine Sodium 137 MCG CAPS Take 137 mcg by mouth daily        lisinopril (ZESTRIL) 20 mg tablet Take 1 tablet (20 mg total) by mouth 2 (two) times a day 180 tablet 3    LORazepam (ATIVAN) 0 5 mg tablet Take 0 5 mg by mouth daily as needed for anxiety   metFORMIN (GLUCOPHAGE) 500 mg tablet Take 500 mg by mouth 2 (two) times a day with meals        multivitamin (THERAGRAN) TABS Take 1 tablet by mouth daily      nortriptyline (PAMELOR) 75 MG capsule Take 75 mg by mouth daily at bedtime      Omega-3 Fatty Acids (FISH OIL PO) Take 1 g by mouth      potassium chloride (K-DUR,KLOR-CON) 10 mEq tablet Take 1 tablet (10 mEq total) by mouth daily (Patient taking differently: Take 10 mEq by mouth 3 (three) times a week  ) 45 tablet 3     No current facility-administered medications on file prior to visit  Allergies  Allergies   Allergen Reactions    Meloxicam GI Intolerance    Morphine GI Intolerance    Norvasc [Amlodipine] Edema    Penicillins     Percocet [Oxycodone-Acetaminophen]     Sitagliptin      SOB       Review of Systems    Objective  Vitals:    07/10/18 0841   BP: 158/74   Temp: 97 5 °F (36 4 °C)       Physical Exam   Abdomen:  Midline incision well healed  Soft nontender appears to be mostly defect in the midline  Standing up feels more like go hernia along most of the midline incision

## 2018-07-10 NOTE — ASSESSMENT & PLAN NOTE
I told her it looks like she has an    incisional hernia  I told her I do not think this is related to her nausea  At this point time, she is not interested in any surgery and I think that is reasonable  We will try an abdominal binder to see if that helps her  She is asked come back sooner if she has any further problems  Otherwise see her for routine breast follow-up

## 2018-07-10 NOTE — LETTER
July 10, 2018     Fercho Valdez, DO  [address] [address]    Patient: Kenia Woods   YOB: 1940   Date of Visit: 7/10/2018       Dear Dr Charu Galeano: Thank you for referring Frances Woodard to me for evaluation  Below are my notes for this consultation  If you have questions, please do not hesitate to call me  I look forward to following your patient along with you  Sincerely,        Caridad Smith MD        CC: No Recipients  Caridad Smith MD  7/10/2018  9:17 AM  Sign at close encounter  Office Visit - General Surgery  Kenia Woods MRN: [id number]  Encounter: [id number]    Assessment and Plan    Problem List Items Addressed This Visit        Other    Incisional hernia, without obstruction or gangrene - Primary     I told her it looks like she has an    incisional hernia  I told her I do not think this is related to her nausea  At this point time, she is not interested in any surgery and I think that is reasonable  We will try an abdominal binder to see if that helps her  She is asked come back sooner if she has any further problems  Otherwise see her for routine breast follow-up  Relevant Orders    Abdominal binder          Chief Complaint:  Kenia Woods is a [de-identified] y o  female who presents for Post-op (exploratory lap in 2-4-18 c/o bloating and nausea)    Subjective  53-year-old female status post laparotomy for bowel obstruction  She has noticed that her abdomen is getting somewhat swift  She is eating well and her bowels are moving satisfactorily    Her only complaint is nausea    Past Medical History  Past Medical History:   Diagnosis Date    Anxiety     Atrial fibrillation (HCC)     Bowel obstruction (HCC)     Cancer (HCC)     LEFT BREAST CA 22 YEARS AGO     Depression     Diabetes mellitus ([facility] [address] )     Hypertension     Hypothyroidism        Past Surgical History  Past Surgical History: Procedure Laterality Date    ABDOMINAL ADHESION SURGERY N/A 2/4/2018    Procedure: LYSIS ADHESIONS;  Surgeon: Warner Honeycutt DO;  [facility];  Service: General    BREAST SURGERY      CHOLECYSTECTOMY      EXPLORATORY LAPAROTOMY      JOINT REPLACEMENT      LEFT KNEE REPLACEMENT     LAPAROTOMY N/A 2/4/2018    Procedure: LAPAROTOMY EXPLORATORY,;  Surgeon: Warner Honeycutt DO;  [facility];  Service: General    MASTECTOMY      CT BIOPSY/EXCISION, LYMPH NODE(S) Right 1/13/2017    Procedure: SENTINEL LYMPH NODE BIOPSY RIGHT AXILLA;   Surgeon: Ulysess Ormond, MD;  [facility];  Service: General    CT MASTECTOMY, SIMPLE, COMPLETE Right 1/13/2017    Procedure: MASTECTOMY SIMPLE;  Surgeon: Ulysess Ormond, MD;  [facility];  Service: General    SMALL INTESTINE SURGERY N/A 2/4/2018    Procedure: RESECTION SMALL BOWEL;  Surgeon: Warner Honeycutt DO;  [facility];  Service: General       Family History  Family History   Problem Relation Age of Onset    Cancer Mother        Medications  Current Outpatient Prescriptions on File Prior to Visit   Medication Sig Dispense Refill    amLODIPine (NORVASC) 5 mg tablet Take 1 tablet (5 mg total) by mouth daily 30 tablet 3    apixaban (ELIQUIS) 5 mg Take 1 tablet (5 mg total) by mouth 2 (two) times a day 60 tablet 3    ascorbic acid (VITAMIN C) 500 mg tablet Take 500 mg by mouth daily      carvedilol (COREG) 12 5 mg tablet TAKE 1 TABLET (12 5 MG TOTAL) BY MOUTH 2 (TWO) TIMES A DAY WITH MEALS 60 tablet 1    cholecalciferol (VITAMIN D3) 1,000 units tablet Take 1,000 Units by mouth daily      cloNIDine (CATAPRES) 0 1 mg tablet Take 1 tablet (0 1 mg total) by mouth once for 1 dose (Patient taking differently: Take 0 1 mg by mouth every 12 (twelve) hours  ) 30 tablet 3    furosemide (LASIX) 20 mg tablet Take 1 tablet (20 mg total) by mouth daily (Patient taking differently: Take 20 mg by mouth 3 (three) times a week  ) 45 tablet 3    Levothyroxine Sodium 137 MCG CAPS Take 137 mcg by mouth daily        lisinopril (ZESTRIL) 20 mg tablet Take 1 tablet (20 mg total) by mouth 2 (two) times a day 180 tablet 3    LORazepam (ATIVAN) 0 5 mg tablet Take 0 5 mg by mouth daily as needed for anxiety   metFORMIN (GLUCOPHAGE) 500 mg tablet Take 500 mg by mouth 2 (two) times a day with meals   multivitamin (THERAGRAN) TABS Take 1 tablet by mouth daily      nortriptyline (PAMELOR) 75 MG capsule Take 75 mg by mouth daily at bedtime      Omega-3 Fatty Acids (FISH OIL PO) Take 1 g by mouth      potassium chloride (K-DUR,KLOR-CON) 10 mEq tablet Take 1 tablet (10 mEq total) by mouth daily (Patient taking differently: Take 10 mEq by mouth 3 (three) times a week  ) 45 tablet 3     No current facility-administered medications on file prior to visit  Allergies  Allergies   Allergen Reactions    Meloxicam GI Intolerance    Morphine GI Intolerance    Norvasc [Amlodipine] Edema    Penicillins     Percocet [Oxycodone-Acetaminophen]     Sitagliptin      SOB       Review of Systems    Objective  Vitals:    07/10/18 0841   BP: 158/74   Temp: 97 5 °F (36 4 °C)       Physical Exam   Abdomen:  Midline incision well healed  Soft nontender appears to be mostly defect in the midline  Standing up feels more like go hernia along most of the midline incision

## 2018-07-23 ENCOUNTER — TELEPHONE (OUTPATIENT)
Dept: CARDIOLOGY CLINIC | Facility: CLINIC | Age: 78
End: 2018-07-23

## 2018-07-31 DIAGNOSIS — I48.91 TRANSIENT ATRIAL FIBRILLATION (HCC): ICD-10-CM

## 2018-07-31 RX ORDER — CARVEDILOL 12.5 MG/1
12.5 TABLET ORAL 2 TIMES DAILY WITH MEALS
Qty: 60 TABLET | Refills: 1 | Status: SHIPPED | OUTPATIENT
Start: 2018-07-31 | End: 2018-09-28 | Stop reason: SDUPTHER

## 2018-08-05 DIAGNOSIS — I10 HYPERTENSION, UNSPECIFIED TYPE: ICD-10-CM

## 2018-08-08 RX ORDER — CLONIDINE HYDROCHLORIDE 0.1 MG/1
TABLET ORAL
Qty: 60 TABLET | Refills: 3 | Status: SHIPPED | OUTPATIENT
Start: 2018-08-08 | End: 2019-01-15 | Stop reason: SDUPTHER

## 2018-08-16 ENCOUNTER — TELEPHONE (OUTPATIENT)
Dept: SURGERY | Facility: CLINIC | Age: 78
End: 2018-08-16

## 2018-08-27 DIAGNOSIS — Z90.13 S/P MASTECTOMY, BILATERAL: Primary | ICD-10-CM

## 2018-08-27 NOTE — TELEPHONE ENCOUNTER
Dr Pratik Hernandez entered the order    Called patient and LMOM that order will be mailed to her home per her original request

## 2018-09-26 ENCOUNTER — OFFICE VISIT (OUTPATIENT)
Dept: SURGERY | Facility: CLINIC | Age: 78
End: 2018-09-26
Payer: MEDICARE

## 2018-09-26 VITALS
HEIGHT: 65 IN | BODY MASS INDEX: 23.43 KG/M2 | SYSTOLIC BLOOD PRESSURE: 156 MMHG | TEMPERATURE: 97.5 F | DIASTOLIC BLOOD PRESSURE: 72 MMHG | WEIGHT: 140.6 LBS

## 2018-09-26 DIAGNOSIS — C50.111 MALIGNANT NEOPLASM OF CENTRAL PORTION OF RIGHT FEMALE BREAST, UNSPECIFIED ESTROGEN RECEPTOR STATUS (HCC): Primary | ICD-10-CM

## 2018-09-26 PROCEDURE — 99213 OFFICE O/P EST LOW 20 MIN: CPT | Performed by: SURGERY

## 2018-09-26 NOTE — PROGRESS NOTES
Office Visit - General Surgery  Patricia Mina MRN: [id number]  Encounter: [id number]    Assessment and Plan    Problem List Items Addressed This Visit        Other    Malignant neoplasm of central portion of right female breast [facility]) - Primary     Doing well with no evidence of disease  Will continue surveillance  Follow-up in 6 months time  Chief Complaint:  Patricia Mina is a [de-identified] y o  female who presents for Breast Cancer (6 month breast check)    Subjective  68-year-old female comes in follow-up breast cancer  It is now status post bilateral mastectomies  Had a little discomfort laterally on the right side  Past Medical History  Past Medical History:   Diagnosis Date    Anxiety     Atrial fibrillation (HCC)     Bowel obstruction (HCC)     Cancer ([facility] [address] )     LEFT BREAST CA 22 YEARS AGO     Depression     Diabetes mellitus ([facility] [address] )     Hypertension     Hypothyroidism        Past Surgical History  Past Surgical History:   Procedure Laterality Date    ABDOMINAL ADHESION SURGERY N/A 2/4/2018    Procedure: LYSIS ADHESIONS;  Surgeon: Carolee Kimble DO;  [facility];  Service: General    BREAST SURGERY      CHOLECYSTECTOMY      EXPLORATORY LAPAROTOMY      JOINT REPLACEMENT      LEFT KNEE REPLACEMENT     LAPAROTOMY N/A 2/4/2018    Procedure: LAPAROTOMY EXPLORATORY,;  Surgeon: Carolee Kimble DO;  [facility];  Service: General    MASTECTOMY      KS BIOPSY/EXCISION, LYMPH NODE(S) Right 1/13/2017    Procedure: SENTINEL LYMPH NODE BIOPSY RIGHT AXILLA;   Surgeon: Haider Wilson MD;  [facility];  Service: General    KS MASTECTOMY, SIMPLE, COMPLETE Right 1/13/2017    Procedure: MASTECTOMY SIMPLE;  Surgeon: Haider Wilson MD;  [facility];  Service: General    SMALL INTESTINE SURGERY N/A 2/4/2018    Procedure: RESECTION SMALL BOWEL;  Surgeon: Carolee Kimble DO;  [facility];  Service: General       Family History  Family History   Problem Relation Age of Onset    Cancer Mother        Medications  Current Outpatient Prescriptions on File Prior to Visit   Medication Sig Dispense Refill    amLODIPine (NORVASC) 5 mg tablet Take 1 tablet (5 mg total) by mouth daily 30 tablet 3    apixaban (ELIQUIS) 5 mg Take 1 tablet (5 mg total) by mouth 2 (two) times a day 60 tablet 3    ascorbic acid (VITAMIN C) 500 mg tablet Take 500 mg by mouth daily      carvedilol (COREG) 12 5 mg tablet TAKE 1 TABLET (12 5 MG TOTAL) BY MOUTH 2 (TWO) TIMES A DAY WITH MEALS 60 tablet 1    cholecalciferol (VITAMIN D3) 1,000 units tablet Take 1,000 Units by mouth daily      cloNIDine (CATAPRES) 0 1 mg tablet TAKE 1 TABLET TWICE A DAY 60 tablet 3    Levothyroxine Sodium 137 MCG CAPS Take 137 mcg by mouth daily        lisinopril (ZESTRIL) 20 mg tablet Take 1 tablet (20 mg total) by mouth 2 (two) times a day 180 tablet 3    LORazepam (ATIVAN) 0 5 mg tablet Take 0 5 mg by mouth daily as needed for anxiety   metFORMIN (GLUCOPHAGE) 500 mg tablet Take 500 mg by mouth 2 (two) times a day with meals   multivitamin (THERAGRAN) TABS Take 1 tablet by mouth daily      nortriptyline (PAMELOR) 75 MG capsule Take 75 mg by mouth daily at bedtime      Omega-3 Fatty Acids (FISH OIL PO) Take 1 g by mouth      furosemide (LASIX) 20 mg tablet Take 1 tablet (20 mg total) by mouth daily (Patient not taking: Reported on 9/26/2018 ) 45 tablet 3    potassium chloride (K-DUR,KLOR-CON) 10 mEq tablet Take 1 tablet (10 mEq total) by mouth daily (Patient not taking: Reported on 9/26/2018 ) 45 tablet 3     No current facility-administered medications on file prior to visit  Allergies  Allergies   Allergen Reactions    Meloxicam GI Intolerance    Morphine GI Intolerance    Norvasc [Amlodipine] Edema    Penicillins     Percocet [Oxycodone-Acetaminophen]     Sitagliptin      SOB       Review of Systems   Constitutional: Negative for activity change, chills and fever     HENT: Negative for trouble swallowing and voice change  Eyes: Negative for pain and visual disturbance  Respiratory: Negative for cough and shortness of breath  Cardiovascular: Negative for chest pain and leg swelling  Gastrointestinal: Positive for nausea  Negative for abdominal pain and vomiting  Endocrine: Negative for cold intolerance, heat intolerance, polydipsia, polyphagia and polyuria  Genitourinary: Negative for difficulty urinating and flank pain  Musculoskeletal: Negative for arthralgias and gait problem  Skin: Negative for rash and wound  Allergic/Immunologic: Negative for food allergies  Neurological: Negative for seizures, syncope, weakness and headaches  Hematological: Negative for adenopathy  Psychiatric/Behavioral: Negative for confusion  All other systems reviewed and are negative  Objective  Vitals:    09/26/18 1036   BP: 156/72   Temp: 97 5 °F (36 4 °C)       Physical Exam   Constitutional: She is oriented to person, place, and time  Older white female alert and oriented   HENT:   Head: Normocephalic and atraumatic  Right Ear: External ear normal    Left Ear: External ear normal    Eyes: Conjunctivae are normal  No scleral icterus  Neck: Neck supple  No tracheal deviation present  No thyromegaly present  Cardiovascular: Normal rate, regular rhythm and normal heart sounds  Exam reveals no friction rub  No murmur heard  Pulmonary/Chest: Effort normal and breath sounds normal  No respiratory distress  She has no wheezes  She has no rales  Bilateral chest wall incisions well healed  There is no nodularity anywhere in the chest wall  There is no axillary adenopathy  Abdominal: Soft  She exhibits no mass  There is no tenderness  There is no rebound and no guarding  Midline incision well healed with a hernia at the umbilical region easily reducible nontender   Musculoskeletal: Normal range of motion  She exhibits no edema     Lymphadenopathy:     She has no cervical adenopathy  Neurological: She is alert and oriented to person, place, and time  Skin: Skin is warm and dry  Psychiatric: She has a normal mood and affect  Her behavior is normal  Judgment and thought content normal    Nursing note and vitals reviewed

## 2018-09-26 NOTE — LETTER
September 26, 2018     Destiney Torrez DO  [address], [place] [id number]    Patient: Patricia Mina   YOB: 1940   Date of Visit: 9/26/2018       Dear Dr Sam Diggs: Thank you for referring Mildred Montes to me for evaluation  Below are my notes for this consultation  If you have questions, please do not hesitate to call me  I look forward to following your patient along with you  Sincerely,        Haider Wilson MD        CC: No Recipients  Haider Wilson MD  9/26/2018 11:04 AM  Sign at close encounter  Office Visit - General Surgery  Patricia Mina MRN: [id number]  Encounter: [id number]    Assessment and Plan    Problem List Items Addressed This Visit        Other    Malignant neoplasm of central portion of right female breast [facility]) - Primary     Doing well with no evidence of disease  Will continue surveillance  Follow-up in 6 months time  Chief Complaint:  Patricia Mina is a [de-identified] y o  female who presents for Breast Cancer (6 month breast check)    Subjective  64-year-old female comes in follow-up breast cancer  It is now status post bilateral mastectomies  Had a little discomfort laterally on the right side      Past Medical History  Past Medical History:   Diagnosis Date    Anxiety     Atrial fibrillation ([address] )     Bowel obstruction ([address] )     Cancer ([address] )     LEFT BREAST CA 22 YEARS AGO     Depression     Diabetes mellitus ([facility] [address] )     Hypertension     Hypothyroidism        Past Surgical History  Past Surgical History:   Procedure Laterality Date    ABDOMINAL ADHESION SURGERY N/A 2/4/2018    Procedure: LYSIS ADHESIONS;  Surgeon: Carolee Kimble DO;  [facility];  Service: General    BREAST SURGERY      CHOLECYSTECTOMY      EXPLORATORY LAPAROTOMY      JOINT REPLACEMENT      LEFT KNEE REPLACEMENT     LAPAROTOMY N/A 2/4/2018    Procedure: LAPAROTOMY EXPLORATORY,;  Surgeon: Carolee Kimble DO; [facility];  Service: General    MASTECTOMY      ND BIOPSY/EXCISION, LYMPH NODE(S) Right 1/13/2017    Procedure: SENTINEL LYMPH NODE BIOPSY RIGHT AXILLA; Surgeon: Jaycob Liu MD;  [facility];  Service: General    ND MASTECTOMY, SIMPLE, COMPLETE Right 1/13/2017    Procedure: MASTECTOMY SIMPLE;  Surgeon: Jaycob Liu MD;  [facility];  Service: General    SMALL INTESTINE SURGERY N/A 2/4/2018    Procedure: RESECTION SMALL BOWEL;  Surgeon: Juan Pablo Flynn DO;  [facility];  Service: General       Family History  Family History   Problem Relation Age of Onset    Cancer Mother        Medications  Current Outpatient Prescriptions on File Prior to Visit   Medication Sig Dispense Refill    amLODIPine (NORVASC) 5 mg tablet Take 1 tablet (5 mg total) by mouth daily 30 tablet 3    apixaban (ELIQUIS) 5 mg Take 1 tablet (5 mg total) by mouth 2 (two) times a day 60 tablet 3    ascorbic acid (VITAMIN C) 500 mg tablet Take 500 mg by mouth daily      carvedilol (COREG) 12 5 mg tablet TAKE 1 TABLET (12 5 MG TOTAL) BY MOUTH 2 (TWO) TIMES A DAY WITH MEALS 60 tablet 1    cholecalciferol (VITAMIN D3) 1,000 units tablet Take 1,000 Units by mouth daily      cloNIDine (CATAPRES) 0 1 mg tablet TAKE 1 TABLET TWICE A DAY 60 tablet 3    Levothyroxine Sodium 137 MCG CAPS Take 137 mcg by mouth daily        lisinopril (ZESTRIL) 20 mg tablet Take 1 tablet (20 mg total) by mouth 2 (two) times a day 180 tablet 3    LORazepam (ATIVAN) 0 5 mg tablet Take 0 5 mg by mouth daily as needed for anxiety   metFORMIN (GLUCOPHAGE) 500 mg tablet Take 500 mg by mouth 2 (two) times a day with meals        multivitamin (THERAGRAN) TABS Take 1 tablet by mouth daily      nortriptyline (PAMELOR) 75 MG capsule Take 75 mg by mouth daily at bedtime      Omega-3 Fatty Acids (FISH OIL PO) Take 1 g by mouth      furosemide (LASIX) 20 mg tablet Take 1 tablet (20 mg total) by mouth daily (Patient not taking: Reported on 9/26/2018 ) 45 tablet 3    potassium chloride (K-DUR,KLOR-CON) 10 mEq tablet Take 1 tablet (10 mEq total) by mouth daily (Patient not taking: Reported on 9/26/2018 ) 45 tablet 3     No current facility-administered medications on file prior to visit  Allergies  Allergies   Allergen Reactions    Meloxicam GI Intolerance    Morphine GI Intolerance    Norvasc [Amlodipine] Edema    Penicillins     Percocet [Oxycodone-Acetaminophen]     Sitagliptin      SOB       Review of Systems   Constitutional: Negative for activity change, chills and fever  HENT: Negative for trouble swallowing and voice change  Eyes: Negative for pain and visual disturbance  Respiratory: Negative for cough and shortness of breath  Cardiovascular: Negative for chest pain and leg swelling  Gastrointestinal: Positive for nausea  Negative for abdominal pain and vomiting  Endocrine: Negative for cold intolerance, heat intolerance, polydipsia, polyphagia and polyuria  Genitourinary: Negative for difficulty urinating and flank pain  Musculoskeletal: Negative for arthralgias and gait problem  Skin: Negative for rash and wound  Allergic/Immunologic: Negative for food allergies  Neurological: Negative for seizures, syncope, weakness and headaches  Hematological: Negative for adenopathy  Psychiatric/Behavioral: Negative for confusion  All other systems reviewed and are negative  Objective  Vitals:    09/26/18 1036   BP: 156/72   Temp: 97 5 °F (36 4 °C)       Physical Exam   Constitutional: She is oriented to person, place, and time  Older white female alert and oriented   HENT:   Head: Normocephalic and atraumatic  Right Ear: External ear normal    Left Ear: External ear normal    Eyes: Conjunctivae are normal  No scleral icterus  Neck: Neck supple  No tracheal deviation present  No thyromegaly present  Cardiovascular: Normal rate, regular rhythm and normal heart sounds    Exam reveals no friction rub  No murmur heard  Pulmonary/Chest: Effort normal and breath sounds normal  No respiratory distress  She has no wheezes  She has no rales  Bilateral chest wall incisions well healed  There is no nodularity anywhere in the chest wall  There is no axillary adenopathy  Abdominal: Soft  She exhibits no mass  There is no tenderness  There is no rebound and no guarding  Midline incision well healed with a hernia at the umbilical region easily reducible nontender   Musculoskeletal: Normal range of motion  She exhibits no edema  Lymphadenopathy:     She has no cervical adenopathy  Neurological: She is alert and oriented to person, place, and time  Skin: Skin is warm and dry  Psychiatric: She has a normal mood and affect  Her behavior is normal  Judgment and thought content normal    Nursing note and vitals reviewed

## 2018-09-27 DIAGNOSIS — I48.91 ATRIAL FIBRILLATION, UNSPECIFIED TYPE (HCC): ICD-10-CM

## 2018-09-27 RX ORDER — APIXABAN 5 MG/1
TABLET, FILM COATED ORAL
Qty: 60 TABLET | Refills: 3 | Status: SHIPPED | OUTPATIENT
Start: 2018-09-27 | End: 2019-01-19 | Stop reason: SDUPTHER

## 2018-09-28 DIAGNOSIS — I48.91 TRANSIENT ATRIAL FIBRILLATION (HCC): ICD-10-CM

## 2018-09-28 RX ORDER — CARVEDILOL 12.5 MG/1
12.5 TABLET ORAL 2 TIMES DAILY WITH MEALS
Qty: 60 TABLET | Refills: 1 | Status: SHIPPED | OUTPATIENT
Start: 2018-09-28 | End: 2018-11-22 | Stop reason: SDUPTHER

## 2018-10-02 ENCOUNTER — OFFICE VISIT (OUTPATIENT)
Dept: CARDIOLOGY CLINIC | Facility: CLINIC | Age: 78
End: 2018-10-02
Payer: MEDICARE

## 2018-10-02 VITALS
SYSTOLIC BLOOD PRESSURE: 124 MMHG | WEIGHT: 144.2 LBS | HEIGHT: 66 IN | BODY MASS INDEX: 23.18 KG/M2 | HEART RATE: 63 BPM | DIASTOLIC BLOOD PRESSURE: 60 MMHG

## 2018-10-02 DIAGNOSIS — R00.2 HEART PALPITATIONS: ICD-10-CM

## 2018-10-02 DIAGNOSIS — R26.2 AMBULATORY DYSFUNCTION: ICD-10-CM

## 2018-10-02 DIAGNOSIS — I48.91 TRANSIENT ATRIAL FIBRILLATION (HCC): ICD-10-CM

## 2018-10-02 DIAGNOSIS — I10 HYPERTENSION, UNSPECIFIED TYPE: Primary | ICD-10-CM

## 2018-10-02 DIAGNOSIS — I10 HYPERTENSION, ESSENTIAL, BENIGN: Chronic | ICD-10-CM

## 2018-10-02 PROCEDURE — 93000 ELECTROCARDIOGRAM COMPLETE: CPT | Performed by: INTERNAL MEDICINE

## 2018-10-02 PROCEDURE — 99214 OFFICE O/P EST MOD 30 MIN: CPT | Performed by: INTERNAL MEDICINE

## 2018-10-02 NOTE — PROGRESS NOTES
Cardiology Follow Up    Jenniffer Medina  1940  [id number]  [address] [facility] [address]    1  Hypertension, unspecified type  POCT ECG   2  Hypertension, essential, benign     3  Transient atrial fibrillation ([address] )     4  Ambulatory dysfunction     5  Heart palpitations         Discussion/Summary: Overall doing much better following our last visit  She is tolerating anticoagulation well with no adverse bleeding events  She is currently maintaining sinus rhythm  Blood pressure overall is been much better controlled  Will continue with current medical regimen no change  I encouraged her to continue with diet and exercise  She needs to follow a low-sodium diet  She has new anemia that needs to be followed up on  Interval History:  78-year-old female with paroxysmal atrial fibrillation, diabetes, hypertension, hyperlipidemia presents to discuss anticoagulation  I had recommended Coumadin however she is fearful of the medicine and wants talk about other options  She is resting comfortably with no complaints  There has been no chest pain or shortness of breath  Mild palpitations are minimal   Blood pressure has been hard to control recent changes in medication seems to be somewhat confusing for the patient  She is here with her daughter to discuss things  She has been doing well since her last visit  Tolerating anticoagulation with no adverse bleeding events  She continues to have some chronic nausea  There is some mild generalized fatigue  She was found to be newly anemic  Will need close follow-up  She denies any chest pain or pressure, palpitations, lightheadedness, dizziness, or syncope but has no lower extremity edema, PND, orthopnea      Problem List     Heart palpitations    Nicotine abuse    Transient atrial fibrillation (HCC)    Diabetes mellitus type 2 in nonobese (HCC) (Chronic)    Hypertension, essential, benign (Chronic)    Acquired hypothyroidism (Chronic)    Malignant neoplasm of central portion of right female breast ([facility] [address] )    Acute kidney injury ([facility] )    Delirium    Physical deconditioning    Ambulatory dysfunction    Hx of fall    Anxiety    Postop check    Community acquired pneumonia of right middle lobe of lung ([facility] )        Past Medical History:   Diagnosis Date    Anxiety     Atrial fibrillation (HCC)     Bowel obstruction (HCC)     Cancer ([facility] )     LEFT BREAST CA 22 YEARS AGO     Depression     Diabetes mellitus ([place] [address] )     Hypertension     Hypothyroidism      Social History     Social History    Marital status:      Spouse name: N/A    Number of children: N/A    Years of education: N/A     Occupational History    Not on file  Social History Main Topics    Smoking status: Former Smoker     Packs/day: 1 00     Types: Cigarettes     Quit date: 02/2018    Smokeless tobacco: Never Used    Alcohol use No    Drug use: No    Sexual activity: Not Currently     Other Topics Concern    Not on file     Social History Narrative    No narrative on file      Family History   Problem Relation Age of Onset    Cancer Mother      Past Surgical History:   Procedure Laterality Date    ABDOMINAL ADHESION SURGERY N/A 2/4/2018    Procedure: LYSIS ADHESIONS;  Surgeon: Tita Hawkins DO;  [facility];  Service: General    BREAST SURGERY      CHOLECYSTECTOMY      EXPLORATORY LAPAROTOMY      JOINT REPLACEMENT      LEFT KNEE REPLACEMENT     LAPAROTOMY N/A 2/4/2018    Procedure: LAPAROTOMY EXPLORATORY,;  Surgeon: Tita Hawkins DO;  [facility];  Service: General    MASTECTOMY      LA BIOPSY/EXCISION, LYMPH NODE(S) Right 1/13/2017    Procedure: SENTINEL LYMPH NODE BIOPSY RIGHT AXILLA;   Surgeon: Trey Craig MD;  [facility];  Service: General    LA MASTECTOMY, SIMPLE, COMPLETE Right 1/13/2017    Procedure: MASTECTOMY SIMPLE;  Surgeon: Tootie Guajardo MD;  [facility];  Service: General    SMALL INTESTINE SURGERY N/A 2/4/2018    Procedure: RESECTION SMALL BOWEL;  Surgeon: Malissa Reynolds DO;  [facility];  Service: General       Current Outpatient Prescriptions:     amLODIPine (NORVASC) 5 mg tablet, Take 1 tablet (5 mg total) by mouth daily, Disp: 30 tablet, Rfl: 3    ascorbic acid (VITAMIN C) 500 mg tablet, Take 500 mg by mouth daily, Disp: , Rfl:     carvedilol (COREG) 12 5 mg tablet, TAKE 1 TABLET (12 5 MG TOTAL) BY MOUTH 2 (TWO) TIMES A DAY WITH MEALS, Disp: 60 tablet, Rfl: 1    cholecalciferol (VITAMIN D3) 1,000 units tablet, Take 1,000 Units by mouth daily, Disp: , Rfl:     cloNIDine (CATAPRES) 0 1 mg tablet, TAKE 1 TABLET TWICE A DAY, Disp: 60 tablet, Rfl: 3    ELIQUIS 5 MG, TAKE 1 TABLET BY MOUTH TWICE A DAY, Disp: 60 tablet, Rfl: 3    furosemide (LASIX) 20 mg tablet, Take 1 tablet (20 mg total) by mouth daily (Patient not taking: Reported on 9/26/2018 ), Disp: 45 tablet, Rfl: 3    Levothyroxine Sodium 137 MCG CAPS, Take 137 mcg by mouth daily  , Disp: , Rfl:     lisinopril (ZESTRIL) 20 mg tablet, Take 1 tablet (20 mg total) by mouth 2 (two) times a day, Disp: 180 tablet, Rfl: 3    LORazepam (ATIVAN) 0 5 mg tablet, Take 0 5 mg by mouth daily as needed for anxiety  , Disp: , Rfl:     metFORMIN (GLUCOPHAGE) 500 mg tablet, Take 500 mg by mouth 2 (two) times a day with meals  , Disp: , Rfl:     multivitamin (THERAGRAN) TABS, Take 1 tablet by mouth daily, Disp: , Rfl:     nortriptyline (PAMELOR) 75 MG capsule, Take 75 mg by mouth daily at bedtime, Disp: , Rfl:     Omega-3 Fatty Acids (FISH OIL PO), Take 1 g by mouth, Disp: , Rfl:     potassium chloride (K-DUR,KLOR-CON) 10 mEq tablet, Take 1 tablet (10 mEq total) by mouth daily (Patient not taking: Reported on 9/26/2018 ), Disp: 45 tablet, Rfl: 3  Allergies   Allergen Reactions    Meloxicam GI Intolerance    Morphine GI Intolerance  Norvasc [Amlodipine] Edema    Penicillins     Percocet [Oxycodone-Acetaminophen]     Sitagliptin      SOB       Labs:     Chemistry        Component Value Date/Time     04/20/2018 0530     09/29/2015 1034    K 3 9 04/20/2018 0530    K 4 1 09/29/2015 1034     04/20/2018 0530     09/29/2015 1034    CO2 27 04/20/2018 0530    CO2 28 09/29/2015 1034    BUN 17 04/20/2018 0530    BUN 14 09/29/2015 1034    CREATININE 0 90 04/20/2018 0530    CREATININE 0 89 09/29/2015 1034        Component Value Date/Time    CALCIUM 9 3 04/20/2018 0530    CALCIUM 9 2 09/29/2015 1034    ALKPHOS 90 04/17/2018 1633    AST 17 04/17/2018 1633    ALT 16 04/17/2018 1633            No results found for: CHOL  Lab Results   Component Value Date    HDL 45 07/12/2016     Lab Results   Component Value Date    LDLCALC 85 07/12/2016     Lab Results   Component Value Date    TRIG 71 02/06/2018    TRIG 81 07/12/2016     No components found for: CHOLHDL    Imaging: No results found  ECG:      Normal sinus rhythm      Review of Systems   Constitution: Negative  HENT: Negative  Eyes: Negative  Cardiovascular: Negative  Respiratory: Negative  Endocrine: Negative  Hematologic/Lymphatic: Negative  Skin: Negative  Musculoskeletal: Negative  Gastrointestinal: Negative  Genitourinary: Negative  Neurological: Negative  Psychiatric/Behavioral: Negative  Vitals:    10/02/18 0944   BP: 124/60   Pulse: 63     Vitals:    10/02/18 0944   Weight: 65 4 kg (144 lb 3 2 oz)     Height: 5' 6" (167 6 cm)   Body mass index is 23 27 kg/m²      Physical Exam:   General appearance:  Appears stated age, alert, well appearing and in no distress  HEENT:  PERRLA, EOMI, no scleral icterus, no conjunctival pallor  NECK:  Supple, No elevated JVP, no thyromegaly, no carotid bruits  HEART:  Regular rate and rhythm, normal S1/S2, no S3/S4, no murmur or rub  LUNGS:  Clear to auscultation bilaterally, no wheezes rales or rhonchi  ABDOMEN:  Soft, non-tender, positive bowel sounds, no rebound or guarding, no organomegaly   EXTREMITIES:  No edema, normal range of motion  VASCULAR:  Normal pedal pulses, good pulse volume   SKIN: No lesions or rashes on exposed skin  NEURO:  CN II-XII intact, no focal deficits

## 2018-11-22 DIAGNOSIS — I48.91 TRANSIENT ATRIAL FIBRILLATION (HCC): ICD-10-CM

## 2018-11-26 RX ORDER — CARVEDILOL 12.5 MG/1
12.5 TABLET ORAL 2 TIMES DAILY WITH MEALS
Qty: 60 TABLET | Refills: 1 | Status: SHIPPED | OUTPATIENT
Start: 2018-11-26 | End: 2019-01-19 | Stop reason: SDUPTHER

## 2019-01-15 DIAGNOSIS — I10 HYPERTENSION, UNSPECIFIED TYPE: ICD-10-CM

## 2019-01-15 RX ORDER — CLONIDINE HYDROCHLORIDE 0.1 MG/1
TABLET ORAL
Qty: 60 TABLET | Refills: 3 | Status: SHIPPED | OUTPATIENT
Start: 2019-01-15 | End: 2019-05-07 | Stop reason: SDUPTHER

## 2019-01-19 DIAGNOSIS — I48.91 ATRIAL FIBRILLATION, UNSPECIFIED TYPE (HCC): ICD-10-CM

## 2019-01-19 DIAGNOSIS — I48.91 TRANSIENT ATRIAL FIBRILLATION (HCC): ICD-10-CM

## 2019-01-23 RX ORDER — APIXABAN 5 MG/1
TABLET, FILM COATED ORAL
Qty: 60 TABLET | Refills: 3 | Status: SHIPPED | OUTPATIENT
Start: 2019-01-23 | End: 2019-05-19 | Stop reason: SDUPTHER

## 2019-01-23 RX ORDER — CARVEDILOL 12.5 MG/1
12.5 TABLET ORAL 2 TIMES DAILY WITH MEALS
Qty: 60 TABLET | Refills: 1 | Status: SHIPPED | OUTPATIENT
Start: 2019-01-23 | End: 2019-03-21 | Stop reason: SDUPTHER

## 2019-03-04 ENCOUNTER — TRANSCRIBE ORDERS (OUTPATIENT)
Dept: ADMINISTRATIVE | Facility: HOSPITAL | Age: 79
End: 2019-03-04

## 2019-03-04 DIAGNOSIS — R63.4 WEIGHT LOSS: Primary | ICD-10-CM

## 2019-03-04 DIAGNOSIS — Z72.0 TOBACCO ABUSE: ICD-10-CM

## 2019-03-04 DIAGNOSIS — I10 HYPERTENSION, ACCELERATED: ICD-10-CM

## 2019-03-14 ENCOUNTER — HOSPITAL ENCOUNTER (OUTPATIENT)
Dept: RADIOLOGY | Facility: HOSPITAL | Age: 79
Discharge: HOME | End: 2019-03-14
Payer: MEDICARE

## 2019-03-14 DIAGNOSIS — Z72.0 TOBACCO ABUSE: ICD-10-CM

## 2019-03-14 DIAGNOSIS — R63.4 WEIGHT LOSS: ICD-10-CM

## 2019-03-14 PROCEDURE — 71260 CT THORAX DX C+: CPT

## 2019-03-14 PROCEDURE — 74177 CT ABD & PELVIS W/CONTRAST: CPT

## 2019-03-14 RX ADMIN — IOHEXOL 100 ML: 350 INJECTION, SOLUTION INTRAVENOUS at 11:02

## 2019-03-15 ENCOUNTER — HOSPITAL ENCOUNTER (OUTPATIENT)
Dept: NON INVASIVE DIAGNOSTICS | Facility: CLINIC | Age: 79
Discharge: HOME | End: 2019-03-15
Payer: MEDICARE

## 2019-03-15 DIAGNOSIS — I10 HYPERTENSION, ACCELERATED: ICD-10-CM

## 2019-03-15 PROCEDURE — 93975 VASCULAR STUDY: CPT | Performed by: RADIOLOGY

## 2019-03-15 PROCEDURE — 93975 VASCULAR STUDY: CPT

## 2019-03-19 ENCOUNTER — HOSPITAL ENCOUNTER (OUTPATIENT)
Dept: RADIOLOGY | Facility: HOSPITAL | Age: 79
Discharge: HOME | End: 2019-03-19
Payer: MEDICARE

## 2019-03-19 DIAGNOSIS — R63.4 WEIGHT LOSS: ICD-10-CM

## 2019-03-19 PROCEDURE — 77075 RADEX OSSEOUS SURVEY COMPL: CPT

## 2019-03-20 DIAGNOSIS — I10 HYPERTENSION, ESSENTIAL, BENIGN: Chronic | ICD-10-CM

## 2019-03-20 RX ORDER — AMLODIPINE BESYLATE 5 MG/1
TABLET ORAL
Qty: 30 TABLET | Refills: 3 | Status: SHIPPED | OUTPATIENT
Start: 2019-03-20 | End: 2019-06-17 | Stop reason: SDUPTHER

## 2019-03-21 DIAGNOSIS — I48.91 TRANSIENT ATRIAL FIBRILLATION (HCC): ICD-10-CM

## 2019-03-21 RX ORDER — CARVEDILOL 12.5 MG/1
12.5 TABLET ORAL 2 TIMES DAILY WITH MEALS
Qty: 60 TABLET | Refills: 1 | Status: SHIPPED | OUTPATIENT
Start: 2019-03-21 | End: 2019-05-19 | Stop reason: SDUPTHER

## 2019-04-29 ENCOUNTER — OFFICE VISIT (OUTPATIENT)
Dept: CARDIOLOGY CLINIC | Facility: CLINIC | Age: 79
End: 2019-04-29
Payer: MEDICARE

## 2019-04-29 VITALS
HEART RATE: 54 BPM | HEIGHT: 66 IN | DIASTOLIC BLOOD PRESSURE: 58 MMHG | SYSTOLIC BLOOD PRESSURE: 122 MMHG | BODY MASS INDEX: 21.5 KG/M2 | WEIGHT: 133.8 LBS

## 2019-04-29 DIAGNOSIS — R26.2 AMBULATORY DYSFUNCTION: ICD-10-CM

## 2019-04-29 DIAGNOSIS — I48.91 TRANSIENT ATRIAL FIBRILLATION (HCC): ICD-10-CM

## 2019-04-29 DIAGNOSIS — R00.2 HEART PALPITATIONS: ICD-10-CM

## 2019-04-29 DIAGNOSIS — Z91.81 HX OF FALL: ICD-10-CM

## 2019-04-29 DIAGNOSIS — I10 HYPERTENSION, ESSENTIAL, BENIGN: Primary | Chronic | ICD-10-CM

## 2019-04-29 PROCEDURE — 99214 OFFICE O/P EST MOD 30 MIN: CPT | Performed by: INTERNAL MEDICINE

## 2019-04-29 PROCEDURE — 93000 ELECTROCARDIOGRAM COMPLETE: CPT | Performed by: INTERNAL MEDICINE

## 2019-04-29 RX ORDER — CHLORTHALIDONE 25 MG/1
25 TABLET ORAL DAILY
Refills: 3 | Status: ON HOLD | COMMUNITY
Start: 2019-04-10 | End: 2019-07-21 | Stop reason: ALTCHOICE

## 2019-05-07 DIAGNOSIS — I10 HYPERTENSION, UNSPECIFIED TYPE: ICD-10-CM

## 2019-05-07 RX ORDER — CLONIDINE HYDROCHLORIDE 0.1 MG/1
0.1 TABLET ORAL DAILY
Qty: 60 TABLET | Refills: 11 | Status: ON HOLD | OUTPATIENT
Start: 2019-05-07 | End: 2019-07-21 | Stop reason: ALTCHOICE

## 2019-05-10 DIAGNOSIS — I10 ESSENTIAL HYPERTENSION: ICD-10-CM

## 2019-05-13 RX ORDER — LISINOPRIL 20 MG/1
20 TABLET ORAL 2 TIMES DAILY
Qty: 180 TABLET | Refills: 3 | Status: SHIPPED | OUTPATIENT
Start: 2019-05-13 | End: 2020-03-23

## 2019-05-19 DIAGNOSIS — I48.91 TRANSIENT ATRIAL FIBRILLATION (HCC): ICD-10-CM

## 2019-05-19 DIAGNOSIS — I48.91 ATRIAL FIBRILLATION, UNSPECIFIED TYPE (HCC): ICD-10-CM

## 2019-05-21 RX ORDER — APIXABAN 5 MG/1
TABLET, FILM COATED ORAL
Qty: 60 TABLET | Refills: 3 | Status: SHIPPED | OUTPATIENT
Start: 2019-05-21 | End: 2019-07-19

## 2019-05-21 RX ORDER — CARVEDILOL 12.5 MG/1
12.5 TABLET ORAL 2 TIMES DAILY WITH MEALS
Qty: 60 TABLET | Refills: 1 | Status: ON HOLD | OUTPATIENT
Start: 2019-05-21 | End: 2019-07-21 | Stop reason: ALTCHOICE

## 2019-06-03 ENCOUNTER — HOSPITAL ENCOUNTER (OUTPATIENT)
Facility: HOSPITAL | Age: 79
Setting detail: OBSERVATION
Discharge: HOME | End: 2019-06-04
Attending: SURGERY | Admitting: INTERNAL MEDICINE
Payer: MEDICARE

## 2019-06-03 ENCOUNTER — APPOINTMENT (EMERGENCY)
Dept: RADIOLOGY | Facility: HOSPITAL | Age: 79
End: 2019-06-03
Payer: MEDICARE

## 2019-06-03 DIAGNOSIS — I70.1 RENAL ARTERY STENOSIS, NATIVE (HCC): ICD-10-CM

## 2019-06-03 DIAGNOSIS — I15.0 RENOVASCULAR HYPERTENSION: Primary | ICD-10-CM

## 2019-06-03 PROBLEM — R55 SYNCOPE: Status: ACTIVE | Noted: 2019-06-03

## 2019-06-03 PROBLEM — R63.4 WEIGHT LOSS: Status: ACTIVE | Noted: 2019-06-03

## 2019-06-03 PROBLEM — N18.30 CKD (CHRONIC KIDNEY DISEASE) STAGE 3, GFR 30-59 ML/MIN (HCC): Status: ACTIVE | Noted: 2019-06-03

## 2019-06-03 PROBLEM — I48.0 PAROXYSMAL A-FIB (HCC): Status: ACTIVE | Noted: 2019-06-03

## 2019-06-03 PROBLEM — E11.9 DIABETES (HCC): Status: ACTIVE | Noted: 2019-06-03

## 2019-06-03 LAB
ANION GAP SERPL CALCULATED.3IONS-SCNC: 6 MMOL/L (ref 4–13)
APTT PPP: 37 SECONDS (ref 26–38)
ATRIAL RATE: 115 BPM
BACTERIA UR QL AUTO: ABNORMAL /HPF
BASOPHILS # BLD AUTO: 0.03 THOUSANDS/ΜL (ref 0–0.1)
BASOPHILS NFR BLD AUTO: 1 % (ref 0–1)
BILIRUB UR QL STRIP: NEGATIVE
BUN SERPL-MCNC: 19 MG/DL (ref 5–25)
CALCIUM SERPL-MCNC: 8.2 MG/DL (ref 8.3–10.1)
CHLORIDE SERPL-SCNC: 104 MMOL/L (ref 100–108)
CLARITY UR: CLEAR
CO2 SERPL-SCNC: 25 MMOL/L (ref 21–32)
COLOR UR: YELLOW
CREAT SERPL-MCNC: 1.07 MG/DL (ref 0.6–1.3)
EOSINOPHIL # BLD AUTO: 0.2 THOUSAND/ΜL (ref 0–0.61)
EOSINOPHIL NFR BLD AUTO: 3 % (ref 0–6)
ERYTHROCYTE [DISTWIDTH] IN BLOOD BY AUTOMATED COUNT: 14.2 % (ref 11.6–15.1)
GFR SERPL CREATININE-BSD FRML MDRD: 49 ML/MIN/1.73SQ M
GLUCOSE SERPL-MCNC: 127 MG/DL (ref 65–140)
GLUCOSE SERPL-MCNC: 203 MG/DL (ref 65–140)
GLUCOSE SERPL-MCNC: 96 MG/DL (ref 65–140)
GLUCOSE UR STRIP-MCNC: NEGATIVE MG/DL
HCT VFR BLD AUTO: 32.8 % (ref 34.8–46.1)
HGB BLD-MCNC: 11.1 G/DL (ref 11.5–15.4)
HGB UR QL STRIP.AUTO: NEGATIVE
HYALINE CASTS #/AREA URNS LPF: ABNORMAL /LPF
IMM GRANULOCYTES # BLD AUTO: 0.02 THOUSAND/UL (ref 0–0.2)
IMM GRANULOCYTES NFR BLD AUTO: 0 % (ref 0–2)
INR PPP: 1.41 (ref 0.86–1.17)
KETONES UR STRIP-MCNC: NEGATIVE MG/DL
LEUKOCYTE ESTERASE UR QL STRIP: NEGATIVE
LYMPHOCYTES # BLD AUTO: 1.18 THOUSANDS/ΜL (ref 0.6–4.47)
LYMPHOCYTES NFR BLD AUTO: 19 % (ref 14–44)
MCH RBC QN AUTO: 31.1 PG (ref 26.8–34.3)
MCHC RBC AUTO-ENTMCNC: 33.8 G/DL (ref 31.4–37.4)
MCV RBC AUTO: 92 FL (ref 82–98)
MONOCYTES # BLD AUTO: 0.62 THOUSAND/ΜL (ref 0.17–1.22)
MONOCYTES NFR BLD AUTO: 10 % (ref 4–12)
NEUTROPHILS # BLD AUTO: 4.28 THOUSANDS/ΜL (ref 1.85–7.62)
NEUTS SEG NFR BLD AUTO: 67 % (ref 43–75)
NITRITE UR QL STRIP: NEGATIVE
NON-SQ EPI CELLS URNS QL MICRO: ABNORMAL /HPF
NRBC BLD AUTO-RTO: 0 /100 WBCS
P AXIS: 78 DEGREES
PH UR STRIP.AUTO: 6.5 [PH]
PLATELET # BLD AUTO: 235 THOUSANDS/UL (ref 149–390)
PMV BLD AUTO: 10 FL (ref 8.9–12.7)
POTASSIUM SERPL-SCNC: 3.5 MMOL/L (ref 3.5–5.3)
PROT UR STRIP-MCNC: NEGATIVE MG/DL
PROTHROMBIN TIME: 17.4 SECONDS (ref 11.8–14.2)
QRS AXIS: 69 DEGREES
QRSD INTERVAL: 86 MS
QT INTERVAL: 376 MS
QTC INTERVAL: 503 MS
RBC # BLD AUTO: 3.57 MILLION/UL (ref 3.81–5.12)
RBC #/AREA URNS AUTO: ABNORMAL /HPF
SODIUM SERPL-SCNC: 135 MMOL/L (ref 136–145)
SP GR UR STRIP.AUTO: 1.01 (ref 1–1.03)
T WAVE AXIS: 73 DEGREES
TROPONIN I SERPL-MCNC: <0.02 NG/ML
TSH SERPL DL<=0.05 MIU/L-ACNC: 0.76 UIU/ML (ref 0.36–3.74)
UROBILINOGEN UR QL STRIP.AUTO: 1 E.U./DL
VENTRICULAR RATE: 108 BPM
WBC # BLD AUTO: 6.33 THOUSAND/UL (ref 4.31–10.16)
WBC #/AREA URNS AUTO: ABNORMAL /HPF

## 2019-06-03 PROCEDURE — 80048 BASIC METABOLIC PNL TOTAL CA: CPT | Performed by: SURGERY

## 2019-06-03 PROCEDURE — 82948 REAGENT STRIP/BLOOD GLUCOSE: CPT

## 2019-06-03 PROCEDURE — 1123F ACP DISCUSS/DSCN MKR DOCD: CPT | Performed by: SURGERY

## 2019-06-03 PROCEDURE — 82803 BLOOD GASES ANY COMBINATION: CPT

## 2019-06-03 PROCEDURE — 85610 PROTHROMBIN TIME: CPT | Performed by: INTERNAL MEDICINE

## 2019-06-03 PROCEDURE — 93010 ELECTROCARDIOGRAM REPORT: CPT | Performed by: INTERNAL MEDICINE

## 2019-06-03 PROCEDURE — 84295 ASSAY OF SERUM SODIUM: CPT

## 2019-06-03 PROCEDURE — 36415 COLL VENOUS BLD VENIPUNCTURE: CPT | Performed by: SURGERY

## 2019-06-03 PROCEDURE — 93005 ELECTROCARDIOGRAM TRACING: CPT

## 2019-06-03 PROCEDURE — 85025 COMPLETE CBC W/AUTO DIFF WBC: CPT | Performed by: SURGERY

## 2019-06-03 PROCEDURE — 99220 PR INITIAL OBSERVATION CARE/DAY 70 MINUTES: CPT | Performed by: INTERNAL MEDICINE

## 2019-06-03 PROCEDURE — 84443 ASSAY THYROID STIM HORMONE: CPT | Performed by: INTERNAL MEDICINE

## 2019-06-03 PROCEDURE — NC001 PR NO CHARGE: Performed by: EMERGENCY MEDICINE

## 2019-06-03 PROCEDURE — 71045 X-RAY EXAM CHEST 1 VIEW: CPT

## 2019-06-03 PROCEDURE — 72125 CT NECK SPINE W/O DYE: CPT

## 2019-06-03 PROCEDURE — 82947 ASSAY GLUCOSE BLOOD QUANT: CPT

## 2019-06-03 PROCEDURE — 99285 EMERGENCY DEPT VISIT HI MDM: CPT

## 2019-06-03 PROCEDURE — 70450 CT HEAD/BRAIN W/O DYE: CPT

## 2019-06-03 PROCEDURE — 85014 HEMATOCRIT: CPT

## 2019-06-03 PROCEDURE — 81001 URINALYSIS AUTO W/SCOPE: CPT | Performed by: INTERNAL MEDICINE

## 2019-06-03 PROCEDURE — 99283 EMERGENCY DEPT VISIT LOW MDM: CPT | Performed by: PHYSICIAN ASSISTANT

## 2019-06-03 PROCEDURE — 73552 X-RAY EXAM OF FEMUR 2/>: CPT

## 2019-06-03 PROCEDURE — 84484 ASSAY OF TROPONIN QUANT: CPT | Performed by: INTERNAL MEDICINE

## 2019-06-03 PROCEDURE — 82330 ASSAY OF CALCIUM: CPT

## 2019-06-03 PROCEDURE — 84132 ASSAY OF SERUM POTASSIUM: CPT

## 2019-06-03 PROCEDURE — 99214 OFFICE O/P EST MOD 30 MIN: CPT | Performed by: INTERNAL MEDICINE

## 2019-06-03 PROCEDURE — 85730 THROMBOPLASTIN TIME PARTIAL: CPT | Performed by: INTERNAL MEDICINE

## 2019-06-03 RX ORDER — PANTOPRAZOLE SODIUM 40 MG/1
40 TABLET, DELAYED RELEASE ORAL DAILY
Status: DISCONTINUED | OUTPATIENT
Start: 2019-06-04 | End: 2019-06-04 | Stop reason: HOSPADM

## 2019-06-03 RX ORDER — LEVOTHYROXINE SODIUM 0.1 MG/1
100 TABLET ORAL DAILY
COMMUNITY

## 2019-06-03 RX ORDER — CARVEDILOL 12.5 MG/1
12.5 TABLET ORAL 2 TIMES DAILY WITH MEALS
Status: DISCONTINUED | OUTPATIENT
Start: 2019-06-04 | End: 2019-06-04 | Stop reason: HOSPADM

## 2019-06-03 RX ORDER — CARVEDILOL 12.5 MG/1
12.5 TABLET ORAL 2 TIMES DAILY WITH MEALS
COMMUNITY
End: 2019-07-19

## 2019-06-03 RX ORDER — MELATONIN
2000 DAILY
COMMUNITY

## 2019-06-03 RX ORDER — LORAZEPAM 0.5 MG/1
0.5 TABLET ORAL 2 TIMES DAILY
COMMUNITY

## 2019-06-03 RX ORDER — LISINOPRIL 20 MG/1
20 TABLET ORAL EVERY 12 HOURS
Status: DISCONTINUED | OUTPATIENT
Start: 2019-06-04 | End: 2019-06-04 | Stop reason: HOSPADM

## 2019-06-03 RX ORDER — LISINOPRIL 20 MG/1
20 TABLET ORAL 2 TIMES DAILY
Status: DISCONTINUED | OUTPATIENT
Start: 2019-06-03 | End: 2019-06-03

## 2019-06-03 RX ORDER — CHLORTHALIDONE 25 MG/1
25 TABLET ORAL DAILY
Status: DISCONTINUED | OUTPATIENT
Start: 2019-06-03 | End: 2019-06-03

## 2019-06-03 RX ORDER — LORAZEPAM 0.5 MG/1
0.5 TABLET ORAL 2 TIMES DAILY
Status: DISCONTINUED | OUTPATIENT
Start: 2019-06-03 | End: 2019-06-04 | Stop reason: HOSPADM

## 2019-06-03 RX ORDER — CHLORTHALIDONE 25 MG/1
25 TABLET ORAL DAILY
COMMUNITY
End: 2019-06-04 | Stop reason: HOSPADM

## 2019-06-03 RX ORDER — CLONIDINE HYDROCHLORIDE 0.1 MG/1
0.1 TABLET ORAL DAILY
Status: DISCONTINUED | OUTPATIENT
Start: 2019-06-04 | End: 2019-06-04 | Stop reason: HOSPADM

## 2019-06-03 RX ORDER — ESCITALOPRAM OXALATE 10 MG/1
10 TABLET ORAL DAILY
Status: DISCONTINUED | OUTPATIENT
Start: 2019-06-04 | End: 2019-06-04 | Stop reason: HOSPADM

## 2019-06-03 RX ORDER — CLONIDINE HYDROCHLORIDE 0.1 MG/1
0.1 TABLET ORAL DAILY
Status: DISCONTINUED | OUTPATIENT
Start: 2019-06-03 | End: 2019-06-03

## 2019-06-03 RX ORDER — LEVOTHYROXINE SODIUM 0.12 MG/1
125 TABLET ORAL
Status: DISCONTINUED | OUTPATIENT
Start: 2019-06-04 | End: 2019-06-04 | Stop reason: HOSPADM

## 2019-06-03 RX ORDER — CARVEDILOL 12.5 MG/1
12.5 TABLET ORAL 2 TIMES DAILY WITH MEALS
Status: DISCONTINUED | OUTPATIENT
Start: 2019-06-03 | End: 2019-06-03

## 2019-06-03 RX ORDER — LORAZEPAM 0.5 MG/1
0.5 TABLET ORAL 2 TIMES DAILY
Status: DISCONTINUED | OUTPATIENT
Start: 2019-06-04 | End: 2019-06-03

## 2019-06-03 RX ORDER — PANTOPRAZOLE SODIUM 40 MG/1
40 TABLET, DELAYED RELEASE ORAL DAILY
Status: ON HOLD | COMMUNITY
End: 2022-02-13 | Stop reason: CLARIF

## 2019-06-03 RX ORDER — ESCITALOPRAM OXALATE 10 MG/1
10 TABLET ORAL DAILY
COMMUNITY
End: 2019-06-04 | Stop reason: HOSPADM

## 2019-06-03 RX ORDER — MELATONIN
2000 DAILY
Status: DISCONTINUED | OUTPATIENT
Start: 2019-06-04 | End: 2019-06-04 | Stop reason: HOSPADM

## 2019-06-03 RX ORDER — CLONIDINE HYDROCHLORIDE 0.1 MG/1
0.1 TABLET ORAL DAILY
COMMUNITY
End: 2019-07-19

## 2019-06-03 RX ORDER — LISINOPRIL 20 MG/1
20 TABLET ORAL EVERY 12 HOURS
Status: DISCONTINUED | OUTPATIENT
Start: 2019-06-04 | End: 2019-06-03

## 2019-06-03 RX ORDER — HYDRALAZINE HYDROCHLORIDE 20 MG/ML
5 INJECTION INTRAMUSCULAR; INTRAVENOUS EVERY 8 HOURS PRN
Status: DISCONTINUED | OUTPATIENT
Start: 2019-06-03 | End: 2019-06-04 | Stop reason: HOSPADM

## 2019-06-03 RX ORDER — AMLODIPINE BESYLATE 5 MG/1
5 TABLET ORAL
Status: DISCONTINUED | OUTPATIENT
Start: 2019-06-04 | End: 2019-06-03

## 2019-06-03 RX ORDER — AMLODIPINE BESYLATE 5 MG/1
5 TABLET ORAL DAILY
Status: DISCONTINUED | OUTPATIENT
Start: 2019-06-04 | End: 2019-06-04

## 2019-06-03 RX ORDER — AMLODIPINE BESYLATE 5 MG/1
5 TABLET ORAL DAILY
COMMUNITY
End: 2019-06-04 | Stop reason: HOSPADM

## 2019-06-03 RX ORDER — ALENDRONATE SODIUM 70 MG/1
70 TABLET ORAL
Status: ON HOLD | COMMUNITY
End: 2019-07-21 | Stop reason: ALTCHOICE

## 2019-06-03 RX ORDER — AMLODIPINE BESYLATE 5 MG/1
5 TABLET ORAL DAILY
Status: DISCONTINUED | OUTPATIENT
Start: 2019-06-03 | End: 2019-06-03

## 2019-06-03 RX ORDER — LISINOPRIL 20 MG/1
20 TABLET ORAL 2 TIMES DAILY
COMMUNITY
End: 2019-07-19

## 2019-06-03 RX ADMIN — AMLODIPINE BESYLATE 5 MG: 5 TABLET ORAL at 16:13

## 2019-06-03 RX ADMIN — INSULIN LISPRO 1 UNITS: 100 INJECTION, SOLUTION INTRAVENOUS; SUBCUTANEOUS at 22:53

## 2019-06-03 RX ADMIN — LORAZEPAM 0.5 MG: 0.5 TABLET ORAL at 23:19

## 2019-06-03 RX ADMIN — CARVEDILOL 12.5 MG: 12.5 TABLET, FILM COATED ORAL at 16:13

## 2019-06-03 RX ADMIN — CLONIDINE HYDROCHLORIDE 0.1 MG: 0.1 TABLET ORAL at 16:12

## 2019-06-04 VITALS
BODY MASS INDEX: 22.92 KG/M2 | DIASTOLIC BLOOD PRESSURE: 78 MMHG | TEMPERATURE: 98.4 F | HEART RATE: 86 BPM | HEIGHT: 65 IN | WEIGHT: 137.57 LBS | OXYGEN SATURATION: 98 % | RESPIRATION RATE: 18 BRPM | SYSTOLIC BLOOD PRESSURE: 135 MMHG
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
## 2019-06-04 PROBLEM — E87.1 HYPONATREMIA: Status: ACTIVE | Noted: 2019-06-04

## 2019-06-04 LAB
ANION GAP SERPL CALCULATED.3IONS-SCNC: 4 MMOL/L (ref 4–13)
BASE EXCESS BLDA CALC-SCNC: 0 MMOL/L (ref -2–3)
BASOPHILS # BLD AUTO: 0.03 THOUSANDS/ΜL (ref 0–0.1)
BASOPHILS NFR BLD AUTO: 0 % (ref 0–1)
BUN SERPL-MCNC: 17 MG/DL (ref 5–25)
CA-I BLD-SCNC: 1.19 MMOL/L (ref 1.12–1.32)
CALCIUM SERPL-MCNC: 8.8 MG/DL (ref 8.3–10.1)
CHLORIDE SERPL-SCNC: 101 MMOL/L (ref 100–108)
CHLORIDE UR-SCNC: 153 MMOL/L (ref 10–330)
CO2 SERPL-SCNC: 28 MMOL/L (ref 21–32)
CREAT SERPL-MCNC: 0.93 MG/DL (ref 0.6–1.3)
EOSINOPHIL # BLD AUTO: 0.2 THOUSAND/ΜL (ref 0–0.61)
EOSINOPHIL NFR BLD AUTO: 2 % (ref 0–6)
ERYTHROCYTE [DISTWIDTH] IN BLOOD BY AUTOMATED COUNT: 14.1 % (ref 11.6–15.1)
GFR SERPL CREATININE-BSD FRML MDRD: 58 ML/MIN/1.73SQ M
GLUCOSE SERPL-MCNC: 109 MG/DL (ref 65–140)
GLUCOSE SERPL-MCNC: 114 MG/DL (ref 65–140)
GLUCOSE SERPL-MCNC: 126 MG/DL (ref 65–140)
GLUCOSE SERPL-MCNC: 131 MG/DL (ref 65–140)
GLUCOSE SERPL-MCNC: 183 MG/DL (ref 65–140)
HCO3 BLDA-SCNC: 25.5 MMOL/L (ref 24–30)
HCT VFR BLD AUTO: 34.7 % (ref 34.8–46.1)
HCT VFR BLD CALC: 32 % (ref 34.8–46.1)
HGB BLD-MCNC: 11.8 G/DL (ref 11.5–15.4)
HGB BLDA-MCNC: 10.9 G/DL (ref 11.5–15.4)
IMM GRANULOCYTES # BLD AUTO: 0.04 THOUSAND/UL (ref 0–0.2)
IMM GRANULOCYTES NFR BLD AUTO: 1 % (ref 0–2)
LYMPHOCYTES # BLD AUTO: 0.98 THOUSANDS/ΜL (ref 0.6–4.47)
LYMPHOCYTES NFR BLD AUTO: 11 % (ref 14–44)
MCH RBC QN AUTO: 31.1 PG (ref 26.8–34.3)
MCHC RBC AUTO-ENTMCNC: 34 G/DL (ref 31.4–37.4)
MCV RBC AUTO: 91 FL (ref 82–98)
MONOCYTES # BLD AUTO: 0.94 THOUSAND/ΜL (ref 0.17–1.22)
MONOCYTES NFR BLD AUTO: 11 % (ref 4–12)
NEUTROPHILS # BLD AUTO: 6.59 THOUSANDS/ΜL (ref 1.85–7.62)
NEUTS SEG NFR BLD AUTO: 75 % (ref 43–75)
NRBC BLD AUTO-RTO: 0 /100 WBCS
OSMOLALITY UR/SERPL-RTO: 287 MMOL/KG (ref 282–298)
OSMOLALITY UR: 673 MMOL/KG
PCO2 BLD: 27 MMOL/L (ref 21–32)
PCO2 BLD: 43.8 MM HG (ref 42–50)
PH BLD: 7.37 [PH] (ref 7.3–7.4)
PLATELET # BLD AUTO: 253 THOUSANDS/UL (ref 149–390)
PMV BLD AUTO: 10 FL (ref 8.9–12.7)
PO2 BLD: 36 MM HG (ref 35–45)
POTASSIUM BLD-SCNC: 3.5 MMOL/L (ref 3.5–5.3)
POTASSIUM SERPL-SCNC: 3.5 MMOL/L (ref 3.5–5.3)
RBC # BLD AUTO: 3.8 MILLION/UL (ref 3.81–5.12)
SAO2 % BLD FROM PO2: 66 % (ref 95–98)
SODIUM 24H UR-SCNC: 103 MOL/L
SODIUM BLD-SCNC: 138 MMOL/L (ref 136–145)
SODIUM SERPL-SCNC: 133 MMOL/L (ref 136–145)
SPECIMEN SOURCE: ABNORMAL
WBC # BLD AUTO: 8.78 THOUSAND/UL (ref 4.31–10.16)

## 2019-06-04 PROCEDURE — 97166 OT EVAL MOD COMPLEX 45 MIN: CPT

## 2019-06-04 PROCEDURE — 97163 PT EVAL HIGH COMPLEX 45 MIN: CPT

## 2019-06-04 PROCEDURE — 83930 ASSAY OF BLOOD OSMOLALITY: CPT | Performed by: INTERNAL MEDICINE

## 2019-06-04 PROCEDURE — G8978 MOBILITY CURRENT STATUS: HCPCS

## 2019-06-04 PROCEDURE — 99213 OFFICE O/P EST LOW 20 MIN: CPT | Performed by: SURGERY

## 2019-06-04 PROCEDURE — 97535 SELF CARE MNGMENT TRAINING: CPT

## 2019-06-04 PROCEDURE — 99214 OFFICE O/P EST MOD 30 MIN: CPT | Performed by: INTERNAL MEDICINE

## 2019-06-04 PROCEDURE — 80048 BASIC METABOLIC PNL TOTAL CA: CPT | Performed by: INTERNAL MEDICINE

## 2019-06-04 PROCEDURE — G8979 MOBILITY GOAL STATUS: HCPCS

## 2019-06-04 PROCEDURE — 99217 PR OBSERVATION CARE DISCHARGE MANAGEMENT: CPT | Performed by: PHYSICIAN ASSISTANT

## 2019-06-04 PROCEDURE — G8987 SELF CARE CURRENT STATUS: HCPCS

## 2019-06-04 PROCEDURE — 82088 ASSAY OF ALDOSTERONE: CPT | Performed by: INTERNAL MEDICINE

## 2019-06-04 PROCEDURE — 84300 ASSAY OF URINE SODIUM: CPT | Performed by: INTERNAL MEDICINE

## 2019-06-04 PROCEDURE — 83935 ASSAY OF URINE OSMOLALITY: CPT | Performed by: INTERNAL MEDICINE

## 2019-06-04 PROCEDURE — G8988 SELF CARE GOAL STATUS: HCPCS

## 2019-06-04 PROCEDURE — 82436 ASSAY OF URINE CHLORIDE: CPT | Performed by: INTERNAL MEDICINE

## 2019-06-04 PROCEDURE — 85025 COMPLETE CBC W/AUTO DIFF WBC: CPT | Performed by: INTERNAL MEDICINE

## 2019-06-04 PROCEDURE — 82948 REAGENT STRIP/BLOOD GLUCOSE: CPT

## 2019-06-04 PROCEDURE — 84244 ASSAY OF RENIN: CPT | Performed by: INTERNAL MEDICINE

## 2019-06-04 RX ADMIN — INSULIN LISPRO 1 UNITS: 100 INJECTION, SOLUTION INTRAVENOUS; SUBCUTANEOUS at 12:36

## 2019-06-04 RX ADMIN — LEVOTHYROXINE SODIUM 125 MCG: 125 TABLET ORAL at 06:18

## 2019-06-04 RX ADMIN — PANTOPRAZOLE SODIUM 40 MG: 40 TABLET, DELAYED RELEASE ORAL at 09:25

## 2019-06-04 RX ADMIN — APIXABAN 5 MG: 5 TABLET, FILM COATED ORAL at 09:26

## 2019-06-04 RX ADMIN — VITAMIN D, TAB 1000IU (100/BT) 2000 UNITS: 25 TAB at 09:24

## 2019-06-04 RX ADMIN — LISINOPRIL 20 MG: 20 TABLET ORAL at 09:26

## 2019-06-04 RX ADMIN — APIXABAN 5 MG: 5 TABLET, FILM COATED ORAL at 01:29

## 2019-06-04 RX ADMIN — LORAZEPAM 0.5 MG: 0.5 TABLET ORAL at 09:25

## 2019-06-04 RX ADMIN — CARVEDILOL 12.5 MG: 12.5 TABLET, FILM COATED ORAL at 09:26

## 2019-06-07 LAB
ALDOST SERPL-MCNC: 4.2 NG/DL (ref 0–30)
RENIN PLAS-CCNC: 0.18 NG/ML/HR (ref 0.17–5.38)

## 2019-06-17 DIAGNOSIS — I10 HYPERTENSION, ESSENTIAL, BENIGN: Chronic | ICD-10-CM

## 2019-06-17 RX ORDER — AMLODIPINE BESYLATE 5 MG/1
TABLET ORAL
Qty: 30 TABLET | Refills: 2 | Status: SHIPPED | OUTPATIENT
Start: 2019-06-17 | End: 2019-07-19

## 2019-07-17 DIAGNOSIS — I48.91 TRANSIENT ATRIAL FIBRILLATION (HCC): ICD-10-CM

## 2019-07-19 ENCOUNTER — HOSPITAL ENCOUNTER (INPATIENT)
Facility: HOSPITAL | Age: 79
LOS: 1 days | Discharge: HOME | DRG: 305 | End: 2019-07-21
Attending: EMERGENCY MEDICINE | Admitting: INTERNAL MEDICINE
Payer: MEDICARE

## 2019-07-19 ENCOUNTER — APPOINTMENT (EMERGENCY)
Dept: RADIOLOGY | Facility: HOSPITAL | Age: 79
DRG: 305 | End: 2019-07-19
Payer: MEDICARE

## 2019-07-19 DIAGNOSIS — I10 HIGH BLOOD PRESSURE: ICD-10-CM

## 2019-07-19 DIAGNOSIS — H00.011 HORDEOLUM EXTERNUM OF RIGHT UPPER EYELID: ICD-10-CM

## 2019-07-19 DIAGNOSIS — I15.0 RENOVASCULAR HYPERTENSION: ICD-10-CM

## 2019-07-19 DIAGNOSIS — R53.1 WEAKNESS: Primary | ICD-10-CM

## 2019-07-19 DIAGNOSIS — I70.1 RENAL ARTERY STENOSIS, NATIVE (HCC): ICD-10-CM

## 2019-07-19 DIAGNOSIS — I16.0 HYPERTENSIVE URGENCY: ICD-10-CM

## 2019-07-19 PROBLEM — D50.0 IRON DEFICIENCY ANEMIA DUE TO CHRONIC BLOOD LOSS: Status: ACTIVE | Noted: 2018-12-07

## 2019-07-19 PROBLEM — J18.9 COMMUNITY ACQUIRED PNEUMONIA OF RIGHT MIDDLE LOBE OF LUNG: Status: RESOLVED | Noted: 2018-04-17 | Resolved: 2019-07-19

## 2019-07-19 PROBLEM — E87.1 HYPO-OSMOLALITY AND HYPONATREMIA: Status: ACTIVE | Noted: 2019-06-26

## 2019-07-19 PROBLEM — Z17.0 MALIGNANT NEOPLASM OF RIGHT BREAST, STAGE 1, ESTROGEN RECEPTOR POSITIVE (HCC): Status: ACTIVE | Noted: 2017-02-08

## 2019-07-19 PROBLEM — C50.911 MALIGNANT NEOPLASM OF RIGHT BREAST, STAGE 1, ESTROGEN RECEPTOR POSITIVE (HCC): Status: ACTIVE | Noted: 2017-02-08

## 2019-07-19 PROBLEM — Z72.0 TOBACCO ABUSE: Status: ACTIVE | Noted: 2017-01-11

## 2019-07-19 LAB
ANION GAP SERPL CALCULATED.3IONS-SCNC: 7 MMOL/L (ref 4–13)
ATRIAL RATE: 68 BPM
BASOPHILS # BLD AUTO: 0.04 THOUSANDS/ΜL (ref 0–0.1)
BASOPHILS NFR BLD AUTO: 1 % (ref 0–1)
BUN SERPL-MCNC: 14 MG/DL (ref 5–25)
CALCIUM SERPL-MCNC: 9.4 MG/DL (ref 8.3–10.1)
CHLORIDE SERPL-SCNC: 95 MMOL/L (ref 100–108)
CO2 SERPL-SCNC: 28 MMOL/L (ref 21–32)
CREAT SERPL-MCNC: 0.73 MG/DL (ref 0.6–1.3)
EOSINOPHIL # BLD AUTO: 0.14 THOUSAND/ΜL (ref 0–0.61)
EOSINOPHIL NFR BLD AUTO: 2 % (ref 0–6)
ERYTHROCYTE [DISTWIDTH] IN BLOOD BY AUTOMATED COUNT: 14.4 % (ref 11.6–15.1)
EST. AVERAGE GLUCOSE BLD GHB EST-MCNC: 114 MG/DL
GFR SERPL CREATININE-BSD FRML MDRD: 77 ML/MIN/1.73SQ M
GLUCOSE SERPL-MCNC: 108 MG/DL (ref 65–140)
GLUCOSE SERPL-MCNC: 143 MG/DL (ref 65–140)
GLUCOSE SERPL-MCNC: 177 MG/DL (ref 65–140)
HBA1C MFR BLD: 5.6 % (ref 4.2–6.3)
HCT VFR BLD AUTO: 36 % (ref 34.8–46.1)
HGB BLD-MCNC: 12.5 G/DL (ref 11.5–15.4)
IMM GRANULOCYTES # BLD AUTO: 0.02 THOUSAND/UL (ref 0–0.2)
IMM GRANULOCYTES NFR BLD AUTO: 0 % (ref 0–2)
LYMPHOCYTES # BLD AUTO: 1.33 THOUSANDS/ΜL (ref 0.6–4.47)
LYMPHOCYTES NFR BLD AUTO: 17 % (ref 14–44)
MCH RBC QN AUTO: 31.2 PG (ref 26.8–34.3)
MCHC RBC AUTO-ENTMCNC: 34.7 G/DL (ref 31.4–37.4)
MCV RBC AUTO: 90 FL (ref 82–98)
MONOCYTES # BLD AUTO: 0.95 THOUSAND/ΜL (ref 0.17–1.22)
MONOCYTES NFR BLD AUTO: 12 % (ref 4–12)
NEUTROPHILS # BLD AUTO: 5.22 THOUSANDS/ΜL (ref 1.85–7.62)
NEUTS SEG NFR BLD AUTO: 68 % (ref 43–75)
NRBC BLD AUTO-RTO: 0 /100 WBCS
P AXIS: 78 DEGREES
PLATELET # BLD AUTO: 279 THOUSANDS/UL (ref 149–390)
PMV BLD AUTO: 9.7 FL (ref 8.9–12.7)
POTASSIUM SERPL-SCNC: 3.4 MMOL/L (ref 3.5–5.3)
PR INTERVAL: 184 MS
QRS AXIS: 23 DEGREES
QRSD INTERVAL: 88 MS
QT INTERVAL: 410 MS
QTC INTERVAL: 435 MS
RBC # BLD AUTO: 4.01 MILLION/UL (ref 3.81–5.12)
SODIUM SERPL-SCNC: 130 MMOL/L (ref 136–145)
T WAVE AXIS: 94 DEGREES
TROPONIN I SERPL-MCNC: <0.02 NG/ML
VENTRICULAR RATE: 68 BPM
WBC # BLD AUTO: 7.7 THOUSAND/UL (ref 4.31–10.16)

## 2019-07-19 PROCEDURE — 71046 X-RAY EXAM CHEST 2 VIEWS: CPT

## 2019-07-19 PROCEDURE — 36415 COLL VENOUS BLD VENIPUNCTURE: CPT | Performed by: EMERGENCY MEDICINE

## 2019-07-19 PROCEDURE — 99215 OFFICE O/P EST HI 40 MIN: CPT | Performed by: INTERNAL MEDICINE

## 2019-07-19 PROCEDURE — 80048 BASIC METABOLIC PNL TOTAL CA: CPT | Performed by: EMERGENCY MEDICINE

## 2019-07-19 PROCEDURE — 93005 ELECTROCARDIOGRAM TRACING: CPT

## 2019-07-19 PROCEDURE — 85025 COMPLETE CBC W/AUTO DIFF WBC: CPT | Performed by: EMERGENCY MEDICINE

## 2019-07-19 PROCEDURE — 82948 REAGENT STRIP/BLOOD GLUCOSE: CPT

## 2019-07-19 PROCEDURE — 93010 ELECTROCARDIOGRAM REPORT: CPT | Performed by: INTERNAL MEDICINE

## 2019-07-19 PROCEDURE — 84484 ASSAY OF TROPONIN QUANT: CPT | Performed by: INTERNAL MEDICINE

## 2019-07-19 PROCEDURE — 99285 EMERGENCY DEPT VISIT HI MDM: CPT

## 2019-07-19 PROCEDURE — 84484 ASSAY OF TROPONIN QUANT: CPT | Performed by: EMERGENCY MEDICINE

## 2019-07-19 PROCEDURE — 99285 EMERGENCY DEPT VISIT HI MDM: CPT | Performed by: EMERGENCY MEDICINE

## 2019-07-19 PROCEDURE — 99220 PR INITIAL OBSERVATION CARE/DAY 70 MINUTES: CPT | Performed by: INTERNAL MEDICINE

## 2019-07-19 PROCEDURE — 83036 HEMOGLOBIN GLYCOSYLATED A1C: CPT | Performed by: INTERNAL MEDICINE

## 2019-07-19 RX ORDER — LISINOPRIL 20 MG/1
20 TABLET ORAL 2 TIMES DAILY
Status: DISCONTINUED | OUTPATIENT
Start: 2019-07-19 | End: 2019-07-21 | Stop reason: HOSPADM

## 2019-07-19 RX ORDER — CLONIDINE 0.1 MG/24H
0.1 PATCH, EXTENDED RELEASE TRANSDERMAL WEEKLY
Status: DISCONTINUED | OUTPATIENT
Start: 2019-07-19 | End: 2019-07-21 | Stop reason: HOSPADM

## 2019-07-19 RX ORDER — LORAZEPAM 1 MG/1
1 TABLET ORAL ONCE
Status: COMPLETED | OUTPATIENT
Start: 2019-07-19 | End: 2019-07-19

## 2019-07-19 RX ORDER — CLONIDINE HYDROCHLORIDE 0.1 MG/1
0.1 TABLET ORAL ONCE
Status: COMPLETED | OUTPATIENT
Start: 2019-07-19 | End: 2019-07-19

## 2019-07-19 RX ORDER — HYDRALAZINE HYDROCHLORIDE 10 MG/1
10 TABLET, FILM COATED ORAL EVERY 8 HOURS SCHEDULED
Status: DISCONTINUED | OUTPATIENT
Start: 2019-07-19 | End: 2019-07-19

## 2019-07-19 RX ORDER — AMLODIPINE BESYLATE 5 MG/1
5 TABLET ORAL DAILY
Status: DISCONTINUED | OUTPATIENT
Start: 2019-07-19 | End: 2019-07-19

## 2019-07-19 RX ORDER — ATORVASTATIN CALCIUM 40 MG/1
40 TABLET, FILM COATED ORAL EVERY EVENING
Status: DISCONTINUED | OUTPATIENT
Start: 2019-07-19 | End: 2019-07-21 | Stop reason: HOSPADM

## 2019-07-19 RX ORDER — LEVOTHYROXINE SODIUM 0.12 MG/1
125 TABLET ORAL DAILY
Status: DISCONTINUED | OUTPATIENT
Start: 2019-07-19 | End: 2019-07-21 | Stop reason: HOSPADM

## 2019-07-19 RX ORDER — PANTOPRAZOLE SODIUM 40 MG/1
40 TABLET, DELAYED RELEASE ORAL DAILY
Status: DISCONTINUED | OUTPATIENT
Start: 2019-07-19 | End: 2019-07-21 | Stop reason: HOSPADM

## 2019-07-19 RX ORDER — AMLODIPINE BESYLATE 2.5 MG/1
2.5 TABLET ORAL 2 TIMES DAILY
Status: DISCONTINUED | OUTPATIENT
Start: 2019-07-19 | End: 2019-07-21 | Stop reason: HOSPADM

## 2019-07-19 RX ORDER — MELATONIN
2000 DAILY
Status: DISCONTINUED | OUTPATIENT
Start: 2019-07-19 | End: 2019-07-21 | Stop reason: HOSPADM

## 2019-07-19 RX ORDER — CLONIDINE HYDROCHLORIDE 0.2 MG/1
0.2 TABLET ORAL DAILY
Status: DISCONTINUED | OUTPATIENT
Start: 2019-07-20 | End: 2019-07-19

## 2019-07-19 RX ORDER — CARVEDILOL 12.5 MG/1
12.5 TABLET ORAL 2 TIMES DAILY WITH MEALS
Status: DISCONTINUED | OUTPATIENT
Start: 2019-07-19 | End: 2019-07-20

## 2019-07-19 RX ORDER — CHLORAL HYDRATE 500 MG
1000 CAPSULE ORAL DAILY
Status: DISCONTINUED | OUTPATIENT
Start: 2019-07-19 | End: 2019-07-21 | Stop reason: HOSPADM

## 2019-07-19 RX ORDER — LORAZEPAM 0.5 MG/1
0.5 TABLET ORAL DAILY PRN
Status: DISCONTINUED | OUTPATIENT
Start: 2019-07-19 | End: 2019-07-21 | Stop reason: HOSPADM

## 2019-07-19 RX ORDER — ASPIRIN 81 MG/1
81 TABLET, CHEWABLE ORAL DAILY
Status: DISCONTINUED | OUTPATIENT
Start: 2019-07-20 | End: 2019-07-21 | Stop reason: HOSPADM

## 2019-07-19 RX ORDER — ASCORBIC ACID 500 MG
500 TABLET ORAL DAILY
Status: DISCONTINUED | OUTPATIENT
Start: 2019-07-19 | End: 2019-07-21 | Stop reason: HOSPADM

## 2019-07-19 RX ORDER — LABETALOL 20 MG/4 ML (5 MG/ML) INTRAVENOUS SYRINGE
10 EVERY 4 HOURS PRN
Status: DISCONTINUED | OUTPATIENT
Start: 2019-07-19 | End: 2019-07-21 | Stop reason: HOSPADM

## 2019-07-19 RX ORDER — NITROGLYCERIN 0.4 MG/1
0.4 TABLET SUBLINGUAL
Status: DISCONTINUED | OUTPATIENT
Start: 2019-07-19 | End: 2019-07-21 | Stop reason: HOSPADM

## 2019-07-19 RX ORDER — POTASSIUM CHLORIDE 20 MEQ/1
20 TABLET, EXTENDED RELEASE ORAL DAILY
Status: DISCONTINUED | OUTPATIENT
Start: 2019-07-19 | End: 2019-07-20

## 2019-07-19 RX ORDER — 0.9 % SODIUM CHLORIDE 0.9 %
3 VIAL (ML) INJECTION AS NEEDED
Status: DISCONTINUED | OUTPATIENT
Start: 2019-07-19 | End: 2019-07-19

## 2019-07-19 RX ORDER — POTASSIUM CHLORIDE 20 MEQ/1
40 TABLET, EXTENDED RELEASE ORAL ONCE
Status: COMPLETED | OUTPATIENT
Start: 2019-07-19 | End: 2019-07-19

## 2019-07-19 RX ORDER — LORAZEPAM 0.5 MG/1
0.5 TABLET ORAL 2 TIMES DAILY
Status: DISCONTINUED | OUTPATIENT
Start: 2019-07-19 | End: 2019-07-21 | Stop reason: HOSPADM

## 2019-07-19 RX ADMIN — CLONIDINE HYDROCHLORIDE 0.1 MG: 0.1 TABLET ORAL at 14:28

## 2019-07-19 RX ADMIN — AMLODIPINE BESYLATE 2.5 MG: 2.5 TABLET ORAL at 18:06

## 2019-07-19 RX ADMIN — LORAZEPAM 1 MG: 1 TABLET ORAL at 14:28

## 2019-07-19 RX ADMIN — INSULIN LISPRO 1 UNITS: 100 INJECTION, SOLUTION INTRAVENOUS; SUBCUTANEOUS at 22:35

## 2019-07-19 RX ADMIN — ATORVASTATIN CALCIUM 40 MG: 40 TABLET, FILM COATED ORAL at 18:05

## 2019-07-19 RX ADMIN — POTASSIUM CHLORIDE 40 MEQ: 1500 TABLET, EXTENDED RELEASE ORAL at 15:06

## 2019-07-19 RX ADMIN — APIXABAN 5 MG: 5 TABLET, FILM COATED ORAL at 18:04

## 2019-07-19 RX ADMIN — POTASSIUM CHLORIDE 20 MEQ: 1500 TABLET, EXTENDED RELEASE ORAL at 18:11

## 2019-07-19 NOTE — H&P
H&P- Fairy Diver 1940, 78 y o  female MRN: [id number]    Unit/Bed#: DARCY Encounter: [id number]    Primary Care Provider: Luz George DO   Date and time admitted to hospital: 7/19/2019  1:18 PM        Hyponatremia  Assessment & Plan  Likely secondary to the use of chlorthalidone, will discontinue for now and monitor    CKD (chronic kidney disease) stage 3, GFR 30-59 ml/min ([facility])  Assessment & Plan  Creatinine appears to be a baseline, will continue to monitor while in the hospital    Diabetes mellitus type 2 in nonobese [facility])  Assessment & Plan  Lab Results   Component Value Date    HGBA1C 6 4 (H) 04/17/2018       No results for input(s): POCGLU in the last 72 hours  Blood Sugar Average: Last 72 hrs:   for now, sliding scale insulin and monitor    * Hypertension, accelerated  Assessment & Plan  Accelerated hypertension in patient with renal artery stenosis, follows up with Nephrology as outpatient, never evaluated by vascular surgery  Difficult to control blood pressure with hyper and hypotension and 2 episode of syncope secondary to hypotension  Will ask Cardiology as well as Nephrology to evaluate the patient and will ask vascular surgery consultation  Keep on telemetry monitoring  Echocardiogram if deemed necessary by Cardiology  Continue to trend troponin as patient presented with transient left arm pain and nausea which could be related to elevated blood pressure, 1st set negative with nonspecific EKG changes  Patient is a 1 dose of p o   Clonidine in the ER, blood pressure currently 147/68  Will continue with home medication for now including lisinopril b i d , Coreg, discontinue chlorthalidone, continue with Catapres patch    VTE Prophylaxis: Apixaban (Eliquis)  / sequential compression device   Code Status:  Full code  POLST: POLST is not applicable to this patient  Discussion with family:  Daughter bedside    Anticipated Length of Stay:  Patient will be admitted on an Observation basis with an anticipated length of stay of  less than 2 midnights  Justification for Hospital Stay:  Refer to above    Total Time for Visit, including Counseling / Coordination of Care: 1 hour  Greater than 50% of this total time spent on direct patient counseling and coordination of care  Chief Complaint:   Feeling lousy    History of Present Illness:    Ankush White is a 78 y o  female who presents with feeling unwell  Is nearly 1year-old female with past medical history of atrial fibrillation, anxiety, previous history of breast cancer, diabetes mellitus type 2, hypothyroidism, poorly controlled hypertension secondary to renal artery stenosis, who came to the hospital today complaining of feeling unwell  As per patient she has been having generalized fatigue and weakness for the past 2 days after receiving treatment with topical injection for macular degeneration  As per patient, she has had difficulties to control her blood pressure for now several months and was diagnosed with renal artery stenosis for which she follows as outpatient in another network with nephrologist   Nephrologist try to manage the patient with antihypertensive however she has experienced either extreme drop of blood pressure versus extreme elevation of blood pressure  Currently her blood pressure has remained elevated for the course of several days  Presented to the ER with blood pressure of 245/110 otherwise unremarkable workup  She is compliant with medication, recently:  In p o  Has been switched to Catapres TTS  weekly transdermal   She has never been evaluated by vascular surgeon  She follows up as outpatient with Cardiology within our no active work  She is anticoagulated on Eliquis for atrial fibrillation      Review of Systems:    Review of Systems   Constitutional: Positive for fatigue  Negative for fever  Respiratory: Negative for shortness of breath  Cardiovascular: Negative for chest pain     Gastrointestinal: Positive for nausea  Musculoskeletal: Positive for arthralgias  All other systems reviewed and are negative  Past Medical and Surgical History:     Past Medical History:   Diagnosis Date    Anxiety     Atrial fibrillation [facility] [address] [facility]     Bowel obstruction [facility] [address] [facility]     Cancer ([facility] [address] [facility]     LEFT BREAST CA 22 YEARS AGO     Depression     Diabetes mellitus ([facility] [address] [facility]     Disease of thyroid gland     Hypertension     Hypothyroidism        Past Surgical History:   Procedure Laterality Date    ABDOMINAL ADHESION SURGERY N/A 2/4/2018    Procedure: LYSIS ADHESIONS;  Surgeon: Joseph Carpio DO;  [facility];  Service: General    BREAST SURGERY      CHOLECYSTECTOMY      COLON SURGERY  2017    EXPLORATORY LAPAROTOMY      JOINT REPLACEMENT      LEFT KNEE REPLACEMENT     LAPAROTOMY N/A 2/4/2018    Procedure: LAPAROTOMY EXPLORATORY,;  Surgeon: Joseph Carpio DO;  [facility];  Service: General    MASTECTOMY      MASTECTOMY Left 1995    MASTECTOMY Right 2017    NH BIOPSY/EXCISION, LYMPH NODE(S) Right 1/13/2017    Procedure: SENTINEL LYMPH NODE BIOPSY RIGHT AXILLA; Surgeon: Curly Fuentes MD;  [facility];  Service: General    NH MASTECTOMY, SIMPLE, COMPLETE Right 1/13/2017    Procedure: MASTECTOMY SIMPLE;  Surgeon: Curly Fuentes MD;  [facility];  Service: General    SMALL INTESTINE SURGERY N/A 2/4/2018    Procedure: RESECTION SMALL BOWEL;  Surgeon: Joseph Carpio DO;  [facility];  Service: General    US GUIDED BREAST BIOPSY RIGHT COMPLETE Right 11/29/2016       Meds/Allergies:    Prior to Admission medications    Medication Sig Start Date End Date Taking?  Authorizing Provider   alendronate (FOSAMAX) 70 mg tablet Take 70 mg by mouth every 7 days    Historical Provider, MD   apixaban (ELIQUIS) 5 mg Take 5 mg by mouth 2 (two) times a day    Historical Provider, MD   ascorbic acid (VITAMIN C) 500 mg tablet Take 500 mg by mouth daily    Historical Provider, MD   carvedilol (COREG) 12 5 mg tablet TAKE 1 TABLET (12 5 MG TOTAL) BY MOUTH 2 (TWO) TIMES A DAY WITH MEALS 5/21/19   Darrel Burciaga DO   chlorthalidone 25 mg tablet Take 25 mg by mouth daily 4/10/19   Historical Provider, MD   cholecalciferol (VITAMIN D3) 1,000 units tablet Take 2,000 Units by mouth 2 (two) times a day    Historical Provider, MD   cloNIDine (CATAPRES) 0 1 mg tablet Take 1 tablet (0 1 mg total) by mouth daily 5/7/19   Dary Kahn DO   Escitalopram Oxalate (LEXAPRO PO) Take by mouth    Historical Provider, MD   levothyroxine 125 mcg tablet Take 125 mcg by mouth daily    Historical Provider, MD   Levothyroxine Sodium 137 MCG CAPS Take 137 mcg by mouth daily      Historical Provider, MD   lisinopril (ZESTRIL) 20 mg tablet TAKE 1 TABLET (20 MG TOTAL) BY MOUTH 2 (TWO) TIMES A DAY 5/13/19   Romelia Huff MD   LORazepam (ATIVAN) 0 5 mg tablet Take 0 5 mg by mouth daily as needed for anxiety      Historical Provider, MD   LORazepam (ATIVAN) 0 5 mg tablet Take 0 5 mg by mouth 2 (two) times a day    Historical Provider, MD   metFORMIN (GLUCOPHAGE) 500 mg tablet Take 500 mg by mouth 2 (two) times a day with meals    Historical Provider, MD   multivitamin (THERAGRAN) TABS Take 1 tablet by mouth daily    Historical Provider, MD   nortriptyline (PAMELOR) 75 MG capsule Take 75 mg by mouth daily at bedtime    Historical Provider, MD   Omega-3 Fatty Acids (FISH OIL PO) Take 1 g by mouth    Historical Provider, MD   pantoprazole (PROTONIX) 40 mg tablet Take 40 mg by mouth daily    Historical Provider, MD   potassium chloride (K-DUR,KLOR-CON) 10 mEq tablet Take 1 tablet (10 mEq total) by mouth daily  Patient not taking: Reported on 9/26/2018 2/21/18   Romelia Huff MD   amLODIPine (NORVASC) 5 mg tablet TAKE 1 TABLET BY MOUTH EVERY DAY 6/17/19 7/19/19  Darrel Burciaga DO   carvedilol (COREG) 12 5 mg tablet Take 12 5 mg by mouth 2 (two) times a day with meals  7/19/19  Historical Provider, MD   cholecalciferol (VITAMIN D3) 1,000 units tablet Take 1,000 Units by mouth daily  7/19/19  Historical Provider, MD   cloNIDine (CATAPRES) 0 1 mg tablet Take 0 1 mg by mouth daily  7/19/19  Historical Provider, MD   ELIQUIS 5 MG TAKE 1 TABLET BY MOUTH TWICE A DAY 5/21/19 7/19/19  Darrel Burciaga DO   furosemide (LASIX) 20 mg tablet Take 1 tablet (20 mg total) by mouth daily  Patient not taking: Reported on 9/26/2018 2/21/18 7/19/19  Joelle Lawler MD   lisinopril (ZESTRIL) 20 mg tablet Take 20 mg by mouth 2 (two) times a day  7/19/19  Historical Provider, MD   metFORMIN (GLUCOPHAGE) 500 mg tablet Take 500 mg by mouth daily with breakfast   7/19/19  Historical Provider, MD     I have reviewed home medications with patient personally  Allergies: Allergies   Allergen Reactions    Meloxicam GI Intolerance    Morphine GI Intolerance    Morphine     Penicillins     Percocet [Oxycodone-Acetaminophen]     Sitagliptin      SOB       Social History:     Marital Status:      Substance Use History:   Social History     Substance and Sexual Activity   Alcohol Use Never    Frequency: Never    Binge frequency: Never     Social History     Tobacco Use   Smoking Status Current Every Day Smoker    Packs/day: 0 50    Types: Cigarettes   Smokeless Tobacco Never Used     Social History     Substance and Sexual Activity   Drug Use Never       Family History:    non-contributory    Physical Exam:     Vitals:   Blood Pressure: (!) 195/89 (07/19/19 1429)  Pulse: 61 (07/19/19 1429)  Temperature: 97 8 °F (36 6 °C) (07/19/19 1322)  Temp Source: Oral (07/19/19 1322)  Respirations: 19 (07/19/19 1429)  Weight - Scale: 62 1 kg (137 lb) (07/19/19 1322)  SpO2: 98 % (07/19/19 1429)    Physical Exam   Constitutional: She is oriented to person, place, and time  She appears well-developed  HENT:   Head: Normocephalic and atraumatic  Eyes: Right eye exhibits no discharge  Left eye exhibits no discharge     Cardiovascular: Normal rate and regular rhythm  Exam reveals no friction rub  No murmur heard  Pulmonary/Chest: Effort normal  No stridor  No respiratory distress  She has no wheezes  Abdominal: Soft  Bowel sounds are normal  She exhibits no distension  There is no tenderness  There is no guarding  Musculoskeletal: She exhibits no edema  Neurological: She is alert and oriented to person, place, and time  Psychiatric: She has a normal mood and affect  Judgment and thought content normal            Additional Data:     Lab Results: I have personally reviewed pertinent reports  Results from last 7 days   Lab Units 07/19/19  1352   WBC Thousand/uL 7 70   HEMOGLOBIN g/dL 12 5   HEMATOCRIT % 36 0   PLATELETS Thousands/uL 279   NEUTROS PCT % 68   LYMPHS PCT % 17   MONOS PCT % 12   EOS PCT % 2     Results from last 7 days   Lab Units 07/19/19  1352   SODIUM mmol/L 130*   POTASSIUM mmol/L 3 4*   CHLORIDE mmol/L 95*   CO2 mmol/L 28   BUN mg/dL 14   CREATININE mg/dL 0 73   ANION GAP mmol/L 7   CALCIUM mg/dL 9 4   GLUCOSE RANDOM mg/dL 108                       Imaging: I have personally reviewed pertinent reports  X-ray chest 2 views   ED Interpretation by Ita Cisneros MD (07/19 1442)   No acute abnormality      Final Result by Danita Glynn MD (07/19 1613)      No acute cardiopulmonary disease  Workstation performed: [id number]             EKG, Pathology, and Other Studies Reviewed on Admission:   · EKG:  Nonspecific changes    Allscripts / Epic Records Reviewed: Yes     ** Please Note: This note has been constructed using a voice recognition system   **

## 2019-07-19 NOTE — ASSESSMENT & PLAN NOTE
Lab Results   Component Value Date    HGBA1C 6 4 (H) 04/17/2018       No results for input(s): POCGLU in the last 72 hours      Blood Sugar Average: Last 72 hrs:   for now, sliding scale insulin and monitor

## 2019-07-19 NOTE — ASSESSMENT & PLAN NOTE
Accelerated hypertension in patient with renal artery stenosis, follows up with Nephrology as outpatient, never evaluated by vascular surgery  Difficult to control blood pressure with hyper and hypotension and 2 episode of syncope secondary to hypotension  Will ask Cardiology as well as Nephrology to evaluate the patient and will ask vascular surgery consultation  Keep on telemetry monitoring  Echocardiogram if deemed necessary by Cardiology  Continue to trend troponin as patient presented with transient left arm pain and nausea which could be related to elevated blood pressure, 1st set negative with nonspecific EKG changes  Patient is a 1 dose of p o   Clonidine in the ER, blood pressure currently 147/68  Will continue with home medication for now including lisinopril b i d , Coreg, discontinue chlorthalidone, continue with Catapres patch

## 2019-07-19 NOTE — ED ATTENDING ATTESTATION
Noah Avalos MD, saw and evaluated the patient  I have discussed the patient with the resident/non-physician practitioner and agree with the resident's/non-physician practitioner's findings, Plan of Care, and MDM as documented in the resident's/non-physician practitioner's note, except where noted  All available labs and Radiology studies were reviewed  I was present for key portions of any procedure(s) performed by the resident/non-physician practitioner and I was immediately available to provide assistance  At this point I agree with the current assessment done in the Emergency Department    I have conducted an independent evaluation of this patient a history and physical is as follows:  Elevated BP   Has Renal artery stenosis    Felt generalized weakness and took her bp which was elevated    Epigastric discomfort no CP  No sob  Has HA intermittently   Exam nad  HEENT normal neck supple no jvd  Lungs clear heart rrr no m abd soft nt nd pos bs   Imp HTN     Critical Care Time  Procedures

## 2019-07-19 NOTE — ED PROVIDER NOTES
History  Chief Complaint   Patient presents with    High Blood Pressure     pt states she started to feel unwell yesteday with generalized weakness and nausea that is getting worse  reports monitoring her BP which has be 383Y systolic since yesterday  c/o occasional headaches    Weakness - Generalized     Patient is an 78-year-old female smoker with history of hypertension, diabetes who presents for evaluation of generalized weakness, nausea, epigastric abdominal pain  Symptoms started yesterday evening with severe generalized weakness  Patient took her blood pressure at home and noted that it was quite high (>200)  She also a 15 minutes episode of left arm pain yesterday evening that resolved spontaneously and has not recurred  Weakness is persistent, severe  She also notes a mild headache and mild epigastric discomfort with nausea but no vomiting  Has taken her anti hypertensive medications as prescribed  Denies chest pain, shortness of breath, cough, fever/chills, leg swelling  Denies similar symptoms in the past           Prior to Admission Medications   Prescriptions Last Dose Informant Patient Reported? Taking?    LORazepam (ATIVAN) 0 5 mg tablet   Yes Yes   Sig: Take 0 5 mg by mouth 2 (two) times a day   Omega-3 Fatty Acids (FISH OIL PO) Not Taking at Unknown time Self Yes No   Sig: Take 1 g by mouth   apixaban (ELIQUIS) 5 mg   Yes Yes   Sig: Take 5 mg by mouth 2 (two) times a day   ascorbic acid (VITAMIN C) 500 mg tablet  Self Yes No   Sig: Take 500 mg by mouth daily   cholecalciferol (VITAMIN D3) 1,000 units tablet   Yes Yes   Sig: Take 2,000 Units by mouth 2 (two) times a day   cloNIDine (CATAPRES-TTS-1) 0 1 mg/24 hr   Yes Yes   Sig: Place 1 patch on the skin once a week   levothyroxine 125 mcg tablet   Yes Yes   Sig: Take 125 mcg by mouth daily   lisinopril (ZESTRIL) 20 mg tablet   No Yes   Sig: TAKE 1 TABLET (20 MG TOTAL) BY MOUTH 2 (TWO) TIMES A DAY   metFORMIN (GLUCOPHAGE) 500 mg tablet   Yes Yes   Sig: Take 500 mg by mouth daily with breakfast    multivitamin (THERAGRAN) TABS Not Taking at Unknown time Self Yes No   Sig: Take 1 tablet by mouth daily   pantoprazole (PROTONIX) 40 mg tablet   Yes Yes   Sig: Take 40 mg by mouth daily Pt takes daily when needed  Facility-Administered Medications: None       Past Medical History:   Diagnosis Date    Anxiety     [facility]     Bowel obstruction (HCC)     Cancer ([facility] )     LEFT BREAST CA 22 YEARS AGO     Depression     Diabetes mellitus ([place] [address] )     Disease of thyroid gland     Hypertension     Hypothyroidism        Past Surgical History:   Procedure Laterality Date    ABDOMINAL ADHESION SURGERY N/A 2/4/2018    Procedure: LYSIS ADHESIONS;  Surgeon: Kate Domingo DO;  [facility];  Service: General    BREAST SURGERY      CHOLECYSTECTOMY      COLON SURGERY  2017    EXPLORATORY LAPAROTOMY      JOINT REPLACEMENT      LEFT KNEE REPLACEMENT     LAPAROTOMY N/A 2/4/2018    Procedure: LAPAROTOMY EXPLORATORY,;  Surgeon: Kate Domingo DO;  [facility];  Service: General    MASTECTOMY      MASTECTOMY Left 1995    MASTECTOMY Right 2017    NH BIOPSY/EXCISION, LYMPH NODE(S) Right 1/13/2017    Procedure: SENTINEL LYMPH NODE BIOPSY RIGHT AXILLA; Surgeon: Savi Correia MD;  [facility];  Service: General    NH MASTECTOMY, SIMPLE, COMPLETE Right 1/13/2017    Procedure: MASTECTOMY SIMPLE;  Surgeon: Savi Correia MD;  [facility];  Service: General    SMALL INTESTINE SURGERY N/A 2/4/2018    Procedure: RESECTION SMALL BOWEL;  Surgeon: Kate Domingo DO;  [facility];  Service: General    US GUIDED BREAST BIOPSY RIGHT COMPLETE Right 11/29/2016       Family History   Problem Relation Age of Onset    Cancer Mother      I have reviewed and agree with the history as documented      Social History     Tobacco Use    Smoking status: Current Every Day Smoker     Packs/day: 0 50     Types: Cigarettes  Smokeless tobacco: Never Used   Substance Use Topics    Alcohol use: Never     Frequency: Never     Binge frequency: Never    Drug use: Never        Review of Systems   Constitutional: Positive for fatigue  Negative for chills and fever  HENT: Negative for congestion and sore throat  Eyes: Negative for visual disturbance  Respiratory: Negative for cough and shortness of breath  Cardiovascular: Negative for chest pain  Gastrointestinal: Positive for abdominal pain and nausea  Negative for diarrhea and vomiting  Endocrine: Negative for polyuria  Genitourinary: Negative for difficulty urinating and dysuria  Musculoskeletal: Negative for arthralgias  Skin: Negative for rash  Neurological: Positive for weakness  Negative for dizziness, tremors, syncope, speech difficulty, light-headedness, numbness and headaches  All other systems reviewed and are negative  Physical Exam  ED Triage Vitals   Temperature Pulse Respirations Blood Pressure SpO2   07/19/19 1322 07/19/19 1322 07/19/19 1322 07/19/19 1322 07/19/19 1322   97 8 °F (36 6 °C) 64 16 (!) 218/107 99 %      Temp Source Heart Rate Source Patient Position - Orthostatic VS BP Location FiO2 (%)   07/19/19 1322 07/19/19 1429 07/19/19 1429 07/19/19 1429 --   Oral Monitor Lying Right arm       Pain Score       07/19/19 1429       No Pain             Orthostatic Vital Signs  Vitals:    07/20/19 1720 07/20/19 2129 07/20/19 2247 07/21/19 0658   BP: 144/60 152/66 140/66 149/66   Pulse:  62 59 58   Patient Position - Orthostatic VS:           Physical Exam   Constitutional: She is oriented to person, place, and time  She appears well-developed and well-nourished  No distress  HENT:   Head: Normocephalic and atraumatic  Eyes: Pupils are equal, round, and reactive to light  EOM are normal    Neck: Normal range of motion  Neck supple  Cardiovascular: Normal rate, regular rhythm and normal heart sounds     Pulmonary/Chest: Effort normal and breath sounds normal  No respiratory distress  Abdominal: Soft  Bowel sounds are normal  There is no tenderness  Musculoskeletal: Normal range of motion  She exhibits no edema  Neurological: She is alert and oriented to person, place, and time  Skin: Skin is warm and dry  Psychiatric: She has a normal mood and affect  Nursing note and vitals reviewed        ED Medications  Medications   cloNIDine (CATAPRES) tablet 0 1 mg (0 1 mg Oral Given 7/19/19 1428)   LORazepam (ATIVAN) tablet 1 mg (1 mg Oral Given 7/19/19 1428)   potassium chloride (K-DUR,KLOR-CON) CR tablet 40 mEq (40 mEq Oral Given 7/19/19 1506)   tolvaptan (SAMSCA) split tablet 15 mg (15 mg Oral Given 7/20/19 1155)   torsemide (DEMADEX) tablet 10 mg (10 mg Oral Given 7/21/19 1016)       Diagnostic Studies  Results Reviewed     Procedure Component Value Units Date/Time    Basic metabolic panel [[id number]]  (Abnormal) Collected:  07/20/19 0524    Lab Status:  Final result Specimen:  Blood from Arm, Left Updated:  07/20/19 0383     Sodium 127 mmol/L      Potassium 4 3 mmol/L      Chloride 96 mmol/L      CO2 28 mmol/L      ANION GAP 3 mmol/L      BUN 19 mg/dL      Creatinine 0 81 mg/dL      Glucose 115 mg/dL      Calcium 9 3 mg/dL      eGFR 68 ml/min/1 73sq m     Narrative:       [place] guidelines for Chronic Kidney Disease (CKD):     Stage 1 with normal or high GFR (GFR > 90 mL/min/1 73 square meters)    Stage 2 Mild CKD (GFR = 60-89 mL/min/1 73 square meters)    Stage 3A Moderate CKD (GFR = 45-59 mL/min/1 73 square meters)    Stage 3B Moderate CKD (GFR = 30-44 mL/min/1 73 square meters)    Stage 4 Severe CKD (GFR = 15-29 mL/min/1 73 square meters)    Stage 5 End Stage CKD (GFR <15 mL/min/1 73 square meters)  Note: GFR calculation is accurate only with a steady state creatinine    Phosphorus [[id number]]  (Normal) Collected:  07/20/19 0524    Lab Status:  Final result Specimen:  Blood from Arm, Left Updated:  07/20/19 9656     Phosphorus 2 4 mg/dL     Magnesium [[id number]]  (Normal) Collected:  07/20/19 0524    Lab Status:  Final result Specimen:  Blood from Arm, Left Updated:  07/20/19 0619     Magnesium 1 8 mg/dL     CBC (With Platelets) [[id number]]  (Abnormal) Collected:  07/20/19 0524    Lab Status:  Final result Specimen:  Blood from Arm, Left Updated:  07/20/19 0607     WBC 7 05 Thousand/uL      RBC 3 74 Million/uL      Hemoglobin 11 5 g/dL      Hematocrit 34 0 %      MCV 91 fL      MCH 30 7 pg      MCHC 33 8 g/dL      RDW 14 4 %      Platelets 762 Thousands/uL      MPV 9 7 fL     Troponin I [[id number]]  (Normal) Collected:  07/19/19 2122    Lab Status:  Final result Specimen:  Blood from Arm, Left Updated:  07/19/19 2149     Troponin I <0 02 ng/mL     Troponin I [[id number]]  (Normal) Collected:  07/19/19 1825    Lab Status:  Final result Specimen:  Blood from Arm, Left Updated:  07/19/19 1900     Troponin I <0 02 ng/mL     Hemoglobin A1c w/EAG Estimation (Orders if not completed within the last 90 days) [[id number]] Collected:  07/19/19 1825    Lab Status:  Final result Specimen:  Blood from Arm, Left Updated:  07/19/19 1858     Hemoglobin A1C 5 6 %       mg/dl     Basic metabolic panel [[id number]]  (Abnormal) Collected:  07/19/19 1352    Lab Status:  Final result Specimen:  Blood from Arm, Left Updated:  07/19/19 1440     Sodium 130 mmol/L      Potassium 3 4 mmol/L      Chloride 95 mmol/L      CO2 28 mmol/L      ANION GAP 7 mmol/L      BUN 14 mg/dL      Creatinine 0 73 mg/dL      Glucose 108 mg/dL      Calcium 9 4 mg/dL      eGFR 77 ml/min/1 73sq m     Narrative:       [place] guidelines for Chronic Kidney Disease (CKD):     Stage 1 with normal or high GFR (GFR > 90 mL/min/1 73 square meters)    Stage 2 Mild CKD (GFR = 60-89 mL/min/1 73 square meters)    Stage 3A Moderate CKD (GFR = 45-59 mL/min/1 73 square meters)    Stage 3B Moderate CKD (GFR = 30-44 mL/min/1 73 square meters)   Stage 4 Severe CKD (GFR = 15-29 mL/min/1 73 square meters)    Stage 5 End Stage CKD (GFR <15 mL/min/1 73 square meters)  Note: GFR calculation is accurate only with a steady state creatinine    Troponin I [[id number]]  (Normal) Collected:  07/19/19 1352    Lab Status:  Final result Specimen:  Blood from Arm, Left Updated:  07/19/19 1428     Troponin I <0 02 ng/mL     CBC and differential [[id number]] Collected:  07/19/19 1352    Lab Status:  Final result Specimen:  Blood from Arm, Left Updated:  07/19/19 1404     WBC 7 70 Thousand/uL      RBC 4 01 Million/uL      Hemoglobin 12 5 g/dL      Hematocrit 36 0 %      MCV 90 fL      MCH 31 2 pg      MCHC 34 7 g/dL      RDW 14 4 %      MPV 9 7 fL      Platelets 677 Thousands/uL      nRBC 0 /100 WBCs      Neutrophils Relative 68 %      Immat GRANS % 0 %      Lymphocytes Relative 17 %      Monocytes Relative 12 %      Eosinophils Relative 2 %      Basophils Relative 1 %      Neutrophils Absolute 5 22 Thousands/µL      Immature Grans Absolute 0 02 Thousand/uL      Lymphocytes Absolute 1 33 Thousands/µL      Monocytes Absolute 0 95 Thousand/µL      Eosinophils Absolute 0 14 Thousand/µL      Basophils Absolute 0 04 Thousands/µL                  X-ray chest 2 views   ED Interpretation by Lis Tellez MD (07/19 1442)   No acute abnormality      Final Result by Marce Ham MD (07/19 1613)      No acute cardiopulmonary disease              Workstation performed: [id number]         VAS carotid complete study    (Results Pending)         Procedures  Procedures        ED Course         HEART Risk Score      Most Recent Value   History  1 Filed at: 07/19/2019 1443   ECG  0 Filed at: 07/19/2019 1443   Age  2 Filed at: 07/19/2019 1443   Risk Factors  2 Filed at: 07/19/2019 1443   Troponin  0 Filed at: 07/19/2019 1443   Heart Score Risk Calculator   History  1 Filed at: 07/19/2019 1443   ECG  0 Filed at: 07/19/2019 1443   Age  2 Filed at: 07/19/2019 1443   Risk Factors  2 Filed at: 07/19/2019 1443   Troponin  0 Filed at: 07/19/2019 1443   HEART Score  5 Filed at: 07/19/2019 1443   HEART Score  5 Filed at: 07/19/2019 1443        Identification of Seniors at Risk      Most Recent Value   (ISAR) Identification of Seniors at Risk   Before the illness or injury that brought you to the Emergency, did you need someone to help you on a regular basis? 0 Filed at: 07/19/2019 1326   In the last 24 hours, have you needed more help than usual?  0 Filed at: 07/19/2019 1326   Have you been hospitalized for one or more nights during the past 6 months? 0 Filed at: 07/19/2019 1326   In general, do you see well?  0 Filed at: 07/19/2019 1326   In general, do you have serious problems with your memory? 0 Filed at: 07/19/2019 1326   Do you take more than three different medications every day? 1 Filed at: 07/19/2019 1326   ISAR Score  1 Filed at: 07/19/2019 1326                          MDM  Number of Diagnoses or Management Options  High blood pressure:   Weakness:   Diagnosis management comments: Patient is an 25-year-old female smoker with history of hypertension, diabetes who presents for evaluation of generalized weakness, nausea, epigastric abdominal pain  Exam shows hypertension; otherwise unremarkable  Concerning for chest pain equivalent given age and risk factors  Cardiac workup obtained and is unremarkable  Heart score 5  Will admit for further care          Disposition  Final diagnoses:   Weakness   High blood pressure     Time reflects when diagnosis was documented in both MDM as applicable and the Disposition within this note     Time User Action Codes Description Comment    7/19/2019  2:42 PM Marie Brunner Add [R53 1] Weakness     7/19/2019  2:43 PM Bethany Brunner Add [I10] High blood pressure     7/19/2019  3:57 PM Lord Salomon Add [I70 1] Renal artery stenosis(HCC)     7/19/2019  3:57 PM Lord Salomon Add [I15 0] Renovascular hypertension     7/20/2019  9:58 PM Lorie Rodríguez Sarah Add [E97 716] Hordeolum externum of right upper eyelid     7/21/2019  2:25 PM Tracey Summers Add [I16 0] Hypertensive urgency       ED Disposition     ED Disposition Condition Date/Time Comment    Admit Stable Fri Jul 19, 2019  3:07 PM Case was discussed with BRIT and the patient's admission status was agreed to be Admission Status: observation status to the service of Dr Delmis Centeno           Follow-up Information     Follow up With Specialties Details Why Andrey Bartlett MD Vascular Surgery, Radiology Follow up in 1 month(s) Please get carotid artery ultrasound prior to follow up visit to discuss the results of this study [address] [facility]  [address] [facility] [address] [facility]      Timbo Miller, [place] [facility] Follow up  [address] [place] [address],Third Floor, [facility] [id number] Aren Barba DO Cardiology, Multidisciplinary Follow up  [facility] 149 [address] [facility]  [phone]      Jannette Samuel MD Nephrology Follow up  [facility]  [address] [facility] [address]            Discharge Medication List as of 7/21/2019  2:39 PM      START taking these medications    Details   amLODIPine (NORVASC) 2 5 mg tablet Take 1 tablet (2 5 mg total) by mouth 2 (two) times a day for 120 doses, Starting Sun 7/21/2019, Until Thu 9/19/2019, Normal      atorvastatin (LIPITOR) 40 mg tablet Take 1 tablet (40 mg total) by mouth every evening, Starting Sun 7/21/2019, Normal      labetalol (NORMODYNE) 200 mg tablet Take 1 tablet (200 mg total) by mouth every 12 (twelve) hours, Starting Sun 7/21/2019, Normal         CONTINUE these medications which have NOT CHANGED    Details   apixaban (ELIQUIS) 5 mg Take 5 mg by mouth 2 (two) times a day, Historical Med      ascorbic acid (VITAMIN C) 500 mg tablet Take 500 mg by mouth daily, Historical Med      cholecalciferol (VITAMIN D3) 1,000 units tablet Take 2,000 Units by mouth 2 (two) times a day, Historical Med      cloNIDine (CATAPRES-TTS-1) 0 1 mg/24 hr Place 1 patch on the skin once a week, Historical Med      levothyroxine 125 mcg tablet Take 125 mcg by mouth daily, Historical Med      lisinopril (ZESTRIL) 20 mg tablet TAKE 1 TABLET (20 MG TOTAL) BY MOUTH 2 (TWO) TIMES A DAY, Starting Mon 5/13/2019, Normal      LORazepam (ATIVAN) 0 5 mg tablet Take 0 5 mg by mouth 2 (two) times a day, Historical Med      metFORMIN (GLUCOPHAGE) 500 mg tablet Take 500 mg by mouth daily with breakfast , Historical Med      multivitamin (THERAGRAN) TABS Take 1 tablet by mouth daily, Historical Med      Omega-3 Fatty Acids (FISH OIL PO) Take 1 g by mouth, Historical Med      pantoprazole (PROTONIX) 40 mg tablet Take 40 mg by mouth daily Pt takes daily when needed , Historical Med           Outpatient Discharge Orders   Discharge Diet     Activity as tolerated     VAS carotid complete study   Standing Status: Future Standing Exp  Date: 07/20/23       ED Provider  Attending physically available and evaluated Tawanna Yusuf I managed the patient along with the ED Attending      Electronically Signed by         Francisco De La Garza MD  07/25/19 4468

## 2019-07-19 NOTE — CONSULTS
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)  [id number] bypass (2004)      Plan:  -Recommend maximal medical therapy for HTN  -Will review recent renal artery duplex  -Question repeat given study completed 4 months ago  -Will discuss with attending role for renal arteriogram and possible stenting      History of Present Illness   Physician Requesting Consult: Nakul Shen MD  Reason for Consult / Principal Problem: refractory hypertension with history of bilateral renal artery stenosis  HPI: Momo Dozier is a 78y o  year old female who presented to the ED earlier today for HTN, weakness in her lower extremities, fatigue and mild intermittent headaches over the last 2 days  She took her blood pressure this morning at home and it was 836P systolic  She subsequently called her PCP who told her to come to the ED for evaluation  Vascular surgery was consulted for additional input in controlling her HTN in the setting of bilateral renal artery stenosis  She denies neurological symptoms, chest pain, SOB, abdominal pain, or any other complaints at this time  Consults    Review of Systems   Constitutional: Positive for fatigue  Negative for chills and fever  HENT: Negative  Eyes: Negative  Respiratory: Negative for cough, chest tightness and shortness of breath  Cardiovascular: Negative for chest pain and palpitations  Gastrointestinal: Negative for abdominal pain, blood in stool, constipation, diarrhea, nausea and vomiting  Endocrine: Negative  Genitourinary: Negative  Negative for difficulty urinating  Musculoskeletal: Negative  Negative for arthralgias  Skin: Negative  Negative for rash  Allergic/Immunologic: Negative  Neurological: Positive for weakness (bilateral lower extremities)  Negative for dizziness, tremors, seizures, syncope, facial asymmetry, speech difficulty, light-headedness and numbness  Hematological: Negative  Psychiatric/Behavioral: Negative  Historical Information   Past Medical History:   Diagnosis Date    Anxiety     [facility] fibrillation [facility] )     Bowel obstruction ([facility] )     Cancer ([place] [address] )     LEFT BREAST CA 22 YEARS AGO     Depression     Diabetes mellitus ([place] [address] )     Disease of thyroid gland     Hypertension     Hypothyroidism      Past Surgical History:   Procedure Laterality Date    ABDOMINAL ADHESION SURGERY N/A 2/4/2018    Procedure: LYSIS ADHESIONS;  Surgeon: Summer Harrington DO;  [facility];  Service: General    BREAST SURGERY      CHOLECYSTECTOMY      COLON SURGERY  2017    EXPLORATORY LAPAROTOMY      JOINT REPLACEMENT      LEFT KNEE REPLACEMENT     LAPAROTOMY N/A 2/4/2018    Procedure: LAPAROTOMY EXPLORATORY,;  Surgeon: Summer Harrington DO;  [facility];  Service: General    MASTECTOMY      MASTECTOMY Left 1995    MASTECTOMY Right 2017    ID BIOPSY/EXCISION, LYMPH NODE(S) Right 1/13/2017    Procedure: SENTINEL LYMPH NODE BIOPSY RIGHT AXILLA;   Surgeon: Shaun Hinton MD;  [facility];  Service: General    ID MASTECTOMY, SIMPLE, COMPLETE Right 1/13/2017    Procedure: MASTECTOMY SIMPLE;  Surgeon: Shaun Hinton MD;  [facility];  Service: General    SMALL INTESTINE SURGERY N/A 2/4/2018    Procedure: RESECTION SMALL BOWEL;  Surgeon: Summer Harrington DO;  [facility];  Service: General    US GUIDED BREAST BIOPSY RIGHT COMPLETE Right 11/29/2016     Social History   Social History     Substance and Sexual Activity   Alcohol Use Never    Frequency: Never    Binge frequency: Never     Social History     Substance and Sexual Activity   Drug Use Never     Social History     Tobacco Use   Smoking Status Current Every Day Smoker    Packs/day: 0 50    Types: Cigarettes   Smokeless Tobacco Never Used     Family History: non-contributory}    Meds/Allergies   all current active meds have been reviewed  Allergies   Allergen Reactions    Meloxicam GI Intolerance    Morphine GI Intolerance    Morphine     Penicillins     Percocet [Oxycodone-Acetaminophen]     Sitagliptin      SOB       Objective   Vitals: Blood pressure (!) 195/89, pulse 61, temperature 97 8 °F (36 6 °C), temperature source Oral, resp  rate 19, weight 62 1 kg (137 lb), SpO2 98 %  ,Body mass index is 22 8 kg/m²  No intake or output data in the 24 hours ending 07/19/19 1719  Invasive Devices     Peripheral Intravenous Line            Peripheral IV 07/19/19 Left Forearm less than 1 day                Physical Exam   Constitutional: She is oriented to person, place, and time  She appears well-developed and well-nourished  No distress  HENT:   Head: Normocephalic and atraumatic  Eyes: Pupils are equal, round, and reactive to light  Conjunctivae and EOM are normal    Neck: Normal range of motion  Neck supple  Cardiovascular: Normal rate, regular rhythm, normal heart sounds and intact distal pulses  Carotid bruits auscultated bilaterally  2+ DP/PT bilaterally  2+ popliteal/femoral bilaterally    Pulmonary/Chest: Effort normal and breath sounds normal  No stridor  No respiratory distress  Abdominal: Soft  Bowel sounds are normal  She exhibits no distension and no mass  There is no tenderness  There is no guarding  Musculoskeletal: Normal range of motion  She exhibits no edema  Neurological: She is alert and oriented to person, place, and time  No cranial nerve deficit or sensory deficit  She exhibits normal muscle tone   Coordination normal    Skin: Skin is warm and dry  Capillary refill takes less than 2 seconds  She is not diaphoretic  Psychiatric: She has a normal mood and affect  Her behavior is normal        Lab Results:   Lab Results   Component Value Date    WBC 7 70 07/19/2019    HGB 12 5 07/19/2019    HCT 36 0 07/19/2019    MCV 90 07/19/2019     07/19/2019    MCH 31 2 07/19/2019    MCHC 34 7 07/19/2019    RDW 14 4 07/19/2019    MPV 9 7 07/19/2019    NRBC 0 07/19/2019   , BMP/CMP:   Lab Results   Component Value Date    SODIUM 130 (L) 07/19/2019    K 3 4 (L) 07/19/2019    CL 95 (L) 07/19/2019    CO2 28 07/19/2019    BUN 14 07/19/2019    CREATININE 0 73 07/19/2019    CALCIUM 9 4 07/19/2019    EGFR 77 07/19/2019     Imaging Studies:  3/15/2019 VAS Renal artery:   -RIGHT RENAL:  There is a >60% stenosis in the main renal artery  Patent renal vein  Adequate parenchymal flow noted with a renovascular resistive index of 0 75  Renal/Aorta Ratio: 3 87  The Kidney measures 12 9 cm, Prior: 12 3 cm  -LEFT RENAL:  There is a >60% stenosis in the main renal artery  Patent renal vein  Adequate parenchymal flow noted with a renovascular resistive index of 0 71  Renal/Aorta Ratio: 5 85  The Kidney measures 10 51 cm, Prior: 9 8 cm  -MESENTERIC:  >70% stenosis noted in the celiac trunk and superior mesenteric artery  EKG, Pathology, and Other Studies: I have personally reviewed pertinent reports         Code Status: Level 1 - Full Code

## 2019-07-19 NOTE — CONSULTS
Consultation - Cardiology Team One  Nghia Tracy 78 y o  female MRN: [id number]  Unit/Bed#: ED 29 Encounter: [id number]    Inpatient consult to Cardiology  Consult performed by: RUCHI Perez  Consult ordered by: Yohana Gerber MD      Physician Requesting Consult: Rene Rodriguez MD  Reason for Consult / Principal Problem: high blood pressure    Assessment/ Plan    Hypertensive urgency: /109, presents with feeling of weakness  Has had medications adjusted recently, namely switching from oral clonidine to patch  Then back to oral clonidine since she felt the patch was not working but then switched back to patch again yesterday  She also remains on carvedilol 12 5 mg b i d , lisinopril 20 mg b i d , and amlodipine 2 5 mg daily  She has fallen twice in past month or so due to hypotension and weakness  She feels her fear of falling because of her labile BPs is effecting her quality of life and she has started limiting her activities outside her apartment  · Received clonidine 0 1 mg tablets and lorazepam 1 mg in the ER  · Last /79  · Continue rest of home regimen, start amlodipine 5 mg daily  PRN labetalol  · Recommend ambulatory blood pressure monitor  Preserved biventricular systolic function:  EF 97% with no wall motion abnormalities in 04/2018  Renal artery stenosis:  Follows with nephrologist Dr Avila at [facility] who manages antihypertensive medications  Last office visit 6/26/19 at which time it was felt treating B/L ROS would not help with BPs  Nephrology and vascular surgery consulted  Paroxysmal atrial fibrillation:  Currently maintaining sinus rhythm on carvedilol 12 5 mg b i d  Anticoagulated on Eliquis 5 mg b i d  CKD stage 2:  Baseline creatinine 0 9-1 0  Creatinine 0 73 today    DM2: A1C 6 4 in 4/2018  Management per primary team     Chronic Hyponatremia: Na 130 today  Follows with Nephrology   Pt states she has stopped her diuretics due to hyponatremia  Hypokalemia:  Potassium 3 4  Repleted with 40 mEq  Maintain potassium >4    History of Present Illness   HPI: Asif Anna is a 78y o  year old female who has a history of hypertension, renal artery stenosis, paroxysmal atrial fibrillation on Eliquis, CKD stage 2, type 2 DM, and hyponatremia  She follows with cardiologist Dr Meliza Car and was last seen in the office April 2019  [facility] nephrology Dr Tashi Navarrete has been managing her anti-hypertensive regimen  Metanephrine, cortisol, renin, aldosterone all normal per his notes and he doubts fixing her b/l ROS will help  He switched her from oral clonidine to clonidine patch at last visit 6/26/19  This is in addition to her other medications: carvedilol 12 5 mg BID, chlorthalidone 25mg daily, lisinopril 20 mg BID, and spironolactone 12 5 mg daily  Limited TTE in April 2018 demonstrated LVEF 70%, no RWMA, mild MR, mild to moderate TR with estimated peak PA pressure of 50 mmHg  NST 5/2017- nonischemic  She presented to [facility] ED with weakness and elevated BP at home  She was recently treated for suspected UTI with Bactrim  She saw PCP and c/o fall due to fluctuating BPs on clonidine patch and she was switched back to oral clonidine 0 1 mg BID as of 7/15  She removed her clonidine patch yesterday  Blood pressure on arrival 218/107, most recent /89  Treatment thus far in the ED has included clonidine 0 1 mg and lorazepam 1 mg  Hypokalemia replete (K3 4)  Renal function and CBC stable, troponin <0 02  Cardiology has been consulted due to elevated blood pressure  EKG reviewed personally: NSR with sinus arrhythmia, lateral T wave abnormality    Telemetry reviewed personally: NSR    Review of Systems   Constitution: Negative for chills, diaphoresis and weight gain  Cardiovascular: Positive for leg swelling (Had R leg erythema and swelling a few days ago, resolved on its own)   Negative for chest pain, dyspnea on exertion, orthopnea, palpitations and syncope  Respiratory: Negative for cough, shortness of breath and sputum production  Gastrointestinal: Negative for bloating, nausea and vomiting  Neurological: Positive for weakness  Negative for dizziness and light-headedness  Psychiatric/Behavioral: The patient is nervous/anxious  All other systems reviewed and are negative  Historical Information   Past Medical History:   Diagnosis Date    Anxiety     [facility] fibrillation [facility]     [facility]     [facility] )     LEFT BREAST CA 22 YEARS AGO     Depression     Diabetes mellitus ([facility] )     Disease of thyroid gland     Hypertension     Hypothyroidism      Past Surgical History:   Procedure Laterality Date    ABDOMINAL ADHESION SURGERY N/A 2/4/2018    Procedure: LYSIS ADHESIONS;  Surgeon: Adry Smith DO;  [facility];  Service: General    BREAST SURGERY      CHOLECYSTECTOMY      COLON SURGERY  2017    EXPLORATORY LAPAROTOMY      JOINT REPLACEMENT      LEFT KNEE REPLACEMENT     LAPAROTOMY N/A 2/4/2018    Procedure: LAPAROTOMY EXPLORATORY,;  Surgeon: Adry Smith DO;  [facility];  Service: General    MASTECTOMY      MASTECTOMY Left 1995    MASTECTOMY Right 2017    MO BIOPSY/EXCISION, LYMPH NODE(S) Right 1/13/2017    Procedure: SENTINEL LYMPH NODE BIOPSY RIGHT AXILLA;   Surgeon: Madan Yo MD;  [facility];  Service: General    MO MASTECTOMY, SIMPLE, COMPLETE Right 1/13/2017    Procedure: MASTECTOMY SIMPLE;  Surgeon: Madan Yo MD;  [facility];  Service: General    SMALL INTESTINE SURGERY N/A 2/4/2018    Procedure: RESECTION SMALL BOWEL;  Surgeon: Adry Smith DO;  [facility];  Service: General    US GUIDED BREAST BIOPSY RIGHT COMPLETE Right 11/29/2016     Social History     Substance and Sexual Activity   Alcohol Use Never    Frequency: Never    Binge frequency: Never     Social History     Substance and Sexual Activity   Drug Use Never     Social History Tobacco Use   Smoking Status Current Every Day Smoker    Packs/day: 0 50    Types: Cigarettes   Smokeless Tobacco Never Used     Family History:   Family History   Problem Relation Age of Onset    Cancer Mother        Meds/Allergies   all current active meds have been reviewed and current meds:   Current Facility-Administered Medications   Medication Dose Route Frequency    apixaban (ELIQUIS) tablet 5 mg  5 mg Oral BID    ascorbic acid (VITAMIN C) tablet 500 mg  500 mg Oral Daily    [START ON 7/20/2019] aspirin chewable tablet 81 mg  81 mg Oral Daily    atorvastatin (LIPITOR) tablet 40 mg  40 mg Oral QPM    carvedilol (COREG) tablet 12 5 mg  12 5 mg Oral BID With Meals    cholecalciferol (VITAMIN D3) tablet 2,000 Units  2,000 Units Oral Daily    [START ON 7/20/2019] cloNIDine (CATAPRES) tablet 0 2 mg  0 2 mg Oral Daily    fish oil capsule 1,000 mg  1,000 mg Oral Daily    hydrALAZINE (APRESOLINE) tablet 10 mg  10 mg Oral Q8H [address]    insulin lispro (HumaLOG) 100 units/mL subcutaneous injection 1-5 Units  1-5 Units Subcutaneous TID AC    insulin lispro (HumaLOG) 100 units/mL subcutaneous injection 1-5 Units  1-5 Units Subcutaneous HS    levothyroxine tablet 125 mcg  125 mcg Oral Daily    lisinopril (ZESTRIL) tablet 20 mg  20 mg Oral BID    LORazepam (ATIVAN) tablet 0 5 mg  0 5 mg Oral Daily PRN    LORazepam (ATIVAN) tablet 0 5 mg  0 5 mg Oral BID    nitroglycerin (NITROSTAT) SL tablet 0 4 mg  0 4 mg Sublingual Q5 Min PRN    nortriptyline (PAMELOR) capsule 75 mg  75 mg Oral HS    pantoprazole (PROTONIX) EC tablet 40 mg  40 mg Oral Daily    potassium chloride (K-DUR,KLOR-CON) CR tablet 20 mEq  20 mEq Oral Daily        Allergies   Allergen Reactions    Meloxicam GI Intolerance    Morphine GI Intolerance    Morphine     Norvasc [Amlodipine] Edema    Penicillins     Percocet [Oxycodone-Acetaminophen]     Sitagliptin      SOB     Objective   Vitals: Blood pressure (!) 195/89, pulse 61, temperature 97 8 °F (36 6 °C), temperature source Oral, resp  rate 19, weight 62 1 kg (137 lb), SpO2 98 %  ,     Body mass index is 22 8 kg/m²  ,     Systolic (58WQO), NKD:689 , Min:195 , HIZ:958     Diastolic (01AKE), BDI:647, Min:89, Max:109    No intake or output data in the 24 hours ending 07/19/19 1535  Weight (last 2 days)     Date/Time   Weight    07/19/19 1322   62 1 (137)            Invasive Devices     Peripheral Intravenous Line            Peripheral IV 07/19/19 Left Forearm less than 1 day              Physical Exam   Constitutional: She is oriented to person, place, and time  No distress  Neck: Neck supple  No JVD present  Cardiovascular: Normal rate, regular rhythm, normal heart sounds and intact distal pulses  Exam reveals no gallop  No murmur heard  Pulmonary/Chest: Effort normal  No respiratory distress  She has no rales  Clear, room air  Abdominal: Soft  Bowel sounds are normal  She exhibits no distension  Musculoskeletal: She exhibits no edema  Neurological: She is alert and oriented to person, place, and time  Skin: Skin is warm and dry  She is not diaphoretic  No erythema  Psychiatric: She has a normal mood and affect       LABORATORY RESULTS:  Results from last 7 days   Lab Units 07/19/19  1352   TROPONIN I ng/mL <0 02     CBC with diff:   Results from last 7 days   Lab Units 07/19/19  1352   WBC Thousand/uL 7 70   HEMOGLOBIN g/dL 12 5   HEMATOCRIT % 36 0   MCV fL 90   PLATELETS Thousands/uL 279   MCH pg 31 2   MCHC g/dL 34 7   RDW % 14 4   MPV fL 9 7   NRBC AUTO /100 WBCs 0     CMP:  Results from last 7 days   Lab Units 07/19/19  1352   POTASSIUM mmol/L 3 4*   CHLORIDE mmol/L 95*   CO2 mmol/L 28   BUN mg/dL 14   CREATININE mg/dL 0 73   CALCIUM mg/dL 9 4   EGFR ml/min/1 73sq m 77     BMP:  Results from last 7 days   Lab Units 07/19/19  1352   POTASSIUM mmol/L 3 4*   CHLORIDE mmol/L 95*   CO2 mmol/L 28   BUN mg/dL 14   CREATININE mg/dL 0 73   CALCIUM mg/dL 9 4      Lab Results Component Value Date    NTBNP 723 (H) 2016       Lipid Profile:   No results found for: CHOL  Lab Results   Component Value Date    HDL 45 2016     Lab Results   Component Value Date    LDLCALC 85 2016     Lab Results   Component Value Date    TRIG 71 2018    TRIG 81 2016     Cardiac testing:   Results for orders placed during the hospital encounter of 18   Echo complete with contrast if indicated    Narrative [place] [address] [place] [address]  [phone]    Transthoracic Echocardiogram  2D, M-mode, Doppler, and Color Doppler    Study date:  2018    Patient: Belem Ware  MR number: [id number]  Account number: [de-identified]  : 1940  Age: 76 years  Gender: Female  Status: Inpatient  Location: Bedside  Height: 66 in  Weight: 142 lb  BP: 132/ 63 mmHg    Indications: Atrial fibrillation    Diagnoses: I48 0 - Atrial fibrillation    Sonographer:  CAROLA Jiménez, RDCS  Primary Physician:  Madelyn Rey DO  Referring Physician:  Chema Gonzales MD  Group:  [id number]  Interpreting Physician:  Sp Castanon MD    SUMMARY    LEFT VENTRICLE:  Systolic function was hyperdynamic by visual assessment  Ejection fraction was estimated to be 70 %  There were no regional wall motion abnormalities  Wall thickness was mildly increased  Left ventricular diastolic function parameters were normal for atrial fibrillation  RIGHT VENTRICLE:  The size was normal   Systolic function was normal     MITRAL VALVE:  There was mild regurgitation  TRICUSPID VALVE:  There was trace regurgitation  HISTORY: PRIOR HISTORY: Hypertension; Hypothryoid; Diabetes Mellitus; Left breast cancer (22years ago); Smoker    PROCEDURE: The procedure was performed at the bedside  This was a routine study  The transthoracic approach was used   The study included complete 2D imaging, M-mode, complete spectral Doppler, and color Doppler  Images were obtained from  the parasternal, apical, subcostal, and suprasternal notch acoustic windows  Echocardiographic views were limited due to poor acoustic window availability  Image quality was adequate  LEFT VENTRICLE: Size was normal  Systolic function was hyperdynamic by visual assessment  Ejection fraction was estimated to be 70 %  There were no regional wall motion abnormalities  Wall thickness was mildly increased  DOPPLER: Left  ventricular diastolic function parameters were normal for atrial fibrillation  RIGHT VENTRICLE: The size was normal  Systolic function was normal  Wall thickness was normal     LEFT ATRIUM: Size was normal     RIGHT ATRIUM: Size was normal     MITRAL VALVE: Valve structure was normal  There was normal leaflet separation  DOPPLER: The transmitral velocity was within the normal range  There was no evidence for stenosis  There was mild regurgitation  AORTIC VALVE: The valve was trileaflet  Leaflets exhibited normal thickness and normal cuspal separation  DOPPLER: Transaortic velocity was within the normal range  There was no evidence for stenosis  There was no significant  regurgitation  TRICUSPID VALVE: The valve structure was normal  There was normal leaflet separation  DOPPLER: The transtricuspid velocity was within the normal range  There was no evidence for stenosis  There was trace regurgitation  Estimated peak PA  pressure was 37 mmHg  PULMONIC VALVE: Leaflets exhibited normal thickness, no calcification, and normal cuspal separation  DOPPLER: The transpulmonic velocity was within the normal range  There was no significant regurgitation  PERICARDIUM: There was no pericardial effusion  The pericardium was normal in appearance  AORTA: The root exhibited normal size  SYSTEMIC VEINS: IVC: The inferior vena cava was normal in size   Respirophasic changes were normal     MEASUREMENT TABLES    OTHER ECHO MEASUREMENTS  (Reference normals)  Estimated CVP   5 mmHg   (--)    SYSTEM MEASUREMENT TABLES    2D  %FS: 29 99 %  Ao Diam: 3 19 cm  EDV(Teich): 71 54 ml  EF(Teich): 57 73 %  ESV(Teich): 30 24 ml  IVSd: 1 13 cm  LA Area: 16 73 cm2  LA Diam: 3 38 cm  LVEDV MOD A4C: 48 ml  LVEF MOD A4C: 71 93 %  LVESV MOD A4C: 13 48 ml  LVIDd: 4 04 cm  LVIDs: 2 83 cm  LVLd A4C: 6 47 cm  LVLs A4C: 5 29 cm  LVPWd: 1 06 cm  RA Area: 13 16 cm2  RVIDd: 3 45 cm  SV MOD A4C: 34 53 ml  SV(Teich): 41 3 ml    CW  TR Vmax: 2 91 m/s  TR maxP 78 mmHg    MM  TAPSE: 1 72 cm    PW  E': 0 09 m/s  E/E': 12 4  MV A Mika: 0 m/s  MV Dec [place]: 5 33 m/s2  MV DecT: 199 99 ms  MV E Mika: 1 07 m/s  MV E/A Ratio: 1088  2  MV PHT: 58 ms  MVA By PHT: 3 79 cm2    [employer] Accredited Echocardiography Laboratory    Prepared and electronically signed by    Valentino Hosteller, MD  Signed 10-Feb-2018 18:24:16       Imaging: I have personally reviewed pertinent reports  No results found  Thank you for allowing us to participate in this patient's care  This pt will follow up with Dr Yamila Ballard    once discharged  Counseling / Coordination of Care  Total floor / unit time spent today 45 minutes  Greater than 50% of total time was spent with the patient and / or family counseling and / or coordination of care  A description of the counseling / coordination of care: Review of history, current assessment, development of a plan  Code Status: Level 1 - Full Code    ** Please Note: Dragon 360 Dictation voice to text software may have been used in the creation of this document   **

## 2019-07-20 PROBLEM — I16.0 HYPERTENSIVE URGENCY: Status: ACTIVE | Noted: 2019-07-20

## 2019-07-20 LAB
ANION GAP SERPL CALCULATED.3IONS-SCNC: 3 MMOL/L (ref 4–13)
ATRIAL RATE: 61 BPM
BUN SERPL-MCNC: 19 MG/DL (ref 5–25)
CALCIUM SERPL-MCNC: 9.3 MG/DL (ref 8.3–10.1)
CHLORIDE SERPL-SCNC: 96 MMOL/L (ref 100–108)
CO2 SERPL-SCNC: 28 MMOL/L (ref 21–32)
CREAT SERPL-MCNC: 0.81 MG/DL (ref 0.6–1.3)
ERYTHROCYTE [DISTWIDTH] IN BLOOD BY AUTOMATED COUNT: 14.4 % (ref 11.6–15.1)
GFR SERPL CREATININE-BSD FRML MDRD: 68 ML/MIN/1.73SQ M
GLUCOSE SERPL-MCNC: 115 MG/DL (ref 65–140)
GLUCOSE SERPL-MCNC: 116 MG/DL (ref 65–140)
GLUCOSE SERPL-MCNC: 117 MG/DL (ref 65–140)
GLUCOSE SERPL-MCNC: 119 MG/DL (ref 65–140)
GLUCOSE SERPL-MCNC: 135 MG/DL (ref 65–140)
HCT VFR BLD AUTO: 34 % (ref 34.8–46.1)
HGB BLD-MCNC: 11.5 G/DL (ref 11.5–15.4)
MAGNESIUM SERPL-MCNC: 1.8 MG/DL (ref 1.6–2.6)
MCH RBC QN AUTO: 30.7 PG (ref 26.8–34.3)
MCHC RBC AUTO-ENTMCNC: 33.8 G/DL (ref 31.4–37.4)
MCV RBC AUTO: 91 FL (ref 82–98)
P AXIS: 87 DEGREES
PHOSPHATE SERPL-MCNC: 2.4 MG/DL (ref 2.3–4.1)
PLATELET # BLD AUTO: 282 THOUSANDS/UL (ref 149–390)
PMV BLD AUTO: 9.7 FL (ref 8.9–12.7)
POTASSIUM SERPL-SCNC: 4.3 MMOL/L (ref 3.5–5.3)
PR INTERVAL: 208 MS
QRS AXIS: 90 DEGREES
QRSD INTERVAL: 96 MS
QT INTERVAL: 430 MS
QTC INTERVAL: 432 MS
RBC # BLD AUTO: 3.74 MILLION/UL (ref 3.81–5.12)
SODIUM SERPL-SCNC: 127 MMOL/L (ref 136–145)
T WAVE AXIS: 65 DEGREES
VENTRICULAR RATE: 61 BPM
WBC # BLD AUTO: 7.05 THOUSAND/UL (ref 4.31–10.16)

## 2019-07-20 PROCEDURE — 84100 ASSAY OF PHOSPHORUS: CPT | Performed by: INTERNAL MEDICINE

## 2019-07-20 PROCEDURE — 80048 BASIC METABOLIC PNL TOTAL CA: CPT | Performed by: INTERNAL MEDICINE

## 2019-07-20 PROCEDURE — 99215 OFFICE O/P EST HI 40 MIN: CPT | Performed by: INTERNAL MEDICINE

## 2019-07-20 PROCEDURE — 99232 SBSQ HOSP IP/OBS MODERATE 35: CPT | Performed by: INTERNAL MEDICINE

## 2019-07-20 PROCEDURE — G8987 SELF CARE CURRENT STATUS: HCPCS

## 2019-07-20 PROCEDURE — G8988 SELF CARE GOAL STATUS: HCPCS

## 2019-07-20 PROCEDURE — 82948 REAGENT STRIP/BLOOD GLUCOSE: CPT

## 2019-07-20 PROCEDURE — G8989 SELF CARE D/C STATUS: HCPCS

## 2019-07-20 PROCEDURE — 93010 ELECTROCARDIOGRAM REPORT: CPT | Performed by: INTERNAL MEDICINE

## 2019-07-20 PROCEDURE — 83735 ASSAY OF MAGNESIUM: CPT | Performed by: INTERNAL MEDICINE

## 2019-07-20 PROCEDURE — 99232 SBSQ HOSP IP/OBS MODERATE 35: CPT | Performed by: PHYSICIAN ASSISTANT

## 2019-07-20 PROCEDURE — 97166 OT EVAL MOD COMPLEX 45 MIN: CPT

## 2019-07-20 PROCEDURE — 85027 COMPLETE CBC AUTOMATED: CPT | Performed by: INTERNAL MEDICINE

## 2019-07-20 PROCEDURE — 99222 1ST HOSP IP/OBS MODERATE 55: CPT | Performed by: SURGERY

## 2019-07-20 RX ORDER — LABETALOL 200 MG/1
200 TABLET, FILM COATED ORAL EVERY 12 HOURS SCHEDULED
Status: DISCONTINUED | OUTPATIENT
Start: 2019-07-20 | End: 2019-07-21 | Stop reason: HOSPADM

## 2019-07-20 RX ADMIN — VITAMIN D, TAB 1000IU (100/BT) 2000 UNITS: 25 TAB at 09:43

## 2019-07-20 RX ADMIN — LORAZEPAM 0.5 MG: 0.5 TABLET ORAL at 09:43

## 2019-07-20 RX ADMIN — LORAZEPAM 0.5 MG: 0.5 TABLET ORAL at 17:20

## 2019-07-20 RX ADMIN — CARVEDILOL 12.5 MG: 12.5 TABLET, FILM COATED ORAL at 06:32

## 2019-07-20 RX ADMIN — LEVOTHYROXINE SODIUM 125 MCG: 125 TABLET ORAL at 08:03

## 2019-07-20 RX ADMIN — ATORVASTATIN CALCIUM 40 MG: 40 TABLET, FILM COATED ORAL at 17:20

## 2019-07-20 RX ADMIN — LISINOPRIL 20 MG: 20 TABLET ORAL at 09:43

## 2019-07-20 RX ADMIN — POTASSIUM CHLORIDE 20 MEQ: 1500 TABLET, EXTENDED RELEASE ORAL at 09:43

## 2019-07-20 RX ADMIN — APIXABAN 5 MG: 5 TABLET, FILM COATED ORAL at 17:20

## 2019-07-20 RX ADMIN — LISINOPRIL 20 MG: 20 TABLET ORAL at 17:20

## 2019-07-20 RX ADMIN — TOLVAPTAN 15 MG: 15 TABLET ORAL at 11:55

## 2019-07-20 RX ADMIN — APIXABAN 5 MG: 5 TABLET, FILM COATED ORAL at 09:43

## 2019-07-20 RX ADMIN — LABETALOL HCL 200 MG: 200 TABLET, FILM COATED ORAL at 21:30

## 2019-07-20 RX ADMIN — AMLODIPINE BESYLATE 2.5 MG: 2.5 TABLET ORAL at 09:43

## 2019-07-20 RX ADMIN — LABETALOL HCL 200 MG: 200 TABLET, FILM COATED ORAL at 11:55

## 2019-07-20 RX ADMIN — OXYCODONE HYDROCHLORIDE AND ACETAMINOPHEN 500 MG: 500 TABLET ORAL at 09:44

## 2019-07-20 RX ADMIN — AMLODIPINE BESYLATE 2.5 MG: 2.5 TABLET ORAL at 17:20

## 2019-07-20 NOTE — PHYSICIAN ADVISOR
Current patient class: Inpatient  The patient is currently on Hospital Day: 2 at [address] 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]  Renal artery stenosis, native ([address] ) [I70 1]       There is documentation in the medical record of an expected length of stay of at least 2 midnights  The patient is therefore expected to satisfy the 2 midnight benchmark and given the 2 midnight presumption is appropriate for INPATIENT ADMISSION  Given this expectation of a satisfying stay, CMS instructs us that the patient is most often appropriate for inpatient admission under part A provided medical necessity is documented in the chart  After review of the relevant documentation, labs, vital signs and test results, the patient is appropriate for INPATIENT ADMISSION  Admission to the hospital as an inpatient is a complex decision making process which requires the practitioner to consider the patients presenting complaint, history and physical examination and all relevant testing  With this in mind, in this case, the patient was deemed appropriate for INPATIENT ADMISSION  After review of the documentation and testing available at the time of the admission I concur with this clinical determination of medical necessity  Rationale is as follows: The patient is an 77-year-old female who presented to the emergency room on 07/19/2019 with a chief complaint of generalized weakness with nausea which is worsening  Patient noted her blood pressures were significantly elevated  In the ER patient's blood pressure were up to 244/109  Control is improved today with medication adjustment  Nephrology and Cardiology is consulted  Patient also with significant hyponatremia on admission of 127  It is improving today up to 130 the    Nephrology is adjusting medication  Patient received Samsca today  She will require continued close monitoring  Patient will remain hospitalized over 2 midnights  Inpatient level of care is appropriate  The patients vitals on arrival were ED Triage Vitals   Temperature Pulse Respirations Blood Pressure SpO2   07/19/19 1322 07/19/19 1322 07/19/19 1322 07/19/19 1322 07/19/19 1322   97 8 °F (36 6 °C) 64 16 (!) 218/107 99 %      Temp Source Heart Rate Source Patient Position - Orthostatic VS BP Location FiO2 (%)   07/19/19 1322 07/19/19 1429 07/19/19 1429 07/19/19 1429 --   Oral Monitor Lying Right arm       Pain Score       07/19/19 1429       No Pain           Past Medical History:   Diagnosis Date    Anxiety     Atrial fibrillation (HCC)     Bowel obstruction (HCC)     Cancer (HCC)     LEFT BREAST CA 22 YEARS AGO     Depression     Diabetes mellitus ([facility] [address] )     Disease of thyroid gland     Hypertension     Hypothyroidism      Past Surgical History:   Procedure Laterality Date    ABDOMINAL ADHESION SURGERY N/A 2/4/2018    Procedure: LYSIS ADHESIONS;  Surgeon: Eliseo Ruiz DO;  [facility];  Service: General    BREAST SURGERY      CHOLECYSTECTOMY      COLON SURGERY  2017    EXPLORATORY LAPAROTOMY      JOINT REPLACEMENT      LEFT KNEE REPLACEMENT     LAPAROTOMY N/A 2/4/2018    Procedure: LAPAROTOMY EXPLORATORY,;  Surgeon: Eliseo Ruiz DO;  [facility];  Service: General    MASTECTOMY      MASTECTOMY Left 1995    MASTECTOMY Right 2017    MT BIOPSY/EXCISION, LYMPH NODE(S) Right 1/13/2017    Procedure: SENTINEL LYMPH NODE BIOPSY RIGHT AXILLA;   Surgeon: Yesenia Martinez MD;  [facility];  Service: General    MT MASTECTOMY, SIMPLE, COMPLETE Right 1/13/2017    Procedure: MASTECTOMY SIMPLE;  Surgeon: Yesenia Martinez MD;  [facility];  Service: General    SMALL INTESTINE SURGERY N/A 2/4/2018    Procedure: RESECTION SMALL BOWEL;  Surgeon: Eliseo Ruiz DO;  [facility]; Service: General    US GUIDED BREAST BIOPSY RIGHT COMPLETE Right 11/29/2016           Consults have been placed to:   IP CONSULT TO CARDIOLOGY  IP CONSULT TO CASE MANAGEMENT  IP CONSULT TO NEPHROLOGY  IP CONSULT TO VASCULAR SURGERY    Vitals:    07/20/19 0722 07/20/19 0900 07/20/19 1518 07/20/19 1720   BP: 140/56 158/84 148/69 144/60   BP Location: Left arm Left arm Left arm    Pulse: 58  62    Resp: 16  18    Temp: 97 9 °F (36 6 °C)  97 7 °F (36 5 °C)    TempSrc: Oral  Oral    SpO2: 99%  99%    Weight:       Height:           Most recent labs:    Recent Labs     07/19/19 2122 07/20/19  0524   WBC  --  7 05   HGB  --  11 5   HCT  --  34 0*   PLT  --  282   K  --  4 3   CALCIUM  --  9 3   BUN  --  19   CREATININE  --  0 81   TROPONINI <0 02  --        Scheduled Meds:  Current Facility-Administered Medications:  amLODIPine 2 5 mg Oral BID RUCHI Vazquez   apixaban 5 mg Oral BID Eliseo Jimenez MD   ascorbic acid 500 mg Oral Daily Eliseo Jimenez MD   aspirin 81 mg Oral Daily Eliseo Jimenez MD   atorvastatin 40 mg Oral QPM Eliseo Jimenez MD   cholecalciferol 2,000 Units Oral Daily Eliseo Jimenez MD   cloNIDine 0 1 mg Transdermal Weekly Eliseo Jimenez MD   fish oil 1,000 mg Oral Daily Eliseo Jimenez MD   insulin lispro 1-5 Units Subcutaneous TID AC Eliseo Jimenez MD   insulin lispro 1-5 Units Subcutaneous HS Eliseo Jimenez MD   labetalol 200 mg Oral Q12H [facility] Patrick Julien MD   Labetalol HCl 10 mg Intravenous Q4H PRN RUCHI Vazquez   levothyroxine 125 mcg Oral Daily Eliseo Jimenez MD   lisinopril 20 mg Oral BID Eliseo Jimenez MD   LORazepam 0 5 mg Oral Daily PRN Eliseo Jimenez MD   LORazepam 0 5 mg Oral BID Eliseo Jimenez MD   nitroglycerin 0 4 mg Sublingual Q5 Min PRN Eliseo Jimenez MD   pantoprazole 40 mg Oral Daily Eliseo Jimenez MD     Continuous Infusions:   PRN Meds: Labetalol HCl    LORazepam    nitroglycerin    Surgical procedures (if appropriate):

## 2019-07-20 NOTE — ASSESSMENT & PLAN NOTE
· Continue p r n   Ativan  · Lexapro discontinued in the outpatient setting, Pamelor discontinued on admission due to hyponatremia  · She states her medical condition drives her anxiety and she is often afraid to leave her house w fear of passing out from low BP

## 2019-07-20 NOTE — ASSESSMENT & PLAN NOTE
· Patient has history of chronic hyponatremia, sodium 130 on admission now 127  · Known to Nephrology  · Diuretics d/c in the outpatient setting  · SSRI recently discontinued in the outpatient setting and she was placed on a TCA which was discontinued this admission  · Samsca 50 mg p o   X1  · Repeat BMP in a m   · Has appointment with nephrologist on 07/30 however interested in follow up with [facility] nephro

## 2019-07-20 NOTE — ASSESSMENT & PLAN NOTE
· History of paroxysmal atrial fibrillation currently in sinus rhythm  · Home regimen includes Eliquis and carvedilol 12 5 mg b i d    · Carvedilol discontinued by Renal and now on labetalol 200 mg q 12

## 2019-07-20 NOTE — UTILIZATION REVIEW
Initial Clinical Review    Admission: Date/Time/Statement: OBS  7/19   1508 converted to IP on 7/20 @ 1142 for continued work up and treatment of HTN and hyponatremia     Admitting Physician GHAZALA WOLF    Level of Care Med Surg    Estimated length of stay More than 2 Midnights    Certification I certify that inpatient services are medically necessary for this patient for a duration of greater than two midnights  See H&P and MD Progress Notes for additional information about the patient's course of treatment                ED Arrival Information     Expected Arrival Acuity Means of Arrival Escorted By Service Admission Type    - 7/19/2019 13:17 Emergent Ambulance [facility] Hospitalist Emergency    Arrival Complaint    -        Chief Complaint   Patient presents with    High Blood Pressure     pt states she started to feel unwell yesteday with generalized weakness and nausea that is getting worse  reports monitoring her BP which has be 823L systolic since yesterday  c/o occasional headaches    Weakness - Generalized     Assessment/Plan: 78 y o  female who presents with feeling unwell  Is nearly 1year-old female with past medical history of atrial fibrillation, anxiety, previous history of breast cancer, diabetes mellitus type 2, hypothyroidism, poorly controlled hypertension secondary to renal artery stenosis, who came to the hospital today complaining of feeling unwell  As per patient she has been having generalized fatigue and weakness for the past 2 days after receiving treatment with topical injection for macular degeneration    As per patient, she has had difficulties to control her blood pressure for now several months and was diagnosed with renal artery stenosis for which she follows as outpatient in another network with nephrologist   Nephrologist try to manage the patient with antihypertensive however she has experienced either extreme drop of blood pressure versus extreme elevation of blood pressure  Currently her blood pressure has remained elevated for the course of several days  Presented to the ER with blood pressure of 245/110 otherwise unremarkable workup  She is compliant with medication, recently:  In p o  Has been switched to Catapres TTS  weekly transdermal Hyponatremia  Assessment & Plan  Likely secondary to the use of chlorthalidone, will discontinue for now and monitor   CKD (chronic kidney disease) stage 3, GFR 30-59 ml/min ([facility])  Assessment & Plan  Creatinine appears to be a baseline, will continue to monitor while in the hospital     Diabetes mellitus type 2 in nonobese [facility])  Assessment & Plan        Lab Results   Component Value Date     HGBA1C 6 4 (H) 04/17/2018      No results for input(s): POCGLU in the last 72 hours    Blood Sugar Average: Last 72 hrs:   for now, sliding scale insulin and monitor   * Hypertension, accelerated  Assessment & Plan  Accelerated hypertension in patient with renal artery stenosis, follows up with Nephrology as outpatient, never evaluated by vascular surgery  Difficult to control blood pressure with hyper and hypotension and 2 episode of syncope secondary to hypotension  Will ask Cardiology as well as Nephrology to evaluate the patient and will ask vascular surgery consultation  Keep on telemetry monitoring  Echocardiogram if deemed necessary by Cardiology  Continue to trend troponin as patient presented with transient left arm pain and nausea which could be related to elevated blood pressure, 1st set negative with nonspecific EKG changes  Patient is a 1 dose of p o  Clonidine in the ER, blood pressure currently 147/68  Will continue with home medication for now including lisinopril b i d , Coreg, discontinue chlorthalidone, continue with Catapres patch     Anticipated Length of Stay:  Patient will be admitted on an Observation basis with an anticipated length of stay of  less than 2 midnights     Justification for Hospital Stay:  Refer to above    7/20 Nephrology consult   1  Hyponatremia  The patient appears to have chronic hyponatremia and she has seen Nephrology through [facility]   She was on medication in the past that could cause it and she remembered that chlorthalidone really lowered it but she was also on an SSRI that was recently discontinued that also can cause SIADH  In looking at a workup that was done in June urine osmolality was significantly elevated when she was mildly hyponatremic to 673 mmol/kg which is a little higher than could be explained slowly by thiazide diuretic so 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  She has been placed on a try cyclic which can also cause SIADH  She has been off the thiazide diuretic    Discontinue tricyclic antidepressant  Continue to avoid thiazide diuretics an SSRI  Samsca 15 mg p o x1  Hold fluid restriction 1 receives this medication  BMP a m    2  Hypertension  From review of chart patient has difficult to control hypertension followed by her nephrologist   Presented with severely elevated blood pressure  She was requiring some IV labetalol  Medications were reviewed at this point I am going to switch her to labetalol 200 mg twice a day and discontinue her carvedilol as she may respond better to the beta-blocker that has some alpha activity and monitor    Discontinue carvedilol  Start labetalol 20 mg p o  B i d   Monitor if patient requires IV labetalol for surgeons in blood pressure over the next 24 hours      7/19 vascular consult    Plan:  -Recommend maximal medical therapy for HTN  -Will review recent renal artery duplex  -Question repeat given study completed 4 months ago  -Will discuss with attending role for renal arteriogram and possible stenting    7/19  Cardiology consult    Hypertensive urgency: /109, presents with feeling of weakness  Has had medications adjusted recently, namely switching from oral clonidine to patch   Then back to oral clonidine since she felt the patch was not working but then switched back to patch again yesterday  She also remains on carvedilol 12 5 mg b i d , lisinopril 20 mg b i d , and amlodipine 2 5 mg daily  She has fallen twice in past month or so due to hypotension and weakness  She feels her fear of falling because of her labile BPs is effecting her quality of life and she has started limiting her activities outside her apartment  · Received clonidine 0 1 mg tablets and lorazepam 1 mg in the ER  · Last /79  · Continue rest of home regimen, start amlodipine 5 mg daily  PRN labetalol  · Recommend ambulatory blood pressure monitor    Preserved biventricular systolic function:  EF 49% with no wall motion abnormalities in 04/2018    Renal artery stenosis:  Follows with nephrologist Dr Tashi Navarrete at [address] [place] [address] [address] who manages antihypertensive medications  Last office visit 6/26/19 at which time it was felt treating B/L ROS would not help with BPs  Nephrology and vascular surgery consulted     Paroxysmal atrial fibrillation:  Currently maintaining sinus rhythm on carvedilol 12 5 mg b i d  Anticoagulated on Eliquis 5 mg b i d    CKD stage 2:  Baseline creatinine 0 9-1 0  Creatinine 0 73 today   DM2: A1C 6 4 in 4/2018  Management per primary team    Chronic Hyponatremia: Na 130 today  Follows with Nephrology  Pt states she has stopped her diuretics due to hyponatremia    Hypokalemia:  Potassium 3 4  Repleted with 40 mEq    Maintain potassium >4    IM note  7/20  Hypertensive urgency  Assessment & Plan  · Patient admitted with complaint of generalized weakness found to have blood pressure 244/109  · Appreciate input from Cardiology, Nephrology, vascular surgery  · Blood pressure is now markedly improved 154/84 this morning  · Outpatient Nephrology manages patient's blood pressure  · Home regimen includes carvedilol 12 5 mg twice a day, lisinopril 20 mg twice a day and Norvasc 2 5 mg daily and recently switched back to clonidine patch  · Cardiology added Norvasc 2 5 mg twice daily   · Carvedilol d/c and labetalol 200mg BID added   · Chlorthalidone d/c      Hyponatremia  Assessment & Plan  · Patient has history of chronic hyponatremia, sodium 130 on admission  · Known to Nephrology  · Chlorthalidone d/c in the outpatient setting  · SSRI recently discontinued in the outpatient setting and she was placed on a TCA which was discontinued this admission by Nephrology  · Samsca 50 mg p o  X1  · Repeat BMP in a m      CKD (chronic kidney disease) stage 3, GFR 30-59 ml/min ([facility])  Assessment & Plan  · Creatinine appears to be at baseline, will continue to monitor while in the hospital     Renal artery stenosis(HCC)  Assessment & Plan  · Known to nephro Dr Clinton Jean Baptiste at [facility] who manages HTN regimen   · Per last office visit in June 2019, felt that treating the patient's bilateral renal artery stenosis would not help with blood pressure control  · Appreciate input from vascular surgery and Nephrology  · Patient does have history of peripheral arterial disease with prior history of a left CEA in 2017, 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%     Acquired hypothyroidism  Assessment & Plan  · Continue synthroid      Diabetes mellitus type 2 in nonobese [facility])  Assessment & Plan        Lab Results   Component Value Date     HGBA1C 5 6 07/19/2019            Recent Labs     07/19/19  1744 07/19/19  2101 07/20/19  0559   POCGLU 143* 177* 119      Blood Sugar Average: Last 72 hrs:  (P) 415 [id number]   · Hold metformin  · Continue sliding scale insulin     Transient atrial fibrillation (HCC)  Assessment & Plan  · History of paroxysmal atrial fibrillation currently in sinus rhythm  · Home regimen includes Eliquis and carvedilol 12 5 mg b i d    · Carvedilol discontinued by Renal and now on labetalol 200 mg q 12          ED Triage Vitals   Temperature Pulse Respirations Blood Pressure SpO2   07/19/19 1322 07/19/19 1322 07/19/19 1322 07/19/19 1322 07/19/19 1322   97 8 °F (36 6 °C) 64 16 (!) 218/107 99 %      Temp Source Heart Rate Source Patient Position - Orthostatic VS BP Location FiO2 (%)   07/19/19 1322 07/19/19 1429 07/19/19 1429 07/19/19 1429 --   Oral Monitor Lying Right arm       Pain Score       07/19/19 1429       No Pain        Wt Readings from Last 1 Encounters:   07/20/19 57 2 kg (126 lb 3 2 oz)     Additional Vital Signs:   07/20/19 03:05:06  --  57  19  166/74  105  99 %  --  --   07/19/19 22:32:34  --  56  19  124/68  87  99 %  --  --   07/19/19 1733  98 °F (36 7 °C)  56  16  118/70  86  99 %  --  --   07/19/19 1429  --  61  19  195/89Abnormal   --  98 %  None (Room air)  Lying   07/19/19 1428  --  --  --  205/102Abnormal   --  --  --  --   07/19/19 1326  --  --  --  244/109Abnormal   --             Pertinent Labs/Diagnostic Test Results:   7/19 CXR - wnl   7/19 EKG NSR   Results from last 7 days   Lab Units 07/20/19  0524 07/19/19  1352   WBC Thousand/uL 7 05 7 70   HEMOGLOBIN g/dL 11 5 12 5   HEMATOCRIT % 34 0* 36 0   PLATELETS Thousands/uL 282 279   NEUTROS ABS Thousands/µL  --  5 22     Results from last 7 days   Lab Units 07/20/19  0524 07/19/19  1352   SODIUM mmol/L 127* 130*   POTASSIUM mmol/L 4 3 3 4*   CHLORIDE mmol/L 96* 95*   CO2 mmol/L 28 28   ANION GAP mmol/L 3* 7   BUN mg/dL 19 14   CREATININE mg/dL 0 81 0 73   EGFR ml/min/1 73sq m 68 77   CALCIUM mg/dL 9 3 9 4   MAGNESIUM mg/dL 1 8  --    PHOSPHORUS mg/dL 2 4  --      Results from last 7 days   Lab Units 07/20/19  0559 07/19/19  2101 07/19/19  1744   POC GLUCOSE mg/dl 119 177* 143*     Results from last 7 days   Lab Units 07/20/19  0524 07/19/19  1352   GLUCOSE RANDOM mg/dL 115 108     Results from last 7 days   Lab Units 07/19/19  1825   HEMOGLOBIN A1C % 5 6   EAG mg/dl 114     Results from last 7 days   Lab Units 07/19/19  2122 07/19/19  1825 07/19/19  1352   TROPONIN I ng/mL <0 02 <0 02 <0 02       ED Treatment:   Medication Administration from 07/19/2019 1317 to 07/19/2019 1652       Date/Time Order Dose Route Action Action by Comments     07/19/2019 1428 cloNIDine (CATAPRES) tablet 0 1 mg 0 1 mg Oral Given Jesús Redman RN      07/19/2019 1428 LORazepam (ATIVAN) tablet 1 mg 1 mg Oral Given Jesús Redman RN      07/19/2019 1506 potassium chloride (K-DUR,KLOR-CON) CR tablet 40 mEq 40 mEq Oral Given Andrea Noland RN         Past Medical History:   Diagnosis Date    Anxiety     Atrial fibrillation ([place] [address] )     Bowel obstruction ([place] [address] )     Cancer ([place] [address] )     LEFT BREAST CA 22 YEARS AGO     Depression     Diabetes mellitus ([place] [address] )     Disease of thyroid gland     Hypertension     Hypothyroidism      Present on Admission:   Diabetes mellitus type 2 in nonobese ([place] [address] )   CKD (chronic kidney disease) stage 3, GFR 30-59 ml/min ([facility])   Hyponatremia   Hypertension, accelerated   Renal artery stenosis([facility])      Admitting Diagnosis: High blood pressure [I10]  Weakness [R53 1]  Renovascular hypertension [I15 0]  Renal artery stenosis, native ([place] [address] ) [I70 1]  Age/Sex: 78 y o  female  Admission Orders:    Current Facility-Administered Medications:  amLODIPine 2 5 mg Oral BID    apixaban 5 mg Oral BID    ascorbic acid 500 mg Oral Daily    aspirin 81 mg Oral Daily    atorvastatin 40 mg Oral QPM    carvedilol 12 5 mg Oral BID With Meals    cholecalciferol 2,000 Units Oral Daily    cloNIDine 0 1 mg Transdermal Weekly    fish oil 1,000 mg Oral Daily    insulin lispro 1-5 Units Subcutaneous TID AC    insulin lispro 1-5 Units Subcutaneous HS    Labetalol HCl 10 mg Intravenous Q4H PRN    levothyroxine 125 mcg Oral Daily    lisinopril 20 mg Oral BID    LORazepam 0 5 mg Oral Daily PRN    LORazepam 0 5 mg Oral BID    nitroglycerin 0 4 mg Sublingual Q5 Min PRN    nortriptyline 75 mg Oral HS    pantoprazole 40 mg Oral Daily    potassium chloride 20 mEq Oral Daily      Fingerstick ac and hs   Fall precautions  OT PT eval   IS   Cardiac diet   SCD  I&O   Daily weight   Tele   EKG prn cp   IP CONSULT TO CARDIOLOGY  IP CONSULT TO CASE MANAGEMENT  IP CONSULT TO NEPHROLOGY  IP CONSULT TO 3500  [address] [facility] Review Department  Phone: [phone]; Fax [phone]  [email or web address]  ATTENTION: Please call with any questions or concerns to [phone]  and carefully listen to the prompts so that you are directed to the right person  Send all requests for admission clinical reviews, approved or denied determinations and any other requests to fax [phone]   All voicemails are confidential

## 2019-07-20 NOTE — ASSESSMENT & PLAN NOTE
Lab Results   Component Value Date    HGBA1C 5 6 07/19/2019     Recent Labs     07/19/19  1744 07/19/19  2101 07/20/19  0559   POCGLU 143* 177* 119     Blood Sugar Average: Last 72 hrs:  (P) 968 [id number]   · Hold metformin  · Continue sliding scale insulin

## 2019-07-20 NOTE — PROGRESS NOTES
Cardiology Progress Note - Ross Grewal 78 y o  female MRN: [id number]    Unit/Bed#: CW2 212-02 Encounter: [id number]    Assessment and plan  1  Difficult to control hypertension  2  Paroxysmal atrial fibrillation  3  History of fall  4  Hyponatremia  5  Mm toward dysfunction    Recommendations:  Blood pressure overall has improved  Continue current regimen  She tends to correct quickly when she comes into the hospital wore in the office when she is reoriented on her medication and regimen  I think she may have difficulty taking the pills at home at times  Subjective:    No significant events overnight  Denies chest pain or dyspnea overall feeling well  Blood pressure has improved  No lightheadedness or dizziness  ROS    Objective:   Vitals: Blood pressure 158/84, pulse 58, temperature 97 9 °F (36 6 °C), temperature source Oral, resp  rate 16, height 5' 6" (1 676 m), weight 57 2 kg (126 lb 3 2 oz), SpO2 99 %  , Body mass index is 20 37 kg/m² ,   Orthostatic Blood Pressures      Most Recent Value   Blood Pressure  158/84 filed at 07/20/2019 0900   Patient Position - Orthostatic VS  Lying filed at 07/20/2019 0537         Systolic (96FIA), JYK:877 , Min:118 , WXH:067     Diastolic (00HMQ), AKW:69, Min:56, Max:109      Intake/Output Summary (Last 24 hours) at 7/20/2019 1137  Last data filed at 7/20/2019 5765  Gross per 24 hour   Intake 120 ml   Output --   Net 120 ml     Weight (last 2 days)     Date/Time   Weight    07/20/19 0532   57 2 (126 2)    07/19/19 1733   56 4 (124 4)    07/19/19 1520   56 4 (124 4)    07/19/19 1322   62 1 (137)                Telemetry Review: No significant arrhythmias seen on telemetry review  EKG personally reviewed by Irving Gordon DO  Physical Exam   Constitutional: She is oriented to person, place, and time  She appears well-nourished  No distress  HENT:   Head: Atraumatic  Eyes: Pupils are equal, round, and reactive to light   Conjunctivae are normal  Neck: Neck supple  Cardiovascular: Normal rate, regular rhythm and normal heart sounds  Exam reveals no friction rub  No murmur heard  Pulmonary/Chest: Effort normal and breath sounds normal  No respiratory distress  She has no wheezes  She has no rales  Abdominal: Bowel sounds are normal  She exhibits no distension  There is no tenderness  There is no rebound  Musculoskeletal: She exhibits no edema  Neurological: She is alert and oriented to person, place, and time  No cranial nerve deficit  Skin: Skin is warm and dry  No erythema  Nursing note and vitals reviewed  Laboratory Results:  Results from last 7 days   Lab Units 07/19/19 2122 07/19/19  1825 07/19/19  1352   TROPONIN I ng/mL <0 02 <0 02 <0 02       CBC with diff:   Results from last 7 days   Lab Units 07/20/19  0524 07/19/19  1352   WBC Thousand/uL 7 05 7 70   HEMOGLOBIN g/dL 11 5 12 5   HEMATOCRIT % 34 0* 36 0   MCV fL 91 90   PLATELETS Thousands/uL 282 279   MCH pg 30 7 31 2   MCHC g/dL 33 8 34 7   RDW % 14 4 14 4   MPV fL 9 7 9 7   NRBC AUTO /100 WBCs  --  0         CMP:  Results from last 7 days   Lab Units 07/20/19  0524 07/19/19  1352   POTASSIUM mmol/L 4 3 3 4*   CHLORIDE mmol/L 96* 95*   CO2 mmol/L 28 28   BUN mg/dL 19 14   CREATININE mg/dL 0 81 0 73   CALCIUM mg/dL 9 3 9 4   EGFR ml/min/1 73sq m 68 77         BMP:  Results from last 7 days   Lab Units 07/20/19  0524 07/19/19  1352   POTASSIUM mmol/L 4 3 3 4*   CHLORIDE mmol/L 96* 95*   CO2 mmol/L 28 28   BUN mg/dL 19 14   CREATININE mg/dL 0 81 0 73   CALCIUM mg/dL 9 3 9 4       BNP: No results for input(s): BNP in the last 72 hours      Magnesium:   Results from last 7 days   Lab Units 07/20/19  0524   MAGNESIUM mg/dL 1 8       Coags:       TSH:        Hemoglobin A1C   Results from last 7 days   Lab Units 07/19/19  1825   HEMOGLOBIN A1C % 5 6       Lipid Profile:       Cardiac testing:   Results for orders placed during the hospital encounter of 02/01/18   Echo complete with contrast if indicated    [address]imal [address]uch[address]  [facility] [place], [address] [facility]  [phone]    Transthoracic Echocardiogram  2D, M-mode, Doppler, and Color Doppler    Study date:  2018    Patient: Edilson Clement  MR number: [id number]  Account number: [de-identified]  : 1940  Age: 76 years  Gender: Female  Status: Inpatient  Location: Bedside  Height: 66 in  Weight: 142 lb  BP: 132/ 63 mmHg    Indications: Atrial fibrillation    Diagnoses: I48 0 - Atrial fibrillation    Sonographer:  CAROLA Cook, RDCS  Primary Physician:  Aruna Mendenhall DO  Referring Physician:  Milo Quesada MD  Group:  [facility]  Interpreting Physician:  Ephraim Baker MD    SUMMARY    LEFT VENTRICLE:  Systolic function was hyperdynamic by visual assessment  Ejection fraction was estimated to be 70 %  There were no regional wall motion abnormalities  Wall thickness was mildly increased  Left ventricular diastolic function parameters were normal for atrial fibrillation  RIGHT VENTRICLE:  The size was normal   Systolic function was normal     MITRAL VALVE:  There was mild regurgitation  TRICUSPID VALVE:  There was trace regurgitation  HISTORY: PRIOR HISTORY: Hypertension; Hypothryoid; Diabetes Mellitus; Left breast cancer (22years ago); Smoker    PROCEDURE: The procedure was performed at the bedside  This was a routine study  The transthoracic approach was used  The study included complete 2D imaging, M-mode, complete spectral Doppler, and color Doppler  Images were obtained from  the parasternal, apical, subcostal, and suprasternal notch acoustic windows  Echocardiographic views were limited due to poor acoustic window availability  Image quality was adequate  LEFT VENTRICLE: Size was normal  Systolic function was hyperdynamic by visual assessment  Ejection fraction was estimated to be 70 %  There were no regional wall motion abnormalities  Wall thickness was mildly increased  DOPPLER: Left  ventricular diastolic function parameters were normal for atrial fibrillation  RIGHT VENTRICLE: The size was normal  Systolic function was normal  Wall thickness was normal     LEFT ATRIUM: Size was normal     RIGHT ATRIUM: Size was normal     MITRAL VALVE: Valve structure was normal  There was normal leaflet separation  DOPPLER: The transmitral velocity was within the normal range  There was no evidence for stenosis  There was mild regurgitation  AORTIC VALVE: The valve was trileaflet  Leaflets exhibited normal thickness and normal cuspal separation  DOPPLER: Transaortic velocity was within the normal range  There was no evidence for stenosis  There was no significant  regurgitation  TRICUSPID VALVE: The valve structure was normal  There was normal leaflet separation  DOPPLER: The transtricuspid velocity was within the normal range  There was no evidence for stenosis  There was trace regurgitation  Estimated peak PA  pressure was 37 mmHg  PULMONIC VALVE: Leaflets exhibited normal thickness, no calcification, and normal cuspal separation  DOPPLER: The transpulmonic velocity was within the normal range  There was no significant regurgitation  PERICARDIUM: There was no pericardial effusion  The pericardium was normal in appearance  AORTA: The root exhibited normal size  SYSTEMIC VEINS: IVC: The inferior vena cava was normal in size   Respirophasic changes were normal     MEASUREMENT TABLES    OTHER ECHO MEASUREMENTS  (Reference normals)  Estimated CVP   5 mmHg   (--)    SYSTEM MEASUREMENT TABLES    2D  %FS: 29 99 %  Ao Diam: 3 19 cm  EDV(Teich): 71 54 ml  EF(Teich): 57 73 %  ESV(Teich): 30 24 ml  IVSd: 1 13 cm  LA Area: 16 73 cm2  LA Diam: 3 38 cm  LVEDV MOD A4C: 48 ml  LVEF MOD A4C: 71 93 %  LVESV MOD A4C: 13 48 ml  LVIDd: 4 04 cm  LVIDs: 2 83 cm  LVLd A4C: 6 47 cm  LVLs A4C: 5 29 cm  LVPWd: 1 06 cm  RA Area: 13 16 cm2  RVIDd: 3 45 cm  SV MOD A4C: 34 53 ml  SV(Teich): 41 3 ml    CW  TR Vmax: 2 91 m/s  TR maxP 78 mmHg    MM  TAPSE: 1 72 cm    PW  E': 0 09 m/s  E/E': 12 4  MV A Mika: 0 m/s  MV Dec [place]: 5 33 m/s2  MV DecT: 199 99 ms  MV E Mika: 1 07 m/s  MV E/A Ratio: 1088  2  MV PHT: 58 ms  MVA By PHT: 3 79 cm2    [employer] Accredited Echocardiography Laboratory    Prepared and electronically signed by    Ed Maldonado MD  Signed 10-Feb-2018 18:24:16       No results found for this or any previous visit  No results found for this or any previous visit  No results found for this or any previous visit      Meds/Allergies   all current active meds have been reviewed  Medications Prior to Admission   Medication    alendronate (FOSAMAX) 70 mg tablet    apixaban (ELIQUIS) 5 mg    carvedilol (COREG) 12 5 mg tablet    cholecalciferol (VITAMIN D3) 1,000 units tablet    Escitalopram Oxalate (LEXAPRO PO)    levothyroxine 125 mcg tablet    lisinopril (ZESTRIL) 20 mg tablet    LORazepam (ATIVAN) 0 5 mg tablet    metFORMIN (GLUCOPHAGE) 500 mg tablet    pantoprazole (PROTONIX) 40 mg tablet    ascorbic acid (VITAMIN C) 500 mg tablet    chlorthalidone 25 mg tablet    cloNIDine (CATAPRES) 0 1 mg tablet    Levothyroxine Sodium 137 MCG CAPS    LORazepam (ATIVAN) 0 5 mg tablet    multivitamin (THERAGRAN) TABS    nortriptyline (PAMELOR) 75 MG capsule    Omega-3 Fatty Acids (FISH OIL PO)    potassium chloride (K-DUR,KLOR-CON) 10 mEq tablet          Assessment:  Principal Problem:    Hypertensive urgency  Active Problems:    Transient atrial fibrillation (HCC)    Diabetes mellitus type 2 in nonobese [facility])    Acquired hypothyroidism    Anxiety    Renal artery stenosis(HCC)    CKD (chronic kidney disease) stage 3, GFR 30-59 ml/min (HCC)    Hyponatremia    Hypertension, accelerated    Hypertension

## 2019-07-20 NOTE — PROGRESS NOTES
Progress Note - Tawanna Favors 1940, 78 y o  female MRN: [id number]   Unit/Bed#: [facility] Walker 808-94 Encounter: [id number] DOS: 7/20/19  Primary Care Provider: Ashley Cabral DO   Date and time admitted to hospital: 7/19/2019  1:18 PM    * Hypertensive urgency  Assessment & Plan  · Patient admitted with complaint of generalized weakness found to have blood pressure 244/109  · Appreciate input from Cardiology, Nephrology, vascular surgery  · Blood pressure is now markedly improved 154/84 this morning  · Outpatient Nephrology manages patient's blood pressure  · Home regimen includes carvedilol 12 5 mg twice a day, lisinopril 20 mg twice a day and Norvasc 2 5 mg daily and recently switched back to clonidine patch  · Cardiology added Norvasc 2 5 mg twice daily   · Carvedilol d/c and labetalol 200mg BID added   · Encourage tobacco cessation  · Continue to check blood pressures at home  · Ambulatory BP monitor per cards   · Consider some degree of med noncompliance vs confusion as patients bp normalized once she was started on correct regimen     Hyponatremia  Assessment & Plan  · Patient has history of chronic hyponatremia, sodium 130 on admission now 127  · Known to Nephrology  · Diuretics d/c in the outpatient setting  · SSRI recently discontinued in the outpatient setting and she was placed on a TCA which was discontinued this admission  · Samsca 50 mg p o   X1  · Repeat BMP in a m   · Has appointment with nephrologist on 07/30 however interested in follow up with  nephro     CKD (chronic kidney disease) stage 3, GFR 30-59 ml/min ([facility])  Assessment & Plan  · Creatinine appears to be at baseline, will continue to monitor while in the hospital    Renal artery stenosis(HCC)  Assessment & Plan  · Known to nephro Dr Linwood Gonsales at [facility] who manages HTN regimen   · Per last office visit in June 2019, felt that treating the patient's bilateral renal artery stenosis would not help with blood pressure control  · Appreciate input from vascular surgery and Nephrology  · Patient does have history of peripheral arterial disease with prior history of a left CEA in 2017, 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%    Anxiety  Assessment & Plan  · Continue p r n  Ativan  · Lexapro discontinued in the outpatient setting, Pamelor discontinued on admission due to hyponatremia  · She states her medical condition drives her anxiety and she is often afraid to leave her house w fear of passing out from low BP     Acquired hypothyroidism  Assessment & Plan  · Continue synthroid     [facility]  Assessment & Plan  Lab Results   Component Value Date    HGBA1C 5 6 07/19/2019     Recent Labs     07/19/19  1744 07/19/19  2101 07/20/19  0559   POCGLU 143* 177* 119     Blood Sugar Average: Last 72 hrs:  (P) 407 [id number]   · Hold metformin  · Continue sliding scale insulin    Transient atrial fibrillation (HCC)  Assessment & Plan  · History of paroxysmal atrial fibrillation currently in sinus rhythm  · Home regimen includes Eliquis and carvedilol 12 5 mg b i d  · Carvedilol discontinued by Renal and now on labetalol 200 mg q 12    VTE Pharmacologic Prophylaxis:   Pharmacologic: Apixaban (Eliquis)  Mechanical VTE Prophylaxis in Place: Yes    Patient Centered Rounds: I have performed bedside rounds with nursing staff today  Discussions with Specialists or Other Care Team Provider: nursing    Education and Discussions with Family / Patient: patient, called dtr Michael Owens no answer     Time Spent for Care: 30 minutes  More than 50% of total time spent on counseling and coordination of care as described above      Current Length of Stay: 0 day(s)    Current Patient Status: Inpatient   Certification Statement: The patient will continue to require additional inpatient hospital stay due to abnormal lab work requriring furhter workup and close monitoring, hypertensive urgency     Discharge Plan / Estimated Discharge Date: pending likely in 24 hours if BP improved     Code Status: Level 1 - Full Code      Subjective:   Pt seen and examined at bedside  States she had been feeling weak and fatigued lately, feels better today  No cp, sob, LH or dizziness  States she is nervous about leaving her house and has a lot of anxiety about her medications  Objective:     Vitals:   Temp (24hrs), Av °F (36 7 °C), Min:97 9 °F (36 6 °C), Max:98 °F (36 7 °C)    Temp:  [97 9 °F (36 6 °C)-98 °F (36 7 °C)] 97 9 °F (36 6 °C)  HR:  [56-61] 58  Resp:  [16-19] 16  BP: (118-205)/() 158/84  SpO2:  [98 %-99 %] 99 %  Body mass index is 20 37 kg/m²  Input and Output Summary (last 24 hours): Intake/Output Summary (Last 24 hours) at 2019 1355  Last data filed at 2019 6889  Gross per 24 hour   Intake 120 ml   Output --   Net 120 ml       Physical Exam:     Physical Exam   Constitutional: She is oriented to person, place, and time  She appears well-developed and well-nourished  HENT:   Head: Normocephalic and atraumatic  Eyes: EOM are normal  No scleral icterus  Neck: Normal range of motion  Neck supple  Cardiovascular: Normal rate, regular rhythm and normal heart sounds  No murmur heard  Pulmonary/Chest: Effort normal and breath sounds normal    Abdominal: Soft  Bowel sounds are normal    Musculoskeletal: Normal range of motion  She exhibits no edema  Neurological: She is alert and oriented to person, place, and time  Skin: Skin is warm and dry  Psychiatric: She has a normal mood and affect     Forgetful at times      Additional Data:     Labs:    Results from last 7 days   Lab Units 19  0524 19  1352   WBC Thousand/uL 7 05 7 70   HEMOGLOBIN g/dL 11 5 12 5   HEMATOCRIT % 34 0* 36 0   PLATELETS Thousands/uL 282 279   NEUTROS PCT %  --  68   LYMPHS PCT %  --  17   MONOS PCT %  --  12   EOS PCT %  --  2     Results from last 7 days   Lab Units 19  0524   POTASSIUM mmol/L 4 3 CHLORIDE mmol/L 96*   CO2 mmol/L 28   BUN mg/dL 19   CREATININE mg/dL 0 81   CALCIUM mg/dL 9 3           * I Have Reviewed All Lab Data Listed Above  * Additional Pertinent Lab Tests Reviewed: Marco A 66 Admission Reviewed    Imaging:    Imaging Reports Reviewed Today Include: all  Imaging Personally Reviewed by Myself Includes:  none    Recent Cultures (last 7 days):           Last 24 Hours Medication List:     Current Facility-Administered Medications:  amLODIPine 2 5 mg Oral BID RUCHI Reeves   apixaban 5 mg Oral BID [place] Maryan, MD   ascorbic acid 500 mg Oral Daily Carlota Box, MD   aspirin 81 mg Oral Daily Carlota Box, MD   atorvastatin 40 mg Oral QPM Carlota Maryan, MD   cholecalciferol 2,000 Units Oral Daily Carlota Box, MD   cloNIDine 0 1 mg Transdermal Weekly [place] Box, MD   fish oil 1,000 mg Oral Daily [place] Box, MD   insulin lispro 1-5 Units Subcutaneous TID AC Carlota Setinberg MD   insulin lispro 1-5 Units Subcutaneous HS Carlota Steinberg, MD   labetalol 200 mg Oral Q12H [address] [facility]   Labetalol HCl 10 mg Intravenous Q4H PRN RUCHI Reeves   levothyroxine 125 mcg Oral Daily Carlota Maryan, MD   lisinopril 20 mg Oral BID Carlota Maryan, MD   LORazepam 0 5 mg Oral Daily PRN Carlota Steinberg MD   LORazepam 0 5 mg Oral BID [place] Box, MD   nitroglycerin 0 4 mg Sublingual Q5 Min PRN Carlota Steinberg MD   pantoprazole 40 mg Oral Daily Carlota Box, MD   potassium chloride 20 mEq Oral Daily Carlota Steinberg MD        Today, Patient Was Seen By: Megan León PA-C    ** Please Note: This note has been constructed using a voice recognition system   **

## 2019-07-20 NOTE — CONSULTS
2           Consultation - Nephrology   Phoenix Chain 78 y o  female MRN: [id number]  Unit/Bed#: Bhanu Leslie 770-53 Encounter: [id number]      Assessment/Plan     Assessment / Plan:  1  Hyponatremia  The patient appears to have chronic hyponatremia and she has seen Nephrology through [facility]   She was on medication in the past that could cause it and she remembered that chlorthalidone really lowered it but she was also on an SSRI that was recently discontinued that also can cause SIADH  In looking at a workup that was done in June urine osmolality was significantly elevated when she was mildly hyponatremic to 673 mmol/kg which is a little higher than could be explained slowly by thiazide diuretic so 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  She has been placed on a try cyclic which can also cause SIADH  She has been off the thiazide diuretic  Discontinue tricyclic antidepressant  Continue to avoid thiazide diuretics an SSRI  Samsca 15 mg p o x1  Hold fluid restriction 1 receives this medication  BMP a m  2  Hypertension  From review of chart patient has difficult to control hypertension followed by her nephrologist   Presented with severely elevated blood pressure  She was requiring some IV labetalol  Medications were reviewed at this point I am going to switch her to labetalol 200 mg twice a day and discontinue her carvedilol as she may respond better to the beta-blocker that has some alpha activity and monitor  Discontinue carvedilol  Start labetalol 20 mg p o  B i d   Monitor if patient requires IV labetalol for surgeons in blood pressure over the next 24 hours          History of Present Illness   Physician Requesting Consult: Ray Fabian DO  Reason for Consult / Principal Problem:  Hyponatremia  Hx and PE limited by:   HPI: Opal Chain is a 78y o  year old female who presented to the hospital she states she was not feeling well she has had a lot of fatigue weakness over the last few days  She does tell so her blood pressures been significantly elevated as well  She was also found to have hyponatremia were asked to see her for this  History obtained from chart review and the patient  I also reviewed prior [facility] records  Consults    Review of Systems   Constitutional: Positive for fatigue  Negative for chills and fever  When can I go home?"   HENT: Negative  Eyes:        No acute visual changes  Respiratory: Negative  Negative for cough, chest tightness, shortness of breath and wheezing  Cardiovascular: Negative for chest pain, palpitations and leg swelling  Gastrointestinal: Negative for abdominal distention, abdominal pain, diarrhea, nausea and vomiting  Genitourinary: Negative  Negative for dysuria and hematuria  Neurological: Negative  Psychiatric/Behavioral: Negative for agitation and behavioral problems         Historical Information   Patient Active Problem List   Diagnosis    Heart palpitations    Nicotine abuse    [facility]ient [facility]    Diabetes mellitus type 2 in nonobese ([facility]    Hypertension, essential, benign    Acquired hypothyroidism    Malignant neoplasm of central portion of right female breast ([facility]    Acute kidney injury ([facility]    Delirium    Physical deconditioning    Ambulatory dysfunction    Hx of fall    Anxiety    Postop check    Incisional hernia, without obstruction or gangrene    Syncope vs presyncope    Paroxysmal A-fib ([address] )    Renovascular hypertension    Renal artery stenosis(HCC)    Diabetes (HCC)    Weight loss    CKD (chronic kidney disease) stage 3, GFR 30-59 ml/min (HCC)    Fall    Hyponatremia    Gastroesophageal reflux disease without esophagitis    Depression    Hordeolum externum of right upper eyelid    History of CEA (carotid endarterectomy)    Hyperlipidemia associated with type 2 diabetes mellitus (HCC)    Hypertension, accelerated    Hypo-osmolality and hyponatremia    Hypothyroidism due to acquired atrophy of thyroid    Iron deficiency anemia due to chronic blood loss    Low back pain without sciatica    Malignant neoplasm of right breast, stage 1, estrogen receptor positive (HCC)    Mitral insufficiency and aortic stenosis    Vitamin D deficiency    Varicose vein of leg    Tobacco abuse    Nontraumatic compression fracture of T4 vertebra (HCC)    Non-seasonal allergic rhinitis due to pollen     Past Medical History:   Diagnosis Date    Anxiety     Atrial fibrillation (HCC)     Bowel obstruction (HCC)     Cancer ([facility] )     LEFT BREAST CA 22 YEARS AGO     Depression     Diabetes mellitus ([facility] )     Disease of thyroid gland     Hypertension     Hypothyroidism      Past Surgical History:   Procedure Laterality Date    ABDOMINAL ADHESION SURGERY N/A 2/4/2018    Procedure: LYSIS ADHESIONS;  Surgeon: Lora Polo DO;  [facility];  Service: General    BREAST SURGERY      CHOLECYSTECTOMY      COLON SURGERY  2017    EXPLORATORY LAPAROTOMY      JOINT REPLACEMENT      LEFT KNEE REPLACEMENT     LAPAROTOMY N/A 2/4/2018    Procedure: LAPAROTOMY EXPLORATORY,;  Surgeon: Lora Polo DO;  [facility];  Service: General    MASTECTOMY      MASTECTOMY Left 1995    MASTECTOMY Right 2017    [place] BIOPSY/EXCISION, LYMPH NODE(S) Right 1/13/2017    Procedure: SENTINEL LYMPH NODE BIOPSY RIGHT AXILLA;   Surgeon: Lawanda Campos MD;  [facility];  Service: General    IA MASTECTOMY, SIMPLE, COMPLETE Right 1/13/2017    Procedure: MASTECTOMY SIMPLE;  Surgeon: Lawanda Campos MD;  [facility];  Service: General    SMALL INTESTINE SURGERY N/A 2/4/2018    Procedure: RESECTION SMALL BOWEL;  Surgeon: Lora Polo DO;  [facility];  Service: General    US GUIDED BREAST BIOPSY RIGHT COMPLETE Right 11/29/2016     Social History   Social History     Substance and Sexual Activity   Alcohol Use Never    Frequency: Never    Binge frequency: Never     Social History     Substance and Sexual Activity   Drug Use Never     Social History     Tobacco Use   Smoking Status Current Every Day Smoker    Packs/day: 0 50    Types: Cigarettes   Smokeless Tobacco Never Used     Family History   Problem Relation Age of Onset    Cancer Mother        Meds/Allergies   current meds:   Current Facility-Administered Medications   Medication Dose Route Frequency    amLODIPine (NORVASC) tablet 2 5 mg  2 5 mg Oral BID    apixaban (ELIQUIS) tablet 5 mg  5 mg Oral BID    ascorbic acid (VITAMIN C) tablet 500 mg  500 mg Oral Daily    aspirin chewable tablet 81 mg  81 mg Oral Daily    atorvastatin (LIPITOR) tablet 40 mg  40 mg Oral QPM    carvedilol (COREG) tablet 12 5 mg  12 5 mg Oral BID With Meals    cholecalciferol (VITAMIN D3) tablet 2,000 Units  2,000 Units Oral Daily    cloNIDine (CATAPRES-TTS-1) 0 1 mg/24 hr TD weekly patch  0 1 mg Transdermal Weekly    fish oil capsule 1,000 mg  1,000 mg Oral Daily    insulin lispro (HumaLOG) 100 units/mL subcutaneous injection 1-5 Units  1-5 Units Subcutaneous TID AC    insulin lispro (HumaLOG) 100 units/mL subcutaneous injection 1-5 Units  1-5 Units Subcutaneous HS    Labetalol HCl (NORMODYNE) injection 10 mg  10 mg Intravenous Q4H PRN    levothyroxine tablet 125 mcg  125 mcg Oral Daily    lisinopril (ZESTRIL) tablet 20 mg  20 mg Oral BID    LORazepam (ATIVAN) tablet 0 5 mg  0 5 mg Oral Daily PRN    LORazepam (ATIVAN) tablet 0 5 mg  0 5 mg Oral BID    nitroglycerin (NITROSTAT) SL tablet 0 4 mg  0 4 mg Sublingual Q5 Min PRN    pantoprazole (PROTONIX) EC tablet 40 mg  40 mg Oral Daily    potassium chloride (K-DUR,KLOR-CON) CR tablet 20 mEq  20 mEq Oral Daily     Allergies   Allergen Reactions    Meloxicam GI Intolerance    Morphine GI Intolerance    Morphine     Penicillins     Percocet [Oxycodone-Acetaminophen]     Sitagliptin      SOB       Objective     Intake/Output Summary (Last 24 hours) at 7/20/2019 0955  Last data filed at 7/20/2019 8132  Gross per 24 hour   Intake 120 ml   Output --   Net 120 ml     Body mass index is 20 37 kg/m²  Invasive Devices:        PHYSICAL EXAM:  /56 (BP Location: Right arm)   Pulse 58   Temp 97 9 °F (36 6 °C) (Oral)   Resp 16   Ht 5' 6" (1 676 m)   Wt 57 2 kg (126 lb 3 2 oz)   SpO2 99%   BMI 20 37 kg/m²     Physical Exam   Constitutional: She is oriented to person, place, and time  No distress  Eyes: No scleral icterus  Neck: Neck supple  No JVD present  Cardiovascular: Normal rate and regular rhythm  Exam reveals no friction rub  Pulmonary/Chest: Effort normal and breath sounds normal  No respiratory distress  She has no wheezes  She has no rales  Abdominal: Soft  Bowel sounds are normal  She exhibits no distension  There is no tenderness  Neurological: She is alert and oriented to person, place, and time  Psychiatric: She has a normal mood and affect           Current Weight: Weight - Scale: 57 2 kg (126 lb 3 2 oz)  First Weight: Weight - Scale: 62 1 kg (137 lb)    Lab Results:    Results from last 7 days   Lab Units 07/20/19  0524   WBC Thousand/uL 7 05   HEMOGLOBIN g/dL 11 5   HEMATOCRIT % 34 0*   PLATELETS Thousands/uL 282     Results from last 7 days   Lab Units 07/20/19  0524   POTASSIUM mmol/L 4 3   CHLORIDE mmol/L 96*   CO2 mmol/L 28   BUN mg/dL 19   CREATININE mg/dL 0 81   CALCIUM mg/dL 9 3     Results from last 7 days   Lab Units 07/20/19  0524   POTASSIUM mmol/L 4 3   CHLORIDE mmol/L 96*   CO2 mmol/L 28   BUN mg/dL 19   CREATININE mg/dL 0 81   CALCIUM mg/dL 9 3

## 2019-07-20 NOTE — ASSESSMENT & PLAN NOTE
· Patient admitted with complaint of generalized weakness found to have blood pressure 244/109  · Appreciate input from Cardiology, Nephrology, vascular surgery  · Blood pressure is now markedly improved 154/84 this morning  · Outpatient Nephrology manages patient's blood pressure  · Home regimen includes carvedilol 12 5 mg twice a day, lisinopril 20 mg twice a day and Norvasc 2 5 mg daily and recently switched back to clonidine patch  · Cardiology added Norvasc 2 5 mg twice daily   · Carvedilol d/c and labetalol 200mg BID added   · Encourage tobacco cessation  · Continue to check blood pressures at home  · Ambulatory BP monitor per cards   · Consider some degree of med noncompliance vs confusion as patients bp normalized once she was started on correct regimen

## 2019-07-20 NOTE — ASSESSMENT & PLAN NOTE
78 y o   F with history of ROS (R >60%, L >70%, R kidney 12 9 cm, L kidney 10 5 cm), on multiple hypertensive medications (Norvasc 2 5mg, Coreg 12 5mg BID, Clonidine 0 1mg, Lisinopril 20 mg BID), hx of CKD stage 3 (Cr at baseline today, 0 78) here with hypertensive urgency (244/109 --> 150/68)     Hx of :  SMA and celiac stenosis, >70%  L CEA (2017)  [id number] bypass (2004)        Plan:  -Recommend maximal medical therapy for HTN  -Will review recent renal artery duplex  -Question repeat given study completed 4 months ago  -Will discuss with attending role for renal arteriogram and possible stenting

## 2019-07-20 NOTE — OCCUPATIONAL THERAPY NOTE
OccupationalTherapy Evaluation     Patient Name: Oc Rivero Date: 7/20/2019  Problem List  Patient Active Problem List   Diagnosis    Heart palpitations    Nicotine abuse    [facility]ient [facility] fibrillation ([facility] )    Diabetes mellitus type 2 in nonobese ([facility] )    Hypertension, essential, benign    Acquired hypothyroidism    Malignant [facility] central portion of right [facility] breast [facility]    Acute kidney injury ([facility] )    Delirium    Physical deconditioning    Ambulatory dysfunction    Hx of fall    Anxiety    Postop check    Incisional hernia, without obstruction or gangrene    Syncope vs presyncope    Paroxysmal A-fib ([facility] )    Renovascular hypertension    Renal artery stenosis(HCC)    Diabetes (HCC)    Weight loss    CKD (chronic kidney disease) stage 3, GFR 30-59 ml/min (HCC)    Fall    Hyponatremia    Gastroesophageal reflux disease without esophagitis    Depression    Hordeolum externum of right upper eyelid    History of CEA (carotid endarterectomy)    Hyperlipidemia associated with type 2 diabetes mellitus (HCC)    Hypertension, accelerated    Hypo-osmolality and hyponatremia    Hypothyroidism due to acquired atrophy of thyroid    Iron deficiency anemia due to chronic blood loss    Low back pain without sciatica    Malignant neoplasm of right breast, stage 1, estrogen receptor positive (HCC)    Mitral insufficiency and aortic stenosis    Vitamin D deficiency    Varicose vein of leg    Tobacco abuse    Nontraumatic compression fracture of T4 vertebra (HCC)    Non-seasonal allergic rhinitis due to pollen    Hypertension    Hypertensive urgency     Past Medical History  Past Medical History:   Diagnosis Date    Anxiety     Atrial fibrillation (HCC)     Bowel obstruction (HCC)     Cancer (HCC)     LEFT BREAST CA 22 YEARS AGO     Depression     Diabetes mellitus (HCC)     Disease of thyroid gland     Hypertension     Hypothyroidism      Past Surgical History  Past Surgical History:   Procedure Laterality Date    ABDOMINAL ADHESION SURGERY N/A 2/4/2018    Procedure: LYSIS ADHESIONS;  Surgeon: Shiva Squires DO;  [facility];  Service: General    BREAST SURGERY      CHOLECYSTECTOMY      COLON SURGERY  2017    EXPLORATORY LAPAROTOMY      JOINT REPLACEMENT      LEFT KNEE REPLACEMENT     LAPAROTOMY N/A 2/4/2018    Procedure: LAPAROTOMY EXPLORATORY,;  Surgeon: Shiva Squires DO;  [facility];  Service: General    MASTECTOMY      MASTECTOMY Left 1995    MASTECTOMY Right 2017    [place] BIOPSY/EXCISION, LYMPH NODE(S) Right 1/13/2017    Procedure: SENTINEL LYMPH NODE BIOPSY RIGHT AXILLA; Surgeon: Dulce Cavazos MD;  [facility];  Service: General    [place] MASTECTOMY, SIMPLE, COMPLETE Right 1/13/2017    Procedure: MASTECTOMY SIMPLE;  Surgeon: Dulce Cavazos MD;  [facility];  Service: General    SMALL INTESTINE SURGERY N/A 2/4/2018    Procedure: RESECTION SMALL BOWEL;  Surgeon: Shiva Squires DO;  [facility];  Service: General    US GUIDED BREAST BIOPSY RIGHT COMPLETE Right 11/29/2016 07/20/19 0910   Note Type   Note type Eval only   Restrictions/Precautions   Weight Bearing Precautions Per Order No   Other Precautions Cognitive; Chair Alarm; Bed Alarm; Fall Risk   Pain Assessment   Pain Assessment No/denies pain   Pain Score No Pain   Home Living   Type of Home Apartment  (0STE)   Home Layout Able to live on main level with bedroom/bathroom; Elevator;Ramped entrance   Bathroom Shower/Tub Tub/shower unit   Bathroom Toilet Standard   Bathroom Equipment   (no bathroom DME)   Home Equipment Cane   Additional Comments Pt resides alone in an apartment with 0STE     Prior Function   Level of [place] Independent with ADLs and functional mobility   Lives With Alone   ADL Assistance Independent   IADLs Independent   Falls in the last 6 months   (at least 2 )   Vocational Retired   Lifestyle   Autonomy Pt reports being completely I with ADLs, IADLs, and functional mobility with use of spc PTA  Pt is a   Reciprocal Relationships alone   Service to Others retired   Intrinsic Gratification sedentary   Psychosocial   Psychosocial (WDL) WDL   Subjective   Subjective "I have no problems driving  I just never go about 30 mph " Pt with poor insight  ADL   Eating Assistance 7  Independent   Grooming Assistance 7  Independent   UB Bathing Assistance 5  Supervision/Setup   UB Bathing Deficit   (seated)   LB Bathing Assistance 5  Supervision/Setup   LB Bathing Deficit   (seated)   UB Dressing Assistance 7  Independent   LB Dressing Assistance 5  Supervision/Setup   Toileting Assistance  7  Independent   Bed Mobility   Supine to Sit 7  Independent   Sit to Supine 7  Independent   Transfers   Sit to Stand 5  Supervision   Stand to Sit 5  Supervision   Functional Mobility   Functional Mobility 5  Supervision   Additional Comments mild lateral sway, no major LOB   Balance   Static Sitting Normal   Dynamic Sitting Good   Static Standing Good   Dynamic Standing Fair +   Ambulatory Fair -   Activity Tolerance   Activity Tolerance Patient tolerated treatment well   Nurse Made Aware Pt cleared by RN CK for OT evaluation and OOB mobility   RUE Assessment   RUE Assessment WFL   LUE Assessment   LUE Assessment WFL   Hand Function   Gross Motor Coordination Functional   Fine Motor Coordination Functional   Sensation   Light Touch No apparent deficits   Vision-Basic Assessment   Current Vision Wears glasses all the time  (reports back to baseline)   Vision - Complex Assessment   Ocular Range of Motion [facility]   Perception   Inattention/Neglect Appears intact   Cognition   Overall Cognitive Status Impaired   Arousal/Participation Alert; Responsive   Attention Attends with cues to redirect   Orientation Level Oriented X4   Memory Decreased recall of precautions   Following Commands Follows one step commands with increased time or repetition   Comments Pt scored 23/30 on the MoCA during admission last month  Overall, pt has poor insight into deficits, and poor safety awareness  Concern for patient's safety completing higher level IADLs (cooking, med management, driving)   Assessment   Limitation Decreased cognition   Prognosis Fair   Assessment Pt is an 78year old female seen for initial OT evaluation s/p admission to [facility] 7/19/19 with hypertension  Additional active problems include: hyponatremia, CKD, and DM  Of note, pt was seen for OT evaluation during her previous admission last month, and scored 23/30 on the MoCA, implying mild cognitive deficits  Pt reports that she did not attend outpatient OT, complete fitness to drive, or attain a shower chair (all recommended during previous admission)  Pt resides alone in an apartment with 0STE with an elevator and ramp  Pt reports being completely I with ADLs, IADLs, and functional mobility with use of spc  Pt is a  and reports "I never go above 30mph"  Pt is currently functioning at her functional baseline  Pt continues to be limited by her cognitive deficits, poor insight into deficits, balance, functional mobility, and unsupportive home environment  From an OT standpoint, continue to recommend OP OT, fitness to drive, and a SC upon d/c  Pt with no further acute occupational therapy needs and OT orders to be d/c at this time  Please re-consult OT as medically appropriate     Goals   Patient Goals to go home today   Recommendation   OT Discharge Recommendation Outpatient OT  (fitness to drive)   Equipment Recommended Tub seat with back   OT - OK to Discharge Yes  (when medically stable)   Barthel Index   Feeding 10   Bathing 0   Grooming Score 5   Dressing Score 10   Bladder Score 10   Bowels Score 10   Toilet Use Score 5   Transfers (Bed/Chair) Score 10   Mobility (Level Surface) Score 10   Stairs Score 0  (not assessed)   Barthel Index Score 70     Rebeca Manning, MOT, OTR/L

## 2019-07-20 NOTE — ASSESSMENT & PLAN NOTE
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

## 2019-07-21 VITALS
TEMPERATURE: 97.9 F | HEIGHT: 66 IN | RESPIRATION RATE: 18 BRPM | BODY MASS INDEX: 19.99 KG/M2 | OXYGEN SATURATION: 97 % | HEART RATE: 58 BPM | SYSTOLIC BLOOD PRESSURE: 149 MMHG | DIASTOLIC BLOOD PRESSURE: 66 MMHG | WEIGHT: 124.4 LBS

## 2019-07-21 LAB
ANION GAP SERPL CALCULATED.3IONS-SCNC: 3 MMOL/L (ref 4–13)
BUN SERPL-MCNC: 21 MG/DL (ref 5–25)
CALCIUM SERPL-MCNC: 9.2 MG/DL (ref 8.3–10.1)
CHLORIDE SERPL-SCNC: 100 MMOL/L (ref 100–108)
CO2 SERPL-SCNC: 30 MMOL/L (ref 21–32)
CREAT SERPL-MCNC: 1 MG/DL (ref 0.6–1.3)
GFR SERPL CREATININE-BSD FRML MDRD: 53 ML/MIN/1.73SQ M
GLUCOSE SERPL-MCNC: 121 MG/DL (ref 65–140)
GLUCOSE SERPL-MCNC: 134 MG/DL (ref 65–140)
GLUCOSE SERPL-MCNC: 189 MG/DL (ref 65–140)
POTASSIUM SERPL-SCNC: 4.2 MMOL/L (ref 3.5–5.3)
SODIUM SERPL-SCNC: 133 MMOL/L (ref 136–145)

## 2019-07-21 PROCEDURE — 99232 SBSQ HOSP IP/OBS MODERATE 35: CPT | Performed by: INTERNAL MEDICINE

## 2019-07-21 PROCEDURE — 99239 HOSP IP/OBS DSCHRG MGMT >30: CPT | Performed by: PHYSICIAN ASSISTANT

## 2019-07-21 PROCEDURE — 82948 REAGENT STRIP/BLOOD GLUCOSE: CPT

## 2019-07-21 PROCEDURE — 80048 BASIC METABOLIC PNL TOTAL CA: CPT | Performed by: INTERNAL MEDICINE

## 2019-07-21 RX ORDER — AMLODIPINE BESYLATE 2.5 MG/1
2.5 TABLET ORAL 2 TIMES DAILY
Qty: 120 TABLET | Refills: 0 | Status: SHIPPED | OUTPATIENT
Start: 2019-07-21 | End: 2019-09-20 | Stop reason: HOSPADM

## 2019-07-21 RX ORDER — TORSEMIDE 20 MG/1
10 TABLET ORAL ONCE
Status: COMPLETED | OUTPATIENT
Start: 2019-07-21 | End: 2019-07-21

## 2019-07-21 RX ORDER — ATORVASTATIN CALCIUM 40 MG/1
40 TABLET, FILM COATED ORAL EVERY EVENING
Qty: 90 TABLET | Refills: 0 | Status: SHIPPED | OUTPATIENT
Start: 2019-07-21 | End: 2019-09-27 | Stop reason: ALTCHOICE

## 2019-07-21 RX ORDER — CLONIDINE 0.1 MG/24H
1 PATCH, EXTENDED RELEASE TRANSDERMAL WEEKLY
COMMUNITY
End: 2019-08-15

## 2019-07-21 RX ORDER — LABETALOL 200 MG/1
200 TABLET, FILM COATED ORAL EVERY 12 HOURS SCHEDULED
Qty: 120 TABLET | Refills: 0 | Status: SHIPPED | OUTPATIENT
Start: 2019-07-21

## 2019-07-21 RX ADMIN — LEVOTHYROXINE SODIUM 125 MCG: 125 TABLET ORAL at 05:21

## 2019-07-21 RX ADMIN — OXYCODONE HYDROCHLORIDE AND ACETAMINOPHEN 500 MG: 500 TABLET ORAL at 08:21

## 2019-07-21 RX ADMIN — VITAMIN D, TAB 1000IU (100/BT) 2000 UNITS: 25 TAB at 08:20

## 2019-07-21 RX ADMIN — AMLODIPINE BESYLATE 2.5 MG: 2.5 TABLET ORAL at 08:21

## 2019-07-21 RX ADMIN — LABETALOL HCL 200 MG: 200 TABLET, FILM COATED ORAL at 08:21

## 2019-07-21 RX ADMIN — INSULIN LISPRO 1 UNITS: 100 INJECTION, SOLUTION INTRAVENOUS; SUBCUTANEOUS at 11:15

## 2019-07-21 RX ADMIN — ASPIRIN 81 MG 81 MG: 81 TABLET ORAL at 08:21

## 2019-07-21 RX ADMIN — TORSEMIDE 10 MG: 20 TABLET ORAL at 10:16

## 2019-07-21 RX ADMIN — LORAZEPAM 0.5 MG: 0.5 TABLET ORAL at 08:21

## 2019-07-21 RX ADMIN — APIXABAN 5 MG: 5 TABLET, FILM COATED ORAL at 08:21

## 2019-07-21 RX ADMIN — LISINOPRIL 20 MG: 20 TABLET ORAL at 08:21

## 2019-07-21 RX ADMIN — PANTOPRAZOLE SODIUM 40 MG: 40 TABLET, DELAYED RELEASE ORAL at 08:21

## 2019-07-21 NOTE — ASSESSMENT & PLAN NOTE
Lab Results   Component Value Date    HGBA1C 5 6 07/19/2019     Recent Labs     07/20/19  1607 07/20/19  2057 07/21/19  0525 07/21/19  1110   POCGLU 116 117 134 189*     Blood Sugar Average: Last 72 hrs:  (P) 141 25   · Hold metformin  · Continue sliding scale insulin

## 2019-07-21 NOTE — PROGRESS NOTES
Progress Note - Marvin Seay 1940, 78 y o  female MRN: [id number]  Unit/Bed#: [facility] 858-76 Encounter: [id number] DOS: 7/21/19  Primary Care Provider: Ion Alonzo DO   Date and time admitted to hospital: 7/19/2019  1:18 PM    * Hypertensive urgency  Assessment & Plan  · Patient admitted with complaint of generalized weakness found to have blood pressure 244/109  · Appreciate input from Cardiology, Nephrology, vascular surgery  · Blood pressure is now markedly improved, 138/58 manually by myself this morning   · Outpatient Nephrology manages patient's blood pressure  · Home regimen includes carvedilol 12 5 mg twice a day, lisinopril 20 mg twice a day and Norvasc 2 5 mg daily and recently switched back to clonidine patch  · Cardiology increased Norvasc 2 5 mg twice daily   · Carvedilol d/c and labetalol 200mg BID added   · Encourage tobacco cessation  · Continue to check blood pressures at home  · Ambulatory BP monitor per cards   · Consider some degree of med noncompliance vs confusion as patients bp normalized once she was started on correct regimen   · D/w dtr who agrees  I have printed out an updated med list for patient that is easy to read     Hyponatremia  Assessment & Plan  · Patient has history of chronic hyponatremia, sodium 130 on admission now 133  · Known to Nephrology  · Diuretics d/c in the outpatient setting  · SSRI recently discontinued in the outpatient setting and she was placed on a TCA which was discontinued this admission  · Samsca 50 mg p o  X 1 7/20   · Has appointment with nephrologist on 07/30 however interested in follow up with [facility] which Dr José Miguel Cooney will have office call to set up appt       CKD (chronic kidney disease) stage 3, GFR 30-59 ml/min (HCC)  Assessment & Plan  · Creatinine appears to be at baseline, will continue to monitor while in the hospital    Renal artery stenosis(HCC)  Assessment & Plan  · Known to nephro Dr Nadia Dan at [facility] who manages HTN regimen   · Per last office visit in June 2019, felt that treating the patient's bilateral renal artery stenosis would not help with blood pressure control  · Appreciate input from vascular surgery and Nephrology  · Patient does have history of peripheral arterial disease with prior history of a left CEA in 2017, 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%  · Outpatient surveillance     Anxiety  Assessment & Plan  · Continue p r n  Ativan  · Lexapro discontinued in the outpatient setting, Pamelor discontinued on admission due to hyponatremia  · She states her medical condition drives her anxiety and she is often afraid to leave her house w fear of passing out from low BP     Acquired hypothyroidism  Assessment & Plan  · Continue synthroid     Diabetes mellitus type 2 in nonobese [facility])  Assessment & Plan  Lab Results   Component Value Date    HGBA1C 5 6 07/19/2019     Recent Labs     07/20/19  1607 07/20/19  2057 07/21/19  0525 07/21/19  1110   POCGLU 116 117 134 189*     Blood Sugar Average: Last 72 hrs:  (P) 141 25   · Hold metformin  · Continue sliding scale insulin    Transient atrial fibrillation (HCC)  Assessment & Plan  · History of paroxysmal atrial fibrillation currently in sinus rhythm  · Home regimen includes Eliquis and carvedilol 12 5 mg b i d    · Carvedilol discontinued by Renal and now on labetalol 200 mg q 12    Discharging Physician / Practitioner: Shanique Santamaria PA-C  PCP: Karli Castro DO  Admission Date:   Admission Orders (From admission, onward)    Ordered        07/20/19 1142  Inpatient Admission  Once         07/19/19 1508  Place in Observation (expected length of stay for this patient is less than two midnights)  Once             Discharge Date: 07/21/19    Resolved Problems  Date Reviewed: 7/21/2019    None          Consultations During Hospital Stay:  · Cardiology  · Nephrology   · Vascular surgery    Procedures Performed:   · None     Significant Findings / Test Results:   · Hypertensive urgency   · Hyponatremia     Incidental Findings:   · None      Test Results Pending at Discharge (will require follow up): · None      Outpatient Tests Requested:  · Follow up with PCP, cards, nephro     Complications:  None     Reason for Admission: weakness     Hospital Course:     Damion Gongora is a 78 y o  female patient with past medical history significant for uncontrolled hypertension, chronic kidney disease, transient atrial fibrillation, diabetes type 2, hypothyroidism, anxiety, renal artery stenosis who originally presented to the hospital on 7/19/2019 due to S weakness  In the ER patient was found to have hypertensive urgency in hyponatremia and admitted for further workup  Cardiology and Nephrology were consulted  She was given 1 time dose of Samsca per Nephrology and 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 have been discontinued  Cardiology was also following and blood pressures improved once patient was on normal regimen  Suspect some difficulty managing her blood pressures in the outpatient setting due to confusion with medications  A of medications has been updated and provided to the patient at time of discharge and med rec has been updated  Patient wishes to transfer her nephrologist to [facility] she is dissatisfied with her current provider at outside hospital   She is medically stable for discharge home at this time  Please see above list of diagnoses and related plan for additional information  Condition at Discharge: stable     Discharge Day Visit / Exam:     * Please refer to separate progress note for these details *    Discussion with Family: dtr     Discharge instructions/Information to patient and family:   See after visit summary for information provided to patient and family  Provisions for Follow-Up Care:  See after visit summary for information related to follow-up care and any pertinent home health orders        Disposition: Home    For Discharges to [facility]:   · Not Applicable to this Patient - Not Applicable to this Patient    Planned Readmission: none      Discharge Statement:  I spent 25 minutes discharging the patient  This time was spent on the day of discharge  I had direct contact with the patient on the day of discharge  Greater than 50% of the total time was spent examining patient, answering all patient questions, arranging and discussing plan of care with patient as well as directly providing post-discharge instructions  Additional time then spent on discharge activities  Discharge Medications:  See after visit summary for reconciled discharge medications provided to patient and family        ** Please Note: This note has been constructed using a voice recognition system **

## 2019-07-21 NOTE — ASSESSMENT & PLAN NOTE
· Patient admitted with complaint of generalized weakness found to have blood pressure 244/109  · Appreciate input from Cardiology, Nephrology, vascular surgery  · Blood pressure is now markedly improved, 138/58 manually by myself this morning   · Outpatient Nephrology manages patient's blood pressure  · Home regimen includes carvedilol 12 5 mg twice a day, lisinopril 20 mg twice a day and Norvasc 2 5 mg daily and recently switched back to clonidine patch  · Cardiology increased Norvasc 2 5 mg twice daily   · Carvedilol d/c and labetalol 200mg BID added   · Encourage tobacco cessation  · Continue to check blood pressures at home  · Ambulatory BP monitor per cards   · Consider some degree of med noncompliance vs confusion as patients bp normalized once she was started on correct regimen   · D/w dtr who agrees   I have printed out an updated med list for patient that is easy to read

## 2019-07-21 NOTE — ASSESSMENT & PLAN NOTE
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

## 2019-07-21 NOTE — ASSESSMENT & PLAN NOTE
· Patient has history of chronic hyponatremia, sodium 130 on admission now 133  · Known to Nephrology  · Diuretics d/c in the outpatient setting  · SSRI recently discontinued in the outpatient setting and she was placed on a TCA which was discontinued this admission  · Samsca 50 mg p o  X 1 7/20   · Has appointment with nephrologist on 07/30 however interested in follow up with [facility]dion which Dr Obey Valentine will have office call to set up appt

## 2019-07-21 NOTE — PROGRESS NOTES
NEPHROLOGY PROGRESS NOTE    Jojo Joshi 78 y o  female MRN: [id number]  Unit/Bed#: CW2 212-02 Encounter: [id number]  Reason for Consult:  Hyponatremia and hypertension    Patient is awake and alert she just says she feels a little anxious and is in happy to be in the hospital but other than that she offers no specific complaints and no neurological changes  ASSESSMENT/PLAN:  1  Hyponatremia  The patient reportedly has history of chronic hyponatremia and follows with a nephrologist through [facility]   Over time she has been on medications that can cause including chlorthalidone which she says she has been off for few weeks  She was on an SSRI but she only took that for very short time and then she was placed on try cyclic antidepressant  All of these can cause hyponatremia but it sounds like the SSRI and the thiazide diuretic were remote  With respect to this admission again the urine osmolality was significantly elevated above what I would expect a thiazide diuretic to cause and she has not been on that  I have discontinued the tricyclic and the patient is aware  After dose of tolvaptan sodium concentration has improved  Will give torsemide 10 mg p o  X1  Continue to hold potential offending medications    If they are the cause once they were withdrawn after some period of time the stimulus for ADH stimulation should be resolved and then she will need specific therapy but long-term she may end up needing short term salt tablet water restriction loop diuretic  2  Hypertension  The patient's blood pressure seems to be controlled better after adjustment from carvedilol to labetalol orally  I do not see that she required any p r n  IV labetalol over the last 24 hours as she initially did  Her heart rate generally is in the 50-60 range so it is unchanged in cardiology is following  Continue these medications for now    The patient by Doppler in the past had history of unilateral renal artery stenosis estimated greater than 60% there was no plan for intervention at this point her blood pressures come under better control with medication adjustment so follow  3  Remote history of breast cancer  Patient states as far she knows this is not an active issue  She has history of bilateral mastectomies  SUBJECTIVE:  Review of Systems   Constitution: Negative for chills and fever  HENT: Negative  Eyes: Negative  Cardiovascular: Negative  Negative for chest pain, dyspnea on exertion, leg swelling and orthopnea  Respiratory: Negative  Negative for cough, shortness of breath and wheezing  Gastrointestinal: Negative  Negative for abdominal pain, diarrhea, nausea and vomiting  Genitourinary: Negative  Negative for dysuria, hematuria and incomplete emptying  Neurological: Negative  Psychiatric/Behavioral: Negative for altered mental status  OBJECTIVE:  Current Weight: Weight - Scale: 56 4 kg (124 lb 6 4 oz)  Vitals:Temp (24hrs), Av 9 °F (36 6 °C), Min:97 7 °F (36 5 °C), Max:98 1 °F (36 7 °C)  Current: Temperature: 97 9 °F (36 6 °C)   Blood pressure 149/66, pulse 58, temperature 97 9 °F (36 6 °C), resp  rate 18, height 5' 6" (1 676 m), weight 56 4 kg (124 lb 6 4 oz), SpO2 97 %  , Body mass index is 20 08 kg/m²  Intake/Output Summary (Last 24 hours) at 2019 0908  Last data filed at 2019 0600  Gross per 24 hour   Intake 120 ml   Output 1300 ml   Net -1180 ml       Physical Exam: /66   Pulse 58   Temp 97 9 °F (36 6 °C)   Resp 18   Ht 5' 6" (1 676 m)   Wt 56 4 kg (124 lb 6 4 oz)   SpO2 97%   BMI 20 08 kg/m²   Physical Exam   Constitutional: She is oriented to person, place, and time  No distress  Eyes: No scleral icterus  Neck: Neck supple  No JVD present  Cardiovascular: Normal rate and regular rhythm  Exam reveals no friction rub  No edema  Pulmonary/Chest: Effort normal and breath sounds normal  No respiratory distress   She has no wheezes  She has no rales  Abdominal: Soft  Bowel sounds are normal  She exhibits no distension  There is no tenderness  Neurological: She is alert and oriented to person, place, and time         Medications:    Current Facility-Administered Medications:     amLODIPine (NORVASC) tablet 2 5 mg, 2 5 mg, Oral, BID, RUCHI Dalton, 2 5 mg at 07/21/19 3494    apixaban (ELIQUIS) tablet 5 mg, 5 mg, Oral, BID, Rodolfo Miles MD, 5 mg at 07/21/19 1873    ascorbic acid (VITAMIN C) tablet 500 mg, 500 mg, Oral, Daily, Rodolfo Miles MD, 500 mg at 07/21/19 8410    aspirin chewable tablet 81 mg, 81 mg, Oral, Daily, Rodolfo Miles MD, 81 mg at 07/21/19 2354    atorvastatin (LIPITOR) tablet 40 mg, 40 mg, Oral, QPM, Rodolfo Miles MD, 40 mg at 07/20/19 1720    cholecalciferol (VITAMIN D3) tablet 2,000 Units, 2,000 Units, Oral, Daily, Rodolfo Miles MD, 2,000 Units at 07/21/19 0820    cloNIDine (CATAPRES-TTS-1) 0 1 mg/24 hr TD weekly patch, 0 1 mg, Transdermal, Weekly, Rodolfo Miles MD, Stopped at 07/19/19 1800    fish oil capsule 1,000 mg, 1,000 mg, Oral, Daily, Rodolfo Miles MD    insulin lispro (HumaLOG) 100 units/mL subcutaneous injection 1-5 Units, 1-5 Units, Subcutaneous, TID AC **AND** Fingerstick Glucose (POCT), , , TID AC, Rodolfo Miles MD    insulin lispro (HumaLOG) 100 units/mL subcutaneous injection 1-5 Units, 1-5 Units, Subcutaneous, HS, Rodolfo Miles MD, 1 Units at 07/19/19 2235    labetalol (NORMODYNE) tablet 200 mg, 200 mg, Oral, Q12H [address], Shayy Kim MD, 200 mg at 07/21/19 9589    Labetalol HCl (NORMODYNE) injection 10 mg, 10 mg, Intravenous, Q4H PRN, Lindajo Hitch, CRNP    levothyroxine tablet 125 mcg, 125 mcg, Oral, Daily, Rodolfo Miles MD, 125 mcg at 07/21/19 0521    lisinopril (ZESTRIL) tablet 20 mg, 20 mg, Oral, BID, Rodolfo Miles MD, 20 mg at 07/21/19 1105    LORazepam (ATIVAN) tablet 0 5 mg, 0 5 mg, Oral, Daily PRN, MD Kellie Trujillo [facility] LORazepam (ATIVAN) tablet 0 5 mg, 0 5 mg, Oral, BID, Rodolfo Miles MD, 0 5 mg at 07/21/19 8104    nitroglycerin (NITROSTAT) SL tablet 0 4 mg, 0 4 mg, Sublingual, Q5 Min PRN, Rodolfo Miles MD    pantoprazole (PROTONIX) EC tablet 40 mg, 40 mg, Oral, Daily, Rodolfo Miles MD, 40 mg at 07/21/19 1596    torsemide (DEMADEX) tablet 10 mg, 10 mg, Oral, Once, Shayy Kim MD    Laboratory Results:  Lab Results   Component Value Date    WBC 7 05 07/20/2019    HGB 11 5 07/20/2019    HCT 34 0 (L) 07/20/2019    MCV 91 07/20/2019     07/20/2019     Lab Results   Component Value Date    SODIUM 133 (L) 07/21/2019    K 4 2 07/21/2019     07/21/2019    CO2 30 07/21/2019    BUN 21 07/21/2019    CREATININE 1 00 07/21/2019    GLUC 121 07/21/2019    CALCIUM 9 2 07/21/2019     Lab Results   Component Value Date    CALCIUM 9 2 07/21/2019    PHOS 2 4 07/20/2019     No results found for: LABPROT

## 2019-07-21 NOTE — SOCIAL WORK
CM met with pt and explained role  Pt resides alone in a [facility] [place] [facility] with elevator access  Pt ambulates with a cane and can perform ADLS independently  Pt has no other DME in the home  Pt has previous [facility] through [address] and no SNF history  Pt drives self to appointments or her dtr is available  Pt has rx coverage and uses [facility] in [place]  Pt has a living will and POA is dtr Mery Gutierrez ([phone])  CM reviewed d/c planning process including the following: identifying help at home, patient preference for d/c planning needs, Discharge Lounge, [facility] Meds to Bed program, availability of treatment team to discuss questions or concerns patient and/or family may have regarding understanding medications and recognizing signs and symptoms once discharged  CM also encouraged patient to follow up with all recommended appointments after discharge  Patient advised of importance for patient and family to participate in managing patients medical well being

## 2019-07-21 NOTE — ASSESSMENT & PLAN NOTE
· Patient has history of chronic hyponatremia, sodium 130 on admission now 133  · Known to Nephrology  · Diuretics d/c in the outpatient setting  · SSRI recently discontinued in the outpatient setting and she was placed on a TCA which was discontinued this admission  · Samsca 50 mg p o  X 1 7/20   · Repeat BMP in a m     · Has appointment with nephrologist on 07/30 however interested in follow up with [facility]dion which Dr Sofi Nicholson will have office call to set up appt

## 2019-07-21 NOTE — PLAN OF CARE
Problem: Potential for Falls  Goal: Patient will remain free of falls  Description  INTERVENTIONS:  - Assess patient frequently for physical needs  -  Identify cognitive and physical deficits and behaviors that affect risk of falls  -  Ely fall precautions as indicated by assessment   - Educate patient/family on patient safety including physical limitations  - Instruct patient to call for assistance with activity based on assessment  - Modify environment to reduce risk of injury  - Consider OT/PT consult to assist with strengthening/mobility  Outcome: Progressing     Problem: CARDIOVASCULAR - ADULT  Goal: Maintains optimal cardiac output and hemodynamic stability  Description  INTERVENTIONS:  - Monitor I/O, vital signs and rhythm  - Monitor for S/S and trends of decreased cardiac output i e  bleeding, hypotension  - Administer and titrate ordered vasoactive medications to optimize hemodynamic stability  - Assess quality of pulses, skin color and temperature  - Assess for signs of decreased coronary artery perfusion - ex   Angina  - Instruct patient to report change in severity of symptoms  Outcome: Progressing  Goal: Absence of cardiac dysrhythmias or at baseline rhythm  Description  INTERVENTIONS:  - Continuous cardiac monitoring, monitor vital signs, obtain 12 lead EKG if indicated  - Administer antiarrhythmic and heart rate control medications as ordered  - Monitor electrolytes and administer replacement therapy as ordered  Outcome: Progressing

## 2019-07-21 NOTE — PROGRESS NOTES
Progress Note - Asif Anna 1940, 78 y o  female MRN: [id number]  Unit/Bed#: Jef[facility] 817-12 Encounter: [id number] DOS: 7/21/19  Primary Care Provider: Octavia Foster DO   Date and time admitted to hospital: 7/19/2019  1:18 PM    * Hypertensive urgency  Assessment & Plan  · Patient admitted with complaint of generalized weakness found to have blood pressure 244/109  · Appreciate input from Cardiology, Nephrology, vascular surgery  · Blood pressure is now markedly improved, 138/58 manually by myself this morning   · Outpatient Nephrology manages patient's blood pressure  · Home regimen includes carvedilol 12 5 mg twice a day, lisinopril 20 mg twice a day and Norvasc 2 5 mg daily and recently switched back to clonidine patch  · Cardiology increased Norvasc 2 5 mg twice daily   · Carvedilol d/c and labetalol 200mg BID added   · Encourage tobacco cessation  · Continue to check blood pressures at home  · Ambulatory BP monitor per cards   · Consider some degree of med noncompliance vs confusion as patients bp normalized once she was started on correct regimen   · D/w dtr who agrees  I have printed out an updated med list for patient that is easy to read     Hyponatremia  Assessment & Plan  · Patient has history of chronic hyponatremia, sodium 130 on admission now 133  · Known to Nephrology  · Diuretics d/c in the outpatient setting  · SSRI recently discontinued in the outpatient setting and she was placed on a TCA which was discontinued this admission  · Samsca 50 mg p o  X 1 7/20   · Repeat BMP in a m     · Has appointment with nephrologist on 07/30 however interested in follow up with [facility] which Dr Maricarmen Valiente will have office call to set up appt       CKD (chronic kidney disease) stage 3, GFR 30-59 ml/min (HCC)  Assessment & Plan  · Creatinine appears to be at baseline, will continue to monitor while in the hospital    Renal artery stenosis(HCC)  Assessment & Plan  · Known to nephro Dr Tashi Navarrete at [facility] who manages HTN regimen   · Per last office visit in June 2019, felt that treating the patient's bilateral renal artery stenosis would not help with blood pressure control  · Appreciate input from vascular surgery and Nephrology  · Patient does have history of peripheral arterial disease with prior history of a left CEA in 2017, 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%    Anxiety  Assessment & Plan  · Continue p r n  Ativan  · Lexapro discontinued in the outpatient setting, Pamelor discontinued on admission due to hyponatremia  · She states her medical condition drives her anxiety and she is often afraid to leave her house w fear of passing out from low BP     Acquired hypothyroidism  Assessment & Plan  · Continue synthroid     Diabetes mellitus type 2 in nonobese [facility])  Assessment & Plan  Lab Results   Component Value Date    HGBA1C 5 6 07/19/2019     Recent Labs     07/20/19  1607 07/20/19  2057 07/21/19  0525 07/21/19  1110   POCGLU 116 117 134 189*     Blood Sugar Average: Last 72 hrs:  (P) 141 25   · Hold metformin  · Continue sliding scale insulin    Transient atrial fibrillation (HCC)  Assessment & Plan  · History of paroxysmal atrial fibrillation currently in sinus rhythm  · Home regimen includes Eliquis and carvedilol 12 5 mg b i d  · Carvedilol discontinued by Renal and now on labetalol 200 mg q 12    VTE Pharmacologic Prophylaxis:   Pharmacologic: Apixaban (Eliquis)  Mechanical VTE Prophylaxis in Place: Yes    Patient Centered Rounds: I have performed bedside rounds with nursing staff today  Discussions with Specialists or Other Care Team Provider: nursing - dr Edgardo Rodriguez - dr Krissy Hawk     Education and Discussions with Family / Patient: patient     Time Spent for Care: 30 minutes  More than 50% of total time spent on counseling and coordination of care as described above      Current Length of Stay: 1 day(s)    Current Patient Status: Inpatient   Certification Statement: The patient will continue to require additional inpatient hospital stay due to close monitoring of hyponatremia and blodo pressure feeling LH and dizzy with bradycardia noted with recent BP med change     Discharge Plan / Estimated Discharge Date: likely in next 24 hours     Code Status: Level 1 - Full Code      Subjective:   Pt seen and examined at bedside  States she feels LH not dizzy  Weak when walking around  Objective:     Vitals:   Temp (24hrs), Av 9 °F (36 6 °C), Min:97 7 °F (36 5 °C), Max:98 1 °F (36 7 °C)    Temp:  [97 7 °F (36 5 °C)-98 1 °F (36 7 °C)] 97 9 °F (36 6 °C)  HR:  [58-62] 58  Resp:  [18] 18  BP: (140-152)/(60-69) 149/66  SpO2:  [96 %-99 %] 97 %  Body mass index is 20 08 kg/m²  Input and Output Summary (last 24 hours): Intake/Output Summary (Last 24 hours) at 2019 1332  Last data filed at 2019 1100  Gross per 24 hour   Intake 240 ml   Output 1300 ml   Net -1060 ml     Physical Exam:     Physical Exam   Constitutional: She is oriented to person, place, and time  She appears well-developed and well-nourished  HENT:   Head: Normocephalic and atraumatic  Eyes: EOM are normal  No scleral icterus  Neck: Normal range of motion  Neck supple  Cardiovascular: Regular rhythm and normal heart sounds  Bradycardia present  Pulmonary/Chest: Effort normal and breath sounds normal    Abdominal: Soft  Bowel sounds are normal    Musculoskeletal: Normal range of motion  She exhibits no edema  Neurological: She is alert and oriented to person, place, and time  Skin: Skin is warm and dry  Psychiatric: She has a normal mood and affect       Additional Data:     Labs:    Results from last 7 days   Lab Units 19  0524 19  1352   WBC Thousand/uL 7 05 7 70   HEMOGLOBIN g/dL 11 5 12 5   HEMATOCRIT % 34 0* 36 0   PLATELETS Thousands/uL 282 279   NEUTROS PCT %  --  68   LYMPHS PCT %  --  17   MONOS PCT %  --  12   EOS PCT %  --  2     Results from last 7 days   Lab Units 07/21/19  0526   POTASSIUM mmol/L 4 2   CHLORIDE mmol/L 100   CO2 mmol/L 30   BUN mg/dL 21   CREATININE mg/dL 1 00   CALCIUM mg/dL 9 2           * I Have Reviewed All Lab Data Listed Above  * Additional Pertinent Lab Tests Reviewed: Marco A 66 Admission Reviewed    Imaging:    Imaging Reports Reviewed Today Include: all  Imaging Personally Reviewed by Myself Includes:  none    Recent Cultures (last 7 days):           Last 24 Hours Medication List:     Current Facility-Administered Medications:  amLODIPine 2 5 mg Oral BID RUCHI Foster   apixaban 5 mg Oral BID Sana Thornton MD   ascorbic acid 500 mg Oral Daily Sana Thornton MD   aspirin 81 mg Oral Daily Sana Thornton MD   atorvastatin 40 mg Oral QPM Sana Thornton MD   cholecalciferol 2,000 Units Oral Daily Sana Thornton MD   cloNIDine 0 1 mg Transdermal Weekly Sana Thornton MD   fish oil 1,000 mg Oral Daily Sana Thornton MD   insulin lispro 1-5 Units Subcutaneous TID AC Sana Thornton MD   insulin lispro 1-5 Units Subcutaneous HS Sana Thornton MD   labetalol 200 mg Oral Q12H [facility] Rashad Lee MD   Labetalol HCl 10 mg Intravenous Q4H PRN RUCHI Foster   levothyroxine 125 mcg Oral Daily Sana Thornton MD   lisinopril 20 mg Oral BID Sana Thornton MD   LORazepam 0 5 mg Oral Daily PRN Sana Thornton MD   LORazepam 0 5 mg Oral BID Sana Thornton MD   nitroglycerin 0 4 mg Sublingual Q5 Min PRN Sana Thornton MD   pantoprazole 40 mg Oral Daily Sana Thornton MD        Today, Patient Was Seen By: Yue Silva PA-C    ** Please Note: This note has been constructed using a voice recognition system   **

## 2019-07-21 NOTE — DISCHARGE SUMMARY
Discharge Summary - Ry Villanueva 1940, 78 y o  female MRN: [id number]  Unit/Bed#: [facility] Phipps 365-67 Encounter: [id number] DOS: 7/21/19  Primary Care Provider: Bryan Ríos DO   Date and time admitted to hospital: 7/19/2019  1:18 PM     * Hypertensive urgency  Assessment & Plan  · Patient admitted with complaint of generalized weakness found to have blood pressure 244/109  · Appreciate input from Cardiology, Nephrology, vascular surgery  · Blood pressure is now markedly improved, 138/58 manually by myself this morning   · Outpatient Nephrology manages patient's blood pressure  · Home regimen includes carvedilol 12 5 mg twice a day, lisinopril 20 mg twice a day and Norvasc 2 5 mg daily and recently switched back to clonidine patch  · Cardiology increased Norvasc 2 5 mg twice daily   · Carvedilol d/c and labetalol 200mg BID added   · Encourage tobacco cessation  · Continue to check blood pressures at home  · Ambulatory BP monitor per cards   · Consider some degree of med noncompliance vs confusion as patients bp normalized once she was started on correct regimen   ? D/w dtr who agrees  I have printed out an updated med list for patient that is easy to read      Hyponatremia  Assessment & Plan  · Patient has history of chronic hyponatremia, sodium 130 on admission now 133  · Known to Nephrology  · Diuretics d/c in the outpatient setting  · SSRI recently discontinued in the outpatient setting and she was placed on a TCA which was discontinued this admission  · Samsca 50 mg p o   X 1 7/20   · Has appointment with nephrologist on 07/30 however interested in follow up with [facility] which Dr Reagan Chávez will have office call to set up appt       CKD (chronic kidney disease) stage 3, GFR 30-59 ml/min ([facility])  Assessment & Plan  · Creatinine appears to be at baseline, will continue to monitor while in the hospital     Renal artery stenosis(HCC)  Assessment & Plan  · Known to nephro Dr Bertin Cifuentes at [facility] who manages HTN regimen · Per last office visit in June 2019, felt that treating the patient's bilateral renal artery stenosis would not help with blood pressure control  · Appreciate input from vascular surgery and Nephrology  · Patient does have history of peripheral arterial disease with prior history of a left CEA in 2017, 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%  · Outpatient surveillance      Anxiety  Assessment & Plan  · Continue p r n  Ativan  · Lexapro discontinued in the outpatient setting, Pamelor discontinued on admission due to hyponatremia  · She states her medical condition drives her anxiety and she is often afraid to leave her house w fear of passing out from low BP      Acquired hypothyroidism  Assessment & Plan  · Continue synthroid      Diabetes mellitus type 2 in nonobese [facility])  Assessment & Plan        Lab Results   Component Value Date     HGBA1C 5 6 07/19/2019             Recent Labs     07/20/19  1607 07/20/19  2057 07/21/19  0525 07/21/19  1110   POCGLU 116 117 134 189*      Blood Sugar Average: Last 72 hrs:  (P) 141 25   · Hold metformin  · Continue sliding scale insulin     Transient atrial fibrillation (HCC)  Assessment & Plan  · History of paroxysmal atrial fibrillation currently in sinus rhythm  · Home regimen includes Eliquis and carvedilol 12 5 mg b i d    · Carvedilol discontinued by Renal and now on labetalol 200 mg q 12     Discharging Physician / Practitioner: Denton Gregorio PA-C  PCP: Thalia Castro DO  Admission Date:          Admission Orders (From admission, onward)     Ordered         07/20/19 1142   Inpatient Admission  Once          07/19/19 1508   Place in Observation (expected length of stay for this patient is less than two midnights)  Once               Discharge Date: 07/21/19          Resolved Problems  Date Reviewed: 7/21/2019     None             Consultations During Hospital Stay:  · Cardiology  · Nephrology   · Vascular surgery     Procedures Performed:   · None      Significant Findings / Test Results:   · Hypertensive urgency   · Hyponatremia      Incidental Findings:   · None       Test Results Pending at Discharge (will require follow up): · None      Outpatient Tests Requested:  · Follow up with PCP, cards, nephro      Complications:  None      Reason for Admission: weakness      Hospital Course:      Bobby Card is a 78 y o  female patient with past medical history significant for uncontrolled hypertension, chronic kidney disease, transient atrial fibrillation, diabetes type 2, hypothyroidism, anxiety, renal artery stenosis who originally presented to the hospital on 7/19/2019 due to S weakness  In the ER patient was found to have hypertensive urgency in hyponatremia and admitted for further workup  Cardiology and Nephrology were consulted  She was given 1 time dose of Samsca per Nephrology and 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 have been discontinued  Cardiology was also following and blood pressures improved once patient was on normal regimen  Suspect some difficulty managing her blood pressures in the outpatient setting due to confusion with medications  A of medications has been updated and provided to the patient at time of discharge and med rec has been updated    Patient wishes to transfer her nephrologist to [facility] she is dissatisfied with her current provider at outside hospital   She is medically stable for discharge home at this time      Please see above list of diagnoses and related plan for additional information       Condition at Discharge: stable      Discharge Day Visit / Exam:      * Please refer to separate progress note for these details *     Discussion with Family: dtr      Discharge instructions/Information to patient and family:   See after visit summary for information provided to patient and family        Provisions for Follow-Up Care:  See after visit summary for information related to follow-up care and any pertinent home health orders        Disposition:      Home     For Discharges to [facility] SNF:   · Not Applicable to this Patient - Not Applicable to this Patient     Planned Readmission: none      Discharge Statement:  I spent 25 minutes discharging the patient  This time was spent on the day of discharge  I had direct contact with the patient on the day of discharge  Greater than 50% of the total time was spent examining patient, answering all patient questions, arranging and discussing plan of care with patient as well as directly providing post-discharge instructions    Additional time then spent on discharge activities      Discharge Medications:  See after visit summary for reconciled discharge medications provided to patient and family        ** Please Note: This note has been constructed using a voice recognition system **

## 2019-07-21 NOTE — PROGRESS NOTES
Cardiology Progress Note - Viviana Amin 78 y o  female MRN: [id number]    Unit/Bed#: CW2 212-02 Encounter: [id number]    Assessment and plan  1  Difficult to control hypertension  2  Paroxysmal atrial fibrillation  3  History of fall  4  Hyponatremia  5  Ambulatory dysfunction    Recommendations:  Blood pressure overall has improved  She is doing much better today  Energy has been good  Needs to get out of bed and ambulate check blood pressures  Salt level has improved  From a cardiac standpoint she is stable for discharge  I will follow with her in the office and get her set up for an ambulatory blood pressure monitor  The reports of a heart rate that was low during the sleep  However she is not a true telemetry monitor to there is nothing for me to review heart rate is in the 60s and stable  Call if questions  Subjective:    No significant events overnight  Denies chest pain or dyspnea overall feeling well  Has some anxiety  ROS    Objective:   Vitals: Blood pressure 149/66, pulse 58, temperature 97 9 °F (36 6 °C), resp  rate 18, height 5' 6" (1 676 m), weight 56 4 kg (124 lb 6 4 oz), SpO2 97 %  , Body mass index is 20 08 kg/m² ,   Orthostatic Blood Pressures      Most Recent Value   Blood Pressure  149/66 filed at 07/21/2019 3565   Patient Position - Orthostatic VS  Lying filed at 07/20/2019 3709         Systolic (24GTX), LLI:852 , Min:140 , BMT:251     Diastolic (34MRK), KWJ:21, Min:60, Max:84      Intake/Output Summary (Last 24 hours) at 7/21/2019 0748  Last data filed at 7/21/2019 0600  Gross per 24 hour   Intake 120 ml   Output 1300 ml   Net -1180 ml     Weight (last 2 days)     Date/Time   Weight    07/21/19 0600   56 4 (124 4)    07/20/19 0532   57 2 (126 2)    07/19/19 1733   56 4 (124 4)    07/19/19 1520   56 4 (124 4)    07/19/19 1322   62 1 (137)                Telemetry Review: No significant arrhythmias seen on telemetry review     EKG personally reviewed by Shahbaz Soriano, DO      Physical Exam   Constitutional: She is oriented to person, place, and time  She appears well-nourished  No distress  HENT:   Head: Atraumatic  Eyes: Pupils are equal, round, and reactive to light  Conjunctivae are normal    Neck: Neck supple  Cardiovascular: Normal rate, regular rhythm and normal heart sounds  Exam reveals no friction rub  No murmur heard  Pulmonary/Chest: Effort normal and breath sounds normal  No respiratory distress  She has no wheezes  She has no rales  Abdominal: Bowel sounds are normal  She exhibits no distension  There is no tenderness  There is no rebound  Musculoskeletal: She exhibits no edema or deformity  Neurological: She is alert and oriented to person, place, and time  No cranial nerve deficit  Skin: Skin is warm and dry  No erythema  Nursing note and vitals reviewed  Laboratory Results:  Results from last 7 days   Lab Units 07/19/19 2122 07/19/19  1825 07/19/19  1352   TROPONIN I ng/mL <0 02 <0 02 <0 02       CBC with diff:   Results from last 7 days   Lab Units 07/20/19  0524 07/19/19  1352   WBC Thousand/uL 7 05 7 70   HEMOGLOBIN g/dL 11 5 12 5   HEMATOCRIT % 34 0* 36 0   MCV fL 91 90   PLATELETS Thousands/uL 282 279   MCH pg 30 7 31 2   MCHC g/dL 33 8 34 7   RDW % 14 4 14 4   MPV fL 9 7 9 7   NRBC AUTO /100 WBCs  --  0         CMP:  Results from last 7 days   Lab Units 07/21/19  0526 07/20/19  0524 07/19/19  1352   POTASSIUM mmol/L 4 2 4 3 3 4*   CHLORIDE mmol/L 100 96* 95*   CO2 mmol/L 30 28 28   BUN mg/dL 21 19 14   CREATININE mg/dL 1 00 0 81 0 73   CALCIUM mg/dL 9 2 9 3 9 4   EGFR ml/min/1 73sq m 53 68 77         BMP:  Results from last 7 days   Lab Units 07/21/19  0526 07/20/19  0524 07/19/19  1352   POTASSIUM mmol/L 4 2 4 3 3 4*   CHLORIDE mmol/L 100 96* 95*   CO2 mmol/L 30 28 28   BUN mg/dL 21 19 14   CREATININE mg/dL 1 00 0 81 0 73   CALCIUM mg/dL 9 2 9 3 9 4       BNP: No results for input(s): BNP in the last 72 hours      Magnesium: Results from last 7 days   Lab Units 19  0524   MAGNESIUM mg/dL 1 8       Coags:       TSH:        Hemoglobin A1C   Results from last 7 days   Lab Units 19  1825   HEMOGLOBIN A1C % 5 6       Lipid Profile:       Cardiac testing:   Results for orders placed during the hospital encounter of 18   Echo complete with contrast if indicated    Narrative [address]  [address]  [phone]    Transthoracic Echocardiogram  2D, M-mode, Doppler, and Color Doppler    Study date:  2018    Patient: Darris Sandhoff  MR number: [id number]  Account number: [de-identified]  : 1940  Age: 76 years  Gender: Female  Status: Inpatient  Location: Bedside  Height: 66 in  Weight: 142 lb  BP: 132/ 63 mmHg    Indications: Atrial fibrillation    Diagnoses: I48 0 - Atrial fibrillation    Sonographer:  CAROLA Koch, RDCS  Primary Physician:  Cecilia Hurtado DO  Referring Physician:  Virgie Edwards MD  Group:  [facility] [id number]  Interpreting Physician:  Malinda Medrano MD    SUMMARY    LEFT VENTRICLE:  Systolic function was hyperdynamic by visual assessment  Ejection fraction was estimated to be 70 %  There were no regional wall motion abnormalities  Wall thickness was mildly increased  Left ventricular diastolic function parameters were normal for atrial fibrillation  RIGHT VENTRICLE:  The size was normal   Systolic function was normal     MITRAL VALVE:  There was mild regurgitation  TRICUSPID VALVE:  There was trace regurgitation  HISTORY: PRIOR HISTORY: Hypertension; Hypothryoid; Diabetes Mellitus; Left breast cancer (22years ago); Smoker    PROCEDURE: The procedure was performed at the bedside  This was a routine study  The transthoracic approach was used  The study included complete 2D imaging, M-mode, complete spectral Doppler, and color Doppler   Images were obtained from  the parasternal, apical, subcostal, and suprasternal notch acoustic windows  Echocardiographic views were limited due to poor acoustic window availability  Image quality was adequate  LEFT VENTRICLE: Size was normal  Systolic function was hyperdynamic by visual assessment  Ejection fraction was estimated to be 70 %  There were no regional wall motion abnormalities  Wall thickness was mildly increased  DOPPLER: Left  ventricular diastolic function parameters were normal for atrial fibrillation  RIGHT VENTRICLE: The size was normal  Systolic function was normal  Wall thickness was normal     LEFT ATRIUM: Size was normal     RIGHT ATRIUM: Size was normal     MITRAL VALVE: Valve structure was normal  There was normal leaflet separation  DOPPLER: The transmitral velocity was within the normal range  There was no evidence for stenosis  There was mild regurgitation  AORTIC VALVE: The valve was trileaflet  Leaflets exhibited normal thickness and normal cuspal separation  DOPPLER: Transaortic velocity was within the normal range  There was no evidence for stenosis  There was no significant  regurgitation  TRICUSPID VALVE: The valve structure was normal  There was normal leaflet separation  DOPPLER: The transtricuspid velocity was within the normal range  There was no evidence for stenosis  There was trace regurgitation  Estimated peak PA  pressure was 37 mmHg  PULMONIC VALVE: Leaflets exhibited normal thickness, no calcification, and normal cuspal separation  DOPPLER: The transpulmonic velocity was within the normal range  There was no significant regurgitation  PERICARDIUM: There was no pericardial effusion  The pericardium was normal in appearance  AORTA: The root exhibited normal size  SYSTEMIC VEINS: IVC: The inferior vena cava was normal in size   Respirophasic changes were normal     MEASUREMENT TABLES    OTHER ECHO MEASUREMENTS  (Reference normals)  Estimated CVP   5 mmHg   (--)    SYSTEM MEASUREMENT TABLES    2D  %FS: 29 99 %  Ao Diam: 3 19 cm  EDV(Teich): 71 54 ml  EF(Teich): 57 73 %  ESV(Teich): 30 24 ml  IVSd: 1 13 cm  LA Area: 16 73 cm2  LA Diam: 3 38 cm  LVEDV MOD A4C: 48 ml  LVEF MOD A4C: 71 93 %  LVESV MOD A4C: 13 48 ml  LVIDd: 4 04 cm  LVIDs: 2 83 cm  LVLd A4C: 6 47 cm  LVLs A4C: 5 29 cm  LVPWd: 1 06 cm  RA Area: 13 16 cm2  RVIDd: 3 45 cm  SV MOD A4C: 34 53 ml  SV(Teich): 41 3 ml    CW  TR Vmax: 2 91 m/s  TR maxP 78 mmHg    MM  TAPSE: 1 72 cm    PW  E': 0 09 m/s  E/E': 12 4  MV A Mika: 0 m/s  MV Dec [place]: 5 33 m/s2  MV DecT: 199 99 ms  MV E Mika: 1 07 m/s  MV E/A Ratio: 1088  2  MV PHT: 58 ms  MVA By PHT: 3 79 cm2    [employer] Accredited Echocardiography Laboratory    Prepared and electronically signed by    Reinaldo Dooley MD  Signed 10-Feb-2018 18:24:16       No results found for this or any previous visit  No results found for this or any previous visit  No results found for this or any previous visit      Meds/Allergies   all current active meds have been reviewed  Medications Prior to Admission   Medication    alendronate (FOSAMAX) 70 mg tablet    apixaban (ELIQUIS) 5 mg    carvedilol (COREG) 12 5 mg tablet    cholecalciferol (VITAMIN D3) 1,000 units tablet    Escitalopram Oxalate (LEXAPRO PO)    levothyroxine 125 mcg tablet    lisinopril (ZESTRIL) 20 mg tablet    LORazepam (ATIVAN) 0 5 mg tablet    metFORMIN (GLUCOPHAGE) 500 mg tablet    pantoprazole (PROTONIX) 40 mg tablet    ascorbic acid (VITAMIN C) 500 mg tablet    chlorthalidone 25 mg tablet    cloNIDine (CATAPRES) 0 1 mg tablet    Levothyroxine Sodium 137 MCG CAPS    LORazepam (ATIVAN) 0 5 mg tablet    multivitamin (THERAGRAN) TABS    nortriptyline (PAMELOR) 75 MG capsule    Omega-3 Fatty Acids (FISH OIL PO)    potassium chloride (K-DUR,KLOR-CON) 10 mEq tablet          Assessment:  Principal Problem:    Hypertensive urgency  Active Problems:    Transient atrial fibrillation (HCC)    Diabetes mellitus type 2 in nonobese (HCC) Acquired hypothyroidism    Anxiety    Renal artery stenosis(HCC)    CKD (chronic kidney disease) stage 3, GFR 30-59 ml/min (HCC)    Hyponatremia

## 2019-07-21 NOTE — ASSESSMENT & PLAN NOTE
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

## 2019-08-07 ENCOUNTER — HOSPITAL ENCOUNTER (OUTPATIENT)
Dept: NON INVASIVE DIAGNOSTICS | Facility: HOSPITAL | Age: 79
Discharge: HOME | End: 2019-08-07
Payer: MEDICARE

## 2019-08-07 DIAGNOSIS — H00.011 HORDEOLUM EXTERNUM OF RIGHT UPPER EYELID: ICD-10-CM

## 2019-08-07 PROCEDURE — 93880 EXTRACRANIAL BILAT STUDY: CPT

## 2019-08-08 PROCEDURE — 93880 EXTRACRANIAL BILAT STUDY: CPT | Performed by: SURGERY

## 2019-08-12 RX ORDER — CARVEDILOL 12.5 MG/1
12.5 TABLET ORAL 2 TIMES DAILY WITH MEALS
Qty: 60 TABLET | Refills: 1 | OUTPATIENT
Start: 2019-08-12

## 2019-08-15 ENCOUNTER — OFFICE VISIT (OUTPATIENT)
Dept: VASCULAR SURGERY | Facility: CLINIC | Age: 79
End: 2019-08-15
Payer: MEDICARE

## 2019-08-15 VITALS
BODY MASS INDEX: 20.25 KG/M2 | HEIGHT: 66 IN | WEIGHT: 126 LBS | DIASTOLIC BLOOD PRESSURE: 88 MMHG | SYSTOLIC BLOOD PRESSURE: 168 MMHG | TEMPERATURE: 98.2 F | HEART RATE: 90 BPM

## 2019-08-15 DIAGNOSIS — I10 HYPERTENSION, ACCELERATED: ICD-10-CM

## 2019-08-15 DIAGNOSIS — I70.1 RENAL ARTERY STENOSIS, NATIVE (HCC): Primary | ICD-10-CM

## 2019-08-15 DIAGNOSIS — I65.23 ASYMPTOMATIC BILATERAL CAROTID ARTERY STENOSIS: ICD-10-CM

## 2019-08-15 DIAGNOSIS — Z98.890 HISTORY OF CEA (CAROTID ENDARTERECTOMY): ICD-10-CM

## 2019-08-15 PROCEDURE — 99215 OFFICE O/P EST HI 40 MIN: CPT | Performed by: SURGERY

## 2019-08-15 RX ORDER — CLONIDINE HYDROCHLORIDE 0.1 MG/1
0.1 TABLET ORAL DAILY
COMMUNITY
End: 2020-01-24 | Stop reason: SDUPTHER

## 2019-08-15 NOTE — PATIENT INSTRUCTIONS
1) renal artery stenosis  -you have blockages at the beginning of the kidney arteries on both sides  -in certain people, treating these blockages with a balloon or stent, may help control their blood pressure  -we want to make sure that your blood pressure medications are optimized and that your schedule for taking them is controlled before we consider any intervention on your kidney arteries  -please start using a morning and evening pill box for your medications to make sure that nothing is missed  -please make another appointment to see Dr Jenniffer Hubbard to get your blood pressure monitor    2) Carotid stenosis  -you have a severe blockage on the right side and a moderate reblockage on the left side  -you have been monitoring this with ultrasounds  -we discussed the risks and benefits of monitoring/medication vs surgery and will discuss this again at next appt

## 2019-08-15 NOTE — PROGRESS NOTES
Assessment/Plan:    Pt is an 77 yo F w/ GERD, hypothyroid, DM, HLD, allergies, uncontrolled HTN, B reanl artery stenoses, hx of L CEA; B ICA stenoses, MiV insufficiency, AoV stenosis, afib, CKD, anxiety, MDD, anemia, hx of R breast cancer, presents to establish care  Renal artery stenosis(HCC)  HTN  -reviewed DU which shows B renal artery >60% stenoses  -difficult to say if uncontrolled HTN is renovascular in origin; discussed this patient with Dr Miguel A Zapata in house; there is question about medication compliance    Patient lives alone and is using a dish to separate her medications without any way to verify if they have been taken and has a complicated medication schedule; she becomes very well controlled when she is in house and her medications are given to her; she was supposed to f/u with cards this week but cancelled this appointment because she didn't feel well  -would like to verify medication compliance prior to considering any interventions; have requested that patient use 2 day of the week pill boxes to be filled weekly and used for AM and PM meds; she will take all meds either AM or PM (was taking several at odd times of the day); patients grand-daughter present at [facility] and will help with this  -also instructed patient to make another appointment with cards so that she can get a blood pressure monitor so that we can figure out her BP range  -will f/u in 2 months to see how blood pressure is doing and monitoring results    History of CEA (carotid endarterectomy)  Bilateral asymptomatic carotid artery stenosis  -remote hx of L CEA (unsure if this was symptomatic or ppx)  -reviewed carotid DU which shows moderate restenosis of the L CEA site; the R ICA stneosis is >70% and meghan 289/71 ratio 7 06  -discussed the risks and benefits of R CEA vs medical management for asymptomatic carotid disease; patient does not want surgery at this time; would not recommend surgery in the setting of oscillating blood pressure  -will discuss further workup again once her pressure is stablized; discussed that next step would be CTA; will not order this unless she was considering surgery given her hx of LITA/CKD    Medications  -currently taking amlodipine, clonidine, labetalol, lisinopril for HTN  -cont ELiquis for afib  -cont statin for life  -consider starting antiplatelet; will discuss at next visit    Other orders  -     cloNIDine (CATAPRES) 0 1 mg tablet; Take 0 1 mg by mouth every 12 (twelve) hours        Subjective:      Patient ID: Bobby Card is a 78 y o  female  Patient presents today to establish outpatient care from hospital admission 7/19-7/21 due to renal artery stenosis  Patient is currently taking 4 BP meds and states that is still not that well controlled, sometimes running in the 170's or as low as low 100's  She also has carotid study done 8/6  She denies all TIA/CVA symptoms  Patient is taking Eliquis and Lipitor  Patient is a pack/day smoker  HPI:    Patient previously seen at [address] [place] [address] [address]  Patient is s/p L CEA and aortobiliac bypass for occlusive disease  Known renal artery stenosis  Has uncontrolled HTN  Patient was recently admitted for uncontrolled HTN/HTN urgency and hyponatremia  Patient takes BP throughout day and is irregular  When she is hypotensive, she notes dizziness/light headedness  She uses a glass dish to take her medications and fills this throughout the day  She has a complicated medication schedule  Patient denies amaurosis, facial droop, garbled speech, unilateral weakness/numbness      The following portions of the patient's history were reviewed and updated as appropriate: allergies, current medications, past family history, past medical history, past social history, past surgical history and problem list     Review of Systems   Constitutional: Negative  HENT: Negative  Eyes: Negative  Respiratory: Positive for cough and shortness of breath      Cardiovascular: Negative  Negative for leg swelling  Gastrointestinal: Negative  Endocrine: Negative  Genitourinary: Negative  Musculoskeletal: Positive for gait problem  Skin: Negative  Negative for wound  Allergic/Immunologic: Negative  Neurological: Positive for light-headedness  Negative for facial asymmetry, speech difficulty, weakness and numbness  Hematological: Negative  Psychiatric/Behavioral: Negative  Objective:      /88 (BP Location: Right arm, Patient Position: Sitting)   Pulse 90   Temp 98 2 °F (36 8 °C) (Tympanic)   Ht 5' 6" (1 676 m)   Wt 57 2 kg (126 lb)   BMI 20 34 kg/m²          Physical Exam   Constitutional: She is oriented to person, place, and time  She appears well-developed and well-nourished  HENT:   Head: Normocephalic and atraumatic  Eyes: Conjunctivae are normal    Neck: Normal range of motion  Neck supple  Cardiovascular: Normal rate and regular rhythm  Murmur heard  Systolic murmurs: no caroti dbruit B   Pulses:       Radial pulses are 2+ on the right side, and 2+ on the left side  no carotid bruit B   Pulmonary/Chest: Effort normal and breath sounds normal  No respiratory distress  She has no wheezes  Abdominal: Soft  She exhibits no distension  There is no tenderness  There is no rebound  Musculoskeletal: Normal range of motion  She exhibits no edema  Neurological: She is alert and oriented to person, place, and time  Skin: Skin is warm and dry  Psychiatric: She has a normal mood and affect  Her behavior is normal    Nursing note and vitals reviewed  I have reviewed and made appropriate changes to the review of systems input by the medical assistant      Vitals:    08/15/19 1540 08/15/19 1546   BP: 164/90 168/88   BP Location: Left arm Right arm   Patient Position: Sitting Sitting   Pulse: 90    Temp: 98 2 °F (36 8 °C)    TempSrc: Tympanic    Weight: 57 2 kg (126 lb)    Height: 5' 6" (1 676 m)        Patient Active Problem List Diagnosis    Heart palpitations    Nicotine abuse    [facility]    Diabetes mellitus type 2 in nonobese (HCC)    Hypertension, essential, benign    Acquired hypothyroidism    Malignant neoplasm of central portion of right female breast ([facility]    Acute kidney injury [facility]    Delirium    Physical deconditioning    Ambulatory dysfunction    Hx of fall    Anxiety    Postop check    Incisional hernia, without obstruction or gangrene    Syncope vs presyncope    Paroxysmal A-fib (HCC)    Renovascular hypertension    Renal artery stenosis(HCC)    Diabetes (HCC)    Weight loss    CKD (chronic kidney disease) stage 3, GFR 30-59 ml/min (HCC)    Fall    Hyponatremia    Gastroesophageal reflux disease without esophagitis    Depression    Hordeolum externum of right upper eyelid    History of CEA (carotid endarterectomy)    Hyperlipidemia associated with type 2 diabetes mellitus (HCC)    Hypertension, accelerated    Hypo-osmolality and hyponatremia    Hypothyroidism due to acquired atrophy of thyroid    Iron deficiency anemia due to chronic blood loss    Low back pain without sciatica    Malignant neoplasm of right breast, stage 1, estrogen receptor positive ([facility] )    Mitral insufficiency and aortic stenosis    Vitamin D deficiency    Varicose vein of leg    Tobacco abuse    Nontraumatic compression fracture of T4 vertebra (HCC)    Non-seasonal allergic rhinitis due to pollen    Hypertension    Hypertensive urgency    High blood pressure       Past Surgical History:   Procedure Laterality Date    ABDOMINAL ADHESION SURGERY N/A 2/4/2018    Procedure: LYSIS ADHESIONS;  Surgeon: Stephanie Wheeler DO;  [facility];  Service: General    BREAST SURGERY      CHOLECYSTECTOMY      COLON SURGERY  2017    EXPLORATORY LAPAROTOMY      JOINT REPLACEMENT      LEFT KNEE REPLACEMENT     LAPAROTOMY N/A 2/4/2018    Procedure: LAPAROTOMY EXPLORATORY,;  Surgeon: Hira Bullock Oral Gross DO;  [facility];  Service: General    MASTECTOMY      MASTECTOMY Left 1995    MASTECTOMY Right 2017    CA BIOPSY/EXCISION, LYMPH NODE(S) Right 1/13/2017    Procedure: SENTINEL LYMPH NODE BIOPSY RIGHT AXILLA; Surgeon: Kendrick Camp MD;  [facility];  Service: General    CA MASTECTOMY, SIMPLE, COMPLETE Right 1/13/2017    Procedure: MASTECTOMY SIMPLE;  Surgeon: Kendrick Camp MD;  [facility];  Service: General    SMALL INTESTINE SURGERY N/A 2/4/2018    Procedure: RESECTION SMALL BOWEL;  Surgeon: Candie Chowdhury DO;  [facility];  Service: General    US GUIDED BREAST BIOPSY RIGHT COMPLETE Right 11/29/2016       Family History   Problem Relation Age of Onset    Cancer Mother        Social History     Socioeconomic History    Marital status:       Spouse name: Not on file    Number of children: Not on file    Years of education: Not on file    Highest education level: Not on file   Occupational History    Not on file   Social Needs    Financial resource strain: Not on file    Food insecurity:     Worry: Not on file     Inability: Not on file    Transportation needs:     Medical: Not on file     Non-medical: Not on file   Tobacco Use    Smoking status: Current Every Day Smoker     Packs/day: 1 00     Types: Cigarettes    Smokeless tobacco: Never Used   Substance and Sexual Activity    Alcohol use: Never     Frequency: Never     Binge frequency: Never    Drug use: Never    Sexual activity: Not Currently   Lifestyle    Physical activity:     Days per week: Not on file     Minutes per session: Not on file    Stress: Not on file   Relationships    Social connections:     Talks on phone: Not on file     Gets together: Not on file     Attends Restoration service: Not on file     Active member of club or organization: Not on file     Attends meetings of clubs or organizations: Not on file     Relationship status: Not on file    Intimate partner violence:     Fear of current or ex partner: Not on file     Emotionally abused: Not on file     Physically abused: Not on file     Forced sexual activity: Not on file   Other Topics Concern    Not on file   Social History Narrative    ** Merged History Encounter **            Allergies   Allergen Reactions    Meloxicam GI Intolerance    Morphine GI Intolerance    Morphine     Penicillins     Percocet [Oxycodone-Acetaminophen]     Sitagliptin      SOB         Current Outpatient Medications:     amLODIPine (NORVASC) 2 5 mg tablet, Take 1 tablet (2 5 mg total) by mouth 2 (two) times a day for 120 doses, Disp: 120 tablet, Rfl: 0    apixaban (ELIQUIS) 5 mg, Take 5 mg by mouth 2 (two) times a day, Disp: , Rfl:     ascorbic acid (VITAMIN C) 500 mg tablet, Take 500 mg by mouth daily, Disp: , Rfl:     atorvastatin (LIPITOR) 40 mg tablet, Take 1 tablet (40 mg total) by mouth every evening, Disp: 90 tablet, Rfl: 0    cholecalciferol (VITAMIN D3) 1,000 units tablet, Take 2,000 Units by mouth 2 (two) times a day, Disp: , Rfl:     cloNIDine (CATAPRES) 0 1 mg tablet, Take 0 1 mg by mouth every 12 (twelve) hours, Disp: , Rfl:     labetalol (NORMODYNE) 200 mg tablet, Take 1 tablet (200 mg total) by mouth every 12 (twelve) hours, Disp: 120 tablet, Rfl: 0    levothyroxine 125 mcg tablet, Take 125 mcg by mouth daily, Disp: , Rfl:     lisinopril (ZESTRIL) 20 mg tablet, TAKE 1 TABLET (20 MG TOTAL) BY MOUTH 2 (TWO) TIMES A DAY, Disp: 180 tablet, Rfl: 3    LORazepam (ATIVAN) 0 5 mg tablet, Take 0 5 mg by mouth 2 (two) times a day, Disp: , Rfl:     metFORMIN (GLUCOPHAGE) 500 mg tablet, Take 500 mg by mouth daily with breakfast , Disp: , Rfl:     multivitamin (THERAGRAN) TABS, Take 1 tablet by mouth daily, Disp: , Rfl:     Omega-3 Fatty Acids (FISH OIL PO), Take 1 g by mouth, Disp: , Rfl:     pantoprazole (PROTONIX) 40 mg tablet, Take 40 mg by mouth daily Pt takes daily when needed  , Disp: , Rfl:

## 2019-08-19 ENCOUNTER — TELEPHONE (OUTPATIENT)
Dept: VASCULAR SURGERY | Facility: CLINIC | Age: 79
End: 2019-08-19

## 2019-08-19 NOTE — TELEPHONE ENCOUNTER
Dr Roseann Thompson, pt called stating she s/w her daughter and would now like to proceed w/ testing for carotid stenosis  pt also states she would agree to surgery if blockage is 90% or greater  I reviewed your ov note -  Please advise re: next step  Thank you

## 2019-08-20 NOTE — TELEPHONE ENCOUNTER
Patient called back today to find out what Dr Agueda Gardner response  Informed her Dr Amanda Hagen is out of the office until next week  Patient states she is not comfortable with the imaging and contrast that have to be used which may cause her more renal damage  Encouraged patient Dr Amanda Hagen will guide her in the appropriate direction given her co-morbidities  Patient states she does not wish to pursue anything at this time  Her daughter feels as though she needs to have this done at [facility] instead  Advised patient to call office back if she should change her mind

## 2019-08-26 ENCOUNTER — OFFICE VISIT (OUTPATIENT)
Dept: CARDIOLOGY CLINIC | Facility: CLINIC | Age: 79
End: 2019-08-26
Payer: MEDICARE

## 2019-08-26 VITALS
OXYGEN SATURATION: 98 % | HEART RATE: 72 BPM | BODY MASS INDEX: 20.99 KG/M2 | HEIGHT: 66 IN | SYSTOLIC BLOOD PRESSURE: 138 MMHG | WEIGHT: 130.6 LBS | DIASTOLIC BLOOD PRESSURE: 62 MMHG

## 2019-08-26 DIAGNOSIS — I48.0 PAROXYSMAL ATRIAL FIBRILLATION (HCC): ICD-10-CM

## 2019-08-26 DIAGNOSIS — I10 ESSENTIAL HYPERTENSION: Primary | ICD-10-CM

## 2019-08-26 DIAGNOSIS — E87.1 CHRONIC HYPONATREMIA: ICD-10-CM

## 2019-08-26 DIAGNOSIS — I65.21 STENOSIS OF RIGHT CAROTID ARTERY: ICD-10-CM

## 2019-08-26 PROCEDURE — 1123F ACP DISCUSS/DSCN MKR DOCD: CPT | Performed by: NURSE PRACTITIONER

## 2019-08-26 PROCEDURE — 99214 OFFICE O/P EST MOD 30 MIN: CPT | Performed by: NURSE PRACTITIONER

## 2019-08-26 NOTE — PROGRESS NOTES
Cardiology Follow Up    Ross Grewal  1940  [id number]  [address] [facility] [place]  [facility] [address]  [phone]      Hospital Follow up for Hypertension    Interval History:     Ms Raul Bello was Hospitalized at [facility] on 7/19- 7/21/19 with  Hypertensive urgency  She presented to the emergency room with weakness  Blood pressure in the emergency room 244/109  She reported recent falls due to hypotension and weakness  Cardiology consulted  Coreg stopped and labetalol 200 mg b i d  Ordered  She has chronic hyponatremia and was treated with one dose of Samsca 50mg  Her blood pressure improved  Telemetry showed HR stable in 60's  She was discharged home  Discharge lab studies sodium 133, potassium 4 2, BUN 21, creatinine 1 0, GFR 53  Ms Donis Failing our office for recent hospitalization follow-up visit  She admits to anxiety and taking her blood pressure often  This causing her stress inferior leaving her house  She denies dyspnea with minimal moderate exertion chest pain palpitations lightheadedness or dizziness  She has not experienced any falls since she has been home from the hospital       8/07/19 carotid ultrasound right > 70% stenosis of ICA, Left 50-69% stenosis of ICA    Follows with vascular  Paroxysmal atrial fibrillation on Eliquis for stroke prevention   CKD II baseline creat 0 9- 1 0- follow up with Dr Jose Antonio Mesa   DM2 Hgb A1C 5 6 on 7/19/19  Hypothyroid  Patient Active Problem List   Diagnosis    Heart palpitations    Nicotine abuse    Transient atrial fibrillation ([address] )    Diabetes mellitus type 2 in nonobese ([address] )    Hypertension, essential, benign    Acquired hypothyroidism    Malignant neoplasm of central portion of right female breast ([address] )    Acute kidney injury ([address] )    Delirium    Physical deconditioning    Ambulatory dysfunction  Hx of fall    Anxiety    Postop check    Incisional hernia, without obstruction or gangrene    Syncope vs presyncope    Paroxysmal A-fib (HCC)    Renovascular hypertension    Renal artery stenosis(HCC)    Diabetes (HCC)    Weight loss    CKD (chronic kidney disease) stage 3, GFR 30-59 ml/min (HCC)    Fall    Hyponatremia    Gastroesophageal reflux disease without esophagitis    Depression    Hordeolum externum of right upper eyelid    History of CEA (carotid endarterectomy)    Hyperlipidemia associated with type 2 diabetes mellitus (HCC)    Hypertension, accelerated    Hypo-osmolality and hyponatremia    Hypothyroidism due to acquired atrophy of thyroid    Iron deficiency anemia due to chronic blood loss    Low back pain without sciatica    Malignant neoplasm of right breast, stage 1, estrogen receptor positive (HCC)    Mitral insufficiency and aortic stenosis    Vitamin D deficiency    Varicose vein of leg    Tobacco abuse    Nontraumatic compression fracture of T4 vertebra (HCC)    Non-seasonal allergic rhinitis due to pollen    Hypertension    Hypertensive urgency    High blood pressure    Asymptomatic bilateral carotid artery stenosis     Past Medical History:   Diagnosis Date    Anxiety     Atrial fibrillation (HCC)     Bowel obstruction (HCC)     Cancer (HCC)     LEFT BREAST CA 22 YEARS AGO     Depression     Diabetes mellitus (HCC)     Disease of thyroid gland     Hypertension     Hypothyroidism      Social History     Socioeconomic History    Marital status:       Spouse name: Not on file    Number of children: Not on file    Years of education: Not on file    Highest education level: Not on file   Occupational History    Not on file   Social Needs    Financial resource strain: Not on file    Food insecurity:     Worry: Not on file     Inability: Not on file    Transportation needs:     Medical: Not on file     Non-medical: Not on file   Tobacco Use  Smoking status: Current Every Day Smoker     Packs/day: 1 00     Types: Cigarettes    Smokeless tobacco: Never Used   Substance and Sexual Activity    Alcohol use: Never     Frequency: Never     Binge frequency: Never    Drug use: Never    Sexual activity: Not Currently   Lifestyle    Physical activity:     Days per week: Not on file     Minutes per session: Not on file    Stress: Not on file   Relationships    Social connections:     Talks on phone: Not on file     Gets together: Not on file     Attends Hindu service: Not on file     Active member of club or organization: Not on file     Attends meetings of clubs or organizations: Not on file     Relationship status: Not on file    Intimate partner violence:     Fear of current or ex partner: Not on file     Emotionally abused: Not on file     Physically abused: Not on file     Forced sexual activity: Not on file   Other Topics Concern    Not on file   Social History Narrative    ** Merged History Encounter **           Family History   Problem Relation Age of Onset    Cancer Mother      Past Surgical History:   Procedure Laterality Date    ABDOMINAL ADHESION SURGERY N/A 2/4/2018    Procedure: LYSIS ADHESIONS;  Surgeon: Jeaneth Guevara DO;  [facility];  Service: General    BREAST SURGERY      CHOLECYSTECTOMY      COLON SURGERY  2017    EXPLORATORY LAPAROTOMY      JOINT REPLACEMENT      LEFT KNEE REPLACEMENT     LAPAROTOMY N/A 2/4/2018    Procedure: LAPAROTOMY EXPLORATORY,;  Surgeon: Jeaneth Guevara DO;  [facility];  Service: General    MASTECTOMY      MASTECTOMY Left 1995    MASTECTOMY Right 2017    PA BIOPSY/EXCISION, LYMPH NODE(S) Right 1/13/2017    Procedure: SENTINEL LYMPH NODE BIOPSY RIGHT AXILLA;   Surgeon: Kemar Wick MD;  [facility];  Service: General    PA MASTECTOMY, SIMPLE, COMPLETE Right 1/13/2017    Procedure: MASTECTOMY SIMPLE;  Surgeon: Kemar Wick MD;  [facility];  Service: General    SMALL INTESTINE SURGERY N/A 2/4/2018    Procedure: RESECTION SMALL BOWEL;  Surgeon: Lisandro Arguelles DO;  [facility];  Service: General    US GUIDED BREAST BIOPSY RIGHT COMPLETE Right 11/29/2016       Current Outpatient Medications:     amLODIPine (NORVASC) 2 5 mg tablet, Take 1 tablet (2 5 mg total) by mouth 2 (two) times a day for 120 doses, Disp: 120 tablet, Rfl: 0    apixaban (ELIQUIS) 5 mg, Take 5 mg by mouth 2 (two) times a day, Disp: , Rfl:     ascorbic acid (VITAMIN C) 500 mg tablet, Take 500 mg by mouth daily, Disp: , Rfl:     atorvastatin (LIPITOR) 40 mg tablet, Take 1 tablet (40 mg total) by mouth every evening, Disp: 90 tablet, Rfl: 0    cholecalciferol (VITAMIN D3) 1,000 units tablet, Take 2,000 Units by mouth daily , Disp: , Rfl:     cloNIDine (CATAPRES) 0 1 mg tablet, Take 0 1 mg by mouth daily , Disp: , Rfl:     labetalol (NORMODYNE) 200 mg tablet, Take 1 tablet (200 mg total) by mouth every 12 (twelve) hours, Disp: 120 tablet, Rfl: 0    levothyroxine 125 mcg tablet, Take 125 mcg by mouth daily, Disp: , Rfl:     lisinopril (ZESTRIL) 20 mg tablet, TAKE 1 TABLET (20 MG TOTAL) BY MOUTH 2 (TWO) TIMES A DAY, Disp: 180 tablet, Rfl: 3    LORazepam (ATIVAN) 0 5 mg tablet, Take 0 5 mg by mouth 2 (two) times a day, Disp: , Rfl:     metFORMIN (GLUCOPHAGE) 500 mg tablet, Take 500 mg by mouth daily with breakfast , Disp: , Rfl:     Multiple Vitamins-Minerals (PRESERVISION AREDS 2 PO), Take by mouth 2 (two) times a day, Disp: , Rfl:     multivitamin (THERAGRAN) TABS, Take 1 tablet by mouth daily, Disp: , Rfl:     pantoprazole (PROTONIX) 40 mg tablet, Take 40 mg by mouth daily Pt takes daily when needed  , Disp: , Rfl:   Allergies   Allergen Reactions    Meloxicam GI Intolerance    Morphine GI Intolerance    Morphine     Penicillins     Percocet [Oxycodone-Acetaminophen]     Sitagliptin      SOB       Labs:  Admission on 07/19/2019, Discharged on 07/21/2019   Component Date Value    WBC 07/19/2019 7 70     RBC 07/19/2019 4 01     Hemoglobin 07/19/2019 12 5     Hematocrit 07/19/2019 36 0     MCV 07/19/2019 90     MCH 07/19/2019 31 2     MCHC 07/19/2019 34 7     RDW 07/19/2019 14 4     MPV 07/19/2019 9 7     Platelets 71/44/7191 279     nRBC 07/19/2019 0     Neutrophils Relative 07/19/2019 68     Immat GRANS % 07/19/2019 0     Lymphocytes Relative 07/19/2019 17     Monocytes Relative 07/19/2019 12     Eosinophils Relative 07/19/2019 2     Basophils Relative 07/19/2019 1     Neutrophils Absolute 07/19/2019 5 22     Immature Grans Absolute 07/19/2019 0 02     Lymphocytes Absolute 07/19/2019 1 33     Monocytes Absolute 07/19/2019 0 95     Eosinophils Absolute 07/19/2019 0 14     Basophils Absolute 07/19/2019 0 04     Sodium 07/19/2019 130*    Potassium 07/19/2019 3 4*    Chloride 07/19/2019 95*    CO2 07/19/2019 28     ANION GAP 07/19/2019 7     BUN 07/19/2019 14     Creatinine 07/19/2019 0 73     Glucose 07/19/2019 108     Calcium 07/19/2019 9 4     eGFR 07/19/2019 77     Troponin I 07/19/2019 <0 02     Ventricular Rate 07/19/2019 68     Atrial Rate 07/19/2019 68     NH Interval 07/19/2019 184     QRSD Interval 07/19/2019 88     QT Interval 07/19/2019 410     QTC Interval 07/19/2019 435     P Axis 07/19/2019 78     QRS Axis 07/19/2019 23     T Wave Axis 07/19/2019 94     Hemoglobin A1C 07/19/2019 5 6     EAG 07/19/2019 114     Troponin I 07/19/2019 <0 02     POC Glucose 07/19/2019 143*    Troponin I 07/19/2019 <0 02     POC Glucose 07/19/2019 177*    Sodium 07/20/2019 127*    Potassium 07/20/2019 4 3     Chloride 07/20/2019 96*    CO2 07/20/2019 28     ANION GAP 07/20/2019 3*    BUN 07/20/2019 19     Creatinine 07/20/2019 0 81     Glucose 07/20/2019 115     Calcium 07/20/2019 9 3     eGFR 07/20/2019 68     Magnesium 07/20/2019 1 8     Phosphorus 07/20/2019 2 4     WBC 07/20/2019 7 05     RBC 07/20/2019 3 74*    Hemoglobin 07/20/2019 11 5     Hematocrit 07/20/2019 34 0*    MCV 07/20/2019 91     MCH 07/20/2019 30 7     MCHC 07/20/2019 33 8     RDW 07/20/2019 14 4     Platelets 37/25/5603 282     MPV 07/20/2019 9 7     POC Glucose 07/20/2019 119     Ventricular Rate 07/19/2019 61     Atrial Rate 07/19/2019 61     DE Interval 07/19/2019 208     QRSD Interval 07/19/2019 96     QT Interval 07/19/2019 430     QTC Interval 07/19/2019 432     P Axis 07/19/2019 87     QRS Axis 07/19/2019 90     T Wave Axis 07/19/2019 65     POC Glucose 07/20/2019 135     POC Glucose 07/20/2019 116     POC Glucose 07/20/2019 117     Sodium 07/21/2019 133*    Potassium 07/21/2019 4 2     Chloride 07/21/2019 100     CO2 07/21/2019 30     ANION GAP 07/21/2019 3*    BUN 07/21/2019 21     Creatinine 07/21/2019 1 00     Glucose 07/21/2019 121     Calcium 07/21/2019 9 2     eGFR 07/21/2019 53     POC Glucose 07/21/2019 134     POC Glucose 07/21/2019 189*     Imaging: Vas Carotid Complete Study    Result Date: 8/8/2019  Narrative:  [facility] REPORT CLINICAL: Indications: Patient presents with a cervical bruit and multiple cardiovascular risk factors  Patient is asymptomatic from a cerebral vascular standpoint  Operative History: [id number] versus fem bpg (15 years ago dr Jocelyn Blount) B/L MASTECTOMY L CEA L knee replacement Risk Factors The patient has history of HTN, Diabetes (Yes) and smoking (current) 1-2 ppd  Clinical Right Pressure: recent mastectomy Left Pressure:  170/88 mm Hg  FINDINGS:  Right        Impression  PSV  EDV (cm/s)  Direction of Flow  Ratio  Dist  ICA                 63          18                      1 53  Mid  ICA     50 - 69%    139          36                      3 40  Prox   ICA    70%+        289          71                      7 06  Dist CCA                  41          12                            Mid CCA                   41          16                      0 72  Prox CCA                  57 19                      1 04  Ext Carotid  severe      316          44                      7 72  Prox Vert                 75          16  Antegrade                 Subclavian                97           9                            Innominate                54           0                             Left         Impression  PSV  EDV (cm/s)  Direction of Flow  Ratio  Dist  ICA                123          34                      1 80  Mid  ICA                 112          20                      1 64  Prox  ICA    50 - 69%    136          28                      2 00  Dist CCA                  59          11                            Mid CCA                   68          25                      1 11  Prox CCA                  62          13                            Ext Carotid               69           8                      1 01  Prox Vert                 81          17  Antegrade                 Subclavian               113           6                               CONCLUSION:  Impression  RIGHT: There is >70% stenosis noted in the internal carotid artery and a severe stenosis noted in the external carotid artery  Plaque is heterogenous and irregular  Vertebral artery flow is antegrade  There is no significant subclavian artery disease  LEFT: There is 50-69% stenosis noted in the internal carotid artery  Plaque is heterogenous and irregular  Vertebral artery flow is antegrade  There is no significant subclavian artery disease  There are no previous studies for comparison  Recommend repeat testing in 6month as per protocol unless otherwise indicated  SIGNATURE: Electronically Signed by: Farooq Lawrence MD on 2019-08-08 05:05:06 PM      Review of Systems:  Review of Systems   Psychiatric/Behavioral: The patient is nervous/anxious  All other systems reviewed and are negative  Physical Exam:  Physical Exam   Constitutional: She is oriented to person, place, and time  She appears well-developed  HENT:   Head: Normocephalic  Eyes: Pupils are equal, round, and reactive to light  Neck: Normal range of motion  Cardiovascular: Normal rate and regular rhythm  Pulmonary/Chest: Effort normal and breath sounds normal    Abdominal: Soft  Bowel sounds are normal    Musculoskeletal: Normal range of motion  Neurological: She is oriented to person, place, and time  Skin: Skin is warm and dry  Capillary refill takes less than 2 seconds  Psychiatric: She has a normal mood and affect  Vitals reviewed  Discussion/Summary:  1  Hypertension- controlled LUE sitting 132/64, continue on  Amlodipine 2 5 mg b i d , Lipitor 40 mg daily, clonidine 0 1 mg p o  Daily, labetalol 200 mg every 12 hours, lisinopril 20 mg b i d ,   frequent blood pressure monitoring causing anxiety and fear instructed to take blood pressures symptomatic lightheadedness or dizziness  2  Chronic hyponatremia follow up with Nephrology  3  8/07/19 MARK  > 63% stenosis, LICA  83-54% stenosis, Follows with vascular surgery  Continue Lipitor 40 mg daily  4  Paroxysmal atrial fibrillation- RRR, continue on  Labetalol 200 mg q 12 hours, on Eliquis 5mg BID for stroke prevention   5  CKD II baseline creat 0 9- 1 0- follow up with Dr Nereyda Mora   6  DM2 Hgb A1C 5 6 on 7/19/19-  Continue on metformin 500 mg daily  7   Tobacco abuse encouraged smoking cessation

## 2019-08-26 NOTE — PATIENT INSTRUCTIONS
2gm sodium, low fat, low cholesterol diet, eating fresh is best, fresh fruit, fresh vegetables, lean protein    Take blood pressure only if symptomatic

## 2019-08-27 NOTE — TELEPHONE ENCOUNTER
Per my office note, would not consider further workup/intervention for carotid disease (asymptomatic) until her blood pressure is stabilized and she is taking her BP medications appropriately  Agree with patient that she is at high risk for LITA with contrast load  If she decides to continue to follow with us, she should have repeat carotid DU in 6 months and she should return to care once her blood pressure medication regimen has stablized (taking correctly)

## 2019-08-27 NOTE — TELEPHONE ENCOUNTER
Spoke with patient and relayed provider's message  Offered patient to schedule f/u carotid duplex in 6 months  Patient would like to call back at her own convenience

## 2019-09-12 DIAGNOSIS — I10 HYPERTENSION, ESSENTIAL, BENIGN: Chronic | ICD-10-CM

## 2019-09-12 RX ORDER — AMLODIPINE BESYLATE 5 MG/1
TABLET ORAL
Qty: 90 TABLET | Refills: 0 | Status: ON HOLD | OUTPATIENT
Start: 2019-09-12 | End: 2019-09-20 | Stop reason: SDUPTHER

## 2019-09-13 ENCOUNTER — TRANSCRIBE ORDERS (OUTPATIENT)
Dept: ADMINISTRATIVE | Facility: HOSPITAL | Age: 79
End: 2019-09-13

## 2019-09-13 DIAGNOSIS — R22.1 LOCALIZED SWELLING, MASS AND LUMP, NECK: Primary | ICD-10-CM

## 2019-09-15 DIAGNOSIS — I48.91 ATRIAL FIBRILLATION, UNSPECIFIED TYPE (HCC): ICD-10-CM

## 2019-09-16 RX ORDER — APIXABAN 5 MG/1
TABLET, FILM COATED ORAL
Qty: 60 TABLET | Refills: 3 | Status: SHIPPED | OUTPATIENT
Start: 2019-09-16 | End: 2019-09-20 | Stop reason: HOSPADM

## 2019-09-18 ENCOUNTER — HOSPITAL ENCOUNTER (INPATIENT)
Facility: HOSPITAL | Age: 79
LOS: 2 days | Discharge: HOME HEALTH | DRG: 558 | End: 2019-09-20
Attending: EMERGENCY MEDICINE | Admitting: INTERNAL MEDICINE
Payer: MEDICARE

## 2019-09-18 ENCOUNTER — APPOINTMENT (EMERGENCY)
Dept: NON INVASIVE DIAGNOSTICS | Facility: HOSPITAL | Age: 79
DRG: 558 | End: 2019-09-18
Payer: MEDICARE

## 2019-09-18 ENCOUNTER — APPOINTMENT (EMERGENCY)
Dept: RADIOLOGY | Facility: HOSPITAL | Age: 79
DRG: 558 | End: 2019-09-18
Payer: MEDICARE

## 2019-09-18 DIAGNOSIS — M25.561 RIGHT KNEE PAIN: Primary | ICD-10-CM

## 2019-09-18 DIAGNOSIS — R26.2 AMBULATORY DYSFUNCTION: ICD-10-CM

## 2019-09-18 DIAGNOSIS — R60.9 SWELLING: ICD-10-CM

## 2019-09-18 DIAGNOSIS — I10 HYPERTENSION, ESSENTIAL, BENIGN: Chronic | ICD-10-CM

## 2019-09-18 PROBLEM — Z85.3 HISTORY OF BREAST CANCER: Status: ACTIVE | Noted: 2019-09-18

## 2019-09-18 PROBLEM — E44.0 MODERATE PROTEIN-CALORIE MALNUTRITION (HCC): Status: ACTIVE | Noted: 2019-09-18

## 2019-09-18 LAB
ANION GAP SERPL CALCULATED.3IONS-SCNC: 7 MMOL/L (ref 4–13)
BASOPHILS # BLD AUTO: 0.03 THOUSANDS/ΜL (ref 0–0.1)
BASOPHILS NFR BLD AUTO: 0 % (ref 0–1)
BUN SERPL-MCNC: 21 MG/DL (ref 5–25)
CALCIUM SERPL-MCNC: 8.5 MG/DL (ref 8.3–10.1)
CHLORIDE SERPL-SCNC: 99 MMOL/L (ref 100–108)
CO2 SERPL-SCNC: 26 MMOL/L (ref 21–32)
CREAT SERPL-MCNC: 1.12 MG/DL (ref 0.6–1.3)
EOSINOPHIL # BLD AUTO: 0.01 THOUSAND/ΜL (ref 0–0.61)
EOSINOPHIL NFR BLD AUTO: 0 % (ref 0–6)
ERYTHROCYTE [DISTWIDTH] IN BLOOD BY AUTOMATED COUNT: 14.6 % (ref 11.6–15.1)
GFR SERPL CREATININE-BSD FRML MDRD: 46 ML/MIN/1.73SQ M
GLUCOSE SERPL-MCNC: 198 MG/DL (ref 65–140)
GLUCOSE SERPL-MCNC: 209 MG/DL (ref 65–140)
HCT VFR BLD AUTO: 29.3 % (ref 34.8–46.1)
HGB BLD-MCNC: 9.3 G/DL (ref 11.5–15.4)
IMM GRANULOCYTES # BLD AUTO: 0.05 THOUSAND/UL (ref 0–0.2)
IMM GRANULOCYTES NFR BLD AUTO: 1 % (ref 0–2)
LYMPHOCYTES # BLD AUTO: 0.96 THOUSANDS/ΜL (ref 0.6–4.47)
LYMPHOCYTES NFR BLD AUTO: 10 % (ref 14–44)
MCH RBC QN AUTO: 30.3 PG (ref 26.8–34.3)
MCHC RBC AUTO-ENTMCNC: 31.7 G/DL (ref 31.4–37.4)
MCV RBC AUTO: 95 FL (ref 82–98)
MONOCYTES # BLD AUTO: 1.32 THOUSAND/ΜL (ref 0.17–1.22)
MONOCYTES NFR BLD AUTO: 13 % (ref 4–12)
NEUTROPHILS # BLD AUTO: 7.71 THOUSANDS/ΜL (ref 1.85–7.62)
NEUTS SEG NFR BLD AUTO: 76 % (ref 43–75)
NRBC BLD AUTO-RTO: 0 /100 WBCS
PLATELET # BLD AUTO: 183 THOUSANDS/UL (ref 149–390)
PMV BLD AUTO: 10.5 FL (ref 8.9–12.7)
POTASSIUM SERPL-SCNC: 3.6 MMOL/L (ref 3.5–5.3)
RBC # BLD AUTO: 3.07 MILLION/UL (ref 3.81–5.12)
SODIUM SERPL-SCNC: 132 MMOL/L (ref 136–145)
WBC # BLD AUTO: 10.08 THOUSAND/UL (ref 4.31–10.16)

## 2019-09-18 PROCEDURE — 73552 X-RAY EXAM OF FEMUR 2/>: CPT

## 2019-09-18 PROCEDURE — 85025 COMPLETE CBC W/AUTO DIFF WBC: CPT | Performed by: EMERGENCY MEDICINE

## 2019-09-18 PROCEDURE — 73560 X-RAY EXAM OF KNEE 1 OR 2: CPT

## 2019-09-18 PROCEDURE — 99223 1ST HOSP IP/OBS HIGH 75: CPT | Performed by: INTERNAL MEDICINE

## 2019-09-18 PROCEDURE — 99284 EMERGENCY DEPT VISIT MOD MDM: CPT | Performed by: EMERGENCY MEDICINE

## 2019-09-18 PROCEDURE — 93971 EXTREMITY STUDY: CPT

## 2019-09-18 PROCEDURE — 36415 COLL VENOUS BLD VENIPUNCTURE: CPT | Performed by: EMERGENCY MEDICINE

## 2019-09-18 PROCEDURE — 99285 EMERGENCY DEPT VISIT HI MDM: CPT

## 2019-09-18 PROCEDURE — 82948 REAGENT STRIP/BLOOD GLUCOSE: CPT

## 2019-09-18 PROCEDURE — 80048 BASIC METABOLIC PNL TOTAL CA: CPT | Performed by: EMERGENCY MEDICINE

## 2019-09-18 PROCEDURE — 93971 EXTREMITY STUDY: CPT | Performed by: SURGERY

## 2019-09-18 RX ORDER — ATORVASTATIN CALCIUM 40 MG/1
40 TABLET, FILM COATED ORAL EVERY EVENING
Status: DISCONTINUED | OUTPATIENT
Start: 2019-09-18 | End: 2019-09-20 | Stop reason: HOSPADM

## 2019-09-18 RX ORDER — LEVOTHYROXINE SODIUM 0.12 MG/1
125 TABLET ORAL
Status: DISCONTINUED | OUTPATIENT
Start: 2019-09-19 | End: 2019-09-20 | Stop reason: HOSPADM

## 2019-09-18 RX ORDER — ASCORBIC ACID 500 MG
500 TABLET ORAL DAILY
Status: DISCONTINUED | OUTPATIENT
Start: 2019-09-19 | End: 2019-09-20 | Stop reason: HOSPADM

## 2019-09-18 RX ORDER — AMLODIPINE BESYLATE 5 MG/1
5 TABLET ORAL DAILY
Status: DISCONTINUED | OUTPATIENT
Start: 2019-09-19 | End: 2019-09-20

## 2019-09-18 RX ORDER — LABETALOL 200 MG/1
200 TABLET, FILM COATED ORAL EVERY 12 HOURS SCHEDULED
Status: DISCONTINUED | OUTPATIENT
Start: 2019-09-18 | End: 2019-09-20 | Stop reason: HOSPADM

## 2019-09-18 RX ORDER — CLONIDINE HYDROCHLORIDE 0.1 MG/1
0.1 TABLET ORAL ONCE
Status: COMPLETED | OUTPATIENT
Start: 2019-09-18 | End: 2019-09-18

## 2019-09-18 RX ORDER — MELATONIN
2000 DAILY
Status: DISCONTINUED | OUTPATIENT
Start: 2019-09-19 | End: 2019-09-20 | Stop reason: HOSPADM

## 2019-09-18 RX ORDER — MUSCLE RUB CREAM 100; 150 MG/G; MG/G
CREAM TOPICAL 4 TIMES DAILY PRN
Status: DISCONTINUED | OUTPATIENT
Start: 2019-09-18 | End: 2019-09-20 | Stop reason: HOSPADM

## 2019-09-18 RX ORDER — LORAZEPAM 0.5 MG/1
0.5 TABLET ORAL 2 TIMES DAILY
Status: DISCONTINUED | OUTPATIENT
Start: 2019-09-18 | End: 2019-09-20 | Stop reason: HOSPADM

## 2019-09-18 RX ORDER — PANTOPRAZOLE SODIUM 40 MG/1
40 TABLET, DELAYED RELEASE ORAL
Status: DISCONTINUED | OUTPATIENT
Start: 2019-09-19 | End: 2019-09-20 | Stop reason: HOSPADM

## 2019-09-18 RX ORDER — ONDANSETRON 2 MG/ML
4 INJECTION INTRAMUSCULAR; INTRAVENOUS EVERY 4 HOURS PRN
Status: DISCONTINUED | OUTPATIENT
Start: 2019-09-18 | End: 2019-09-20 | Stop reason: HOSPADM

## 2019-09-18 RX ORDER — LISINOPRIL 20 MG/1
20 TABLET ORAL 2 TIMES DAILY
Status: DISCONTINUED | OUTPATIENT
Start: 2019-09-18 | End: 2019-09-20 | Stop reason: HOSPADM

## 2019-09-18 RX ORDER — CLONIDINE HYDROCHLORIDE 0.1 MG/1
0.1 TABLET ORAL DAILY
Status: DISCONTINUED | OUTPATIENT
Start: 2019-09-19 | End: 2019-09-20 | Stop reason: HOSPADM

## 2019-09-18 RX ADMIN — LABETALOL HCL 200 MG: 200 TABLET, FILM COATED ORAL at 20:57

## 2019-09-18 RX ADMIN — LORAZEPAM 0.5 MG: 0.5 TABLET ORAL at 20:05

## 2019-09-18 RX ADMIN — APIXABAN 5 MG: 5 TABLET, FILM COATED ORAL at 20:05

## 2019-09-18 RX ADMIN — INSULIN LISPRO 1 UNITS: 100 INJECTION, SOLUTION INTRAVENOUS; SUBCUTANEOUS at 21:00

## 2019-09-18 RX ADMIN — CLONIDINE HYDROCHLORIDE 0.1 MG: 0.1 TABLET ORAL at 15:20

## 2019-09-18 RX ADMIN — LISINOPRIL 20 MG: 20 TABLET ORAL at 20:05

## 2019-09-18 RX ADMIN — ATORVASTATIN CALCIUM 40 MG: 40 TABLET, FILM COATED ORAL at 20:05

## 2019-09-18 NOTE — ED NOTES
Dr Kurt Juarez at bedside to update patient on plan of care and xray results      Raúl Santa RN  09/18/19 [id number]

## 2019-09-18 NOTE — ED PROVIDER NOTES
History  Chief Complaint   Patient presents with    Leg Pain     Pt has been c/o right knee pain starting yesterday  Denies injury/trauma  Pt leg is swollen and hot to touch on arrival to ED  57-year-old female presents to ED stating that this morning she got out of bed and had immediate severe pain in her right knee causing her to have difficulty walking  Patient describes pain as a 9/10 and a severe ache  States she did not take any medicine at home as she was afraid of taking it due to all the medicines she is on  Patient states occasionally the pain will radiate down to her right ankle  She denies any other symptoms at this time and states otherwise she is in her normal state of health  She denies any history of blood clots  She does take Eliquis due to the fact that she has chronic atrial fibrillation  Denies any numbness or tingling in the right lower extremity  Denies any injury or falls to the right knee  Prior knee replacement many years ago per patient on the left side  History provided by:  Patient   used: No        Prior to Admission Medications   Prescriptions Last Dose Informant Patient Reported? Taking?    ELIQUIS 5 MG   No No   Sig: TAKE 1 TABLET BY MOUTH TWICE A DAY   LORazepam (ATIVAN) 0 5 mg tablet  Self Yes No   Sig: Take 0 5 mg by mouth 2 (two) times a day   Multiple Vitamins-Minerals (PRESERVISION AREDS 2 PO)  Self Yes No   Sig: Take by mouth 2 (two) times a day   amLODIPine (NORVASC) 2 5 mg tablet  Self No No   Sig: Take 1 tablet (2 5 mg total) by mouth 2 (two) times a day for 120 doses   amLODIPine (NORVASC) 5 mg tablet   No No   Sig: TAKE 1 TABLET BY MOUTH EVERY DAY   apixaban (ELIQUIS) 5 mg  Self Yes No   Sig: Take 5 mg by mouth 2 (two) times a day   ascorbic acid (VITAMIN C) 500 mg tablet  Self Yes No   Sig: Take 500 mg by mouth daily   atorvastatin (LIPITOR) 40 mg tablet Not Taking at Unknown time Self No No   Sig: Take 1 tablet (40 mg total) by mouth every evening   Patient not taking: Reported on 9/18/2019   cholecalciferol (VITAMIN D3) 1,000 units tablet  Self Yes No   Sig: Take 2,000 Units by mouth daily    cloNIDine (CATAPRES) 0 1 mg tablet  Self Yes No   Sig: Take 0 1 mg by mouth daily    labetalol (NORMODYNE) 200 mg tablet  Self No No   Sig: Take 1 tablet (200 mg total) by mouth every 12 (twelve) hours   levothyroxine 125 mcg tablet  Self Yes No   Sig: Take 125 mcg by mouth daily   lisinopril (ZESTRIL) 20 mg tablet  Self No No   Sig: TAKE 1 TABLET (20 MG TOTAL) BY MOUTH 2 (TWO) TIMES A DAY   metFORMIN (GLUCOPHAGE) 500 mg tablet  Self Yes No   Sig: Take 500 mg by mouth daily with breakfast    multivitamin (THERAGRAN) TABS  Self Yes No   Sig: Take 1 tablet by mouth daily   pantoprazole (PROTONIX) 40 mg tablet  Self Yes No   Sig: Take 40 mg by mouth daily Pt takes daily when needed  Facility-Administered Medications: None       Past Medical History:   Diagnosis Date    Anxiety     [facility] )     Bowel obstruction (HCC)     Cancer ([facility] )     LEFT BREAST CA 22 YEARS AGO     Depression     Diabetes mellitus ([facility] )     Disease of thyroid gland     Hypertension     Hypothyroidism        Past Surgical History:   Procedure Laterality Date    ABDOMINAL ADHESION SURGERY N/A 2/4/2018    Procedure: LYSIS ADHESIONS;  Surgeon: Lisandro Arguelles DO;  [facility];  Service: General    BREAST SURGERY      CHOLECYSTECTOMY      COLON SURGERY  2017    EXPLORATORY LAPAROTOMY      JOINT REPLACEMENT      LEFT KNEE REPLACEMENT     LAPAROTOMY N/A 2/4/2018    Procedure: LAPAROTOMY EXPLORATORY,;  Surgeon: Lisandro Arguelles DO;  [facility];  Service: General    MASTECTOMY      MASTECTOMY Left 1995    MASTECTOMY Right 2017    VT BIOPSY/EXCISION, LYMPH NODE(S) Right 1/13/2017    Procedure: SENTINEL LYMPH NODE BIOPSY RIGHT AXILLA;   Surgeon: Caridad Smith MD;  [facility];  Service: General    VT MASTECTOMY, SIMPLE, COMPLETE Right 1/13/2017    Procedure: MASTECTOMY SIMPLE;  Surgeon: Catarina Bailey MD;  [facility];  Service: General    SMALL INTESTINE SURGERY N/A 2/4/2018    Procedure: RESECTION SMALL BOWEL;  Surgeon: Raf Bowman DO;  [facility];  Service: General    US GUIDED BREAST BIOPSY RIGHT COMPLETE Right 11/29/2016       Family History   Problem Relation Age of Onset    Cancer Mother      I have reviewed and agree with the history as documented  Social History     Tobacco Use    Smoking status: Current Every Day Smoker     Packs/day: 1 00     Types: Cigarettes    Smokeless tobacco: Never Used   Substance Use Topics    Alcohol use: Never     Frequency: Never     Binge frequency: Never    Drug use: Never        Review of Systems   Musculoskeletal: Positive for arthralgias, gait problem and joint swelling  All other systems reviewed and are negative  Physical Exam  ED Triage Vitals [09/18/19 1024]   Temperature Pulse Respirations Blood Pressure SpO2   98 3 °F (36 8 °C) 74 18 166/79 97 %      Temp Source Heart Rate Source Patient Position - Orthostatic VS BP Location FiO2 (%)   Oral Monitor Sitting Right arm --      Pain Score       9             Orthostatic Vital Signs  Vitals:    09/18/19 1812 09/18/19 1921 09/18/19 2300 09/19/19 0700   BP: 165/79 (!) 173/81 166/81 132/54   Pulse: 80 80 78 72   Patient Position - Orthostatic VS: Sitting  Lying Lying       Physical Exam   Constitutional: She appears well-developed and well-nourished  No distress  HENT:   Head: Normocephalic and atraumatic  Right Ear: External ear normal    Left Ear: External ear normal    Nose: Nose normal    Eyes: Pupils are equal, round, and reactive to light  Conjunctivae are normal  Right eye exhibits no discharge  Left eye exhibits no discharge  No scleral icterus  Neck: No JVD present  No tracheal deviation present  Cardiovascular: Normal rate, regular rhythm, normal heart sounds and intact distal pulses   Exam reveals no gallop and no friction rub  No murmur heard  Pulmonary/Chest: Effort normal and breath sounds normal  No stridor  No respiratory distress  She has no wheezes  She has no rales  Abdominal: Soft  Bowel sounds are normal  She exhibits no distension and no mass  There is no tenderness  There is no guarding  Musculoskeletal: She exhibits edema and tenderness  She exhibits no deformity  Right knee: She exhibits decreased range of motion, swelling and abnormal meniscus  She exhibits no effusion, no ecchymosis, no deformity, no laceration, no erythema, normal alignment, no LCL laxity and no MCL laxity  Tenderness found  Medial joint line and lateral joint line tenderness noted  No MCL, no LCL and no patellar tendon tenderness noted  Patient tender predominantly in popliteal fossa and overlying medial joint line       Neurological: She is alert  No sensory deficit  Skin: Skin is warm and dry  No rash noted  She is not diaphoretic  No erythema  No pallor  Psychiatric: She has a normal mood and affect  Her behavior is normal  Judgment and thought content normal    Nursing note and vitals reviewed        ED Medications  Medications   amLODIPine (NORVASC) tablet 5 mg (5 mg Oral Given 9/19/19 0854)   apixaban (ELIQUIS) tablet 5 mg (5 mg Oral Given 9/19/19 0854)   ascorbic acid (VITAMIN C) tablet 500 mg (500 mg Oral Given 9/19/19 0854)   atorvastatin (LIPITOR) tablet 40 mg (40 mg Oral Given 9/18/19 2005)   cholecalciferol (VITAMIN D3) tablet 2,000 Units (2,000 Units Oral Given 9/19/19 0854)   cloNIDine (CATAPRES) tablet 0 1 mg (0 1 mg Oral Given 9/19/19 0855)   labetalol (NORMODYNE) tablet 200 mg (200 mg Oral Given 9/19/19 0856)   levothyroxine tablet 125 mcg (125 mcg Oral Given 9/19/19 0604)   lisinopril (ZESTRIL) tablet 20 mg (20 mg Oral Given 9/19/19 0854)   LORazepam (ATIVAN) tablet 0 5 mg (0 5 mg Oral Given 9/19/19 0854)   multivitamin stress formula tablet 1 tablet (1 tablet Oral Given 9/19/19 4377)   pantoprazole (PROTONIX) EC tablet 40 mg (40 mg Oral Not Given 9/19/19 0605)   insulin lispro (HumaLOG) 100 units/mL subcutaneous injection 1-5 Units (1 Units Subcutaneous Given 9/19/19 1156)   ondansetron (ZOFRAN) injection 4 mg (has no administration in time range)   menthol-methyl salicylate (BENGAY) 61-09 % cream ( Apply externally Given 9/19/19 1156)   bupivacaine (PF) (MARCAINE) 0 25 % injection 10 mL (has no administration in time range)   betamethasone acetate-betamethasone sodium phosphate (CELESTONE) injection 12 mg (has no administration in time range)   cloNIDine (CATAPRES) tablet 0 1 mg (0 1 mg Oral Given 9/18/19 1520)   lidocaine-epinephrine (XYLOCAINE-MPF/EPINEPHRINE) 1%-1:200,000 injection 10 mL (10 mL Infiltration Given by Other 9/19/19 1200)       Diagnostic Studies  Results Reviewed     Procedure Component Value Units Date/Time    Basic metabolic panel [[id number]]  (Abnormal) Collected:  09/18/19 1732    Lab Status:  Final result Specimen:  Blood from Arm, Left Updated:  09/18/19 1757     Sodium 132 mmol/L      Potassium 3 6 mmol/L      Chloride 99 mmol/L      CO2 26 mmol/L      ANION GAP 7 mmol/L      BUN 21 mg/dL      Creatinine 1 12 mg/dL      Glucose 209 mg/dL      Calcium 8 5 mg/dL      eGFR 46 ml/min/1 73sq m     Narrative:       [place] guidelines for Chronic Kidney Disease (CKD):     Stage 1 with normal or high GFR (GFR > 90 mL/min/1 73 square meters)    Stage 2 Mild CKD (GFR = 60-89 mL/min/1 73 square meters)    Stage 3A Moderate CKD (GFR = 45-59 mL/min/1 73 square meters)    Stage 3B Moderate CKD (GFR = 30-44 mL/min/1 73 square meters)    Stage 4 Severe CKD (GFR = 15-29 mL/min/1 73 square meters)    Stage 5 End Stage CKD (GFR <15 mL/min/1 73 square meters)  Note: GFR calculation is accurate only with a steady state creatinine    CBC and differential [[id number]]  (Abnormal) Collected:  09/18/19 1732    Lab Status:  Final result Specimen:  Blood from Arm, Left Updated:  09/18/19 1744     WBC 10 08 Thousand/uL      RBC 3 07 Million/uL      Hemoglobin 9 3 g/dL      Hematocrit 29 3 %      MCV 95 fL      MCH 30 3 pg      MCHC 31 7 g/dL      RDW 14 6 %      MPV 10 5 fL      Platelets 394 Thousands/uL      nRBC 0 /100 WBCs      Neutrophils Relative 76 %      Immat GRANS % 1 %      Lymphocytes Relative 10 %      Monocytes Relative 13 %      Eosinophils Relative 0 %      Basophils Relative 0 %      Neutrophils Absolute 7 71 Thousands/µL      Immature Grans Absolute 0 05 Thousand/uL      Lymphocytes Absolute 0 96 Thousands/µL      Monocytes Absolute 1 32 Thousand/µL      Eosinophils Absolute 0 01 Thousand/µL      Basophils Absolute 0 03 Thousands/µL                  VAS lower limb venous duplex study, unilateral/limited   Final Result by Shantelle Stoddard MD (09/18 1635)      XR femur 2 views RIGHT   Final Result by Aditi Cardenas DO (09/18 1503)      No acute osseous abnormality  Workstation performed: [id number]         XR knee 1 or 2 views right   Final Result by Jm Linton MD (09/18 1153)      No acute osseous abnormality  Workstation performed: [id number]               Procedures  Procedures        ED Course                               MDM  Number of Diagnoses or Management Options  Diagnosis management comments: Patient initially evaluated with x-rays of her knee and femur due to tenderness, no obvious abnormality seen  Patient evaluated with a Doppler of her right lower extremity for possible DVT  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   Attempts to ambulate patient with pain which is what patient has at home show patient's inability to walk with a cane at this time  Patient to be admitted due to ambulatory dysfunction she lives home alone    She will be evaluated for physical therapy as an inpatient      Disposition  Final diagnoses:   Swelling   Right knee pain     Time reflects when diagnosis was documented in both MDM as applicable and the Disposition within this note     Time User Action Codes Description Comment    9/18/2019  1:49 PM Shannan Conde Add [R60 9] Swelling     9/18/2019  7:19 PM Danial Fields Add [M25 561] Right knee pain       ED Disposition     ED Disposition Condition Date/Time Comment    Admit Stable Thu Sep 19, 2019  1:51 PM Case was discussed with BRIT and the patient's admission status was agreed to be Admission Status: observation status to the service of Dr Nimesh Morfin   Follow-up Information    None         Current Discharge Medication List      CONTINUE these medications which have NOT CHANGED    Details   !! amLODIPine (NORVASC) 2 5 mg tablet Take 1 tablet (2 5 mg total) by mouth 2 (two) times a day for 120 doses  Qty: 120 tablet, Refills: 0    Associated Diagnoses: Hypertensive urgency      !! amLODIPine (NORVASC) 5 mg tablet TAKE 1 TABLET BY MOUTH EVERY DAY  Qty: 90 tablet, Refills: 0    Associated Diagnoses: Hypertension, essential, benign      !! apixaban (ELIQUIS) 5 mg Take 5 mg by mouth 2 (two) times a day      ascorbic acid (VITAMIN C) 500 mg tablet Take 500 mg by mouth daily      atorvastatin (LIPITOR) 40 mg tablet Take 1 tablet (40 mg total) by mouth every evening  Qty: 90 tablet, Refills: 0    Associated Diagnoses: Renal artery stenosis, native ([facility] );  Hypertensive urgency      cholecalciferol (VITAMIN D3) 1,000 units tablet Take 2,000 Units by mouth daily       cloNIDine (CATAPRES) 0 1 mg tablet Take 0 1 mg by mouth daily       !! ELIQUIS 5 MG TAKE 1 TABLET BY MOUTH TWICE A DAY  Qty: 60 tablet, Refills: 3    Associated Diagnoses: Atrial fibrillation, unspecified type (HCC)      labetalol (NORMODYNE) 200 mg tablet Take 1 tablet (200 mg total) by mouth every 12 (twelve) hours  Qty: 120 tablet, Refills: 0    Associated Diagnoses: Hypertensive urgency      levothyroxine 125 mcg tablet Take 125 mcg by mouth daily      lisinopril (ZESTRIL) 20 mg tablet TAKE 1 TABLET (20 MG TOTAL) BY MOUTH 2 (TWO) TIMES A DAY  Qty: 180 tablet, Refills: 3    Associated Diagnoses: Essential hypertension      LORazepam (ATIVAN) 0 5 mg tablet Take 0 5 mg by mouth 2 (two) times a day      metFORMIN (GLUCOPHAGE) 500 mg tablet Take 500 mg by mouth daily with breakfast       Multiple Vitamins-Minerals (PRESERVISION AREDS 2 PO) Take by mouth 2 (two) times a day      multivitamin (THERAGRAN) TABS Take 1 tablet by mouth daily      pantoprazole (PROTONIX) 40 mg tablet Take 40 mg by mouth daily Pt takes daily when needed  !! - Potential duplicate medications found  Please discuss with provider  No discharge procedures on file  ED Provider  Attending physically available and evaluated Bobby Card  I managed the patient along with the ED Attending      Electronically Signed by         Arelis Saavedra DO  09/19/19 9085

## 2019-09-18 NOTE — ASSESSMENT & PLAN NOTE
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

## 2019-09-18 NOTE — ASSESSMENT & PLAN NOTE
- HbA1c of 5 6 in July 2019  - on SSI coverage per [employer]  - hold Metformin during inpatient course

## 2019-09-18 NOTE — ED NOTES
Pt ambulatory approximately 50ft using a walker  Pt demonstrated a steady gait, but rated her pain as an 8  Upon return to bed, VSS  Pt given meal box        Shirley Maravilla  01/13/15 9513

## 2019-09-18 NOTE — ED NOTES
Dr Anastasia Rm at bedside with ultra sound to look at right knee      Joseline Walker, TANK  09/18/19 5867

## 2019-09-18 NOTE — ED NOTES
Pt ambulatory approximately 20ft with cane   Pt states, "this hurts too much, I want to stay but only 1 night "     [address]  07/91/01 3260

## 2019-09-18 NOTE — ED NOTES
Patient back from [address] [place] [address],[address] at this time        Oralia Share  09/18/19 1095

## 2019-09-18 NOTE — H&P
History & Physical - [facility] Internal Medicine  Patient: Jojo Joshi 78 y o  female MRN: [id number]  Unit/Bed#: -01 Encounter: [id number]  Primary Care Provider: Sukhdeep David DO  Date & Time of Admission: 9/18/2019 10:22 AM        Assessment & Plan:    * Right knee pain  Assessment & Plan  - XR of knee/femur unremarkable for acute etiology - Doppler ultrasound imaging however, revealing a "well defined hypoechoic non-vascularized cystic-type structure noted in the popliteal fossa" possibly representing a Baker cyst  - PRN pain control - PT/OT as tolerated - will appreciate orthopedic evaluation    Essential hypertension  Assessment & Plan  - continue Norvasc/Catapres/Labetalol/Zestril regimen  - PRN pain control    Chronic hyponatremia  Assessment & Plan  - serum sodium of 132 on presentation (around baseline)    Ambulatory dysfunction  Assessment & Plan  - secondary to right knee pain (see above)  - await PT/OT input    Atrial fibrillation   Assessment & Plan  - rate controlled on Labetalol  - continue Eliquis for anticoagulation    Diabetes mellitus type 2  Assessment & Plan  - HbA1c of 5 6 in July 2019  - on SSI coverage per [employer]  - hold Metformin during inpatient course    Hypothyroidism  Assessment & Plan  - continue Synthroid    History of breast cancer  Assessment & Plan  - s/p prior mastectomy  - outpatient follow-up    Moderate protein-calorie malnutrition   Assessment & Plan  - BMI of 21 14 in the setting of decreased appetite/oral intake and evidence of muscle wasting/atrophy on exam  - initiate Glucerna nutritional supplementation with meals        DVT Prophylaxis:  Eliquis    Code Status:  Full code    Discussion with:  Patient at bedside    Anticipated Length of Stay:  Patient will be admitted on an Inpatient basis with an anticipated length of stay of greater than 2 midnights     Justification for Hospital Stay:  Right knee pain with associated amateur dysfunction requiring orthopedic evaluation and optimized pain control  Total Time for Visit, including Counseling / Coordination of Care: 72 minutes  Greater than 50% of this total time spent on direct patient counseling and coordination of care  Chief Complaint:  Right knee pain and trouble ambulating      History of Present Illness:    Bobby Card is a 78 y o  female who presents with complaints of right knee pain with associated ambulatory dysfunction which started shortly after awaking this morning and getting out of bed  In the ER she underwent XR imaging of her right femur and knee both of which was unremarkable  Ultrasound imaging, however, did reveal evidence of a possible Baker cyst   Attempts were made in the ER to get the patient to ambulate which she was only able to mildly due without pain/discomfort  She remains in hopeful spirits otherwise  Awaiting PT/OT evaluation  Denies any falls on attempts at ambulation at home  Of note, she is on Eliquis for anticoagulation due to history of atrial fibrillation  Review of Systems:    Review of Systems - A thorough 12 point review systems was conducted  Pertinent positives and negatives are mentioned in the history of present illness        Past Medical and Surgical History:     Past Medical History:   Diagnosis Date    Anxiety     [facility]     [facility]el obstruction [facility]     Cancer ([facility]     LEFT BREAST CA 22 YEARS AGO     Depression     Diabetes mellitus [facility]     Disease of thyroid gland     Hypertension     Hypothyroidism        Past Surgical History:   Procedure Laterality Date    ABDOMINAL ADHESION SURGERY N/A 2/4/2018    Procedure: LYSIS ADHESIONS;  Surgeon: Eliseo Ruiz DO;  [facility];  Service: General    BREAST SURGERY      CHOLECYSTECTOMY      COLON SURGERY  2017    EXPLORATORY LAPAROTOMY      JOINT REPLACEMENT      LEFT KNEE REPLACEMENT     LAPAROTOMY N/A 2/4/2018    Procedure: LAPAROTOMY EXPLORATORY,; Surgeon: Candie Chowdhury DO;  [facility];  Service: General    MASTECTOMY      MASTECTOMY Left 1995    MASTECTOMY Right 2017    VA BIOPSY/EXCISION, LYMPH NODE(S) Right 1/13/2017    Procedure: SENTINEL LYMPH NODE BIOPSY RIGHT AXILLA; Surgeon: Kendrick Camp MD;  [facility];  Service: General    VA MASTECTOMY, SIMPLE, COMPLETE Right 1/13/2017    Procedure: MASTECTOMY SIMPLE;  Surgeon: Kendrick Camp MD;  [facility];  Service: General    SMALL INTESTINE SURGERY N/A 2/4/2018    Procedure: RESECTION SMALL BOWEL;  Surgeon: Candie Chowdhury DO;  [facility];  Service: General    US GUIDED BREAST BIOPSY RIGHT COMPLETE Right 11/29/2016         Medications & Allergies:    Prior to Admission medications    Medication Sig Start Date End Date Taking?  Authorizing Provider   amLODIPine (NORVASC) 2 5 mg tablet Take 1 tablet (2 5 mg total) by mouth 2 (two) times a day for 120 doses 7/21/19 9/19/19  Mt Summers PA-C   amLODIPine (NORVASC) 5 mg tablet TAKE 1 TABLET BY MOUTH EVERY DAY 9/12/19   Darrel Burciaga DO   apixaban (ELIQUIS) 5 mg Take 5 mg by mouth 2 (two) times a day    Historical Provider, MD   ascorbic acid (VITAMIN C) 500 mg tablet Take 500 mg by mouth daily    Historical Provider, MD   atorvastatin (LIPITOR) 40 mg tablet Take 1 tablet (40 mg total) by mouth every evening  Patient not taking: Reported on 9/18/2019 7/21/19   Mt Summers PA-C   cholecalciferol (VITAMIN D3) 1,000 units tablet Take 2,000 Units by mouth daily     Historical Provider, MD   cloNIDine (CATAPRES) 0 1 mg tablet Take 0 1 mg by mouth daily     Historical Provider, MD   ELIQUIS 5 MG TAKE 1 TABLET BY MOUTH TWICE A DAY 9/16/19   Darrel Burciaga DO   labetalol (NORMODYNE) 200 mg tablet Take 1 tablet (200 mg total) by mouth every 12 (twelve) hours 7/21/19   Mt Summers PA-C   levothyroxine 125 mcg tablet Take 125 mcg by mouth daily    Historical Provider, MD   lisinopril (ZESTRIL) 20 mg tablet TAKE 1 TABLET (20 MG TOTAL) BY MOUTH 2 (TWO) TIMES A DAY 5/13/19   Ly Walters MD   LORazepam (ATIVAN) 0 5 mg tablet Take 0 5 mg by mouth 2 (two) times a day    Historical Provider, MD   metFORMIN (GLUCOPHAGE) 500 mg tablet Take 500 mg by mouth daily with breakfast     Historical Provider, MD   Multiple Vitamins-Minerals (PRESERVISION AREDS 2 PO) Take by mouth 2 (two) times a day    Historical Provider, MD   multivitamin (THERAGRAN) TABS Take 1 tablet by mouth daily    Historical Provider, MD   pantoprazole (PROTONIX) 40 mg tablet Take 40 mg by mouth daily Pt takes daily when needed  Historical Provider, MD         Allergies: Allergies   Allergen Reactions    Meloxicam GI Intolerance    Morphine GI Intolerance    Morphine     Penicillins     Percocet [Oxycodone-Acetaminophen]     Sitagliptin      SOB         Social History:    Substance Use History:   Social History     Substance and Sexual Activity   Alcohol Use Never    Frequency: Never    Binge frequency: Never     Social History     Tobacco Use   Smoking Status Current Every Day Smoker    Packs/day: 1 00    Types: Cigarettes   Smokeless Tobacco Never Used     Social History     Substance and Sexual Activity   Drug Use Never         Family History:    Denies significant family history at this time        Physical Exam:     Vitals:   Blood Pressure: (!) 173/81 (09/18/19 1921)  Pulse: 80 (09/18/19 1921)  Temperature: 99 2 °F (37 3 °C) (09/18/19 1921)  Temp Source: Oral (09/18/19 1921)  Respirations: 18 (09/18/19 1921)  Height: 5' 6" (167 6 cm) (09/18/19 1921)  Weight - Scale: 59 4 kg (131 lb) (09/18/19 1921)  SpO2: 96 % (09/18/19 1921)      GENERAL:  Weak/cachectic  HEAD:  Normocephalic - atraumatic - mild temporal wasting noted  EYES: PERRL - EOMI   MOUTH:  Mucosa moist  NECK:  Supple - full range of motion - clavicular wasting evident  CARDIAC:  Rate-controlled - S1/S2 positive  PULMONARY:  Clear breath sounds bilaterally - nonlabored respirations  ABDOMEN:  Soft - nontender/nondistended - active bowel sounds  MUSCULOSKELETAL:  Motor strength/range of motion diminished due to right knee pain - posterior right knee tenderness to palpation  NEUROLOGIC:  Alert/oriented x 3  SKIN:  Chronic wrinkles/blemishes   PSYCHIATRIC:  Mood/affect age-appropriate      Additional Data:     Labs & Recent Cultures:    Results from last 7 days   Lab Units 09/18/19  1732   WBC Thousand/uL 10 08   HEMOGLOBIN g/dL 9 3*   HEMATOCRIT % 29 3*   PLATELETS Thousands/uL 183   NEUTROS PCT % 76*   LYMPHS PCT % 10*   MONOS PCT % 13*   EOS PCT % 0     Results from last 7 days   Lab Units 09/18/19  1732   SODIUM mmol/L 132*   POTASSIUM mmol/L 3 6   CHLORIDE mmol/L 99*   CO2 mmol/L 26   BUN mg/dL 21   CREATININE mg/dL 1 12   ANION GAP mmol/L 7   CALCIUM mg/dL 8 5   GLUCOSE RANDOM mg/dL 209*           Imaging:     Xr Femur 2 Views Right    Result Date: 9/18/2019  Narrative: RIGHT FEMUR INDICATION:   pain  COMPARISON:  6/3/2019 VIEWS:  XR FEMUR 2 VW RIGHT Images: 4 FINDINGS: There is no fracture or dislocation  Mild degenerative osteoarthritis of the hip  No lytic or blastic lesions are seen  Vascular calcification noted throughout the thigh  Impression: No acute osseous abnormality  Workstation performed: [id number]     Xr Knee 1 Or 2 Views Right    Result Date: 9/18/2019  Narrative: RIGHT KNEE INDICATION:   swollen  COMPARISON:  None VIEWS:  XR KNEE 1 OR 2 VW RIGHT FINDINGS: There is no acute fracture or dislocation  There is no joint effusion  Mild osteoarthritis with small osteophytes seen  No lytic or blastic lesions are seen  Mild anterior soft tissue swelling  Impression: No acute osseous abnormality  Workstation performed: [id number]     Vas Lower Limb Venous Duplex Study, Unilateral/limited    Result Date: 9/18/2019  Narrative:  [facility] REPORT CLINICAL: Indications: PT C/O severe right knee pain radiating above and below knee X 1 day    PT could not bear weight on right leg today  Physician wants to rule out DVT  Operative History: [id number] versus fem bpg (15 years ago Dr Davenport) B/L MASTECTOMY Left CEA Left knee replacement Risk Factors The patient has history of HTN, Diabetes, GERD, chronic back pain, breast cancer, HLD and smoking (current) 1-2 ppd  She has no history of Obesity  The patient current BMI is 20 98, Weight is 130 lb and height is 66 in  FINDINGS:  Segment  Right            Left              Impression       Impression       CFV      Normal (Patent)  Normal (Patent)     CONCLUSION:  Impression:  RIGHT LOWER LIMB No evidence of acute or chronic deep vein thrombosis   No evidence of superficial thrombophlebitis noted  Doppler evaluation shows a normal response to augmentation maneuvers  Popliteal, posterior tibial, and anterior tibial arterial Doppler waveforms are biphasic/monophasic  There is a well defined hypoechoic non-vascularized cystic-type structure noted in the popliteal fossa  LEFT LOWER LIMB LIMITED Evaluation shows no evidence of thrombus in the common femoral vein  Doppler evaluation shows a normal response to augmentation maneuvers  Technical findings were faxed to chart  SIGNATURE: Electronically Signed by: Johnny Fernandez MD, [id number] on 2019-09-18 04:35:35 PM        ** Please Note: This note is constructed using a voice recognition dictation system  An occasional wrong word/phrase or sound-a-like substitution may have been picked up by dictation device due to the inherent limitations of voice recognition software  Read the chart carefully and recognize, using reasonable context, where substitutions may have occurred  **

## 2019-09-19 LAB
CRYSTALS SNV QL MICRO: NORMAL
GLUCOSE SERPL-MCNC: 124 MG/DL (ref 65–140)
GLUCOSE SERPL-MCNC: 148 MG/DL (ref 65–140)
GLUCOSE SERPL-MCNC: 192 MG/DL (ref 65–140)
GLUCOSE SERPL-MCNC: 251 MG/DL (ref 65–140)
GRAM STN SPEC: NORMAL
GRAM STN SPEC: NORMAL
LYMPHOCYTES # SNV MANUAL: 3 %
MONOCYTES NFR SNV MANUAL: 5 %
NEUTROPHILS NFR SNV MANUAL: 92 %
RBC # SNV MANUAL: ABNORMAL /UL (ref 0–10)
TOTAL CELLS COUNTED SPEC: 100
WBC # FLD MANUAL: 8083 /UL (ref 0–200)

## 2019-09-19 PROCEDURE — 3E0U3BZ INTRODUCTION OF ANESTHETIC AGENT INTO JOINTS, PERCUTANEOUS APPROACH: ICD-10-PCS | Performed by: ORTHOPAEDIC SURGERY

## 2019-09-19 PROCEDURE — G8988 SELF CARE GOAL STATUS: HCPCS

## 2019-09-19 PROCEDURE — G8979 MOBILITY GOAL STATUS: HCPCS

## 2019-09-19 PROCEDURE — G8987 SELF CARE CURRENT STATUS: HCPCS

## 2019-09-19 PROCEDURE — 89050 BODY FLUID CELL COUNT: CPT | Performed by: ORTHOPAEDIC SURGERY

## 2019-09-19 PROCEDURE — 97167 OT EVAL HIGH COMPLEX 60 MIN: CPT

## 2019-09-19 PROCEDURE — 87070 CULTURE OTHR SPECIMN AEROBIC: CPT | Performed by: ORTHOPAEDIC SURGERY

## 2019-09-19 PROCEDURE — NC001 PR NO CHARGE: Performed by: ORTHOPAEDIC SURGERY

## 2019-09-19 PROCEDURE — 97163 PT EVAL HIGH COMPLEX 45 MIN: CPT

## 2019-09-19 PROCEDURE — 0S9C3ZZ DRAINAGE OF RIGHT KNEE JOINT, PERCUTANEOUS APPROACH: ICD-10-PCS | Performed by: ORTHOPAEDIC SURGERY

## 2019-09-19 PROCEDURE — 89060 EXAM SYNOVIAL FLUID CRYSTALS: CPT | Performed by: ORTHOPAEDIC SURGERY

## 2019-09-19 PROCEDURE — 82948 REAGENT STRIP/BLOOD GLUCOSE: CPT

## 2019-09-19 PROCEDURE — 87205 SMEAR GRAM STAIN: CPT | Performed by: ORTHOPAEDIC SURGERY

## 2019-09-19 PROCEDURE — 99222 1ST HOSP IP/OBS MODERATE 55: CPT | Performed by: ORTHOPAEDIC SURGERY

## 2019-09-19 PROCEDURE — 99232 SBSQ HOSP IP/OBS MODERATE 35: CPT | Performed by: INTERNAL MEDICINE

## 2019-09-19 PROCEDURE — 89051 BODY FLUID CELL COUNT: CPT | Performed by: ORTHOPAEDIC SURGERY

## 2019-09-19 PROCEDURE — G8978 MOBILITY CURRENT STATUS: HCPCS

## 2019-09-19 PROCEDURE — 3E0U33Z INTRODUCTION OF ANTI-INFLAMMATORY INTO JOINTS, PERCUTANEOUS APPROACH: ICD-10-PCS | Performed by: ORTHOPAEDIC SURGERY

## 2019-09-19 RX ORDER — BUPIVACAINE HYDROCHLORIDE 2.5 MG/ML
10 INJECTION, SOLUTION EPIDURAL; INFILTRATION; INTRACAUDAL ONCE
Status: DISCONTINUED | OUTPATIENT
Start: 2019-09-19 | End: 2019-09-20 | Stop reason: HOSPADM

## 2019-09-19 RX ORDER — BETAMETHASONE SODIUM PHOSPHATE AND BETAMETHASONE ACETATE 3; 3 MG/ML; MG/ML
12 INJECTION, SUSPENSION INTRA-ARTICULAR; INTRALESIONAL; INTRAMUSCULAR; SOFT TISSUE ONCE
Status: DISCONTINUED | OUTPATIENT
Start: 2019-09-19 | End: 2019-09-20 | Stop reason: HOSPADM

## 2019-09-19 RX ADMIN — ATORVASTATIN CALCIUM 40 MG: 40 TABLET, FILM COATED ORAL at 17:06

## 2019-09-19 RX ADMIN — LORAZEPAM 0.5 MG: 0.5 TABLET ORAL at 17:06

## 2019-09-19 RX ADMIN — CLONIDINE HYDROCHLORIDE 0.1 MG: 0.1 TABLET ORAL at 08:55

## 2019-09-19 RX ADMIN — MENTHOL, METHYL SALICYLATE: 10; 15 CREAM TOPICAL at 11:56

## 2019-09-19 RX ADMIN — INSULIN LISPRO 1 UNITS: 100 INJECTION, SOLUTION INTRAVENOUS; SUBCUTANEOUS at 11:56

## 2019-09-19 RX ADMIN — VITAMIN D, TAB 1000IU (100/BT) 2000 UNITS: 25 TAB at 08:54

## 2019-09-19 RX ADMIN — OXYCODONE HYDROCHLORIDE AND ACETAMINOPHEN 500 MG: 500 TABLET ORAL at 08:54

## 2019-09-19 RX ADMIN — LORAZEPAM 0.5 MG: 0.5 TABLET ORAL at 08:54

## 2019-09-19 RX ADMIN — LIDOCAINE HYDROCHLORIDE 10 ML: 10; .005 INJECTION, SOLUTION EPIDURAL; INFILTRATION; INTRACAUDAL; PERINEURAL at 12:00

## 2019-09-19 RX ADMIN — LABETALOL HCL 200 MG: 200 TABLET, FILM COATED ORAL at 08:56

## 2019-09-19 RX ADMIN — LABETALOL HCL 200 MG: 200 TABLET, FILM COATED ORAL at 21:23

## 2019-09-19 RX ADMIN — APIXABAN 5 MG: 5 TABLET, FILM COATED ORAL at 08:54

## 2019-09-19 RX ADMIN — LISINOPRIL 20 MG: 20 TABLET ORAL at 08:54

## 2019-09-19 RX ADMIN — INSULIN LISPRO 2 UNITS: 100 INJECTION, SOLUTION INTRAVENOUS; SUBCUTANEOUS at 21:16

## 2019-09-19 RX ADMIN — ZINC 1 TABLET: TAB ORAL at 08:54

## 2019-09-19 RX ADMIN — APIXABAN 5 MG: 5 TABLET, FILM COATED ORAL at 17:06

## 2019-09-19 RX ADMIN — AMLODIPINE BESYLATE 5 MG: 5 TABLET ORAL at 08:54

## 2019-09-19 RX ADMIN — LEVOTHYROXINE SODIUM 125 MCG: 125 TABLET ORAL at 06:04

## 2019-09-19 RX ADMIN — LISINOPRIL 20 MG: 20 TABLET ORAL at 17:07

## 2019-09-19 NOTE — ASSESSMENT & PLAN NOTE
- BMI of 21 14 in the setting of decreased appetite/oral intake and evidence of muscle wasting/atrophy on exam  - initiate Glucerna nutritional supplementation with meals

## 2019-09-19 NOTE — PHYSICIAN ADVISOR
Current patient class: Inpatient  The patient is currently on Hospital Day: 2 at [address] [place] [address]        The patient is  documented to require at least a 2nd midnight in the hospital  As such the patient is  expected to satisfy the 2 midnight benchmark and is therefore eligible for inpatient admission  After review of the relevant documentation, labs, vital signs and test results, the patient is appropriate for INPATIENT ADMISSION  Admission to the hospital as an inpatient is a complex decision making process which requires the practitioner to consider the patients presenting complaint, history and physical examination and all relevant testing  With this in mind, in this case, the patient was deemed appropriate for INPATIENT ADMISSION  After review of the documentation and testing available at the time of the admission I concur with this clinical determination of medical necessity  Rationale is as follows: The patient is a 78 yrs Female who presented to the ED at 9/18/2019 10:22 AM with a chief complaint of Leg Pain (Pt has been c/o right knee pain starting yesterday  Denies injury/trauma  Pt leg is swollen and hot to touch on arrival to ED )  Patient admitted for right knee pain and associated ambulatory dysfunction-seen in consultation by Ortho service-for right knee arthritis and Baker cyst-plan of care is non operative management  She underwent right he steroid injection today  She was also evaluated by PT/OT who have recommended home therapy        The patients vitals on arrival were ED Triage Vitals [09/18/19 1024]   Temperature Pulse Respirations Blood Pressure SpO2   98 3 °F (36 8 °C) 74 18 166/79 97 %      Temp Source Heart Rate Source Patient Position - Orthostatic VS BP Location FiO2 (%)   Oral Monitor Sitting Right arm --      Pain Score       9           Past Medical History:   Diagnosis Date    Anxiety     Atrial fibrillation (HCC)     [facility] [address] [facility]     Cancer [facility] [address] [facility]     LEFT BREAST CA 22 YEARS AGO     Depression     Diabetes mellitus [facility] [address] [facility]     Disease of thyroid gland     Hypertension     Hypothyroidism      Past Surgical History:   Procedure Laterality Date    ABDOMINAL ADHESION SURGERY N/A 2/4/2018    Procedure: LYSIS ADHESIONS;  Surgeon: Lora Polo DO;  [facility];  Service: General    BREAST SURGERY      CHOLECYSTECTOMY      COLON SURGERY  2017    EXPLORATORY LAPAROTOMY      JOINT REPLACEMENT      LEFT KNEE REPLACEMENT     LAPAROTOMY N/A 2/4/2018    Procedure: LAPAROTOMY EXPLORATORY,;  Surgeon: Lora Polo DO;  [facility];  Service: General    MASTECTOMY      MASTECTOMY Left 1995    MASTECTOMY Right 2017    MA BIOPSY/EXCISION, LYMPH NODE(S) Right 1/13/2017    Procedure: SENTINEL LYMPH NODE BIOPSY RIGHT AXILLA;   Surgeon: Lawanda Campos MD;  [facility];  Service: General    MA MASTECTOMY, SIMPLE, COMPLETE Right 1/13/2017    Procedure: MASTECTOMY SIMPLE;  Surgeon: Lawanda Campos MD;  [facility];  Service: General    SMALL INTESTINE SURGERY N/A 2/4/2018    Procedure: RESECTION SMALL BOWEL;  Surgeon: Lora Polo DO;  [facility];  Service: General    US GUIDED BREAST BIOPSY RIGHT COMPLETE Right 11/29/2016       The patient was admitted to the hospital at 1810 on 9/18/19 for the following diagnosis:  Swelling [R60 9]  Right knee pain [M25 561]    Consults have been placed to:   IP CONSULT TO ORTHOPEDIC SURGERY    Vitals:    09/18/19 1921 09/18/19 2300 09/19/19 0700 09/19/19 1430   BP: (!) 173/81 166/81 132/54 136/56   BP Location:  Right arm Left arm    Pulse: 80 78 72 79   Resp: 18 18 16 18   Temp: 99 2 °F (37 3 °C) 99 °F (37 2 °C) 98 5 °F (36 9 °C) 98 4 °F (36 9 °C)   TempSrc: Oral Oral Oral Oral   SpO2: 96% 96% 95% 96%   Weight: 59 4 kg (131 lb)      Height: 5' 6" (1 676 m)          Most recent labs:    Recent Labs     09/18/19  1732   WBC 10 08   HGB 9 3*   HCT 29 3*      K 3 6   CALCIUM 8 5   BUN 21   CREATININE 1 12       Scheduled Meds:  Current Facility-Administered Medications:  amLODIPine 5 mg Oral Daily Barbara Mckeon MD   apixaban 5 mg Oral BID Barbara Mckeon MD   ascorbic acid 500 mg Oral Daily Barbara Mckeon MD   atorvastatin 40 mg Oral QPM Barbara Mckeon MD   betamethasone acetate-betamethasone sodium phosphate 12 mg Intra-articular Once Evelia Teixeira MD   bupivacaine (PF) 10 mL Infiltration Once Evelia Teixeira MD   cholecalciferol 2,000 Units Oral Daily Barbara Mckeon MD   cloNIDine 0 1 mg Oral Daily Barbara Mckeon MD   insulin lispro 1-5 Units Subcutaneous 4x Daily (AC & HS) Barbara Mckeon MD   labetalol 200 mg Oral Q12H [facility] Barbara Mckeon MD   levothyroxine 125 mcg Oral Early Morning Barbara Mckeon MD   lisinopril 20 mg Oral BID Barbara Mckeon MD   LORazepam 0 5 mg Oral BID Barbara Mckeon MD   menthol-methyl salicylate  Apply externally 4x Daily PRN Barbara Mckeon MD   multivitamin stress formula 1 tablet Oral Daily Barbara Mckeon MD   ondansetron 4 mg Intravenous Q4H PRN Barbara Mckeon MD   pantoprazole 40 mg Oral Daily Before Breakfast Barbara Mckeon MD     Continuous Infusions:   PRN Meds: menthol-methyl salicylate    ondansetron    Surgical procedures (if appropriate):

## 2019-09-19 NOTE — PHYSICAL THERAPY NOTE
Physical Therapy Evaluation    Patient's Name: Prem Irvin    Admitting Diagnosis  Swelling [R60 9]  Right knee pain [M25 561]    Problem List  Patient Active Problem List   Diagnosis    Heart palpitations    Nicotine abuse    Atrial fibrillation     Diabetes mellitus type 2    Essential hypertension    Hypothyroidism    Malignant neoplasm of central portion of right female breast ([facility] )    Acute kidney injury ([place] [address] )    Delirium    Physical deconditioning    Ambulatory dysfunction    Hx of fall    Anxiety    Postop check    Incisional hernia, without obstruction or gangrene    Syncope vs presyncope    Paroxysmal A-fib ([place] [address] )    Renovascular hypertension    Renal artery stenosis(HCC)    Diabetes (HCC)    Weight loss    CKD (chronic kidney disease) stage 3, GFR 30-59 ml/min (HCC)    Fall    Chronic hyponatremia    Gastroesophageal reflux disease without esophagitis    Depression    Hordeolum externum of right upper eyelid    History of CEA (carotid endarterectomy)    Hyperlipidemia associated with type 2 diabetes mellitus ([facility] )    Hypertension, accelerated    Hypo-osmolality and hyponatremia    Hypothyroidism due to acquired atrophy of thyroid    Iron deficiency anemia due to chronic blood loss    Low back pain without sciatica    Malignant neoplasm of right breast, stage 1, estrogen receptor positive (HCC)    Mitral insufficiency and aortic stenosis    Vitamin D deficiency    Varicose vein of leg    Tobacco abuse    Nontraumatic compression fracture of T4 vertebra (HCC)    Non-seasonal allergic rhinitis due to pollen    Hypertension    Hypertensive urgency    High blood pressure    Asymptomatic bilateral carotid artery stenosis    Right knee pain    History of breast cancer    Moderate protein-calorie malnutrition        Past Medical History  Past Medical History:   Diagnosis Date    Anxiety     Atrial fibrillation (HCC)     Bowel obstruction ([facility] )     Cancer ([facility] )     LEFT BREAST CA 22 YEARS AGO     Depression     Diabetes mellitus ([facility] )     Disease of thyroid gland     Hypertension     Hypothyroidism        Past Surgical History  Past Surgical History:   Procedure Laterality Date    ABDOMINAL ADHESION SURGERY N/A 2/4/2018    Procedure: LYSIS ADHESIONS;  Surgeon: Charly Werner DO;  [facility];  Service: General    BREAST SURGERY      CHOLECYSTECTOMY      COLON SURGERY  2017    EXPLORATORY LAPAROTOMY      JOINT REPLACEMENT      LEFT KNEE REPLACEMENT     LAPAROTOMY N/A 2/4/2018    Procedure: LAPAROTOMY EXPLORATORY,;  Surgeon: Charly Werner DO;  [facility];  Service: General    MASTECTOMY      MASTECTOMY Left 1995    MASTECTOMY Right 2017    SD BIOPSY/EXCISION, LYMPH NODE(S) Right 1/13/2017    Procedure: SENTINEL LYMPH NODE BIOPSY RIGHT AXILLA; Surgeon: Fredrick Blandon MD;  [facility];  Service: General    SD MASTECTOMY, SIMPLE, COMPLETE Right 1/13/2017    Procedure: MASTECTOMY SIMPLE;  Surgeon: Fredrick Blandon MD;  [facility];  Service: General    SMALL INTESTINE SURGERY N/A 2/4/2018    Procedure: RESECTION SMALL BOWEL;  Surgeon: Charly Werner DO;  [facility];  Service: General    US GUIDED BREAST BIOPSY RIGHT COMPLETE Right 11/29/2016 09/19/19 1200   Note Type   Note type Eval only   Pain Assessment   Pain Assessment 0-10   Pain Score Worst Possible Pain   Pain Type Acute pain   Pain Location   (R knee, L low back)   Patient's Stated Pain Goal No pain   Hospital Pain Intervention(s) Ambulation/increased activity   Response to Interventions unchanged   Home Living   Type of Home Apartment   Additional Comments Resides alone in highrise apt   indep self care, drives    Local dtr can check on pt   ambulates w/ cane   Prior Function   Level of [place] Independent with ADLs and functional mobility   Falls in the last 6 months 1 to 4   Restrictions/Precautions   Weight Bearing Precautions Per Order Yes   LLE Weight Bearing Per Order WBAT   Other Precautions Pain; Fall Risk;Multiple lines   General   Family/Caregiver Present No   Cognition   Overall Cognitive Status WFL   Arousal/Participation Responsive   Orientation Level Oriented X4   Memory Unable to assess   Following Commands Follows all commands and directions without difficulty   RLE Assessment   RLE Assessment   (ROM limited by pain, strength grossly 3 to 3+/5)   LLE Assessment   LLE Assessment   (strength grossly 4/5)   Coordination   Movements are Fluid and Coordinated 0   Coordination and Movement Description mild ataxia   Bed Mobility   Supine to Sit 4  Minimal assistance   Additional items Assist x 1;LE management   Sit to Supine 5  Supervision   Additional items Assist x 1   Transfers   Sit to Stand 4  Minimal assistance   Additional items Assist x 1   Stand to Sit 5  Supervision   Additional items Assist x 1   Ambulation/Elevation   Gait pattern   (slow, antalgic)   Gait Assistance 4  Minimal assist   Additional items Assist x 1   Assistive Device Rolling walker   Distance 60'   Balance   Static Sitting Good   Dynamic Sitting Fair -   Static Standing Fair -   Dynamic Standing Poor +   Ambulatory Poor +   Endurance Deficit   Endurance Deficit Yes   Endurance Deficit Description fatigue, pain   Activity Tolerance   Activity Tolerance Patient limited by fatigue;Patient limited by pain;Treatment limited secondary to medical complications (Comment)   Nurse Made Aware yes   Assessment   Prognosis Good   Problem List Decreased strength;Decreased endurance; Impaired balance;Decreased mobility;Pain;Orthopedic restrictions   Assessment Pt seen for high complexity physical therapy evaluation  Pt is an 77 y/o female w/ history/comorbidities of anxiety, a-fib, depression, breast CA w/ B mastectomies, DM, HTN, who is now admitted w/ c/o worsening R knee pain w/ decreased mobility due to same  Knee aspirated by ortho earlier    Due to acute medical issues, pain, fall risk, note unstable clinical picture  PT consulted to assess mobility, d/c needs  Pt presents w/ decreased functional mob, standing balance, endurance, B LE strength, barriers at home  will benefit from skilled PT to correct for the above problems  recommend d/c home when stable w/ RW, home therapies   Goals   Patient Goals to have less pain   STG Expiration Date 10/03/19   Short Term Goal #1 1-2 wks: bed mob and transfers w/ indep, standing balance to good w/ device, ambulate 200-300 ft w  RW and mod I WBAT on R, increase R knee ROM to [facility] and B LE strength by 1/2 -1 grade,    Treatment Day 0   Plan   Treatment/Interventions Functional transfer training;LE strengthening/ROM; Therapeutic exercise; Endurance training;Patient/family training;Equipment eval/education; Bed mobility;Gait training   PT Frequency   (3-5x/wk)   Recommendation   Recommendation   (home w/ home therapies, RW)   Equipment Recommended Walker  (will likely need RW for d/c)   PT - OK to Discharge Yes  (when stable to home w/ home therapies, RW)   Modified [place] Scale   Modified Parul Scale 4   Barthel Index   Feeding 10   Bathing 0   Grooming Score 5   Dressing Score 5   Bladder Score 10   Bowels Score 10   Toilet Use Score 5   Transfers (Bed/Chair) Score 10   Mobility (Level Surface) Score 0   Stairs Score 0   Barthel Index Score 55         Rekha Herring PT, DPT, CSRS

## 2019-09-19 NOTE — CONSULTS
Orthopedics   Momo Dozier 78 y o  female MRN: [id number]  Unit/Bed#: [facility] -19      Chief Complaint:   right knee pain    HPI:   78 y  o female complaining of right knee pain  Patient states that she began experiencing pain in the right knee when she got out of bed yesterday morning  The pain became severe enough that she was unable to bear weight or ambulate  She is normally a community ambulator at baseline  Patient denies any history of injury to the right knee, recent trauma or previous history of arthritis to the right knee  Pain is localized to the medial lateral joint lines, is worse with palpation or attempted range of motion and improves somewhat with rest   Of note, patient does have a history of left total knee arthroplasty performed by Dr Coni Pillai at [facility] ([facility] several years ago  She denies any numbness or tingling to the right lower extremity  She has no other complaints at this time      Review Of Systems:   · Skin:  See below  · Neuro: See HPI  · Musculoskeletal: See HPI  · 14 point review of systems negative except as stated above     Past Medical History:   Past Medical History:   Diagnosis Date    Anxiety     Atrial fibrillation ([facility] )     Bowel obstruction ([facility] )     Cancer ([facility] )     LEFT BREAST CA 22 YEARS AGO     Depression     Diabetes mellitus ([facility] )     Disease of thyroid gland     Hypertension     Hypothyroidism        Past Surgical History:   Past Surgical History:   Procedure Laterality Date    ABDOMINAL ADHESION SURGERY N/A 2/4/2018    Procedure: LYSIS ADHESIONS;  Surgeon: Charly Werner DO;  [facility];  Service: General    BREAST SURGERY      CHOLECYSTECTOMY      COLON SURGERY  2017    EXPLORATORY LAPAROTOMY      JOINT REPLACEMENT      LEFT KNEE REPLACEMENT     LAPAROTOMY N/A 2/4/2018    Procedure: LAPAROTOMY EXPLORATORY,;  Surgeon: Charly Werner DO;  [facility];  Service: General    MASTECTOMY      MASTECTOMY Left 1995 [address] [facility] Right 2017    TN BIOPSY/EXCISION, LYMPH NODE(S) Right 1/13/2017    Procedure: SENTINEL LYMPH NODE BIOPSY RIGHT AXILLA; Surgeon: Shila Hamilton MD;  [facility];  Service: General    TN MASTECTOMY, SIMPLE, COMPLETE Right 1/13/2017    Procedure: MASTECTOMY SIMPLE;  Surgeon: Shila Hamilton MD;  [facility];  Service: General    SMALL INTESTINE SURGERY N/A 2/4/2018    Procedure: RESECTION SMALL BOWEL;  Surgeon: Casandra Escobar DO;  [facility];  Service: General    US GUIDED BREAST BIOPSY RIGHT COMPLETE Right 11/29/2016       Family History:  Family history reviewed and non-contributory  Family History   Problem Relation Age of Onset    Cancer Mother        Social History:  Social History     Socioeconomic History    Marital status:       Spouse name: None    Number of children: None    Years of education: None    Highest education level: None   Occupational History    None   Social Needs    Financial resource strain: None    Food insecurity:     Worry: None     Inability: None    Transportation needs:     Medical: None     Non-medical: None   Tobacco Use    Smoking status: Current Every Day Smoker     Packs/day: 1 00     Types: Cigarettes    Smokeless tobacco: Never Used   Substance and Sexual Activity    Alcohol use: Never     Frequency: Never     Binge frequency: Never    Drug use: Never    Sexual activity: Not Currently   Lifestyle    Physical activity:     Days per week: None     Minutes per session: None    Stress: None   Relationships    Social connections:     Talks on phone: None     Gets together: None     Attends Anabaptist service: None     Active member of club or organization: None     Attends meetings of clubs or organizations: None     Relationship status: None    Intimate partner violence:     Fear of current or ex partner: None     Emotionally abused: None     Physically abused: None     Forced sexual activity: None   Other Topics Concern    None   Social History Narrative    ** Merged History Encounter **            Allergies:    Allergies   Allergen Reactions    Meloxicam GI Intolerance    Morphine GI Intolerance    Morphine     Penicillins     Percocet [Oxycodone-Acetaminophen]     Sitagliptin      SOB           Labs:  0   Lab Value Date/Time    HCT 29 3 (L) 09/18/2019 1732    HCT 34 0 (L) 07/20/2019 0524    HCT 36 0 07/19/2019 1352    HGB 9 3 (L) 09/18/2019 1732    HGB 11 5 07/20/2019 0524    HGB 12 5 07/19/2019 1352    INR 1 41 (H) 06/03/2019 1432    WBC 10 08 09/18/2019 1732    WBC 7 05 07/20/2019 0524    WBC 7 70 07/19/2019 1352       Meds:    Current Facility-Administered Medications:     amLODIPine (NORVASC) tablet 5 mg, 5 mg, Oral, Daily, Johnson Dale MD    apixaban (ELIQUIS) tablet 5 mg, 5 mg, Oral, BID, Johnson Dale MD, 5 mg at 09/18/19 2005    ascorbic acid (VITAMIN C) tablet 500 mg, 500 mg, Oral, Daily, Johnson Dale MD    atorvastatin (LIPITOR) tablet 40 mg, 40 mg, Oral, QPM, Johnson Dale MD, 40 mg at 09/18/19 2005    cholecalciferol (VITAMIN D3) tablet 2,000 Units, 2,000 Units, Oral, Daily, Johnson Dale MD    cloNIDine (CATAPRES) tablet 0 1 mg, 0 1 mg, Oral, Daily, Johnson Dale MD    insulin lispro (HumaLOG) 100 units/mL subcutaneous injection 1-5 Units, 1-5 Units, Subcutaneous, 4x Daily (AC & HS), 1 Units at 09/18/19 2100 **AND** Fingerstick Glucose (POCT), , , 4x Daily AC and at bedtime, Johnson Dale MD    labetalol (NORMODYNE) tablet 200 mg, 200 mg, Oral, Q12H [facility], Johnson Dale MD, 200 mg at 09/18/19 2057    levothyroxine tablet 125 mcg, 125 mcg, Oral, Early Morning, Johnson Dale MD, 125 mcg at 09/19/19 0604    lisinopril (ZESTRIL) tablet 20 mg, 20 mg, Oral, BID, Johnson Dale MD, 20 mg at 09/18/19 2005    LORazepam (ATIVAN) tablet 0 5 mg, 0 5 mg, Oral, BID, Johnson Dale MD, 0 5 mg at 09/18/19 2005    menthol-methyl salicylate (BENGAY) 82-86 % cream, , Apply externally, 4x Daily PRN, MD Nabeel Bianchi multivitamin stress formula tablet 1 tablet, 1 tablet, Oral, Daily, Nguyen Ferrara MD    ondansetron [facility]) injection 4 mg, 4 mg, Intravenous, Q4H PRN, Nguyen Ferrara MD    pantoprazole (PROTONIX) EC tablet 40 mg, 40 mg, Oral, Daily Before Breakfast, Nguyen Ferrara MD    Blood Culture:   No results found for: BLOODCX    Wound Culture:   No results found for: WOUNDCULT    Ins and Outs:  I/O last 24 hours: In: -   Out: 250 [Urine:250]          Physical Exam:   /81 (BP Location: Right arm)   Pulse 78   Temp 99 °F (37 2 °C) (Oral)   Resp 18   Ht 5' 6" (1 676 m)   Wt 59 4 kg (131 lb)   SpO2 96%   BMI 21 14 kg/m²   Gen: Alert and oriented to person, place, time  HEENT: EOMI, eyes clear, moist mucus membranes, hearing intact  Respiratory: Bilateral chest rise  No audible wheezing found  Cardiovascular: Regular Rate and Rhythm  Abdomen: soft nontender/nondistended  Musculoskeletal: right lower extremity  · Skin intact  · Tender to palpation over Medial and lateral joint line  · Large effusion  · Can perform striaght leg raise  · No micro motion tenderness  · Stable to varus/valgus stress  · Sensation intact DP/SP/tib/taylor/saph  · One intact EHL/FHL/TA/GS  · Palpable PT pulse      Radiology:   I personally reviewed the films  X-rays right knee shows no acute fracture dislocation    Osteoarthritis appreciated     _*_*_*_*_*_*_*_*_*_*_*_*_*_*_*_*_*_*_*_*_*_*_*_*_*_*_*_*_*_*_*_*_*_*_*_*_*_*_*_*_*    Assessment:  78 y o  female with right knee pain and effusion secondary to osteoarthritis    Plan:   · Weight bearing as tolerated  right lower extremity  · Will perform right knee aspiration and injection of corticosteroid pending discussion with primary team  · PT  · Pain control  · Dispo: Stephania Ye for discharge from [facility] [address] [facility], MD

## 2019-09-19 NOTE — PLAN OF CARE
Problem: PHYSICAL THERAPY ADULT  Goal: Performs mobility at highest level of function for planned discharge setting  See evaluation for individualized goals  Description  Treatment/Interventions: Functional transfer training, LE strengthening/ROM, Therapeutic exercise, Endurance training, Patient/family training, Equipment eval/education, Bed mobility, Gait training  Equipment Recommended: Walker(will likely need RW for d/c)       See flowsheet documentation for full assessment, interventions and recommendations  Note:   Prognosis: Good  Problem List: Decreased strength, Decreased endurance, Impaired balance, Decreased mobility, Pain, Orthopedic restrictions  Assessment: Pt seen for high complexity physical therapy evaluation  Pt is an 79 y/o female w/ history/comorbidities of anxiety, a-fib, depression, breast CA w/ B mastectomies, DM, HTN, who is now admitted w/ c/o worsening R knee pain w/ decreased mobility due to same  Knee aspirated by ortho earlier  Due to acute medical issues, pain, fall risk, note unstable clinical picture  PT consulted to assess mobility, d/c needs  Pt presents w/ decreased functional mob, standing balance, endurance, B LE strength, barriers at home  will benefit from skilled PT to correct for the above problems  recommend d/c home when stable w/ RW, home therapies        Recommendation: (home w/ home therapies, RW)     PT - OK to Discharge: (S) Yes(when stable to home w/ home therapies, RW)    See flowsheet documentation for full assessment

## 2019-09-19 NOTE — PLAN OF CARE
Problem: OCCUPATIONAL THERAPY ADULT  Goal: Performs self-care activities at highest level of function for planned discharge setting  See evaluation for individualized goals  Description  Treatment Interventions: ADL retraining, Functional transfer training, UE strengthening/ROM, Endurance training, Equipment evaluation/education, Patient/family training, Compensatory technique education, Activityengagement, Energy conservation  Equipment Recommended: (S) Bedside commode, Other (comment)(RW and BSC)       See flowsheet documentation for full assessment, interventions and recommendations  Note:   Limitation: Decreased ADL status, Decreased endurance, Decreased high-level ADLs  Prognosis: Fair  Assessment: Pt is a 79 yo female seen for OT eval s/p adm to [facility] w/ increasing R knee pain dx'd w/ R knee pain  Per ortho, Pt is s/p R knee aspiration, awaiting corticosteroid injection, and is WBAT RLE  Comorbidities include a h/o A-fib, HTN, DM 2, h/o breast CA, LITA, anxiety, CKD stage 3, depression, HLD, LBP, coronary artery stenosis  Pt with active OT orders  Pt is retired and resides alone in [address] [place] [address] apt w/ ramped entrance and elevator access  Pt reports having supportive dtr and family that reside 30 minutes away and visit to provide A as needed  Pt was I w/  ADLS and IADLS, drove, & required use of DME PTA, including SPC for mobility  Pt is currently demonstrating the following occupational deficits: S UB bathing/dressing, Min A LB bathing/dressing, Min A toileting, Min A bed  Mobility, transfers, and ambulation w/ RW  These deficits that are impacting pt's baseline areas of occupation are a result of the following impairments: pain, endurance, activity tolerance, functional mobility, forward functional reach, balance, unsupportive home environment, decreased I w/ ADLS/IADLS and strength   The following Occupational Performance Areas to address include: grooming, bathing/shower, toilet hygiene, dressing, health maintenance, functional mobility and clothing management  Pt scored overall 50/100 on the Barthel Index  Based on the aforementioned OT evaluation, functional performance deficits, and assessments, pt has been identified as a high complexity evaluation  Recommend home with family support and home OT upon D/C   Pt to continue to benefit from acute immediate OT services to address the following goals 3-5x/week to  w/in 7-10 days:      OT Discharge Recommendation: Home OT  OT - OK to Discharge: Yes(when medically cleared)

## 2019-09-19 NOTE — UTILIZATION REVIEW
Initial Clinical Review    Admission: Date/Time/Statement: Inpatient Admission Orders (From admission, onward)     Ordered        09/18/19 1827  Inpatient Admission  Once                   Orders Placed This Encounter   Procedures    Inpatient Admission     Standing Status:   Standing     Number of Occurrences:   1     Order Specific Question:   Admitting Physician     Answer:   Lexy Morales     Order Specific Question:   Level of Care     Answer:   Med Surg [16]     Order Specific Question:   Estimated length of stay     Answer:   More than 2 Midnights     Order Specific Question:   Certification     Answer:   I certify that inpatient services are medically necessary for this patient for a duration of greater than two midnights  See H&P and MD Progress Notes for additional information about the patient's course of treatment  ED Arrival Information     Expected Arrival Acuity Means of Arrival Escorted By Service Admission Type    - 9/18/2019 10:21 Urgent Ambulance [facility] Hospitalist Urgent    Arrival Complaint    Right Knee Pain        Chief Complaint   Patient presents with    Leg Pain     Pt has been c/o right knee pain starting yesterday  Denies injury/trauma  Pt leg is swollen and hot to touch on arrival to ED  Assessment/Plan: 79 yo f with a PMH - Anxiety, Afib on Eliquis,  Bowel obstruction, breast cancer , depression, DM, Thyroid disease , HTN, she presented wiwth complaints of right knee pain with associated ambulatory dysfunction  XR in the ER of the R femur and knee unremarkable  Ultrasound did reveal a possible Baker cyst  Ambulation was attempted in the ER but patient had continued pain  PT/OT evakl and ortho consult  She is admitted inpatient with a diagnosis of R knee pain with ambulatory dysfunction       Orthopedics consult- 9/19 - Assessment:  78 y o  female with right knee pain and effusion secondary to osteoarthritis   Plan:   · Weight bearing as tolerated right lower extremity  · Will perform right knee aspiration and injection of corticosteroid pending discussion with primary team  · PT  · Pain control      ED Triage Vitals [09/18/19 1024]   Temperature Pulse Respirations Blood Pressure SpO2   98 3 °F (36 8 °C) 74 18 166/79 97 %      Temp Source Heart Rate Source Patient Position - Orthostatic VS BP Location FiO2 (%)   Oral Monitor Sitting Right arm --      Pain Score       9        Wt Readings from Last 1 Encounters:   09/18/19 59 4 kg (131 lb)     Additional Vital Signs:  Date/Time  Temp  Pulse  Resp  BP  SpO2  O2 Device  Patient Position - Orthostatic VS   09/19/19 0700  98 5 °F (36 9 °C)  72  16  132/54  95 %  None (Room air)  Lying   09/18/19 2300  99 °F (37 2 °C)  78  18  166/81  96 %    Lying   09/18/19 1921  99 2 °F (37 3 °C)  80  18  173/81Abnormal   96 %  None (Room air)     09/18/19 1812    80  18  165/79  95 %  None (Room air)  Sitting   09/18/19 1659    84  16  164/82  94 %  None (Room air)  Sitting   09/18/19 1553    87  18  157/103Abnormal   97 %  None (Room air)  Sitting   09/18/19 1446    74  18  164/112Abnormal   96 %  None (Room air)     09/18/19 1300    70  18  181/81Abnormal   97 %  None (Room air)     09/18/19 1200    72  18  140/86  97 %  None (Room air)             Pertinent Labs/Diagnostic Test Results:   Results from last 7 days   Lab Units 09/18/19  1732   WBC Thousand/uL 10 08   HEMOGLOBIN g/dL 9 3*   HEMATOCRIT % 29 3*   PLATELETS Thousands/uL 183   NEUTROS ABS Thousands/µL 7 71*         Results from last 7 days   Lab Units 09/18/19  1732   SODIUM mmol/L 132*   POTASSIUM mmol/L 3 6   CHLORIDE mmol/L 99*   CO2 mmol/L 26   ANION GAP mmol/L 7   BUN mg/dL 21   CREATININE mg/dL 1 12   EGFR ml/min/1 73sq m 46   CALCIUM mg/dL 8 5         Results from last 7 days   Lab Units 09/19/19  1059 09/19/19  0616 09/18/19  2051   POC GLUCOSE mg/dl 192* 124 198*     Results from last 7 days   Lab Units 09/18/19  1732   GLUCOSE RANDOM mg/dL 209* Results from last 7 days   Lab Units 09/19/19  1133   GRAM STAIN RESULT  2+ Polys  No bacteria seen     Results from last 7 days   Lab Units 09/19/19  1133   CRYSTALS, SYNOVIAL FLUID  No Crystals Seen     XR R femur - 918 - no acute  XR R knee - 9/18 - no acute  VAS eval 9/18 - RIGHT LOWER LIMB  No evidence of acute or chronic deep vein thrombosis   No evidence of superficial thrombophlebitis noted  Doppler evaluation shows a normal response to augmentation maneuvers  Popliteal, posterior tibial, and anterior tibial arterial Doppler waveforms are  biphasic/monophasic  There is a well defined hypoechoic non-vascularized cystic-type structure noted  in the popliteal fossa      LEFT LOWER LIMB LIMITED  Evaluation shows no evidence of thrombus in the common femoral vein    Doppler evaluation shows a normal response to augmentation maneuvers          ED Treatment:   Medication Administration from 09/18/2019 1021 to 09/18/2019 1914       Date/Time Order Dose Route Action     09/18/2019 1520 cloNIDine (CATAPRES) tablet 0 1 mg 0 1 mg Oral Given        Past Medical History:   Diagnosis Date    Anxiety     Atrial fibrillation (HCC)     Bowel obstruction (HCC)     Cancer (HCC)     LEFT BREAST CA 22 YEARS AGO     Depression     Diabetes mellitus ([facility] [address] )     Disease of thyroid gland     Hypertension     Hypothyroidism      Present on Admission:   Essential hypertension   Ambulatory dysfunction   Chronic hyponatremia   Diabetes mellitus type 2      Admitting Diagnosis: Swelling [R60 9]  Right knee pain [M25 561]  Age/Sex: 78 y o  female  Admission Orders:    Current Facility-Administered Medications:    Current Facility-Administered Medications:  amLODIPine 5 mg Oral Daily    apixaban 5 mg Oral BID    ascorbic acid 500 mg Oral Daily    atorvastatin 40 mg Oral QPM    betamethasone acetate-betamethasone sodium phosphate 12 mg Intra-articular Once    bupivacaine (PF) 10 mL Infiltration Once    cholecalciferol 2,000 Units Oral Daily    cloNIDine 0 1 mg Oral Daily    insulin lispro 1-5 Units Subcutaneous 4x Daily (AC & HS)    labetalol 200 mg Oral Q12H [address]    levothyroxine 125 mcg Oral Early Morning    lisinopril 20 mg Oral BID    LORazepam 0 5 mg Oral BID    menthol-methyl salicylate  Apply externally 4x Daily PRN    multivitamin stress formula 1 tablet Oral Daily    ondansetron 4 mg Intravenous Q4H PRN    pantoprazole 40 mg Oral Daily Before Breakfast      Nursing Orders -  VS q4 - diet cons carb - PT/OT eval       Network Utilization Review Department  Phone: [phone]; Fax [phone]  [email or web address]  ATTENTION: Please call with any questions or concerns to [phone]  and carefully listen to the prompts so that you are directed to the right person  Send all requests for admission clinical reviews, approved or denied determinations and any other requests to fax [phone]   All voicemails are confidential

## 2019-09-19 NOTE — SOCIAL WORK
Pt reviewed in SLIM rounds  Pending PT/OT eval and ortho consult for steroid injection  PT recommended home therapies and a walker at d/c  CM to meet with pt  CM met with pt at bedside to discuss CM role in dcp  Pt lives in senior living on 3rd floor, elevator access  Independent with ADL's/ambulation PTA  Pt uses cane and reports no other DME  Pt has Sue 76 hx with [facility]  Pt reports no STR  Pt denies MH, IPMH, drug/alcohol  Pt has PCP and Pharmacy [facility] in [address]  Pt drives  Dtr is contact, Nitish Muro [phone]  Nitish Muro can provide transport at d/c

## 2019-09-19 NOTE — PROGRESS NOTES
Progress Note - Fairy Diver 1940, 78 y o  female MRN: [id number]    Unit/Bed#: -01 Encounter: [id number]    Primary Care Provider: Luz George DO   Date and time admitted to hospital: 9/18/2019 10:22 AM        * Right knee pain  Assessment & Plan  - XR of knee/femur unremarkable for acute etiology - Doppler ultrasound imaging however, revealing a "well defined hypoechoic non-vascularized cystic-type structure noted in the popliteal fossa" possibly representing a Baker cyst  - PRN pain control  - s/p right knee steroid injection by Orthopedics  - patient reports improvement in pain  - Monitor overnight any if stable tomorrow can go home  Ambulatory dysfunction  Assessment & Plan  - secondary to right knee pain (see above)  -PT OT recommed home therapy    Chronic hyponatremia  Assessment & Plan  - serum sodium of 132 on presentation (around baseline)    Hypothyroidism  Assessment & Plan  - continue Synthroid    Essential hypertension  Assessment & Plan  - continue Norvasc/Catapres/Labetalol/Zestril regimen  - PRN pain control    Diabetes mellitus type 2  Assessment & Plan  - HbA1c of 5 6 in July 2019  - on SSI coverage per [employer]  - hold Metformin during inpatient course    Atrial fibrillation   Assessment & Plan  - rate controlled on Labetalol  - continue Eliquis for anticoagulation        VTE Pharmacologic Prophylaxis:   Pharmacologic: Apixaban (Eliquis)  Mechanical VTE Prophylaxis in Place: Yes    Patient Centered Rounds: I have performed bedside rounds with nursing staff today  Discussions with Specialists or Other Care Team Provider:     Education and Discussions with Family / Patient: pt    Time Spent for Care: 30 minutes  More than 50% of total time spent on counseling and coordination of care as described above      Current Length of Stay: 1 day(s)    Current Patient Status: Inpatient   Certification Statement: The patient will continue to require additional inpatient hospital stay due to above    Discharge Plan: possibly tomorrow    Code Status: Level 1 - Full Code      Subjective:   Pt seen and examined by me this afternoon  She reported improvement in her pain in the right knee after steroid injection  But she complained of pain in her left lower back since this morning  Feels she sprained a muscle  Objective:     Vitals:   Temp (24hrs), Av 8 °F (37 1 °C), Min:98 4 °F (36 9 °C), Max:99 2 °F (37 3 °C)    Temp:  [98 4 °F (36 9 °C)-99 2 °F (37 3 °C)] 98 4 °F (36 9 °C)  HR:  [72-84] 79  Resp:  [16-18] 18  BP: (132-173)/(54-82) 136/56  SpO2:  [94 %-96 %] 96 %  Body mass index is 21 14 kg/m²  Input and Output Summary (last 24 hours): Intake/Output Summary (Last 24 hours) at 2019 1554  Last data filed at 2019 1518  Gross per 24 hour   Intake 778 ml   Output 1000 ml   Net -222 ml       Physical Exam:     Physical Exam    Constitutional: Pt appears well-developed and well-nourished  Not in any acute distress  HENT:   Head: Normocephalic and atraumatic  Eyes: EOM are normal    Neck: Neck supple  Cardiovascular: Normal rate, regular rhythm, normal heart sounds  Exam reveals no gallop and no friction rub  No murmur heard  Pulmonary/Chest: Effort normal and breath sounds normal  No respiratory distress  Pt has no wheezes or rales  Abdominal: Soft  Non-distended, Non-tender  Bowel sounds are normal    Neurological: alert and oriented to person, place, and time  Normal strength and sensations  Psychiatric: normal mood and affect        Additional Data:     Labs:    Results from last 7 days   Lab Units 19  1732   WBC Thousand/uL 10 08   HEMOGLOBIN g/dL 9 3*   HEMATOCRIT % 29 3*   PLATELETS Thousands/uL 183   NEUTROS PCT % 76*   LYMPHS PCT % 10*   MONOS PCT % 13*   EOS PCT % 0     Results from last 7 days   Lab Units 19  1732   SODIUM mmol/L 132*   POTASSIUM mmol/L 3 6   CHLORIDE mmol/L 99*   CO2 mmol/L 26   BUN mg/dL 21   CREATININE mg/dL 1  12   ANION GAP mmol/L 7   CALCIUM mg/dL 8 5   GLUCOSE RANDOM mg/dL 209*         Results from last 7 days   Lab Units 09/19/19  1059 09/19/19  0616 09/18/19  2051   POC GLUCOSE mg/dl 192* 124 198*                   * I Have Reviewed All Lab Data Listed Above  * Additional Pertinent Lab Tests Reviewed: Marco A 64 Admission Reviewed    Imaging:    Imaging Reports Reviewed Today Include:   Imaging Personally Reviewed by Myself Includes:     Recent Cultures (last 7 days):     Results from last 7 days   Lab Units 09/19/19  1133   GRAM STAIN RESULT  2+ Polys  No bacteria seen       Last 24 Hours Medication List:     Current Facility-Administered Medications:  amLODIPine 5 mg Oral Daily Fela Quintero MD   apixaban 5 mg Oral BID Fela Quintero MD   ascorbic acid 500 mg Oral Daily Fela Quintero MD   atorvastatin 40 mg Oral QPM Fela Quintero MD   betamethasone acetate-betamethasone sodium phosphate 12 mg Intra-articular Once Concepción Alfaro MD   bupivacaine (PF) 10 mL Infiltration Once Concepción Alfaro MD   cholecalciferol 2,000 Units Oral Daily Fela Quintero MD   cloNIDine 0 1 mg Oral Daily Fela Quintero MD   insulin lispro 1-5 Units Subcutaneous 4x Daily (AC & HS) Fela Quintero MD   labetalol 200 mg Oral Q12H Domo Maloney MD   levothyroxine 125 mcg Oral Early Morning Fela Quintero MD   lisinopril 20 mg Oral BID Fela Quintero MD   LORazepam 0 5 mg Oral BID Fela Quintero MD   menthol-methyl salicylate  Apply externally 4x Daily PRN Fela Quintero MD   multivitamin stress formula 1 tablet Oral Daily Fela Quintero MD   ondansetron 4 mg Intravenous Q4H PRN Fela Quintero MD   pantoprazole 40 mg Oral Daily Before Orion Wright MD        Today, Patient Was Seen By: Dinesh Mahoney MD    ** Please Note: Dictation voice to text software may have been used in the creation of this document   **

## 2019-09-19 NOTE — PLAN OF CARE
Problem: Potential for Falls  Goal: Patient will remain free of falls  Description  INTERVENTIONS:  - Assess patient frequently for physical needs  -  Identify cognitive and physical deficits and behaviors that affect risk of falls  -  [place] fall precautions as indicated by assessment   - Educate patient/family on patient safety including physical limitations  - Instruct patient to call for assistance with activity based on assessment  - Modify environment to reduce risk of injury  - Consider OT/PT consult to assist with strengthening/mobility  Outcome: Progressing     Problem: PAIN - ADULT  Goal: Verbalizes/displays adequate comfort level or baseline comfort level  Description  Interventions:  - Encourage patient to monitor pain and request assistance  - Assess pain using appropriate pain scale  - Administer analgesics based on type and severity of pain and evaluate response  - Implement non-pharmacological measures as appropriate and evaluate response  - Consider cultural and social influences on pain and pain management  - Notify physician/advanced practitioner if interventions unsuccessful or patient reports new pain  Outcome: Progressing     Problem: SAFETY ADULT  Goal: Patient will remain free of falls  Description  INTERVENTIONS:  - Assess patient frequently for physical needs  -  Identify cognitive and physical deficits and behaviors that affect risk of falls    -  [place] fall precautions as indicated by assessment   - Educate patient/family on patient safety including physical limitations  - Instruct patient to call for assistance with activity based on assessment  - Modify environment to reduce risk of injury  - Consider OT/PT consult to assist with strengthening/mobility  Outcome: Progressing     Problem: DISCHARGE PLANNING  Goal: Discharge to home or other facility with appropriate resources  Description  INTERVENTIONS:  - Identify barriers to discharge w/patient and caregiver  - Arrange for needed discharge resources and transportation as appropriate  - Identify discharge learning needs (meds, wound care, etc )  - Arrange for interpretive services to assist at discharge as needed  - Refer to Case Management Department for coordinating discharge planning if the patient needs post-hospital services based on physician/advanced practitioner order or complex needs related to functional status, cognitive ability, or social support system  Outcome: Progressing

## 2019-09-19 NOTE — ASSESSMENT & PLAN NOTE
- XR of knee/femur unremarkable for acute etiology - Doppler ultrasound imaging however, revealing a "well defined hypoechoic non-vascularized cystic-type structure noted in the popliteal fossa" possibly representing a Baker cyst  - PRN pain control  - s/p right knee steroid injection by Orthopedics  - patient reports improvement in pain  - Monitor overnight any if stable tomorrow can go home

## 2019-09-20 VITALS
BODY MASS INDEX: 21.05 KG/M2 | RESPIRATION RATE: 18 BRPM | SYSTOLIC BLOOD PRESSURE: 160 MMHG | WEIGHT: 131 LBS | OXYGEN SATURATION: 95 % | HEIGHT: 66 IN | HEART RATE: 73 BPM | TEMPERATURE: 98 F | DIASTOLIC BLOOD PRESSURE: 71 MMHG

## 2019-09-20 LAB
GLUCOSE SERPL-MCNC: 226 MG/DL (ref 65–140)
GLUCOSE SERPL-MCNC: 284 MG/DL (ref 65–140)

## 2019-09-20 PROCEDURE — 82948 REAGENT STRIP/BLOOD GLUCOSE: CPT

## 2019-09-20 PROCEDURE — 99239 HOSP IP/OBS DSCHRG MGMT >30: CPT | Performed by: INTERNAL MEDICINE

## 2019-09-20 PROCEDURE — NS001 PR NO SIGNATURE OR ATTESTATION: Performed by: ORTHOPAEDIC SURGERY

## 2019-09-20 RX ORDER — AMLODIPINE BESYLATE 5 MG/1
10 TABLET ORAL DAILY
Qty: 90 TABLET | Refills: 0 | Status: ON HOLD | OUTPATIENT
Start: 2019-09-20 | End: 2022-02-13 | Stop reason: CLARIF

## 2019-09-20 RX ORDER — AMLODIPINE BESYLATE 10 MG/1
10 TABLET ORAL DAILY
Status: DISCONTINUED | OUTPATIENT
Start: 2019-09-20 | End: 2019-09-20 | Stop reason: HOSPADM

## 2019-09-20 RX ADMIN — AMLODIPINE BESYLATE 10 MG: 10 TABLET ORAL at 08:41

## 2019-09-20 RX ADMIN — APIXABAN 5 MG: 5 TABLET, FILM COATED ORAL at 08:29

## 2019-09-20 RX ADMIN — VITAMIN D, TAB 1000IU (100/BT) 2000 UNITS: 25 TAB at 08:29

## 2019-09-20 RX ADMIN — CLONIDINE HYDROCHLORIDE 0.1 MG: 0.1 TABLET ORAL at 15:17

## 2019-09-20 RX ADMIN — INSULIN LISPRO 3 UNITS: 100 INJECTION, SOLUTION INTRAVENOUS; SUBCUTANEOUS at 11:15

## 2019-09-20 RX ADMIN — OXYCODONE HYDROCHLORIDE AND ACETAMINOPHEN 500 MG: 500 TABLET ORAL at 08:28

## 2019-09-20 RX ADMIN — LABETALOL HCL 200 MG: 200 TABLET, FILM COATED ORAL at 08:29

## 2019-09-20 RX ADMIN — LORAZEPAM 0.5 MG: 0.5 TABLET ORAL at 08:29

## 2019-09-20 RX ADMIN — LEVOTHYROXINE SODIUM 125 MCG: 125 TABLET ORAL at 06:01

## 2019-09-20 RX ADMIN — INSULIN LISPRO 2 UNITS: 100 INJECTION, SOLUTION INTRAVENOUS; SUBCUTANEOUS at 07:46

## 2019-09-20 RX ADMIN — LISINOPRIL 20 MG: 20 TABLET ORAL at 08:29

## 2019-09-20 RX ADMIN — ZINC 1 TABLET: TAB ORAL at 08:29

## 2019-09-20 NOTE — PROGRESS NOTES
Progress Note - Orthopedics   Marjo Favors 78 y o  female MRN: [id number]  Unit/Bed#: ONI JERNIGAN 567-01      Subjective:    78 y  o female with R knee OA  Doing well, knee pain improved today       Labs:  0   Lab Value Date/Time    HCT 29 3 (L) 09/18/2019 1732    HCT 34 0 (L) 07/20/2019 0524    HCT 36 0 07/19/2019 1352    HGB 9 3 (L) 09/18/2019 1732    HGB 11 5 07/20/2019 0524    HGB 12 5 07/19/2019 1352    INR 1 41 (H) 06/03/2019 1432    WBC 10 08 09/18/2019 1732    WBC 7 05 07/20/2019 0524    WBC 7 70 07/19/2019 1352       Meds:    Current Facility-Administered Medications:     amLODIPine (NORVASC) tablet 5 mg, 5 mg, Oral, Daily, Tyler Wright MD, 5 mg at 09/19/19 0854    apixaban (ELIQUIS) tablet 5 mg, 5 mg, Oral, BID, Tyler Wright MD, 5 mg at 09/19/19 1706    ascorbic acid (VITAMIN C) tablet 500 mg, 500 mg, Oral, Daily, Tyler Wright MD, 500 mg at 09/19/19 0854    atorvastatin (LIPITOR) tablet 40 mg, 40 mg, Oral, QPM, Tyler Wright MD, 40 mg at 09/19/19 1706    betamethasone acetate-betamethasone sodium phosphate (CELESTONE) injection 12 mg, 12 mg, Intra-articular, Once, Nell Gordon MD    bupivacaine (PF) (MARCAINE) 0 25 % injection 10 mL, 10 mL, Infiltration, Once, Nell Gordon MD    cholecalciferol (VITAMIN D3) tablet 2,000 Units, 2,000 Units, Oral, Daily, Tyler Wright MD, 2,000 Units at 09/19/19 0854    cloNIDine (CATAPRES) tablet 0 1 mg, 0 1 mg, Oral, Daily, Tyler Wright MD, 0 1 mg at 09/19/19 0855    insulin lispro (HumaLOG) 100 units/mL subcutaneous injection 1-5 Units, 1-5 Units, Subcutaneous, 4x Daily (AC & HS), 2 Units at 09/20/19 0746 **AND** Fingerstick Glucose (POCT), , , 4x Daily AC and at bedtime, Tyler Wright MD    labetalol (NORMODYNE) tablet 200 mg, 200 mg, Oral, Q12H [facility], Tyler Wright MD, 200 mg at 09/19/19 2123    levothyroxine tablet 125 mcg, 125 mcg, Oral, Early Morning, Tyler Wright MD, 125 mcg at 09/20/19 0601    lisinopril (ZESTRIL) tablet 20 mg, 20 mg, Oral, BID, An Platt MD, 20 mg at 09/19/19 1707    LORazepam (ATIVAN) tablet 0 5 mg, 0 5 mg, Oral, BID, An Platt MD, 0 5 mg at 09/19/19 1706    menthol-methyl salicylate (BENGAY) 88-36 % cream, , Apply externally, 4x Daily PRN, An Platt MD    multivitamin stress formula tablet 1 tablet, 1 tablet, Oral, Daily, An Platt MD, 1 tablet at 09/19/19 0854    ondansetron (ZOFRAN) injection 4 mg, 4 mg, Intravenous, Q4H PRN, An Platt MD    pantoprazole (PROTONIX) EC tablet 40 mg, 40 mg, Oral, Daily Before Breakfast, An Platt MD    Blood Culture:   No results found for: BLOODCX    Wound Culture:   No results found for: WOUNDCULT    Ins and Outs:  I/O last 24 hours: In: 598 [P O :598]  Out: 1000 [Urine:1000]          Physical:  Vitals:    09/19/19 2121   BP: 158/76   Pulse: 73   Resp: 18   Temp:    SpO2:      Musculoskeletal: right Lower Extremity  · Skin intact  · Trace effusion  · Minimally TTP around knee  · SILT s/s/sp/dp/t    · +fhl/ehl, +ankle dorsi/plantar flexion  · 2+ DP pulse    Assessment:    78 y  o female with R knee OA  Doing well        Plan:  · WBAT RLE  · PT/OT  · Pain control  · DVT ppx  · Dispo: Ortho will follow    Mary Banerjee MD

## 2019-09-20 NOTE — ASSESSMENT & PLAN NOTE
- continue Norvasc/Catapres/Labetalol/Zestril regimen  Increase Norvasc to 10 mg because of persistently elevated blood pressure despite better pain control

## 2019-09-20 NOTE — SOCIAL WORK
CM placed referral to [facility] for PT/OT at home  Deirdre Mata script placed in pt's chart, provider aware  Pt has ride home with her grand daughter

## 2019-09-20 NOTE — DISCHARGE INSTRUCTIONS
Discharge Instructions - Orthopedics  Riya Benites 78 y o  female MRN: [id number]  Unit/Bed#: BE -01    Weight Bearing Status:                                          Weight bearing as tolerated right lower extremity    Pain:  Continue analgesics as directed    Dressing Instructions:   Keep dressing clean, dry and intact until follow up appointment      PT/OT:  Continue PT/OT on outpatient basis as directed    Appt Instructions:   Please follow up with your PCP

## 2019-09-20 NOTE — DISCHARGE SUMMARY
Discharge- Patricia Mina 1940, 78 y o  female MRN: [id number]    Unit/Bed#: -01 Encounter: [id number]    Primary Care Provider: Destiney Torrez DO   Date and time admitted to hospital: 9/18/2019 10:22 AM        * Right knee pain  Assessment & Plan  Possible Baker cyst and osteoarthritis  - s/p right knee steroid injection by Orthopedics on 9/19/2019  - patient reports improvement in pain  Seen by PT and OT recommended home PT OT  Ambulatory dysfunction  Assessment & Plan  - secondary to right knee pain (see above)  -PT OT recommed home therapy    Chronic hyponatremia  Assessment & Plan  - serum sodium of 132 on presentation (around baseline)    Hypothyroidism  Assessment & Plan  - continue Synthroid    Essential hypertension  Assessment & Plan  - continue Norvasc/Catapres/Labetalol/Zestril regimen  Increase Norvasc to 10 mg because of persistently elevated blood pressure despite better pain control  Diabetes mellitus type 2  Assessment & Plan  - HbA1c of 5 6 in July 2019  - on SSI coverage per [employer]u-Cheks  Resume metformin on discharge    Atrial fibrillation   Assessment & Plan  - rate controlled on Labetalol  - continue Eliquis for anticoagulation        Discharging Physician / Practitioner: Dinesh Mahoney MD  PCP: Destiney Torrez DO  Admission Date:   Admission Orders (From admission, onward)     Ordered        09/18/19 1827  Inpatient Admission  Once                   Discharge Date: 09/20/19    Resolved Problems  Date Reviewed: 9/20/2019    None          Consultations During Hospital Stay:  · ortho    Procedures Performed:   · Right knee steroid injection    Significant Findings / Test Results:     VAS lower limb venous duplex study, unilateral/limited   Final Result by Nicole Tillman MD (09/18 1635)      XR femur 2 views RIGHT   Final Result by Gonzalo Lopez DO (09/18 0863)      No acute osseous abnormality              Workstation performed: [id number]         XR knee 1 or 2 views right   Final Result by Samira Langley MD (09/18 1153)      No acute osseous abnormality  Workstation performed: [id number]              Incidental Findings:   · none     Test Results Pending at Discharge (will require follow up):   · none     Outpatient Tests Requested:  · none    Complications:  none    Reason for Admission:  Right knee pain with ambulatory dysfunction    Hospital Course:     Shiva Willoughby is a 78 y o  female patient who originally presented to the hospital on 9/18/2019 due to severe pain in the right knee with ambulatory dysfunction  This was most likely secondary to a Baker cyst and severe osteoarthritis  Patient was evaluated by Orthopedic surgery and she received intra-articular steroid injection  Pain improved significantly after that  Patient had significant ambulatory dysfunction on admission and was unable to put any weight on the knee  Per after injection she was able to bear weight and walk around without any problems  She was evaluated by PT and OT recommended discharge home with home PT and OT  Patient to be discharged home today  Benefit from outpatient orthopedic follow-up  Please see above list of diagnoses and related plan for additional information  Condition at Discharge: good     Discharge Day Visit / Exam:     Subjective:  Pt seen and examined by me this morning  Pt reports significant improvement in the pain in the right knee  Says she has been ambulating with a walker herself without any problems  Vitals: Blood Pressure: 160/71 (09/20/19 1353)  Pulse: 73 (09/20/19 0700)  Temperature: 98 °F (36 7 °C) (09/20/19 0700)  Temp Source: Oral (09/20/19 0700)  Respirations: 18 (09/20/19 0700)  Height: 5' 6" (167 6 cm) (09/18/19 1921)  Weight - Scale: 59 4 kg (131 lb) (09/18/19 1921)  SpO2: 95 % (09/20/19 0700)  Exam:   Physical Exam    Constitutional: Pt appears well-developed and well-nourished  Not in any acute distress     Cardiovascular: Normal rate, regular rhythm, normal heart sounds  Exam reveals no gallop and no friction rub  No murmur heard  Pulmonary/Chest: Effort normal and breath sounds normal  No respiratory distress  Pt has no wheezes or rales  Abdominal: Soft  Non-distended, Non-tender  Bowel sounds are normal    Musculoskeletal: Normal range of motion  Neurological: alert and oriented to person, place, and time  Normal strength and sensations  Psychiatric: normal mood and affect  Discussion with Family: pt    Discharge instructions/Information to patient and family:   See after visit summary for information provided to patient and family  Provisions for Follow-Up Care:  See after visit summary for information related to follow-up care and any pertinent home health orders  Disposition:     Home    For Discharges to [facility]:   · Not Applicable to this Patient - Not Applicable to this Patient    Planned Readmission: no     Discharge Statement:  I spent 40 minutes discharging the patient  This time was spent on the day of discharge  I had direct contact with the patient on the day of discharge  Greater than 50% of the total time was spent examining patient, answering all patient questions, arranging and discussing plan of care with patient as well as directly providing post-discharge instructions  Additional time then spent on discharge activities  Discharge Medications:  See after visit summary for reconciled discharge medications provided to patient and family        ** Please Note: This note has been constructed using a voice recognition system **

## 2019-09-20 NOTE — NURSING NOTE
Pt to be discharged home with new walker  She left via wheelchair with PCA to meet her granddaughter outside by [place]  She will followup with appointments and medication

## 2019-09-20 NOTE — ASSESSMENT & PLAN NOTE
Possible Baker cyst and osteoarthritis  - s/p right knee steroid injection by Orthopedics on 9/19/2019  - patient reports improvement in pain  Seen by PT and OT recommended home PT OT

## 2019-09-22 LAB
BACTERIA SPEC BFLD CULT: NO GROWTH
GRAM STN SPEC: NORMAL
GRAM STN SPEC: NORMAL

## 2019-09-23 NOTE — ED ATTENDING ATTESTATION
9/18/2019  I, Rosario Tao MD, saw and evaluated the patient  I have discussed the patient with the resident/non-physician practitioner and agree with the resident's/non-physician practitioner's findings, Plan of Care, and MDM as documented in the resident's/non-physician practitioner's note, except where noted  All available labs and Radiology studies were reviewed  I was present for key portions of any procedure(s) performed by the resident/non-physician practitioner and I was immediately available to provide assistance  At this point I agree with the current assessment done in the Emergency Department  I have conducted an independent evaluation of this patient a history and physical is as follows:      OA: 79 y/o f c/o knee pain x 1 day  Pt states she first noted some discomfort in her right knee last night that intensified this morning when attempting to get out of bed  + associated swelling  Pain is worse with any type of movement, improves but still present at rest  Pain radiates throughout the leg toward thigh/hip as well as ankle  No falls/trauma/fevers/chills  Pt does have a history of atrial fibrillation for which she takes eliquis  No history of DVT  PE, well developed but uncomfortable appearing f, VSS, NC/AT, MMM, neck supple/fROM, RR, lungs CTAB, abd soft, +Bs, -r/g, - mass, + 2 DP pulses, intact sensation throughout b/l LE, + swelling with decreased ROM to all planes of right knee, no swelling and FROM L knee  No erythema/warmth, no calf ttp, no edema  AAO  A/p leg pain, obtain xray for occult fx, duplex and treat accordingly  ED Course     Pt unable to ambulate without assistance, despite being a cane/walker as well as pain control  Will require admission as the patient lives alone       Critical Care Time  Procedures

## 2019-09-27 ENCOUNTER — HOSPITAL ENCOUNTER (OUTPATIENT)
Dept: RADIOLOGY | Facility: HOSPITAL | Age: 79
Discharge: HOME | End: 2019-09-27
Payer: MEDICARE

## 2019-09-27 DIAGNOSIS — R22.1 LOCALIZED SWELLING, MASS AND LUMP, NECK: ICD-10-CM

## 2019-09-27 PROCEDURE — 71250 CT THORAX DX C-: CPT

## 2019-09-27 PROCEDURE — 70490 CT SOFT TISSUE NECK W/O DYE: CPT

## 2019-10-01 ENCOUNTER — TELEPHONE (OUTPATIENT)
Dept: NEPHROLOGY | Facility: CLINIC | Age: 79
End: 2019-10-01

## 2019-10-02 ENCOUNTER — OFFICE VISIT (OUTPATIENT)
Dept: NEPHROLOGY | Facility: CLINIC | Age: 79
End: 2019-10-02
Payer: MEDICARE

## 2019-10-02 VITALS
SYSTOLIC BLOOD PRESSURE: 186 MMHG | BODY MASS INDEX: 20.28 KG/M2 | HEIGHT: 66 IN | WEIGHT: 126.2 LBS | DIASTOLIC BLOOD PRESSURE: 82 MMHG | HEART RATE: 72 BPM

## 2019-10-02 DIAGNOSIS — E87.1 HYPONATREMIA: Primary | ICD-10-CM

## 2019-10-02 DIAGNOSIS — I70.1 RENAL ARTERY STENOSIS, NATIVE (HCC): ICD-10-CM

## 2019-10-02 DIAGNOSIS — I10 HYPERTENSION, ACCELERATED: ICD-10-CM

## 2019-10-02 PROCEDURE — 99214 OFFICE O/P EST MOD 30 MIN: CPT | Performed by: PHYSICIAN ASSISTANT

## 2019-10-02 NOTE — PROGRESS NOTES
OFFICE FOLLOW UP - Nephrology   Nasra Jefferson 78 y o  female MRN: [id number]       ASSESSMENT/PLAN:  1  Uncontrolled hypertension:  Blood pressure elevated consistently in the 180s/80s today in our office  Patient was considerably stressed and anxious regarding the appointment which could be why her blood pressure is elevated  · She reports that her home readings are 803-162 systolic in the am, 184Q in the afternoon and then improves after taking clonidine   · Currently on amlodipine 10 mg daily, clonidine 0 1 mg daily, labetalol 200 mg q 12 hours, lisinopril 20 mg b i d   · Previously on chlorthalidone which was discontinued due to hyponatremia  · Instructed patient to follow her blood pressure at home for the next week and call us with her reading  · We are also repeating a BMP and if blood pressure remains high and sodium remains low would consider adding loop diuretic (patient hesitant to do this as in the past this has made her urinate "too much")  · She does have right renal artery stenosis soap blood pressure remains difficult to control could consider repeating a renal artery duplex as this was last done 04/20/2018  2    Hyponatremia:  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 based on prior workup  · When she was in the hospital her sodium was close to 134 on 9/18 (when corrected for hyperglycemia)   · Instructed her to follow a moderate fluid restriction of around 50-60 oz per day   · Consider loop diuretic if sodium worsens   · She does have a history of breast cancer but no known diagnosis of active cancer  · Recent CT showed stable lung nodules and emphysema    Will repeat BMP to make sure sodium is stable after recent discharge  Follow-up home blood pressure readings and if consistently elevated will adjust medication  Follow-up with Dr Johnny Middleton in 3-4 months       PATIENT INSTRUCTIONS:  Patient Instructions   Repeat blood work in the next few weeks  Call in 1 week with your blood pressure reading from home       HPI: Sunday Puckett is a 78 y o  female who is here for hospital follow-up  We have seen the patient a few times in the past in the hospital for issues with hypertension and hyponatremia  Most recently we saw her in July  At that time she was on chlorthalidone and an SSRI and was hyponatremic so the offending medications were discontinued  Her blood pressure was felt to be elevated possibly due to confusion versus noncompliance with her home medications and when she was started back on her home medicines her blood pressure improved  Patient was then readmitted to the hospital from 9/18-9/20 with knee pain due to a Baker cyst   She underwent steroid injection and was discharged  During that admission her blood pressure was elevated and her Norvasc was increased to 10 mg daily  Since discharge the patient has been doing okay  She has been taking her blood pressure medicines as prescribed  She tells me that when she wakes up in the morning her pressure is between 636-628 at home systolic  In the afternoon it increases to 160 and she then takes her clonidine and it improves again  Her blood pressure is elevated in our office however she is feeling very stressed over the traffic and drive to get here  She denies any recent chest pain, shortness of breath, nausea, vomiting or diarrhea  Occasional dizziness  ROS:   A complete review of systems was done  Pertinent positives and negatives as noted in the HPI, otherwise the review of systems is negative  Allergies: Meloxicam; Morphine; Morphine; Penicillins;  Percocet [oxycodone-acetaminophen]; and Sitagliptin    Medications:   Current Outpatient Medications:     amLODIPine (NORVASC) 5 mg tablet, Take 2 tablets (10 mg total) by mouth daily, Disp: 90 tablet, Rfl: 0    apixaban (ELIQUIS) 5 mg, Take 5 mg by mouth 2 (two) times a day, Disp: , Rfl:     ascorbic acid (VITAMIN C) 500 mg tablet, Take 500 mg by mouth daily, Disp: , Rfl:     cholecalciferol (VITAMIN D3) 1,000 units tablet, Take 2,000 Units by mouth daily , Disp: , Rfl:     cloNIDine (CATAPRES) 0 1 mg tablet, Take 0 1 mg by mouth daily , Disp: , Rfl:     labetalol (NORMODYNE) 200 mg tablet, Take 1 tablet (200 mg total) by mouth every 12 (twelve) hours, Disp: 120 tablet, Rfl: 0    levothyroxine 125 mcg tablet, Take 125 mcg by mouth daily, Disp: , Rfl:     lisinopril (ZESTRIL) 20 mg tablet, TAKE 1 TABLET (20 MG TOTAL) BY MOUTH 2 (TWO) TIMES A DAY, Disp: 180 tablet, Rfl: 3    LORazepam (ATIVAN) 0 5 mg tablet, Take 0 5 mg by mouth 2 (two) times a day, Disp: , Rfl:     metFORMIN (GLUCOPHAGE) 500 mg tablet, Take 500 mg by mouth daily with breakfast , Disp: , Rfl:     Multiple Vitamins-Minerals (PRESERVISION AREDS 2 PO), Take by mouth 2 (two) times a day, Disp: , Rfl:     multivitamin (THERAGRAN) TABS, Take 1 tablet by mouth daily, Disp: , Rfl:     pantoprazole (PROTONIX) 40 mg tablet, Take 40 mg by mouth daily Pt takes daily when needed  , Disp: , Rfl:     Past Medical History:   Diagnosis Date    Anxiety     [facility]     [facility] obstruction [facility]     Cancer [facility]     LEFT BREAST CA 22 YEARS AGO     Depression     Diabetes mellitus [facility]     Disease of thyroid gland     Hypertension     Hypothyroidism      Past Surgical History:   Procedure Laterality Date    ABDOMINAL ADHESION SURGERY N/A 2/4/2018    Procedure: LYSIS ADHESIONS;  Surgeon: Aditi Parmar DO;  [facility];  Service: General    BREAST SURGERY      CHOLECYSTECTOMY      COLON SURGERY  2017    EXPLORATORY LAPAROTOMY      JOINT REPLACEMENT      LEFT KNEE REPLACEMENT     LAPAROTOMY N/A 2/4/2018    Procedure: LAPAROTOMY EXPLORATORY,;  Surgeon: Aditi Parmar DO;  [facility];  Service: General    MASTECTOMY      MASTECTOMY Left 1995    MASTECTOMY Right 2017    HI BIOPSY/EXCISION, LYMPH NODE(S) Right 1/13/2017    Procedure: SENTINEL LYMPH NODE BIOPSY RIGHT AXILLA;   Surgeon: Brien Reynoso MD; [facility];  Service: General    DC MASTECTOMY, SIMPLE, COMPLETE Right 1/13/2017    Procedure: MASTECTOMY SIMPLE;  Surgeon: Abril Hansen MD;  [facility];  Service: General    SMALL INTESTINE SURGERY N/A 2/4/2018    Procedure: RESECTION SMALL BOWEL;  Surgeon: Vic Kaufman DO;  [facility];  Service: General    US GUIDED BREAST BIOPSY RIGHT COMPLETE Right 11/29/2016     Family History   Problem Relation Age of Onset    Cancer Mother       reports that she has been smoking cigarettes  She has been smoking about 1 00 pack per day  She has never used smokeless tobacco  She reports that she does not drink alcohol or use drugs  Physical Exam:   Vitals:    10/02/19 1448 10/02/19 1516   BP:  (!) 186/82   Pulse:  72   Weight: 57 2 kg (126 lb 3 2 oz)    Height: 5' 6" (1 676 m)      Body mass index is 20 37 kg/m²      General: no acute distress   Eyes: conjunctivae pink, anicteric sclerae  ENT: mucous membranes moist  Neck: supple, no JVD  Chest: clear to auscultation bilaterally with no wheezes, rale or rhochi  CVS: regular rate and rhythm   Abdomen: soft, non-tender, non-distended  Extremities: no lower extremity edema   Skin: no rash  Neuro: awake and alert       Lab Results:  Results for orders placed or performed during the hospital encounter of 09/18/19   STAT Gram Stain   Result Value Ref Range    Gram Stain Result 2+ Polys     Gram Stain Result No bacteria seen    Body fluid culture and Gram stain   Result Value Ref Range    Body Fluid Culture, Sterile No growth     Gram Stain Result 2+ Polys     Gram Stain Result No bacteria seen    CBC and differential   Result Value Ref Range    WBC 10 08 4 31 - 10 16 Thousand/uL    RBC 3 07 (L) 3 81 - 5 12 Million/uL    Hemoglobin 9 3 (L) 11 5 - 15 4 g/dL    Hematocrit 29 3 (L) 34 8 - 46 1 %    MCV 95 82 - 98 fL    MCH 30 3 26 8 - 34 3 pg    MCHC 31 7 31 4 - 37 4 g/dL    RDW 14 6 11 6 - 15 1 %    MPV 10 5 8 9 - 12 7 fL    Platelets 150 881 - 390 Thousands/uL    nRBC 0 /100 WBCs    Neutrophils Relative 76 (H) 43 - 75 %    Immat GRANS % 1 0 - 2 %    Lymphocytes Relative 10 (L) 14 - 44 %    Monocytes Relative 13 (H) 4 - 12 %    Eosinophils Relative 0 0 - 6 %    Basophils Relative 0 0 - 1 %    Neutrophils Absolute 7 71 (H) 1 85 - 7 62 Thousands/µL    Immature Grans Absolute 0 05 0 00 - 0 20 Thousand/uL    Lymphocytes Absolute 0 96 0 60 - 4 47 Thousands/µL    Monocytes Absolute 1 32 (H) 0 17 - 1 22 Thousand/µL    Eosinophils Absolute 0 01 0 00 - 0 61 Thousand/µL    Basophils Absolute 0 03 0 00 - 0 10 Thousands/µL   Basic metabolic panel   Result Value Ref Range    Sodium 132 (L) 136 - 145 mmol/L    Potassium 3 6 3 5 - 5 3 mmol/L    Chloride 99 (L) 100 - 108 mmol/L    CO2 26 21 - 32 mmol/L    ANION GAP 7 4 - 13 mmol/L    BUN 21 5 - 25 mg/dL    Creatinine 1 12 0 60 - 1 30 mg/dL    Glucose 209 (H) 65 - 140 mg/dL    Calcium 8 5 8 3 - 10 1 mg/dL    eGFR 46 ml/min/1 73sq m   Synovial fluid, crystal   Result Value Ref Range    Crystals, Synovial Fluid No Crystals Seen No Crystals Seen   RBC count,Synovial Fluid   Result Value Ref Range    RBC, SYNOVIAL 136,000 (H) 0 - 10   Synovial fluid white cell count w/ diff   Result Value Ref Range    WBC, Fluid 8,083 (H) 0 - 200 /ul   Synovial Fluid Diff   Result Value Ref Range    Total Counted 100     Neutrophil % Synovial 92 %    Lymph % Synovial 3 %    Monocyte % Synovial 5 %   Fingerstick Glucose (POCT)   Result Value Ref Range    POC Glucose 198 (H) 65 - 140 mg/dl   Fingerstick Glucose (POCT)   Result Value Ref Range    POC Glucose 124 65 - 140 mg/dl   Fingerstick Glucose (POCT)   Result Value Ref Range    POC Glucose 192 (H) 65 - 140 mg/dl   Fingerstick Glucose (POCT)   Result Value Ref Range    POC Glucose 148 (H) 65 - 140 mg/dl   Fingerstick Glucose (POCT)   Result Value Ref Range    POC Glucose 251 (H) 65 - 140 mg/dl   Fingerstick Glucose (POCT)   Result Value Ref Range    POC Glucose 226 (H) 65 - 140 mg/dl Fingerstick Glucose (POCT)   Result Value Ref Range    POC Glucose 284 (H) 65 - 140 mg/dl             Invalid input(s): ALBUMIN      Portions of the record may have been created with voice recognition software  Occasional wrong word or "sound a like" substitutions may have occurred due to the inherent limitations of voice recognition software  Read the chart carefully and recognize, using context, where substitutions have occurred  If you have any questions, please contact the dictating provider

## 2019-10-15 ENCOUNTER — TELEPHONE (OUTPATIENT)
Dept: NEPHROLOGY | Facility: CLINIC | Age: 79
End: 2019-10-15

## 2019-10-23 ENCOUNTER — TELEPHONE (OUTPATIENT)
Dept: NEPHROLOGY | Facility: CLINIC | Age: 79
End: 2019-10-23

## 2019-10-23 NOTE — TELEPHONE ENCOUNTER
Pt called and would like to go over results from her most recent lab from 10/16  She left the mobile number as best contact

## 2019-10-24 NOTE — TELEPHONE ENCOUNTER
Returned her call but no answer  Let a message telling her that labs looked great with normal sodium and creatinine  No need for any changes at this time and she should call the office back with any questions

## 2019-11-19 RX ORDER — ATORVASTATIN CALCIUM 40 MG/1
40 TABLET, FILM COATED ORAL
COMMUNITY
Start: 2019-10-23

## 2019-11-20 ENCOUNTER — OFFICE VISIT (OUTPATIENT)
Dept: OBGYN CLINIC | Facility: HOSPITAL | Age: 79
End: 2019-11-20
Payer: MEDICARE

## 2019-11-20 VITALS
HEIGHT: 66 IN | BODY MASS INDEX: 21.05 KG/M2 | DIASTOLIC BLOOD PRESSURE: 71 MMHG | HEART RATE: 74 BPM | WEIGHT: 131 LBS | SYSTOLIC BLOOD PRESSURE: 165 MMHG

## 2019-11-20 DIAGNOSIS — M17.11 PRIMARY OSTEOARTHRITIS OF RIGHT KNEE: Primary | ICD-10-CM

## 2019-11-20 DIAGNOSIS — M71.21 SYNOVIAL CYST OF RIGHT POPLITEAL SPACE: ICD-10-CM

## 2019-11-20 PROCEDURE — 99213 OFFICE O/P EST LOW 20 MIN: CPT | Performed by: ORTHOPAEDIC SURGERY

## 2019-11-20 RX ORDER — CHLORTHALIDONE 25 MG/1
25 TABLET ORAL DAILY
Refills: 3 | COMMUNITY
Start: 2019-10-18

## 2019-11-20 NOTE — PROGRESS NOTES
Assessment:   Diagnosis ICD-10-CM Associated Orders   1  Primary osteoarthritis of right knee M17 11    2  Synovial cyst of right popliteal space M71 21        Plan:  Diagnostics reviewed and physical exam performed  Diagnosis, treatment options and associated risks were discussed with the patient including no treatment, nonsurgical treatment and potential for surgical intervention  The patient was given the opportunity to ask questions regarding each  In the presence of resolution of her symptoms from her right knee Baker cyst aspiration injection of cortisone performed 2 months ago, no indication for further treatment intervention today  Weightbearing activities as tolerated  To do next visit:  Return for re-check on an as needed basis (PRN)  The above stated was discussed in layman's terms and the patient expressed understanding  All questions were answered to the patient's satisfaction  Scribe Attestation    I,:   Arpita Mo am acting as a scribe while in the presence of the attending physician :        I,:   Ratna Ulloa MD personally performed the services described in this documentation    as scribed in my presence :              Subjective:   Juvenal Tapia is a 78 y o  female who presents follow-up of her right knee after consultation from the hospital from 09/18/2019  She presented to the emergency department with pain at her right knee posteriorly as well as swelling  An aspiration with injection of cortisone was performed with resolution of her symptoms  Patient uses a single-point cane for ambulatory assistance    Patient presents today in the absence of any symptoms      Review of systems negative unless otherwise specified in HPI    Past Medical History:   Diagnosis Date    Anxiety     Atrial fibrillation (HCC)     Bowel obstruction (HCC)     Cancer (HCC)     LEFT BREAST CA 22 YEARS AGO     Depression     Diabetes mellitus ([facility] [address] )     Disease of thyroid gland     Hypertension     Hypothyroidism        Past Surgical History:   Procedure Laterality Date    ABDOMINAL ADHESION SURGERY N/A 2/4/2018    Procedure: LYSIS ADHESIONS;  Surgeon: Aracelis Hobbs DO;  [facility];  Service: General    BREAST SURGERY      CHOLECYSTECTOMY      COLON SURGERY  2017    EXPLORATORY LAPAROTOMY      JOINT REPLACEMENT      LEFT KNEE REPLACEMENT     LAPAROTOMY N/A 2/4/2018    Procedure: LAPAROTOMY EXPLORATORY,;  Surgeon: Aracelis Hobbs DO;  [facility];  Service: General    MASTECTOMY      MASTECTOMY Left 1995    MASTECTOMY Right 2017    MN BIOPSY/EXCISION, LYMPH NODE(S) Right 1/13/2017    Procedure: SENTINEL LYMPH NODE BIOPSY RIGHT AXILLA;   Surgeon: Georgina Storey MD;  [facility];  Service: General    MN MASTECTOMY, SIMPLE, COMPLETE Right 1/13/2017    Procedure: MASTECTOMY SIMPLE;  Surgeon: Georgina Storey MD;  [facility];  Service: General    SMALL INTESTINE SURGERY N/A 2/4/2018    Procedure: RESECTION SMALL BOWEL;  Surgeon: Aracelis Hobbs DO;  [facility];  Service: General    US GUIDED BREAST BIOPSY RIGHT COMPLETE Right 11/29/2016       Family History   Problem Relation Age of Onset    Cancer Mother        Social History     Occupational History    Not on file   Tobacco Use    Smoking status: Current Every Day Smoker     Packs/day: 1 00     Types: Cigarettes    Smokeless tobacco: Never Used   Substance and Sexual Activity    Alcohol use: Never     Frequency: Never     Binge frequency: Never    Drug use: Never    Sexual activity: Not Currently         Current Outpatient Medications:     atorvastatin (LIPITOR) 40 mg tablet, Take 40 mg by mouth, Disp: , Rfl:     amLODIPine (NORVASC) 5 mg tablet, Take 2 tablets (10 mg total) by mouth daily, Disp: 90 tablet, Rfl: 0    apixaban (ELIQUIS) 5 mg, Take 5 mg by mouth 2 (two) times a day, Disp: , Rfl:     ascorbic acid (VITAMIN C) 500 mg tablet, Take 500 mg by mouth daily, Disp: , Rfl:   chlorthalidone 25 mg tablet, Take 25 mg by mouth daily, Disp: , Rfl: 3    cholecalciferol (VITAMIN D3) 1,000 units tablet, Take 2,000 Units by mouth daily , Disp: , Rfl:     cloNIDine (CATAPRES) 0 1 mg tablet, Take 0 1 mg by mouth daily , Disp: , Rfl:     labetalol (NORMODYNE) 200 mg tablet, Take 1 tablet (200 mg total) by mouth every 12 (twelve) hours, Disp: 120 tablet, Rfl: 0    levothyroxine 125 mcg tablet, Take 125 mcg by mouth daily, Disp: , Rfl:     lisinopril (ZESTRIL) 20 mg tablet, TAKE 1 TABLET (20 MG TOTAL) BY MOUTH 2 (TWO) TIMES A DAY, Disp: 180 tablet, Rfl: 3    LORazepam (ATIVAN) 0 5 mg tablet, Take 0 5 mg by mouth 2 (two) times a day, Disp: , Rfl:     metFORMIN (GLUCOPHAGE) 500 mg tablet, Take 500 mg by mouth daily with breakfast , Disp: , Rfl:     Multiple Vitamins-Minerals (PRESERVISION AREDS 2 PO), Take by mouth 2 (two) times a day, Disp: , Rfl:     multivitamin (THERAGRAN) TABS, Take 1 tablet by mouth daily, Disp: , Rfl:     pantoprazole (PROTONIX) 40 mg tablet, Take 40 mg by mouth daily Pt takes daily when needed  , Disp: , Rfl:     Allergies   Allergen Reactions    Meloxicam GI Intolerance    Morphine GI Intolerance    Morphine     Penicillins     Percocet [Oxycodone-Acetaminophen]     Sitagliptin      SOB            Vitals:    11/20/19 1238   BP: 165/71   Pulse: 74       Objective:                    Right Knee Exam     Muscle Strength   The patient has normal right knee strength  Tenderness   The patient is experiencing no tenderness  Range of Motion   The patient has normal right knee ROM  Right knee flexion: Crepitation       Tests   Varus: negative Valgus: negative    Other   Erythema: absent  Sensation: normal  Swelling: none  Effusion: no effusion present            Diagnostics, reviewed and taken today if performed as documented:    None performed but reviewed     The attending physician has personally reviewed the pertinent films in PACS and interpretation is as follows:    Right knee x-rays taken 09/18/2019 were reviewed today show:  Mild tricompartmental degenerative changes with subchondral sclerosis and osteophyte formation present  No fracture dislocation      Procedures, if performed today:    Procedures    None performed      Portions of the record may have been created with voice recognition software  Occasional wrong word or "sound a like" substitutions may have occurred due to the inherent limitations of voice recognition software  Read the chart carefully and recognize, using context, where substitutions have occurred

## 2020-01-10 DIAGNOSIS — I48.91 ATRIAL FIBRILLATION, UNSPECIFIED TYPE (HCC): Primary | ICD-10-CM

## 2020-01-10 RX ORDER — APIXABAN 5 MG/1
TABLET, FILM COATED ORAL
Qty: 60 TABLET | Refills: 0 | Status: SHIPPED | OUTPATIENT
Start: 2020-01-10 | End: 2020-02-02

## 2020-01-24 ENCOUNTER — OFFICE VISIT (OUTPATIENT)
Dept: NEPHROLOGY | Facility: CLINIC | Age: 80
End: 2020-01-24
Payer: MEDICARE

## 2020-01-24 VITALS
HEIGHT: 66 IN | SYSTOLIC BLOOD PRESSURE: 168 MMHG | WEIGHT: 129.8 LBS | DIASTOLIC BLOOD PRESSURE: 90 MMHG | HEART RATE: 80 BPM | BODY MASS INDEX: 20.86 KG/M2

## 2020-01-24 DIAGNOSIS — N28.89 RENAL VASCULAR DISEASE: ICD-10-CM

## 2020-01-24 DIAGNOSIS — I10 HYPERTENSION, UNSPECIFIED TYPE: Primary | ICD-10-CM

## 2020-01-24 PROBLEM — I70.1 RENAL ARTERY STENOSIS, NATIVE (HCC): Status: RESOLVED | Noted: 2019-06-03 | Resolved: 2020-01-24

## 2020-01-24 PROBLEM — I15.0 RENOVASCULAR HYPERTENSION: Status: RESOLVED | Noted: 2019-06-03 | Resolved: 2020-01-24

## 2020-01-24 PROCEDURE — 99214 OFFICE O/P EST MOD 30 MIN: CPT | Performed by: INTERNAL MEDICINE

## 2020-01-24 RX ORDER — CLONIDINE HYDROCHLORIDE 0.1 MG/1
0.1 TABLET ORAL 2 TIMES DAILY
Qty: 180 TABLET | Refills: 3 | Status: SHIPPED | OUTPATIENT
Start: 2020-01-24 | End: 2020-07-06

## 2020-01-24 RX ORDER — CHLORAL HYDRATE 500 MG
1000 CAPSULE ORAL DAILY
Status: ON HOLD | COMMUNITY
End: 2022-02-13 | Stop reason: CLARIF

## 2020-01-24 NOTE — PATIENT INSTRUCTIONS
You are here for follow-up in its the 1st visit in the office with me  We initially had met you in the hospital for blood pressure and a low sodium  The low sodium is corrected with stopping the chlorthalidone  Your blood pressure still has wide fluctuations at home as you informed me I am going to have you increased her clonidine to twice a day because the medication does not last the whole day and you notice it does bring it down  Continue your other medications the same  The only new recommendation is increased her clonidine to 0 1 mg twice a day  I will call you in about 2 weeks and inquire about how your blood pressure is doing and then we can determine if we need to titrate medications over the phone and at that point I will tell you when we should follow-up

## 2020-01-24 NOTE — PROGRESS NOTES
NEPHROLOGY PROGRESS NOTE    Nydia Berman 78 y o  female MRN: [id number]  Unit/Bed#:  Encounter: [id number]  Reason for Consult:  Hypertension    The patient is here for her 1st visit in the office with me  She was initially seen in the hospital and she had significant hypertension and hyponatremia  At the time she was on a thiazide diuretic so that was discontinued in the hyponatremia resolved  She does state that she was seen by extender in the office but no changes were made she has been monitoring her blood pressure at home and it does fluctuate according to her and all range from systolic 220 at times the systolic could be 725  ASSESSMENT/PLAN:  1  Hypertension    The patient has longstanding hypertension and is not ideally controlled  We met her in the hospital and some medications were titrated and she is here for follow-up  She tells me that there was wide fluctuations in her blood pressure and we reviewed her current medications which include labetalol, lisinopril, amlodipine  She happens to be taking clonidine 0 1 mg once a day and she states that that will bring it down  At this point I am just going to increase the clonidine to 0 1 mg twice a day I will contact her to see how her blood pressures are doing as she checks them a few times a day unsure record them  Based on how the blood pressure is doing with increasing the clonidine to twice a day as its half-life does not allow it to treat the blood pressure for 24 hours then we can determine whether not to titrate medications and when follow-up will be  2  Renovascular disease    The patient did have Doppler studies that suggested greater than 60% renal artery disease  We have never confirmed that this is leading to her hypertension and she has had poorly controlled hypertension for years  At this point were going to opt for medical therapy and I explained this to the patient and the plan as outlined above      SUBJECTIVE:  Review of Systems   Constitution: Positive for malaise/fatigue  Negative for chills, diaphoresis and fever  HENT: Negative  Eyes: Negative  Cardiovascular: Negative  Negative for chest pain, dyspnea on exertion, leg swelling and orthopnea  Respiratory: Negative  Negative for cough, shortness of breath, sputum production and wheezing  Gastrointestinal: Negative  Negative for bloating, abdominal pain, diarrhea, nausea and vomiting  Genitourinary: Negative  Negative for bladder incontinence, dysuria, hematuria and incomplete emptying  Neurological: Negative  Psychiatric/Behavioral: Negative  OBJECTIVE:  Current Weight: Weight - Scale: 58 9 kg (129 lb 12 8 oz)  [email or web address]:     Height 5' 6" (1 676 m), weight 58 9 kg (129 lb 12 8 oz)  , Body mass index is 20 95 kg/m²  [unfilled]    Physical Exam: Ht 5' 6" (1 676 m)   Wt 58 9 kg (129 lb 12 8 oz)   BMI 20 95 kg/m²   Physical Exam   Constitutional: She is oriented to person, place, and time  No distress  HENT:   Head: Atraumatic  Mouth/Throat: Oropharynx is clear and moist    Eyes: Conjunctivae and EOM are normal  No scleral icterus  Neck: Neck supple  No JVD present  Cardiovascular: Normal rate and regular rhythm  Exam reveals no gallop and no friction rub  No edema  Pulmonary/Chest: Effort normal and breath sounds normal  No respiratory distress  She has no wheezes  She has no rales  Abdominal: Soft  Bowel sounds are normal  She exhibits no distension  There is no tenderness  There is no rebound  Neurological: She is alert and oriented to person, place, and time  Gait normal and nonfocal motor exam    Skin: She is not diaphoretic  Psychiatric: She has a normal mood and affect         Medications:    Current Outpatient Medications:     amLODIPine (NORVASC) 5 mg tablet, Take 2 tablets (10 mg total) by mouth daily, Disp: 90 tablet, Rfl: 0    ascorbic acid (VITAMIN C) 500 mg tablet, Take 500 mg by mouth daily, Disp: , Rfl:     atorvastatin (LIPITOR) 40 mg tablet, Take 40 mg by mouth, Disp: , Rfl:     cholecalciferol (VITAMIN D3) 1,000 units tablet, Take 2,000 Units by mouth daily , Disp: , Rfl:     cloNIDine (CATAPRES) 0 1 mg tablet, Take 1 tablet (0 1 mg total) by mouth 2 (two) times a day, Disp: 180 tablet, Rfl: 3    ELIQUIS 5 MG, TAKE 1 TABLET BY MOUTH TWICE A DAY, Disp: 60 tablet, Rfl: 0    labetalol (NORMODYNE) 200 mg tablet, Take 1 tablet (200 mg total) by mouth every 12 (twelve) hours, Disp: 120 tablet, Rfl: 0    levothyroxine 125 mcg tablet, Take 125 mcg by mouth daily, Disp: , Rfl:     lisinopril (ZESTRIL) 20 mg tablet, TAKE 1 TABLET (20 MG TOTAL) BY MOUTH 2 (TWO) TIMES A DAY, Disp: 180 tablet, Rfl: 3    LORazepam (ATIVAN) 0 5 mg tablet, Take 0 5 mg by mouth 2 (two) times a day, Disp: , Rfl:     metFORMIN (GLUCOPHAGE) 500 mg tablet, Take 500 mg by mouth daily with breakfast , Disp: , Rfl:     multivitamin (THERAGRAN) TABS, Take 1 tablet by mouth daily, Disp: , Rfl:     Omega-3 Fatty Acids (FISH OIL) 1,000 mg, Take 1,000 mg by mouth daily, Disp: , Rfl:     pantoprazole (PROTONIX) 40 mg tablet, Take 40 mg by mouth daily Pt takes daily when needed  , Disp: , Rfl:     chlorthalidone 25 mg tablet, Take 25 mg by mouth daily, Disp: , Rfl: 3    Multiple Vitamins-Minerals (PRESERVISION AREDS 2 PO), Take by mouth 2 (two) times a day, Disp: , Rfl:     Laboratory Results:  Lab Results   Component Value Date    WBC 10 08 09/18/2019    HGB 9 3 (L) 09/18/2019    HCT 29 3 (L) 09/18/2019    MCV 95 09/18/2019     09/18/2019     Lab Results   Component Value Date    SODIUM 132 (L) 09/18/2019    K 3 6 09/18/2019    CL 99 (L) 09/18/2019    CO2 26 09/18/2019    BUN 21 09/18/2019    CREATININE 1 12 09/18/2019    GLUC 209 (H) 09/18/2019    CALCIUM 8 5 09/18/2019     Lab Results   Component Value Date    CALCIUM 8 5 09/18/2019    PHOS 2 4 07/20/2019     No results found for: LABPROT

## 2020-01-24 NOTE — LETTER
January 24, 2020     Kelly Frazier DO  [address], [place] [address]    Patient: Viviana Serrano   YOB: 1940   Date of Visit: 1/24/2020       Dear Dr Marino Moore: Thank you for referring Amanda Brown to me for evaluation  Below are my notes for this consultation  If you have questions, please do not hesitate to call me  I look forward to following your patient along with you  Sincerely,        Ananth Gonzalez MD        CC: No Recipients  Ananth Gonzalez MD  1/24/2020  2:18 PM  Sign at close encounter  [address] 78 y o  female MRN: [id number]  Unit/Bed#:  Encounter: [id number]  Reason for Consult:  Hypertension    The patient is here for her 1st visit in the office with me  She was initially seen in the hospital and she had significant hypertension and hyponatremia  At the time she was on a thiazide diuretic so that was discontinued in the hyponatremia resolved  She does state that she was seen by extender in the office but no changes were made she has been monitoring her blood pressure at home and it does fluctuate according to her and all range from systolic 646 at times the systolic could be 836  ASSESSMENT/PLAN:  1  Hypertension    The patient has longstanding hypertension and is not ideally controlled  We met her in the hospital and some medications were titrated and she is here for follow-up  She tells me that there was wide fluctuations in her blood pressure and we reviewed her current medications which include labetalol, lisinopril, amlodipine  She happens to be taking clonidine 0 1 mg once a day and she states that that will bring it down  At this point I am just going to increase the clonidine to 0 1 mg twice a day I will contact her to see how her blood pressures are doing as she checks them a few times a day unsure record them    Based on how the blood pressure is doing with increasing the clonidine to twice a day as its half-life does not allow it to treat the blood pressure for 24 hours then we can determine whether not to titrate medications and when follow-up will be  2  Renovascular disease    The patient did have Doppler studies that suggested greater than 60% renal artery disease  We have never confirmed that this is leading to her hypertension and she has had poorly controlled hypertension for years  At this point were going to opt for medical therapy and I explained this to the patient and the plan as outlined above  SUBJECTIVE:  Review of Systems   Constitution: Positive for malaise/fatigue  Negative for chills, diaphoresis and fever  HENT: Negative  Eyes: Negative  Cardiovascular: Negative  Negative for chest pain, dyspnea on exertion, leg swelling and orthopnea  Respiratory: Negative  Negative for cough, shortness of breath, sputum production and wheezing  Gastrointestinal: Negative  Negative for bloating, abdominal pain, diarrhea, nausea and vomiting  Genitourinary: Negative  Negative for bladder incontinence, dysuria, hematuria and incomplete emptying  Neurological: Negative  Psychiatric/Behavioral: Negative  OBJECTIVE:  Current Weight: Weight - Scale: 58 9 kg (129 lb 12 8 oz)  [email or web address]:     Height 5' 6" (1 676 m), weight 58 9 kg (129 lb 12 8 oz)  , Body mass index is 20 95 kg/m²  [unfilled]    Physical Exam: Ht 5' 6" (1 676 m)   Wt 58 9 kg (129 lb 12 8 oz)   BMI 20 95 kg/m²    Physical Exam   Constitutional: She is oriented to person, place, and time  No distress  HENT:   Head: Atraumatic  Mouth/Throat: Oropharynx is clear and moist    Eyes: Conjunctivae and EOM are normal  No scleral icterus  Neck: Neck supple  No JVD present  Cardiovascular: Normal rate and regular rhythm  Exam reveals no gallop and no friction rub  No edema  Pulmonary/Chest: Effort normal and breath sounds normal  No respiratory distress   She has no wheezes  She has no rales  Abdominal: Soft  Bowel sounds are normal  She exhibits no distension  There is no tenderness  There is no rebound  Neurological: She is alert and oriented to person, place, and time  Gait normal and nonfocal motor exam    Skin: She is not diaphoretic  Psychiatric: She has a normal mood and affect  Medications:    Current Outpatient Medications:     amLODIPine (NORVASC) 5 mg tablet, Take 2 tablets (10 mg total) by mouth daily, Disp: 90 tablet, Rfl: 0    ascorbic acid (VITAMIN C) 500 mg tablet, Take 500 mg by mouth daily, Disp: , Rfl:     atorvastatin (LIPITOR) 40 mg tablet, Take 40 mg by mouth, Disp: , Rfl:     cholecalciferol (VITAMIN D3) 1,000 units tablet, Take 2,000 Units by mouth daily , Disp: , Rfl:     cloNIDine (CATAPRES) 0 1 mg tablet, Take 1 tablet (0 1 mg total) by mouth 2 (two) times a day, Disp: 180 tablet, Rfl: 3    ELIQUIS 5 MG, TAKE 1 TABLET BY MOUTH TWICE A DAY, Disp: 60 tablet, Rfl: 0    labetalol (NORMODYNE) 200 mg tablet, Take 1 tablet (200 mg total) by mouth every 12 (twelve) hours, Disp: 120 tablet, Rfl: 0    levothyroxine 125 mcg tablet, Take 125 mcg by mouth daily, Disp: , Rfl:     lisinopril (ZESTRIL) 20 mg tablet, TAKE 1 TABLET (20 MG TOTAL) BY MOUTH 2 (TWO) TIMES A DAY, Disp: 180 tablet, Rfl: 3    LORazepam (ATIVAN) 0 5 mg tablet, Take 0 5 mg by mouth 2 (two) times a day, Disp: , Rfl:     metFORMIN (GLUCOPHAGE) 500 mg tablet, Take 500 mg by mouth daily with breakfast , Disp: , Rfl:     multivitamin (THERAGRAN) TABS, Take 1 tablet by mouth daily, Disp: , Rfl:     Omega-3 Fatty Acids (FISH OIL) 1,000 mg, Take 1,000 mg by mouth daily, Disp: , Rfl:     pantoprazole (PROTONIX) 40 mg tablet, Take 40 mg by mouth daily Pt takes daily when needed  , Disp: , Rfl:     chlorthalidone 25 mg tablet, Take 25 mg by mouth daily, Disp: , Rfl: 3    Multiple Vitamins-Minerals (PRESERVISION AREDS 2 PO), Take by mouth 2 (two) times a day, Disp: , Rfl:     Laboratory Results:  Lab Results   Component Value Date    WBC 10 08 09/18/2019    HGB 9 3 (L) 09/18/2019    HCT 29 3 (L) 09/18/2019    MCV 95 09/18/2019     09/18/2019     Lab Results   Component Value Date    SODIUM 132 (L) 09/18/2019    K 3 6 09/18/2019    CL 99 (L) 09/18/2019    CO2 26 09/18/2019    BUN 21 09/18/2019    CREATININE 1 12 09/18/2019    GLUC 209 (H) 09/18/2019    CALCIUM 8 5 09/18/2019     Lab Results   Component Value Date    CALCIUM 8 5 09/18/2019    PHOS 2 4 07/20/2019     No results found for: LABPROT

## 2020-02-02 DIAGNOSIS — I48.91 ATRIAL FIBRILLATION, UNSPECIFIED TYPE (HCC): ICD-10-CM

## 2020-02-02 RX ORDER — APIXABAN 5 MG/1
TABLET, FILM COATED ORAL
Qty: 60 TABLET | Refills: 0 | Status: SHIPPED | OUTPATIENT
Start: 2020-02-02 | End: 2020-02-26

## 2020-02-11 ENCOUNTER — TELEPHONE (OUTPATIENT)
Dept: NEPHROLOGY | Facility: CLINIC | Age: 80
End: 2020-02-11

## 2020-02-11 NOTE — TELEPHONE ENCOUNTER
Pt called in her Bp for today  First waking up pressure was 174/84 pulse 90 and then she ate and took her bp medication and took pressure a have hour later and it went down to 164/74 pulse 71  She said since the change to the clonidine there hasn't been and drastic change to her BP since starting new dose on 1/25

## 2020-02-26 DIAGNOSIS — I48.91 ATRIAL FIBRILLATION, UNSPECIFIED TYPE (HCC): ICD-10-CM

## 2020-02-26 RX ORDER — APIXABAN 5 MG/1
TABLET, FILM COATED ORAL
Qty: 60 TABLET | Refills: 0 | Status: SHIPPED | OUTPATIENT
Start: 2020-02-26 | End: 2020-03-23

## 2020-03-06 ENCOUNTER — TRANSCRIBE ORDERS (OUTPATIENT)
Dept: ADMINISTRATIVE | Facility: HOSPITAL | Age: 80
End: 2020-03-06

## 2020-03-06 DIAGNOSIS — I65.23 BILATERAL CAROTID ARTERY STENOSIS: Primary | ICD-10-CM

## 2020-03-20 DIAGNOSIS — I10 ESSENTIAL HYPERTENSION: ICD-10-CM

## 2020-03-22 DIAGNOSIS — I48.91 ATRIAL FIBRILLATION, UNSPECIFIED TYPE (HCC): ICD-10-CM

## 2020-03-23 RX ORDER — LISINOPRIL 20 MG/1
20 TABLET ORAL 2 TIMES DAILY
Qty: 180 TABLET | Refills: 3 | Status: SHIPPED | OUTPATIENT
Start: 2020-03-23

## 2020-03-23 RX ORDER — APIXABAN 5 MG/1
TABLET, FILM COATED ORAL
Qty: 60 TABLET | Refills: 0 | Status: SHIPPED | OUTPATIENT
Start: 2020-03-23 | End: 2020-04-20

## 2020-04-16 NOTE — RESTORATIVE TECHNICIAN NOTE
Restorative Specialist Mobility Note       Activity: Ambulate in caldera, Ambulate in room, Bathroom privileges, Chair, Dangle, Stand at bedside (Educated/encouraged pt to ambulate with assistance 3-4 x's/day  Bed alarm on   Pt callbell, phone/tray within reach )     Assistive Device: Rick SRIVASTAVA, Restorative Technician, [employer] [facility] Helical Rim Text: The closure involved the helical rim.

## 2020-04-18 DIAGNOSIS — I48.91 ATRIAL FIBRILLATION, UNSPECIFIED TYPE (HCC): ICD-10-CM

## 2020-04-20 RX ORDER — APIXABAN 5 MG/1
TABLET, FILM COATED ORAL
Qty: 60 TABLET | Refills: 0 | Status: SHIPPED | OUTPATIENT
Start: 2020-04-20 | End: 2020-06-08

## 2020-06-06 DIAGNOSIS — I48.91 ATRIAL FIBRILLATION, UNSPECIFIED TYPE (HCC): ICD-10-CM

## 2020-06-08 RX ORDER — APIXABAN 5 MG/1
TABLET, FILM COATED ORAL
Qty: 60 TABLET | Refills: 0 | Status: SHIPPED | OUTPATIENT
Start: 2020-06-08 | End: 2020-07-01

## 2020-06-08 NOTE — TELEPHONE ENCOUNTER
38-year-old male presents with pain and constipation for the past 2 to 3 days. He denies any nausea or vomiting. He denies any fevers or chills. He states that he is unable to urinate now as well. He states that he feels like the stool was just sitting in the bottom of his abdomen. He tried to manually disimpact himself but was unable to do. His only abdominal surgery was hernia operations. He rates his pain 3 out of 10. Constipation    Associated symptoms include abdominal pain and constipation. Pertinent negatives include no chills, no fever and no back pain. Past Medical History:   Diagnosis Date    A-fib ([facility] [address]     Arthritis     Enlarged prostate     High cholesterol     Hx of completed stroke     Hypertension     Stroke [facility])        Past Surgical History:   Procedure Laterality Date    HX HERNIA REPAIR      HX TONSILLECTOMY           History reviewed. No pertinent family history.     Social History     Socioeconomic History    Marital status:      Spouse name: Not on file    Number of children: Not on file    Years of education: Not on file    Highest education level: Not on file   Occupational History    Not on file   Social Needs    Financial resource strain: Not on file    Food insecurity     Worry: Not on file     Inability: Not on file    Transportation needs     Medical: Not on file     Non-medical: Not on file   Tobacco Use    Smoking status: Never Smoker    Smokeless tobacco: Never Used   Substance and Sexual Activity    Alcohol use: No    Drug use: No    Sexual activity: Not on file   Lifestyle    Physical activity     Days per week: Not on file     Minutes per session: Not on file    Stress: Not on file   Relationships    Social connections     Talks on phone: Not on file     Gets together: Not on file     Attends Latter day service: Not on file     Active member of club or organization: Not on file     Attends meetings of clubs or organizations: Not on Received a call from Kavitha Santillan  Pt /90, Hr 75 irregular  +3 pitting in [place]  Denies any CP, or SOB, only c/o fatigue  Pt has not had a BM since 2/16/18  Visiting nurse currently speaking  w/LINSEY Maciel [address] file     Relationship status: Not on file    Intimate partner violence     Fear of current or ex partner: Not on file     Emotionally abused: Not on file     Physically abused: Not on file     Forced sexual activity: Not on file   Other Topics Concern    Not on file   Social History Narrative    Not on file         ALLERGIES: Patient has no known allergies. Review of Systems   Constitutional: Negative for chills and fever. HENT: Negative for ear pain and sore throat. Eyes: Negative for pain. Respiratory: Negative for chest tightness and shortness of breath. Cardiovascular: Negative for chest pain. Gastrointestinal: Positive for abdominal pain and constipation. Genitourinary: Negative for flank pain. Musculoskeletal: Negative for back pain. Skin: Negative for rash. Neurological: Negative for headaches. All other systems reviewed and are negative. Vitals:    06/08/20 1648   BP: 168/67   Pulse: 87   Resp: 16   Temp: 97.5 °F (36.4 °C)   SpO2: 97%   Weight: 68 kg (150 lb)   Height: 5' 8\" (1.727 m)            Physical Exam  Vitals signs and nursing note reviewed. Constitutional:       General: He is not in acute distress. HENT:      Head: Normocephalic and atraumatic. Eyes:      General: No scleral icterus. Conjunctiva/sclera: Conjunctivae normal.      Pupils: Pupils are equal, round, and reactive to light. Neck:      Musculoskeletal: No neck rigidity. Trachea: No tracheal deviation. Cardiovascular:      Rate and Rhythm: Normal rate and regular rhythm. Pulmonary:      Effort: Pulmonary effort is normal. No respiratory distress. Breath sounds: Normal breath sounds. No stridor. Abdominal:      General: There is no distension. Palpations: Abdomen is soft. Tenderness: There is abdominal tenderness (lower). Genitourinary:     Comments: deferred  Musculoskeletal:         General: No deformity. Skin:     General: Skin is warm and dry.    Neurological: General: No focal deficit present. Mental Status: He is alert. Psychiatric:         Mood and Affect: Mood normal.         Behavior: Behavior normal.          MDM       Procedures          Hospitalist Perfect Serve for Admission  6:50 PM    ED Room Number: NQC50/80  Patient Name and age:  Nelta Estimable 78 y.o.  male  Working Diagnosis:   1. Bladder mass    2. Urinary tract infection without hematuria, site unspecified        COVID-19 Suspicion:  no    Code Status:  Full Code  Readmission: no  Isolation Requirements:  no  Recommended Level of Care:  med/surg  Department:[facility] - [phone]  Other:  Urinary retention with infection and large mass c/o bladder CA         LABORATORY TESTS:  Labs Reviewed   CBC WITH AUTOMATED DIFF - Abnormal; Notable for the following components:       Result Value    WBC 11.2 (*)     NEUTROPHILS 83 (*)     LYMPHOCYTES 11 (*)     ABS. NEUTROPHILS 9.2 (*)     All other components within normal limits   METABOLIC PANEL, COMPREHENSIVE - Abnormal; Notable for the following components:    Potassium 3.3 (*)     Glucose 145 (*)     BUN 22 (*)     GFR est non-AA 55 (*)     All other components within normal limits   URINALYSIS W/MICROSCOPIC - Abnormal; Notable for the following components:    Appearance CLOUDY (*)     Protein 100 (*)     Blood LARGE (*)     Leukocyte Esterase MODERATE (*)     WBC >100 (*)     Bacteria 2+ (*)     All other components within normal limits   LIPASE - Abnormal; Notable for the following components:    Lipase 58 (*)     All other components within normal limits   URINE CULTURE HOLD SAMPLE   CULTURE, URINE   LACTIC ACID       IMAGING RESULTS:  CT ABD PELV W CONT   Final Result   IMPRESSION:      1. Anterior left bladder wall mass suspicious for urothelial neoplasm such as   transitional cell carcinoma. 2. Moderate rectosigmoid fecal retention. 3. Lateral right peripheral zone prostate hyperenhancement could reflect   prostate neoplasm.  Correlate with clinical and laboratory data and consider   dedicated prostate MRI imaging as clinically appropriate. 4. Bilateral pelvocaliectasis likely reflecting chronic urinary bladder outlet   obstruction, likely secondary to prostamegaly. 5. Incidentals as above including coronary artery disease. MEDICATIONS GIVEN:  Medications   cefTRIAXone (ROCEPHIN) 1 g in sterile water (preservative free) 10 mL IV syringe (has no administration in time range)   sodium chloride 0.9 % bolus infusion 1,000 mL (1,000 mL IntraVENous New Bag 6/8/20 1713)   fentaNYL citrate (PF) injection 50 mcg (50 mcg IntraVENous Given 6/8/20 1713)   iopamidoL (ISOVUE-370) 76 % injection 100 mL (100 mL IntraVENous Given 6/8/20 1803)         IMPRESSION/ PLAN:  1. Bladder mass    2. Urinary tract infection without hematuria, site unspecified      - admit for CA work-up and antibiotics    Total critical care time spent exclusive of procedures:  0 minutes      Neville Wahl MD

## 2020-07-01 DIAGNOSIS — I48.91 ATRIAL FIBRILLATION, UNSPECIFIED TYPE (HCC): ICD-10-CM

## 2020-07-01 RX ORDER — APIXABAN 5 MG/1
TABLET, FILM COATED ORAL
Qty: 60 TABLET | Refills: 0 | Status: SHIPPED | OUTPATIENT
Start: 2020-07-01 | End: 2020-07-27

## 2020-07-05 DIAGNOSIS — I10 HYPERTENSION, UNSPECIFIED TYPE: ICD-10-CM

## 2020-07-06 RX ORDER — CLONIDINE HYDROCHLORIDE 0.1 MG/1
TABLET ORAL
Qty: 90 TABLET | Refills: 7 | Status: SHIPPED | OUTPATIENT
Start: 2020-07-06 | End: 2021-02-17

## 2020-07-25 DIAGNOSIS — I48.91 ATRIAL FIBRILLATION, UNSPECIFIED TYPE (HCC): ICD-10-CM

## 2020-07-27 RX ORDER — APIXABAN 5 MG/1
TABLET, FILM COATED ORAL
Qty: 60 TABLET | Refills: 0 | Status: SHIPPED | OUTPATIENT
Start: 2020-07-27 | End: 2020-08-24

## 2020-08-22 DIAGNOSIS — I48.91 ATRIAL FIBRILLATION, UNSPECIFIED TYPE (HCC): ICD-10-CM

## 2020-08-24 RX ORDER — APIXABAN 5 MG/1
TABLET, FILM COATED ORAL
Qty: 60 TABLET | Refills: 0 | Status: SHIPPED | OUTPATIENT
Start: 2020-08-24 | End: 2020-09-21 | Stop reason: SDUPTHER

## 2020-09-21 DIAGNOSIS — I48.91 ATRIAL FIBRILLATION, UNSPECIFIED TYPE (HCC): ICD-10-CM

## 2020-10-22 DIAGNOSIS — I48.91 ATRIAL FIBRILLATION, UNSPECIFIED TYPE (HCC): ICD-10-CM

## 2021-01-17 ENCOUNTER — IMMUNIZATIONS (OUTPATIENT)
Dept: FAMILY MEDICINE CLINIC | Facility: HOSPITAL | Age: 81
End: 2021-01-17

## 2021-01-17 DIAGNOSIS — Z23 ENCOUNTER FOR IMMUNIZATION: Primary | ICD-10-CM

## 2021-01-17 PROCEDURE — 0001A SARS-COV-2 / COVID-19 MRNA VACCINE (PFIZER-BIONTECH) 30 MCG: CPT

## 2021-01-17 PROCEDURE — 91300 SARS-COV-2 / COVID-19 MRNA VACCINE (PFIZER-BIONTECH) 30 MCG: CPT

## 2021-02-10 ENCOUNTER — IMMUNIZATIONS (OUTPATIENT)
Dept: FAMILY MEDICINE CLINIC | Facility: HOSPITAL | Age: 81
End: 2021-02-10

## 2021-02-10 DIAGNOSIS — Z23 ENCOUNTER FOR IMMUNIZATION: Primary | ICD-10-CM

## 2021-02-10 PROCEDURE — 91300 SARS-COV-2 / COVID-19 MRNA VACCINE (PFIZER-BIONTECH) 30 MCG: CPT

## 2021-02-10 PROCEDURE — 0002A SARS-COV-2 / COVID-19 MRNA VACCINE (PFIZER-BIONTECH) 30 MCG: CPT

## 2021-02-17 DIAGNOSIS — I10 HYPERTENSION, UNSPECIFIED TYPE: ICD-10-CM

## 2021-02-17 RX ORDER — CLONIDINE HYDROCHLORIDE 0.1 MG/1
TABLET ORAL
Qty: 180 TABLET | Refills: 3 | Status: SHIPPED | OUTPATIENT
Start: 2021-02-17 | End: 2021-07-14

## 2021-04-23 ENCOUNTER — OFFICE VISIT (OUTPATIENT)
Dept: CARDIOLOGY CLINIC | Facility: CLINIC | Age: 81
End: 2021-04-23
Payer: MEDICARE

## 2021-04-23 VITALS
SYSTOLIC BLOOD PRESSURE: 130 MMHG | BODY MASS INDEX: 22.32 KG/M2 | HEIGHT: 66 IN | DIASTOLIC BLOOD PRESSURE: 78 MMHG | HEART RATE: 75 BPM | OXYGEN SATURATION: 98 % | WEIGHT: 138.9 LBS

## 2021-04-23 DIAGNOSIS — I65.23 ASYMPTOMATIC BILATERAL CAROTID ARTERY STENOSIS: Primary | ICD-10-CM

## 2021-04-23 DIAGNOSIS — I10 HYPERTENSION, ESSENTIAL, BENIGN: Chronic | ICD-10-CM

## 2021-04-23 DIAGNOSIS — I08.0 MITRAL INSUFFICIENCY AND AORTIC STENOSIS: ICD-10-CM

## 2021-04-23 DIAGNOSIS — I48.91 ATRIAL FIBRILLATION, UNSPECIFIED TYPE (HCC): ICD-10-CM

## 2021-04-23 DIAGNOSIS — I48.0 PAROXYSMAL A-FIB (HCC): ICD-10-CM

## 2021-04-23 PROCEDURE — 99214 OFFICE O/P EST MOD 30 MIN: CPT | Performed by: INTERNAL MEDICINE

## 2021-04-23 NOTE — PROGRESS NOTES
Cardiology Follow Up    Ventura Watson  1940  [id number]  [address]  [address] [facility] [address]    1  Asymptomatic bilateral carotid artery stenosis     2  Atrial fibrillation, unspecified type ([address] )  Echo complete with contrast if indicated   3  Essential hypertension  Echo complete with contrast if indicated   4  Paroxysmal A-fib (HCC)  Echo complete with contrast if indicated   5  Mitral insufficiency and aortic stenosis  Echo complete with contrast if indicated       Discussion/SummARY:    Overall she has been doing well from a cardiac standpoint  Blood pressure has been doing well and is currently controlled on her antihypertensive regimen  Lipids have been doing great on current dose of atorvastatin  She has paroxysmal atrial fibrillation and is tolerating Eliquis well for thromboembolic protection  Will continue current regimen she is maintaining sinus rhythm  She has a history of aortic valve disease as well as hypertension would like an echocardiogram this summer  She also has a history of pulmonary hypertension want to evaluate the pulmonary artery pressures  Continue with diet and exercise  I counseled her on the need to quit smoking and provided strategies to do so  Interval History:  23-year-old female with paroxysmal atrial fibrillation, diabetes, hypertension, hyperlipidemia presents to discuss anticoagulation  I had recommended Coumadin however she is fearful of the medicine and wants talk about other options  She is resting comfortably with no complaints  There has been no chest pain or shortness of breath  Mild palpitations are minimal   Blood pressure has been hard to control recent changes in medication seems to be somewhat confusing for the patient  She is here with her daughter to discuss things            Overall she has been doing well from a cardiac standpoint  She has no complaints at today's visit  It has been about a year and half since I have seen her  Denies any chest pain, shortness of breath, palpitations, lightheadedness, dizziness, or syncope  There has been no lower extremity edema, PND, orthopnea  She has been tolerating anticoagulation with no adverse bleeding events  Problem List     Heart palpitations    Nicotine abuse    Transient atrial fibrillation (HCC)    Diabetes mellitus type 2 in nonobese (HCC) (Chronic)    Hypertension, essential, benign (Chronic)    Acquired hypothyroidism (Chronic)    Malignant neoplasm of central portion of right female breast ([facility] [address] )    [facility] kidney [facility] ([facility]    Delirium    Physical deconditioning    Ambulatory dysfunction    Hx of fall    Anxiety    Postop check    Community acquired pneumonia of right middle lobe of lung ([facility] )        Past Medical History:   Diagnosis Date    Anxiety     Atrial fibrillation (HCC)     Bowel obstruction (HCC)     Cancer ([facility] )     LEFT BREAST CA 22 YEARS AGO     Depression     Diabetes mellitus ([facility] )     Disease of thyroid gland     Hypertension     Hypothyroidism      Social History     Socioeconomic History    Marital status:       Spouse name: Not on file    Number of children: Not on file    Years of education: Not on file    Highest education level: Not on file   Occupational History    Not on file   Social Needs    Financial resource strain: Not on file    Food insecurity     Worry: Not on file     Inability: Not on file    Transportation needs     Medical: Not on file     Non-medical: Not on file   Tobacco Use    Smoking status: Current Every Day Smoker     Packs/day: 1 00     Types: Cigarettes    Smokeless tobacco: Never Used   Substance and Sexual Activity    Alcohol use: Never     Frequency: Never     Binge frequency: Never    Drug use: Never    Sexual activity: Not Currently   Lifestyle    Physical activity     Days per week: Not on file Minutes per session: Not on file    Stress: Not on file   Relationships    Social connections     Talks on phone: Not on file     Gets together: Not on file     Attends Yazidism service: Not on file     Active member of club or organization: Not on file     Attends meetings of clubs or organizations: Not on file     Relationship status: Not on file    Intimate partner violence     Fear of current or ex partner: Not on file     Emotionally abused: Not on file     Physically abused: Not on file     Forced sexual activity: Not on file   Other Topics Concern    Not on file   Social History Narrative    ** Merged History Encounter **           Family History   Problem Relation Age of Onset    Cancer Mother     No Known Problems Father      Past Surgical History:   Procedure Laterality Date    ABDOMINAL ADHESION SURGERY N/A 2/4/2018    Procedure: LYSIS ADHESIONS;  Surgeon: Jeffry Nelson DO;  [facility];  Service: General    BREAST SURGERY      CHOLECYSTECTOMY      COLON SURGERY  2017    EXPLORATORY LAPAROTOMY      JOINT REPLACEMENT      LEFT KNEE REPLACEMENT     LAPAROTOMY N/A 2/4/2018    Procedure: LAPAROTOMY EXPLORATORY,;  Surgeon: Jeffry Nelson DO;  [facility];  Service: General    MASTECTOMY      MASTECTOMY Left 1995    MASTECTOMY Right 2017    AL BIOPSY/EXCISION, LYMPH NODE(S) Right 1/13/2017    Procedure: SENTINEL LYMPH NODE BIOPSY RIGHT AXILLA;   Surgeon: Jennifer Vance MD;  [facility];  Service: General    AL MASTECTOMY, SIMPLE, COMPLETE Right 1/13/2017    Procedure: MASTECTOMY SIMPLE;  Surgeon: Jennifer Vance MD;  [facility];  Service: General    SMALL INTESTINE SURGERY N/A 2/4/2018    Procedure: RESECTION SMALL BOWEL;  Surgeon: Jeffry Nelson DO;  [facility];  Service: General    US GUIDED BREAST BIOPSY RIGHT COMPLETE Right 11/29/2016       Current Outpatient Medications:     amLODIPine (NORVASC) 5 mg tablet, Take 2 tablets (10 mg total) by mouth daily (Patient taking differently: Take 2 5 mg by mouth daily ), Disp: 90 tablet, Rfl: 0    apixaban (Eliquis) 5 mg, Take 1 tablet (5 mg total) by mouth 2 (two) times a day, Disp: 60 tablet, Rfl: 8    ascorbic acid (VITAMIN C) 500 mg tablet, Take 500 mg by mouth daily, Disp: , Rfl:     atorvastatin (LIPITOR) 40 mg tablet, Take 40 mg by mouth, Disp: , Rfl:     cholecalciferol (VITAMIN D3) 1,000 units tablet, Take 2,000 Units by mouth daily , Disp: , Rfl:     cloNIDine (CATAPRES) 0 1 mg tablet, TAKE 1 TABLET BY MOUTH TWICE A DAY, Disp: 180 tablet, Rfl: 3    labetalol (NORMODYNE) 200 mg tablet, Take 1 tablet (200 mg total) by mouth every 12 (twelve) hours, Disp: 120 tablet, Rfl: 0    levothyroxine 125 mcg tablet, Take 125 mcg by mouth daily, Disp: , Rfl:     lisinopril (ZESTRIL) 20 mg tablet, TAKE 1 TABLET (20 MG TOTAL) BY MOUTH 2 (TWO) TIMES A DAY, Disp: 180 tablet, Rfl: 3    LORazepam (ATIVAN) 0 5 mg tablet, Take 0 5 mg by mouth 2 (two) times a day, Disp: , Rfl:     multivitamin (THERAGRAN) TABS, Take 1 tablet by mouth daily, Disp: , Rfl:     chlorthalidone 25 mg tablet, Take 25 mg by mouth daily, Disp: , Rfl: 3    metFORMIN (GLUCOPHAGE) 500 mg tablet, Take 500 mg by mouth daily with breakfast , Disp: , Rfl:     Multiple Vitamins-Minerals (PRESERVISION AREDS 2 PO), Take by mouth 2 (two) times a day, Disp: , Rfl:     Omega-3 Fatty Acids (FISH OIL) 1,000 mg, Take 1,000 mg by mouth daily, Disp: , Rfl:     pantoprazole (PROTONIX) 40 mg tablet, Take 40 mg by mouth daily Pt takes daily when needed  , Disp: , Rfl:   Allergies   Allergen Reactions    Meloxicam GI Intolerance    Morphine GI Intolerance    Morphine     Penicillins     Percocet [Oxycodone-Acetaminophen]     Sitagliptin      SOB       Labs:     Chemistry        Component Value Date/Time     09/29/2015 1034    K 3 6 09/18/2019 1732    K 4 1 09/29/2015 1034    CL 99 (L) 09/18/2019 1732     09/29/2015 1034    CO2 26 09/18/2019 1732    CO2 27 06/03/2019 1203    BUN 21 09/18/2019 1732    BUN 14 09/29/2015 1034    CREATININE 1 12 09/18/2019 1732    CREATININE 0 89 09/29/2015 1034        Component Value Date/Time    CALCIUM 8 5 09/18/2019 1732    CALCIUM 9 2 09/29/2015 1034    ALKPHOS 90 04/17/2018 1633    AST 17 04/17/2018 1633    ALT 16 04/17/2018 1633            No results found for: CHOL  Lab Results   Component Value Date    HDL 45 07/12/2016     Lab Results   Component Value Date    LDLCALC 85 07/12/2016     Lab Results   Component Value Date    TRIG 71 02/06/2018    TRIG 81 07/12/2016     No results found for: CHOLHDL    Imaging: No results found  ECG:      Sinus bradycardia LVH 1st degree AV block nonspecific ST changes      Review of Systems   Constitution: Negative  HENT: Negative  Eyes: Negative  Cardiovascular: Negative  Respiratory: Negative  Endocrine: Negative  Hematologic/Lymphatic: Negative  Skin: Negative  Musculoskeletal: Negative  Gastrointestinal: Negative  Genitourinary: Negative  Neurological: Negative  Psychiatric/Behavioral: Negative  Vitals:    04/23/21 1436   BP: 130/78   Pulse: 75   SpO2: 98%     Vitals:    04/23/21 1436   Weight: 63 kg (138 lb 14 4 oz)     Height: 5' 6" (167 6 cm)   Body mass index is 22 42 kg/m²  Physical Exam:  Vital signs reviewed  General:  Alert and cooperative, appears stated age, no acute distress  HEENT:  PERRLA, EOMI, no scleral icterus, no conjunctival pallor  Neck:  No lymphadenopathy, no thyromegaly, no carotid bruits, no elevated JVP  Heart:  Regular rate and rhythm, normal S1/S2, no S3/S4, no murmur, rubs or gallops  PMI nondisplaced  Lungs:  Clear to auscultation bilaterally, no wheezes rales or rhonchi  Abdomen:  Soft, non-tender, positive bowel sounds, no rebound or guarding,   no organomegaly   Extremities:  Normal range of motion    No clubbing, cyanosis or edema   Vascular:  2+ pedal pulses  Skin:  No rashes or lesions on exposed skin  Neurologic:  Cranial nerves II-XII grossly intact without focal deficits  Psych:  Normal mood and affect

## 2021-07-14 DIAGNOSIS — I10 HYPERTENSION, UNSPECIFIED TYPE: ICD-10-CM

## 2021-07-14 RX ORDER — CLONIDINE HYDROCHLORIDE 0.1 MG/1
TABLET ORAL
Qty: 90 TABLET | Refills: 7 | Status: ON HOLD | OUTPATIENT
Start: 2021-07-14 | End: 2022-02-24 | Stop reason: SDUPTHER

## 2021-07-29 ENCOUNTER — HOSPITAL ENCOUNTER (OUTPATIENT)
Dept: NON INVASIVE DIAGNOSTICS | Facility: HOSPITAL | Age: 81
Discharge: HOME | End: 2021-07-29
Attending: INTERNAL MEDICINE
Payer: MEDICARE

## 2021-07-29 ENCOUNTER — HOSPITAL ENCOUNTER (OUTPATIENT)
Dept: NON INVASIVE DIAGNOSTICS | Facility: HOSPITAL | Age: 81
Discharge: HOME | End: 2021-07-29
Payer: MEDICARE

## 2021-07-29 DIAGNOSIS — I08.0 MITRAL INSUFFICIENCY AND AORTIC STENOSIS: ICD-10-CM

## 2021-07-29 DIAGNOSIS — I48.91 ATRIAL FIBRILLATION, UNSPECIFIED TYPE (HCC): ICD-10-CM

## 2021-07-29 DIAGNOSIS — I10 HYPERTENSION, ESSENTIAL, BENIGN: Chronic | ICD-10-CM

## 2021-07-29 DIAGNOSIS — I48.0 PAROXYSMAL A-FIB (HCC): ICD-10-CM

## 2021-07-29 PROCEDURE — 93306 TTE W/DOPPLER COMPLETE: CPT

## 2021-07-29 PROCEDURE — 93306 TTE W/DOPPLER COMPLETE: CPT | Performed by: INTERNAL MEDICINE

## 2021-08-10 ENCOUNTER — TELEPHONE (OUTPATIENT)
Dept: CARDIOLOGY CLINIC | Facility: CLINIC | Age: 81
End: 2021-08-10

## 2021-08-10 DIAGNOSIS — I48.91 ATRIAL FIBRILLATION, UNSPECIFIED TYPE (HCC): ICD-10-CM

## 2021-08-10 RX ORDER — APIXABAN 5 MG/1
TABLET, FILM COATED ORAL
Qty: 60 TABLET | Refills: 8 | Status: SHIPPED | OUTPATIENT
Start: 2021-08-10 | End: 2022-05-23

## 2021-09-21 ENCOUNTER — APPOINTMENT (EMERGENCY)
Dept: RADIOLOGY | Facility: HOSPITAL | Age: 81
End: 2021-09-21
Payer: MEDICARE

## 2021-09-21 ENCOUNTER — HOSPITAL ENCOUNTER (EMERGENCY)
Facility: HOSPITAL | Age: 81
Discharge: HOME | End: 2021-09-21
Attending: EMERGENCY MEDICINE | Admitting: EMERGENCY MEDICINE
Payer: MEDICARE

## 2021-09-21 VITALS
OXYGEN SATURATION: 99 % | DIASTOLIC BLOOD PRESSURE: 104 MMHG | SYSTOLIC BLOOD PRESSURE: 199 MMHG | RESPIRATION RATE: 16 BRPM | TEMPERATURE: 97.9 F | HEART RATE: 76 BPM

## 2021-09-21 DIAGNOSIS — S46.819A TRAPEZIUS MUSCLE STRAIN: ICD-10-CM

## 2021-09-21 DIAGNOSIS — M43.10 ANTEROLISTHESIS: Primary | ICD-10-CM

## 2021-09-21 DIAGNOSIS — M54.2 NECK PAIN: ICD-10-CM

## 2021-09-21 PROCEDURE — 99283 EMERGENCY DEPT VISIT LOW MDM: CPT

## 2021-09-21 PROCEDURE — 72125 CT NECK SPINE W/O DYE: CPT

## 2021-09-21 PROCEDURE — 99284 EMERGENCY DEPT VISIT MOD MDM: CPT | Performed by: EMERGENCY MEDICINE

## 2021-09-21 RX ORDER — ACETAMINOPHEN 325 MG/1
975 TABLET ORAL ONCE
Status: COMPLETED | OUTPATIENT
Start: 2021-09-21 | End: 2021-09-21

## 2021-09-21 RX ADMIN — DICLOFENAC SODIUM 2 G: 10 GEL TOPICAL at 14:32

## 2021-09-21 RX ADMIN — ACETAMINOPHEN 975 MG: 325 TABLET ORAL at 14:32

## 2021-09-21 NOTE — DISCHARGE INSTRUCTIONS
You have been evaluated in the Emergency Department today for bilateral neck pain  Your evaluation did not find evidence of medical conditions requiring emergent intervention at this time  You may take tylenol every 6 hours as needed for pain  Please schedule an appointment for follow up with your primary care physician this week  Return to the Emergency Department if you experience worsening pain, numbness, tingling, change of color in your fingers, or any other concerning symptoms

## 2021-09-21 NOTE — ED PROVIDER NOTES
History  Chief Complaint   Patient presents with    Neck Pain     Pt presents with "sharp" BL neck pain since waking up Saturday morning  Pt states the L side is worse  Pain progressively getting worse  Patient is an 78-year-old female, past medical history of AFib on Eliquis, diabetes, hypertension, breast cancer, and hypothyroidism, who presents to the emergency department for neck pain  Patient states the pain started 3 days ago after waking up  It involves both sides of her neck, but the left side is worse  She describes the pain as sharp  Pain is worse with certain movements  Patient states it feels as if the pain radiates across the neck  Patient tried muscle rubs, heating pads, and warm showers with minimal relief  There are no associated symptoms  Patient denies any prior history of pain like this in the past  She denies any recent trauma, falls, or accidents  She denies any numbness, tingling, or weakness of her UE  She denies any fevers, chills, nausea, vomiting, diarrhea, or any other new or concerning symptoms  Of note, patient states that she was diagnosed with breast CA several years ago on the R  She opted to not have any treatment  She also had breast CA on the left about 30 years ago which was treated  Prior to Admission Medications   Prescriptions Last Dose Informant Patient Reported? Taking?    Eliquis 5 MG   No No   Sig: TAKE 1 TABLET BY MOUTH TWICE A DAY   LORazepam (ATIVAN) 0 5 mg tablet  Self Yes No   Sig: Take 0 5 mg by mouth 2 (two) times a day   Multiple Vitamins-Minerals (PRESERVISION AREDS 2 PO)  Self Yes No   Sig: Take by mouth 2 (two) times a day   Omega-3 Fatty Acids (FISH OIL) 1,000 mg  Self Yes No   Sig: Take 1,000 mg by mouth daily   amLODIPine (NORVASC) 5 mg tablet  Self No No   Sig: Take 2 tablets (10 mg total) by mouth daily   Patient taking differently: Take 2 5 mg by mouth daily    ascorbic acid (VITAMIN C) 500 mg tablet  Self Yes No   Sig: Take 500 mg by mouth daily   atorvastatin (LIPITOR) 40 mg tablet  Self Yes No   Sig: Take 40 mg by mouth   chlorthalidone 25 mg tablet  Self Yes No   Sig: Take 25 mg by mouth daily   cholecalciferol (VITAMIN D3) 1,000 units tablet  Self Yes No   Sig: Take 2,000 Units by mouth daily    cloNIDine (CATAPRES) 0 1 mg tablet   No No   Sig: TAKE 1 TABLET BY MOUTH EVERY DAY   labetalol (NORMODYNE) 200 mg tablet  Self No No   Sig: Take 1 tablet (200 mg total) by mouth every 12 (twelve) hours   levothyroxine 125 mcg tablet  Self Yes No   Sig: Take 125 mcg by mouth daily   lisinopril (ZESTRIL) 20 mg tablet  Self No No   Sig: TAKE 1 TABLET (20 MG TOTAL) BY MOUTH 2 (TWO) TIMES A DAY   metFORMIN (GLUCOPHAGE) 500 mg tablet  Self Yes No   Sig: Take 500 mg by mouth daily with breakfast    multivitamin (THERAGRAN) TABS  Self Yes No   Sig: Take 1 tablet by mouth daily   pantoprazole (PROTONIX) 40 mg tablet  Self Yes No   Sig: Take 40 mg by mouth daily Pt takes daily when needed        Facility-Administered Medications: None       Past Medical History:   Diagnosis Date    Anxiety     [facility] ([facility] )     Bowel obstruction (HCC)     [facility]     LEFT BREAST CA 22 YEARS AGO     Depression     Diabetes mellitus ([facility]     Disease of thyroid gland     Hypertension     Hypothyroidism        Past Surgical History:   Procedure Laterality Date    ABDOMINAL ADHESION SURGERY N/A 2/4/2018    Procedure: LYSIS ADHESIONS;  Surgeon: Casandra Escobar DO;  [facility];  Service: General    BREAST SURGERY      CHOLECYSTECTOMY      COLON SURGERY  2017    EXPLORATORY LAPAROTOMY      JOINT REPLACEMENT      LEFT KNEE REPLACEMENT     LAPAROTOMY N/A 2/4/2018    Procedure: LAPAROTOMY EXPLORATORY,;  Surgeon: Casandra Escobar DO;  [facility];  Service: General    MASTECTOMY      MASTECTOMY Left 1995    MASTECTOMY Right 2017    DE BIOPSY/EXCISION, LYMPH NODE(S) Right 1/13/2017    Procedure: SENTINEL LYMPH NODE BIOPSY RIGHT AXILLA; Surgeon: Pamela Grimm MD;  [facility];  Service: General    FL MASTECTOMY, SIMPLE, COMPLETE Right 1/13/2017    Procedure: MASTECTOMY SIMPLE;  Surgeon: Pamela Grimm MD;  [facility];  Service: General    SMALL INTESTINE SURGERY N/A 2/4/2018    Procedure: RESECTION SMALL BOWEL;  Surgeon: Lily Casanova DO;  [facility];  Service: General    US GUIDED BREAST BIOPSY RIGHT COMPLETE Right 11/29/2016       Family History   Problem Relation Age of Onset    Cancer Mother     No Known Problems Father      I have reviewed and agree with the history as documented  E-Cigarette/Vaping     E-Cigarette/Vaping Substances     Social History     Tobacco Use    Smoking status: Current Every Day Smoker     Packs/day: 1 00     Types: Cigarettes    Smokeless tobacco: Never Used   Substance Use Topics    Alcohol use: Never    Drug use: Never        Review of Systems   Constitutional: Negative for chills and fever  HENT: Negative for sore throat and trouble swallowing  Respiratory: Negative for cough and shortness of breath  Cardiovascular: Negative for chest pain  Gastrointestinal: Negative for abdominal pain, diarrhea, nausea and vomiting  Musculoskeletal: Positive for neck pain  Negative for back pain and neck stiffness  All other systems reviewed and are negative  Physical Exam  ED Triage Vitals   Temperature Pulse Respirations Blood Pressure SpO2   09/21/21 1202 09/21/21 1202 09/21/21 1202 09/21/21 1202 09/21/21 1202   97 9 °F (36 6 °C) 76 16 (!) 199/104 99 %      Temp Source Heart Rate Source Patient Position - Orthostatic VS BP Location FiO2 (%)   09/21/21 1202 -- -- -- --   Tympanic          Pain Score       09/21/21 1432       8             Orthostatic Vital Signs  Vitals:    09/21/21 1202   BP: (!) 199/104   Pulse: 76       Physical Exam  Vitals and nursing note reviewed  Constitutional:       General: She is not in acute distress       Appearance: She is well-developed  She is not diaphoretic  HENT:      Head: Normocephalic and atraumatic  Right Ear: External ear normal       Left Ear: External ear normal       Nose: Nose normal    Eyes:      General: Lids are normal  No scleral icterus  Neck:      Vascular: No carotid bruit  Comments: There is bilateral trapezius tenderness to palpation  There is also midline C spine tenderness  No step offs or deformities  Cardiovascular:      Rate and Rhythm: Normal rate and regular rhythm  Heart sounds: Normal heart sounds  No murmur heard  No friction rub  No gallop  Pulmonary:      Effort: Pulmonary effort is normal  No respiratory distress  Breath sounds: Normal breath sounds  No wheezing or rales  Abdominal:      Tenderness: There is no abdominal tenderness  Musculoskeletal:         General: No deformity  Normal range of motion  Cervical back: Normal range of motion and neck supple  Tenderness present  No edema, erythema, rigidity, torticollis or crepitus  Spinous process tenderness and muscular tenderness present  Skin:     General: Skin is warm and dry  Neurological:      General: No focal deficit present  Mental Status: She is alert  Cranial Nerves: No cranial nerve deficit  Sensory: No sensory deficit  Motor: No weakness  Psychiatric:         Mood and Affect: Mood normal          Behavior: Behavior normal          ED Medications  Medications   acetaminophen (TYLENOL) tablet 975 mg (975 mg Oral Given 9/21/21 1432)   Diclofenac Sodium (VOLTAREN) 1 % topical gel 2 g (2 g Topical Given 9/21/21 1432)       Diagnostic Studies  Results Reviewed     None                 CT cervical spine without contrast   Final Result by Carlos Dorsey DO (09/21 1424)      Bones are overall demineralized  No destructive osseous lesion or evidence of acute fracture identified  Moderate multilevel degenerative changes of the spine                     Workstation performed: [id number]               Procedures  Procedures      ED Course  ED Course as of Sep 23 1933   Tue Sep 21, 2021   1428 CT cervical spine without contrast   1447 Patient was re-evaluated  Resting comfortably  Discussed results of CT scan  Will d/c  Recommended that she use Tylenol as needed for pain relief  Recommended PCP follow-up  Return precautions discussed  Patient verbalized understanding and agreed to plan of care  MDM  Number of Diagnoses or Management Options  Anterolisthesis  Neck pain  Trapezius muscle strain  Diagnosis management comments: Patient is a 78 y o  female who presents to the ED for neck pain  Pain likely due to muscle strain  However, due to hx of untreated breast CA and midline C spine tenderness, differential includes fracture, metastatic disease  Presentation not consistent with carotid dissection  Plan: CT C spine, pain control  Portions of the record may have been created with voice recognition software  Occasional wrong word or "sound a like" substitutions may have occurred due to the inherent limitations of voice recognition software  Read the chart carefully and recognize, using context, where substitutions have occurred           Amount and/or Complexity of Data Reviewed  Tests in the radiology section of CPT®: ordered and reviewed  Independent visualization of images, tracings, or specimens: yes    Risk of Complications, Morbidity, and/or Mortality  Presenting problems: moderate  Diagnostic procedures: moderate  Management options: moderate    Patient Progress  Patient progress: stable      Disposition  Final diagnoses:   Anterolisthesis   Trapezius muscle strain   Neck pain     Time reflects when diagnosis was documented in both MDM as applicable and the Disposition within this note     Time User Action Codes Description Comment    9/21/2021  2:28 PM Venancio Mary Add [O98 890] Trapezius muscle spasm 9/21/2021  2:29 PM Lisbet Ramona Add [M43 10] Anterolisthesis     9/21/2021  2:30 PM [place] Ramona Add [L93 277Y] Trapezius muscle strain     9/21/2021  2:30 PM [place] Ramona Modify [M43 10] Anterolisthesis     9/21/2021  2:30 PM [place] Ramona Remove [P03 160] Trapezius muscle spasm     9/21/2021  2:33 PM Lisbet Ramona Add [M54 2] Neck pain       ED Disposition     ED Disposition Condition Date/Time Comment    Discharge Stable Tue Sep 21, 2021  2:28 PM Shiva Willoughby discharge to home/self care              Follow-up Information    None         Discharge Medication List as of 9/21/2021  2:33 PM      CONTINUE these medications which have NOT CHANGED    Details   amLODIPine (NORVASC) 5 mg tablet Take 2 tablets (10 mg total) by mouth daily, Starting Fri 9/20/2019, Normal      ascorbic acid (VITAMIN C) 500 mg tablet Take 500 mg by mouth daily, Historical Med      atorvastatin (LIPITOR) 40 mg tablet Take 40 mg by mouth, Starting Wed 10/23/2019, Historical Med      chlorthalidone 25 mg tablet Take 25 mg by mouth daily, Starting Fri 10/18/2019, Historical Med      cholecalciferol (VITAMIN D3) 1,000 units tablet Take 2,000 Units by mouth daily , Historical Med      cloNIDine (CATAPRES) 0 1 mg tablet TAKE 1 TABLET BY MOUTH EVERY DAY, Normal      Eliquis 5 MG TAKE 1 TABLET BY MOUTH TWICE A DAY, Normal      labetalol (NORMODYNE) 200 mg tablet Take 1 tablet (200 mg total) by mouth every 12 (twelve) hours, Starting Sun 7/21/2019, Normal      levothyroxine 125 mcg tablet Take 125 mcg by mouth daily, Historical Med      lisinopril (ZESTRIL) 20 mg tablet TAKE 1 TABLET (20 MG TOTAL) BY MOUTH 2 (TWO) TIMES A DAY, Starting Mon 3/23/2020, Normal      LORazepam (ATIVAN) 0 5 mg tablet Take 0 5 mg by mouth 2 (two) times a day, Historical Med      metFORMIN (GLUCOPHAGE) 500 mg tablet Take 500 mg by mouth daily with breakfast , Historical Med      Multiple Vitamins-Minerals (PRESERVISION AREDS 2 PO) Take by mouth 2 (two) times a day, Historical Med      multivitamin (THERAGRAN) TABS Take 1 tablet by mouth daily, Historical Med      Omega-3 Fatty Acids (FISH OIL) 1,000 mg Take 1,000 mg by mouth daily, Historical Med      pantoprazole (PROTONIX) 40 mg tablet Take 40 mg by mouth daily Pt takes daily when needed , Historical Med           No discharge procedures on file  PDMP Review     None           ED Provider  Attending physically available and evaluated [facility]  I managed the patient along with the ED Attending      Electronically Signed by         Enderrebecca Bethea,   09/23/21 0410 Elizabeth Joseph,   09/23/21 5814

## 2021-09-21 NOTE — ED ATTENDING ATTESTATION
Final Diagnosis:  1  Anterolisthesis    2  Trapezius muscle strain    3  Neck pain           ISammie MD, saw and evaluated the patient  All available labs and X-rays were ordered by me or the resident and have been reviewed by myself  I discussed the patient with the resident / non-physician and agree with the resident's / non-physician practitioner's findings and plan as documented in the resident's / non-physician practicitioner's note, except where noted  At this point, I agree with the current assessment done in the ED  I was present during key portions of all procedures performed unless otherwise stated  Chief Complaint   Patient presents with    Neck Pain     Pt presents with "sharp" BL neck pain since waking up Saturday morning  Pt states the L side is worse  Pain progressively getting worse  This is a 78 y o  female presenting for evaluation of bilateral neck pain  She was doing okay until she woke up on Saturday and had the pain  The pain comes/goes  Never completely gone  Nothing helps  She tried muscle rub, asper-creme  She tried heating pads (both the one for clothing and an electric pad, but it didn't work)  Hot showers didn't help  No falls/trauma  She woke up with it  No numbness/tingling down the arms/legs  No weakness  No f/ch/cp/sob  Denies any upper respiratory tract infection symptoms (cough, congestion, rhinorrhea, sore throat)  No night sweats  Hx of breast CA on the RIGHT (diagnosed when she was 76)  Hx of similar 30 years ago on LEFT  PMH:   has a past medical history of Anxiety, Atrial fibrillation ([address] ), Bowel obstruction ([address] ), Cancer ([address] ), Depression, Diabetes mellitus ([address] ), Disease of thyroid gland, Hypertension, and Hypothyroidism      PSH:   has a past surgical history that includes Mastectomy; Breast surgery; Joint replacement; pr mastectomy, simple, complete (Right, 1/13/2017); pr biopsy/excision, lymph node(s) (Right, 1/13/2017); LAPAROTOMY (N/A, 2/4/2018); Abdominal adhesion surgery (N/A, 2/4/2018); Small intestine surgery (N/A, 2/4/2018); Exploratory laparotomy; US guided breast biopsy right complete (Right, 11/29/2016); Mastectomy (Left, 1995); Mastectomy (Right, 2017); Cholecystectomy; and Colon surgery (2017)  Social:  Social History     Substance and Sexual Activity   Alcohol Use Never     Social History     Tobacco Use   Smoking Status Current Every Day Smoker    Packs/day: 1 00    Types: Cigarettes   Smokeless Tobacco Never Used     Social History     Substance and Sexual Activity   Drug Use Never     PE:  Vitals:    09/21/21 1202   BP: (!) 199/104   Pulse: 76   Resp: 16   Temp: 97 9 °F (36 6 °C)   TempSrc: Tympanic   SpO2: 99%   General: VSS, NAD, awake, alert  Well-nourished, well-developed  Appears stated age  Head: Normocephalic, atraumatic, nontender  Eyes: PERRL, EOM-I  No diplopia  No hyphema  No subconjunctival hemorrhages  Symmetrical lids  ENTAtraumatic external nose and ears  MMM  No stridor  Normal phonation  No drooling  Base of mouth is soft  No mastoid tenderness  Neck: Symmetric, trachea midline  No JVD   +midline C-spine tenderness around C5  Able to flex/extend  CV: Peripheral pulses +2 throughout  No chest wall tenderness  Lungs:   Unlabored   No retractions  No crepitus  No tachypnea  No paradoxical motion  Abd: +BS, soft, NT/ND    MSK:   Extremities without tenderness or gross deformity  FROM    5/5  Sensation intact b/l equal for M/U/R/A nerve sensation  No lower extremity edema  Back:   No C/T/L-spine tenderness  No CVAT  Skin: Dry, intact  Neuro: AAOx3, GCS 15, CN II-XII grossly intact  Motor grossly intact  Sensory grossly intact  Psychiatric/Behavioral: Appropriate mood and affect   Exam: deferred  A:  - Neck pain  P:  - CT neck (non-contrast)  - Likely MSK but given the symptoms, will r/o acute process   Specifically metastatic cancer / pathologic fx      - 13 point ROS was performed and all are normal unless stated in the history above  - Nursing note reviewed  Vitals reviewed  - Orders placed by myself and/or advanced practitioner / resident     - Previous chart was reviewed  - No language barrier    - History obtained from patient  - There are no limitations to the history obtained  - Critical care time: Not applicable for this patient  Code Status: Prior  Advance Directive and Living Will:      Power of :    POLST:      Medications   acetaminophen (TYLENOL) tablet 975 mg (975 mg Oral Given 9/21/21 1432)   Diclofenac Sodium (VOLTAREN) 1 % topical gel 2 g (2 g Topical Given 9/21/21 1432)     CT cervical spine without contrast   Final Result      Bones are overall demineralized  No destructive osseous lesion or evidence of acute fracture identified  Moderate multilevel degenerative changes of the spine  Workstation performed: [id number]           Orders Placed This Encounter   Procedures    CT cervical spine without contrast     Labs Reviewed - No data to display  Time reflects when diagnosis was documented in both MDM as applicable and the Disposition within this note     Time User Action Codes Description Comment    9/21/2021  2:28 PM Anniece Bunkers Add [W72 270] Trapezius muscle spasm     9/21/2021  2:29 PM Anniece Bunkers Add [M43 10] Anterolisthesis     9/21/2021  2:30 PM Anniece Bunkers Add [L89 069S] Trapezius muscle strain     9/21/2021  2:30 PM Anniece Bunkers Modify [M43 10] Anterolisthesis     9/21/2021  2:30 PM Anniece Bunkers Remove [V61 116] Trapezius muscle spasm     9/21/2021  2:33 PM Anniece Bunkers Add [M54 2] Neck pain       ED Disposition     ED Disposition Condition Date/Time Comment    Discharge Stable Tue Sep 21, 2021  2:28 PM Ry Villanueva discharge to home/self care              Follow-up Information    None       Patient's Medications   Discharge Prescriptions    No medications on file No discharge procedures on file  Prior to Admission Medications   Prescriptions Last Dose Informant Patient Reported? Taking? Eliquis 5 MG   No No   Sig: TAKE 1 TABLET BY MOUTH TWICE A DAY   LORazepam (ATIVAN) 0 5 mg tablet  Self Yes No   Sig: Take 0 5 mg by mouth 2 (two) times a day   Multiple Vitamins-Minerals (PRESERVISION AREDS 2 PO)  Self Yes No   Sig: Take by mouth 2 (two) times a day   Omega-3 Fatty Acids (FISH OIL) 1,000 mg  Self Yes No   Sig: Take 1,000 mg by mouth daily   amLODIPine (NORVASC) 5 mg tablet  Self No No   Sig: Take 2 tablets (10 mg total) by mouth daily   Patient taking differently: Take 2 5 mg by mouth daily    ascorbic acid (VITAMIN C) 500 mg tablet  Self Yes No   Sig: Take 500 mg by mouth daily   atorvastatin (LIPITOR) 40 mg tablet  Self Yes No   Sig: Take 40 mg by mouth   chlorthalidone 25 mg tablet  Self Yes No   Sig: Take 25 mg by mouth daily   cholecalciferol (VITAMIN D3) 1,000 units tablet  Self Yes No   Sig: Take 2,000 Units by mouth daily    cloNIDine (CATAPRES) 0 1 mg tablet   No No   Sig: TAKE 1 TABLET BY MOUTH EVERY DAY   labetalol (NORMODYNE) 200 mg tablet  Self No No   Sig: Take 1 tablet (200 mg total) by mouth every 12 (twelve) hours   levothyroxine 125 mcg tablet  Self Yes No   Sig: Take 125 mcg by mouth daily   lisinopril (ZESTRIL) 20 mg tablet  Self No No   Sig: TAKE 1 TABLET (20 MG TOTAL) BY MOUTH 2 (TWO) TIMES A DAY   metFORMIN (GLUCOPHAGE) 500 mg tablet  Self Yes No   Sig: Take 500 mg by mouth daily with breakfast    multivitamin (THERAGRAN) TABS  Self Yes No   Sig: Take 1 tablet by mouth daily   pantoprazole (PROTONIX) 40 mg tablet  Self Yes No   Sig: Take 40 mg by mouth daily Pt takes daily when needed  Facility-Administered Medications: None       Portions of the record may have been created with voice recognition software  Occasional wrong word or "sound a like" substitutions may have occurred due to the inherent limitations of voice recognition software  Read the chart carefully and recognize, using context, where substitutions have occurred      Electronically signed by:  Franklin Ortiz

## 2021-11-20 ENCOUNTER — IMMUNIZATIONS (OUTPATIENT)
Dept: FAMILY MEDICINE CLINIC | Facility: HOSPITAL | Age: 81
End: 2021-11-20

## 2021-11-20 DIAGNOSIS — Z23 ENCOUNTER FOR IMMUNIZATION: Primary | ICD-10-CM

## 2021-11-20 PROCEDURE — 91300 COVID-19 PFIZER VACC 0.3 ML: CPT

## 2021-11-20 PROCEDURE — 0001A COVID-19 PFIZER VACC 0.3 ML: CPT

## 2022-01-16 ENCOUNTER — HOSPITAL ENCOUNTER (EMERGENCY)
Facility: HOSPITAL | Age: 82
Discharge: HOME | End: 2022-01-16
Attending: EMERGENCY MEDICINE | Admitting: EMERGENCY MEDICINE
Payer: MEDICARE

## 2022-01-16 ENCOUNTER — APPOINTMENT (EMERGENCY)
Dept: RADIOLOGY | Facility: HOSPITAL | Age: 82
End: 2022-01-16
Payer: MEDICARE

## 2022-01-16 VITALS
WEIGHT: 138.89 LBS | TEMPERATURE: 97.3 F | RESPIRATION RATE: 18 BRPM | HEART RATE: 90 BPM | SYSTOLIC BLOOD PRESSURE: 193 MMHG | OXYGEN SATURATION: 99 % | BODY MASS INDEX: 22.42 KG/M2 | DIASTOLIC BLOOD PRESSURE: 88 MMHG

## 2022-01-16 DIAGNOSIS — M25.461 KNEE EFFUSION, RIGHT: Primary | ICD-10-CM

## 2022-01-16 LAB
ALBUMIN SERPL BCP-MCNC: 3.6 G/DL (ref 3.5–5)
ALP SERPL-CCNC: 114 U/L (ref 46–116)
ALT SERPL W P-5'-P-CCNC: 32 U/L (ref 12–78)
ANION GAP SERPL CALCULATED.3IONS-SCNC: 7 MMOL/L (ref 4–13)
AST SERPL W P-5'-P-CCNC: 19 U/L (ref 5–45)
BASOPHILS # BLD AUTO: 0.06 THOUSANDS/ΜL (ref 0–0.1)
BASOPHILS NFR BLD AUTO: 1 % (ref 0–1)
BILIRUB SERPL-MCNC: 0.9 MG/DL (ref 0.2–1)
BUN SERPL-MCNC: 17 MG/DL (ref 5–25)
CALCIUM SERPL-MCNC: 9 MG/DL (ref 8.3–10.1)
CHLORIDE SERPL-SCNC: 100 MMOL/L (ref 100–108)
CO2 SERPL-SCNC: 29 MMOL/L (ref 21–32)
CREAT SERPL-MCNC: 1 MG/DL (ref 0.6–1.3)
EOSINOPHIL # BLD AUTO: 0.15 THOUSAND/ΜL (ref 0–0.61)
EOSINOPHIL NFR BLD AUTO: 2 % (ref 0–6)
ERYTHROCYTE [DISTWIDTH] IN BLOOD BY AUTOMATED COUNT: 15.2 % (ref 11.6–15.1)
GFR SERPL CREATININE-BSD FRML MDRD: 52 ML/MIN/1.73SQ M
GLUCOSE SERPL-MCNC: 163 MG/DL (ref 65–140)
HCT VFR BLD AUTO: 36 % (ref 34.8–46.1)
HGB BLD-MCNC: 11.4 G/DL (ref 11.5–15.4)
IMM GRANULOCYTES # BLD AUTO: 0.04 THOUSAND/UL (ref 0–0.2)
IMM GRANULOCYTES NFR BLD AUTO: 0 % (ref 0–2)
LYMPHOCYTES # BLD AUTO: 1.03 THOUSANDS/ΜL (ref 0.6–4.47)
LYMPHOCYTES NFR BLD AUTO: 11 % (ref 14–44)
MCH RBC QN AUTO: 29.6 PG (ref 26.8–34.3)
MCHC RBC AUTO-ENTMCNC: 31.7 G/DL (ref 31.4–37.4)
MCV RBC AUTO: 94 FL (ref 82–98)
MONOCYTES # BLD AUTO: 1.06 THOUSAND/ΜL (ref 0.17–1.22)
MONOCYTES NFR BLD AUTO: 11 % (ref 4–12)
NEUTROPHILS # BLD AUTO: 7.15 THOUSANDS/ΜL (ref 1.85–7.62)
NEUTS SEG NFR BLD AUTO: 75 % (ref 43–75)
NRBC BLD AUTO-RTO: 0 /100 WBCS
PLATELET # BLD AUTO: 207 THOUSANDS/UL (ref 149–390)
PMV BLD AUTO: 9.9 FL (ref 8.9–12.7)
POTASSIUM SERPL-SCNC: 4.2 MMOL/L (ref 3.5–5.3)
PROT SERPL-MCNC: 6.9 G/DL (ref 6.4–8.2)
RBC # BLD AUTO: 3.85 MILLION/UL (ref 3.81–5.12)
SODIUM SERPL-SCNC: 136 MMOL/L (ref 136–145)
WBC # BLD AUTO: 9.49 THOUSAND/UL (ref 4.31–10.16)

## 2022-01-16 PROCEDURE — 85025 COMPLETE CBC W/AUTO DIFF WBC: CPT | Performed by: EMERGENCY MEDICINE

## 2022-01-16 PROCEDURE — 36415 COLL VENOUS BLD VENIPUNCTURE: CPT | Performed by: EMERGENCY MEDICINE

## 2022-01-16 PROCEDURE — 99282 EMERGENCY DEPT VISIT SF MDM: CPT | Performed by: EMERGENCY MEDICINE

## 2022-01-16 PROCEDURE — 73562 X-RAY EXAM OF KNEE 3: CPT

## 2022-01-16 PROCEDURE — 99283 EMERGENCY DEPT VISIT LOW MDM: CPT

## 2022-01-16 PROCEDURE — 80053 COMPREHEN METABOLIC PANEL: CPT | Performed by: EMERGENCY MEDICINE

## 2022-01-16 NOTE — ED PROVIDER NOTES
History  Chief Complaint   Patient presents with    Knee Swelling     To ED with c/o right kneee pain and swelling started two days ago  No injury  69-year-old female presents for evaluation of right knee swelling that started 4 days ago  Patient states she woke up and noticed that her knee was swollen  Patient reports mild pain with movement  Denies having any trauma or any specific inciting factors  Still has full range of motion  Denies having similar symptoms previously  Prior to Admission Medications   Prescriptions Last Dose Informant Patient Reported? Taking?    Eliquis 5 MG   No No   Sig: TAKE 1 TABLET BY MOUTH TWICE A DAY   LORazepam (ATIVAN) 0 5 mg tablet  Self Yes No   Sig: Take 0 5 mg by mouth 2 (two) times a day   Multiple Vitamins-Minerals (PRESERVISION AREDS 2 PO)  Self Yes No   Sig: Take by mouth 2 (two) times a day   Omega-3 Fatty Acids (FISH OIL) 1,000 mg  Self Yes No   Sig: Take 1,000 mg by mouth daily   amLODIPine (NORVASC) 5 mg tablet  Self No No   Sig: Take 2 tablets (10 mg total) by mouth daily   Patient taking differently: Take 2 5 mg by mouth daily    ascorbic acid (VITAMIN C) 500 mg tablet  Self Yes No   Sig: Take 500 mg by mouth daily   atorvastatin (LIPITOR) 40 mg tablet  Self Yes No   Sig: Take 40 mg by mouth   chlorthalidone 25 mg tablet  Self Yes No   Sig: Take 25 mg by mouth daily   cholecalciferol (VITAMIN D3) 1,000 units tablet  Self Yes No   Sig: Take 2,000 Units by mouth daily    cloNIDine (CATAPRES) 0 1 mg tablet   No No   Sig: TAKE 1 TABLET BY MOUTH EVERY DAY   labetalol (NORMODYNE) 200 mg tablet  Self No No   Sig: Take 1 tablet (200 mg total) by mouth every 12 (twelve) hours   levothyroxine 125 mcg tablet  Self Yes No   Sig: Take 125 mcg by mouth daily   lisinopril (ZESTRIL) 20 mg tablet  Self No No   Sig: TAKE 1 TABLET (20 MG TOTAL) BY MOUTH 2 (TWO) TIMES A DAY   metFORMIN (GLUCOPHAGE) 500 mg tablet  Self Yes No   Sig: Take 500 mg by mouth daily with breakfast    multivitamin (THERAGRAN) TABS  Self Yes No   Sig: Take 1 tablet by mouth daily   pantoprazole (PROTONIX) 40 mg tablet  Self Yes No   Sig: Take 40 mg by mouth daily Pt takes daily when needed  Facility-Administered Medications: None       Past Medical History:   Diagnosis Date    Anxiety     [facility]     Bowel obstruction (HCC)     Cancer ([facility] )     LEFT BREAST CA 22 YEARS AGO     Depression     Diabetes mellitus ([place] [address] )     Disease of thyroid gland     Hypertension     Hypothyroidism        Past Surgical History:   Procedure Laterality Date    ABDOMINAL ADHESION SURGERY N/A 2/4/2018    Procedure: LYSIS ADHESIONS;  Surgeon: Nilesh Terrell DO;  [facility];  Service: General    BREAST SURGERY      CHOLECYSTECTOMY      COLON SURGERY  2017    EXPLORATORY LAPAROTOMY      JOINT REPLACEMENT      LEFT KNEE REPLACEMENT     LAPAROTOMY N/A 2/4/2018    Procedure: LAPAROTOMY EXPLORATORY,;  Surgeon: Nilesh Terrell DO;  [facility];  Service: General    MASTECTOMY      MASTECTOMY Left 1995    MASTECTOMY Right 2017    MT BIOPSY/EXCISION, LYMPH NODE(S) Right 1/13/2017    Procedure: SENTINEL LYMPH NODE BIOPSY RIGHT AXILLA; Surgeon: Salima Goznalez MD;  [facility];  Service: General    MT MASTECTOMY, SIMPLE, COMPLETE Right 1/13/2017    Procedure: MASTECTOMY SIMPLE;  Surgeon: Salima Gonzalez MD;  [facility];  Service: General    SMALL INTESTINE SURGERY N/A 2/4/2018    Procedure: RESECTION SMALL BOWEL;  Surgeon: Nilesh Terrell DO;  [facility];  Service: General    US GUIDED BREAST BIOPSY RIGHT COMPLETE Right 11/29/2016       Family History   Problem Relation Age of Onset    Cancer Mother     No Known Problems Father      I have reviewed and agree with the history as documented      E-Cigarette/Vaping    E-Cigarette Use Never User      E-Cigarette/Vaping Substances    Nicotine No     Flavoring No      Social History     Tobacco Use  Smoking status: Current Every Day Smoker     Packs/day: 1 00     Types: Cigarettes    Smokeless tobacco: Never Used   Vaping Use    Vaping Use: Never used   Substance Use Topics    Alcohol use: Never    Drug use: Never       Review of Systems   Constitutional: Negative for fever  Musculoskeletal: Positive for arthralgias  All other systems reviewed and are negative  Physical Exam  Physical Exam  Vitals and nursing note reviewed  Constitutional:       Appearance: She is well-developed  HENT:      Head: Normocephalic and atraumatic  Right Ear: External ear normal       Left Ear: External ear normal       Nose: Nose normal    Eyes:      General: No scleral icterus  Cardiovascular:      Rate and Rhythm: Normal rate  Pulmonary:      Effort: Pulmonary effort is normal  No respiratory distress  Abdominal:      General: There is no distension  Musculoskeletal:         General: Swelling present  No deformity  Normal range of motion  Cervical back: Normal range of motion  Comments: Mild right knee effusion  Full ROM  No overlying erythema  Non tender  Skin:     Findings: No rash  Neurological:      General: No focal deficit present  Mental Status: She is alert and oriented to person, place, and time     Psychiatric:         Mood and Affect: Mood normal          Vital Signs  ED Triage Vitals [01/16/22 1304]   Temperature Pulse Respirations Blood Pressure SpO2   (!) 97 3 °F (36 3 °C) 90 18 (!) 193/88 99 %      Temp Source Heart Rate Source Patient Position - Orthostatic VS BP Location FiO2 (%)   Tympanic Monitor Sitting Left arm --      Pain Score       6           Vitals:    01/16/22 1304   BP: (!) 193/88   Pulse: 90   Patient Position - Orthostatic VS: Sitting         Visual Acuity      ED Medications  Medications - No data to display    Diagnostic Studies  Results Reviewed     Procedure Component Value Units Date/Time    Comprehensive metabolic panel [[id number]]  (Abnormal) Collected: 01/16/22 1311    Lab Status: Final result Specimen: Blood from Arm, Left Updated: 01/16/22 1338     Sodium 136 mmol/L      Potassium 4 2 mmol/L      Chloride 100 mmol/L      CO2 29 mmol/L      ANION GAP 7 mmol/L      BUN 17 mg/dL      Creatinine 1 00 mg/dL      Glucose 163 mg/dL      Calcium 9 0 mg/dL      AST 19 U/L      ALT 32 U/L      Alkaline Phosphatase 114 U/L      Total Protein 6 9 g/dL      Albumin 3 6 g/dL      Total Bilirubin 0 90 mg/dL      eGFR 52 ml/min/1 73sq m     Narrative:      National Kidney Disease Foundation guidelines for Chronic Kidney Disease (CKD):     Stage 1 with normal or high GFR (GFR > 90 mL/min/1 73 square meters)    Stage 2 Mild CKD (GFR = 60-89 mL/min/1 73 square meters)    Stage 3A Moderate CKD (GFR = 45-59 mL/min/1 73 square meters)    Stage 3B Moderate CKD (GFR = 30-44 mL/min/1 73 square meters)    Stage 4 Severe CKD (GFR = 15-29 mL/min/1 73 square meters)    Stage 5 End Stage CKD (GFR <15 mL/min/1 73 square meters)  Note: GFR calculation is accurate only with a steady state creatinine    CBC and differential [[id number]]  (Abnormal) Collected: 01/16/22 1311    Lab Status: Final result Specimen: Blood from Arm, Left Updated: 01/16/22 1317     WBC 9 49 Thousand/uL      RBC 3 85 Million/uL      Hemoglobin 11 4 g/dL      Hematocrit 36 0 %      MCV 94 fL      MCH 29 6 pg      MCHC 31 7 g/dL      RDW 15 2 %      MPV 9 9 fL      Platelets 000 Thousands/uL      nRBC 0 /100 WBCs      Neutrophils Relative 75 %      Immat GRANS % 0 %      Lymphocytes Relative 11 %      Monocytes Relative 11 %      Eosinophils Relative 2 %      Basophils Relative 1 %      Neutrophils Absolute 7 15 Thousands/µL      Immature Grans Absolute 0 04 Thousand/uL      Lymphocytes Absolute 1 03 Thousands/µL      Monocytes Absolute 1 06 Thousand/µL      Eosinophils Absolute 0 15 Thousand/µL      Basophils Absolute 0 06 Thousands/µL                  XR knee 3 views right non injury    (Results Pending) Procedures  Procedures         ED Course                               SBIRT 20yo+      Most Recent Value   SBIRT (24 yo +)    In order to provide better care to our patients, we are screening all of our patients for alcohol and drug use  Would it be okay to ask you these screening questions? Yes Filed at: 01/16/2022 1550   Initial Alcohol Screen: US AUDIT-C     1  How often do you have a drink containing alcohol? 0 Filed at: 01/16/2022 1550   2  How many drinks containing alcohol do you have on a typical day you are drinking? 0 Filed at: 01/16/2022 1550   3a  Male UNDER 65: How often do you have five or more drinks on one occasion? 0 Filed at: 01/16/2022 1550   3b  FEMALE Any Age, or MALE 65+: How often do you have 4 or more drinks on one occassion? 0 Filed at: 01/16/2022 1550   Audit-C Score 0 Filed at: 01/16/2022 1550   KENA: How many times in the past year have you    Used an illegal drug or used a prescription medication for non-medical reasons? Never Filed at: 01/16/2022 1550                    MDM  Number of Diagnoses or Management Options  Knee effusion, right: new and requires workup  Diagnosis management comments: 78 yof with right knee swelling  Apply short knee brace for comfort  FU PCP/orthopedics          Amount and/or Complexity of Data Reviewed  Clinical lab tests: reviewed and ordered  Tests in the radiology section of CPT®: reviewed and ordered  Tests in the medicine section of CPT®: reviewed  Decide to obtain previous medical records or to obtain history from someone other than the patient: yes  Review and summarize past medical records: yes        Disposition  Final diagnoses:   Knee effusion, right     Time reflects when diagnosis was documented in both MDM as applicable and the Disposition within this note     Time User Action Codes Description Comment    1/16/2022  3:35 PM Marija Dowell Add [Q38 124] Acute pain of right knee     1/16/2022  3:35 PM Selena Montero Acute pain of right knee     1/16/2022  3:35 PM [place] Add [I29 [id number]] Knee effusion, right       ED Disposition     ED Disposition Condition Date/Time Comment    Discharge Stable Sun Jan 16, 2022  3:35 PM Lida Rayo discharge to home/self care              Follow-up Information     Follow up With Specialties Details Why Contact Info Additional [address] [facility],[address]Th Floor, DO Internal Medicine   [address], [address] [place] [address]  [phone]       [address] Specialists [facility] Orth[facility] Surgery In 1 week  [address]  [place] [address] [place] [facility] [address]  [address] Specialists [facility], [address], [facility], [place], [address]     [address] Emergency Department Emergency Medicine  If symptoms worsen [address] [place], [address]  [phone] [address] Emergency Department, [address] [place], [facility], [address]          Discharge Medication List as of 1/16/2022  3:37 PM      CONTINUE these medications which have NOT CHANGED    Details   amLODIPine (NORVASC) 5 mg tablet Take 2 tablets (10 mg total) by mouth daily, Starting Fri 9/20/2019, Normal      ascorbic acid (VITAMIN C) 500 mg tablet Take 500 mg by mouth daily, Historical Med      atorvastatin (LIPITOR) 40 mg tablet Take 40 mg by mouth, Starting Wed 10/23/2019, Historical Med      chlorthalidone 25 mg tablet Take 25 mg by mouth daily, Starting Fri 10/18/2019, Historical Med      cholecalciferol (VITAMIN D3) 1,000 units tablet Take 2,000 Units by mouth daily , Historical Med      cloNIDine (CATAPRES) 0 1 mg tablet TAKE 1 TABLET BY MOUTH EVERY DAY, Normal      Eliquis 5 MG TAKE 1 TABLET BY MOUTH TWICE A DAY, Normal      labetalol (NORMODYNE) 200 mg tablet Take 1 tablet (200 mg total) by mouth every 12 (twelve) hours, Starting Sun 7/21/2019, Normal      levothyroxine 125 mcg tablet Take 125 mcg by mouth daily, Historical Med      lisinopril (ZESTRIL) 20 mg tablet TAKE 1 TABLET (20 MG TOTAL) BY MOUTH 2 (TWO) TIMES A DAY, Starting Mon 3/23/2020, Normal      LORazepam (ATIVAN) 0 5 mg tablet Take 0 5 mg by mouth 2 (two) times a day, Historical Med      metFORMIN (GLUCOPHAGE) 500 mg tablet Take 500 mg by mouth daily with breakfast , Historical Med      Multiple Vitamins-Minerals (PRESERVISION AREDS 2 PO) Take by mouth 2 (two) times a day, Historical Med      multivitamin (THERAGRAN) TABS Take 1 tablet by mouth daily, Historical Med      Omega-3 Fatty Acids (FISH OIL) 1,000 mg Take 1,000 mg by mouth daily, Historical Med      pantoprazole (PROTONIX) 40 mg tablet Take 40 mg by mouth daily Pt takes daily when needed , Historical Med             No discharge procedures on file      PDMP Review     None          ED Provider  Electronically Signed by           Smooth Trinidad DO  01/16/22 4422

## 2022-01-16 NOTE — ED NOTES
Discharged to home   Tolerating knee [address], RN  01/16/22 8408

## 2022-01-19 ENCOUNTER — OFFICE VISIT (OUTPATIENT)
Dept: OBGYN CLINIC | Facility: CLINIC | Age: 82
End: 2022-01-19
Payer: MEDICARE

## 2022-01-19 ENCOUNTER — APPOINTMENT (OUTPATIENT)
Dept: RADIOLOGY | Facility: CLINIC | Age: 82
End: 2022-01-19
Payer: MEDICARE

## 2022-01-19 VITALS
WEIGHT: 140 LBS | BODY MASS INDEX: 22.5 KG/M2 | SYSTOLIC BLOOD PRESSURE: 150 MMHG | DIASTOLIC BLOOD PRESSURE: 72 MMHG | HEIGHT: 66 IN

## 2022-01-19 DIAGNOSIS — M17.11 PRIMARY OSTEOARTHRITIS OF RIGHT KNEE: ICD-10-CM

## 2022-01-19 DIAGNOSIS — M25.561 RIGHT KNEE PAIN, UNSPECIFIED CHRONICITY: ICD-10-CM

## 2022-01-19 DIAGNOSIS — M25.061 HEMARTHROSIS OF RIGHT KNEE: Primary | ICD-10-CM

## 2022-01-19 PROCEDURE — 99212 OFFICE O/P EST SF 10 MIN: CPT | Performed by: ORTHOPAEDIC SURGERY

## 2022-01-19 PROCEDURE — 20610 DRAIN/INJ JOINT/BURSA W/O US: CPT | Performed by: ORTHOPAEDIC SURGERY

## 2022-01-19 PROCEDURE — 73560 X-RAY EXAM OF KNEE 1 OR 2: CPT

## 2022-01-19 RX ORDER — LIDOCAINE HYDROCHLORIDE 10 MG/ML
8 INJECTION, SOLUTION INFILTRATION; PERINEURAL
Status: COMPLETED | OUTPATIENT
Start: 2022-01-19 | End: 2022-01-19

## 2022-01-19 RX ADMIN — LIDOCAINE HYDROCHLORIDE 8 ML: 10 INJECTION, SOLUTION INFILTRATION; PERINEURAL at 15:26

## 2022-01-19 NOTE — ASSESSMENT & PLAN NOTE
Right knee hemarthrosis likely due to spontaneous bleeding in setting of Eliquis vs occult trauma  - Pt denies any trauma or inciting event  - Right knee XR + medial arthritis  - Arthrocentesis +45cc bloody aspirate

## 2022-01-19 NOTE — PROGRESS NOTES
Assessment:     1  Hemarthrosis of right knee    2  Primary osteoarthritis of right knee        Plan:     Problem List Items Addressed This Visit        Musculoskeletal and Integument    Hemarthrosis of right knee - Primary     Right knee hemarthrosis likely due to spontaneous bleeding in setting of Eliquis vs occult trauma  - Pt denies any trauma or inciting event  - Right knee XR + medial arthritis  - Arthrocentesis +45cc bloody aspirate         Relevant Orders    XR knee 1 or 2 vw right    Large joint arthrocentesis: R knee    Primary osteoarthritis of right knee           Symptoms likely due to moderate right knee effusion from spontaneous hemarthrosis in setting of Eliquis use vs occult trauma  Also could be contributed by medial right knee arthritis  Will aspirate with lidocaine knee injection  Rec avoid high-impact activities including squatting, kneeling, and walking up/down incline  Follow-up in 6-8 weeks for re-evaluation  Subjective:     Patient ID: Sofi Oliver is a 78 y o  female  Chief Complaint:  81-YOF presents for c/o right knee pain noted after waking up x1 week ago  Denies any trauma, inciting event, or suspected etiology  H/o left total knee replacement in 2002 but denies any h/o of gout or pseudogout  Evaluated in [address]  ED 1/16 with knee XR + effusion and sent home with knee brace  Pain is achy, 4-5/10, localized, no exacerbation factor, improves with ice and immobilization  Pt states her pain has improved since a week ago  Denies any knee locking or giving out  Allergy:  Allergies   Allergen Reactions    Meloxicam GI Intolerance    Morphine GI Intolerance    Morphine     Penicillins     Percocet [Oxycodone-Acetaminophen]     Sitagliptin      SOB     Medications:  all current active meds have been reviewed and PTA meds:   Cannot display prior to admission medications because the patient has not been admitted in this contact       Past Medical History:  Past Medical History: Diagnosis Date    Anxiety     Atrial fibrillation (HCC)     Bowel obstruction (HCC)     [facility]     LEFT BREAST CA 22 YEARS AGO     Depression     Diabetes mellitus ([facility] )     Disease of thyroid gland     Hypertension     Hypothyroidism      Past Surgical History:  Past Surgical History:   Procedure Laterality Date    ABDOMINAL ADHESION SURGERY N/A 2/4/2018    Procedure: LYSIS ADHESIONS;  Surgeon: Nani Still DO;  [facility];  Service: General    BREAST SURGERY      CHOLECYSTECTOMY      COLON SURGERY  2017    EXPLORATORY LAPAROTOMY      JOINT REPLACEMENT      LEFT KNEE REPLACEMENT     LAPAROTOMY N/A 2/4/2018    Procedure: LAPAROTOMY EXPLORATORY,;  Surgeon: Nani Still DO;  [facility];  Service: General    MASTECTOMY      MASTECTOMY Left 1995    MASTECTOMY Right 2017    ME BIOPSY/EXCISION, LYMPH NODE(S) Right 1/13/2017    Procedure: SENTINEL LYMPH NODE BIOPSY RIGHT AXILLA; Surgeon: Jose Juan Mason MD;  [facility];  Service: General    ME MASTECTOMY, SIMPLE, COMPLETE Right 1/13/2017    Procedure: MASTECTOMY SIMPLE;  Surgeon: Jose Juan Mason MD;  [facility];  Service: General    SMALL INTESTINE SURGERY N/A 2/4/2018    Procedure: RESECTION SMALL BOWEL;  Surgeon: Nani Still DO;  [facility];  Service: General    US GUIDED BREAST BIOPSY RIGHT COMPLETE Right 11/29/2016     Family History:  Family History   Problem Relation Age of Onset    Cancer Mother     No Known Problems Father      Social History:  Social History     Substance and Sexual Activity   Alcohol Use Never     Social History     Substance and Sexual Activity   Drug Use Never     Social History     Tobacco Use   Smoking Status Current Every Day Smoker    Packs/day: 1 00    Types: Cigarettes   Smokeless Tobacco Never Used     Review of Systems   Constitutional: Negative for activity change, appetite change, fatigue, fever and unexpected weight change     HENT: Negative for congestion, ear discharge, ear pain, hearing loss, nosebleeds, rhinorrhea, sore throat, tinnitus, trouble swallowing and voice change  Eyes: Negative for pain, discharge and visual disturbance  Respiratory: Negative for apnea, cough, choking, chest tightness, shortness of breath, wheezing and stridor  Cardiovascular: Negative for chest pain and palpitations  Gastrointestinal: Negative for abdominal distention, abdominal pain, constipation, diarrhea, nausea and vomiting  Genitourinary: Negative for dysuria  Musculoskeletal: Positive for arthralgias (Right knee) and joint swelling (Right knee)  Negative for myalgias, neck pain and neck stiffness  Neurological: Negative for dizziness, tremors and weakness  Psychiatric/Behavioral: Negative for agitation and behavioral problems  Objective:  BP Readings from Last 1 Encounters:   01/19/22 150/72      Wt Readings from Last 1 Encounters:   01/19/22 63 5 kg (140 lb)      BMI:   Estimated body mass index is 22 6 kg/m² as calculated from the following:    Height as of this encounter: 5' 6" (1 676 m)  Weight as of this encounter: 63 5 kg (140 lb)  BSA:   Estimated body surface area is 1 72 meters squared as calculated from the following:    Height as of this encounter: 5' 6" (1 676 m)  Weight as of this encounter: 63 5 kg (140 lb)  Physical Exam  Constitutional:       General: She is not in acute distress  Appearance: Normal appearance  She is normal weight  She is not ill-appearing, toxic-appearing or diaphoretic  HENT:      Head: Normocephalic and atraumatic  Right Ear: External ear normal       Left Ear: External ear normal    Eyes:      Extraocular Movements: Extraocular movements intact  Conjunctiva/sclera: Conjunctivae normal    Pulmonary:      Effort: Pulmonary effort is normal    Musculoskeletal:         General: Swelling (Mild right knee effusion) and tenderness (Medial right knee) present   No deformity or signs of injury  Cervical back: Normal range of motion and neck supple  Right knee: Effusion present  Instability Tests: Medial Iris test negative and lateral Iris test negative  Left knee:      Instability Tests: Medial Iris test negative and lateral Iris test negative  Right lower leg: No edema  Left lower leg: No edema  Skin:     General: Skin is warm and dry  Capillary Refill: Capillary refill takes less than 2 seconds  Neurological:      General: No focal deficit present  Mental Status: She is alert and oriented to person, place, and time  Mental status is at baseline  Psychiatric:         Mood and Affect: Mood normal          Behavior: Behavior normal        Right Knee Exam     Muscle Strength   The patient has normal right knee strength  Tenderness   The patient is experiencing tenderness in the medial joint line  Range of Motion   Extension:  5 abnormal   Flexion:  80 abnormal     Tests   Iris:  Medial - negative Lateral - negative  Varus: negative Valgus: negative  Lachman:  Anterior - negative    Posterior - negative  Drawer:  Anterior - negative    Posterior - negative  Pivot shift: negative  Patellar apprehension: negative    Other   Erythema: absent  Scars: absent  Sensation: normal  Pulse: present  Swelling: moderate  Effusion: effusion present      Left Knee Exam   Left knee exam is normal     Muscle Strength   The patient has normal left knee strength  Range of Motion   The patient has normal left knee ROM    Extension: normal   Flexion: normal     Tests   Iris:  Medial - negative Lateral - negative  Varus: negative Valgus: negative  Lachman:  Anterior - negative    Posterior - negative  Drawer:  Anterior - negative     Posterior - negative  Pivot shift: negative  Patellar apprehension: negative    Other   Erythema: absent  Scars: present  Sensation: normal  Pulse: present  Swelling: none              I have personally reviewed pertinent films in PACS  Large joint arthrocentesis: R knee  [place] Protocol:  Procedure performed by: (Dr Lily Jones)  Consent: Verbal consent obtained    Risks and benefits: risks, benefits and alternatives were discussed  Consent given by: patient  Patient understanding: patient states understanding of the procedure being performed  Patient identity confirmed: verbally with patient    Supporting Documentation  Indications: pain and joint swelling   Procedure Details  Location: knee - R knee  Needle size: 18 G  Ultrasound guidance: no  Approach: anterolateral  Medications administered: 8 mL lidocaine 1 %    Aspirate amount: 45 mL  Aspirate: bloody    Patient tolerance: patient tolerated the procedure well with no immediate complications  Dressing:  Sterile dressing applied

## 2022-01-21 ENCOUNTER — TELEPHONE (OUTPATIENT)
Dept: OBGYN CLINIC | Facility: HOSPITAL | Age: 82
End: 2022-01-21

## 2022-01-21 NOTE — TELEPHONE ENCOUNTER
Sounds good, Isabel  Most likely not DVT since she is on Eliquis, but if treatment recommendations do not help she may need to go to ED  Thank you

## 2022-01-21 NOTE — TELEPHONE ENCOUNTER
I spoke with patient and she states that she thinks she pulled her calf muscle when getting out of the car after office visit on 1/19  She states that was a buldge to the calf area that has disappated but she feels tight in the calf  Denies swelling increased swelling, she does note that she does have problems with b/l LE edema, denies redness or tenderness to touch, just feels tight  She is asking what she should do for this  She will using heat 20 on 20 off and taking tylenol 1000m TID  Please advise

## 2022-01-21 NOTE — TELEPHONE ENCOUNTER
Dr Isabel Larsen    Patient seen 1/19, she has intense pain in her leg  She has been trying ice and heat  She think she may have pulled a muscle and has pain and pulling feeling in her calf  Swelling has gone down       xfer to triage nurse Brijesh Arce

## 2022-02-07 ENCOUNTER — TELEPHONE (OUTPATIENT)
Dept: OBGYN CLINIC | Facility: HOSPITAL | Age: 82
End: 2022-02-07

## 2022-02-07 NOTE — TELEPHONE ENCOUNTER
I spoke with patient and she states that sometimes the knee throbs  Denies swelling or redness, or fever  Hurts to bear weight when she gets up in the morning  She uses a cane all the time  She stopped tylenol as it was not helping  Advised that she should try taking it to help take the edge off the pain at least, RICE method and off load with cane  Offered appt for tomorrow morning but her  cannot come in morning  Needs afternoon  Appt given for first afternoon available 2/15 at 2:45pm   Patient can call back for cancellations  Worsening of symptoms ER for evaluation and treatment    Patient verbalized understanding

## 2022-02-07 NOTE — TELEPHONE ENCOUNTER
Dr Anamaria Gresham  RE:  Pain in knee     Patient states she saw the Dr on 1/19 but now, the left side of her R-knee is really hurting her and it is throbbing  Patient also states the Tylonel extra strength isn't helping  Patient would like to know what to do

## 2022-02-09 ENCOUNTER — TELEPHONE (OUTPATIENT)
Dept: OBGYN CLINIC | Facility: HOSPITAL | Age: 82
End: 2022-02-09

## 2022-02-09 ENCOUNTER — OFFICE VISIT (OUTPATIENT)
Dept: OBGYN CLINIC | Facility: CLINIC | Age: 82
End: 2022-02-09
Payer: MEDICARE

## 2022-02-09 VITALS
HEIGHT: 66 IN | BODY MASS INDEX: 22.5 KG/M2 | DIASTOLIC BLOOD PRESSURE: 86 MMHG | WEIGHT: 140 LBS | SYSTOLIC BLOOD PRESSURE: 142 MMHG

## 2022-02-09 DIAGNOSIS — M70.51 PES ANSERINUS BURSITIS OF RIGHT KNEE: ICD-10-CM

## 2022-02-09 DIAGNOSIS — M17.11 PRIMARY OSTEOARTHRITIS OF RIGHT KNEE: ICD-10-CM

## 2022-02-09 DIAGNOSIS — M25.061 HEMARTHROSIS OF RIGHT KNEE: Primary | ICD-10-CM

## 2022-02-09 PROCEDURE — 99213 OFFICE O/P EST LOW 20 MIN: CPT | Performed by: ORTHOPAEDIC SURGERY

## 2022-02-09 NOTE — TELEPHONE ENCOUNTER
Patient was in today and the daughter has some concerns about her mom  She lives by herself and she is afraid of her falling  Her question is, can her MRI be ordered as STAT?   Jim Rebollar (daughter) can be reached at [phone]

## 2022-02-09 NOTE — PROGRESS NOTES
Assessment:     1  Hemarthrosis of right knee    2  Primary osteoarthritis of right knee    3  Pes anserinus bursitis of right knee        Plan:     Problem List Items Addressed This Visit        Musculoskeletal and Integument    Hemarthrosis of right knee - Primary    Relevant Orders    MRI knee right  wo contrast    Primary osteoarthritis of right knee      Other Visit Diagnoses     Pes anserinus bursitis of right knee            Findings consistent with right knee recurrent swelling, pain  Not sure why her knee continues to swell  She may have occult-stress fracture causing swelling  She is tender in pes bursa but will hold off on cortisone injection at this time  Will move forward with MRI of right knee to further evaluate and see patient back to determine future course of treatment  Continue to elevate and ice knee  Tylenol, voltaren gel for pain  See back for review of MRI of right knee  All patient's questions were answered to their satisfaction  This note is created using dictation transcription  It may contain typographical errors, grammatical errors, improperly dictated words, background noise and other errors  Subjective:     Patient ID: Raegan Jerome is a 78 y o  female  Chief Complaint:   78 yr old female in for follow up regarding right knee  Seen 3 weeks ago for moderate right knee effusion from spontaneous hemarthrosis in setting of Eliquis use vs occult trauma  Knee aspirated 45 cc bloody fluid  Since last appt she is experiencing more pain medial aspect of knee, worse with activity, walking  In morning 1st step is very painful  She does have recurrence of swelling in knee       Allergy:  Allergies   Allergen Reactions    Meloxicam GI Intolerance    Morphine GI Intolerance    Morphine     Penicillins     Percocet [Oxycodone-Acetaminophen]     Sitagliptin      SOB     Medications:  all current active meds have been reviewed  Past Medical History:  Past Medical History:   Diagnosis Date    Anxiety     [facility]rial [facility]     Bowel obstruction (HCC)     Cancer ([facility]     LEFT BREAST CA 22 YEARS AGO     Depression     Diabetes mellitus ([facility]     Disease of thyroid gland     Hypertension     Hypothyroidism      Past Surgical History:  Past Surgical History:   Procedure Laterality Date    ABDOMINAL ADHESION SURGERY N/A 2/4/2018    Procedure: LYSIS ADHESIONS;  Surgeon: Jose Valderrama DO;  [facility];  Service: General    BREAST SURGERY      CHOLECYSTECTOMY      COLON SURGERY  2017    EXPLORATORY LAPAROTOMY      JOINT REPLACEMENT      LEFT KNEE REPLACEMENT     LAPAROTOMY N/A 2/4/2018    Procedure: LAPAROTOMY EXPLORATORY,;  Surgeon: Jose Valderrama DO;  [facility];  Service: General    MASTECTOMY      MASTECTOMY Left 1995    MASTECTOMY Right 2017    TN BIOPSY/EXCISION, LYMPH NODE(S) Right 1/13/2017    Procedure: SENTINEL LYMPH NODE BIOPSY RIGHT AXILLA; Surgeon: Clinton Coello MD;  [facility];  Service: General    TN MASTECTOMY, SIMPLE, COMPLETE Right 1/13/2017    Procedure: MASTECTOMY SIMPLE;  Surgeon: Clinton Coello MD;  [facility];  Service: General    SMALL INTESTINE SURGERY N/A 2/4/2018    Procedure: RESECTION SMALL BOWEL;  Surgeon: Jose Valderrama DO;  [facility];  Service: General    US GUIDED BREAST BIOPSY RIGHT COMPLETE Right 11/29/2016     Family History:  Family History   Problem Relation Age of Onset    Cancer Mother     No Known Problems Father      Social History:  Social History     Substance and Sexual Activity   Alcohol Use Never     Social History     Substance and Sexual Activity   Drug Use Never     Social History     Tobacco Use   Smoking Status Current Every Day Smoker    Packs/day: 1 00    Types: Cigarettes   Smokeless Tobacco Never Used     Review of Systems   Constitutional: Negative for chills and fever  HENT: Negative for ear pain and sore throat      Eyes: Negative for pain and visual disturbance  Respiratory: Negative for cough and shortness of breath  Cardiovascular: Negative for chest pain and palpitations  Gastrointestinal: Negative for abdominal pain and vomiting  Genitourinary: Negative for dysuria and hematuria  Musculoskeletal: Positive for arthralgias (Right knee), gait problem (Antalg[place]) and joint swelling (Right knee)  Negative for back pain  Skin: Negative for color change and rash  Neurological: Negative for seizures and syncope  Psychiatric/Behavioral: Negative  All other systems reviewed and are negative  Objective:  BP Readings from Last 1 Encounters:   02/09/22 142/86      Wt Readings from Last 1 Encounters:   02/09/22 63 5 kg (140 lb)      BMI:   Estimated body mass index is 22 6 kg/m² as calculated from the following:    Height as of this encounter: 5' 6" (1 676 m)  Weight as of this encounter: 63 5 kg (140 lb)  BSA:   Estimated body surface area is 1 72 meters squared as calculated from the following:    Height as of this encounter: 5' 6" (1 676 m)  Weight as of this encounter: 63 5 kg (140 lb)  Physical Exam  Vitals and nursing note reviewed  Constitutional:       Appearance: Normal appearance  She is well-developed  HENT:      Head: Normocephalic and atraumatic  Right Ear: External ear normal       Left Ear: External ear normal    Eyes:      Extraocular Movements: Extraocular movements intact  Conjunctiva/sclera: Conjunctivae normal    Pulmonary:      Effort: Pulmonary effort is normal    Musculoskeletal:      Cervical back: Neck supple  Right knee: Effusion (Grade 2) present  Instability Tests: Medial Iris test negative and lateral Iris test negative  Skin:     General: Skin is warm and dry  Neurological:      Mental Status: She is alert and oriented to person, place, and time  Deep Tendon Reflexes: Reflexes are normal and symmetric     Psychiatric:         Mood and Affect: Mood normal  Behavior: Behavior normal        Right Knee Exam     Muscle Strength   The patient has normal right knee strength  Tenderness   The patient is experiencing tenderness in the pes anserinus  Range of Motion   Extension: 0   Flexion: 110     Tests   Iris:  Medial - negative Lateral - negative  Varus: negative Valgus: negative  Patellar apprehension: negative    Other   Erythema: absent  Scars: absent  Sensation: normal  Pulse: present  Swelling: moderate  Effusion: effusion (Grade 2) present    Comments:   Well tracking patella             no new imaging to review      Scribe Attestation    I,:  Abad Alas am acting as a scribe while in the presence of the attending physician :       I,:  Fabi Larsen MD personally performed the services described in this documentation    as scribed in my presence :

## 2022-02-12 ENCOUNTER — HOSPITAL ENCOUNTER (INPATIENT)
Facility: HOSPITAL | Age: 82
LOS: 3 days | DRG: 563 | End: 2022-02-15
Attending: EMERGENCY MEDICINE | Admitting: INTERNAL MEDICINE
Payer: MEDICARE

## 2022-02-12 ENCOUNTER — APPOINTMENT (INPATIENT)
Dept: RADIOLOGY | Facility: HOSPITAL | Age: 82
DRG: 563 | End: 2022-02-12
Payer: MEDICARE

## 2022-02-12 DIAGNOSIS — R26.2 AMBULATORY DYSFUNCTION: Primary | ICD-10-CM

## 2022-02-12 DIAGNOSIS — M25.061 HEMARTHROSIS OF RIGHT KNEE: ICD-10-CM

## 2022-02-12 PROBLEM — M25.461 KNEE EFFUSION, RIGHT: Status: ACTIVE | Noted: 2022-02-12

## 2022-02-12 LAB
ANION GAP SERPL CALCULATED.3IONS-SCNC: 6 MMOL/L (ref 4–13)
APTT PPP: 42 SECONDS (ref 23–37)
BASOPHILS # BLD AUTO: 0.04 THOUSANDS/ΜL (ref 0–0.1)
BASOPHILS NFR BLD AUTO: 1 % (ref 0–1)
BUN SERPL-MCNC: 16 MG/DL (ref 5–25)
CALCIUM SERPL-MCNC: 9.7 MG/DL (ref 8.3–10.1)
CHLORIDE SERPL-SCNC: 102 MMOL/L (ref 100–108)
CO2 SERPL-SCNC: 29 MMOL/L (ref 21–32)
CREAT SERPL-MCNC: 0.9 MG/DL (ref 0.6–1.3)
CRP SERPL QL: <3 MG/L
EOSINOPHIL # BLD AUTO: 0.13 THOUSAND/ΜL (ref 0–0.61)
EOSINOPHIL NFR BLD AUTO: 2 % (ref 0–6)
ERYTHROCYTE [DISTWIDTH] IN BLOOD BY AUTOMATED COUNT: 16.6 % (ref 11.6–15.1)
ERYTHROCYTE [SEDIMENTATION RATE] IN BLOOD: 27 MM/HOUR (ref 0–29)
GFR SERPL CREATININE-BSD FRML MDRD: 59 ML/MIN/1.73SQ M
GLUCOSE SERPL-MCNC: 194 MG/DL (ref 65–140)
HCT VFR BLD AUTO: 35.2 % (ref 34.8–46.1)
HGB BLD-MCNC: 11.3 G/DL (ref 11.5–15.4)
IMM GRANULOCYTES # BLD AUTO: 0.02 THOUSAND/UL (ref 0–0.2)
IMM GRANULOCYTES NFR BLD AUTO: 0 % (ref 0–2)
INR PPP: 1.32 (ref 0.84–1.19)
LYMPHOCYTES # BLD AUTO: 0.79 THOUSANDS/ΜL (ref 0.6–4.47)
LYMPHOCYTES NFR BLD AUTO: 13 % (ref 14–44)
MCH RBC QN AUTO: 30.2 PG (ref 26.8–34.3)
MCHC RBC AUTO-ENTMCNC: 32.1 G/DL (ref 31.4–37.4)
MCV RBC AUTO: 94 FL (ref 82–98)
MONOCYTES # BLD AUTO: 0.67 THOUSAND/ΜL (ref 0.17–1.22)
MONOCYTES NFR BLD AUTO: 11 % (ref 4–12)
NEUTROPHILS # BLD AUTO: 4.33 THOUSANDS/ΜL (ref 1.85–7.62)
NEUTS SEG NFR BLD AUTO: 73 % (ref 43–75)
NRBC BLD AUTO-RTO: 0 /100 WBCS
PLATELET # BLD AUTO: 253 THOUSANDS/UL (ref 149–390)
PMV BLD AUTO: 9.8 FL (ref 8.9–12.7)
POTASSIUM SERPL-SCNC: 3.8 MMOL/L (ref 3.5–5.3)
PROTHROMBIN TIME: 15.8 SECONDS (ref 11.6–14.5)
RBC # BLD AUTO: 3.74 MILLION/UL (ref 3.81–5.12)
SODIUM SERPL-SCNC: 137 MMOL/L (ref 136–145)
WBC # BLD AUTO: 5.98 THOUSAND/UL (ref 4.31–10.16)

## 2022-02-12 PROCEDURE — 85652 RBC SED RATE AUTOMATED: CPT | Performed by: ORTHOPAEDIC SURGERY

## 2022-02-12 PROCEDURE — 85610 PROTHROMBIN TIME: CPT

## 2022-02-12 PROCEDURE — 99285 EMERGENCY DEPT VISIT HI MDM: CPT | Performed by: EMERGENCY MEDICINE

## 2022-02-12 PROCEDURE — 86140 C-REACTIVE PROTEIN: CPT | Performed by: ORTHOPAEDIC SURGERY

## 2022-02-12 PROCEDURE — 73721 MRI JNT OF LWR EXTRE W/O DYE: CPT

## 2022-02-12 PROCEDURE — 36415 COLL VENOUS BLD VENIPUNCTURE: CPT

## 2022-02-12 PROCEDURE — 99222 1ST HOSP IP/OBS MODERATE 55: CPT | Performed by: INTERNAL MEDICINE

## 2022-02-12 PROCEDURE — 85730 THROMBOPLASTIN TIME PARTIAL: CPT

## 2022-02-12 PROCEDURE — 99285 EMERGENCY DEPT VISIT HI MDM: CPT

## 2022-02-12 PROCEDURE — G1004 CDSM NDSC: HCPCS

## 2022-02-12 PROCEDURE — 80048 BASIC METABOLIC PNL TOTAL CA: CPT

## 2022-02-12 PROCEDURE — 85025 COMPLETE CBC W/AUTO DIFF WBC: CPT

## 2022-02-12 RX ORDER — LISINOPRIL 20 MG/1
20 TABLET ORAL 2 TIMES DAILY
Status: DISCONTINUED | OUTPATIENT
Start: 2022-02-13 | End: 2022-02-15 | Stop reason: HOSPADM

## 2022-02-12 RX ORDER — LORAZEPAM 0.5 MG/1
0.5 TABLET ORAL 2 TIMES DAILY PRN
Status: DISCONTINUED | OUTPATIENT
Start: 2022-02-12 | End: 2022-02-13

## 2022-02-12 RX ORDER — CHLORTHALIDONE 25 MG/1
25 TABLET ORAL DAILY
Status: DISCONTINUED | OUTPATIENT
Start: 2022-02-12 | End: 2022-02-15 | Stop reason: HOSPADM

## 2022-02-12 RX ORDER — LABETALOL 200 MG/1
200 TABLET, FILM COATED ORAL EVERY 12 HOURS SCHEDULED
Status: DISCONTINUED | OUTPATIENT
Start: 2022-02-12 | End: 2022-02-15 | Stop reason: HOSPADM

## 2022-02-12 RX ORDER — ACETAMINOPHEN 325 MG/1
650 TABLET ORAL EVERY 6 HOURS PRN
Status: DISCONTINUED | OUTPATIENT
Start: 2022-02-12 | End: 2022-02-13

## 2022-02-12 RX ORDER — AMLODIPINE BESYLATE 5 MG/1
5 TABLET ORAL 2 TIMES DAILY
Status: DISCONTINUED | OUTPATIENT
Start: 2022-02-13 | End: 2022-02-13

## 2022-02-12 RX ORDER — ACETAMINOPHEN 325 MG/1
650 TABLET ORAL ONCE
Status: DISCONTINUED | OUTPATIENT
Start: 2022-02-12 | End: 2022-02-13

## 2022-02-12 RX ORDER — CLONIDINE HYDROCHLORIDE 0.1 MG/1
0.1 TABLET ORAL EVERY 12 HOURS SCHEDULED
Status: DISCONTINUED | OUTPATIENT
Start: 2022-02-12 | End: 2022-02-15 | Stop reason: HOSPADM

## 2022-02-12 RX ORDER — LISINOPRIL 20 MG/1
20 TABLET ORAL DAILY
Status: DISCONTINUED | OUTPATIENT
Start: 2022-02-12 | End: 2022-02-12

## 2022-02-12 RX ORDER — AMLODIPINE BESYLATE 5 MG/1
5 TABLET ORAL DAILY
Status: DISCONTINUED | OUTPATIENT
Start: 2022-02-12 | End: 2022-02-12

## 2022-02-12 RX ADMIN — CLONIDINE HYDROCHLORIDE 0.1 MG: 0.1 TABLET ORAL at 13:58

## 2022-02-12 RX ADMIN — AMLODIPINE BESYLATE 5 MG: 5 TABLET ORAL at 13:58

## 2022-02-12 RX ADMIN — LORAZEPAM 0.5 MG: 0.5 TABLET ORAL at 23:50

## 2022-02-12 RX ADMIN — LISINOPRIL 20 MG: 20 TABLET ORAL at 13:58

## 2022-02-12 RX ADMIN — CLONIDINE HYDROCHLORIDE 0.1 MG: 0.1 TABLET ORAL at 22:11

## 2022-02-12 RX ADMIN — CHLORTHALIDONE 25 MG: 25 TABLET ORAL at 14:18

## 2022-02-12 RX ADMIN — LABETALOL HYDROCHLORIDE 200 MG: 200 TABLET, FILM COATED ORAL at 23:50

## 2022-02-12 NOTE — PLAN OF CARE
Problem: Potential for Falls  Goal: Patient will remain free of falls  Description: INTERVENTIONS:  - Educate patient/family on patient safety including physical limitations  - Instruct patient to call for assistance with activity   - Consult OT/PT to assist with strengthening/mobility   - Keep Call bell within reach  - Keep bed low and locked with side rails adjusted as appropriate  - Keep care items and personal belongings within reach  - Initiate and maintain comfort rounds  - Make Fall Risk Sign visible to staff  - Offer Toileting every  Hours, in advance of need  - Initiate/Maintain alarm  - Obtain necessary fall risk management equipment:   - Apply yellow socks and bracelet for high fall risk patients  - Consider moving patient to room near nurses station  Outcome: Progressing     Problem: Prexisting or High Potential for Compromised Skin Integrity  Goal: Skin integrity is maintained or improved  Description: INTERVENTIONS:  - Identify patients at risk for skin breakdown  - Assess and monitor skin integrity  - Assess and monitor nutrition and hydration status  - Monitor labs   - Assess for incontinence   - Turn and reposition patient  - Assist with mobility/ambulation  - Relieve pressure over bony prominences  - Avoid friction and shearing  - Provide appropriate hygiene as needed including keeping skin clean and dry  - Evaluate need for skin moisturizer/barrier cream  - Collaborate with interdisciplinary team   - Patient/family teaching  - Consider wound care consult   Outcome: Progressing     Problem: MOBILITY - ADULT  Goal: Maintain or return to baseline ADL function  Description: INTERVENTIONS:  -  Assess patient's ability to carry out ADLs; assess patient's baseline for ADL function and identify physical deficits which impact ability to perform ADLs (bathing, care of mouth/teeth, toileting, grooming, dressing, etc )  - Assess/evaluate cause of self-care deficits   - Assess range of motion  - Assess patient's mobility; develop plan if impaired  - Assess patient's need for assistive devices and provide as appropriate  - Encourage maximum independence but intervene and supervise when necessary  - Involve family in performance of ADLs  - Assess for home care needs following discharge   - Consider OT consult to assist with ADL evaluation and planning for discharge  - Provide patient education as appropriate  Outcome: Progressing  Goal: Maintains/Returns to pre admission functional level  Description: INTERVENTIONS:  - Perform BMAT or MOVE assessment daily    - Set and communicate daily mobility goal to care team and patient/family/caregiver  - Collaborate with rehabilitation services on mobility goals if consulted  - Perform Range of Motion  times a day  - Reposition patient every hours    - Dangle patient  times a day  - Stand patient  times a day  - Ambulate patient  times a day  - Out of bed to chair  times a day   - Out of bed for meals times a day  - Out of bed for toileting  - Record patient progress and toleration of activity level   Outcome: Progressing     Problem: PAIN - ADULT  Goal: Verbalizes/displays adequate comfort level or baseline comfort level  Description: Interventions:  - Encourage patient to monitor pain and request assistance  - Assess pain using appropriate pain scale  - Administer analgesics based on type and severity of pain and evaluate response  - Implement non-pharmacological measures as appropriate and evaluate response  - Consider cultural and social influences on pain and pain management  - Notify physician/advanced practitioner if interventions unsuccessful or patient reports new pain  Outcome: Progressing     Problem: INFECTION - ADULT  Goal: Absence or prevention of progression during hospitalization  Description: INTERVENTIONS:  - Assess and monitor for signs and symptoms of infection  - Monitor lab/diagnostic results  - Monitor all insertion sites, i e  indwelling lines, tubes, and drains  - Monitor endotracheal if appropriate and nasal secretions for changes in amount and color  - [place] appropriate cooling/warming therapies per order  - Administer medications as ordered  - Instruct and encourage patient and family to use good hand hygiene technique  - Identify and instruct in appropriate isolation precautions for identified infection/condition  Outcome: Progressing  Goal: Absence of fever/infection during neutropenic period  Description: INTERVENTIONS:  - Monitor WBC    Outcome: Progressing     Problem: SAFETY ADULT  Goal: Patient will remain free of falls  Description: INTERVENTIONS:  - Educate patient/family on patient safety including physical limitations  - Instruct patient to call for assistance with activity   - Consult OT/PT to assist with strengthening/mobility   - Keep Call bell within reach  - Keep bed low and locked with side rails adjusted as appropriate  - Keep care items and personal belongings within reach  - Initiate and maintain comfort rounds  - Make Fall Risk Sign visible to staff  - Offer Toileting every  Hours, in advance of need  - Initiate/Maintain alarm  - Obtain necessary fall risk management equipment:   - Apply yellow socks and bracelet for high fall risk patients  - Consider moving patient to room near nurses station  Outcome: Progressing  Goal: Maintain or return to baseline ADL function  Description: INTERVENTIONS:  -  Assess patient's ability to carry out ADLs; assess patient's baseline for ADL function and identify physical deficits which impact ability to perform ADLs (bathing, care of mouth/teeth, toileting, grooming, dressing, etc )  - Assess/evaluate cause of self-care deficits   - Assess range of motion  - Assess patient's mobility; develop plan if impaired  - Assess patient's need for assistive devices and provide as appropriate  - Encourage maximum independence but intervene and supervise when necessary  - Involve family in performance of ADLs  - Assess for home care needs following discharge   - Consider OT consult to assist with ADL evaluation and planning for discharge  - Provide patient education as appropriate  Outcome: Progressing  Goal: Maintains/Returns to pre admission functional level  Description: INTERVENTIONS:  - Perform BMAT or MOVE assessment daily    - Set and communicate daily mobility goal to care team and patient/family/caregiver  - Collaborate with rehabilitation services on mobility goals if consulted  - Perform Range of Motion  times a day  - Reposition patient every hours  - Dangle patient  times a day  - Stand patient times a day  - Ambulate patient  times a day  - Out of bed to chair  times a day   - Out of bed for meals  times a day  - Out of bed for toileting  - Record patient progress and toleration of activity level   Outcome: Progressing     Problem: DISCHARGE PLANNING  Goal: Discharge to home or other facility with appropriate resources  Description: INTERVENTIONS:  - Identify barriers to discharge w/patient and caregiver  - Arrange for needed discharge resources and transportation as appropriate  - Identify discharge learning needs (meds, wound care, etc )  - Arrange for interpretive services to assist at discharge as needed  - Refer to Case Management Department for coordinating discharge planning if the patient needs post-hospital services based on physician/advanced practitioner order or complex needs related to functional status, cognitive ability, or social support system  Outcome: Progressing     Problem: Knowledge Deficit  Goal: Patient/family/caregiver demonstrates understanding of disease process, treatment plan, medications, and discharge instructions  Description: Complete learning assessment and assess knowledge base    Interventions:  - Provide teaching at level of understanding  - Provide teaching via preferred learning methods  Outcome: Progressing

## 2022-02-12 NOTE — CASE MANAGEMENT
Case Management Assessment & Discharge Planning Note    Patient name Shantanu Martínez  Location /-72 MRN [id number]  : 1940 Date 2022       Current Admission Date: 2022  Current Admission Diagnosis:Diabetes mellitus type 2   Patient Active Problem List    Diagnosis Date Noted    Renal vascular disease 2020    Primary osteoarthritis of right knee 2019    Synovial cyst of right popliteal space 2019    Hemarthrosis of right knee 2019    History of breast cancer 2019    Moderate protein-calorie malnutrition  2019    Asymptomatic bilateral carotid artery stenosis 08/15/2019    High blood pressure     Hypertensive urgency 2019    Hypertension     Hypo-osmolality and hyponatremia 2019    Fall 2019    Chronic hyponatremia 2019    Syncope vs presyncope 2019    Paroxysmal A-fib ([facility] ) 2019    Diabetes [facility] 2019    Weight loss 2019    CKD (chronic kidney disease) stage 3, GFR 30-59 ml/min ([facility]) 2019    Iron deficiency anemia due to chronic blood loss 2018    Incisional hernia, without obstruction or gangrene 07/10/2018    Postop check 2018    Delirium 2018    Physical deconditioning 2018    Ambulatory dysfunction 2018    Hx of fall 2018    Anxiety 2018    Acute kidney injury ([facility] ) 2018    Hordeolum externum of right upper eyelid 2017    Malignant neoplasm of right breast, stage 1, estrogen receptor positive ([facility] ) 2017    Malignant neoplasm of central portion of right female breast ([facility] ) 2017    Tobacco abuse 2017    Heart palpitations 2016    Nicotine abuse 2016    Atrial fibrillation  2016    Diabetes mellitus type 2 2016    Essential hypertension 2016    Hypothyroidism 2016    Nontraumatic compression fracture of T4 vertebra ([facility] ) 10/06/2015    Low back pain without sciatica 09/17/2015    Gastroesophageal reflux disease without esophagitis 02/23/2015    Depression 02/23/2015    History of CEA (carotid endarterectomy) 02/23/2015    Hyperlipidemia associated with type 2 diabetes mellitus ([facility] ) 02/23/2015    Hypothyroidism due to acquired atrophy of thyroid 02/23/2015    Mitral insufficiency and aortic stenosis 02/23/2015    Vitamin D deficiency 02/23/2015    Varicose vein of leg 02/23/2015    Non-seasonal allergic rhinitis due to pollen 02/23/2015      LOS (days): 0  Geometric Mean LOS (GMLOS) (days):   Days to GMLOS:     OBJECTIVE:              Current admission status: Inpatient       Preferred Pharmacy:   [id number] [place] [address] [place] [address] [address] [facility] [place] [address]  Phone: [phone] Fax: [phone]    Primary Care Provider: Annalise Walsh DO    Primary Insurance: MEDICARE  Secondary Insurance: BLUE CROSS    ASSESSMENT:  [address] Representative - Daughter   Primary Phone: [phone] (Home)           Mariela [address] Representative - Daughter   Primary Phone: [phone] (Mobile)               Advance Directives  Does patient have a [facility] [address]?: Yes  Does patient have Advance Directives?: Yes  Advance Directives: Living will  Primary Contact: dghter Tonio Bank              Patient Information  Admitted from[de-identified] Home  Mental Status: Alert  During Assessment patient was accompanied by: Not accompanied during assessment  Assessment information provided by[de-identified] Daughter  Primary Caregiver: Self  Support Systems: [employer] members,Friends/neighbors  Type of Current Residence: Apartment  Floor Level:  (elevator)  Upon entering residence, is there a bedroom on the main floor (no further steps)?: Yes  Upon entering residence, is there a bathroom on the main floor (no further steps)?: Yes  In the last 12 months, was there a time when you were not able to pay the mortgage or rent on time?: No  In the last 12 months, was there a time when you did not have a steady place to sleep or slept in a shelter (including now)?: No  Homeless/housing insecurity resource given?: N/A  Living Arrangements: Lives Alone    Activities of Daily Living Prior to Admission  Functional Status: Independent  Completes ADLs independently?: Yes  Ambulates independently?: Yes  Does patient use assisted devices?: Yes  Assisted Devices (DME) used: Straight Cane  Does patient currently own DME?: Yes  What DME does the patient currently own?: Straight Cane  Does patient have a history of Outpatient Therapy (PT/OT)?: No  Does the patient have a history of Short-Term Rehab?: No  Does patient have a history of HHC?: Yes ([facility])  Does patient currently have [facility]?: No         Patient Information Continued  Income Source: Pension/[employer]  Within the past 12 months, you worried that your food would run out before you got the money to buy more : Never true  Within the past 12 months, the food you bought just didnt last and you didnt have money to get more : Never true  Food insecurity resource given?: N/A  Does patient receive dialysis treatments?: No  Does patient have a history of substance abuse?: No  Does patient have a history of Mental Health Diagnosis?: Yes (depression, anxiety, IP admit >5 yrs ago)  Is patient receiving treatment for mental health?: Yes (family dr)  Has patient received inpatient treatment related to mental health in the last 2 years?: No         Means of Transportation  Means of Transport to Appts[de-identified] Family transport  In the past 12 months, has lack of transportation kept you from medical appointments or from getting medications?: No  In the past 12 months, has lack of transportation kept you from meetings, work, or from getting things needed for daily living?: No  Was application for public transport provided?: N/A        DISCHARGE DETAILS:    Discharge planning discussed with[de-identified] zoe Lemon  Freedom of Choice: Yes                   Contacts  Patient Contacts: Tracie Jared  Relationship to Patient[de-identified] Family  Contact Method: Phone  Phone Number: [phone]  Reason/Outcome: Continuity of Care,Emergency Contact    CM reviewed d/c planning process including the following: identifying help at home, patient preference for d/c planning needs, Discharge Lounge, [facility] Meds to Bed program, availability of treatment team to discuss questions or concerns patient and/or family may have regarding understanding medications and recognizing signs and symptoms once discharged  CM also encouraged patient to follow up with all recommended appointments after discharge  Patient advised of importance for patient and family to participate in managing patients medical well being  Patient/caregiver received discharge checklist  Content reviewed  Patient/caregiver encouraged to participate in discharge plan of care prior to discharge home

## 2022-02-12 NOTE — H&P
[address] [facility] [place] 1940, 78 y o  female MRN: [id number]  Unit/Bed#: MS iJgar-Eulogio Encounter: [id number]  Primary Care Provider: Galilea Manjarrez DO   Date and time admitted to hospital: 2/12/2022 11:05 AM    * Knee effusion, right  Assessment & Plan  Patient says that she has knee swelling and has pain a few weeks ago went to see orthopedic  She has hematoma of the knee joint  They have drain some serosanguineous fluid in she felt better  Again she has swelling of the same knee  Consult orthopedics  The will obtain an MRI of the knee and no plan for aspiration at this point  Pain control with Tylenol  Oxycodone as needed 4 hours p r n  CKD (chronic kidney disease) stage 3, GFR 30-59 ml/min [facility])  Assessment & Plan  Lab Results   Component Value Date    EGFR 59 02/12/2022    EGFR 52 01/16/2022    EGFR 46 09/18/2019    CREATININE 0 90 02/12/2022    CREATININE 1 00 01/16/2022    CREATININE 1 12 09/18/2019   Continue to monitor a m  BMP  Avoid nephrotoxins  Avoid hypotension    Paroxysmal A-fib (HCC)  Assessment & Plan  Rate controlled with labetalol 200 mg  She is on Eliquis    Ambulatory dysfunction  Assessment & Plan  Patient has knee effusion unknown cause  She says seen by orthopedics outpatient and they have drained it  It looks like hematoma, she is on eliquis      Hypothyroidism  Assessment & Plan  Continue with levothyroxine    Essential hypertension  Assessment & Plan  Patient is on multiple blood pressure medications  She is on Chlorthalidone 25 mg daily, clonidine 0 1 mg q 12 hours, lisinopril 20 mg daily, amlodipine 5 mg daily    Diabetes mellitus type 2  Assessment & Plan  Lab Results   Component Value Date    HGBA1C 6 1 (H) 10/10/2020       No results for input(s): POCGLU in the last 72 hours      Blood Sugar Average: Last 72 hrs:   patient is on metformin at home  Avoid hypoglycemia  SSi  Fingersticks    VTE Pharmacologic Prophylaxis: VTE Score: 10 High Risk (Score >/= 5) - Pharmacological DVT Prophylaxis Ordered: apixaban (Eliquis)  Sequential Compression Devices Ordered  Code Status: Prior full code per patient   Discussion with family: Patient declined call to   Anticipated Length of Stay: Patient will be admitted on an inpatient basis with an anticipated length of stay of greater than 2 midnights secondary to more than 2 midnight stay depending on MRI and orthopedics recommendations       Total Time for Visit, including Counseling / Coordination of Care: 45 minutes Greater than 50% of this total time spent on direct patient counseling and coordination of care  Chief Complaint: right knee swelling     History of Present Illness:  Alex Hubbard is a 78 y o  female with a PMH of A fib, Dm, HTN, breast cancer, depression who presents with right knee effusion  About 2 weeks ago she has sudden onset of right knee swelling she was unsure how that happened no fracture of the bone noted  She went orthopedics they have drained and now it has re accumulated  Review of Systems:  Review of Systems   Constitutional: Negative for chills and fever  HENT: Negative for ear pain and sore throat  Eyes: Negative for pain and visual disturbance  Respiratory: Negative for cough and shortness of breath  Cardiovascular: Negative for chest pain and palpitations  Gastrointestinal: Negative for abdominal pain and vomiting  Genitourinary: Negative for dysuria and hematuria  Musculoskeletal: Positive for gait problem and joint swelling  Negative for arthralgias and back pain  Skin: Negative for color change and rash  Neurological: Negative for seizures and syncope  All other systems reviewed and are negative        Past Medical and Surgical History:   Past Medical History:   Diagnosis Date    Anxiety     Atrial fibrillation (HCC)     Bowel obstruction [facility] [address] [facility]     Cancer (HCC)     LEFT BREAST CA 22 YEARS AGO     Depression     Diabetes mellitus ([facility] [address] )     Disease of thyroid gland     Hypertension     Hypothyroidism        Past Surgical History:   Procedure Laterality Date    ABDOMINAL ADHESION SURGERY N/A 2/4/2018    Procedure: LYSIS ADHESIONS;  Surgeon: Jose Valderrama DO;  [facility];  Service: General    BREAST SURGERY      CHOLECYSTECTOMY      COLON SURGERY  2017    EXPLORATORY LAPAROTOMY      JOINT REPLACEMENT      LEFT KNEE REPLACEMENT     LAPAROTOMY N/A 2/4/2018    Procedure: LAPAROTOMY EXPLORATORY,;  Surgeon: Jose Valderrama DO;  [facility];  Service: General    MASTECTOMY      MASTECTOMY Left 1995    MASTECTOMY Right 2017    TN BIOPSY/EXCISION, LYMPH NODE(S) Right 1/13/2017    Procedure: SENTINEL LYMPH NODE BIOPSY RIGHT AXILLA; Surgeon: Clinton Coello MD;  [facility];  Service: General    TN MASTECTOMY, SIMPLE, COMPLETE Right 1/13/2017    Procedure: MASTECTOMY SIMPLE;  Surgeon: Clinton Coello MD;  [facility];  Service: General    SMALL INTESTINE SURGERY N/A 2/4/2018    Procedure: RESECTION SMALL BOWEL;  Surgeon: Jose Valderrama DO;  [facility];  Service: General    US GUIDED BREAST BIOPSY RIGHT COMPLETE Right 11/29/2016       Meds/Allergies:  Prior to Admission medications    Medication Sig Start Date End Date Taking?  Authorizing Provider   ascorbic acid (VITAMIN C) 500 mg tablet Take 500 mg by mouth daily   Yes Historical Provider, MD   atorvastatin (LIPITOR) 40 mg tablet Take 40 mg by mouth 10/23/19  Yes Historical Provider, MD   chlorthalidone 25 mg tablet Take 25 mg by mouth daily 10/18/19  Yes Historical Provider, MD   cholecalciferol (VITAMIN D3) 1,000 units tablet Take 2,000 Units by mouth daily    Yes Historical Provider, MD   cloNIDine (CATAPRES) 0 1 mg tablet TAKE 1 TABLET BY MOUTH EVERY DAY 7/14/21  Yes Darrel Burciaga DO   Eliquis 5 MG TAKE 1 TABLET BY MOUTH TWICE A DAY 8/10/21  Yes Darrel Burciaga DO   labetalol (NORMODYNE) 200 mg tablet Take 1 tablet (200 mg total) by mouth every 12 (twelve) hours 7/21/19  Yes Mt Byrne PA-C   levothyroxine 125 mcg tablet Take 125 mcg by mouth daily   Yes Historical Provider, MD   lisinopril (ZESTRIL) 20 mg tablet TAKE 1 TABLET (20 MG TOTAL) BY MOUTH 2 (TWO) TIMES A DAY 3/23/20  Yes Jennifer Elizondo MD   LORazepam (ATIVAN) 0 5 mg tablet Take 0 5 mg by mouth 2 (two) times a day   Yes Historical Provider, MD   metFORMIN (GLUCOPHAGE) 500 mg tablet Take 500 mg by mouth daily with breakfast    Yes Historical Provider, MD   multivitamin (THERAGRAN) TABS Take 1 tablet by mouth daily   Yes Historical Provider, MD   amLODIPine (NORVASC) 5 mg tablet Take 2 tablets (10 mg total) by mouth daily  Patient not taking: Reported on 2/12/2022 9/20/19   Treva Cee MD   Multiple Vitamins-Minerals (PRESERVISION AREDS 2 PO) Take by mouth 2 (two) times a day  Patient not taking: Reported on 2/12/2022     Historical Provider, MD   Omega-3 Fatty Acids (FISH OIL) 1,000 mg Take 1,000 mg by mouth daily  Patient not taking: Reported on 2/12/2022     Historical Provider, MD   pantoprazole (PROTONIX) 40 mg tablet Take 40 mg by mouth daily Pt takes daily when needed  Patient not taking: Reported on 2/12/2022     Historical Provider, MD     I have reviewed home medications with patient personally  Allergies: Allergies   Allergen Reactions    Meloxicam GI Intolerance    Morphine GI Intolerance    Morphine     Penicillins     Percocet [Oxycodone-Acetaminophen]     Sitagliptin      SOB       Social History:  Marital Status:     Occupation:    Patient Pre-hospital Living Situation: Home  Patient Pre-hospital Level of Mobility: walks  Patient Pre-hospital Diet Restrictions: diabetic   Substance Use History:   Social History     Substance and Sexual Activity   Alcohol Use Never     Social History     Tobacco Use   Smoking Status Current Every Day Smoker    Packs/day: 1 00    Types: Cigarettes   Smokeless Tobacco Never Used     Social History     Substance and Sexual Activity   Drug Use Never       Family History:  Family History   Problem Relation Age of Onset    Cancer Mother     No Known Problems Father        Physical Exam:     Vitals:   Blood Pressure: 170/94 (02/12/22 1351)  Pulse: 72 (02/12/22 1351)  Temperature: 97 7 °F (36 5 °C) (02/12/22 1351)  Temp Source: Oral (02/12/22 1351)  Respirations: 18 (02/12/22 1351)  Height: 5' 6" (167 6 cm) (02/12/22 1351)  Weight - Scale: 63 5 kg (140 lb) (02/12/22 1351)  SpO2: 97 % (02/12/22 1351)    Physical Exam  Vitals and nursing note reviewed  Constitutional:       General: She is not in acute distress  Appearance: She is well-developed  HENT:      Head: Normocephalic and atraumatic  Eyes:      Conjunctiva/sclera: Conjunctivae normal    Cardiovascular:      Rate and Rhythm: Normal rate and regular rhythm  Heart sounds: No murmur heard  Pulmonary:      Effort: Pulmonary effort is normal  No respiratory distress  Breath sounds: Normal breath sounds  Abdominal:      Palpations: Abdomen is soft  Tenderness: There is no abdominal tenderness  Musculoskeletal:         General: Swelling and tenderness present  Cervical back: Neck supple  Skin:     General: Skin is warm and dry  Neurological:      Mental Status: She is alert             Additional Data:     Lab Results:  Results from last 7 days   Lab Units 02/12/22  1139   WBC Thousand/uL 5 98   HEMOGLOBIN g/dL 11 3*   HEMATOCRIT % 35 2   PLATELETS Thousands/uL 253   NEUTROS PCT % 73   LYMPHS PCT % 13*   MONOS PCT % 11   EOS PCT % 2     Results from last 7 days   Lab Units 02/12/22  1139   SODIUM mmol/L 137   POTASSIUM mmol/L 3 8   CHLORIDE mmol/L 102   CO2 mmol/L 29   BUN mg/dL 16   CREATININE mg/dL 0 90   ANION GAP mmol/L 6   CALCIUM mg/dL 9 7   GLUCOSE RANDOM mg/dL 194*     Results from last 7 days   Lab Units 02/12/22  1139   INR  1 32*                   Imaging: Reviewed radiology reports from this admission including:    No orders to display       EKG and Other Studies Reviewed on Admission:   · EKG: NSR  HR 80     ** Please Note: This note has been constructed using a voice recognition system   **

## 2022-02-12 NOTE — ED PROVIDER NOTES
History  Chief Complaint   Patient presents with    Difficulty Walking     Pt states that she has fluid on her R knee and has to go for a MRI in march  Pt states that she is now unable to ambulate and put weight on the leg  22-year-old woman with history of right hemarthrosis and a fib on apixaban  presents with right knee pain and swelling  She was seen a few weeks ago for the same and diagnosed with a hemarthrosis  The mechanism of injury is unknown as she had no traumatic injury to the area  She had 45 mL of blood aspirated and had been doing well until the last week  Her effusion accumulated again and now she is unable to ambulate on the right leg due to pain  She has been using her cane and left leg to hobble around her apartment where she lives alone  She almost had a fall yesterday in the bathroom  She does not feel safe at home  She was recommended to have an MRI of the leg but is not scheduled until March  Prior to Admission Medications   Prescriptions Last Dose Informant Patient Reported? Taking?    Eliquis 5 MG   No Yes   Sig: TAKE 1 TABLET BY MOUTH TWICE A DAY   LORazepam (ATIVAN) 0 5 mg tablet  Self Yes Yes   Sig: Take 0 5 mg by mouth 2 (two) times a day   Multiple Vitamins-Minerals (PRESERVISION AREDS 2 PO)  Self Yes Yes   Sig: Take by mouth 2 (two) times a day   Omega-3 Fatty Acids (FISH OIL) 1,000 mg  Self Yes Yes   Sig: Take 1,000 mg by mouth daily   amLODIPine (NORVASC) 5 mg tablet  Self No Yes   Sig: Take 2 tablets (10 mg total) by mouth daily   Patient taking differently: Take 2 5 mg by mouth daily    ascorbic acid (VITAMIN C) 500 mg tablet  Self Yes Yes   Sig: Take 500 mg by mouth daily   atorvastatin (LIPITOR) 40 mg tablet  Self Yes Yes   Sig: Take 40 mg by mouth   chlorthalidone 25 mg tablet  Self Yes Yes   Sig: Take 25 mg by mouth daily   cholecalciferol (VITAMIN D3) 1,000 units tablet  Self Yes Yes   Sig: Take 2,000 Units by mouth daily    cloNIDine (CATAPRES) 0 1 mg tablet   No Yes   Sig: TAKE 1 TABLET BY MOUTH EVERY DAY   labetalol (NORMODYNE) 200 mg tablet  Self No Yes   Sig: Take 1 tablet (200 mg total) by mouth every 12 (twelve) hours   levothyroxine 125 mcg tablet  Self Yes Yes   Sig: Take 125 mcg by mouth daily   lisinopril (ZESTRIL) 20 mg tablet  Self No Yes   Sig: TAKE 1 TABLET (20 MG TOTAL) BY MOUTH 2 (TWO) TIMES A DAY   metFORMIN (GLUCOPHAGE) 500 mg tablet  Self Yes Yes   Sig: Take 500 mg by mouth daily with breakfast    multivitamin (THERAGRAN) TABS  Self Yes Yes   Sig: Take 1 tablet by mouth daily   pantoprazole (PROTONIX) 40 mg tablet  Self Yes Yes   Sig: Take 40 mg by mouth daily Pt takes daily when needed  Facility-Administered Medications: None       Past Medical History:   Diagnosis Date    Anxiety     [facility]     Bowel obstruction (HCC)     Cancer ([facility] )     LEFT BREAST CA 22 YEARS AGO     Depression     Diabetes mellitus ([place] [id number] )     Disease of thyroid gland     Hypertension     Hypothyroidism        Past Surgical History:   Procedure Laterality Date    ABDOMINAL ADHESION SURGERY N/A 2/4/2018    Procedure: LYSIS ADHESIONS;  Surgeon: Bridget Carias DO;  [facility];  Service: General    BREAST SURGERY      CHOLECYSTECTOMY      COLON SURGERY  2017    EXPLORATORY LAPAROTOMY      JOINT REPLACEMENT      LEFT KNEE REPLACEMENT     LAPAROTOMY N/A 2/4/2018    Procedure: LAPAROTOMY EXPLORATORY,;  Surgeon: Bridget Carias DO;  [facility];  Service: General    MASTECTOMY      MASTECTOMY Left 1995    MASTECTOMY Right 2017    VT BIOPSY/EXCISION, LYMPH NODE(S) Right 1/13/2017    Procedure: SENTINEL LYMPH NODE BIOPSY RIGHT AXILLA;   Surgeon: Alfonso Chen MD;  [facility];  Service: General    VT MASTECTOMY, SIMPLE, COMPLETE Right 1/13/2017    Procedure: MASTECTOMY SIMPLE;  Surgeon: Alfonso Chen MD;  [facility];  Service: General    SMALL INTESTINE SURGERY N/A 2/4/2018    Procedure: RESECTION SMALL BOWEL; Surgeon: Luis Sierra DO;  [facility];  Service: General    US GUIDED BREAST BIOPSY RIGHT COMPLETE Right 11/29/2016       Family History   Problem Relation Age of Onset    Cancer Mother     No Known Problems Father      I have reviewed and agree with the history as documented  E-Cigarette/Vaping    E-Cigarette Use Never User      E-Cigarette/Vaping Substances    Nicotine No     Flavoring No      Social History     Tobacco Use    Smoking status: Current Every Day Smoker     Packs/day: 1 00     Types: Cigarettes    Smokeless tobacco: Never Used   Vaping Use    Vaping Use: Never used   Substance Use Topics    Alcohol use: Never    Drug use: Never        Review of Systems   Constitutional: Negative for chills and fever  HENT: Negative for ear pain and sore throat  Eyes: Negative for pain and visual disturbance  Respiratory: Negative for cough, shortness of breath and wheezing  Cardiovascular: Negative for chest pain and palpitations  Gastrointestinal: Negative for abdominal pain, constipation, diarrhea and vomiting  Genitourinary: Negative for dysuria, frequency, hematuria and vaginal bleeding  Musculoskeletal: Positive for joint swelling and myalgias  Negative for arthralgias and back pain  Skin: Negative for color change and rash  Neurological: Negative for seizures, syncope and headaches  Psychiatric/Behavioral: Negative for agitation and confusion         Physical Exam  ED Triage Vitals [02/12/22 1110]   Temperature Pulse Respirations Blood Pressure SpO2   97 9 °F (36 6 °C) 90 18 (!) 191/89 98 %      Temp Source Heart Rate Source Patient Position - Orthostatic VS BP Location FiO2 (%)   Oral Monitor Sitting Left arm --      Pain Score       10 - Worst Possible Pain             Orthostatic Vital Signs  Vitals:    02/12/22 1110 02/12/22 1145 02/12/22 1215   BP: (!) 191/89 170/79 163/72   Pulse: 90 66 68   Patient Position - Orthostatic VS: Sitting Sitting Sitting Physical Exam  Vitals and nursing note reviewed  Constitutional:       General: She is not in acute distress  Appearance: Normal appearance  She is well-developed  HENT:      Head: Normocephalic and atraumatic  Right Ear: External ear normal       Left Ear: External ear normal       Nose: Nose normal  No congestion  Eyes:      Conjunctiva/sclera: Conjunctivae normal    Cardiovascular:      Rate and Rhythm: Normal rate  Heart sounds: No murmur heard  Pulmonary:      Effort: Pulmonary effort is normal  No respiratory distress  Breath sounds: Normal breath sounds  Abdominal:      Palpations: Abdomen is soft  Tenderness: There is no abdominal tenderness  There is no guarding or rebound  Musculoskeletal:      Cervical back: Normal range of motion and neck supple  Right knee: Swelling and effusion present  No ecchymosis or crepitus  Decreased range of motion  No LCL laxity, MCL laxity, ACL laxity or PCL laxity  Instability Tests: Anterior Lachman test negative  Medial Iris test negative and lateral Iris test negative  Left knee: Normal       Right lower leg: Edema present  Left lower leg: No edema  Skin:     General: Skin is warm and dry  Neurological:      General: No focal deficit present  Mental Status: She is alert and oriented to person, place, and time  Sensory: No sensory deficit  Motor: No weakness     Psychiatric:         Mood and Affect: Mood normal          Behavior: Behavior normal          ED Medications  Medications   acetaminophen (TYLENOL) tablet 650 mg (650 mg Oral Not Given 2/12/22 1139)       Diagnostic Studies  Results Reviewed     Procedure Component Value Units Date/Time    Basic metabolic panel [[id number]]  (Abnormal) Collected: 02/12/22 1139    Lab Status: Final result Specimen: Blood from Arm, Left Updated: 02/12/22 1212     Sodium 137 mmol/L      Potassium 3 8 mmol/L      Chloride 102 mmol/L      CO2 29 mmol/L      ANION GAP 6 mmol/L      BUN 16 mg/dL      Creatinine 0 90 mg/dL      Glucose 194 mg/dL      Calcium 9 7 mg/dL      eGFR 59 ml/min/1 73sq m     Narrative:      [place] guidelines for Chronic Kidney Disease (CKD):     Stage 1 with normal or high GFR (GFR > 90 mL/min/1 73 square meters)    Stage 2 Mild CKD (GFR = 60-89 mL/min/1 73 square meters)    Stage 3A Moderate CKD (GFR = 45-59 mL/min/1 73 square meters)    Stage 3B Moderate CKD (GFR = 30-44 mL/min/1 73 square meters)    Stage 4 Severe CKD (GFR = 15-29 mL/min/1 73 square meters)    Stage 5 End Stage CKD (GFR <15 mL/min/1 73 square meters)  Note: GFR calculation is accurate only with a steady state creatinine    Protime-INR [[id number]]  (Abnormal) Collected: 02/12/22 1139    Lab Status: Final result Specimen: Blood from Arm, Left Updated: 02/12/22 1209     Protime 15 8 seconds      INR 1 32    APTT [[id number]]  (Abnormal) Collected: 02/12/22 1139    Lab Status: Final result Specimen: Blood from Arm, Left Updated: 02/12/22 1209     PTT 42 seconds     CBC and differential [[id number]]  (Abnormal) Collected: 02/12/22 1139    Lab Status: Final result Specimen: Blood from Arm, Left Updated: 02/12/22 1150     WBC 5 98 Thousand/uL      RBC 3 74 Million/uL      Hemoglobin 11 3 g/dL      Hematocrit 35 2 %      MCV 94 fL      MCH 30 2 pg      MCHC 32 1 g/dL      RDW 16 6 %      MPV 9 8 fL      Platelets 985 Thousands/uL      nRBC 0 /100 WBCs      Neutrophils Relative 73 %      Immat GRANS % 0 %      Lymphocytes Relative 13 %      Monocytes Relative 11 %      Eosinophils Relative 2 %      Basophils Relative 1 %      Neutrophils Absolute 4 33 Thousands/µL      Immature Grans Absolute 0 02 Thousand/uL      Lymphocytes Absolute 0 79 Thousands/µL      Monocytes Absolute 0 67 Thousand/µL      Eosinophils Absolute 0 13 Thousand/µL      Basophils Absolute 0 04 Thousands/µL                  No orders to display Procedures  Procedures      ED Course  ED Course as of 02/12/22 1233   Sat Feb 12, 2022   1145 Patient unable to ambulate and unsafe at home  Hemarthrosis likely reaccumulated  On apixaban, so no joint tap in ED  She will likely need MRI inpatient for right knee  Identification of Seniors at Risk      Most Recent Value   (ISAR) Identification of Seniors at Risk    Before the illness or injury that brought you to the Emergency, did you need someone to help you on a regular basis? 0 Filed at: 02/12/2022 1112   In the last 24 hours, have you needed more help than usual? 0 Filed at: 02/12/2022 1112   Have you been hospitalized for one or more nights during the past 6 months? 0 Filed at: 02/12/2022 1112   In general, do you see well? 0 Filed at: 02/12/2022 1112   In general, do you have serious problems with your memory? 0 Filed at: 02/12/2022 1112   Do you take more than three different medications every day? 1 Filed at: 02/12/2022 1112   ISAR Score 1 Filed at: 02/12/2022 1112                    SBIRT 22yo+      Most Recent Value   SBIRT (25 yo +)    In order to provide better care to our patients, we are screening all of our patients for alcohol and drug use  Would it be okay to ask you these screening questions? Yes Filed at: 02/12/2022 1119   Initial Alcohol Screen: US AUDIT-C     1  How often do you have a drink containing alcohol? 0 Filed at: 02/12/2022 1119   2  How many drinks containing alcohol do you have on a typical day you are drinking? 0 Filed at: 02/12/2022 1119   3a  Male UNDER 65: How often do you have five or more drinks on one occasion? 0 Filed at: 02/12/2022 1119   3b  FEMALE Any Age, or MALE 65+: How often do you have 4 or more drinks on one occassion? 0 Filed at: 02/12/2022 1119   Audit-C Score 0 Filed at: 02/12/2022 1119   KENA: How many times in the past year have you    Used an illegal drug or used a prescription medication for non-medical reasons?  Never Filed at: 02/12/2022 200                [facility]  Number of Diagnoses or Management Options  Ambulatory dysfunction  Hemarthrosis of right knee  Diagnosis management comments: Hemarthrosis has likely reaccumulated for unknown reason  Patient denies trauma to the area so possibly an injury sustained during normal daily activities  [facility] to [facility] for ambulatory dysfunction  Patient in agreement with plan and questions were answered  Portions or all of this note were generated using voice recognition software  Occasional wrong word or "sound a like" substitutions may have occurred due to the inherent limitations of voice recognition software  Please interpret any errors within the intended context of the whole sentence or idea  Disposition  Final diagnoses:   Ambulatory dysfunction   Hemarthrosis of right knee     Time reflects when diagnosis was documented in both MDM as applicable and the Disposition within this note     Time User Action Codes Description Comment    2/12/2022 12:00 PM Ashley Suarez Add [R26 2] Ambulatory dysfunction     2/12/2022 12:00 PM Ashley Suarez Add [U38 614] Hemarthrosis of right knee       ED Disposition     ED Disposition Condition Date/Time Comment    Admit Stable Sat Feb 12, 2022 12:00 PM Case was discussed with BRIT and the patient's admission status was agreed to be Admission Status: observation status to the service of Dr Demetria Bravo  Follow-up Information    None         Patient's Medications   Discharge Prescriptions    No medications on file     No discharge procedures on file  PDMP Review     None           ED Provider  Attending physically available and evaluated [facility]  I managed the patient along with the ED Attending      Electronically Signed by         Brooke Dow MD  02/12/22 7764

## 2022-02-12 NOTE — ASSESSMENT & PLAN NOTE
Lab Results   Component Value Date    EGFR 59 02/12/2022    EGFR 52 01/16/2022    EGFR 46 09/18/2019    CREATININE 0 90 02/12/2022    CREATININE 1 00 01/16/2022    CREATININE 1 12 09/18/2019   Continue to monitor a m   BMP  Avoid nephrotoxins  Avoid hypotension

## 2022-02-12 NOTE — ASSESSMENT & PLAN NOTE
Patient is on multiple blood pressure medications  She is on Chlorthalidone 25 mg daily, clonidine 0 1 mg q 12 hours, lisinopril 20 mg daily, amlodipine 5 mg daily

## 2022-02-12 NOTE — ASSESSMENT & PLAN NOTE
Patient says that she has knee swelling and has pain a few weeks ago went to see orthopedic  She has hematoma of the knee joint  They have drain some serosanguineous fluid in she felt better  Again she has swelling of the same knee  Consult orthopedics  The will obtain an MRI of the knee and no plan for aspiration at this point  Pain control with Tylenol  Oxycodone as needed 4 hours p r n

## 2022-02-12 NOTE — ED ATTENDING ATTESTATION
2/12/2022  IZehra DO, saw and evaluated the patient  I have discussed the patient with the resident/non-physician practitioner and agree with the resident's/non-physician practitioner's findings, Plan of Care, and MDM as documented in the resident's/non-physician practitioner's note, except where noted  All available labs and Radiology studies were reviewed  I was present for key portions of any procedure(s) performed by the resident/non-physician practitioner and I was immediately available to provide assistance  At this point I agree with the current assessment done in the Emergency Department  I have conducted an independent evaluation of this patient a history and physical is as follows:    18-year-old female right knee pain  Hemarthrosis  Recently drained  Saw orthopedics, likely microtrauma the meniscus waiting MRI, is on Eliquis which is likely why she has recurrent hemarthrosis  Cannot walk lives at home high fall risk  Does have an effusion on the right knee    Will plan to admit for rehab and further management as the patient is not safe to go home she does not want to try anything stronger than Tylenol    ED Course         Critical Care Time  Procedures

## 2022-02-12 NOTE — ASSESSMENT & PLAN NOTE
Patient has knee effusion unknown cause  She says seen by orthopedics outpatient and they have drained it     It looks like hematoma, she is on eliquis

## 2022-02-12 NOTE — ASSESSMENT & PLAN NOTE
Lab Results   Component Value Date    HGBA1C 6 1 (H) 10/10/2020       No results for input(s): POCGLU in the last 72 hours      Blood Sugar Average: Last 72 hrs:   patient is on metformin at home  Avoid hypoglycemia  SSi  Fingersticks

## 2022-02-12 NOTE — CONSULTS
Orthopedics   Sayda Varma 78 y o  female MRN: [id number]  Unit/Bed#: ED 11      Chief Complaint:   Right knee pain    HPI:  78 y o  female complaining of right atraumatic knee pain x several weeks  She states that she had no trauma or preceding falls  The patient was seen by Dr Elijah Kimball on 1/19 where he aspirated 45 ml of bloody fluid from the knee  She reports that her pain had returned by the time she returned home  She was seen again by Dr Elijah Kimball on 2/9 at which time he recommended MRI to rule out occult fracture  She has had worsening pain and inability to bear weight on the right lower extremity with swelling of the knee, but no numbness, tingling, fevers, chills or pain with knee ROM  She takes eliquis for afib    Review Of Systems:   · Skin: Normal  · Neuro: See HPI  · Musculoskeletal: See HPI  · 14 point review of systems negative except as stated above     Past Medical History:   Past Medical History:   Diagnosis Date    Anxiety     [facility]     [facility]     [facility]     LEFT BREAST CA 22 YEARS AGO     Depression     Diabetes mellitus [facility]     Disease of thyroid gland     Hypertension     Hypothyroidism        Past Surgical History:   Past Surgical History:   Procedure Laterality Date    ABDOMINAL ADHESION SURGERY N/A 2/4/2018    Procedure: LYSIS ADHESIONS;  Surgeon: Devon Kirkland DO;  [facility];  Service: General    BREAST SURGERY      CHOLECYSTECTOMY      COLON SURGERY  2017    EXPLORATORY LAPAROTOMY      JOINT REPLACEMENT      LEFT KNEE REPLACEMENT     LAPAROTOMY N/A 2/4/2018    Procedure: LAPAROTOMY EXPLORATORY,;  Surgeon: Devon Kirkland DO;  [facility];  Service: General    MASTECTOMY      MASTECTOMY Left 1995    MASTECTOMY Right 2017    MN BIOPSY/EXCISION, LYMPH NODE(S) Right 1/13/2017    Procedure: SENTINEL LYMPH NODE BIOPSY RIGHT AXILLA;   Surgeon: Doreen Alarcon MD;  [facility];  Service: General    MN MASTECTOMY, SIMPLE, COMPLETE Right 1/13/2017    Procedure: MASTECTOMY SIMPLE;  Surgeon: Lilo Rae MD;  [facility];  Service: General    SMALL INTESTINE SURGERY N/A 2/4/2018    Procedure: RESECTION SMALL BOWEL;  Surgeon: Martin Davila DO;  [facility];  Service: General    US GUIDED BREAST BIOPSY RIGHT COMPLETE Right 11/29/2016       Family History:  Family history reviewed and non-contributory  Family History   Problem Relation Age of Onset    Cancer Mother     No Known Problems Father        Social History:  Social History     Socioeconomic History    Marital status:      Spouse name: None    Number of children: None    Years of education: None    Highest education level: None   Occupational History    None   Tobacco Use    Smoking status: Current Every Day Smoker     Packs/day: 1 00     Types: Cigarettes    Smokeless tobacco: Never Used   Vaping Use    Vaping Use: Never used   Substance and Sexual Activity    Alcohol use: Never    Drug use: Never    Sexual activity: Not Currently   Other Topics Concern    None   Social History Narrative    ** Merged History Encounter **          Social Determinants of Health     Financial Resource Strain: Not on file   Food Insecurity: Not on file   Transportation Needs: Not on file   Physical Activity: Not on file   Stress: Not on file   Social Connections: Not on file   Intimate Partner Violence: Not on file   Housing Stability: Not on file       Allergies:    Allergies   Allergen Reactions    Meloxicam GI Intolerance    Morphine GI Intolerance    Morphine     Penicillins     Percocet [Oxycodone-Acetaminophen]     Sitagliptin      SOB           Labs:  0   Lab Value Date/Time    HCT 35 2 02/12/2022 1139    HCT 36 0 01/16/2022 1311    HCT 29 3 (L) 09/18/2019 1732    HGB 11 3 (L) 02/12/2022 1139    HGB 11 4 (L) 01/16/2022 1311    HGB 9 3 (L) 09/18/2019 1732    INR 1 32 (H) 02/12/2022 1139    WBC 5 98 02/12/2022 1139    WBC 9 49 01/16/2022 1311    WBC 10 08 09/18/2019 1732       Meds:    Current Facility-Administered Medications:     acetaminophen (TYLENOL) tablet 650 mg, 650 mg, Oral, Once, Shakir Cooper MD    amLODIPine (NORVASC) tablet 5 mg, 5 mg, Oral, Daily, Camden Pinedo MD    chlorthalidone tablet 25 mg, 25 mg, Oral, Daily, Camden Pinedo MD    cloNIDine (CATAPRES) tablet 0 1 mg, 0 1 mg, Oral, Q12H [facility], Camden Pinedo MD    lisinopril (ZESTRIL) tablet 20 mg, 20 mg, Oral, Daily, Camden Pinedo MD    Current Outpatient Medications:     amLODIPine (NORVASC) 5 mg tablet, Take 2 tablets (10 mg total) by mouth daily (Patient taking differently: Take 2 5 mg by mouth daily ), Disp: 90 tablet, Rfl: 0    ascorbic acid (VITAMIN C) 500 mg tablet, Take 500 mg by mouth daily, Disp: , Rfl:     atorvastatin (LIPITOR) 40 mg tablet, Take 40 mg by mouth, Disp: , Rfl:     chlorthalidone 25 mg tablet, Take 25 mg by mouth daily, Disp: , Rfl: 3    cholecalciferol (VITAMIN D3) 1,000 units tablet, Take 2,000 Units by mouth daily , Disp: , Rfl:     cloNIDine (CATAPRES) 0 1 mg tablet, TAKE 1 TABLET BY MOUTH EVERY DAY, Disp: 90 tablet, Rfl: 7    Eliquis 5 MG, TAKE 1 TABLET BY MOUTH TWICE A DAY, Disp: 60 tablet, Rfl: 8    labetalol (NORMODYNE) 200 mg tablet, Take 1 tablet (200 mg total) by mouth every 12 (twelve) hours, Disp: 120 tablet, Rfl: 0    levothyroxine 125 mcg tablet, Take 125 mcg by mouth daily, Disp: , Rfl:     lisinopril (ZESTRIL) 20 mg tablet, TAKE 1 TABLET (20 MG TOTAL) BY MOUTH 2 (TWO) TIMES A DAY, Disp: 180 tablet, Rfl: 3    LORazepam (ATIVAN) 0 5 mg tablet, Take 0 5 mg by mouth 2 (two) times a day, Disp: , Rfl:     metFORMIN (GLUCOPHAGE) 500 mg tablet, Take 500 mg by mouth daily with breakfast , Disp: , Rfl:     Multiple Vitamins-Minerals (PRESERVISION AREDS 2 PO), Take by mouth 2 (two) times a day, Disp: , Rfl:     multivitamin (THERAGRAN) TABS, Take 1 tablet by mouth daily, Disp: , Rfl:     Omega-3 Fatty Acids (FISH OIL) 1,000 mg, Take 1,000 mg by mouth daily, Disp: , Rfl:     pantoprazole (PROTONIX) 40 mg tablet, Take 40 mg by mouth daily Pt takes daily when needed  , Disp: , Rfl:     Blood Culture:   No results found for: BLOODCX    Wound Culture:   No results found for: WOUNDCULT    Ins and Outs:  No intake/output data recorded  Physical Exam:   /72 (BP Location: Left arm)   Pulse 68   Temp 97 9 °F (36 6 °C) (Oral)   Resp 18   SpO2 96%   Gen: Alert and oriented to person, place, time  HEENT: EOMI, eyes clear, moist mucus membranes, hearing intact  Respiratory: Bilateral chest rise  No audible wheezing found  Cardiovascular: Regular Rate and Rhythm  Abdomen: soft nontender/nondistended    Musculoskeletal: right lower extremity  · Skin without erythema, moderate effusion of the knee  · TTP mildly to the medial joint line  · No instability to varus/valgus stress, lachman's or posterior drawer  · SILT s/s/sp/dp/t  · Motor intact 5/5 strength with hip flexion/extension, knee flexion/extension, ankle dorsi/plantar flexion, EHL/FHL  · 2+ DP pulse    Tertiary: no tenderness over all other joints/long bones as except already stated  Radiology:   I personally reviewed the films  X-ray right knee demonstrate some arthritic changes, but no fractures or dislocations     _*_*_*_*_*_*_*_*_*_*_*_*_*_*_*_*_*_*_*_*_*_*_*_*_*_*_*_*_*_*_*_*_*_*_*_*_*_*_*_*_*    Assessment:  78 y  o female with right knee pain  Given the persistence of her pain it is likely related to arthritis, but cannot rule out occult fracture  Agree with MRI for the right knee to rule out fracture  If there is no fracture, may consider CSI      Plan:   · NWB RLE  · PT/OT  · Pain control per primary team  · DVT ppx per primary team  · MRI right knee  · Dispo: ortho will follow    Michael Mcneill MD

## 2022-02-13 LAB
GLUCOSE SERPL-MCNC: 164 MG/DL (ref 65–140)
GLUCOSE SERPL-MCNC: 176 MG/DL (ref 65–140)
GLUCOSE SERPL-MCNC: 183 MG/DL (ref 65–140)

## 2022-02-13 PROCEDURE — 99222 1ST HOSP IP/OBS MODERATE 55: CPT | Performed by: ORTHOPAEDIC SURGERY

## 2022-02-13 PROCEDURE — NC001 PR NO CHARGE

## 2022-02-13 PROCEDURE — 99232 SBSQ HOSP IP/OBS MODERATE 35: CPT | Performed by: NURSE PRACTITIONER

## 2022-02-13 PROCEDURE — 82948 REAGENT STRIP/BLOOD GLUCOSE: CPT

## 2022-02-13 RX ORDER — ATORVASTATIN CALCIUM 40 MG/1
40 TABLET, FILM COATED ORAL
Status: DISCONTINUED | OUTPATIENT
Start: 2022-02-13 | End: 2022-02-15 | Stop reason: HOSPADM

## 2022-02-13 RX ORDER — AMLODIPINE BESYLATE 10 MG/1
10 TABLET ORAL DAILY
Status: DISCONTINUED | OUTPATIENT
Start: 2022-02-14 | End: 2022-02-15 | Stop reason: HOSPADM

## 2022-02-13 RX ORDER — ACETAMINOPHEN 325 MG/1
975 TABLET ORAL EVERY 8 HOURS SCHEDULED
Status: DISCONTINUED | OUTPATIENT
Start: 2022-02-13 | End: 2022-02-15 | Stop reason: HOSPADM

## 2022-02-13 RX ORDER — OXYCODONE HYDROCHLORIDE 5 MG/1
2.5 TABLET ORAL EVERY 4 HOURS PRN
Status: DISCONTINUED | OUTPATIENT
Start: 2022-02-13 | End: 2022-02-15 | Stop reason: HOSPADM

## 2022-02-13 RX ORDER — MELATONIN
1000 DAILY
Status: DISCONTINUED | OUTPATIENT
Start: 2022-02-13 | End: 2022-02-15 | Stop reason: HOSPADM

## 2022-02-13 RX ORDER — ASCORBIC ACID 500 MG
500 TABLET ORAL DAILY
Status: DISCONTINUED | OUTPATIENT
Start: 2022-02-13 | End: 2022-02-15 | Stop reason: HOSPADM

## 2022-02-13 RX ORDER — LORAZEPAM 0.5 MG/1
0.5 TABLET ORAL 2 TIMES DAILY
Status: DISCONTINUED | OUTPATIENT
Start: 2022-02-13 | End: 2022-02-15 | Stop reason: HOSPADM

## 2022-02-13 RX ORDER — AMLODIPINE BESYLATE 10 MG/1
10 TABLET ORAL DAILY
COMMUNITY

## 2022-02-13 RX ORDER — LIDOCAINE 50 MG/G
1 PATCH TOPICAL DAILY
Status: DISCONTINUED | OUTPATIENT
Start: 2022-02-13 | End: 2022-02-15 | Stop reason: HOSPADM

## 2022-02-13 RX ADMIN — LISINOPRIL 20 MG: 20 TABLET ORAL at 20:58

## 2022-02-13 RX ADMIN — LABETALOL HYDROCHLORIDE 200 MG: 200 TABLET, FILM COATED ORAL at 20:58

## 2022-02-13 RX ADMIN — LABETALOL HYDROCHLORIDE 200 MG: 200 TABLET, FILM COATED ORAL at 09:21

## 2022-02-13 RX ADMIN — AMLODIPINE BESYLATE 5 MG: 5 TABLET ORAL at 09:21

## 2022-02-13 RX ADMIN — LIDOCAINE 5% 1 PATCH: 700 PATCH TOPICAL at 10:19

## 2022-02-13 RX ADMIN — OXYCODONE HYDROCHLORIDE AND ACETAMINOPHEN 500 MG: 500 TABLET ORAL at 10:16

## 2022-02-13 RX ADMIN — APIXABAN 5 MG: 5 TABLET, FILM COATED ORAL at 10:16

## 2022-02-13 RX ADMIN — LORAZEPAM 0.5 MG: 0.5 TABLET ORAL at 10:16

## 2022-02-13 RX ADMIN — APIXABAN 5 MG: 5 TABLET, FILM COATED ORAL at 17:31

## 2022-02-13 RX ADMIN — INSULIN LISPRO 1 UNITS: 100 INJECTION, SOLUTION INTRAVENOUS; SUBCUTANEOUS at 17:32

## 2022-02-13 RX ADMIN — LORAZEPAM 0.5 MG: 0.5 TABLET ORAL at 17:31

## 2022-02-13 RX ADMIN — Medication 1000 UNITS: at 10:16

## 2022-02-13 RX ADMIN — CLONIDINE HYDROCHLORIDE 0.1 MG: 0.1 TABLET ORAL at 09:21

## 2022-02-13 RX ADMIN — CHLORTHALIDONE 25 MG: 25 TABLET ORAL at 10:15

## 2022-02-13 RX ADMIN — ATORVASTATIN CALCIUM 40 MG: 40 TABLET, FILM COATED ORAL at 17:31

## 2022-02-13 RX ADMIN — CLONIDINE HYDROCHLORIDE 0.1 MG: 0.1 TABLET ORAL at 20:58

## 2022-02-13 RX ADMIN — INSULIN LISPRO 1 UNITS: 100 INJECTION, SOLUTION INTRAVENOUS; SUBCUTANEOUS at 21:01

## 2022-02-13 RX ADMIN — LISINOPRIL 20 MG: 20 TABLET ORAL at 09:21

## 2022-02-13 NOTE — ASSESSMENT & PLAN NOTE
· Intermittent urgency noted, possible r/t pain  On multiple antihypertensives at baseline     · Continue Chlorthalidone 25 mg daily, clonidine 0 1 mg q 12 hours, lisinopril 20 mg daily, amlodipine 5 mg daily

## 2022-02-13 NOTE — PROGRESS NOTES
[facility]  Progress Note - Raul Muller 1940, 78 y o  female MRN: [id number]  Unit/Bed#: -Eulogio Encounter: [id number]  Primary Care Provider: Phoenix Lewis DO   Date and time admitted to hospital: 2/12/2022 11:05 AM    * Knee effusion, right  Assessment & Plan  Patient presented with right knee pain and swelling, saw orthopedics as outpatient and was ordered an MRI however this was not yet completed  Knee was aspirated in the office on 02/09 for 45 cc of bloody fluid  Continued to have pain and swelling with associated ambulatory dysfunction therefore presented to the ED  · MRI pending however per my discussion with Orthopedic surgery, they did note a small fracture which will be managed non operatively  · Cleared by Orthopedics to resume Eliquis  · Nonweightbearing right lower extremity  · Pain control  · PT/OT evaluations, suspect will need rehab  Paroxysmal A-fib (HCC)  Assessment & Plan  · Continue labetalol  Resume Eliquis  CKD (chronic kidney disease) stage 3, GFR 30-59 ml/min [facility])  Assessment & Plan  Lab Results   Component Value Date    EGFR 59 02/12/2022    EGFR 52 01/16/2022    EGFR 46 09/18/2019    CREATININE 0 90 02/12/2022    CREATININE 1 00 01/16/2022    CREATININE 1 12 09/18/2019   · Renal function stable  · Avoid nephrotoxins and hypotension  · BMP as needed    Anxiety  Assessment & Plan  · Continue home dose Ativan b i d  Hypothyroidism  Assessment & Plan  · Continue with levothyroxine    Diabetes mellitus type 2  Assessment & Plan  Lab Results   Component Value Date    HGBA1C 6 1 (H) 10/10/2020       · Hold metformin  · Refuses Diabetic diet  · SSI  · Hypoglycemia protocol     Essential hypertension  Assessment & Plan  · Intermittent urgency noted, possible r/t pain  On multiple antihypertensives at baseline     · Continue Chlorthalidone 25 mg daily, clonidine 0 1 mg q 12 hours, lisinopril 20 mg daily, amlodipine 5 mg daily    Ambulatory dysfunction  Assessment & Plan  · Lives in independent living  Now with ambulatory dysfunction secondary to above  · PT/OT evaluations, suspect will need rehab        VTE Pharmacologic Prophylaxis: VTE Score: 10 Moderate Risk (Score 3-4) - Pharmacological DVT Prophylaxis Ordered: apixaban (Eliquis)  Patient Centered Rounds: I performed bedside rounds with nursing staff today  Discussions with Specialists or Other Care Team Provider: nursing, ortho    Education and Discussions with Family / Patient: Updated  (daughter) via phone  Time Spent for Care: 30 minutes  More than 50% of total time spent on counseling and coordination of care as described above  Current Length of Stay: 1 day(s)    Current Patient Status: Inpatient     Certification Statement: The patient will continue to require additional inpatient hospital stay due to pain control, PT/OT evaluations, safe discharge planning     Discharge Plan: Anticipate discharge in 24-48 hrs to discharge location to be determined pending rehab evaluations  Code Status: Level 1 - Full Code    Subjective:   Patient continues to complain of pain and swelling of right knee  She is upset that she has a fracture but is happy that she will not require surgery  We discussed that she will likely not be able to return home alone and that she will likely need rehab  She is not thrilled about the idea but understands  Objective:     Vitals:   Temp (24hrs), Av 9 °F (36 6 °C), Min:97 7 °F (36 5 °C), Max:98 2 °F (36 8 °C)    Temp:  [97 7 °F (36 5 °C)-98 2 °F (36 8 °C)] 97 8 °F (36 6 °C)  HR:  [60-90] 60  Resp:  [16-18] 16  BP: (156-191)/() 167/87  SpO2:  [95 %-98 %] 97 %  Body mass index is 22 6 kg/m²  Input and Output Summary (last 24 hours):      Intake/Output Summary (Last 24 hours) at 2022 0954  Last data filed at 2022 2349  Gross per 24 hour   Intake 240 ml   Output 900 ml   Net -660 ml       Physical Exam:   Physical Exam  Vitals and nursing note reviewed  Cardiovascular:      Rate and Rhythm: Normal rate  Rhythm irregular  Pulmonary:      Breath sounds: Normal breath sounds  Abdominal:      Tenderness: There is no abdominal tenderness  Musculoskeletal:      Right knee: Swelling present  Tenderness present  Skin:     General: Skin is warm  Neurological:      Mental Status: She is alert and oriented to person, place, and time  Mental status is at baseline  Psychiatric:         Mood and Affect: Mood normal           Additional Data:     Labs:  Results from last 7 days   Lab Units 02/12/22  1139   WBC Thousand/uL 5 98   HEMOGLOBIN g/dL 11 3*   HEMATOCRIT % 35 2   PLATELETS Thousands/uL 253   NEUTROS PCT % 73   LYMPHS PCT % 13*   MONOS PCT % 11   EOS PCT % 2     Results from last 7 days   Lab Units 02/12/22  1139   SODIUM mmol/L 137   POTASSIUM mmol/L 3 8   CHLORIDE mmol/L 102   CO2 mmol/L 29   BUN mg/dL 16   CREATININE mg/dL 0 90   ANION GAP mmol/L 6   CALCIUM mg/dL 9 7   GLUCOSE RANDOM mg/dL 194*     Results from last 7 days   Lab Units 02/12/22  1139   INR  1 32*                   Lines/Drains:  Invasive Devices  Report    Peripheral Intravenous Line            Peripheral IV 02/12/22 Left;Ventral (anterior) Forearm <1 day                      Imaging: No pertinent imaging reviewed      Recent Cultures (last 7 days):         Last 24 Hours Medication List:   Current Facility-Administered Medications   Medication Dose Route Frequency Provider Last Rate    acetaminophen  975 mg Oral Q8H [facility] RUCHI Napier      [START ON 2/14/2022] amLODIPine  10 mg Oral Daily RUCHI Napier      apixaban  5 mg Oral BID RUCHI Napier      ascorbic acid  500 mg Oral Daily RUCHI Npaier      atorvastatin  40 mg Oral After [employer]      chlorthalidone  25 mg Oral Daily Geri Osuna MD      cholecalciferol  1,000 Units Oral Daily RUCHI Lion      cloNIDine 0 1 mg Oral Q12H [address] Mami Medina MD      insulin lispro  1-5 Units Subcutaneous TID AC RUCHI Napier      insulin lispro  1-5 Units Subcutaneous HS RUCHI Napier      labetalol  200 mg Oral Q12H [address] Miri Singh PA-C      lidocaine  1 patch Topical Daily RUCHI Guevara      lisinopril  20 mg Oral BID Miri Singh PA-C      LORazepam  0 5 mg Oral BID RUCHI Napier      oxyCODONE  2 5 mg Oral Q4H PRN RUCHI Guevara          Today, Patient Was Seen By: RUCHI Cardoza    **Please Note: This note may have been constructed using a voice recognition system  **

## 2022-02-13 NOTE — ASSESSMENT & PLAN NOTE
· Lives in independent living  Now with ambulatory dysfunction secondary to above    · PT/OT evaluations, suspect will need rehab

## 2022-02-13 NOTE — ASSESSMENT & PLAN NOTE
Patient presented with right knee pain and swelling, saw orthopedics as outpatient and was ordered an MRI however this was not yet completed  Knee was aspirated in the office on 02/09 for 45 cc of bloody fluid  Continued to have pain and swelling with associated ambulatory dysfunction therefore presented to the ED  · MRI pending however per my discussion with Orthopedic surgery, they did note a small fracture which will be managed non operatively  · Cleared by Orthopedics to resume Eliquis  · Nonweightbearing right lower extremity  · Pain control  · PT/OT evaluations, suspect will need rehab

## 2022-02-13 NOTE — PLAN OF CARE
Problem: Potential for Falls  Goal: Patient will remain free of falls  Description: INTERVENTIONS:  - Educate patient/family on patient safety including physical limitations  - Instruct patient to call for assistance with activity   - Consult OT/PT to assist with strengthening/mobility   - Keep Call bell within reach  - Keep bed low and locked with side rails adjusted as appropriate  - Keep care items and personal belongings within reach  - Initiate and maintain comfort rounds  - Make Fall Risk Sign visible to staff  - Offer Toileting every  Hours, in advance of need  - Initiate/Maintain alarm  - Obtain necessary fall risk management equipment:   - Apply yellow socks and bracelet for high fall risk patients  - Consider moving patient to room near nurses station  Outcome: Progressing     Problem: Prexisting or High Potential for Compromised Skin Integrity  Goal: Skin integrity is maintained or improved  Description: INTERVENTIONS:  - Identify patients at risk for skin breakdown  - Assess and monitor skin integrity  - Assess and monitor nutrition and hydration status  - Monitor labs   - Assess for incontinence   - Turn and reposition patient  - Assist with mobility/ambulation  - Relieve pressure over bony prominences  - Avoid friction and shearing  - Provide appropriate hygiene as needed including keeping skin clean and dry  - Evaluate need for skin moisturizer/barrier cream  - Collaborate with interdisciplinary team   - Patient/family teaching  - Consider wound care consult   Outcome: Progressing     Problem: MOBILITY - ADULT  Goal: Maintain or return to baseline ADL function  Description: INTERVENTIONS:  -  Assess patient's ability to carry out ADLs; assess patient's baseline for ADL function and identify physical deficits which impact ability to perform ADLs (bathing, care of mouth/teeth, toileting, grooming, dressing, etc )  - Assess/evaluate cause of self-care deficits   - Assess range of motion  - Assess patient's mobility; develop plan if impaired  - Assess patient's need for assistive devices and provide as appropriate  - Encourage maximum independence but intervene and supervise when necessary  - Involve family in performance of ADLs  - Assess for home care needs following discharge   - Consider OT consult to assist with ADL evaluation and planning for discharge  - Provide patient education as appropriate  Outcome: Progressing  Goal: Maintains/Returns to pre admission functional level  Description: INTERVENTIONS:  - Perform BMAT or MOVE assessment daily    - Set and communicate daily mobility goal to care team and patient/family/caregiver  - Collaborate with rehabilitation services on mobility goals if consulted  - Perform Range of Motion  times a day  - Reposition patient every hours    - Dangle patient  times a day  - Stand patient  times a day  - Ambulate patient  times a day  - Out of bed to chair  times a day   - Out of bed for meals  times a day  - Out of bed for toileting  - Record patient progress and toleration of activity level   Outcome: Progressing     Problem: PAIN - ADULT  Goal: Verbalizes/displays adequate comfort level or baseline comfort level  Description: Interventions:  - Encourage patient to monitor pain and request assistance  - Assess pain using appropriate pain scale  - Administer analgesics based on type and severity of pain and evaluate response  - Implement non-pharmacological measures as appropriate and evaluate response  - Consider cultural and social influences on pain and pain management  - Notify physician/advanced practitioner if interventions unsuccessful or patient reports new pain  Outcome: Progressing     Problem: INFECTION - ADULT  Goal: Absence or prevention of progression during hospitalization  Description: INTERVENTIONS:  - Assess and monitor for signs and symptoms of infection  - Monitor lab/diagnostic results  - Monitor all insertion sites, i e  indwelling lines, tubes, and drains  - Monitor endotracheal if appropriate and nasal secretions for changes in amount and color  - [place] appropriate cooling/warming therapies per order  - Administer medications as ordered  - Instruct and encourage patient and family to use good hand hygiene technique  - Identify and instruct in appropriate isolation precautions for identified infection/condition  Outcome: Progressing  Goal: Absence of fever/infection during neutropenic period  Description: INTERVENTIONS:  - Monitor WBC    Outcome: Progressing     Problem: SAFETY ADULT  Goal: Patient will remain free of falls  Description: INTERVENTIONS:  - Educate patient/family on patient safety including physical limitations  - Instruct patient to call for assistance with activity   - Consult OT/PT to assist with strengthening/mobility   - Keep Call bell within reach  - Keep bed low and locked with side rails adjusted as appropriate  - Keep care items and personal belongings within reach  - Initiate and maintain comfort rounds  - Make Fall Risk Sign visible to staff  - Offer Toileting every  Hours, in advance of need  - Initiate/Maintain alarm  - Obtain necessary fall risk management equipment:   - Apply yellow socks and bracelet for high fall risk patients  - Consider moving patient to room near nurses station  Outcome: Progressing  Goal: Maintain or return to baseline ADL function  Description: INTERVENTIONS:  -  Assess patient's ability to carry out ADLs; assess patient's baseline for ADL function and identify physical deficits which impact ability to perform ADLs (bathing, care of mouth/teeth, toileting, grooming, dressing, etc )  - Assess/evaluate cause of self-care deficits   - Assess range of motion  - Assess patient's mobility; develop plan if impaired  - Assess patient's need for assistive devices and provide as appropriate  - Encourage maximum independence but intervene and supervise when necessary  - Involve family in performance of ADLs  - Assess for home care needs following discharge   - Consider OT consult to assist with ADL evaluation and planning for discharge  - Provide patient education as appropriate  Outcome: Progressing  Goal: Maintains/Returns to pre admission functional level  Description: INTERVENTIONS:  - Perform BMAT or MOVE assessment daily    - Set and communicate daily mobility goal to care team and patient/family/caregiver  - Collaborate with rehabilitation services on mobility goals if consulted  - Perform Range of Motion  times a day  - Reposition patient every  hours  - Dangle patient  times a day  - Stand patient  times a day  - Ambulate patient  times a day  - Out of bed to chair  times a day   - Out of bed for meals  times a day  - Out of bed for toileting  - Record patient progress and toleration of activity level   Outcome: Progressing     Problem: DISCHARGE PLANNING  Goal: Discharge to home or other facility with appropriate resources  Description: INTERVENTIONS:  - Identify barriers to discharge w/patient and caregiver  - Arrange for needed discharge resources and transportation as appropriate  - Identify discharge learning needs (meds, wound care, etc )  - Arrange for interpretive services to assist at discharge as needed  - Refer to Case Management Department for coordinating discharge planning if the patient needs post-hospital services based on physician/advanced practitioner order or complex needs related to functional status, cognitive ability, or social support system  Outcome: Progressing     Problem: Knowledge Deficit  Goal: Patient/family/caregiver demonstrates understanding of disease process, treatment plan, medications, and discharge instructions  Description: Complete learning assessment and assess knowledge base    Interventions:  - Provide teaching at level of understanding  - Provide teaching via preferred learning methods  Outcome: Progressing no

## 2022-02-13 NOTE — ASSESSMENT & PLAN NOTE
Lab Results   Component Value Date    EGFR 59 02/12/2022    EGFR 52 01/16/2022    EGFR 46 09/18/2019    CREATININE 0 90 02/12/2022    CREATININE 1 00 01/16/2022    CREATININE 1 12 09/18/2019   · Renal function stable    · Avoid nephrotoxins and hypotension  · BMP as needed

## 2022-02-13 NOTE — DISCHARGE INSTRUCTIONS
Discharge Instructions - Orthopedics  Armaan Neil 78 y o  female MRN: [id number]  Unit/Bed#: -01    Weight Bearing Status:                                           Toe Touch weight bearing right lower extremity    DVT prophylaxis  You are already on Eliquis, no need to add anything else  Pain:  Continue analgesics as directed    Dressing Instructions:   Please keep clean, dry and intact until follow up     Appt Instructions: If you do not have your appointment, please call the clinic at [phone]   Otherwise followup as scheduled     Contact the office sooner if you experience any increased numbness/tingling in the extremities

## 2022-02-13 NOTE — PROGRESS NOTES
Progress Note - Orthopedics   Leeann Kimbrough 78 y o  female MRN: [id number]  Unit/Bed#: -01      Subjective:    78 y  o female with R atraumatic knee pain x several weeks  No acute events, no complaints  Pt doing well  Pain controlled  Denies fevers chills, CP, SOB  Labs:  0   Lab Value Date/Time    HCT 35 2 02/12/2022 1139    HCT 36 0 01/16/2022 1311    HCT 29 3 (L) 09/18/2019 1732    HGB 11 3 (L) 02/12/2022 1139    HGB 11 4 (L) 01/16/2022 1311    HGB 9 3 (L) 09/18/2019 1732    INR 1 32 (H) 02/12/2022 1139    WBC 5 98 02/12/2022 1139    WBC 9 49 01/16/2022 1311    WBC 10 08 09/18/2019 1732    ESR 27 02/12/2022 1139    CRP <3 0 02/12/2022 1139       Meds:    Current Facility-Administered Medications:     acetaminophen (TYLENOL) tablet 650 mg, 650 mg, Oral, Once, Joe Raya MD    acetaminophen (TYLENOL) tablet 650 mg, 650 mg, Oral, Q6H PRN, Nikki Singh PA-C    amLODIPine (NORVASC) tablet 5 mg, 5 mg, Oral, BID, [place] PA-C    chlorthalidone tablet 25 mg, 25 mg, Oral, Daily, Adriana Ramos MD, 25 mg at 02/12/22 1418    cloNIDine (CATAPRES) tablet 0 1 mg, 0 1 mg, Oral, Q12H [address], Adriana Ramos MD, 0 1 mg at 02/12/22 2211    labetalol (NORMODYNE) tablet 200 mg, 200 mg, Oral, Q12H [address], [place] PA-C, 200 mg at 02/12/22 2350    lisinopril (ZESTRIL) tablet 20 mg, 20 mg, Oral, BID, [place] Singh PA-C    LORazepam (ATIVAN) tablet 0 5 mg, 0 5 mg, Oral, BID PRN, Nikki jeovany Singh PA-C, 0 5 mg at 02/12/22 2350    Blood Culture:   No results found for: BLOODCX    Wound Culture:   No results found for: WOUNDCULT    Ins and Outs:  I/O last 24 hours:   In: 240 [P O :240]  Out: 900 [Urine:900]          Physical:  Vitals:    02/12/22 2317   BP: 156/63   Pulse: 63   Resp: 16   Temp: 98 2 °F (36 8 °C)   SpO2: 95%     Musculoskeletal: right Lower Extremity  · Skin intact, no lesions or erythema, mild effusion noted to knee  · TTP over medial joint line of knee · No varus/valgus instability of knee  · SLIT s/s/sp/dp/t  · 5/5 strength with hip flexion/extension and ankle flexion/extension     Assessment:    78 y  o female with R atraumatic knee pain     Plan:  · NWB RLE  · PT/OT  · Pain control - per primary team   · DVT ppx - per primary team   · MRI results pending   · Follow up with Dr Bonita Cannon outpatient   · Dispo: Ortho will follow    Manuela Gonzalez PA-C

## 2022-02-13 NOTE — ASSESSMENT & PLAN NOTE
Lab Results   Component Value Date    HGBA1C 6 1 (H) 10/10/2020       · Hold metformin     · Refuses Diabetic diet  · SSI  · Hypoglycemia protocol

## 2022-02-14 PROBLEM — S82.141A FRACTURE OF RIGHT TIBIAL PLATEAU: Status: ACTIVE | Noted: 2022-02-12

## 2022-02-14 LAB
GLUCOSE SERPL-MCNC: 117 MG/DL (ref 65–140)
GLUCOSE SERPL-MCNC: 143 MG/DL (ref 65–140)
GLUCOSE SERPL-MCNC: 163 MG/DL (ref 65–140)
GLUCOSE SERPL-MCNC: 198 MG/DL (ref 65–140)
GLUCOSE SERPL-MCNC: 221 MG/DL (ref 65–140)

## 2022-02-14 PROCEDURE — 82948 REAGENT STRIP/BLOOD GLUCOSE: CPT

## 2022-02-14 PROCEDURE — 99232 SBSQ HOSP IP/OBS MODERATE 35: CPT | Performed by: INTERNAL MEDICINE

## 2022-02-14 PROCEDURE — 99222 1ST HOSP IP/OBS MODERATE 55: CPT

## 2022-02-14 PROCEDURE — 97163 PT EVAL HIGH COMPLEX 45 MIN: CPT

## 2022-02-14 PROCEDURE — 97167 OT EVAL HIGH COMPLEX 60 MIN: CPT

## 2022-02-14 RX ORDER — LEVOTHYROXINE SODIUM 0.12 MG/1
125 TABLET ORAL DAILY
Status: DISCONTINUED | OUTPATIENT
Start: 2022-02-15 | End: 2022-02-15 | Stop reason: HOSPADM

## 2022-02-14 RX ADMIN — LISINOPRIL 20 MG: 20 TABLET ORAL at 21:04

## 2022-02-14 RX ADMIN — ATORVASTATIN CALCIUM 40 MG: 40 TABLET, FILM COATED ORAL at 17:30

## 2022-02-14 RX ADMIN — LABETALOL HYDROCHLORIDE 200 MG: 200 TABLET, FILM COATED ORAL at 21:04

## 2022-02-14 RX ADMIN — CHLORTHALIDONE 25 MG: 25 TABLET ORAL at 08:37

## 2022-02-14 RX ADMIN — INSULIN LISPRO 1 UNITS: 100 INJECTION, SOLUTION INTRAVENOUS; SUBCUTANEOUS at 10:54

## 2022-02-14 RX ADMIN — LABETALOL HYDROCHLORIDE 200 MG: 200 TABLET, FILM COATED ORAL at 10:57

## 2022-02-14 RX ADMIN — CLONIDINE HYDROCHLORIDE 0.1 MG: 0.1 TABLET ORAL at 21:04

## 2022-02-14 RX ADMIN — LISINOPRIL 20 MG: 20 TABLET ORAL at 08:36

## 2022-02-14 RX ADMIN — ACETAMINOPHEN 975 MG: 325 TABLET, FILM COATED ORAL at 21:04

## 2022-02-14 RX ADMIN — APIXABAN 5 MG: 5 TABLET, FILM COATED ORAL at 08:36

## 2022-02-14 RX ADMIN — LORAZEPAM 0.5 MG: 0.5 TABLET ORAL at 17:29

## 2022-02-14 RX ADMIN — LORAZEPAM 0.5 MG: 0.5 TABLET ORAL at 08:36

## 2022-02-14 RX ADMIN — ACETAMINOPHEN 975 MG: 325 TABLET, FILM COATED ORAL at 15:07

## 2022-02-14 RX ADMIN — Medication 1000 UNITS: at 08:36

## 2022-02-14 RX ADMIN — AMLODIPINE BESYLATE 10 MG: 10 TABLET ORAL at 08:36

## 2022-02-14 RX ADMIN — LIDOCAINE 5% 1 PATCH: 700 PATCH TOPICAL at 08:36

## 2022-02-14 RX ADMIN — INSULIN LISPRO 1 UNITS: 100 INJECTION, SOLUTION INTRAVENOUS; SUBCUTANEOUS at 16:57

## 2022-02-14 RX ADMIN — APIXABAN 5 MG: 5 TABLET, FILM COATED ORAL at 17:29

## 2022-02-14 RX ADMIN — CLONIDINE HYDROCHLORIDE 0.1 MG: 0.1 TABLET ORAL at 08:36

## 2022-02-14 RX ADMIN — OXYCODONE HYDROCHLORIDE AND ACETAMINOPHEN 500 MG: 500 TABLET ORAL at 08:36

## 2022-02-14 NOTE — PLAN OF CARE
Problem: PHYSICAL THERAPY ADULT  Goal: Performs mobility at highest level of function for planned discharge setting  See evaluation for individualized goals  Description: Treatment/Interventions: Functional transfer training,LE strengthening/ROM,Therapeutic exercise,Endurance training,Patient/family training,Equipment eval/education,Bed mobility,Gait training,Spoke to nursing,OT  Equipment Recommended: Wheelchair,Walker       See flowsheet documentation for full assessment, interventions and recommendations  Note: Prognosis: Good  Problem List: Decreased strength,Decreased range of motion,Decreased endurance,Impaired balance,Decreased mobility,Pain,Orthopedic restrictions  Assessment: Pt is 78 y o  female seen for PT evaluation s/p admit to [facility] on 2/12/2022  Two pt identifiers were used to confirm  Pt presented w/ right knee effusion  Patient denies any recent fall or trauma  Pt was admitted with a primary dx of:  Nondisplaced impaction fracture of medial tibial plateau on right, right knee effusion  Per Ortho patient is NWB to RLE and non op management for fx at this time  PT now consulted for assessment of mobility and d/c needs  Pts current co morbidities affecting treatment include: Anxiety, AFib, history of breast cancer, depression, DM, disease of thyroid gland, HTN, hypothyroidism, and personal factors including living alone  Pts current clinical presentation is Unstable/ Unpredictable (high complexity) due to Ongoing medical management for primary dx, Increased reliance on more restrictive AD compared to baseline, Decreased activity tolerance compared to baseline, Fall risk, Increased assistance needed from caregiver at current time, Current WBS, Continuous pulse oximetry monitoring     Upon evaluation, pt currently is requiring Min Ax1 for bed mobility; Mod Ax1 for transfers and Mod Ax1 for ambulation w/ RW    Pt presents at PT eval functioning below baseline and currently w/ overall mobility deficits 2* to: RLE weakness, decreased ROM, impaired balance, decreased endurance, gait deviations, pain, decreased activity tolerance compared to baseline, fall risk, orthopedic restrictions  Pt currently at a fall risk 2* to impairments listed above  Based on the aforementioned PT evaluation, pt will continue to benefit from skilled Acute PT interventions to address stated impairments; to maximize functional mobility; for ongoing pt/ family training; and DME needs  At conclusion of PT session pt returned back in chair with phone and call bell within reach  Pt denies any further questions at this time  PT is currently recommending Rehab  PT will continue to follow during hospital stay  Barriers to Discharge: Inaccessible home environment,Decreased caregiver support        PT Discharge Recommendation: Post acute rehabilitation services          See flowsheet documentation for full assessment

## 2022-02-14 NOTE — PHYSICAL THERAPY NOTE
PHYSICAL THERAPY EVALUATION  NAME:  Ramiro Lopez  DATE: 02/14/22    AGE:   78 y o  Mrn:   [id number]  ADMIT DX:  Hemarthrosis of right knee [M25 061]  Ambulatory dysfunction [R26 2]    Past Medical History:   Diagnosis Date    Anxiety     [facility]     [facility]el obstruction [facility]     Cancer [facility]     LEFT BREAST CA 22 YEARS AGO     Depression     Diabetes mellitus [facility]     Disease of thyroid gland     Hypertension     Hypothyroidism        Past Surgical History:   Procedure Laterality Date    ABDOMINAL ADHESION SURGERY N/A 2/4/2018    Procedure: LYSIS ADHESIONS;  Surgeon: Stacey Graff DO;  [facility];  Service: General    BREAST SURGERY      CHOLECYSTECTOMY      COLON SURGERY  2017    EXPLORATORY LAPAROTOMY      JOINT REPLACEMENT      LEFT KNEE REPLACEMENT     LAPAROTOMY N/A 2/4/2018    Procedure: LAPAROTOMY EXPLORATORY,;  Surgeon: Stacey Graff DO;  [facility];  Service: General    MASTECTOMY      MASTECTOMY Left 1995    MASTECTOMY Right 2017    [place] BIOPSY/EXCISION, LYMPH NODE(S) Right 1/13/2017    Procedure: SENTINEL LYMPH NODE BIOPSY RIGHT AXILLA; Surgeon: Huang Duarte MD;  [facility];  Service: General    [place] MASTECTOMY, SIMPLE, COMPLETE Right 1/13/2017    Procedure: MASTECTOMY SIMPLE;  Surgeon: Huang Duarte MD;  [facility];  Service: General    SMALL INTESTINE SURGERY N/A 2/4/2018    Procedure: RESECTION SMALL BOWEL;  Surgeon: Stacey Graff DO;  [facility];  Service: General    US GUIDED BREAST BIOPSY RIGHT COMPLETE Right 11/29/2016       Length Of Stay: 2    PHYSICAL THERAPY EVALUATION:        02/14/22 0930   Note Type   Note type Evaluation   Pain Assessment   Pain Assessment Tool 0-10   Pain Score 8   Pain Location/Orientation Orientation: Right;Location: Leg   Pain Onset/Description Onset: Ongoing;Frequency: Constant/Continuous; Descriptor: Aching   Effect of Pain on Daily Activities Increased pain with activity   Patient's Stated Pain Goal No pain   Hospital Pain Intervention(s) Ambulation/increased activity;Repositioned   Restrictions/Precautions   Weight Bearing Precautions Per Order Yes   RLE Weight Bearing Per Order NWB   LLE Weight Bearing Per Order WBAT   Other Precautions Fall Risk;Pain;THR   Home Living   Type of Home Apartment   Home Layout One level;Elevator   [address] [facility]   Additional Comments Patient reports living alone, but states she has a local daughter and granddaughter who can assist as able  Patient also reports having a supportive neighbor but states her neighbor is elderly   Prior Function   Level of [place] Independent with ADLs and functional mobility   Lives With Spouse   Receives Help From Family   ADL Assistance Independent   Falls in the last 6 months 0   Comments Patient reports use of a single-point cane for ambulation prior to admission   General   Family/Caregiver Present No   Cognition   Overall Cognitive Status WFL   Arousal/Participation Alert   Attention Within functional limits   Orientation Level Oriented to person;Oriented to place;Oriented to time;Oriented to situation   Memory Within functional limits   Following Commands Follows one step commands without difficulty   RUE Assessment   RUE Assessment WFL   LUE Assessment   LUE Assessment WFL   RLE Assessment   RLE Assessment X   Strength RLE   RLE Overall Strength 3-/5   LLE Assessment   LLE Assessment WFL   Strength LLE   LLE Overall Strength 4/5   Bed Mobility   Supine to Sit 4  Minimal assistance   Additional items Assist x 1; Increased time required;Verbal cues   Transfers   Sit to Stand 3  Moderate assistance   Additional items Assist x 1; Increased time required;Verbal cues   Stand to Sit 3  Moderate assistance   Additional items Assist x 1; Increased time required;Verbal cues   Additional Comments VC and TC needed for hand placement during transfers    Ambulation/Elevation   Gait pattern Excessively slow; Step to;Short stride; Foward flexed  (hop to gait pattern )   Gait Assistance 3  Moderate assist   Additional items Assist x 1   Assistive Device Rolling walker   Distance 3ft    Balance   Static Sitting Fair -   Static Standing Poor +   Ambulatory Poor   Endurance Deficit   Endurance Deficit Yes   Endurance Deficit Description fatigue, pain    Activity Tolerance   Activity Tolerance Patient limited by fatigue;Patient limited by pain   Medical Staff Made Aware Juanito Woodall; Juanito Hidalgo student; OT present for co evaluation due to pts unstable presentation, new physical limitations/ restrictions, and decreased activity tolerance which all impact pts overall physical performance   Nurse Made Aware Patient appropriate to be seen and mobilized per nursing   Assessment   Prognosis Good   Problem List Decreased strength;Decreased range of motion;Decreased endurance; Impaired balance;Decreased mobility;Pain;Orthopedic restrictions   Assessment Pt is 78 y o  female seen for PT evaluation s/p admit to [facility] on 2/12/2022  Two pt identifiers were used to confirm  Pt presented w/ right knee effusion  Patient denies any recent fall or trauma  Pt was admitted with a primary dx of:  Nondisplaced impaction fracture of medial tibial plateau on right, right knee effusion  Per Ortho patient is NWB to RLE and non op management for fx at this time  PT now consulted for assessment of mobility and d/c needs  Pts current co morbidities affecting treatment include: Anxiety, AFib, history of breast cancer, depression, DM, disease of thyroid gland, HTN, hypothyroidism, and personal factors including living alone   Pts current clinical presentation is Unstable/ Unpredictable (high complexity) due to Ongoing medical management for primary dx, Increased reliance on more restrictive AD compared to baseline, Decreased activity tolerance compared to baseline, Fall risk, Increased assistance needed from caregiver at current time, Current WBS, Continuous pulse oximetry monitoring     Upon evaluation, pt currently is requiring Min Ax1 for bed mobility; Mod Ax1 for transfers and Mod Ax1 for ambulation w/ RW   Pt presents at PT eval functioning below baseline and currently w/ overall mobility deficits 2* to: RLE weakness, decreased ROM, impaired balance, decreased endurance, gait deviations, pain, decreased activity tolerance compared to baseline, fall risk, orthopedic restrictions  Pt currently at a fall risk 2* to impairments listed above  Based on the aforementioned PT evaluation, pt will continue to benefit from skilled Acute PT interventions to address stated impairments; to maximize functional mobility; for ongoing pt/ family training; and DME needs  At conclusion of PT session pt returned back in chair with phone and call bell within reach  Pt denies any further questions at this time  PT is currently recommending Rehab  PT will continue to follow during hospital stay  Barriers to Discharge Inaccessible home environment;Decreased caregiver support   Goals   Patient Goals " to go home"    STG Expiration Date 02/24/22   Short Term Goal #1 In 10 days pt will complete: 1) Bed mobility skills with S to increase safety and independence as well as decrease caregiver burden  2) Functional transfers with S to promote increased independence, safety, and QOL  3) Ambulate 100' using least restrictive AD with S without LOB and stable vitals so that pt can negotiate previous living environment safely and promote independence with functional mobility and return to PLOF  4) Improve balance grades by 1/2 grade to increase safety with all mobility and decrease fall risk  5) Improve BLE strength by 1/2 grade to help increase overall functional mobility and decrease fall risk  Plan   Treatment/Interventions Functional transfer training;LE strengthening/ROM; Therapeutic exercise; Endurance training;Patient/family training;Equipment eval/education; Bed mobility;Gait training;Spoke to nursing;OT   PT Frequency 3-5x/wk   Recommendation   PT Discharge Recommendation Post acute rehabilitation services   Equipment Recommended Wheelchair;Walker   Wheelchair Package Recommended Standard   Walker Package Recommended Wheeled walker   AM-PAC Basic Mobility Inpatient   Turning in Bed Without Bedrails 3   Lying on Back to Sitting on Edge of Flat Bed 3   Moving Bed to Chair 2   Standing Up From Chair 2   Walk in Room 2   Climb 3-5 Stairs 1   Basic Mobility Inpatient Raw Score 13   Basic Mobility Standardized Score 33 99   Highest Level Of Mobility   [facility] Goal 4: Move to chair/commode   Modified Keith Scale   Modified Keith Scale 4   Barthel Index   Feeding 10   Bathing 0   Grooming Score 5   Dressing Score 0   Bladder Score 10   Bowels Score 10   Toilet Use Score 5   Transfers (Bed/Chair) Score 5   Mobility (Level Surface) Score 0   Stairs Score 0   Barthel Index Score 45   Portions of the documentation may have been created using voice recognition software  Occasional wrong word or sound alike substitutions may have occurred due to the inherent limitations of the voice recognition software  Read the chart carefully and recognize, using context, where substitutions have occurred      Oda Mortimer, PT, DPT

## 2022-02-14 NOTE — ASSESSMENT & PLAN NOTE
· Remains at baseline    Results from last 7 days   Lab Units 02/12/22  1139   BUN mg/dL 16   CREATININE mg/dL 0 90   EGFR ml/min/1 73sq m 59

## 2022-02-14 NOTE — PLAN OF CARE
Problem: OCCUPATIONAL THERAPY ADULT  Goal: Performs self-care activities at highest level of function for planned discharge setting  See evaluation for individualized goals  Description: Treatment Interventions: ADL retraining,Functional transfer training,Endurance training,Patient/family training,Equipment evaluation/education,Activityengagement,Energy conservation,Continued evaluation          See flowsheet documentation for full assessment, interventions and recommendations  Note: Limitation: Decreased ADL status,Decreased self-care trans,Decreased high-level ADLs,Decreased endurance  Prognosis: Fair  Assessment: Pt  is 78 y o  female admitted to [facility] on 2/12/2022 s/p Knee effusion, right  Pt is currently non-weight bearing on RLE  PMH includes HTN and AFib, anxiety, hypothyroidism, Diabetes mellitus type 2  Pt  currently resides alone in a one story apartment with 0 KYLAH  Prior to admission pt  was independent in ADLs, IADLs, and functional mobility  At baseline, pt  required the use of [facility] for functional mobility  Currently, pt  requires mod A for transfers, functional mobility, and LB ADLs  Pt requires supervision for UB and min A for bed mobility  Pt  demonstrates deficits in bathing, dressing, toileting, functional mobility/transfers, laundry , house maintenance, meal prep, cleaning and leisure activities  due to activity tolerance and standing balance/tolerance and limited home support, difficulty performing ADLS and difficulty performing IADLS   OT d/c recommendations include post acute rehabilitation services due to limited home support, current functional status, and NWB status  Pt  would benefit from continued inpatient OT services focusing on the goals below  OT Discharge Recommendation: Post acute rehabilitation services  OT - OK to Discharge:  Yes

## 2022-02-14 NOTE — ASSESSMENT & PLAN NOTE
· Right tibial plateau fracture  Initially seen in orthopedic office underwent aspiration bloody fluid  · Non operative  Toe-touch weight-bearing  · Seen by PT/OT    Needs rehab placement

## 2022-02-14 NOTE — ASSESSMENT & PLAN NOTE
Lab Results   Component Value Date    HGBA1C 6 1 (H) 10/10/2020     Results from last 7 days   Lab Units 02/14/22  1526 02/14/22  1048 02/14/22  1046 02/14/22  0607 02/13/22  2042 02/13/22  1601 02/13/22  1101   POC GLUCOSE mg/dl 163* 198* 221* 143* 183* 164* 176*     · Holding metformin during hospitalization    Continue sliding scale insulin

## 2022-02-14 NOTE — PROGRESS NOTES
[facility] [facility]  Progress Note - [place] Hemp 1940, 78 y o  female MRN: [id number]  Unit/Bed#: -Eulogio Encounter: [id number]  Primary Care Provider: Vicky Sanchez DO   Date and time admitted to hospital: 2/12/2022 11:05 AM    * Fracture of right tibial plateau  Assessment & Plan  · Right tibial plateau fracture  Initially seen in orthopedic office underwent aspiration bloody fluid  · Non operative  Toe-touch weight-bearing  · Seen by PT/OT  Needs rehab placement    CKD (chronic kidney disease) stage 3, GFR 30-59 ml/min ([facility])  Assessment & Plan  · Remains at baseline    Results from last 7 days   Lab Units 02/12/22  1139   BUN mg/dL 16   CREATININE mg/dL 0 90   EGFR ml/min/1 73sq m 59       Paroxysmal A-fib ([facility])  Assessment & Plan  · Continue eliquis    Anxiety  Assessment & Plan  · Continue lorazepam    Hypothyroidism  Assessment & Plan  · Restart levothyroxine    Essential hypertension  Assessment & Plan  · Stable continue chlorthalidone labetalol lisinopril amlodipine clonidine as previously taken    Diabetes mellitus type 2  Assessment & Plan  Lab Results   Component Value Date    HGBA1C 6 1 (H) 10/10/2020     Results from last 7 days   Lab Units 02/14/22  1526 02/14/22  1048 02/14/22  1046 02/14/22  0607 02/13/22  2042 02/13/22  1601 02/13/22  1101   POC GLUCOSE mg/dl 163* 198* 221* 143* 183* 164* 176*     · Holding metformin during hospitalization  Continue sliding scale insulin      VTE Pharmacologic Prophylaxis: VTE Score: 10 High Risk (Score >/= 5) - Pharmacological DVT Prophylaxis Ordered: Apixaban (Eliquis)  Sequential Compression Devices Ordered  Patient Centered Rounds: I have performed bedside rounds with nursing staff today  Discussions with Specialists or Other Care Team Provider:  Case management    Education and Discussions with Family / Patient:  Daughter on telephone    Time Spent for Care: 25 mins    More than 50% of total time spent on counseling and coordination of care as described above  Current Length of Stay: 2 day(s)  Current Patient Status: Inpatient   Certification Statement: The patient will continue to require additional inpatient hospital stay due to rehab placement  Discharge Plan / Estimated Discharge Date: Medically stable awaiting rehab placement    Code Status: Level 1 - Full Code      Subjective:   Patient seen and examined  Still having pain in right knee  Difficulty ambulating  Objective:   Vitals: Blood pressure 154/55, pulse 59, temperature 98 °F (36 7 °C), resp  rate 17, height 5' 6" (1 676 m), weight 63 5 kg (140 lb), last menstrual period 01/12/1980, SpO2 94 %, not currently breastfeeding  Physical Exam  Vitals reviewed  Constitutional:       General: She is not in acute distress  Appearance: Normal appearance  Eyes:      General: No scleral icterus  Cardiovascular:      Rate and Rhythm: Regular rhythm  Pulmonary:      Breath sounds: Normal breath sounds  No wheezing  Abdominal:      General: Bowel sounds are normal       Palpations: Abdomen is soft  Tenderness: There is no guarding or rebound  Musculoskeletal:         General: Tenderness (Right knee) present  No swelling  Skin:     General: Skin is warm  Neurological:      Mental Status: She is alert and oriented to person, place, and time  Motor: No weakness     Psychiatric:         Mood and Affect: Mood normal        Additional Data:   Labs:  Results from last 7 days   Lab Units 02/12/22  1139   WBC Thousand/uL 5 98   HEMOGLOBIN g/dL 11 3*   HEMATOCRIT % 35 2   MCV fL 94   PLATELETS Thousands/uL 253   INR  1 32*     Results from last 7 days   Lab Units 02/12/22  1139   SODIUM mmol/L 137   POTASSIUM mmol/L 3 8   CHLORIDE mmol/L 102   CO2 mmol/L 29   ANION GAP mmol/L 6   BUN mg/dL 16   CREATININE mg/dL 0 90   CALCIUM mg/dL 9 7   EGFR ml/min/1 73sq m 59   GLUCOSE RANDOM mg/dL 194*                          Results from last 7 days   Lab Units 02/14/22  1526 02/14/22  1048 02/14/22  1046 02/14/22  0607 02/13/22  2042 02/13/22  1601 02/13/22  1101   POC GLUCOSE mg/dl 163* 198* 221* 143* 183* 164* 176*             * I Have Reviewed All Lab Data Listed Above  Cultures:                   Lines/Drains:  Invasive Devices  Report    Peripheral Intravenous Line            Peripheral IV 02/13/22 Left;Ventral (anterior) Forearm <1 day              Telemetry:      Imaging:  Imaging Reports Reviewed Today Include:   MRI knee right wo contrast    Result Date: 2/13/2022  Impression: 1  Nondisplaced medial tibial plateau fracture with associated bone marrow edema  No articular surface depression  2   Round heterogeneous signal structure deep to the proximal soleus muscle measuring 2 0 x 1 3 x 2 2 cm, likely a soft tissue hematoma  3   Moderate hemarthrosis  4   Complex tear of medial meniscus without flipped fragment  5   Mild osteoarthritis of medial and patellofemoral compartments  The study was marked in [facility] for immediate notification   Workstation performed: [id number]       Scheduled Meds:  Current Facility-Administered Medications   Medication Dose Route Frequency Provider Last Rate    acetaminophen  975 mg Oral Q8H [address] RUCHI Napier      amLODIPine  10 mg Oral Daily RUCHI Napier      apixaban  5 mg Oral BID RUCHI Napier      ascorbic acid  500 mg Oral Daily RUCHI Napier      atorvastatin  40 mg Oral After [employer], RUCHI      chlorthalidone  25 mg Oral Daily Adriana Ramos MD      cholecalciferol  1,000 Units Oral Daily RUCHI Reyes      cloNIDine  0 1 mg Oral Q12H [address] Adriana Ramos MD      insulin lispro  1-5 Units Subcutaneous TID AC RUCHI Napier      insulin lispro  1-5 Units Subcutaneous HS RUCHI Napier      labetalol  200 mg Oral Q12H [facility] VARGHESE Singh PA-C      lidocaine  1 patch Topical Daily RUCHI Napier      lisinopril  20 mg Oral BID [place] D MARC Singh      LORazepam  0 5 mg Oral BID RUCHI Napier      oxyCODONE  2 5 mg Oral Q4H PRN RUCHI Ku         Today, Patient Was Seen By: Genaro Chow DO    ** Please Note: Dictation voice to text software may have been used in the creation of this document   **

## 2022-02-15 ENCOUNTER — HOSPITAL ENCOUNTER (INPATIENT)
Facility: HOSPITAL | Age: 82
LOS: 10 days | Discharge: HOME HEALTH | DRG: 560 | End: 2022-02-25
Payer: MEDICARE

## 2022-02-15 VITALS
RESPIRATION RATE: 19 BRPM | WEIGHT: 140 LBS | OXYGEN SATURATION: 98 % | DIASTOLIC BLOOD PRESSURE: 64 MMHG | BODY MASS INDEX: 22.5 KG/M2 | TEMPERATURE: 97.4 F | SYSTOLIC BLOOD PRESSURE: 161 MMHG | HEIGHT: 66 IN | HEART RATE: 63 BPM

## 2022-02-15 DIAGNOSIS — I10 HYPERTENSION, UNSPECIFIED TYPE: ICD-10-CM

## 2022-02-15 DIAGNOSIS — E87.1 HYPONATREMIA: ICD-10-CM

## 2022-02-15 DIAGNOSIS — I10 HIGH BLOOD PRESSURE: Primary | ICD-10-CM

## 2022-02-15 DIAGNOSIS — E11.9 DIABETES (HCC): ICD-10-CM

## 2022-02-15 DIAGNOSIS — R52 PAIN: ICD-10-CM

## 2022-02-15 DIAGNOSIS — I48.0 PAROXYSMAL A-FIB (HCC): ICD-10-CM

## 2022-02-15 DIAGNOSIS — S82.141A FRACTURE OF RIGHT TIBIAL PLATEAU: ICD-10-CM

## 2022-02-15 PROBLEM — Z91.89 AT RISK FOR VENOUS THROMBOEMBOLISM (VTE): Status: ACTIVE | Noted: 2022-02-15

## 2022-02-15 PROBLEM — H35.30 MACULAR DEGENERATION: Status: ACTIVE | Noted: 2022-02-15

## 2022-02-15 LAB
ALBUMIN SERPL BCP-MCNC: 3.2 G/DL (ref 3.5–5)
ALP SERPL-CCNC: 123 U/L (ref 46–116)
ALT SERPL W P-5'-P-CCNC: 26 U/L (ref 12–78)
ANION GAP SERPL CALCULATED.3IONS-SCNC: 6 MMOL/L (ref 4–13)
AST SERPL W P-5'-P-CCNC: 21 U/L (ref 5–45)
BILIRUB SERPL-MCNC: 0.87 MG/DL (ref 0.2–1)
BUN SERPL-MCNC: 17 MG/DL (ref 5–25)
CALCIUM ALBUM COR SERPL-MCNC: 9.6 MG/DL (ref 8.3–10.1)
CALCIUM SERPL-MCNC: 9 MG/DL (ref 8.3–10.1)
CHLORIDE SERPL-SCNC: 98 MMOL/L (ref 100–108)
CO2 SERPL-SCNC: 29 MMOL/L (ref 21–32)
CREAT SERPL-MCNC: 0.96 MG/DL (ref 0.6–1.3)
ERYTHROCYTE [DISTWIDTH] IN BLOOD BY AUTOMATED COUNT: 16.2 % (ref 11.6–15.1)
FLUAV RNA RESP QL NAA+PROBE: NEGATIVE
FLUBV RNA RESP QL NAA+PROBE: NEGATIVE
GFR SERPL CREATININE-BSD FRML MDRD: 54 ML/MIN/1.73SQ M
GLUCOSE SERPL-MCNC: 132 MG/DL (ref 65–140)
GLUCOSE SERPL-MCNC: 143 MG/DL (ref 65–140)
GLUCOSE SERPL-MCNC: 144 MG/DL (ref 65–140)
GLUCOSE SERPL-MCNC: 328 MG/DL (ref 65–140)
GLUCOSE SERPL-MCNC: 95 MG/DL (ref 65–140)
HCT VFR BLD AUTO: 36.1 % (ref 34.8–46.1)
HGB BLD-MCNC: 11.5 G/DL (ref 11.5–15.4)
MCH RBC QN AUTO: 30 PG (ref 26.8–34.3)
MCHC RBC AUTO-ENTMCNC: 31.9 G/DL (ref 31.4–37.4)
MCV RBC AUTO: 94 FL (ref 82–98)
PLATELET # BLD AUTO: 257 THOUSANDS/UL (ref 149–390)
PMV BLD AUTO: 10.4 FL (ref 8.9–12.7)
POTASSIUM SERPL-SCNC: 3.8 MMOL/L (ref 3.5–5.3)
PROT SERPL-MCNC: 7 G/DL (ref 6.4–8.2)
RBC # BLD AUTO: 3.83 MILLION/UL (ref 3.81–5.12)
RSV RNA RESP QL NAA+PROBE: NEGATIVE
SARS-COV-2 RNA RESP QL NAA+PROBE: NEGATIVE
SODIUM SERPL-SCNC: 133 MMOL/L (ref 136–145)
WBC # BLD AUTO: 6.66 THOUSAND/UL (ref 4.31–10.16)

## 2022-02-15 PROCEDURE — 82948 REAGENT STRIP/BLOOD GLUCOSE: CPT

## 2022-02-15 PROCEDURE — 80053 COMPREHEN METABOLIC PANEL: CPT | Performed by: INTERNAL MEDICINE

## 2022-02-15 PROCEDURE — RECHECK: Performed by: INTERNAL MEDICINE

## 2022-02-15 PROCEDURE — NC001 PR NO CHARGE: Performed by: NURSE PRACTITIONER

## 2022-02-15 PROCEDURE — 99239 HOSP IP/OBS DSCHRG MGMT >30: CPT | Performed by: INTERNAL MEDICINE

## 2022-02-15 PROCEDURE — NC001 PR NO CHARGE

## 2022-02-15 PROCEDURE — 85027 COMPLETE CBC AUTOMATED: CPT | Performed by: INTERNAL MEDICINE

## 2022-02-15 PROCEDURE — 99222 1ST HOSP IP/OBS MODERATE 55: CPT

## 2022-02-15 PROCEDURE — 99223 1ST HOSP IP/OBS HIGH 75: CPT | Performed by: INTERNAL MEDICINE

## 2022-02-15 PROCEDURE — 1123F ACP DISCUSS/DSCN MKR DOCD: CPT | Performed by: PHYSICAL MEDICINE & REHABILITATION

## 2022-02-15 PROCEDURE — 0241U HB NFCT DS VIR RESP RNA 4 TRGT: CPT

## 2022-02-15 RX ORDER — OXYCODONE HYDROCHLORIDE 5 MG/1
2.5 TABLET ORAL EVERY 4 HOURS PRN
Status: DISCONTINUED | OUTPATIENT
Start: 2022-02-15 | End: 2022-02-22

## 2022-02-15 RX ORDER — AMLODIPINE BESYLATE 10 MG/1
10 TABLET ORAL DAILY
Status: CANCELLED | OUTPATIENT
Start: 2022-02-15

## 2022-02-15 RX ORDER — POLYETHYLENE GLYCOL 3350 17 G/17G
17 POWDER, FOR SOLUTION ORAL DAILY PRN
Status: DISCONTINUED | OUTPATIENT
Start: 2022-02-15 | End: 2022-02-25 | Stop reason: HOSPADM

## 2022-02-15 RX ORDER — CLONIDINE HYDROCHLORIDE 0.1 MG/1
0.1 TABLET ORAL EVERY 12 HOURS SCHEDULED
Status: DISCONTINUED | OUTPATIENT
Start: 2022-02-15 | End: 2022-02-25 | Stop reason: HOSPADM

## 2022-02-15 RX ORDER — LEVOTHYROXINE SODIUM 0.12 MG/1
125 TABLET ORAL
Status: DISCONTINUED | OUTPATIENT
Start: 2022-02-16 | End: 2022-02-25 | Stop reason: HOSPADM

## 2022-02-15 RX ORDER — LISINOPRIL 20 MG/1
20 TABLET ORAL 2 TIMES DAILY
Status: DISCONTINUED | OUTPATIENT
Start: 2022-02-15 | End: 2022-02-25 | Stop reason: HOSPADM

## 2022-02-15 RX ORDER — LORAZEPAM 0.5 MG/1
0.5 TABLET ORAL 2 TIMES DAILY
Status: CANCELLED | OUTPATIENT
Start: 2022-02-15

## 2022-02-15 RX ORDER — CHLORTHALIDONE 25 MG/1
25 TABLET ORAL DAILY
Status: DISCONTINUED | OUTPATIENT
Start: 2022-02-15 | End: 2022-02-15

## 2022-02-15 RX ORDER — MELATONIN
1000 DAILY
Status: CANCELLED | OUTPATIENT
Start: 2022-02-15

## 2022-02-15 RX ORDER — MELATONIN
1000 DAILY
Status: DISCONTINUED | OUTPATIENT
Start: 2022-02-16 | End: 2022-02-25 | Stop reason: HOSPADM

## 2022-02-15 RX ORDER — ACETAMINOPHEN 325 MG/1
975 TABLET ORAL EVERY 8 HOURS SCHEDULED
Refills: 0
Start: 2022-02-15

## 2022-02-15 RX ORDER — AMLODIPINE BESYLATE 10 MG/1
10 TABLET ORAL DAILY
Status: DISCONTINUED | OUTPATIENT
Start: 2022-02-16 | End: 2022-02-25 | Stop reason: HOSPADM

## 2022-02-15 RX ORDER — CHLORTHALIDONE 25 MG/1
25 TABLET ORAL DAILY
Status: CANCELLED | OUTPATIENT
Start: 2022-02-15

## 2022-02-15 RX ORDER — OXYCODONE HYDROCHLORIDE 5 MG/1
2.5 TABLET ORAL EVERY 4 HOURS PRN
Qty: 30 TABLET | Refills: 0 | OUTPATIENT
Start: 2022-02-15 | End: 2022-02-25

## 2022-02-15 RX ORDER — LIDOCAINE 50 MG/G
1 PATCH TOPICAL DAILY
Status: DISCONTINUED | OUTPATIENT
Start: 2022-02-16 | End: 2022-02-25 | Stop reason: HOSPADM

## 2022-02-15 RX ORDER — LIDOCAINE 50 MG/G
1 PATCH TOPICAL DAILY
Status: DISCONTINUED | OUTPATIENT
Start: 2022-02-15 | End: 2022-02-15

## 2022-02-15 RX ORDER — LISINOPRIL 20 MG/1
20 TABLET ORAL 2 TIMES DAILY
Status: CANCELLED | OUTPATIENT
Start: 2022-02-15

## 2022-02-15 RX ORDER — ACETAMINOPHEN 325 MG/1
975 TABLET ORAL EVERY 8 HOURS SCHEDULED
Status: CANCELLED | OUTPATIENT
Start: 2022-02-15

## 2022-02-15 RX ORDER — ATORVASTATIN CALCIUM 40 MG/1
40 TABLET, FILM COATED ORAL
Status: DISCONTINUED | OUTPATIENT
Start: 2022-02-15 | End: 2022-02-25 | Stop reason: HOSPADM

## 2022-02-15 RX ORDER — OXYCODONE HYDROCHLORIDE 5 MG/1
2.5 TABLET ORAL EVERY 4 HOURS PRN
Status: CANCELLED | OUTPATIENT
Start: 2022-02-15

## 2022-02-15 RX ORDER — LIDOCAINE 50 MG/G
1 PATCH TOPICAL DAILY
Refills: 0
Start: 2022-02-16

## 2022-02-15 RX ORDER — CHLORTHALIDONE 25 MG/1
25 TABLET ORAL DAILY
Status: DISCONTINUED | OUTPATIENT
Start: 2022-02-16 | End: 2022-02-25 | Stop reason: HOSPADM

## 2022-02-15 RX ORDER — CLONIDINE HYDROCHLORIDE 0.1 MG/1
0.1 TABLET ORAL EVERY 12 HOURS SCHEDULED
Status: CANCELLED | OUTPATIENT
Start: 2022-02-15

## 2022-02-15 RX ORDER — BISACODYL 10 MG
10 SUPPOSITORY, RECTAL RECTAL DAILY PRN
Status: DISCONTINUED | OUTPATIENT
Start: 2022-02-15 | End: 2022-02-25 | Stop reason: HOSPADM

## 2022-02-15 RX ORDER — LABETALOL 200 MG/1
200 TABLET, FILM COATED ORAL EVERY 12 HOURS SCHEDULED
Status: CANCELLED | OUTPATIENT
Start: 2022-02-15

## 2022-02-15 RX ORDER — AMLODIPINE BESYLATE 10 MG/1
10 TABLET ORAL DAILY
Status: DISCONTINUED | OUTPATIENT
Start: 2022-02-15 | End: 2022-02-15

## 2022-02-15 RX ORDER — LABETALOL 200 MG/1
200 TABLET, FILM COATED ORAL EVERY 12 HOURS SCHEDULED
Status: DISCONTINUED | OUTPATIENT
Start: 2022-02-15 | End: 2022-02-25 | Stop reason: HOSPADM

## 2022-02-15 RX ORDER — LORAZEPAM 0.5 MG/1
0.5 TABLET ORAL 2 TIMES DAILY
Status: DISCONTINUED | OUTPATIENT
Start: 2022-02-15 | End: 2022-02-25 | Stop reason: HOSPADM

## 2022-02-15 RX ORDER — ASCORBIC ACID 500 MG
500 TABLET ORAL DAILY
Status: CANCELLED | OUTPATIENT
Start: 2022-02-15

## 2022-02-15 RX ORDER — LIDOCAINE 50 MG/G
1 PATCH TOPICAL DAILY
Status: CANCELLED | OUTPATIENT
Start: 2022-02-15

## 2022-02-15 RX ORDER — ASCORBIC ACID 500 MG
500 TABLET ORAL DAILY
Status: DISCONTINUED | OUTPATIENT
Start: 2022-02-16 | End: 2022-02-25 | Stop reason: HOSPADM

## 2022-02-15 RX ORDER — ACETAMINOPHEN 325 MG/1
975 TABLET ORAL EVERY 8 HOURS SCHEDULED
Status: DISCONTINUED | OUTPATIENT
Start: 2022-02-15 | End: 2022-02-22

## 2022-02-15 RX ORDER — LEVOTHYROXINE SODIUM 0.12 MG/1
125 TABLET ORAL DAILY
Status: CANCELLED | OUTPATIENT
Start: 2022-02-15

## 2022-02-15 RX ORDER — ONDANSETRON 4 MG/1
4 TABLET, ORALLY DISINTEGRATING ORAL EVERY 8 HOURS PRN
Status: DISCONTINUED | OUTPATIENT
Start: 2022-02-15 | End: 2022-02-25 | Stop reason: HOSPADM

## 2022-02-15 RX ORDER — MELATONIN
1000 DAILY
Status: DISCONTINUED | OUTPATIENT
Start: 2022-02-15 | End: 2022-02-15

## 2022-02-15 RX ORDER — ANTIOX #8/OM3/DHA/EPA/LUT/ZEAX 250-2.5 MG
1 CAPSULE ORAL 2 TIMES DAILY
Status: DISCONTINUED | OUTPATIENT
Start: 2022-02-15 | End: 2022-02-25 | Stop reason: HOSPADM

## 2022-02-15 RX ORDER — LEVOTHYROXINE SODIUM 0.12 MG/1
125 TABLET ORAL DAILY
Status: DISCONTINUED | OUTPATIENT
Start: 2022-02-15 | End: 2022-02-15

## 2022-02-15 RX ORDER — ASCORBIC ACID 500 MG
500 TABLET ORAL DAILY
Status: DISCONTINUED | OUTPATIENT
Start: 2022-02-15 | End: 2022-02-15

## 2022-02-15 RX ORDER — ATORVASTATIN CALCIUM 40 MG/1
40 TABLET, FILM COATED ORAL
Status: CANCELLED | OUTPATIENT
Start: 2022-02-15

## 2022-02-15 RX ADMIN — OXYCODONE HYDROCHLORIDE AND ACETAMINOPHEN 500 MG: 500 TABLET ORAL at 08:15

## 2022-02-15 RX ADMIN — APIXABAN 5 MG: 5 TABLET, FILM COATED ORAL at 08:15

## 2022-02-15 RX ADMIN — LISINOPRIL 20 MG: 20 TABLET ORAL at 20:22

## 2022-02-15 RX ADMIN — Medication 1 CAPSULE: at 20:23

## 2022-02-15 RX ADMIN — LISINOPRIL 20 MG: 20 TABLET ORAL at 08:17

## 2022-02-15 RX ADMIN — APIXABAN 5 MG: 5 TABLET, FILM COATED ORAL at 17:21

## 2022-02-15 RX ADMIN — CHLORTHALIDONE 25 MG: 25 TABLET ORAL at 08:18

## 2022-02-15 RX ADMIN — INSULIN LISPRO 3 UNITS: 100 INJECTION, SOLUTION INTRAVENOUS; SUBCUTANEOUS at 11:09

## 2022-02-15 RX ADMIN — LORAZEPAM 0.5 MG: 0.5 TABLET ORAL at 08:15

## 2022-02-15 RX ADMIN — LABETALOL HYDROCHLORIDE 200 MG: 200 TABLET, FILM COATED ORAL at 21:17

## 2022-02-15 RX ADMIN — CLONIDINE HYDROCHLORIDE 0.1 MG: 0.1 TABLET ORAL at 20:22

## 2022-02-15 RX ADMIN — ATORVASTATIN CALCIUM 40 MG: 40 TABLET, FILM COATED ORAL at 17:21

## 2022-02-15 RX ADMIN — ACETAMINOPHEN 975 MG: 325 TABLET, FILM COATED ORAL at 21:13

## 2022-02-15 RX ADMIN — CLONIDINE HYDROCHLORIDE 0.1 MG: 0.1 TABLET ORAL at 08:16

## 2022-02-15 RX ADMIN — Medication 1000 UNITS: at 08:16

## 2022-02-15 RX ADMIN — CARBOXYMETHYLCELLULOSE SODIUM 1 DROP: 2.5 SOLUTION/ DROPS OPHTHALMIC at 20:24

## 2022-02-15 RX ADMIN — ACETAMINOPHEN 975 MG: 325 TABLET, FILM COATED ORAL at 05:28

## 2022-02-15 RX ADMIN — LABETALOL HYDROCHLORIDE 200 MG: 200 TABLET, FILM COATED ORAL at 11:08

## 2022-02-15 RX ADMIN — LIDOCAINE 5% 1 PATCH: 700 PATCH TOPICAL at 08:24

## 2022-02-15 RX ADMIN — LEVOTHYROXINE SODIUM 125 MCG: 125 TABLET ORAL at 08:15

## 2022-02-15 RX ADMIN — AMLODIPINE BESYLATE 10 MG: 10 TABLET ORAL at 08:16

## 2022-02-15 RX ADMIN — LORAZEPAM 0.5 MG: 0.5 TABLET ORAL at 17:21

## 2022-02-15 RX ADMIN — POLYETHYLENE GLYCOL 3350 17 G: 17 POWDER, FOR SOLUTION ORAL at 18:11

## 2022-02-15 NOTE — ASSESSMENT & PLAN NOTE
Right tibial plateau fracture  Initially seen in orthopedic office underwent aspiration bloody fluid  · Non operative  Toe-touch weight-bearing  · Seen by PT/OT    Needs rehab placement

## 2022-02-15 NOTE — ASSESSMENT & PLAN NOTE
Lives in independent living  Now with ambulatory dysfunction secondary to above    · PT/OT evaluations, needs rehab

## 2022-02-15 NOTE — ASSESSMENT & PLAN NOTE
· Creat at baseline    Results from last 7 days   Lab Units 02/15/22  0656 02/12/22  1139   BUN mg/dL 17 16   CREATININE mg/dL 0 96 0 90   EGFR ml/min/1 73sq m 54 59

## 2022-02-15 NOTE — CASE MANAGEMENT
Case Management Discharge Planning Note    Patient name Jake Median  Location /-97 MRN [id number]  : 1940 Date 2/15/2022       Current Admission Date: 2022  Current Admission Diagnosis:Fracture of right tibial plateau   Patient Active Problem List    Diagnosis Date Noted    Fracture of right tibial plateau     Renal vascular disease 2020    Primary osteoarthritis of right knee 2019    Synovial cyst of right popliteal space 2019    Hemarthrosis of right knee 2019    History of breast cancer 2019    Moderate protein-calorie malnutrition  2019    Asymptomatic bilateral carotid artery stenosis 08/15/2019    High blood pressure     Hypertensive urgency 2019    Hypertension     Hypo-osmolality and hyponatremia 2019    Fall 2019    Chronic hyponatremia 2019    Syncope vs presyncope 2019    Paroxysmal A-fib ([facility] ) 2019    Diabetes [facility] 2019    Weight loss 2019    CKD (chronic kidney disease) stage 3, GFR 30-59 ml/min ([facility]) 2019    Iron deficiency anemia due to chronic blood loss 2018    Incisional hernia, without obstruction or gangrene 07/10/2018    Postop check 2018    Delirium 2018    Physical deconditioning 2018    Ambulatory dysfunction 2018    Hx of fall 2018    Anxiety 2018    Acute kidney injury ([facility] ) 2018    Hordeolum externum of right upper eyelid 2017    Malignant neoplasm of right breast, stage 1, estrogen receptor positive ([facility] ) 2017    Malignant neoplasm of central portion of right female breast ([facility] ) 2017    Tobacco abuse 2017    Heart palpitations 2016    Nicotine abuse 2016    Atrial fibrillation  2016    Diabetes mellitus type 2 2016    Essential hypertension 2016    Hypothyroidism 2016    Nontraumatic compression fracture of T4 vertebra ([facility] ) 10/06/2015    Low back pain without sciatica 09/17/2015    Gastroesophageal reflux disease without esophagitis 02/23/2015    Depression 02/23/2015    History of CEA (carotid endarterectomy) 02/23/2015    Hyperlipidemia associated with type 2 diabetes mellitus ([facility] ) 02/23/2015    Hypothyroidism due to acquired atrophy of thyroid 02/23/2015    Mitral insufficiency and aortic stenosis 02/23/2015    Vitamin D deficiency 02/23/2015    Varicose vein of leg 02/23/2015    Non-seasonal allergic rhinitis due to pollen 02/23/2015      LOS (days): 3  Geometric Mean LOS (GMLOS) (days): 3 00  Days to GMLOS:0     OBJECTIVE:  Risk of Unplanned Readmission Score: 14         Current admission status: Inpatient   Preferred Pharmacy:   [address] [place] [address] [place] [address]  Phone: [phone] Fax: [phone]    Primary Care Provider: Caridad Mcneill DO    Primary Insurance: MEDICARE  Secondary Insurance: BLUE CROSS    DISCHARGE DETAILS:  Other Referral/Resources/Interventions Provided:  Interventions: Short Term Rehab (Pt has been accepted with [facility] )    Would you like to participate in our [facility] service program?  : No - Declined    Treatment Team Recommendation: Short Term Rehab  Discharge Destination Plan[de-identified] Short Term Rehab (Pt is being DC to [facility] )    Additional Comments: CM attempted to call the Pt's dtr to confirm acceptance/DC plan for Pt to go to [facility]  CM left a   CM will wait for a call back from Pt's dtr to confirm DC plan  Pt is aware of plan

## 2022-02-15 NOTE — ASSESSMENT & PLAN NOTE
Lab Results   Component Value Date    HGBA1C 6 1 (H) 10/10/2020     Results from last 7 days   Lab Units 02/15/22  1039 02/15/22  0648 02/14/22 2011 02/14/22  1526 02/14/22  1048 02/14/22  1046 02/14/22  0607 02/13/22  2042   POC GLUCOSE mg/dl 328* 143* 117 163* 198* 221* 143* 183*     · Holding metformin during hospitalization    Continue sliding scale insulin

## 2022-02-15 NOTE — ASSESSMENT & PLAN NOTE
Lab Results   Component Value Date    HGBA1C 6 1 (H) 10/10/2020     Results from last 7 days   Lab Units 02/15/22  0648 02/14/22 2011 02/14/22  1526 02/14/22  1048 02/14/22  1046 02/14/22  0607 02/13/22  2042 02/13/22  1601   POC GLUCOSE mg/dl 143* 117 163* 198* 221* 143* 183* 164*     · Holding metformin during hospitalization    Continue sliding scale insulin

## 2022-02-15 NOTE — ARC ADMISSION
Patient is approved and can admit to room 453 today at 2:30 pending negative COVID test   Report can be called to [phone]  CM aware

## 2022-02-15 NOTE — CONSULTS
Internal Medicine Consultation Note    Patient: Amadeo Waters  Age/sex: 78 y o  female  Medical Record #: [id number]      ASSESSMENT PLAN    Right Tibial plateau fracture   Medical management per ortho   Rehab/pain per PMR   TTWB   Hinged brace as needed for pain    DM II   Previously on metformin however dc'd d/t hgbA1c of 6 1   Would cont DM diet for now   Cont sliding scale   If BS remain elevated will resume metformin    HTN   HOme: amlodipine 10mg qd; lisinopril 20mg qd; chlorthalidone 25mg qd; clonidine 0 1mg qd; labetolol 200mg q12 hours   Here: same as above   Monitor trend and adjust based on same    PAF   Rates controlled without any agents   Cont eliquis    CKD   Level 3   Baseline 0 8-1 0   Avoid nephrotoxic medications   Avoid hypotension in the post operative setting   Monitor bmp    Hypothyroid   Cont levothyroxine    Hyperlipidemia   Low cholesterol diet   Continue statin therapy        Subjective/ HPI: Patient seen and examined  Patient is an 78-year-old female past medical history of hypertension, CKD stage 3 with baseline creatinine of 0 8-1 0, paroxysmal atrial fibrillation on Eliquis, hypertension on multiple medications, hypothyroidism, history of breast cancer, type 2 diabetes in the outpatient not on any pharmacologic agents who had progressive worsening of right knee pain  She was seen in the outpatient by orthopedics who drained the effusion  She then again developed increasing pain and presented to the emergency room on February 12th  They did do an MRI which showed a medial tibial plateau fracture, medial meniscus tear as well as hematoma  Orthopedics was consulted they recommended medical management in the form of a brace as needed for pain, as well as toe-touch weight-bearing  The patient is now coming to our can we are asked to follow along for medical management  ROS:   A 10 point ROS was performed; negative except as noted above       Social History:    Substance Use History:   Social History     Substance and Sexual Activity   Alcohol Use Never     Social History     Tobacco Use   Smoking Status Current Every Day Smoker    Packs/day: 1 00    Types: Cigarettes   Smokeless Tobacco Never Used     Social History     Substance and Sexual Activity   Drug Use Never       Family History:    Family History   Problem Relation Age of Onset    Cancer Mother     No Known Problems Father          Review of Scheduled Meds:      Labs:     Results from last 7 days   Lab Units 02/15/22  0656 22  1139   WBC Thousand/uL 6 66 5 98   HEMOGLOBIN g/dL 11 5 11 3*   HEMATOCRIT % 36 1 35 2   PLATELETS Thousands/uL 257 253     Results from last 7 days   Lab Units 02/15/22  0656 22  1139   SODIUM mmol/L 133* 137   POTASSIUM mmol/L 3 8 3 8   CHLORIDE mmol/L 98* 102   CO2 mmol/L 29 29   BUN mg/dL 17 16   CREATININE mg/dL 0 96 0 90   CALCIUM mg/dL 9 0 9 7         Results from last 7 days   Lab Units 22  1139   INR  1 32*          Results from last 7 days   Lab Units 02/15/22  1603 02/15/22  1039 02/15/22  0648   POC GLUCOSE mg/dl 95 328* 143*       No results found for: [place] , WOUNDCULT, SPUTUMCULTUR    Input and Output Summary (last 24 hours):     No intake or output data in the 24 hours ending 02/15/22 1631    Imaging:     No orders to display       *Labs /Radiology studiesLabs reviewed  *Medications reviewed and reconciled as needed  *Please refer to order section for additional ordered labs studies  *Case discussed with primary attending during morning huddle case rounds    Vitals:   Temp (24hrs), Av 7 °F (36 5 °C), Min:97 4 °F (36 3 °C), Max:97 9 °F (36 6 °C)    Temp:  [97 4 °F (36 3 °C)-97 9 °F (36 6 °C)] 97 9 °F (36 6 °C)  HR:  [54-67] 64  Resp:  [17-19] 17  BP: (130-161)/(56-75) 153/74  SpO2:  [93 %-98 %] 97 %  Body mass index is 22 67 kg/m²       Physical Exam:   GEN: No apparent distress, interactive; frail  NEURO: Alert and oriented x3  HEENT: Pupils are equal and reactive, EOMI, mucous membranes are moist, face symmetrical  CV: S1 S2 irregular, no MRG, trace RLE edema  RESP: Lungs are clear bilaterally, no wheezes, rales or rhonchi noted, on room air, respirations easy and non labored  GI: Flat, soft non tender, non distended; +BS x4  : Voiding without difficulty  MUSC: Moves all extremities; except RLE  SKIN: pink, warm and dry, normal turgor, no rashes, lesions          Invasive Devices  Report    Peripheral Intravenous Line            Peripheral IV 02/13/22 Left;Ventral (anterior) Forearm 1 day                 VTE Pharmacologic Prophylaxis: Eliquis  Code Status: Level 1 - Full Code  Current Length of Stay: 0 day(s)    Total floor / unit time spent today 1 hour with more than 50% spent counseling/coordinating care  Counseling includes discussion with patient re: progress  and discussion with patient of his/her current medical state/information  Coordination of patient's care was performed in conjunction with primary service  Time invested included review of patient's labs, vitals, and management of their comorbidities with continued monitoring  In addition, this patient was discussed with medical team including physician and advanced extenders  The care of the patient was extensively discussed and appropriate treatment plan was formulated unique for this patient  ** Please Note: Fluency Direct voice to text software may have been used in the creation of this document   Audio transcription errors may occur**

## 2022-02-15 NOTE — CONSULTS
PHYSICAL MEDICINE AND REHABILITATION CONSULT NOTE  Ramiro Lopez 78 y o  female MRN: [id number]  Unit/Bed#: -01 Encounter: [id number]    Requested by: Petra Giordano  Reason for Consultation:  Rehabilitation medicine evaluation and assistance with appropriate disposition  Primary Rehabilitation Diagnosis:   Orthopedic Disorders:  08 9  Other Orthopedic R medial tibial plateau fracture, complex medial meniscus tear, knee hemarthrosis, calf hematoma     Assessment and Recommendations:  78year old F with PMH of Afib on anticoagulation with Eliquis, HTN, DM2, hyperlipidemia, hypothyroidism who developed progressive R knee pain and significant ambulatory dysfunction who had outpatient work-up which showed R knee effusion and R knee OA on X-ray  She underwent knee aspiration on 1/19 with bloody fluid  Pain continued to progress and she could no longer safely ambulate, wt bear, or perform ADLs and presented to hospital where MRI R knee performed and showed R medial tibial plateau fracture, complex medial meniscus tear, knee hemarthrosis, calf hematoma  Orthopedics consulted and initially recommended NWB and now per discussion Dr Romina Tejeda patient can be PWB 30-50%  She is not required to wear brace but can be considered if helpful  She can start passive ROM now but no significant R knee PT expected until 8 weeks  Patient was evaluated by skilled therapies and was found to have significant decline in ADLs and ambulation      Prior Level of Function and Social history:    Patient lives alone in 4 floor apartment with elevator access without step in apartment   Independent with ADLs  Independent with ambulation    Current Level of Function:    Transfers mod assist  Ambulation 3 ft mod assist  MMod assist LBD, LBB, toileting    R medial tibial plateau fracture, complex medial meniscus tear, knee hemarthrosis, calf hematoma with significant decline in ADLs and ambulation   - Per discussion with Dr Romina Tejeda - PWB 30-50%   - Not required to wear brace but consider if helpful during rehab    - PROM ok but no additional knee specific rehab expected for approx 8 weeks  - consider trial of hinged knee brace or other brace with rehab   - Optimal pain control  - Fall precautions  - Recommend acute comprehensive interdisciplinary inpatient rehabilitation to include intensive skilled therapies (PT, OT) as outlined with oversight and management by rehabilitation physician as well as inpatient rehab level nursing, case management and weekly interdisciplinary team meetings  Disposition recommendation:     [facility] - patient is good candidate for transfer to [facility] pending:      - Facility acceptance, insurance authorization, and bed availability     RATIONALE:   I have reviewed this patient's medical and functional history, hospital course, skilled therapy notes, and have evaluated the patient in person  In my professional judgment and rehabilitation experience, this patient MEETS the medical necessity criteria for an inpatient ARU stay:   A  Condition requires supervision by a rehabilitation physician, defined as a licensed physician with specialized training and experience in inpatient rehabilitation  The requirement for medical supervision means that the rehabilitation physician must conduct face-to-face visits with the patient at least 3 days per week throughout the patient's stay in the IRF to assess the patient both medically and functionally, as well as to modify the course of treatment as needed to maximize the patient's capacity to benefit from the rehabilitation process  B  Patient requires an intensive and coordinated interdisciplinary team approach of providing rehabilitation  Under current industry standards, this intensive rehabilitation therapy program generally consists of at least 3 hours of therapy per day at least 5 days per week   In certain well-documented cases, this intensive rehabilitation therapy program might instead consist of at least 15 hours of intensive rehabilitation therapy within a 7 consecutive day period, beginning with the date of admission to the IRF  C  Patient is reasonably expected to adequately participate, and benefit significantly from, the intensive rehabilitation therapy program at time of admission to the IRF  The patient can only be expected to benefit significantly from the intensive rehabilitation therapy program if the patient's condition and functional status are such that the patient can reasonably be expected to make measurable improvement (that will be of practical value to improve the patient's functional capacity or adaptation to impairments) as a result of the rehabilitation treatment, and if such improvement can be expected to be made within a prescribed period of time  The patient need not be expected to achieve complete independence in the domain of self-care nor be expected to return to his or her prior level of functioning in order to meet this standard  D  The patient must equire the active and ongoing therapeutic intervention of multiple therapy disciplines (PT, OT, SLP, or prosthetics/orthotics), one of which must be PT or OT  Medicare Benefit Policy Manual Chapter 1 - Inpatient Hospital Services Covered Under Part A; 110 2 - Inpatient Rehabilitation Facility Medical Necessity Criteria NameRegulator es  pdf     Overall Medical Status:  - Vitals: stable, patient saturating well on room air  - Recent labs: stable   - Recent imaging:  see elsewhere  - Continue to monitor vitals, labs, exam closely   - Additional Work-up/management prior to potential transfer to rehab/discharge:    none    Cardiopulmonary - Afib, HTN, HL  - Eliquis   - Clonidine, Labetolol, Lisinopril, chlorthalidone  - closely monitor vitals with and without activity, orthostatics, clinical exam, and labs as patient is at increased risk for volume overload and dehydration  - adjust medications and fluid intake accordingly    Pain - significant  - Consider hinged knee brace   - APAP TID scheduled   - LD patch   - Oxy 2 5mg Q4H PRN > may need to increase   - Care with concurrent benzos  - Monitor for higher risk of delirium     DM2  - Lispro AC/HS    Cognition/Mood/sleep - Anxiety, insomnia  - Monitor for potential cognitive deficits with RN/PT/OT   - On home ativan 0 5mg BID > Hold for oversedation, AMS, or RR<12   - Frequent reorientation, reassurance  - Avoid sedating meds when possible   - Optimize sleep-wake  - Supportive counseling  - Fall precautions    Hydration -   - ensure adequate hydration but avoid fluid overload     Nutrition -   - ensure optimal intake     Bladder function -   - monitor for retention, incontinence, signs/symptoms of UTI  - recommend toileting (bedpan only if unable to use commode or toilet, but monitor skin closely) program Q2-4 H during day and Q4-6H overnight      Bowel function -   - Bowel regimen per primary team   - PRN bowel regimen     Encounter for skin care -   - Frequent turning, Q2H, EHOB cushion when OOB in chair  - Consider/provide hydragard BID for buttocks, sacrum, and heels  - Ensure optimal bowel/bladder hygiene   - Monitor closely       VTE prophylaxis   - As outlined by primary team      # Other medical issues:     Per primary service        ==================================================================    Chief Complaint:  R knee pain     History of Present Illness:   78year old F with PMH of Afib on anticoagulation with Eliquis, HTN, DM2, hyperlipidemia, hypothyroidism who developed progressive R knee pain and significant ambulatory dysfunction who had outpatient work-up which showed R knee effusion and R knee OA on X-ray  She underwent knee aspiration on 1/19 with bloody fluid    Pain continued to progress and she could no longer safely ambulate, wt bear, or perform ADLs and presented to hospital where MRI R knee performed and showed R medial tibial plateau fracture, complex medial meniscus tear, knee hemarthrosis, calf hematoma  Orthopedics consulted and initially recommended NWB and now per discussion Dr Andrey Govea patient can be PWB 30-50%  She is not required to wear brace but can be considered if helpful  She can start passive ROM now but no significant R knee PT expected until 8 weeks  Patient was evaluated by skilled therapies and was found to have significant decline in ADLs and ambulation  On eval, patient reports significant R knee pain and inability to put wt thru R knee  She reports anxiety and frustration over her condition and need for assistance  She denies lightheadedness, SOB, fever, chills, nausea, or other new complaints  Review of Systems:     Complete review of systems obtained  Please see HPI for details with other significant symptoms or history listed here: Otherwise, 14 point review of systems completed and was otherwise unremarkable      Allergies   Allergen Reactions    Meloxicam GI Intolerance    Morphine GI Intolerance    Morphine     Penicillins     Percocet [Oxycodone-Acetaminophen]     Sitagliptin      SOB       Current Facility-Administered Medications:     acetaminophen (TYLENOL) tablet 975 mg, 975 mg, Oral, Q8H [address], Kalani RUCHI Barron, 975 mg at 02/15/22 0528    amLODIPine (NORVASC) tablet 10 mg, 10 mg, Oral, Daily, RUCHI Napier, 10 mg at 02/14/22 9479    apixaban (ELIQUIS) tablet 5 mg, 5 mg, Oral, BID, RUCHI Napier, 5 mg at 02/14/22 1729    ascorbic acid (VITAMIN C) tablet 500 mg, 500 mg, Oral, Daily, SEDRICK NapierNP, 500 mg at 02/14/22 0836    atorvastatin (LIPITOR) tablet 40 mg, 40 mg, Oral, After Dinner, RUCHI Tineo, 40 mg at 02/14/22 1730    chlorthalidone tablet 25 mg, 25 mg, Oral, Daily, Iris Mendez MD, 25 mg at 02/14/22 0837    cholecalciferol (VITAMIN D3) tablet 1,000 Units, 1,000 Units, Oral, Daily, RUCHI Napier, 1,000 Units at 02/14/22 7772    cloNIDine (CATAPRES) tablet 0 1 mg, 0 1 mg, Oral, Q12H [address], Janice Montiel MD, 0 1 mg at 02/14/22 2104    insulin lispro (HumaLOG) 100 units/mL subcutaneous injection 1-5 Units, 1-5 Units, Subcutaneous, TID AC, 1 Units at 02/14/22 1657 **AND** Fingerstick Glucose (POCT), , , TID AC, RUCHI Napier    insulin lispro (HumaLOG) 100 units/mL subcutaneous injection 1-5 Units, 1-5 Units, Subcutaneous, HS, RUCHI Napier, 1 Units at 02/13/22 2101    labetalol (NORMODYNE) tablet 200 mg, 200 mg, Oral, Q12H [address], [place] D WADE Singh-C, 200 mg at 02/14/22 2104    levothyroxine tablet 125 mcg, 125 mcg, Oral, Daily, Alexis Ayala DO    lidocaine (LIDODERM) 5 % patch 1 patch, 1 patch, Topical, Daily, RUCHI Napier, 1 patch at 02/14/22 0836    lisinopril (ZESTRIL) tablet 20 mg, 20 mg, Oral, BID, Amberly Singh PA-C, 20 mg at 02/14/22 2104    LORazepam (ATIVAN) tablet 0 5 mg, 0 5 mg, Oral, BID, RUCHI Napier, 0 5 mg at 02/14/22 1729    oxyCODONE (ROXICODONE) IR tablet 2 5 mg, 2 5 mg, Oral, Q4H PRN, RUCHI Vargas    Past Medical History:   Diagnosis Date    Anxiety     [facility]rial fibrillation [facility]     Bowel obstruction ([facility]     Cancer ([facility]     LEFT BREAST CA 22 YEARS AGO     Depression     Diabetes mellitus ([facility]     Disease of thyroid gland     Hypertension     Hypothyroidism        Past Surgical History:   Procedure Laterality Date    ABDOMINAL ADHESION SURGERY N/A 2/4/2018    Procedure: LYSIS ADHESIONS;  Surgeon: Bridget Carias DO;  [facility];  Service: General    BREAST SURGERY      CHOLECYSTECTOMY      COLON SURGERY  2017    EXPLORATORY LAPAROTOMY      JOINT REPLACEMENT      LEFT KNEE REPLACEMENT     LAPAROTOMY N/A 2/4/2018    Procedure: LAPAROTOMY EXPLORATORY,;  Surgeon: Bridget Carias DO;  [facility];  Service: General    MASTECTOMY      MASTECTOMY Left 1995   [address] [place] [address] Right 2017    OR BIOPSY/EXCISION, LYMPH NODE(S) Right 1/13/2017    Procedure: SENTINEL LYMPH NODE BIOPSY RIGHT AXILLA; Surgeon: Amrita Morel MD;  [facility];  Service: General    OR MASTECTOMY, SIMPLE, COMPLETE Right 1/13/2017    Procedure: MASTECTOMY SIMPLE;  Surgeon: Amrita Morel MD;  [facility];  Service: General    SMALL INTESTINE SURGERY N/A 2/4/2018    Procedure: RESECTION SMALL BOWEL;  Surgeon: Keshia Lindsay DO;  [facility];  Service: General    US GUIDED BREAST BIOPSY RIGHT COMPLETE Right 11/29/2016      Family History   Problem Relation Age of Onset    Cancer Mother     No Known Problems Father        Social History available currently in EMR:  (See additional 31 Shania Rodriguez as outlined above)   Social History     Socioeconomic History    Marital status:      Spouse name: None    Number of children: None    Years of education: None    Highest education level: None   Occupational History    None   Tobacco Use    Smoking status: Current Every Day Smoker     Packs/day: 1 00     Types: Cigarettes    Smokeless tobacco: Never Used   Vaping Use    Vaping Use: Never used   Substance and Sexual Activity    Alcohol use: Never    Drug use: Never    Sexual activity: Not Currently   Other Topics Concern    None   Social History Narrative    ** Merged History Encounter **          Social Determinants of Health     Financial Resource Strain: Not on file   Food Insecurity: No Food Insecurity    Worried About Running Out of Food in the Last Year: Never true    Rian of Food in the Last Year: Never true   Transportation Needs: No Transportation Needs    Lack of Transportation (Medical): No    Lack of Transportation (Non-Medical):  No   Physical Activity: Not on file   Stress: Not on file   Social Connections: Not on file   Intimate Partner Violence: Not on file   Housing Stability: Unknown    Unable to Pay for Housing in the Last Year: No    Number of Places Lived in the Last Year: Not on file    Unstable Housing in the Last Year: No       Physical Examination:   Temp:  [97 4 °F (36 3 °C)-98 °F (36 7 °C)] 97 4 °F (36 3 °C)  HR:  [54-67] 67  Resp:  [17-19] 19  BP: (131-154)/(55-64) 138/64  General: Awake, alert in NAD  HENT: NCAT, MMM  Neck: Supple, no lymphadenopathy  Respiratory: Unlabored breathing, breath sounds equal, Lungs CTA, no wheezes, rales, or rhonchi  Cardiovascular: Regular rate and rhythm, no murmurs, rubs, or gallops  Gastrointestinal: Soft, non-tender, non-distended, normoactive bowel sounds  Genitourinary: No danielle  SkiN/MSK/Extremities:  R knee with mild swelling; jt line TTP; pain on PROM; L knee with hypertrophic changes, mild R calf swelling without TTP  Neurologic/Psych:   MENTAL STATUS: awake, oriented to person, place, time, and situation  Affect: Frustrated  CN II-XII: grossly intact   Strength/MMT:  Intact except not fully tested R knee flex/ext  FTN intact    Data:  Lab Results   Component Value Date    HGB 11 3 (L) 02/12/2022    HCT 35 2 02/12/2022    WBC 5 98 02/12/2022     09/29/2015    K 3 8 02/12/2022    K 4 1 09/29/2015     02/12/2022     09/29/2015    GLUCOSE 126 06/03/2019    GLUCOSE 118 09/29/2015    CREATININE 0 90 02/12/2022    CREATININE 0 89 09/29/2015    BUN 16 02/12/2022    BUN 14 09/29/2015           MRI knee right wo contrast    Result Date: 2/13/2022  Impression: 1  Nondisplaced medial tibial plateau fracture with associated bone marrow edema  No articular surface depression  2   Round heterogeneous signal structure deep to the proximal soleus muscle measuring 2 0 x 1 3 x 2 2 cm, likely a soft tissue hematoma  3   Moderate hemarthrosis  4   Complex tear of medial meniscus without flipped fragment  5   Mild osteoarthritis of medial and patellofemoral compartments  The study was marked in [facility] for immediate notification  Workstation performed: [id number]       Pertinent labs and imaging reviewed        Patient seen on date of service listed  Thank you for allowing the PM&R service to participate in the care of this patient  We will continue to follow Janina Tanner's progress with you  Please do not hesitate to call with questions or concerns  James Gonzalez MD, [address] [place] [address]  Physical Medicine and Rehabilitation  Brain Injury Medicine       I personally performed the required components and examined the patient myself in person on 2/14/22

## 2022-02-15 NOTE — PROGRESS NOTES
[facility]  Progress Note - Khalida Hudson 1940, 78 y o  female MRN: [id number]  Unit/Bed#: -Eulogio Encounter: [id number]  Primary Care Provider: Thea Henry DO   Date and time admitted to hospital: 2/12/2022 11:05 AM    * Fracture of right tibial plateau  Assessment & Plan  Right tibial plateau fracture  Initially seen in orthopedic office underwent aspiration bloody fluid  · Non operative  Toe-touch weight-bearing  · Seen by PT/OT  Needs rehab placement    CKD (chronic kidney disease) stage 3, GFR 30-59 ml/min ([facility])  Assessment & Plan  · Creat at baseline    Results from last 7 days   Lab Units 02/15/22  0656 02/12/22  1139   BUN mg/dL 17 16   CREATININE mg/dL 0 96 0 90   EGFR ml/min/1 73sq m 54 59       Paroxysmal A-fib ([facility])  Assessment & Plan  · Continue eliquis    Anxiety  Assessment & Plan  · Continue lorazepam    Ambulatory dysfunction  Assessment & Plan  Lives in independent living  Now with ambulatory dysfunction secondary to above  · PT/OT evaluations, needs rehab      Hypothyroidism  Assessment & Plan  · Continue levothyroxine    Essential hypertension  Assessment & Plan  · Stable, continue chlorthalidone labetalol lisinopril amlodipine clonidine as previously taken    Diabetes mellitus type 2  Assessment & Plan  Lab Results   Component Value Date    HGBA1C 6 1 (H) 10/10/2020     Results from last 7 days   Lab Units 02/15/22  0648 02/14/22 2011 02/14/22  1526 02/14/22  1048 02/14/22  1046 02/14/22  0607 02/13/22  2042 02/13/22  1601   POC GLUCOSE mg/dl 143* 117 163* 198* 221* 143* 183* 164*     · Holding metformin during hospitalization  Continue sliding scale insulin        VTE Pharmacologic Prophylaxis: VTE Score: 10 Moderate Risk (Score 3-4) - Pharmacological DVT Prophylaxis Ordered: apixaban (Eliquis)  Patient Centered Rounds: I performed bedside rounds with nursing staff today    Discussions with Specialists or Other Care Team Provider: Case management, ortho    Education and Discussions with Family / Patient: Attempted to update  (daughter) via phone  Unable to contact  Time Spent for Care: 30 minutes  More than 50% of total time spent on counseling and coordination of care as described above  Current Length of Stay: 3 day(s)  Current Patient Status: Inpatient   Certification Statement: The patient will continue to require additional inpatient hospital stay due to rehab placement, medically stable   Discharge Plan: Anticipate discharge later today or tomorrow to rehab facility  Code Status: Level 1 - Full Code    Subjective:   Pt doing okay  OOB in chair  C/o mild R knee pain  Otherwise no complaints  Objective:     Vitals:   Temp (24hrs), Av 7 °F (36 5 °C), Min:97 4 °F (36 3 °C), Max:98 °F (36 7 °C)    Temp:  [97 4 °F (36 3 °C)-98 °F (36 7 °C)] 97 4 °F (36 3 °C)  HR:  [54-67] 67  Resp:  [17-19] 19  BP: (131-154)/(55-64) 138/64  SpO2:  [93 %-96 %] 96 %  Body mass index is 22 6 kg/m²  Input and Output Summary (last 24 hours): Intake/Output Summary (Last 24 hours) at 2/15/2022 0743  Last data filed at 2/15/2022 0601  Gross per 24 hour   Intake 270 ml   Output 250 ml   Net 20 ml       Physical Exam:   Physical Exam  Vitals and nursing note reviewed  Constitutional:       General: She is not in acute distress  Cardiovascular:      Rate and Rhythm: Normal rate and regular rhythm  Pulmonary:      Effort: No respiratory distress  Abdominal:      General: There is no distension  Musculoskeletal:         General: Tenderness (R knee ) present  Neurological:      Mental Status: She is oriented to person, place, and time     Psychiatric:         Mood and Affect: Mood normal           Additional Data:     Labs:  Results from last 7 days   Lab Units 22  1139   WBC Thousand/uL 5 98   HEMOGLOBIN g/dL 11 3*   HEMATOCRIT % 35 2   PLATELETS Thousands/uL 253   NEUTROS PCT % 73   LYMPHS PCT % 13*   MONOS PCT % 11 EOS PCT % 2     Results from last 7 days   Lab Units 02/15/22  0656   SODIUM mmol/L 133*   POTASSIUM mmol/L 3 8   CHLORIDE mmol/L 98*   CO2 mmol/L 29   BUN mg/dL 17   CREATININE mg/dL 0 96   ANION GAP mmol/L 6   CALCIUM mg/dL 9 0   ALBUMIN g/dL 3 2*   TOTAL BILIRUBIN mg/dL 0 87   ALK PHOS U/L 123*   ALT U/L 26   AST U/L 21   GLUCOSE RANDOM mg/dL 144*     Results from last 7 days   Lab Units 02/12/22  1139   INR  1 32*     Results from last 7 days   Lab Units 02/15/22  0648 02/14/22 2011 02/14/22  1526 02/14/22  1048 02/14/22  1046 02/14/22  0607 02/13/22  2042 02/13/22  1601 02/13/22  1101   POC GLUCOSE mg/dl 143* 117 163* 198* 221* 143* 183* 164* 176*               Lines/Drains:  Invasive Devices  Report    Peripheral Intravenous Line            Peripheral IV 02/13/22 Left;Ventral (anterior) Forearm 1 day                      Imaging: No pertinent imaging reviewed      Recent Cultures (last 7 days):         Last 24 Hours Medication List:   Current Facility-Administered Medications   Medication Dose Route Frequency Provider Last Rate    acetaminophen  975 mg Oral Q8H [facility] RUCHI Napier      amLODIPine  10 mg Oral Daily RUCHI Napier      apixaban  5 mg Oral BID RUCHI Napier      ascorbic acid  500 mg Oral Daily RUCHI Napier      atorvastatin  40 mg Oral After [employer], RUCHI      chlorthalidone  25 mg Oral Daily Asad Preston MD      cholecalciferol  1,000 Units Oral Daily RUCHI Martinez      cloNIDine  0 1 mg Oral Q12H [facility] Asad Preston MD      insulin lispro  1-5 Units Subcutaneous TID AC RUCHI Napier      insulin lispro  1-5 Units Subcutaneous HS RUCHI Napier      labetalol  200 mg Oral Q12H [facility] VARGHESE Singh PA-C      levothyroxine  125 mcg Oral Daily Selena Vanessa DO      lidocaine  1 patch Topical Daily RUCHI Napier      lisinopril  20 mg Oral BID [place] D Dierdorff, PA-C      LORazepam  0 5 mg Oral BID [employer] RUCHI Tobias      oxyCODONE  2 5 mg Oral Q4H PRN RUCHI Thomas          Today, Patient Was Seen By: Deandre Mccarty PA-C    **Please Note: This note may have been constructed using a voice recognition system  **

## 2022-02-15 NOTE — PROGRESS NOTES
PHYSICAL MEDICINE AND REHABILITATION   PREADMISSION ASSESSMENT     Projected [facility] Diagnoses:  Impairment of mobility, safety and Activities of Daily Living (ADLs) due to Orthopedic Disorders:  08 9  Other Orthopedic    Etiologic: Right Medial Tibial Plateau Fracture   Date of Onset: 2/12/2022   Date of surgery: N/A     PATIENT INFORMATION  Name: Amadeo Waters Phone #: [phone] (home)   Address: [place] [id number]  YOB: 1940 Age: 78 y o  SS#   Marital Status:   Ethnicity:    Employment Status: retired  Extended Emergency Contact Information  Primary Emergency Contact: Kemar Galo, [address] Phone: [phone]  Relation: Daughter  Secondary Emergency Contact: [address] Phone: [phone]  Relation: Daughter  Advance Directive: Level 1 Full Code, No ACP Documents on File     INSURANCE/COVERAGE:     Primary Payor: MEDICARE / Plan: MEDICARE A AND B / Product Type: Medicare A & B Fee for Service /   Secondary Payer: [employer]    Payer Contact:  Payer Contact:   Contact Phone:  Contact Phone:     Authorization #:   Coverage Dates:  LCD:   MEDICARE #: [id number]  Medicare Days: 60/30/30  Medical Record #: [id number]    REFERRAL SOURCE:   Referring provider: Adrienne Gil MD  Referring facility: [address] [place] [address]   Room: /-  PCP: Danelle Butler DO PCP phone number: [phone]    MEDICAL INFORMATION  HPI: As per PM&R on 2/14/2022:  78year old F with PMH of Afib on anticoagulation with Eliquis, HTN, DM2, hyperlipidemia, hypothyroidism who developed progressive R knee pain and significant ambulatory dysfunction who had outpatient work-up which showed R knee effusion and R knee OA on X-ray  She underwent knee aspiration on 1/19 with bloody fluid    Pain continued to progress and she could no longer safely ambulate, wt bear, or perform ADLs and presented to hospital where MRI R knee performed and showed R medial tibial plateau fracture, complex medial meniscus tear, knee hemarthrosis, calf hematoma  Orthopedics consulted and initially recommended NWB and now per discussion Dr Kina Haney patient can be PWB 30-50%  She is not required to wear brace but can be considered if helpful  She can start passive ROM now but no significant R knee PT expected until 8 weeks  Patient was evaluated by skilled therapies and was found to have significant decline in ADLs and ambulation  Per PM&R patient is a good ARC candidate and meets criteria for acute rehab admission  She is medically stable for transfer to rehab today  COVID-19 testing was completed on 2/15/2021 and is currently pending  Patient had 3 doses of the Yost Peter vaccine - 1/17/2021, 2/10/2021, and 11/20/21  Past Medical History:   Past Surgical History: Allergies:     Past Medical History:   Diagnosis Date    Anxiety     Atrial fibrillation ([address] )     Bowel obstruction ([address] )     Cancer ([address] )     LEFT BREAST CA 22 YEARS AGO     Depression     Diabetes mellitus ([address] )     Disease of thyroid gland     Hypertension     Hypothyroidism     Past Surgical History:   Procedure Laterality Date    ABDOMINAL ADHESION SURGERY N/A 2/4/2018    Procedure: LYSIS ADHESIONS;  Surgeon: Nilesh Terrell DO;  [facility];  Service: General    BREAST SURGERY      CHOLECYSTECTOMY      COLON SURGERY  2017    EXPLORATORY LAPAROTOMY      JOINT REPLACEMENT      LEFT KNEE REPLACEMENT     LAPAROTOMY N/A 2/4/2018    Procedure: LAPAROTOMY EXPLORATORY,;  Surgeon: Nilesh Terrell DO;  [facility];  Service: General    MASTECTOMY      MASTECTOMY Left 1995    MASTECTOMY Right 2017    NE BIOPSY/EXCISION, LYMPH NODE(S) Right 1/13/2017    Procedure: SENTINEL LYMPH NODE BIOPSY RIGHT AXILLA;   Surgeon: Salima Gonzalez MD;  [facility];  Service: General    NE MASTECTOMY, SIMPLE, COMPLETE Right 1/13/2017    Procedure: MASTECTOMY SIMPLE;  Surgeon: Huang Duarte MD;  [facility];  Service: General    SMALL INTESTINE SURGERY N/A 2/4/2018    Procedure: RESECTION SMALL BOWEL;  Surgeon: Stacey Graff DO;  [facility];  Service: General    US GUIDED BREAST BIOPSY RIGHT COMPLETE Right 11/29/2016     Allergies   Allergen Reactions    Meloxicam GI Intolerance    Morphine GI Intolerance    Morphine     Penicillins     Percocet [Oxycodone-Acetaminophen]     Sitagliptin      SOB         Medical/functional conditions requiring inpatient rehabilitation: Complex right medial meniscus tear, right knee hemiarthrosis, right calf hematoma, pain, ambulatory dysfunction     Risk for medical/clinical complications: Risk for falls, risk for orthostasis, risk of delirium, risk for dehydration, risk of fluid overload, and risk for skin break down     Comorbidities/Surgeries in the last 100 days: A-fib, HTN, DM 2, anxiety, and insomnia     CURRENT VITAL SIGNS:   Temp:  [97 4 °F (36 3 °C)-98 °F (36 7 °C)] 97 4 °F (36 3 °C)  HR:  [54-67] 63  Resp:  [17-19] 19  BP: (130-161)/(55-75) 161/64   Intake/Output Summary (Last 24 hours) at 2/15/2022 1220  Last data filed at 2/15/2022 0746  Gross per 24 hour   Intake 630 ml   Output 250 ml   Net 380 ml        LABORATORY RESULTS:      Lab Results   Component Value Date    HGB 11 5 02/15/2022    HCT 36 1 02/15/2022    WBC 6 66 02/15/2022     Lab Results   Component Value Date    BUN 17 02/15/2022    BUN 14 09/29/2015     09/29/2015    K 3 8 02/15/2022    K 4 1 09/29/2015    CL 98 (L) 02/15/2022     09/29/2015    GLUCOSE 126 06/03/2019    GLUCOSE 118 09/29/2015    CREATININE 0 96 02/15/2022    CREATININE 0 89 09/29/2015     Lab Results   Component Value Date    PROTIME 15 8 (H) 02/12/2022    INR 1 32 (H) 02/12/2022        DIAGNOSTIC STUDIES:  MRI knee right wo contrast    Result Date: 2/13/2022  Impression: 1    Nondisplaced medial tibial plateau fracture with associated bone marrow edema  No articular surface depression  2   Round heterogeneous signal structure deep to the proximal soleus muscle measuring 2 0 x 1 3 x 2 2 cm, likely a soft tissue hematoma  3   Moderate hemarthrosis  4   Complex tear of medial meniscus without flipped fragment  5   Mild osteoarthritis of medial and patellofemoral compartments  The study was marked in [facility] for immediate notification   Workstation performed: [id number]       PRECAUTIONS/SPECIAL NEEDS:  Tobacco:   Social History     Tobacco Use   Smoking Status Current Every Day Smoker    Packs/day: 1 00    Types: Cigarettes   Smokeless Tobacco Never Used   , Weight Bearing Precautions:  30-50% Weight Bearing to the RLE and WBAT to the LLE, Anticoagulation:  Eliquis 5mg BID, Blood Sugar Management: as per MD, Edema Management, Safety Concerns, Pain Management, IV: Type:Peripheral Location:Left Ventral Forearm Reason:IV Fluids and Medications and Fall Precautions     MEDICATIONS:     Current Facility-Administered Medications:     acetaminophen (TYLENOL) tablet 975 mg, 975 mg, Oral, Q8H [address], RUCHI Napier, 975 mg at 02/15/22 0528    amLODIPine (NORVASC) tablet 10 mg, 10 mg, Oral, Daily, RUCHI Napier, 10 mg at 02/15/22 2207    apixaban (ELIQUIS) tablet 5 mg, 5 mg, Oral, BID, RUCHI Napier, 5 mg at 02/15/22 0815    ascorbic acid (VITAMIN C) tablet 500 mg, 500 mg, Oral, Daily, RUCHI Napier, 500 mg at 02/15/22 0815    atorvastatin (LIPITOR) tablet 40 mg, 40 mg, Oral, After Dinner, Leeann April, RUCHI, 40 mg at 02/14/22 1730    chlorthalidone tablet 25 mg, 25 mg, Oral, Daily, Faye Lima MD, 25 mg at 02/15/22 0818    cholecalciferol (VITAMIN D3) tablet 1,000 Units, 1,000 Units, Oral, Daily, RUCHI Napier, 1,000 Units at 02/15/22 0816    cloNIDine (CATAPRES) tablet 0 1 mg, 0 1 mg, Oral, Q12H [address], Faye Lima MD, 0 1 mg at 02/15/22 0816    insulin lispro (HumaLOG) 100 units/mL subcutaneous injection 1-5 Units, 1-5 Units, Subcutaneous, TID AC, 3 Units at 02/15/22 1109 **AND** Fingerstick Glucose (POCT), , , TID AC, RUCHI Napier    insulin lispro (HumaLOG) 100 units/mL subcutaneous injection 1-5 Units, 1-5 Units, Subcutaneous, HS, RUCHI Napier, 1 Units at 02/13/22 2101    labetalol (NORMODYNE) tablet 200 mg, 200 mg, Oral, Q12H [address], Thanhia VARGHESE Singh PA-C, 200 mg at 02/15/22 1108    levothyroxine tablet 125 mcg, 125 mcg, Oral, Daily, Alexis Ayala DO, 125 mcg at 02/15/22 0815    lidocaine (LIDODERM) 5 % patch 1 patch, 1 patch, Topical, Daily, RUCHI Napier, 1 patch at 02/15/22 0824    lisinopril (ZESTRIL) tablet 20 mg, 20 mg, Oral, BID, WADE Du-ANGELICA, 20 mg at 02/15/22 6386    LORazepam (ATIVAN) tablet 0 5 mg, 0 5 mg, Oral, BID, RUCHI Napier, 0 5 mg at 02/15/22 0815    oxyCODONE (ROXICODONE) IR tablet 2 5 mg, 2 5 mg, Oral, Q4H PRN, RUCHI Napier    SKIN INTEGRITY:   no rashes, no erythema, no peripheral edema    PRIOR LEVEL OF FUNCTION:  She lives in [facility] is  and lives alone  Self Care: Independent, Indoor Mobility: Modified Independent, Stairs (in/outdoor): Modified Independent  and Cognition: Independent    FALLS IN THE LAST 6 MONTHS: None     HOME ENVIRONMENT:  The living area: elevator  There are No steps to enter the home  The patient will have 24 hour supervision/physical assistance available upon discharge  Patient does have a supportive daughter and granddaughter who live near by       PREVIOUS DME:  Equipment in home (previous DME): Single Point Cane    FUNCTIONAL STATUS:  Physical Therapy Occupational Therapy Speech Therapy   As per PT:      02/14/22 0930   Note Type   Note type Evaluation   Pain Assessment   Pain Assessment Tool 0-10   Pain Score 8   Pain Location/Orientation Orientation: Right;Location: Leg   Pain Onset/Description Onset: Ongoing;Frequency: Constant/Continuous; Descriptor: Aching   Effect of Pain on Daily Activities Increased pain with activity   Patient's Stated Pain Goal No pain   Hospital Pain Intervention(s) Ambulation/increased activity;Repositioned   Restrictions/Precautions   Weight Bearing Precautions Per Order Yes   RLE Weight Bearing Per Order NWB   LLE Weight Bearing Per Order WBAT   Other Precautions Fall Risk;Pain;THR   Home Living   Type of [address] One level;Elevator   [address] [facility]   Additional Comments Patient reports living alone, but states she has a local daughter and granddaughter who can assist as able  Patient also reports having a supportive neighbor but states her neighbor is elderly   Prior Function   Level of [place] Independent with ADLs and functional mobility   Lives With Spouse   Receives Help From Family   ADL Assistance Independent   Falls in the last 6 months 0   Comments Patient reports use of a single-point cane for ambulation prior to admission   General   Family/Caregiver Present No   Cognition   Overall Cognitive Status WFL   Arousal/Participation Alert   Attention Within functional limits   Orientation Level Oriented to person;Oriented to place;Oriented to time;Oriented to situation   Memory Within functional limits   Following Commands Follows one step commands without difficulty   RUE Assessment   RUE Assessment WFL   LUE Assessment   LUE Assessment WFL   RLE Assessment   RLE Assessment X   Strength RLE   RLE Overall Strength 3-/5   LLE Assessment   LLE Assessment WFL   Strength LLE   LLE Overall Strength 4/5   Bed Mobility   Supine to Sit 4  Minimal assistance   Additional items Assist x 1; Increased time required;Verbal cues   Transfers   Sit to Stand 3  Moderate assistance   Additional items Assist x 1; Increased time required;Verbal cues   Stand to Sit 3  Moderate assistance   Additional items Assist x 1; Increased time required;Verbal cues   Additional Comments VC and TC needed for hand placement during transfers    Ambulation/Elevation   Gait pattern Excessively slow; Step to;Short stride; Foward flexed  (hop to gait pattern )   Gait Assistance 3  Moderate assist   Additional items Assist x 1   Assistive Device Rolling walker   Distance 3ft    Balance   Static Sitting Fair -   Static Standing Poor +   Ambulatory Poor   Endurance Deficit   Endurance Deficit Yes   Endurance Deficit Description fatigue, pain    Activity Tolerance   Activity Tolerance Patient limited by fatigue;Patient limited by pain   Medical Staff Made Aware Juanito Mathews; Juanito Hidalgo student; OT present for co evaluation due to pts unstable presentation, new physical limitations/ restrictions, and decreased activity tolerance which all impact pts overall physical performance   Nurse Made Aware Patient appropriate to be seen and mobilized per nursing   Assessment   Prognosis Good   Problem List Decreased strength;Decreased range of motion;Decreased endurance; Impaired balance;Decreased mobility;Pain;Orthopedic restrictions   Assessment Pt is 78 y o  female seen for PT evaluation s/p [facility] on [facility]12/[facility]  Two pt identifiers were used to confirm  Pt presented w/ right knee effusion  Patient denies any recent fall or trauma  Pt was admitted with a primary dx of:  Nondisplaced impaction fracture of medial tibial plateau on right, right knee effusion  Per Ortho patient is NWB to RLE and non op management for fx at this time  PT now consulted for assessment of mobility and d/c needs  Pts current co morbidities affecting treatment include: Anxiety, AFib, history of breast cancer, depression, DM, disease of thyroid gland, HTN, hypothyroidism, and personal factors including living alone   Pts current clinical presentation is Unstable/ Unpredictable (high complexity) due to Ongoing medical management for primary dx, Increased reliance on more restrictive AD compared to baseline, Decreased activity tolerance compared to baseline, Fall risk, Increased assistance needed from caregiver at current time, Current WBS, Continuous pulse oximetry monitoring     Upon evaluation, pt currently is requiring Min Ax1 for bed mobility; Mod Ax1 for transfers and Mod Ax1 for ambulation w/ RW   Pt presents at PT eval functioning below baseline and currently w/ overall mobility deficits 2* to: RLE weakness, decreased ROM, impaired balance, decreased endurance, gait deviations, pain, decreased activity tolerance compared to baseline, fall risk, orthopedic restrictions  Pt currently at a fall risk 2* to impairments listed above  Based on the aforementioned PT evaluation, pt will continue to benefit from skilled Acute PT interventions to address stated impairments; to maximize functional mobility; for ongoing pt/ family training; and DME needs  At conclusion of PT session pt returned back in chair with phone and call bell within reach  Pt denies any further questions at this time  PT is currently recommending Rehab  PT will continue to follow during hospital stay  As per OT:      02/14/22 0929   OT Last Visit   OT Visit Date 02/14/22   Note Type   Note type Evaluation   Restrictions/Precautions   Weight Bearing Precautions Per Order Yes   RUE Weight Bearing Per Order WBAT   LUE Weight Bearing Per Order WBAT   RLE Weight Bearing Per Order NWB   LLE Weight Bearing Per Order WBAT   Other Precautions Fall Risk;Pain;WBS   Pain Assessment   Pain Assessment Tool 0-10   Pain Score 8   Hospital Pain Intervention(s) Repositioned; Ambulation/increased activity   Home Living   Type of Home Apartment   Home Layout One level;Elevator   Bathroom Shower/Tub Tub/shower unit   Bathroom Toilet Standard   Bathroom Equipment Commode   Home Equipment Cane   Additional Comments Pt reports using SPC pta   Prior Function   Level of [place] Independent with ADLs and functional mobility   Lives With Alone   Receives Help From Family; Neighbor   ADL Assistance Independent   IADLs Independent   Falls in the last 6 months 0  (Pt reports no falls)   Vocational Retired   Comments Pt reports that neighbors can assist prn; daughter and granddaughter are not always available to assist   Lifestyle   Autonomy Independent in ADLs, IADLs, and functional mobility pta   Reciprocal Relationships Supportive family and neighbors   Service to Others Retired   Intrinsic Gratification Enjoys watching TV   Subjective   Subjective Pt pleasant and cooperative   ADL   Eating Assistance 7  Independent   Grooming Assistance 5  Supervision/Setup   UB Bathing Assistance 5  Supervision/Setup   LB Bathing Assistance 3  Moderate Assistance   UB Dressing Assistance 5  Supervision/Setup   LB Dressing Assistance 3  Moderate [address]  3  Moderate Assistance   Bed Mobility   Supine to Sit 4  Minimal assistance   Additional items Assist x 1; Increased time required; Impulsive;LE management   Additional Comments Pt left OOB in bedside recliner at the conclusion of the session   Transfers   Sit to Stand 3  Moderate assistance   Additional items Assist x 1; Increased time required;Verbal cues   Stand to Sit 3  Moderate assistance   Additional items Assist x 1; Increased time required;Verbal cues   Additional Comments Pt required VC for hand placement and NWB s of RLE   Functional Mobility   Functional Mobility 3  Moderate assistance   Additional Comments Assist x1   Additional items Rolling walker   Activity Tolerance   Activity Tolerance Patient limited by pain; Patient limited by fatigue   Medical Staff Made Aware DPT    Nurse Made Aware RN cleared for therapy   RUE Assessment   RUE Assessment WFL   LUE Assessment   LUE Assessment WFL   Cognition   Overall Cognitive Status WFL   Arousal/Participation Alert; Responsive; Cooperative   Attention Within functional limits   Orientation Level Oriented X4   Memory Within functional limits   Following Commands Follows one step commands without difficulty   Assessment   Limitation Decreased ADL status; Decreased self-care trans;Decreased high-level ADLs; Decreased endurance   Prognosis Fair   Assessment Pt  is 78 y o  female admitted [facility] on 2022 s/p Knee effusion, right  Pt is currently non-weight bearing on RLE  PMH includes HTN and AFib, anxiety, hypothyroidism, Diabetes mellitus type 2  Pt  currently resides alone in a one story apartment with 0 [place]LA[place]  Prior to admission pt  was independent in ADLs, IADLs, and functional mobility  At baseline, pt  required the use of [facility] for functional mobility  Currently, pt  requires mod A for transfers, functional mobility, and LB ADLs  Pt requires supervision for UB and min A for bed mobility  Pt  demonstrates deficits in bathing, dressing, toileting, functional mobility/transfers, laundry , house maintenance, meal prep, cleaning and leisure activities  due to activity tolerance and standing balance/tolerance and limited home support, difficulty performing ADLS and difficulty performing IADLS   OT d/c recommendations include post acute rehabilitation services due to limited home support, current functional status, and NWB status  Pt  would benefit from continued inpatient OT services focusing on the goals below  N/A      CARE SCORES:  Self Care:  Eatin: Independent  Oral hygiene: 09: Not applicable  Toilet hygiene: 03: Partial/moderate assistance  Shower/bathing self: 03: Partial/moderate assistance  Upper body dressin: Supervision or touching  assistance  Lower body dressin: Partial/moderate assistance  Putting on/taking off footwear: 09: Not applicable  Transfers:  Roll left and right: 09: Not applicable  Sit to lyin: Not applicable  Lying to sitting on side of bed: 04: Supervision or touching  assistance  Sit to stand: 03: Partial/moderate assistance  Chair/bed to chair transfer: 03: Partial/moderate assistance  Toilet transfer: 09:  Not applicable  Mobility:  Walk 10 ft: 88: Not attempted due to medical conditions or safety concerns  Walk 50 ft with two turns: 88: Not attempted due to medical conditions or safety concerns  Walk 150ft: 88: Not attempted due to medical conditions or safety concerns    CURRENT GAP IN FUNCTION  Prior to Admission: Functional Status: Patient was independent with mobility/ambulation, transfers, ADL's, IADL's  Estimated length of stay: 10 to 14 days    Anticipated Post-Discharge Disposition/Treatment  Disposition: Return to previous home/apartment  Outpatient Services: Physical Therapy (PT) and Occupational Therapy (OT)    BARRIERS TO DISCHARGE  Weakness, Pain, Balance Difficulty, Fatigue, Home Accessibility, Caregiver Accessibility, Financial Resources, Equipment Needs, Resource Availability and Lives Alone    INTERVENTIONS FOR DISCHARGE  Adaptive equipment, Patient/Family/Caregiver Education, Community Resources, Financial Assistance, Arrange DME needs, Medication Changes as per MD, Therapy exercises, Center of balance support  and Energy conservation education     REQUIRED THERAPY:  Patient will require PT and OT 90 minutes each per day, five days per week to achieve rehab goals  REQUIRED FUNCTIONAL AND MEDICAL MANAGEMENT FOR INPATIENT REHABILITATION:  Skin:  There are no pressure sores currently, Pain Management: Overall pain is well controlled, Deep Vein Thrombosis (DVT) Prophylaxis:  SCD's while in bed, Diabetes Management: continue sliding scale insulin, patient to do finger sticks as ordered, SLIM to continue to manage diabetes, and additional medical conditions, nursing for education and bowel/bladder management, internal medicine to monitor/manage medical conditions, PM&R to maximize function and provide medical oversight, nursing for edication and bowel/bladder management, PT/OT intervention, patient and family education and training, and any consults as needed      RECOMMENDED LEVEL OF CARE:   As per PM&R on 2/14/2022:  78year old F with PMH of Afib on anticoagulation with Eliquis, HTN, DM2, hyperlipidemia, hypothyroidism who developed progressive R knee pain and significant ambulatory dysfunction who had outpatient work-up which showed R knee effusion and R knee OA on X-ray  She underwent knee aspiration on 1/19 with bloody fluid  Pain continued to progress and she could no longer safely ambulate, wt bear, or perform ADLs and presented to hospital where MRI R knee performed and showed R medial tibial plateau fracture, complex medial meniscus tear, knee hemarthrosis, calf hematoma  Orthopedics consulted and initially recommended NWB and now per discussion Dr Mariusz Redman patient can be PWB 30-50%  She is not required to wear brace but can be considered if helpful  She can start passive ROM now but no significant R knee PT expected until 8 weeks  Patient was evaluated by skilled therapies and was found to have significant decline in ADLs and ambulation  Prior to admission the patient was modified independent with functional mobility, independent with ADLs and IADLs  Nursing is being recommended for education and bowel/bladder management internal medicine to monitor and manage medical conditions, PM&R to maximize function and provide medical oversight, and inpatient rehab to maximize self care, strength, mobility, and endurance upon discharge to home with the support of her family

## 2022-02-15 NOTE — DISCHARGE SUMMARY
[address] [facility]  Discharge- Raegan Jerome 1940, 78 y o  female MRN: [id number]  Unit/Bed#: -01 Encounter: [id number]  Primary Care Provider: Annalise Walsh DO   Date and time admitted to hospital: 2/12/2022 11:05 AM    * Fracture of right tibial plateau  Assessment & Plan  Right tibial plateau fracture  Initially seen in orthopedic office underwent aspiration bloody fluid  · Non operative  Toe-touch weight-bearing  · Seen by PT/OT  Needs rehab placement    CKD (chronic kidney disease) stage 3, GFR 30-59 ml/min ([facility])  Assessment & Plan  · Creat at baseline    Results from last 7 days   Lab Units 02/15/22  0656 02/12/22  1139   BUN mg/dL 17 16   CREATININE mg/dL 0 96 0 90   EGFR ml/min/1 73sq m 54 59       Paroxysmal A-fib ([facility])  Assessment & Plan  · Continue eliquis    Anxiety  Assessment & Plan  · Continue lorazepam    Ambulatory dysfunction  Assessment & Plan  Lives in independent living  Now with ambulatory dysfunction secondary to above  · PT/OT evaluations, needs rehab      Hypothyroidism  Assessment & Plan  · Continue levothyroxine    Essential hypertension  Assessment & Plan  · Stable, continue chlorthalidone labetalol lisinopril amlodipine clonidine as previously taken    Diabetes mellitus type 2  Assessment & Plan  Lab Results   Component Value Date    HGBA1C 6 1 (H) 10/10/2020     Results from last 7 days   Lab Units 02/15/22  1039 02/15/22  0648 02/14/22 2011 02/14/22  1526 02/14/22  1048 02/14/22  1046 02/14/22  0607 02/13/22  2042   POC GLUCOSE mg/dl 328* 143* 117 163* 198* 221* 143* 183*     · Holding metformin during hospitalization    Continue sliding scale insulin      Medical Problems             Resolved Problems  Date Reviewed: 2/15/2022    None              Discharging Physician / Practitioner: Smitha Negrete PA-C  PCP: Annalise Walsh DO  Admission Date:   Admission Orders (From admission, onward)     Ordered        02/12/22 1233  Inpatient Admission  Once                      Discharge Date: 02/15/22    Disposition:    Acute Rehab at [facility]     Reason for Admission: R knee pain    Discharge Diagnoses:   Please see assessment and plan section above for further details regarding discharge diagnoses  Consultations During Hospital Stay:  · Ortho  · PMR    Procedures Performed:   · See chart     Significant Findings / Test Results:   · See chart     Incidental Findings:   · See chart     Test Results Pending at Discharge (will require follow up):   · none     Outpatient Tests Requested:  · F/u ortho 2 weeks    Complications:  none    Hospital Course:      Stanley Pope is a 78 y o  female patient who originally presented to the hospital on 2/12/2022 due to R knee pain  Per consult "significant ambulatory dysfunction who had outpatient work-up which showed R knee effusion and R knee OA on X-ray  She underwent knee aspiration on 1/19 with bloody fluid  Pain continued to progress and she could no longer safely ambulate, wt bear, or perform ADLs and presented to hospital where MRI R knee performed and showed R medial tibial plateau fracture, complex medial meniscus tear, knee hemarthrosis, calf hematoma  Orthopedics consulted and initially recommended NWB and now per discussion Dr Taurus Strange patient can be PWB 30-50%  She is not required to wear brace but can be considered if helpful  She can start passive ROM now but no significant R knee PT expected until 8 weeks "    To be dc to [facility]  Condition at Discharge: stable    Discharge Day Visit / Exam:   * Please refer to separate progress note for these details *    Discussion with Family:  Updated daughter medically earlier, asked Case Management to update daughter in regard to discharge plan  Medication Adjustments and Discharge Medications:  · Discharge Medication List: See after visit summary for reconciled discharge medications     · Medication Dosing Tapers - Please refer to Discharge Medication List for details on any medication dosing tapers (if applicable to patient)  · Summary of Medication Adjustments made as a result of this hospitalization: see chart  · Medications being temporarily held (include recommended restart time): see chart    Wound Care Recommendations:  When applicable, please see wound care section of After Visit Summary  Instructions for any Catheters / Lines Present at Discharge (including removal date, if applicable): NA    Diet Recommendations at Discharge:  Diet -        Diet Orders   (From admission, onward)             Start     Ordered    02/12/22 1251  Diet Regular; Regular House  Diet effective now        References:    Nutrtion Support Algorithm Enteral vs  Parenteral   Question Answer Comment   Diet Type Regular    Regular Regular House    RD to adjust diet per protocol? Yes        02/12/22 1250                Goals of Care Discussions:  · Code Status at Discharge: Level 1 - Full Code  · Goals of care were not discussed during this admission  Discharge instructions/Information to patient and family:   See after visit summary section titled Discharge Instructions for information provided to patient and family  Planned Readmission: no      Discharge Statement:  I spent 34 minutes discharging the patient  This time was spent on the day of discharge  I had direct contact with the patient on the day of discharge  Greater than 50% of the total time was spent examining patient, answering all patient questions, arranging and discussing plan of care with patient as well as directly providing post-discharge instructions  Additional time then spent on discharge activities  **Please Note: This note may have been constructed using a voice recognition system  **

## 2022-02-16 LAB
GLUCOSE SERPL-MCNC: 164 MG/DL (ref 65–140)
GLUCOSE SERPL-MCNC: 177 MG/DL (ref 65–140)
GLUCOSE SERPL-MCNC: 211 MG/DL (ref 65–140)
GLUCOSE SERPL-MCNC: 213 MG/DL (ref 65–140)

## 2022-02-16 PROCEDURE — 97116 GAIT TRAINING THERAPY: CPT

## 2022-02-16 PROCEDURE — 97162 PT EVAL MOD COMPLEX 30 MIN: CPT

## 2022-02-16 PROCEDURE — 99232 SBSQ HOSP IP/OBS MODERATE 35: CPT

## 2022-02-16 PROCEDURE — 82948 REAGENT STRIP/BLOOD GLUCOSE: CPT

## 2022-02-16 PROCEDURE — 99232 SBSQ HOSP IP/OBS MODERATE 35: CPT | Performed by: INTERNAL MEDICINE

## 2022-02-16 PROCEDURE — 97535 SELF CARE MNGMENT TRAINING: CPT

## 2022-02-16 PROCEDURE — 97530 THERAPEUTIC ACTIVITIES: CPT

## 2022-02-16 PROCEDURE — 97166 OT EVAL MOD COMPLEX 45 MIN: CPT

## 2022-02-16 RX ORDER — POLYETHYLENE GLYCOL 3350 17 G/17G
17 POWDER, FOR SOLUTION ORAL ONCE
Status: COMPLETED | OUTPATIENT
Start: 2022-02-16 | End: 2022-02-16

## 2022-02-16 RX ORDER — DOCUSATE SODIUM 100 MG/1
100 CAPSULE, LIQUID FILLED ORAL 2 TIMES DAILY
Status: DISCONTINUED | OUTPATIENT
Start: 2022-02-16 | End: 2022-02-25 | Stop reason: HOSPADM

## 2022-02-16 RX ORDER — SENNOSIDES 8.6 MG
1 TABLET ORAL 2 TIMES DAILY
Status: DISCONTINUED | OUTPATIENT
Start: 2022-02-16 | End: 2022-02-25 | Stop reason: HOSPADM

## 2022-02-16 RX ADMIN — STANDARDIZED SENNA CONCENTRATE 8.6 MG: 8.6 TABLET ORAL at 18:03

## 2022-02-16 RX ADMIN — CHLORTHALIDONE 25 MG: 25 TABLET ORAL at 08:33

## 2022-02-16 RX ADMIN — LORAZEPAM 0.5 MG: 0.5 TABLET ORAL at 08:32

## 2022-02-16 RX ADMIN — DOCUSATE SODIUM 100 MG: 100 CAPSULE ORAL at 18:03

## 2022-02-16 RX ADMIN — CLONIDINE HYDROCHLORIDE 0.1 MG: 0.1 TABLET ORAL at 21:01

## 2022-02-16 RX ADMIN — LORAZEPAM 0.5 MG: 0.5 TABLET ORAL at 18:03

## 2022-02-16 RX ADMIN — INSULIN LISPRO 1 UNITS: 100 INJECTION, SOLUTION INTRAVENOUS; SUBCUTANEOUS at 08:34

## 2022-02-16 RX ADMIN — ACETAMINOPHEN 975 MG: 325 TABLET, FILM COATED ORAL at 13:25

## 2022-02-16 RX ADMIN — ACETAMINOPHEN 975 MG: 325 TABLET, FILM COATED ORAL at 21:01

## 2022-02-16 RX ADMIN — CARBOXYMETHYLCELLULOSE SODIUM 1 DROP: 2.5 SOLUTION/ DROPS OPHTHALMIC at 08:33

## 2022-02-16 RX ADMIN — DICLOFENAC SODIUM 2 G: 10 GEL TOPICAL at 13:25

## 2022-02-16 RX ADMIN — INSULIN LISPRO 2 UNITS: 100 INJECTION, SOLUTION INTRAVENOUS; SUBCUTANEOUS at 13:25

## 2022-02-16 RX ADMIN — LEVOTHYROXINE SODIUM 125 MCG: 125 TABLET ORAL at 05:31

## 2022-02-16 RX ADMIN — INSULIN LISPRO 2 UNITS: 100 INJECTION, SOLUTION INTRAVENOUS; SUBCUTANEOUS at 16:00

## 2022-02-16 RX ADMIN — STANDARDIZED SENNA CONCENTRATE 8.6 MG: 8.6 TABLET ORAL at 09:55

## 2022-02-16 RX ADMIN — Medication 1 CAPSULE: at 21:02

## 2022-02-16 RX ADMIN — APIXABAN 5 MG: 5 TABLET, FILM COATED ORAL at 18:03

## 2022-02-16 RX ADMIN — ACETAMINOPHEN 975 MG: 325 TABLET, FILM COATED ORAL at 05:31

## 2022-02-16 RX ADMIN — OXYCODONE HYDROCHLORIDE AND ACETAMINOPHEN 500 MG: 500 TABLET ORAL at 08:32

## 2022-02-16 RX ADMIN — LISINOPRIL 20 MG: 20 TABLET ORAL at 08:32

## 2022-02-16 RX ADMIN — AMLODIPINE BESYLATE 10 MG: 10 TABLET ORAL at 08:32

## 2022-02-16 RX ADMIN — CARBOXYMETHYLCELLULOSE SODIUM 1 DROP: 2.5 SOLUTION/ DROPS OPHTHALMIC at 21:02

## 2022-02-16 RX ADMIN — Medication 1000 UNITS: at 08:32

## 2022-02-16 RX ADMIN — ATORVASTATIN CALCIUM 40 MG: 40 TABLET, FILM COATED ORAL at 18:03

## 2022-02-16 RX ADMIN — Medication 1 CAPSULE: at 08:33

## 2022-02-16 RX ADMIN — APIXABAN 5 MG: 5 TABLET, FILM COATED ORAL at 08:32

## 2022-02-16 RX ADMIN — LABETALOL HYDROCHLORIDE 200 MG: 200 TABLET, FILM COATED ORAL at 21:01

## 2022-02-16 RX ADMIN — LABETALOL HYDROCHLORIDE 200 MG: 200 TABLET, FILM COATED ORAL at 09:55

## 2022-02-16 RX ADMIN — LISINOPRIL 20 MG: 20 TABLET ORAL at 21:01

## 2022-02-16 RX ADMIN — DICLOFENAC SODIUM 2 G: 10 GEL TOPICAL at 21:02

## 2022-02-16 RX ADMIN — DOCUSATE SODIUM 100 MG: 100 CAPSULE ORAL at 09:55

## 2022-02-16 RX ADMIN — INSULIN LISPRO 1 UNITS: 100 INJECTION, SOLUTION INTRAVENOUS; SUBCUTANEOUS at 21:01

## 2022-02-16 RX ADMIN — POLYETHYLENE GLYCOL 3350 17 G: 17 POWDER, FOR SOLUTION ORAL at 13:24

## 2022-02-16 RX ADMIN — LIDOCAINE 5% 1 PATCH: 700 PATCH TOPICAL at 08:32

## 2022-02-16 RX ADMIN — CLONIDINE HYDROCHLORIDE 0.1 MG: 0.1 TABLET ORAL at 08:32

## 2022-02-16 NOTE — ASSESSMENT & PLAN NOTE
- Function much improved, pain much improved, decreased fall risk, ambulating adequately with PWB status; pt feels ready for discharge as well   - D/c home with  PT, OT    R medial tibial plateau fracture, complex medial meniscus tear, knee hemarthrosis, calf hematoma with significant decline in ADLs and ambulation   · Per discussion with Dr Shakila Chino 2/14, ortho- PWB 30-50%               - Not required to wear brace               - PROM ok but no additional knee specific rehab expected for approx 8 weeks   - Dr Jeannine Seay confirmed with ortho 2/17 - PWB RLE 30-50% and can have passive range of motion performed in physical therapy as tolerated  Other than her usual movements of right leg, patient is not to perform any additional active or active assist range of motion in therapies   Completed acute comprehensive interdisciplinary inpatient rehabilitation to include intensive skilled therapies (PT, OT) as outlined with oversight and management by rehabilitation physician as well as inpatient rehab level nursing, case management and weekly interdisciplinary team meetings   Follow-up with Ortho Sx after d/c   Has follow-up appt 3/1/2022 3:15 PM Yassine Livingston MD [facility] [place]   o Patient requesting to see ortho at [facility] - patient will call and adjust appt accordingly     - 1/16 - ED Visit R knee swelling started 4 days ago denies any trauma - XR No acute osseous abnormality  Joint effusion;  - 1/19 - OP ortho- Hemarthrosis of R knee - R knee hemarthrosis likely 2/2 spontaneous bleeding in setting of Eliquis v occult trauma, pt denies trauma, R knee med arthritis; 45 cc blood aspirate; 1/9 XR There is no acute fracture or dislocation  There is no joint effusion  No significant degenerative changes  Soft tissues are unremarkable   IMPRESSION: No acute osseous abnormality  - 2/12 Ortho c/s - Dr Chari Butler - A 57-year-old female with history of multiple falls as well as right knee pain and recurrent Clinic Care Coordination Contact  [facility]/Voicemail    Referral Source: ED/IP Report- Care Team. (See telephone encounter from today - 04/25/2017.)    Clinical Data: Care Coordinator Outreach.   Patient was hospitalized 04/18-04/23/2017 for COPD exacerbation with acute hypoxic respiratory failure.     *See 04/20/2017 entry by Corinne C White, LSW - Care Transition Initial Assessment - SW.     Outreach attempted x 1.  Unable to reach patient or leave message.   Phone number listed in demographics is for the [place], [place].   CCRN does not have patient's room number at that facility.       [facility] reviewed past Clinic Care Coordination notes by ANTONY Garg dated 04/05/2017.  CC Team and MTM have not been able to reach this patient.   Care Coordination [facility] letter mailed 04/07/2017.      Plan: Care Coordinator will try to reach patient again in 1-2 business days.    Ronda Rain, RN  [facility]  Clinic Care Coordinator - [place] and [place] Locations   Direct:  [phone] (voicemail available)   (Covering for ANTONY Garg - Tuesday 04/25/2017)   effusions  Presents back to the hospital today due to worsening pain and continued swelling  She was sent for an MRI to rule out an occult fracture  On review of imaging this morning she is found to have a nondisplaced impaction fracture of the medial tibial plateau  On my exam patient has a mild effusion she is tender medial tibial plateau  The knee is stable to varus valgus and anterior-posterior drawer testing  Plan is going to be toe-touch weight-bearing on the right lower extremity with the assistance of a walker  She is going to work with PT and Case Management regarding placement  Patient will require follow-up to confirm improvement in her symptoms 2 weeks following discharge  - 2/14 spoke with ortho Ha Hu (resident) to confirm WB status, query need for brace, and ROM as order still said NWB; Per discussion with Dr Ha Hu [facility] 30-50%; brace not required but could consider if helpful; PROM immediately; PWB order (without % parameters entered by Dr Ha Hu)  - 2/17 Dr Angel Burden confirmed with ortho PWB RLE 30-50% and can have passive range of motion performed in physical therapy as tolerated  Other than her usual movements of right leg, patient is not to perform any additional active or active assist range of motion in therapies

## 2022-02-16 NOTE — PROGRESS NOTES
[facility] PT Initial Evaluation Note     02/16/22 1145   Patient Data   Rehab Impairment Impairment of mobility, safety and Activities of Daily Living (ADLs) due to Orthopedic Disorders:  08 9  Other Orthopedic     Etiologic Diagnosis Right Medial tibial plateau   Date of Onset 02/12/22   Home Setup   Type of Home Apartment  (3rd floor with elevator access)   Method of Entry Curb  (front entrance or ramp side entrance)   Number of Stairs 1   Number of Stairs in Home 0   Available Equipment Standard Walker;Single Point Cane  (reacher)   Prior Level of Function   Indoor-Mobility (Ambulation) 3  Independent - Patient completed the activities by him/herself, with or without an assistive device, with no assistance from a helper  Stairs 3  Independent - Patient completed the activities by him/herself, with or without an assistive device, with no assistance from a helper  Falls in the Last Year   Number of falls in the past 12 months 0   Restrictions/Precautions   Precautions Fall Risk;Pain;Limb alert;Visual deficit  (macular degeneration)   RLE Weight Bearing Per Order PWB   ROM Restrictions Yes   RLE ROM Restriction Range Limitation  (no resisted exercise elvira on R knee -will clarify ROM order tomorrow)   Pain Assessment   Pain Assessment Tool 0-10   Pain Score 8  (7-8/10 R medial knee pain throughout tx session)   Pain Location/Orientation Orientation: Right;Location: Knee   Pain Onset/Description Onset: Ongoing;Frequency: Constant/Continuous   Hospital Pain Intervention(s) Repositioned; Rest   Putting On/Taking Off Footwear   Type of Assistance Needed Physical assistance;Verbal cues   Physical Assistance Level 51%-75%   Comment shoes   Putting On/Taking Off Footwear CARE Score 2   Transfer Bed/Chair/Wheelchair   Limitations Noted In Balance;Pain Management; Endurance;LE Strength;UE Strength   Adaptive Equipment Roller Walker   Type of Assistance Needed Physical assistance;Verbal cues; Adaptive equipment   Physical Assistance Level 25% or less   Comment VC to maintain PWB on R, SPT with RW   Chair/Bed-to-Chair Transfer CARE Score 3   Roll Left and Right   Type of Assistance Needed Set-up / clean-up   Comment HOB flat without rail    Roll Left and Right CARE Score 5   Sit to Lying   Type of Assistance Needed Verbal cues; Incidental touching   Comment bed height 27 inch (pt will ask family to measure bed at home) , CGA , HOB flat without rail    Sit to Lying CARE Score 4   Lying to Sitting on Side of Bed   Type of Assistance Needed Verbal cues; Incidental touching   Lying to Sitting on Side of Bed CARE Score 4   Sit to Stand   Type of Assistance Needed Physical assistance;Verbal cues; Adaptive equipment   Physical Assistance Level 25% or less   Comment min A with VC for hand placement using RW and maintain NWB on R LE    Sit to Stand CARE Score 3   Picking Up Object   Type of Assistance Needed Verbal cues; Adaptive equipment;Physical assistance   Physical Assistance Level 25% or less   Comment reacher - marker with walker support  pt has a reacher at home which she uses as needed   Picking Up Object CARE Score 3   Car Transfer   Type of Assistance Needed Physical assistance;Verbal cues   Physical Assistance Level 26%-50%   Comment RW   Car Transfer CARE Score 3   Ambulation   Primary Mode of Locomotion Prior to Admission Walk   Distance Walked (feet) 150 ft   Assist Device Roller Walker   Gait Pattern Decreased R stance; Improper weight shift; Inconsistant Mariangel; Slow Mariangel; Step to   Limitations Noted In Balance; Endurance;Speed;Strength;Swing;Heel Strike   Provided Assistance with: Balance   Walk Assist Level Minimum Assist   Findings requires abot 50% VC to maintain compliance with PWB on R LE   Walk 10 Feet   Type of Assistance Needed Physical assistance;Verbal cues; Adaptive equipment   Physical Assistance Level 25% or less   Walk 10 Feet CARE Score 3   Walk 50 Feet with Two Turns   Type of Assistance Needed Adaptive equipment;Physical assistance;Verbal cues   Physical Assistance Level 25% or less   Walk 50 Feet with Two Turns CARE Score 3   Walk 150 Feet   Type of Assistance Needed Physical assistance;Verbal cues; Adaptive equipment   Physical Assistance Level 25% or less   Comment 150 with RW   Walk 150 Feet CARE Score 3   Walking 10 Feet on Uneven Surfaces   Type of Assistance Needed Physical assistance;Verbal cues; Adaptive equipment   Physical Assistance Level 26%-50%   Comment min A on ramp and floor mat with RW   Walking 10 Feet on Uneven Surfaces CARE Score 3   Wheel 50 Feet with Two Turns   Reason if not Attempted Activity not applicable   Wheel 50 Feet with Two Turns CARE Score 9   Wheel 150 Feet   Reason if not Attempted Activity not applicable   Wheel 265 Feet CARE Score 9   Curb or Single Stair   Style negotiated Curb   Type of Assistance Needed Physical assistance;Verbal cues; Adaptive equipment   Physical Assistance Level 26%-50%   Comment 6" curb with RW, step by step VC for sequencing, safe walker management- expressed fear    1 Step (Curb) CARE Score 3   4 Steps   Comment per pt does not negotiate steps even when visiting family    Reason if not Attempted Activity not applicable   4 Steps CARE Score 9   12 Steps   Reason if not Attempted Activity not applicable   12 Steps CARE Score 9   Stairs   Type Curb   # of Steps 1   Weight Bearing Precautions PWB;Fall Risk   Assist Devices Roller Walker   Comprehension   QI: Comprehension 4  Undestands: Clear comprehension without cues or repetitions   Expression   QI: Expression 4  Express complex messages without difficulty and with speech that is clear and easy to [place]   RLE Assessment   RLE Assessment X   Strength RLE   RLE Overall Strength 4-/5   LLE Assessment   LLE Assessment WFL   Strength LLE   LLE Overall Strength 4/5   Sensation   Light Touch No apparent deficits   Cognition   Overall Cognitive Status WFL   Arousal/Participation Alert; Cooperative   Attention Within functional limits   Orientation Level Oriented X4   Memory Decreased recall of precautions   Following Commands Follows one step commands with increased time or repetition   Vision   Vision Comments pt reports L eye macular degeneration    Discharge Information   Vocational Plan Retired/not working   Patient's Discharge Plan return to apt   Patient's Rehab Expectations " to be able to walk normal with my cane only"   Barriers to Discharge Home Limited Family Support;Decreased Strength;Decreased Endurance;Pain; Safety Considerations   Impressions Pt is a 78year old female who developed progressive R knee pain with significant ambulatory dysfunction s/p Right Medial Tibial Plateau Fracture as shown in MRI  No surgical intervention at this time and has PWB precaution on R LE  Per ortho and PMR MD "PROM ok but no additional knee specific rehab expected for approx 8 weeks"  Pt is seen for moderate complexity eval with co-morbidities affecting functional progress include A-fib, HTN, DM 2, anxiety, and insomnia  Per PMR pt is at Risk for falls, risk for orthostasis, risk of delirium, risk for dehydration, risk of fluid overload, and risk for skin break down  Personal factors affecting pt at time of IE include environmental barrier of ramp vs curb step to enter apt with elevator access  Pt also presents with limited family availability to provide physical assistance at d/c  Pt lives alone in an apt and was using a SPC at baseline  On eval pt presentation impacting functional performance include R knee pain, impaired strength, impaired standing balance/tolerance, dec endurance, gait dysfunction with dec compliance of PWB elvira with fatigue onset, dec functional indep with mobilities and ADLs with dec activity tolerance in standing which places pt a high risk for falls and increase caregiver burden so unsafe to return home at this time   Pt requires 1 person assist to complete functional activities safely requiring use of AD, see above info for mobility details  Pt is a good rehab candidate with anticipated mod indep-S goals using Least restrictive AD with ELOS of 7-10 days  Skilled PT will work on therapeutic exercises, therapeutic activities, NMR and gait training to improve overall functional indep in order for pt to return home safely with reduce risk for falls      PT Therapy Minutes   PT Time In 1145   PT Time Out 1315   PT Total Time (minutes) 90   PT Mode of treatment - Individual (minutes) 90   PT Mode of treatment - Concurrent (minutes) 0   PT Mode of treatment - Group (minutes) 0   PT Mode of treatment - Co-treat (minutes) 0   PT Mode of Treatment - Total time(minutes) 90 minutes   PT Cumulative Minutes 90   Cumulative Minutes   Cumulative therapy minutes 180

## 2022-02-16 NOTE — H&P
PHYSICAL MEDICINE AND REHABILITATION H&P/ADMISSION NOTE  Evelia Clark 78 y o  female MRN: [id number]  Unit/Bed#: [facility] 453-01 Encounter: [id number]     Rehab Diagnosis: Orthopedic Disorders:  08 9  Other Orthopedic See below  Etiologic Diagnosis:  R medial tibial plateau fracture, complex medial meniscus tear, knee hemarthrosis, calf hematoma    History of Present Illness:   78year old F with PMH of Afib on anticoagulation with Eliquis, HTN, DM2, hyperlipidemia, hypothyroidism who developed progressive R knee pain and significant ambulatory dysfunction who had outpatient work-up which showed R knee effusion and R knee OA on X-ray  She underwent knee aspiration on 1/19 with bloody fluid  Pain continued to progress and she could no longer safely ambulate, wt bear, or perform ADLs and presented to hospital where MRI R knee performed and showed R medial tibial plateau fracture, complex medial meniscus tear, knee hemarthrosis, calf hematoma  Orthopedics consulted and initially recommended NWB and now per discussion Dr Kareen Vargas patient can be PWB 30-50%  She is not required to wear brace but can be considered if helpful  She can start passive ROM now but no significant R knee PT expected until 8 weeks  Patient was evaluated by skilled therapies and was found to have significant decline in ADLs and ambulation  Chief complaint:  R knee pain     Subjective: On eval, patient reports still fairly significant R knee pain and inability to bear wt  She reports difficulty with transfers and ADLs  Patient reports bowel movement recently  She denies fever, chills, sweats, SOB, nausea, lightheadedness, or other new complaints except still frustration with her overall situation  Review of Systems: A 10-point review of systems was performed  Negative except as listed above      Plan:    * Fracture of right tibial plateau  Assessment & Plan  R medial tibial plateau fracture, complex medial meniscus tear, knee hemarthrosis, calf hematoma with significant decline in ADLs and ambulation   · Per discussion with Dr Shakila Chino , ortho- PWB 30-50%              - Not required to wear brace but consider if helpful during rehab               - PROM ok but no additional knee specific rehab expected for approx 8 weeks  · consider trial of hinged knee brace or other brace with rehab   · Optimal pain control  · Fall precautions   Monitor for signs/symptoms of VTE, atelectasis, acute blood loss anemia, or other infection    Recommend acute comprehensive interdisciplinary inpatient rehabilitation to include intensive skilled therapies (PT, OT) as outlined with oversight and management by rehabilitation physician as well as inpatient rehab level nursing, case management and weekly interdisciplinary team meetings   Follow-up with Ortho Sx after d/c and ortho if needed during ARC course       Pain  Assessment & Plan  APAP TID  Oxy 2 5mg Q4H PRN > may need to cautiously increase  Counseled on and continue to encourage deep breathing/relaxation/behavioral pain management techniques:     Deep breathin seconds in, 5 seconds out, 5 times per hour when awake and PRN when in pain or anticipate pain; avoid holding breath and tightening muscles and instead breathe slowly and deeply  Monitor for oversedation, AMS/delirium, and respiratory depression   If being administered - hold opiates, muscle relaxants, benzodiazepines, and gabapentin for oversedation, AMS, or RR<12  Anxiety  Assessment & Plan  On chronic ativan 0 5mg BID - care with concurrent benzo  Supportive counseling  Consider Psychology consult while in [place] on and continue to encourage deep breathing/relaxation/behavioral management techniques:     Deep breathin seconds in, 5 seconds out, 5 times per hour when awake and PRN when experiencing pain or anxiety    Avoid holding breath and tightening muscles and instead breathe slowly and deeply        Atrial fibrillation   Assessment & Plan  IM consulted and with overall management at their discretion during ARC course  Rate control: labetolol  Antithrombotic: Eliquis       Macular degeneration  Assessment & Plan  OP optho follow-up   Home Preservision and Theretears    At risk for venous thromboembolism (VTE)  Assessment & Plan  SCDs, ambulation, and Eliquis       Chronic hyponatremia  Assessment & Plan  Mild, monitor intermittently     CKD (chronic kidney disease) stage 3, GFR 30-59 ml/min ([facility])  Assessment & Plan  Monitor BUN/Cr intermittently as well as electrolytes   IM consulted to manage  Limit nephrotoxic agents when possible      Hypothyroidism  Assessment & Plan  Levothyroxine    Essential hypertension  Assessment & Plan  Internal medicine consulted and management at their discretion  Monitor for signs and symptoms of hypoglycemia   Current meds: clonidine, chlorthalidone, amlodipine, lisinopril, labetalol       Diabetes mellitus type 2  Assessment & Plan  Lab Results   Component Value Date    HGBA1C 6 1 (H) 10/10/2020     Metformin d/c'd previously  Internal medicine consulted and management at their discretion  Monitor for signs and symptoms of hypoglycemia   Current meds: Lispro AC/HS  DM2 diet         Disposition   Home with [facility] 14 days from admission     Follow-up providers and other issues to be followed up after discharge   PCP   Ortho   Cards    CODE: Level 1: Full Code    Restrictions include:   Fall precautions    Social History and Prior Level of Function  Patient lives alone in 4 floor apartment with elevator access without step in apartment   Independent with ADLs  Independent with ambulation    Current Level of Function:    Transfers mod assist  Ambulation 3 ft mod assist  Mod assist LBD, LBB, toileting    Potential Barriers to Discharge:  Co-morbidities (see above), new functional deficits, pain, decreased ROM, anxiety, lives alone      Physical Exam:  Temp:  [97 9 °F (36 6 °C)-99 °F (37 2 °C)] 99 °F (37 2 °C)  HR:  [56-71] 71  Resp:  [17-19] 18  BP: (130-173)/(64-83) 164/70  SpO2:  [96 %-98 %] 97 %    GEN:  Sitting in NAD   HEENT/NECK: Normocephalic, atraumatic, moist mucous membranes   CARDIAC: Regular rate rhythm, no murmers, no rubs, no gallops  LUNGS:  clear to auscultation, no wheezes, rales, or rhonchi  ABDOMEN: Soft, non-tender, non-distended, normal active bowel sounds  EXTREMITIES/SKIN:  R knee with mild swelling; jt line TTP; pain on PROM; L knee with hypertrophic changes, mild R calf swelling without TTP - stable  NEURO:   MENTAL STATUS:  Appropriate wakefulness and interaction , CN II-XII: grossly intact  and Strength/MMT:  Full except limited testing of R KF  PSYCH:  Affect:  Euthymic     Laboratory:    Results from last 7 days   Lab Units 02/15/22  0656 02/12/22  1139   HEMOGLOBIN g/dL 11 5 11 3*   HEMATOCRIT % 36 1 35 2   WBC Thousand/uL 6 66 5 98     Results from last 7 days   Lab Units 02/15/22  0656 02/12/22  1139   BUN mg/dL 17 16   SODIUM mmol/L 133* 137   POTASSIUM mmol/L 3 8 3 8   CHLORIDE mmol/L 98* 102   CREATININE mg/dL 0 96 0 90   AST U/L 21  --    ALT U/L 26  --      Results from last 7 days   Lab Units 02/12/22  1139   PROTIME seconds 15 8*   INR  1 32*        Wt Readings from Last 1 Encounters:   02/15/22 63 7 kg (140 lb 6 9 oz)     Estimated body mass index is 22 67 kg/m² as calculated from the following:    Height as of this encounter: 5' 6" (1 676 m)  Weight as of this encounter: 63 7 kg (140 lb 6 9 oz)  Imaging: reviewed    Tolerance for three hours of therapy per therapy day: adequate     Rehabilitation Prognosis: good       Rehabilitation Plan/Therapy Interventions:  This patient will have close medical and rehabilitation oversight from a physical medicine and rehabilitation physician and if needed an internal medicine physician to manage the complexity of medical issues to optimize health, ability to participate in therapy, quality of life, and functional outcomes  This patient requires 24 hour rehabilitation nursing to address bowel and bladder management, (pain management if listed below), medication administration, positioning/skin monitoring, fall/injury prevention, and VTE prophylaxis  Physical and occupational therapy: Patient requires PT, OT to improve functional mobility, transfers, upper and lower body strengthening, conditioning, balance, and gait training with appropriate assistive device  PT and OT will be provided approximately 5 times per week for 90 minutes per day  Skilled therapists and nursing will also provide patient/family safety education and training  / will work to ensure proper communication between patient (+/- family) and staff regarding the overall rehabilitation and medical process while patient is in the acute rehabilitation center  / will work with patient (and if applicable family and community resources) to optimize safe discharge  Discharge Planning:    Estimated length of stay:   14 days  Family/Patient Goals:  Patient/family's goals: Return to previous home/apartment  Mobility Goals:   Largely mod indep    Activities of Daily Living (ADLs) Goals:  Eating: Moderate [place]  Hygiene and Grooming: Moderate [place]  Bathing: Stand by Assist  Upper Extremity Dressing: Moderate [place]  Diet / Swallowing: Moderate [place]  Lower Extremity Dressing: Moderate [place]  Tub/shower Transfers: Stand by Assist  Toilet Transfers: Moderate [place]  Toileting / Hygiene: Moderate [place]    Rehabilitation and discharge goals discussed with the patient and/or family  Case Managment and Social Work to review patient/family resources and to coordinate Discharge Planning  Patient and Family Education and Training:  Rehabilitation and discharge goals discussed with the patient and/or family    Patient/family education/training needs to be discussed in weekly team meeting  Other equipment:  To be determined    Drug regimen reviewed, all potential adverse effects identified and addressed:    Scheduled Meds:  Current Facility-Administered Medications   Medication Dose Route Frequency Provider Last Rate    acetaminophen  975 mg Oral [facility] MD Valeriano Whaley ON 2/16/2022] amLODIPine  10 mg Oral Daily César Sargent MD      apixaban  5 mg Oral BID César Sargent MD      [START ON 2/16/2022] ascorbic acid  500 mg Oral Daily César Sargent MD      atorvastatin  40 mg Oral After Christine Alonso MD      bisacodyl  10 mg Rectal Daily PRN César Sargent MD      Carboxymethylcellulose Sodium  1 drop Ophthalmic BID César Sargent MD      [START ON 2/16/2022] chlorthalidone  25 mg Oral Daily César Sargent MD      [START ON 2/16/2022] cholecalciferol  1,000 Units Oral Daily César Sargent MD      cloNIDine  0 1 mg Oral Q12H [facility] César Sargent MD      insulin lispro  1-5 Units Subcutaneous HS César Sargent MD      insulin lispro  1-5 Units Subcutaneous TID [facility] César Sargent MD      labetalol  200 mg Oral Q12H [facility] César Sargent MD      [START ON 2/16/2022] levothyroxine  125 mcg Oral [facility] MD Valeriano Whaley ON 2/16/2022] lidocaine  1 patch Topical Daily César Sargent MD      lisinopril  20 mg Oral BID César Sargent MD      LORazepam  0 5 mg Oral BID César Sargent MD      ondansetron  4 mg Oral Q8H PRN César Sargent MD      oxyCODONE  2 5 mg Oral Q4H PRN César Sargent MD      polyethylene glycol  17 g Oral Daily PRN César Sargent MD      PreserVision AREDS 2  1 capsule Oral BID César Sargent MD         Past Medical History:   Past Surgical History:   Family History:   Social history:   Past Medical History:   Diagnosis Date    Anxiety     Atrial fibrillation [facility]     Bowel obstruction ([facility]     Cancer ([facility]     LEFT BREAST CA 22 YEARS AGO     Depression     Diabetes mellitus ([facility]     Disease of thyroid gland     Hypertension     Hypothyroidism     Past Surgical History:   Procedure Laterality Date    ABDOMINAL ADHESION SURGERY N/A 2/4/2018    Procedure: LYSIS ADHESIONS;  Surgeon: Martin Davila DO;  [facility];  Service: General    BREAST SURGERY      CHOLECYSTECTOMY      COLON SURGERY  2017    EXPLORATORY LAPAROTOMY      JOINT REPLACEMENT      LEFT KNEE REPLACEMENT     LAPAROTOMY N/A 2/4/2018    Procedure: LAPAROTOMY EXPLORATORY,;  Surgeon: Martin Davila DO;  [facility];  Service: General    MASTECTOMY      MASTECTOMY Left 1995    MASTECTOMY Right 2017    IL BIOPSY/EXCISION, LYMPH NODE(S) Right 1/13/2017    Procedure: SENTINEL LYMPH NODE BIOPSY RIGHT AXILLA; Surgeon: Lilo Rae MD;  [facility];  Service: General    IL MASTECTOMY, SIMPLE, COMPLETE Right 1/13/2017    Procedure: MASTECTOMY SIMPLE;  Surgeon: Lilo Rae MD;  [facility];  Service: General    SMALL INTESTINE SURGERY N/A 2/4/2018    Procedure: RESECTION SMALL BOWEL;  Surgeon: Martin Davila DO;  [facility];  Service: General    US GUIDED BREAST BIOPSY RIGHT COMPLETE Right 11/29/2016     Family History   Problem Relation Age of Onset    Cancer Mother     No Known Problems Father       Social History     Socioeconomic History    Marital status:       Spouse name: None    Number of children: None    Years of education: None    Highest education level: None   Occupational History    None   Tobacco Use    Smoking status: Current Every Day Smoker     Packs/day: 1 00     Types: Cigarettes    Smokeless tobacco: Never Used   Vaping Use    Vaping Use: Never used   Substance and Sexual Activity    Alcohol use: Never    Drug use: Never    Sexual activity: Not Currently   Other Topics Concern    None   Social History Narrative    ** Merged History Encounter **          Social Determinants of Health     Financial Resource Strain: Not on file   Food Insecurity: No Food Insecurity    Worried About Running Out of Food in the Last Year: Never true    Ran Out of Food in the Last Year: Never true   Transportation Needs: No Transportation Needs    Lack of Transportation (Medical): No    Lack of Transportation (Non-Medical):  No   Physical Activity: Not on file   Stress: Not on file   Social Connections: Not on file   Intimate Partner Violence: Not on file   Housing Stability: Unknown    Unable to Pay for Housing in the Last Year: No    Number of Places Lived in the Last Year: Not on file    Unstable Housing in the Last Year: No          Current Medical Diagnosis Allergies   Patient Active Problem List   Diagnosis    Heart palpitations    Nicotine abuse    Atrial fibrillation     Diabetes mellitus type 2    Essential hypertension    Hypothyroidism    Malignant neoplasm of central portion of right female breast ([facility] [address] [facility]    [facility]    Delirium    Physical deconditioning    Ambulatory dysfunction    Hx of fall    Anxiety    Postop check    Incisional hernia, without obstruction or gangrene    Syncope vs presyncope    [facility]oxysmal [facility]-fib ([facility]    Diabetes ([facility]    Weight loss    CKD (chronic kidney disease) stage 3, GFR 30-59 ml/min ([facility])    Fall    Chronic hyponatremia    Gastroesophageal reflux disease without esophagitis    Depression    Hordeolum externum of right upper eyelid    History of CEA (carotid endarterectomy)    Hyperlipidemia associated with type 2 diabetes mellitus (HCC)    Hypo-osmolality and hyponatremia    Hypothyroidism due to acquired atrophy of thyroid    Iron deficiency anemia due to chronic blood loss    Low back pain without sciatica    Malignant neoplasm of right breast, stage 1, estrogen receptor positive ([facility]    Mitral insufficiency and aortic stenosis    Vitamin D deficiency    Varicose vein of leg    Tobacco abuse    Nontraumatic compression fracture of T4 vertebra (HCC)    Non-seasonal allergic rhinitis due to pollen    Hypertension    Hypertensive urgency    High blood pressure    Asymptomatic bilateral carotid artery stenosis    Hemarthrosis of right knee    History of breast cancer    Moderate protein-calorie malnutrition     Primary osteoarthritis of right knee    Synovial cyst of right popliteal space    Renal vascular disease    Fracture of right tibial plateau    Pain    At risk for venous thromboembolism (VTE)    Macular degeneration    Allergies   Allergen Reactions    Meloxicam GI Intolerance    Morphine GI Intolerance    Morphine     Penicillins     Percocet [Oxycodone-Acetaminophen]     Sitagliptin      SOB           Medical Necessity Criteria for ARC Admission: See below  In addition, the preadmission screen, post-admission physical evaluation, overall plan of care and admissions order demonstrate a reasonable expectation that the following criteria were met at the time of admission to the [facility]  1  The patient requires active and ongoing therapeutic intervention of multiple therapy disciplines (physical therapy, occupational therapy, speech-language pathology, or prosthetics/orthotics), one of which is physical or occupational therapy  2  Patient requires an intensive rehabilitation therapy program, as defined in Chapter 1, section 110 2 2 of the CMS Medicare Policy Manual  This intensive rehabilitation therapy program will consist of at least 3 hours of therapy per day at least 5 days per week or at least 15 hours of intensive rehabilitation therapy within a 7 consecutive day period, beginning with the date of admission to the [facility]  3  The patient is reasonably expected to actively participate in, and benefit significantly from, the intensive rehabilitation therapy program as defined in Chapter 1, section 110 2 2 of the CMS Medicare Policy Manual at this time of admission to the [facility]   She can reasonably be expected to make measurable improvement (that will be of practical value to improve the patients functional capacity or adaptation to impairments) as a result of the rehabilitation treatment, as defined in section 110 3, and such improvement can be expected to be made within the prescribed period of time  As noted in the CMS Medicare Policy Manual, the patient need not be expected to achieve complete independence in the domain of self-care nor be expected to return to his or her prior level of functioning in order to meet this standard  4  The patient must require physician supervision by a rehabilitation physician  As such, a rehabilitation physician will conduct face-to-face visits with the patient at least 3 days per week throughout the patients stay in the [facility] to assess the patient both medically and functionally, as well as to modify the course of treatment as needed to maximize the patients capacity to benefit from the rehabilitation process  5  The patient requires an intensive and coordinated interdisciplinary approach to providing rehabilitation, as defined in Chapter 1, section 110 2 5 of the CMS Medicare Policy Manual  This will be achieved through periodic team conferences, conducted at least once in a 7-day period, and comprising of an interdisciplinary team of medical professionals consisting of: a rehabilitation physician, registered nurse,  and/or , and a licensed/certified therapist from each therapy discipline involved in treating the patient  Changes Since Pre-admission Assessment: None -This patient's participation in rehab continues to be reasonable, necessary and appropriate  CMS Required Post-Admission Physician Evaluation Elements  History and Physical, including medical history, functional history and active comorbidities as in above text  Post-Admission Physician Evaluation:  The patient has the potential to make improvement and is in need of physical, occupational, and/or therapy services   The patient may also need nutritional services  Given the patient's complex medical condition and risk of further medical complications, rehabilitative services cannot be safely provided at a lower level of care, such as a skilled nursing facility  I have reviewed the patient's functional and medical status at the time of the preadmission screening and they are the same as on the day of this admission  I acknowledge that I have personally performed a full physical examination on this patient within 24 hours of admission  The patient and/or family demonstrated understanding the rehabilitation program and the discharge process after we discussed them  Agree in entirety: yes  Minor adaptions: none    Major changes: none    Specific areas of management and oversight in ARC setting:     Cardiopulmonary function: Ensure cardiopulmonary stability and optimize cardiopulmonary function not only at rest but with activity as patient's activity level significantly increases in acute rehab compared with prior to transfer in preparation for safe discharge from [facility]  Must closely and frequently monitor blood pressure and HR to ensure adequate cardiac output during ADLs and ambulation as patient is at increased risk for orthostatic hypotension/syncope and potential injury if not monitored for and managed adequately  Blood pressure management:    Frequent monitoring of blood pressure with appropriate adjustments in blood pressure medication management to optimize blood pressure control and prevent/limit renal complications  Monitoring impact of blood pressure and side-effects of blood pressure medications at rest and with activity  DM: Patient will improve/maintain blood sugar control to ensure optimal healing and decrease risks of complications associated with DM through medication monitoring, adjustments as indicated, and optimal dietary intake and education    Pain management:  Pain will improve with frequent evaluation of pain, careful adjustments in medications, frequent re-evaluation of patient's pain and medical/neurologic status to ensure optimal pain control, avoidance of potential serious and even life-threatening side-effects and drug interactions, as well as weaning pain medications as soon as possible to decrease risk of short and long-term use  Orthopedic Disorder: R knee arthropathy/calf hematoma causing impaired mobility, ADLs, and gait:  intensive skilled therapies with physical therapy and occupational therapy with close oversight and management by rehab specialized physician in acute rehabilitation setting to most expeditiously and effectively improve functional mobility, transfers, upper and lower body strengthening, conditioning, balance, and gait training with appropriate assistive device  Patient will have optimal supervision and management of patient's underlying orthopedic disorder with specialized rehabilitation physician during this period of recovery to ensure most expeditious and optimal recovery with decreased risks of fall/injury and other complications including acute worsening of ortho disorder, decrease risk of VTE, PNA, and skin ulceration  Inpatient rehabilitation education/teaching: To be provided to patient and typically family/caregiver (if able to be identified) by all skilled therapists, rehab nursing, case management, and rehab specialized physician to ensure optimal recovery and decrease risks of complications in both acute rehabilitation setting as well as after discharge  Anxiety (and/or Depression): Patient's mood and it's impact on therapy participation and functional recovery will improve during course with supportive counseling, relaxation/breathing techniques and if necessary medication management    Requires frequent re-assessment and close management to ensure anxiety/depression management during acute rehab course with planning for appropriate outpatient management to ensure optimal mental health and functional recovery  Dayne Caldwell MD, [address] [facility]  Physical Medicine and Rehabilitation  Brain Injury Medicine    ** Please Note: Fluency Direct voice to text software may have been used in the creation of this document   **

## 2022-02-16 NOTE — TREATMENT PLAN
Individualized Plan of Juve [age] y o  female MRN: [id number]  Unit/Bed#: -01 Encounter: [id number]     PATIENT INFORMATION  ADMISSION DATE: 2/15/2022  2:42 PM HARESH CATEGORY:Orthopedic Disorders:  08 9  Other Orthopedic See below   ADMISSION DIAGNOSIS: R medial tibial plateau fracture, complex medial meniscus tear, knee hemarthrosis, calf hematoma EXPECTED LOS: 14 days     MEDICAL/FUNCTIONAL PROGNOSIS  Pre-admit screens and post-admit evaluations reviewed and are consistent  Based on my assessment of the patient's medical conditions and current functional status, the prognosis for attaining medical and functional goals or the IRF stay is:  Good  Patient is appropriate for acute rehabilitation  Medical Goals:   Patient will be medically stable for discharge back to community setting upon completion or acute rehab program and patient/family will be able to manage medical conditions and comorbid conditions with medications and appropriate follow up upon completion of rehab program       Cardiopulmonary function: Ensure cardiopulmonary stability and optimize cardiopulmonary function not only at rest but with activity as patient's activity level significantly increases in acute rehab compared with prior to transfer in preparation for safe discharge from [facility]  Must closely and frequently monitor blood pressure and HR to ensure adequate cardiac output during ADLs and ambulation as patient is at increased risk for orthostatic hypotension/syncope and potential injury if not monitored for and managed adequately  Blood pressure management:    Frequent monitoring of blood pressure with appropriate adjustments in blood pressure medication management to optimize blood pressure control and prevent/limit renal complications  Monitoring impact of blood pressure and side-effects of blood pressure medications at rest and with activity    DM: Patient will improve/maintain blood sugar control to ensure optimal healing and decrease risks of complications associated with DM through medication monitoring, adjustments as indicated, and optimal dietary intake and education  Pain management:  Pain will improve with frequent evaluation of pain, careful adjustments in medications, frequent re-evaluation of patient's pain and medical/neurologic status to ensure optimal pain control, avoidance of potential serious and even life-threatening side-effects and drug interactions, as well as weaning pain medications as soon as possible to decrease risk of short and long-term use  Orthopedic Disorder: R knee arthropathy/calf hematoma causing impaired mobility, ADLs, and gait:  intensive skilled therapies with physical therapy and occupational therapy with close oversight and management by rehab specialized physician in acute rehabilitation setting to most expeditiously and effectively improve functional mobility, transfers, upper and lower body strengthening, conditioning, balance, and gait training with appropriate assistive device  Patient will have optimal supervision and management of patient's underlying orthopedic disorder with specialized rehabilitation physician during this period of recovery to ensure most expeditious and optimal recovery with decreased risks of fall/injury and other complications including acute worsening of ortho disorder, decrease risk of VTE, PNA, and skin ulceration  Inpatient rehabilitation education/teaching: To be provided to patient and typically family/caregiver (if able to be identified) by all skilled therapists, rehab nursing, case management, and rehab specialized physician to ensure optimal recovery and decrease risks of complications in both acute rehabilitation setting as well as after discharge     Anxiety (and/or Depression): Patient's mood and it's impact on therapy participation and functional recovery will improve during course with supportive counseling, relaxation/breathing techniques and if necessary medication management  Requires frequent re-assessment and close management to ensure anxiety/depression management during acute rehab course with planning for appropriate outpatient management to ensure optimal mental health and functional recovery  ANTICIPATED DISCHARGE DISPOSITION AND SERVICES  COMMUNITY SETTING:    Previous community setting  ANTICIPATED FOLLOW-UP SERVICE:   Outpatient Therapy PT, OT v   Home Health Services: PT, OT     DISCIPLINE SPECIFIC PLANS:  Required Disciplines & Services: PT, OT, Rehabilitation Physician, Rehabillitation Nursing, Case Management, Dietary    REQUIRED THERAPY (expected): Therapy Hours per Day Days per Week Tx Days (Days in ARF)   Physical Therapy 1 5 5 14   Occupational Therapy 1 5 5 14   NOTE: Additional therapy time(s) may be added as appropriate to meet patient needs and to achieve functional goals  ANTICIPATED FUNCTIONAL OUTCOMES:  ADL: Patient will be largely modified independent with ADLs except possible bathing and LB dressing which may need some assist   Bladder/Bowel: Patient will be modified independent with bladder/bowel management upon completion of rehab program   Transfers: Patient will be modified independent with transfers upon completion of rehab program   Locomotion: Patient will be at or near modified independent with locomotion (wheelchair or ambulation) upon completion of rehab program     DISCHARGE PLANNING NEEDS  Equipment needs: To be determined at team conference  REHAB ANTICIPATED PARTICIPATION RESTRICTIONS:  Uncertain at this time  To be determined closer to discharge

## 2022-02-16 NOTE — PROGRESS NOTES
Internal Medicine Progress Note  Patient: Vnetura Watson  Age/sex: 78 y o  female  Medical Record #: [id number]      ASSESSMENT/PLAN: (Interval History)  Ventura Watson is seen and examined and management for following issues:    Right Tibial plateau fracture  · Medical management per ortho  · Rehab/pain per PMR  · TTWB  · Hinged brace as needed for pain     DM II  · Previously on metformin however dc'd d/t hgbA1c of 6 1  · Would cont DM diet for now  · Cont sliding scale  · If BS elevated will start metformin  · BS stable for now     HTN  · HOme: amlodipine 10mg qd; lisinopril 20mg qd; chlorthalidone 25mg qd; clonidine 0 1mg qd; labetolol 200mg q12 hours  · Here: same as above except increased clonidine to 0 2   · Mildly elevated will increase clonidine to 0 2 (2/16)       PAF  · Rates controlled without any agents  · Cont eliquis     CKD  · Level 3  · Baseline 0 8-1 0  · Avoid nephrotoxic medications  · Avoid hypotension in the post operative setting  · Monitor bmp     Hypothyroid  · Cont levothyroxine     Hyperlipidemia  · Low cholesterol diet  · Continue statin therapy        The above assessment and plan was reviewed and updated as determined by my evaluation of the patient on 2/16/2022      Labs:   Results from last 7 days   Lab Units 02/15/22  0656 02/12/22  1139   WBC Thousand/uL 6 66 5 98   HEMOGLOBIN g/dL 11 5 11 3*   HEMATOCRIT % 36 1 35 2   PLATELETS Thousands/uL 257 253     Results from last 7 days   Lab Units 02/15/22  0656 02/12/22  1139   SODIUM mmol/L 133* 137   POTASSIUM mmol/L 3 8 3 8   CHLORIDE mmol/L 98* 102   CO2 mmol/L 29 29   BUN mg/dL 17 16   CREATININE mg/dL 0 96 0 90   CALCIUM mg/dL 9 0 9 7         Results from last 7 days   Lab Units 02/12/22  1139   INR  1 32*     Results from last 7 days   Lab Units 02/16/22  0634 02/15/22  2058 02/15/22  1603   POC GLUCOSE mg/dl 164* 132 95       Review of Scheduled Meds:  Current Facility-Administered Medications   Medication Dose Route Frequency Provider Last Rate    acetaminophen  975 mg Oral [facility] Rimma Calvert MD      amLODIPine  10 mg Oral Daily Rimma Calvert MD      apixaban  5 mg Oral BID Rimma Calvert MD      ascorbic acid  500 mg Oral Daily Rimma Calvert MD      atorvastatin  40 mg Oral After Mac Fermin MD      bisacodyl  10 mg Rectal Daily PRN Rimma Calvert MD      Carboxymethylcellulose Sodium  1 drop Ophthalmic BID Rimma Calvert MD      chlorthalidone  25 mg Oral Daily Rimma Calvert MD      cholecalciferol  1,000 Units Oral Daily Rimam Calvert MD      cloNIDine  0 1 mg Oral Q12H [address] Rimma Calvert MD      insulin lispro  1-5 Units Subcutaneous HS Rimma Calvert MD      insulin lispro  1-5 Units Subcutaneous TID [facility] Calvert [facility]      labetalol  200 mg Oral Q12H [address] Rimma Calvert MD      levothyroxine  125 mcg Oral Early Morning Rimma Calvert MD      lidocaine  1 patch Topical Daily Rimma Calvert MD      lisinopril  20 mg Oral BID Rimma Calvert MD      LORazepam  0 5 mg Oral BID Rimma Calvert MD      ondansetron  4 mg Oral Q8H PRN Rimma Calvert MD      oxyCODONE  2 5 mg Oral Q4H PRN Rimma Calvert MD      polyethylene glycol  17 g Oral Daily PRN Rimma Calvert MD      PreserVision AREDS 2  1 capsule Oral BID Rimma Calvert MD         Subjective/ HPI: Patient seen and examined  Patients overnight issues or events were reviewed with nursing or staff during rounds or morning huddle session  New or overnight issues include the following:     Pt slept OK overnight, c/o frequent urination without any other symptoms  Felt it was from her "water pill" chlorthalidone would not cause excessive urination  Will monitor    ROS:   A 10 point ROS was performed; negative except as noted above         Imaging:     No orders to display       *Labs /Radiology studies Reviewed  *Medications  reviewed and reconciled as needed  *Please refer to order section for additional ordered labs studies  *Case discussed with primary attending during morning huddle case rounds    Physical Examination:  Vitals:   Vitals:    02/15/22 1608 02/15/22 2016 02/15/22 2114 02/16/22 0634   BP: 153/74 (!) 173/83 164/70 161/73   BP Location: Left arm Left arm Left arm Left arm   Pulse: 64 68 71 63   Resp: 17 18 18 18   Temp: 97 9 °F (36 6 °C) 98 °F (36 7 °C) 99 °F (37 2 °C) 98 °F (36 7 °C)   TempSrc: Oral Oral  Oral   SpO2: 97% 97%  94%   Weight:    59 2 kg (130 lb 9 6 oz)   Height:           GEN: No apparent distress, interactive; frail  NEURO: Alert and oriented x3  HEENT: Pupils are equal and reactive, EOMI, mucous membranes are moist, face symmetrical  CV: S1 S2 regular, no MRG, mild right knee swelling  RESP: Lungs are clear bilaterally, no wheezes, rales or rhonchi noted, on room air, respirations easy and non labored  GI: Flat, soft non tender, non distended; +BS x4  : Voiding without difficulty  MUSC: Moves all extremities; except RLE  SKIN: pink, warm and dry, normal turgor, no rashes, lesions      The above physical exam was reviewed and updated as determined by my evaluation of the patient on 2/16/2022  Invasive Devices  Report    Peripheral Intravenous Line            Peripheral IV 02/13/22 Left;Ventral (anterior) Forearm 2 days                   VTE Pharmacologic Prophylaxis: Eliquis  Code Status: Level 1 - Full Code  Current Length of Stay: 1 day(s)      Total time spent:  30 minutes with more than 50% spent counseling/coordinating care  Counseling includes discussion with patient re: progress  and discussion with patient of his/her current medical state/information  Coordination of patient's care was performed in conjunction with primary service  Time invested included review of patient's labs, vitals, and management of their comorbidities with continued monitoring  In addition, this patient was discussed with medical team including physician and advanced extenders   The care of the patient was extensively discussed and appropriate treatment plan was formulated unique for this patient  ** Please Note:  voice to text software may have been used in the creation of this document   Although proof errors in transcription or interpretation are a potential of such software**

## 2022-02-16 NOTE — ASSESSMENT & PLAN NOTE
Acceptable control  Internal medicine consulted and mgmt during course   Current meds is home regimen: clonidine, chlorthalidone, amlodipine, lisinopril, labetalol

## 2022-02-16 NOTE — PROGRESS NOTES
AJAY SANTOS       02/16/22 0700   Patient Data   Rehab Impairment Impairment of mobility, safety and Activities of Daily Living (ADLs) due to Orthopedic Disorders:  08 9  Other Orthopedic     Etiologic Diagnosis Right Medial Tibial Plateau Fracture    Date of Onset 02/12/22   Support System   Name Mejia Vines Daughter   Home Setup   Type of Home Apartment   Method of Entry Curb  (side entry has ramp)   Number of Stairs 1   Baseline Information   Vocation   (Retired)   Transportation    Prior Device(s) [address]   Prior IADL Participation   Money Management Identify Money;Estimate Costs; Combine Bills;Estimate Change;Manage Checkbook   Meal Preparation Full Participation   Laundry Full Participation   Home Cleaning Full Participation   Prior Level of Function   Self-Care 3  Independent - Patient completed the activities by him/herself, with or without an assistive device, with no assistance from a helper  Indoor-Mobility (Ambulation) 3  Independent - Patient completed the activities by him/herself, with or without an assistive device, with no assistance from a helper  Stairs 3  Independent - Patient completed the activities by him/herself, with or without an assistive device, with no assistance from a helper  Functional Cognition 3  Independent - Patient completed the activities by him/herself, with or without an assistive device, with no assistance from a helper     Prior Device Used   [facility])   Falls in the Last Year   Number of falls in the past 12 months 0   Psychosocial   Psychosocial (WDL) WDL   Restrictions/Precautions   Precautions Fall Risk;Supervision on toilet/commode;Visual deficit  (PWB RLE, macular degeneration)   RLE Weight Bearing Per Order PWB   Pain Assessment   Pain Assessment Tool 0-10   Pain Score 4   Pain Location/Orientation Orientation: Right;Location: Leg   Hospital Pain Intervention(s) Repositioned   Eating Assessment   Type of Assistance Needed Set-up / clean-up   Physical Assistance Level No physical assistance   Eating CARE Score 5   Oral Hygiene   Type of Assistance Needed Physical assistance   Physical Assistance Level 26%-50%   Comment Min A in stance, however pt requested to sit to complete remaining oral care   Oral Hygiene CARE Score 3   Tub/Shower Transfer   Findings plan to complete next session   Shower/Bathe Self   Type of Assistance Needed Physical assistance   Physical Assistance Level 26%-50%   Comment Min A in stance to wash sal/buttock  Pt able to wash 10/10 body parts with SB  Good ability to follow PWB status in stance   Shower/Bathe Self CARE Score 3   Dressing/Undressing Clothing   Remove UB Clothes Pullover Shirt   Don UB Clothes Pullover Shirt   Type of Assistance Needed Supervision   Physical Assistance Level No physical assistance   Upper Body Dressing CARE Score 4   Remove LB Clothes Pants; Undergarment   Don LB Clothes Pants; Undergarment   Type of Assistance Needed Physical assistance   Physical Assistance Level 26%-50%   Comment Min A in stance for CM over hips  Pt able to thread and following WBS in stance   Lower Body Dressing CARE Score 3   Limitations Noted In Balance; Safety;Strength;ROM   Putting On/Taking Off Footwear   Type of Assistance Needed Supervision   Physical Assistance Level No physical assistance   Comment  socks only   Putting On/Taking Off Footwear CARE Score 4   Toileting Hygiene   Type of Assistance Needed Physical assistance   Physical Assistance Level 26%-50%   Comment Min A in stance for hygiene and CM over hips  Pt attempted to remove BUE from RW in stance   Rquired VC for safety   Toileting Hygiene CARE Score 3   Toilet Transfer   Surface Assessed Standard Commode   Transfer Technique Stand Pivot   Limitations Noted In Balance;Confidence;ROM;Safety;LE Strength   Adaptive Equipment Walker   Type of Assistance Needed Physical assistance   Physical Assistance Level 26%-50%   Comment Min A SPT with RW following WBS on RLE   Toilet Transfer CARE Score 3   Transfer Bed/Chair/Wheelchair   Type of Assistance Needed Physical assistance   Physical Assistance Level 26%-50%   Comment Min A SPT with RW   Chair/Bed-to-Chair Transfer CARE Score 3   Lying to Sitting on Side of Bed   Type of Assistance Needed Supervision   Physical Assistance Level No physical assistance   Lying to Sitting on Side of Bed CARE Score 4   Sit to Stand   Type of Assistance Needed Physical assistance   Physical Assistance Level 25% or less   Comment Min A to RW   Sit to Stand CARE Score 3   Comprehension   QI: Comprehension 4  Undestands: Clear comprehension without cues or repetitions   Expression   QI: Expression 4  Express complex messages without difficulty and with speech that is clear and easy to understan   RUE Assessment   RUE Assessment WFL   LUE Assessment   LUE Assessment WFL   Cognition   Overall Cognitive Status WFL   Arousal/Participation Alert; Responsive; Cooperative   Attention Within functional limits   Orientation Level Oriented X4   Memory Within functional limits   Following Commands Follows one step commands without difficulty   Discharge Information   Vocational Plan Retired/not working   Patient's Discharge Plan Return home independently with support of family   Patient's Rehab Expectations "Be able to walk normally"   Barriers to Discharge Home Limited Family Support; Unsafe Home Setup; Decreased Strength;Decreased Endurance;Pain; Safety Considerations   Impressions Pt is a 78year old woman who was seen for an OT evaluation following admission to [facility] due to R knee pain  A workup was completed indicating: R medial tibial plateau fracture, complex medial meniscus tear, knee hemarthrosis, calf hematoma  Pt's current problems include: dec CG support, pain, and WB status  Pt's comorbidities include: A-fib, HTN, DM 2, anxiety, and insomnia  Pt's precuations include: PWB RLE    Prior to admission pt was completing ADL's independently with independently  Currently, pt requires Minimal assist for overall ADLs, and requires Minimal assist for transfers using RW  Pt is currently limited due to the following deficits impacting occupational performance, activity tolerance, endurance, standing balance/tolerance and sitting balance/tolerance  The patient has shown a decline from prior level of function  The patient participates in identification of personal goals to address in Occupational Therapy  Pt benefits from skilled OT services for I/ADL retraining to support the ability to safely participate in daily occupations safely and independently in the most least restrictive way  From OT standpoint, Pt demonstrates Good rehab potential to meet LTG's  Pt is a good rehab candidate, Anticipate 1 week (7-10 days) length of stay to meet indep goals  Occupational Therapy plan of care to focus on the following areas:  ADL re-training, [address] training, IADL re-training, functional transfers, standing tolerance, standing balance, DME training/education, family training/education, and EC techniques/education     OT Therapy Minutes   OT Time In 0700   OT Time Out 0830   OT Total Time (minutes) 90   OT Mode of treatment - Individual (minutes) 90   OT Mode of treatment - Concurrent (minutes) 0   OT Mode of treatment - Group (minutes) 0   OT Mode of treatment - Co-treat (minutes) 0   OT Mode of Treatment - Total time(minutes) 90 minutes   OT Cumulative Minutes 90   Cumulative Minutes   Cumulative therapy minutes 90

## 2022-02-16 NOTE — ASSESSMENT & PLAN NOTE
Lab Results   Component Value Date    HGBA1C 6 4 (H) 02/21/2022     Internal medicine consulted and management at their discretion  DC meds per IM: Metformin 500mg BID (new Rx sent to pharmacy and family/pt will  on way home)  DM2 diet   Has glucometer and able to use  OP PCP follow-up

## 2022-02-16 NOTE — PLAN OF CARE
Problem: Potential for Falls  Goal: Patient will remain free of falls  Description: INTERVENTIONS:  - Educate patient/family on patient safety including physical limitations  - Instruct patient to call for assistance with activity   - Consult OT/PT to assist with strengthening/mobility   - Keep Call bell within reach  - Keep bed low and locked with side rails adjusted as appropriate  - Keep care items and personal belongings within reach  - Initiate and maintain comfort rounds  - Make Fall Risk Sign visible to staff  - Offer Toileting every 2-4 Hours, in advance of need  - Initiate/Maintain bed/chair alarm  - Obtain necessary fall risk management equipment: alarms  - Apply yellow socks and bracelet for high fall risk patients  - Consider moving patient to room near nurses station  Outcome: Progressing     Problem: PAIN - ADULT  Goal: Verbalizes/displays adequate comfort level or baseline comfort level  Description: Interventions:  - Encourage patient to monitor pain and request assistance  - Assess pain using appropriate pain scale  - Administer analgesics based on type and severity of pain and evaluate response  - Implement non-pharmacological measures as appropriate and evaluate response  - Consider cultural and social influences on pain and pain management  - Notify physician/advanced practitioner if interventions unsuccessful or patient reports new pain  Outcome: Progressing     Problem: INFECTION - ADULT  Goal: Absence or prevention of progression during hospitalization  Description: INTERVENTIONS:  - Assess and monitor for signs and symptoms of infection  - Monitor lab/diagnostic results  - Monitor all insertion sites, i e  indwelling lines, tubes, and drains  - Monitor endotracheal if appropriate and nasal secretions for changes in amount and color  - [place] appropriate cooling/warming therapies per order  - Administer medications as ordered  - Instruct and encourage patient and family to use good hand hygiene technique  - Identify and instruct in appropriate isolation precautions for identified infection/condition  Outcome: Progressing     Problem: SAFETY ADULT  Goal: Maintain or return to baseline ADL function  Description: INTERVENTIONS:  -  Assess patient's ability to carry out ADLs; assess patient's baseline for ADL function and identify physical deficits which impact ability to perform ADLs (bathing, care of mouth/teeth, toileting, grooming, dressing, etc )  - Assess/evaluate cause of self-care deficits   - Assess range of motion  - Assess patient's mobility; develop plan if impaired  - Assess patient's need for assistive devices and provide as appropriate  - Encourage maximum independence but intervene and supervise when necessary  - Involve family in performance of ADLs  - Assess for home care needs following discharge   - Consider OT consult to assist with ADL evaluation and planning for discharge  - Provide patient education as appropriate  Outcome: Progressing  Goal: Maintains/Returns to pre admission functional level  Description: INTERVENTIONS:  - Perform BMAT or MOVE assessment daily    - Set and communicate daily mobility goal to care team and patient/family/caregiver  - Collaborate with rehabilitation services on mobility goals if consulted  - Perform Range of Motion 3 times a day  - Reposition patient every 2 hours    - Dangle patient 3 times a day  - Stand patient 3 times a day  - Ambulate patient 3 times a day  - Out of bed to chair 3 times a day   - Out of bed for meals 3 times a day  - Out of bed for toileting  - Record patient progress and toleration of activity level   Outcome: Progressing     Problem: DISCHARGE PLANNING  Goal: Discharge to home or other facility with appropriate resources  Description: INTERVENTIONS:  - Identify barriers to discharge w/patient and caregiver  - Arrange for needed discharge resources and transportation as appropriate  - Identify discharge learning needs (meds, wound care, etc )  - Arrange for interpretive services to assist at discharge as needed  - Refer to Case Management Department for coordinating discharge planning if the patient needs post-hospital services based on physician/advanced practitioner order or complex needs related to functional status, cognitive ability, or social support system  Outcome: Progressing

## 2022-02-16 NOTE — PLAN OF CARE
Problem: Potential for Falls  Goal: Patient will remain free of falls  Description: INTERVENTIONS:  - Educate patient/family on patient safety including physical limitations  - Instruct patient to call for assistance with activity   - Consult OT/PT to assist with strengthening/mobility   - Keep Call bell within reach  - Keep bed low and locked with side rails adjusted as appropriate  - Keep care items and personal belongings within reach  - Initiate and maintain comfort rounds  - Make Fall Risk Sign visible to staff  - Offer Toileting every 2-4 Hours, in advance of need  - Initiate/Maintain bed/chair alarm  - Obtain necessary fall risk management equipment: alarms  - Apply yellow socks and bracelet for high fall risk patients  - Consider moving patient to room near nurses station  2/16/2022 1046 by Debbie Espinosa RN  Outcome: Progressing  2/16/2022 1046 by Debbie Espinosa RN  Outcome: Progressing     Problem: PAIN - ADULT  Goal: Verbalizes/displays adequate comfort level or baseline comfort level  Description: Interventions:  - Encourage patient to monitor pain and request assistance  - Assess pain using appropriate pain scale  - Administer analgesics based on type and severity of pain and evaluate response  - Implement non-pharmacological measures as appropriate and evaluate response  - Consider cultural and social influences on pain and pain management  - Notify physician/advanced practitioner if interventions unsuccessful or patient reports new pain  2/16/2022 1046 by Debbie Espinosa RN  Outcome: Progressing  2/16/2022 1046 by Debbie Espinosa RN  Outcome: Progressing     Problem: INFECTION - ADULT  Goal: Absence or prevention of progression during hospitalization  Description: INTERVENTIONS:  - Assess and monitor for signs and symptoms of infection  - Monitor lab/diagnostic results  - Monitor all insertion sites, i e  indwelling lines, tubes, and drains  - Monitor endotracheal if appropriate and nasal secretions for changes in amount and color  - [place] appropriate cooling/warming therapies per order  - Administer medications as ordered  - Instruct and encourage patient and family to use good hand hygiene technique  - Identify and instruct in appropriate isolation precautions for identified infection/condition  2/16/2022 1046 by Kee Mar RN  Outcome: Progressing  2/16/2022 1046 by Kee Mar RN  Outcome: Progressing     Problem: SAFETY ADULT  Goal: Maintain or return to baseline ADL function  Description: INTERVENTIONS:  -  Assess patient's ability to carry out ADLs; assess patient's baseline for ADL function and identify physical deficits which impact ability to perform ADLs (bathing, care of mouth/teeth, toileting, grooming, dressing, etc )  - Assess/evaluate cause of self-care deficits   - Assess range of motion  - Assess patient's mobility; develop plan if impaired  - Assess patient's need for assistive devices and provide as appropriate  - Encourage maximum independence but intervene and supervise when necessary  - Involve family in performance of ADLs  - Assess for home care needs following discharge   - Consider OT consult to assist with ADL evaluation and planning for discharge  - Provide patient education as appropriate  2/16/2022 1046 by Kee Mar RN  Outcome: Progressing  2/16/2022 1046 by Kee Mar RN  Outcome: Progressing  Goal: Maintains/Returns to pre admission functional level  Description: INTERVENTIONS:  - Perform BMAT or MOVE assessment daily    - Set and communicate daily mobility goal to care team and patient/family/caregiver  - Collaborate with rehabilitation services on mobility goals if consulted  - Perform Range of Motion 3 times a day  - Reposition patient every 2 hours    - Dangle patient 3 times a day  - Stand patient 3 times a day  - Ambulate patient 3 times a day  - Out of bed to chair 3 times a day   - Out of bed for meals 3 times a day  - Out of bed for toileting  - Record patient progress and toleration of activity level   2/16/2022 1046 by Evangelista Holguin RN  Outcome: Progressing  2/16/2022 1046 by Evangelista Holguin RN  Outcome: Progressing     Problem: DISCHARGE PLANNING  Goal: Discharge to home or other facility with appropriate resources  Description: INTERVENTIONS:  - Identify barriers to discharge w/patient and caregiver  - Arrange for needed discharge resources and transportation as appropriate  - Identify discharge learning needs (meds, wound care, etc )  - Arrange for interpretive services to assist at discharge as needed  - Refer to Case Management Department for coordinating discharge planning if the patient needs post-hospital services based on physician/advanced practitioner order or complex needs related to functional status, cognitive ability, or social support system  2/16/2022 1046 by Evangelista Holguin RN  Outcome: Progressing  2/16/2022 1046 by Evangelista Holguin RN  Outcome: Progressing     Problem: Prexisting or High Potential for Compromised Skin Integrity  Goal: Skin integrity is maintained or improved  Description: INTERVENTIONS:  - Identify patients at risk for skin breakdown  - Assess and monitor skin integrity  - Assess and monitor nutrition and hydration status  - Monitor labs   - Assess for incontinence   - Turn and reposition patient  - Assist with mobility/ambulation  - Relieve pressure over bony prominences  - Avoid friction and shearing  - Provide appropriate hygiene as needed including keeping skin clean and dry  - Evaluate need for skin moisturizer/barrier cream  - Collaborate with interdisciplinary team   - Patient/family teaching  - Consider wound care consult   2/16/2022 1046 by Evangelista Holguin RN  Outcome: Progressing  2/16/2022 1046 by Evangelista Holguin RN  Outcome: Progressing

## 2022-02-16 NOTE — ASSESSMENT & PLAN NOTE
Improved, stable  On chronic ativan 0 5mg BID   Provided additional supportive counseling  Counseled on and continue to encourage deep breathing/relaxation/behavioral management techniques:

## 2022-02-16 NOTE — ASSESSMENT & PLAN NOTE
IM consulted and with overall management at their discretion during ARC course  Rate control: labetolol  Antithrombotic: Eliquis

## 2022-02-16 NOTE — PROGRESS NOTES
OT LONG TERM GOALS  **GOALS WERE UPDATED FOR 7-10 DAY LOS**     02/16/22 0700   Rehab Team Goals   ADL Team Goal Patient will be independent with ADLs with least restrictive device upon completion of rehab program   Rehab Team Interventions   OT Interventions Self Care;Home Management; Therapeutic Exercise;Community Reintegration;Cognitive Retraining;Energy Conservation;Patient/Family Education   Eating Goal   Eating Goal 06  Independent - Patient completes the activity by him/herself with no assistance from a helper  Status Ongoing; Target goal - two weeks  (10-14 days)   Interventions Optimal Position   Grooming Goal   Oral Hygiene Goal 06  Independent - Patient completes the activity by him/herself with no assistance from a helper  Task [place] Teeth   Safety Precautions PWB   Status Ongoing; Target goal - two weeks  (10-14 days)   Intervention Assistive Device;Balance Work; Therapeutic Exercise; Tolerance Work   Tub/Shower Transfer Goal   Method Tub Shower  (indep)   Status Ongoing; Target goal - two weeks  (10-14 days)   Interventions ADL Training;Assistive Device   Bathing Goal   Shower/bathe self Goal 06  Independent - Patient completes the activity by him/herself with no assistance from a helper  Environment Seated   Safety Precautions PWB   Status Ongoing; Target goal - two weeks  (10-14 days)   Intervention ADL Training;Assistive Device; Therapeutic Exercise   Upper Body Dressing Goal   Upper body dressing Goal 06  Independent - Patient completes the activity by him/herself with no assistance from a helper  Task Upper Body   Environment Seated;Standing   Safety Precautions PWB   Status Ongoing; Target goal - two weeks  (10-14 days)   Intervention Assistive Device;Balance Work; Therapeutic Exercise; Tolerance Work   Lower Body Dressing Goal   Lower body dressing Goal 06  Independent - Patient completes the activity by him/herself with no assistance from a helper  Putting on/taking off footwear Goal 06  Independent - Patient completes the activity by him/herself with no assistance from a helper  Task Lower Body   Environment Seated;Standing   Safety Precautions PWB   Status Ongoing; Target goal - two weeks  (10-14 days)   Intervention Assistive Device;Balance Work; Therapeutic Exercise; Tolerance Work   Toileting Transfer Goal   Toilet transfer Goal 06  Independent - Patient completes the activity by him/herself with no assistance from a helper  Safety PWD   Status Ongoing; Target goal - two weeks  (10-14 days)   Intervention ADL Training;Balance Work;Assistive Device   Toileting Goal   Toileting hygiene Goal 06  Independent - Patient completes the activity by him/herself with no assistance from a helper  Task Pants Up;Pants Down;Hygiene   Safety Balance   Status Ongoing; Target goal - two weeks  (10-14 days)   Intervention ADL Training;Balance Work;Assistive Device   Meal Prep and Kitchen Mobility   Assist Level Independent   Status Ongoing; Target goal - two weeks  (10-14 days)   Medication Management   Assist Level Independent   Status Ongoing; Target goal - two weeks  (10-14 days)   Laundry   Assist Level Independent   Status Ongoing; Target goal - two weeks  (10-14 days)

## 2022-02-16 NOTE — PROGRESS NOTES
Physical Medicine and Rehabilitation Progress Note  Jake Median 78 y o  female MRN: [id number]  Unit/Bed#: [facility] 453-01 Encounter: [id number]      Assessment & Plan:     Functional assessment:        Admit    Recent  Total assist     Significant assist     Mod assist  To hygiene, bathing, LBD    Min assist   To hygiene    Supervision      Contact Guard     Mod Indep/Indep     Transfers Mod assist     Ambulation      Stairs  Total assist/NT      Goal: Mod indep for ADLs and ambulation except for possibly bathing, LBD  Major barriers:  Pain, WB precautions, lives alone  Dispo & ELOS: Home with ELOS 10 days from admission    * Fracture of right tibial plateau  Assessment & Plan  R medial tibial plateau fracture, complex medial meniscus tear, knee hemarthrosis, calf hematoma with significant decline in ADLs and ambulation   · Per discussion with Dr Vickie Helms 2/14, ortho- PWB 30-50%               - Not required to wear brace but consider if helpful during rehab               - PROM ok but no additional knee specific rehab expected for approx 8 weeks  · Tolerated PWB today   · consider trial of hinged knee brace or other brace with rehab - did adequate without on 2/16  · Optimal pain control  · Fall precautions   Monitor for signs/symptoms of VTE, atelectasis, acute blood loss anemia, or other infection    Continue acute comprehensive interdisciplinary inpatient rehabilitation to include intensive skilled therapies (PT, OT) as outlined with oversight and management by rehabilitation physician as well as inpatient rehab level nursing, case management and weekly interdisciplinary team meetings   Follow-up with Ortho Sx after d/c and ortho if needed during ARC course     - 1/16 - ED Visit R knee swelling started 4 days ago denies any trauma - XR No acute osseous abnormality   Joint effusion;  - 1/19 - OP ortho- Hemarthrosis of R knee - R knee hemarthrosis likely 2/2 spontaneous bleeding in setting of Eliquis v occult trauma, pt denies trauma, R knee med arthritis; 45 cc blood aspirate;  XR There is no acute fracture or dislocation  There is no joint effusion  No significant degenerative changes  Soft tissues are unremarkable  IMPRESSION: No acute osseous abnormality  -  Ortho c/s - Dr Tammy Ge - A 43-year-old female with history of multiple falls as well as right knee pain and recurrent effusions  Presents back to the hospital today due to worsening pain and continued swelling  She was sent for an MRI to rule out an occult fracture  On review of imaging this morning she is found to have a nondisplaced impaction fracture of the medial tibial plateau  On my exam patient has a mild effusion she is tender medial tibial plateau  The knee is stable to varus valgus and anterior-posterior drawer testing  Plan is going to be toe-touch weight-bearing on the right lower extremity with the assistance of a walker  She is going to work with PT and Case Management regarding placement  Patient will require follow-up to confirm improvement in her symptoms 2 weeks following discharge     -  spoke with ortho Estella Anderson (resident) to confirm WB status, query need for brace, and ROM as order still said NWB; Per discussion with Dr Estella Anderson PWB 30-50%; brace not required but could consider if helpful; PROM immediately; PWB order (without % parameters entered by Dr Estella Anderson)        Pain  Assessment & Plan  Acceptable control   APAP TID  Oxy 2 5mg Q4H PRN (prefers not to take but willing to keep as back up) - she reports only some N/V from morphine in past likely SE but no opiate allergy   LD patch  PRN Voltaren gel   Counseled on and continue to encourage deep breathing/relaxation/behavioral pain management techniques:     Deep breathin seconds in, 5 seconds out, 5 times per hour when awake and PRN when in pain or anticipate pain; avoid holding breath and tightening muscles and instead breathe slowly and deeply  Monitor for oversedation, AMS/delirium, and respiratory depression   If being administered - hold opiates, muscle relaxants, benzodiazepines, and gabapentin for oversedation, AMS, or RR<12  Anxiety  Assessment & Plan  Adequate control   On chronic ativan 0 5mg BID - care with concurrent benzo  Supportive counseling  Consider Psychology consult while in [place] on and continue to encourage deep breathing/relaxation/behavioral management techniques:     Deep breathin seconds in, 5 seconds out, 5 times per hour when awake and PRN when experiencing pain or anxiety    Avoid holding breath and tightening muscles and instead breathe slowly and deeply        Atrial fibrillation   Assessment & Plan  IM consulted and with overall management at their discretion during ARC course  Rate control: labetolol  Antithrombotic: Eliquis       Macular degeneration  Assessment & Plan  OP optho follow-up   Home Preservision and Theretears    At risk for venous thromboembolism (VTE)  Assessment & Plan  SCDs, ambulation, and Eliquis       Chronic hyponatremia  Assessment & Plan  Mild, monitor intermittently     CKD (chronic kidney disease) stage 3, GFR 30-59 ml/min ([facility])  Assessment & Plan  Monitor BUN/Cr intermittently as well as electrolytes   IM consulted to manage  Limit nephrotoxic agents when possible      Hypothyroidism  Assessment & Plan  Levothyroxine    Essential hypertension  Assessment & Plan  Somewhat elevated  Internal medicine consulted and management at their discretion  Monitor for signs and symptoms of hypoglycemia   Current meds: clonidine, chlorthalidone, amlodipine, lisinopril, labetalol   >Clonidine dose increased  by IM       Diabetes mellitus type 2  Assessment & Plan  Lab Results   Component Value Date    HGBA1C 6 1 (H) 10/10/2020     Metformin d/c'd previously  Internal medicine consulted and management at their discretion  Monitor for signs and symptoms of hypoglycemia   Current meds: Lispro AC/HS  DM2 diet Other Medical Issues:   None    Follow-up providers and other issues to be followed up after discharge  · PCP  · Ortho  · Cards    CODE: Level 1: Full Code    Restrictions include: Fall precautions    Objective:     Allergies per EMR  Diagnostic Studies: Reviewed, no new imaging  No orders to display     See above as well    Laboratory: Labs reviewed  Results from last 7 days   Lab Units 02/15/22  0656 02/12/22  1139   HEMOGLOBIN g/dL 11 5 11 3*   HEMATOCRIT % 36 1 35 2   WBC Thousand/uL 6 66 5 98     Results from last 7 days   Lab Units 02/15/22  0656 02/12/22  1139   BUN mg/dL 17 16   SODIUM mmol/L 133* 137   POTASSIUM mmol/L 3 8 3 8   CHLORIDE mmol/L 98* 102   CREATININE mg/dL 0 96 0 90   AST U/L 21  --    ALT U/L 26  --      Results from last 7 days   Lab Units 02/12/22  1139   PROTIME seconds 15 8*   INR  1 32*        Drug regimen reviewed, all potential adverse effects identified and addressed:    Scheduled Meds:  Current Facility-Administered Medications   Medication Dose Route Frequency Provider Last Rate    acetaminophen  975 mg Oral [facility] Hali Duarte MD      amLODIPine  10 mg Oral Daily Hali Duarte MD      apixaban  5 mg Oral BID Hali Duarte MD      ascorbic acid  500 mg Oral Daily Hali Duarte MD      atorvastatin  40 mg Oral After Alessandro Rock MD      bisacodyl  10 mg Rectal Daily PRN Hali Duarte MD      Carboxymethylcellulose Sodium  1 drop Ophthalmic BID Hali Duarte MD      chlorthalidone  25 mg Oral Daily Hali Duarte MD      cholecalciferol  1,000 Units Oral Daily Hali Duarte MD      cloNIDine  0 1 mg Oral Q12H [facility] Hali Duarte MD      Diclofenac Sodium  2 g Topical TID PRN Hali Duarte MD      docusate sodium  100 mg Oral BID Hali Duarte MD      insulin lispro  1-5 Units Subcutaneous HS Hali Duarte MD      insulin lispro  1-5 Units Subcutaneous TID Aretha Ace MD      labetalol  200 mg Oral Q12H [facility] MD Mitchell Cohn levothyroxine  125 mcg Oral Early Morning César Sargent MD      lidocaine  1 patch Topical Daily César Sargent MD      lisinopril  20 mg Oral BID César Sargent MD      LORazepam  0 5 mg Oral BID César Sargent MD      ondansetron  4 mg Oral Q8H PRN César Sargent MD      oxyCODONE  2 5 mg Oral Q4H PRN César Sargent MD      polyethylene glycol  17 g Oral Daily PRN César Sargent MD      PreserVision AREDS 2  1 capsule Oral BID César Sargent MD      senna  1 tablet Oral BID César Sargent MD         Chief Complaints:  R knee pain      Subjective:     Patient reports R knee not too bad at rest but still fair amount with activity but she feels like she did well with OT today  She denies lightheadedness, SOB, calf pain, fever, chills, constipation, or other new complaints  ROS: A 10 point ROS was performed; negative except as noted above  I spoke with daughter and reviewed hx and mgmt plan      Physical Exam:  Temp:  [97 9 °F (36 6 °C)-99 °F (37 2 °C)] 98 °F (36 7 °C)  HR:  [59-71] 59  Resp:  [17-18] 18  BP: (150-173)/(69-83) 150/69  SpO2:  [94 %-97 %] 94 %    GEN:  Lying in bed in NAD   HEENT/NECK: Normocephalic, atraumatic, moist mucous membranes   CARDIAC: Regular rate rhythm, no murmers, no rubs, no gallops  LUNGS:  clear to auscultation, no wheezes, rales, or rhonchi  ABDOMEN: Soft, non-tender, non-distended, normal active bowel sounds  EXTREMITIES/SKIN:  R knee with subtle edema, not particularly warm and without erythema; L knee chronic hypertrophic changes; TKA scar on L; no significant calf edema or TTP  NEURO:   MENTAL STATUS: awake, oriented to person, place, time, and situation and MENTAL STATUS:  Appropriate wakefulness and interaction   PSYCH:  Affect:  Euthymic     HPI:  78year old F with PMH of Afib on anticoagulation with Eliquis, HTN, DM2, hyperlipidemia, hypothyroidism who developed progressive R knee pain and significant ambulatory dysfunction who had outpatient work-up which showed R knee effusion and R knee OA on X-ray  She underwent knee aspiration on 1/19 with bloody fluid   Pain continued to progress and she could no longer safely ambulate, wt bear, or perform ADLs and presented to hospital where MRI R knee performed and showed R medial tibial plateau fracture, complex medial meniscus tear, knee hemarthrosis, calf hematoma   Orthopedics consulted and initially recommended NWB and now per discussion Dr Katya Mina patient can be PWB 30-50%  Cloverrimariel Mccray is not required to wear brace but can be considered if helpful   She can start passive ROM now but no significant R knee PT expected until 8 weeks  Patient was evaluated by skilled therapies and was found to have significant decline in ADLs and ambulation        ** Please Note: Fluency Direct voice to text software may have been used in the creation of this document  **    I personally performed the required components and examined the patient myself in person on 2/16/22

## 2022-02-17 LAB
ANION GAP SERPL CALCULATED.3IONS-SCNC: 5 MMOL/L (ref 4–13)
BASOPHILS # BLD AUTO: 0.02 THOUSANDS/ΜL (ref 0–0.1)
BASOPHILS NFR BLD AUTO: 0 % (ref 0–1)
BUN SERPL-MCNC: 25 MG/DL (ref 5–25)
CALCIUM SERPL-MCNC: 8.8 MG/DL (ref 8.3–10.1)
CHLORIDE SERPL-SCNC: 98 MMOL/L (ref 100–108)
CO2 SERPL-SCNC: 30 MMOL/L (ref 21–32)
CREAT SERPL-MCNC: 0.97 MG/DL (ref 0.6–1.3)
EOSINOPHIL # BLD AUTO: 0.2 THOUSAND/ΜL (ref 0–0.61)
EOSINOPHIL NFR BLD AUTO: 4 % (ref 0–6)
ERYTHROCYTE [DISTWIDTH] IN BLOOD BY AUTOMATED COUNT: 16 % (ref 11.6–15.1)
GFR SERPL CREATININE-BSD FRML MDRD: 54 ML/MIN/1.73SQ M
GLUCOSE SERPL-MCNC: 116 MG/DL (ref 65–140)
GLUCOSE SERPL-MCNC: 134 MG/DL (ref 65–140)
GLUCOSE SERPL-MCNC: 153 MG/DL (ref 65–140)
GLUCOSE SERPL-MCNC: 209 MG/DL (ref 65–140)
GLUCOSE SERPL-MCNC: 247 MG/DL (ref 65–140)
HCT VFR BLD AUTO: 32.2 % (ref 34.8–46.1)
HGB BLD-MCNC: 10.3 G/DL (ref 11.5–15.4)
IMM GRANULOCYTES # BLD AUTO: 0.01 THOUSAND/UL (ref 0–0.2)
IMM GRANULOCYTES NFR BLD AUTO: 0 % (ref 0–2)
LYMPHOCYTES # BLD AUTO: 1.13 THOUSANDS/ΜL (ref 0.6–4.47)
LYMPHOCYTES NFR BLD AUTO: 23 % (ref 14–44)
MCH RBC QN AUTO: 30.2 PG (ref 26.8–34.3)
MCHC RBC AUTO-ENTMCNC: 32 G/DL (ref 31.4–37.4)
MCV RBC AUTO: 94 FL (ref 82–98)
MONOCYTES # BLD AUTO: 0.69 THOUSAND/ΜL (ref 0.17–1.22)
MONOCYTES NFR BLD AUTO: 14 % (ref 4–12)
NEUTROPHILS # BLD AUTO: 2.79 THOUSANDS/ΜL (ref 1.85–7.62)
NEUTS SEG NFR BLD AUTO: 59 % (ref 43–75)
NRBC BLD AUTO-RTO: 0 /100 WBCS
PLATELET # BLD AUTO: 246 THOUSANDS/UL (ref 149–390)
PMV BLD AUTO: 10.7 FL (ref 8.9–12.7)
POTASSIUM SERPL-SCNC: 3.8 MMOL/L (ref 3.5–5.3)
RBC # BLD AUTO: 3.41 MILLION/UL (ref 3.81–5.12)
SODIUM SERPL-SCNC: 133 MMOL/L (ref 136–145)
WBC # BLD AUTO: 4.84 THOUSAND/UL (ref 4.31–10.16)

## 2022-02-17 PROCEDURE — 99233 SBSQ HOSP IP/OBS HIGH 50: CPT | Performed by: PHYSICAL MEDICINE & REHABILITATION

## 2022-02-17 PROCEDURE — 85025 COMPLETE CBC W/AUTO DIFF WBC: CPT | Performed by: NURSE PRACTITIONER

## 2022-02-17 PROCEDURE — 97116 GAIT TRAINING THERAPY: CPT

## 2022-02-17 PROCEDURE — 97530 THERAPEUTIC ACTIVITIES: CPT

## 2022-02-17 PROCEDURE — 97110 THERAPEUTIC EXERCISES: CPT

## 2022-02-17 PROCEDURE — 99232 SBSQ HOSP IP/OBS MODERATE 35: CPT | Performed by: INTERNAL MEDICINE

## 2022-02-17 PROCEDURE — 82948 REAGENT STRIP/BLOOD GLUCOSE: CPT

## 2022-02-17 PROCEDURE — 97535 SELF CARE MNGMENT TRAINING: CPT

## 2022-02-17 PROCEDURE — 80048 BASIC METABOLIC PNL TOTAL CA: CPT | Performed by: NURSE PRACTITIONER

## 2022-02-17 RX ADMIN — INSULIN LISPRO 1 UNITS: 100 INJECTION, SOLUTION INTRAVENOUS; SUBCUTANEOUS at 21:54

## 2022-02-17 RX ADMIN — LIDOCAINE 5% 1 PATCH: 700 PATCH TOPICAL at 10:00

## 2022-02-17 RX ADMIN — INSULIN LISPRO 2 UNITS: 100 INJECTION, SOLUTION INTRAVENOUS; SUBCUTANEOUS at 11:26

## 2022-02-17 RX ADMIN — LISINOPRIL 20 MG: 20 TABLET ORAL at 10:00

## 2022-02-17 RX ADMIN — BISACODYL 10 MG: 10 SUPPOSITORY RECTAL at 18:27

## 2022-02-17 RX ADMIN — APIXABAN 5 MG: 5 TABLET, FILM COATED ORAL at 10:00

## 2022-02-17 RX ADMIN — ACETAMINOPHEN 975 MG: 325 TABLET, FILM COATED ORAL at 21:50

## 2022-02-17 RX ADMIN — POLYETHYLENE GLYCOL 3350 17 G: 17 POWDER, FOR SOLUTION ORAL at 10:01

## 2022-02-17 RX ADMIN — STANDARDIZED SENNA CONCENTRATE 8.6 MG: 8.6 TABLET ORAL at 18:22

## 2022-02-17 RX ADMIN — APIXABAN 5 MG: 5 TABLET, FILM COATED ORAL at 18:22

## 2022-02-17 RX ADMIN — CARBOXYMETHYLCELLULOSE SODIUM 1 DROP: 2.5 SOLUTION/ DROPS OPHTHALMIC at 21:53

## 2022-02-17 RX ADMIN — ATORVASTATIN CALCIUM 40 MG: 40 TABLET, FILM COATED ORAL at 18:22

## 2022-02-17 RX ADMIN — Medication 1 CAPSULE: at 10:02

## 2022-02-17 RX ADMIN — LABETALOL HYDROCHLORIDE 200 MG: 200 TABLET, FILM COATED ORAL at 21:51

## 2022-02-17 RX ADMIN — CARBOXYMETHYLCELLULOSE SODIUM 1 DROP: 2.5 SOLUTION/ DROPS OPHTHALMIC at 10:01

## 2022-02-17 RX ADMIN — CLONIDINE HYDROCHLORIDE 0.1 MG: 0.1 TABLET ORAL at 21:51

## 2022-02-17 RX ADMIN — LEVOTHYROXINE SODIUM 125 MCG: 125 TABLET ORAL at 05:58

## 2022-02-17 RX ADMIN — ACETAMINOPHEN 975 MG: 325 TABLET, FILM COATED ORAL at 05:58

## 2022-02-17 RX ADMIN — LABETALOL HYDROCHLORIDE 200 MG: 200 TABLET, FILM COATED ORAL at 10:00

## 2022-02-17 RX ADMIN — LORAZEPAM 0.5 MG: 0.5 TABLET ORAL at 10:00

## 2022-02-17 RX ADMIN — AMLODIPINE BESYLATE 10 MG: 10 TABLET ORAL at 09:59

## 2022-02-17 RX ADMIN — CHLORTHALIDONE 25 MG: 25 TABLET ORAL at 10:02

## 2022-02-17 RX ADMIN — INSULIN LISPRO 1 UNITS: 100 INJECTION, SOLUTION INTRAVENOUS; SUBCUTANEOUS at 09:13

## 2022-02-17 RX ADMIN — DOCUSATE SODIUM 100 MG: 100 CAPSULE ORAL at 10:00

## 2022-02-17 RX ADMIN — STANDARDIZED SENNA CONCENTRATE 8.6 MG: 8.6 TABLET ORAL at 10:00

## 2022-02-17 RX ADMIN — Medication 1000 UNITS: at 09:59

## 2022-02-17 RX ADMIN — LISINOPRIL 20 MG: 20 TABLET ORAL at 21:51

## 2022-02-17 RX ADMIN — CLONIDINE HYDROCHLORIDE 0.1 MG: 0.1 TABLET ORAL at 10:00

## 2022-02-17 RX ADMIN — OXYCODONE HYDROCHLORIDE AND ACETAMINOPHEN 500 MG: 500 TABLET ORAL at 09:59

## 2022-02-17 RX ADMIN — DOCUSATE SODIUM 100 MG: 100 CAPSULE ORAL at 18:22

## 2022-02-17 RX ADMIN — LORAZEPAM 0.5 MG: 0.5 TABLET ORAL at 18:22

## 2022-02-17 RX ADMIN — ACETAMINOPHEN 975 MG: 325 TABLET, FILM COATED ORAL at 14:11

## 2022-02-17 NOTE — QUICK NOTE
Attempted to call patient's son Jose Menezes to answer his questions - LMOM  Will call again tomorrow morning      Kojo Olsen MD  PM&R

## 2022-02-17 NOTE — PROGRESS NOTES
02/17/22 1030   Pain Assessment   Pain Assessment Tool 0-10   Pain Score 7  (6-7/10 )   Pain Location/Orientation Orientation: Right;Location: Knee   Pain Onset/Description Onset: Ongoing;Frequency: Constant/Continuous   Hospital Pain Intervention(s) Medication (See MAR); Repositioned; Rest   Restrictions/Precautions   RUE Weight Bearing Per Order WBAT   LUE Weight Bearing Per Order WBAT   RLE Weight Bearing Per Order PWB   LLE Weight Bearing Per Order WBAT   ROM Restrictions Yes   RLE ROM Restriction Range Limitation  ( no resisted exercise on R knee )   Cognition   Overall Cognitive Status [facility]   Arousal/Participation Alert; Cooperative   Attention Within functional limits   Orientation Level Oriented X4   Memory Decreased recall of precautions   Following Commands Follows one step commands with increased time or repetition   Subjective   Subjective pt reported pain as above but slept better last night  Sit to Stand   Type of Assistance Needed Incidental touching;Verbal cues; Adaptive equipment   Sit to Stand CARE Score 4   Bed-Chair Transfer   Type of Assistance Needed Incidental touching;Verbal cues; Adaptive equipment   Comment RW   Chair/Bed-to-Chair Transfer CARE Score 4   Walk 10 Feet   Type of Assistance Needed Incidental touching;Verbal cues; Adaptive equipment   Comment CG with RW   Walk 10 Feet CARE Score 4   Walk 50 Feet with Two Turns   Type of Assistance Needed Physical assistance;Verbal cues; Adaptive equipment   Physical Assistance Level 25% or less   Comment CG-min with RW x 50' x 2    Walk 50 Feet with Two Turns CARE Score 3   Equipment Use   NuStep L1 x 10 mins using bilat UE and L LE only, R LE resting on foot stool , SPM> 40 , GA range 68-72   Assessment   Treatment Assessment Skilled PT focused on household distance amb with emphasis on maintaining PWB while observing safe R foot clearance at swing phase using a RW   Pt also tolerated the nu step for strengthening on bilat UE and L LE only but will need to work improving speed for cardiovascular challenge  PT will cont to work on strengthening, balance and improving functional indep using RW for mobilities  Still awaiting MD clarification regarding ROM order for R LE  Problem List Decreased strength;Decreased range of motion;Decreased endurance; Impaired balance;Decreased mobility;Pain;Orthopedic restrictions   Barriers to Discharge Inaccessible home environment;Decreased caregiver support   Plan   Treatment/Interventions Functional transfer training;LE strengthening/ROM; Therapeutic exercise; Endurance training;Gait training;Spoke to nursing;OT;Spoke to case management;Spoke to MD;Spoke to advanced practitioner   Progress Progressing toward goals   Recommendation   PT Discharge Recommendation Home with outpatient rehabilitation   PT Therapy Minutes   PT Time In 1030   PT Time Out 1100   PT Total Time (minutes) 30   PT Mode of treatment - Individual (minutes) 30   PT Mode of treatment - Concurrent (minutes) 0   PT Mode of treatment - Group (minutes) 0   PT Mode of treatment - Co-treat (minutes) 0   PT Mode of Treatment - Total time(minutes) 30 minutes   PT Cumulative Minutes 120

## 2022-02-17 NOTE — PROGRESS NOTES
PM&R Coverage Progress Note:    Rehabilitation Diagnosis: Right tibial plateau fracture and suspected meniscus tear    ASSESSMENT: Stable      PLAN:    Rehabilitation   Continue current rehabilitation plan of care to maximize function   Goals to return home:  Modified independent level with mobility and self-care  Patient does live alone however has a daughter and a granddaughter in the area who will be assisting her partially   Confirmed the following with orthopedics:  Patient is partial weight-bearing right lower extremity (PWB RLE 30-50%) and can have passive range of motion performed in physical therapy as tolerated  Other than her usual movements of right leg, patient is not to perform any additional active or active assist range of motion in therapies  Medical issues     Acute pain:  Pain is located on medial right knee  Managed currently with topical Lidoderm patch, Tylenol, p r n  Oxycodone, p r n  Voltaren gel     Diabetes mellitus type 2:  Elevated blood glucose likely related to recent stress  Patient restarted on metformin 500 mg daily to start 2/18/22 per IM consultants   Hypertension:  Elevated at times however not a barrier to her participation  IM consultants managing  Continue amlodipine, lisinopril, chlorthalidone, labetalol  Patient had an increased dose of her clonidine from 0 1 mg daily to 0 2 mg daily  Continue monitoring BP     Paroxysmal atrial fibrillation:  Currently rate controlled on labetalol  Anticoagulated with Eliquis     Anxiety:  Currently stable and not a barrier to rehabilitation  Continue Ativan 0 5 mg b i d      CKD 3:  Stable     Hypothyroidism:  Managed on Synthroid     Hyponatremia:  Chronic  Mild  Na = 133  Asymptomatic     Continue current medical plan of care  Appreciate IM consultants co-management  SUBJECTIVE: Patient seen face to face  Reports minimal pain currently    States she is concerned about her blood glucose being elevated and is willing to restart her metformin  + BM  I did attempt to call her son who had some questions today however was unable to reach him and left a message on machine  ROS:  A ten point review of systems was completed on 02/17/22 and pertinent positives are listed in subjective section  All other systems reviewed were negative  OBJECTIVE:   /94 (BP Location: Left arm)   Pulse 59   Temp 97 7 °F (36 5 °C) (Oral)   Resp 18   Ht 5' 6" (1 676 m)   Wt 59 2 kg (130 lb 9 6 oz)   LMP 01/12/1980 (Approximate)   SpO2 95%   BMI 21 08 kg/m²     Physical Exam  Vitals and nursing note reviewed  Constitutional:       General: She is not in acute distress  HENT:      Head: Normocephalic and atraumatic  Nose: Nose normal       Mouth/Throat:      Mouth: Mucous membranes are moist    Eyes:      Conjunctiva/sclera: Conjunctivae normal    Cardiovascular:      Rate and Rhythm: Normal rate and regular rhythm  Pulses: Normal pulses  Pulmonary:      Effort: Pulmonary effort is normal       Breath sounds: Normal breath sounds  No wheezing or rales  Abdominal:      General: Bowel sounds are normal  There is no distension  Palpations: Abdomen is soft  Tenderness: There is no abdominal tenderness  Musculoskeletal:      Cervical back: Neck supple  Comments: Right knee with limited range of motion and mild swelling   Skin:     General: Skin is warm  Neurological:      Mental Status: She is alert and oriented to person, place, and time  Sensory: No sensory deficit        Comments: Right LE:  Hip flexor graded as a 3-/5, knee extension knee flexion 3-/5, ankle dorsiflexion, plantar flexion, EHL is 4+/5   Psychiatric:         Mood and Affect: Mood normal           Lab Results   Component Value Date    WBC 4 84 02/17/2022    HGB 10 3 (L) 02/17/2022    HCT 32 2 (L) 02/17/2022    MCV 94 02/17/2022     02/17/2022     Lab Results   Component Value Date    SODIUM 133 (L) 02/17/2022    K 3 8 02/17/2022    CL 98 (L) 02/17/2022    CO2 30 02/17/2022    BUN 25 02/17/2022    CREATININE 0 97 02/17/2022    GLUC 134 02/17/2022    CALCIUM 8 8 02/17/2022     Lab Results   Component Value Date    INR 1 32 (H) 02/12/2022    INR 1 41 (H) 06/03/2019    INR 0 96 07/12/2016    PROTIME 15 8 (H) 02/12/2022    PROTIME 17 4 (H) 06/03/2019    PROTIME 12 9 07/12/2016           Current Facility-Administered Medications:     acetaminophen (TYLENOL) tablet 975 mg, 975 mg, Oral, Q8H [address], Payton Spence MD, 975 mg at 02/17/22 0558    amLODIPine (NORVASC) tablet 10 mg, 10 mg, Oral, Daily, Payton Spence MD, 10 mg at 02/16/22 1719    apixaban (ELIQUIS) tablet 5 mg, 5 mg, Oral, BID, Payton Spence MD, 5 mg at 02/16/22 1803    ascorbic acid (VITAMIN C) tablet 500 mg, 500 mg, Oral, Daily, Payton Spence MD, 500 mg at 02/16/22 3324    atorvastatin (LIPITOR) tablet 40 mg, 40 mg, Oral, After Melinda Jo MD, 40 mg at 02/16/22 1803    bisacodyl (DULCOLAX) rectal suppository 10 mg, 10 mg, Rectal, Daily PRN, Payton Spence MD    Carboxymethylcellulose Sodium 0 25 % SOLN 1 drop, 1 drop, Ophthalmic, BID, Payton Spence MD, 1 drop at 02/16/22 2102    chlorthalidone tablet 25 mg, 25 mg, Oral, Daily, Payton Spence MD, 25 mg at 02/16/22 3687    cholecalciferol (VITAMIN D3) tablet 1,000 Units, 1,000 Units, Oral, Daily, Payton Spence MD, 1,000 Units at 02/16/22 4461    cloNIDine (CATAPRES) tablet 0 1 mg, 0 1 mg, Oral, Q12H [address], Payton Spence MD, 0 1 mg at 02/16/22 2101    Diclofenac Sodium (VOLTAREN) 1 % topical gel 2 g, 2 g, Topical, TID PRN, Payton Spence MD, 2 g at 02/16/22 2102    docusate sodium (COLACE) capsule 100 mg, 100 mg, Oral, BID, Payton Spence MD, 100 mg at 02/16/22 1803    insulin lispro (HumaLOG) 100 units/mL subcutaneous injection 1-5 Units, 1-5 Units, Subcutaneous, HS, Payton Spence MD, 1 Units at 02/16/22 2101    insulin lispro (HumaLOG) 100 units/mL subcutaneous injection 1-5 Units, 1-5 Units, Subcutaneous, TID AC, 2 Units at 02/16/22 1600 **AND** Fingerstick Glucose (POCT), , , TID AC, Zia Conde MD    labetalol (NORMODYNE) tablet 200 mg, 200 mg, Oral, Q12H [address], Zia Conde MD, 200 mg at 02/16/22 2101    levothyroxine tablet 125 mcg, 125 mcg, Oral, Early Morning, Zia Conde MD, 125 mcg at 02/17/22 0558    lidocaine (LIDODERM) 5 % patch 1 patch, 1 patch, Topical, Daily, Zia Conde MD, 1 patch at 02/16/22 3850    lisinopril (ZESTRIL) tablet 20 mg, 20 mg, Oral, BID, Zia Conde MD, 20 mg at 02/16/22 2101    LORazepam (ATIVAN) tablet 0 5 mg, 0 5 mg, Oral, BID, Zia Conde MD, 0 5 mg at 02/16/22 1803    ondansetron (ZOFRAN-ODT) dispersible tablet 4 mg, 4 mg, Oral, Q8H PRN, Zia Conde MD    oxyCODONE (ROXICODONE) IR tablet 2 5 mg, 2 5 mg, Oral, Q4H PRN, Zia Conde MD    polyethylene glycol [facility]) packet 17 g, 17 g, Oral, Daily PRN, Zia Conde MD, 17 g at 02/15/22 1811    PreserVision AREDS 2 CAPS 1 capsule, 1 capsule, Oral, BID, Zia Conde MD, 1 capsule at 02/16/22 2102    senna (SENOKOT) tablet 8 6 mg, 1 tablet, Oral, BID, Zia Conde MD, 8 6 mg at 02/16/22 1803    Past Medical History:   Diagnosis Date    Anxiety     [facility]rial fibrillation [facility] [address] [facility]     Bowel obstruction ([facility] [address] [facility]     Cancer ([facility] [address] )     LEFT BREAST CA 22 YEARS AGO     Depression     Diabetes mellitus ([facility] [address] )     Disease of thyroid gland     Hypertension     Hypothyroidism        Patient Active Problem List    Diagnosis Date Noted    Pain 02/15/2022    At risk for venous thromboembolism (VTE) 02/15/2022    Macular degeneration 02/15/2022    Fracture of right tibial plateau 68/69/6055    Renal vascular disease 01/24/2020    Primary osteoarthritis of right knee 11/20/2019    Synovial cyst of right popliteal space 11/20/2019    Hemarthrosis of right knee 09/18/2019    History of breast cancer 09/18/2019    Moderate protein-calorie malnutrition  09/18/2019    Asymptomatic bilateral carotid artery stenosis 08/15/2019    High blood pressure     Hypertensive urgency 07/20/2019    Hypertension     Hypo-osmolality and hyponatremia 06/26/2019    Fall 06/04/2019    Chronic hyponatremia 06/04/2019    Syncope vs presyncope 06/03/2019    Paroxysmal [facility]-fib ([facility]) 06/03/2019    Diabetes ([facility] ) 06/03/2019    Weight loss 06/03/2019    CKD (chronic kidney disease) stage 3, GFR 30-59 ml/min ([facility]) 06/03/2019    Iron deficiency anemia due to chronic blood loss 12/07/2018    Incisional hernia, without obstruction or gangrene 07/10/2018    Postop check 03/02/2018    Delirium 02/05/2018    Physical deconditioning 02/05/2018    Ambulatory dysfunction 02/05/2018    Hx of fall 02/05/2018    Anxiety 02/05/2018    Acute kidney injury ([place] [address] ) 02/02/2018    Hordeolum externum of right upper eyelid 03/08/2017    Malignant neoplasm of right breast, stage 1, estrogen receptor positive ([place] [address] ) 02/08/2017    Malignant neoplasm of central portion of right female breast ([place] [address] ) 01/13/2017    Tobacco abuse 01/11/2017    Heart palpitations 07/11/2016    Nicotine abuse 07/11/2016    Atrial fibrillation  07/11/2016    Diabetes mellitus type 2 07/11/2016    Essential hypertension 07/11/2016    Hypothyroidism 07/11/2016    Nontraumatic compression fracture of T4 vertebra ([facility] ) 10/06/2015    Low back pain without sciatica 09/17/2015    Gastroesophageal reflux disease without esophagitis 02/23/2015    Depression 02/23/2015    History of CEA (carotid endarterectomy) 02/23/2015    Hyperlipidemia associated with type 2 diabetes mellitus ([facility] ) 02/23/2015    Hypothyroidism due to acquired atrophy of thyroid 02/23/2015    Mitral insufficiency and aortic stenosis 02/23/2015    Vitamin D deficiency 02/23/2015    Varicose vein of leg 02/23/2015    Non-seasonal allergic rhinitis due to pollen 02/23/2015          Virginia Cruz MD  PM&R    Total time spent:  30 minutes with more than 50% spent counseling/coordinating care  Counseling includes discussion with patient re: progress and discussion with patient of his/her current medical/functional state/information  Coordination of patient's care was performed in conjunction with consulting services  Time invested included review of patient's labs, vitals, and management of their comorbidities with continued monitoring  The care of the patient was extensively discussed and appropriate treatment plan was formulated unique for this patient  ** Please Note:  voice to text software may have been used in the creation of this document   Although proof errors in transcription or interpretation are a potential of such software**

## 2022-02-17 NOTE — PLAN OF CARE
Reviewed    Problem: Potential for Falls  Goal: Patient will remain free of falls  Description: INTERVENTIONS:  - Educate patient/family on patient safety including physical limitations  - Instruct patient to call for assistance with activity   - Consult OT/PT to assist with strengthening/mobility   - Keep Call bell within reach  - Keep bed low and locked with side rails adjusted as appropriate  - Keep care items and personal belongings within reach  - Initiate and maintain comfort rounds  - Make Fall Risk Sign visible to staff  - Offer Toileting every 2-4 Hours, in advance of need  - Initiate/Maintain bed/chair alarm  - Obtain necessary fall risk management equipment: alarms  - Apply yellow socks and bracelet for high fall risk patients  - Consider moving patient to room near nurses station  Outcome: Progressing     Problem: PAIN - ADULT  Goal: Verbalizes/displays adequate comfort level or baseline comfort level  Description: Interventions:  - Encourage patient to monitor pain and request assistance  - Assess pain using appropriate pain scale  - Administer analgesics based on type and severity of pain and evaluate response  - Implement non-pharmacological measures as appropriate and evaluate response  - Consider cultural and social influences on pain and pain management  - Notify physician/advanced practitioner if interventions unsuccessful or patient reports new pain  Outcome: Progressing     Problem: INFECTION - ADULT  Goal: Absence or prevention of progression during hospitalization  Description: INTERVENTIONS:  - Assess and monitor for signs and symptoms of infection  - Monitor lab/diagnostic results  - Monitor all insertion sites, i e  indwelling lines, tubes, and drains  - Monitor endotracheal if appropriate and nasal secretions for changes in amount and color  - [place] appropriate cooling/warming therapies per order  - Administer medications as ordered  - Instruct and encourage patient and family to use good hand hygiene technique  - Identify and instruct in appropriate isolation precautions for identified infection/condition  Outcome: Progressing     Problem: SAFETY ADULT  Goal: Maintain or return to baseline ADL function  Description: INTERVENTIONS:  -  Assess patient's ability to carry out ADLs; assess patient's baseline for ADL function and identify physical deficits which impact ability to perform ADLs (bathing, care of mouth/teeth, toileting, grooming, dressing, etc )  - Assess/evaluate cause of self-care deficits   - Assess range of motion  - Assess patient's mobility; develop plan if impaired  - Assess patient's need for assistive devices and provide as appropriate  - Encourage maximum independence but intervene and supervise when necessary  - Involve family in performance of ADLs  - Assess for home care needs following discharge   - Consider OT consult to assist with ADL evaluation and planning for discharge  - Provide patient education as appropriate  Outcome: Progressing  Goal: Maintains/Returns to pre admission functional level  Description: INTERVENTIONS:  - Perform BMAT or MOVE assessment daily    - Set and communicate daily mobility goal to care team and patient/family/caregiver  - Collaborate with rehabilitation services on mobility goals if consulted  - Perform Range of Motion 3 times a day  - Reposition patient every 2 hours    - Dangle patient 3 times a day  - Stand patient 3 times a day  - Ambulate patient 3 times a day  - Out of bed to chair 3 times a day   - Out of bed for meals 3 times a day  - Out of bed for toileting  - Record patient progress and toleration of activity level   Outcome: Progressing     Problem: DISCHARGE PLANNING  Goal: Discharge to home or other facility with appropriate resources  Description: INTERVENTIONS:  - Identify barriers to discharge w/patient and caregiver  - Arrange for needed discharge resources and transportation as appropriate  - Identify discharge learning needs (meds, wound care, etc )  - Arrange for interpretive services to assist at discharge as needed  - Refer to Case Management Department for coordinating discharge planning if the patient needs post-hospital services based on physician/advanced practitioner order or complex needs related to functional status, cognitive ability, or social support system  Outcome: Progressing     Problem: Prexisting or High Potential for Compromised Skin Integrity  Goal: Skin integrity is maintained or improved  Description: INTERVENTIONS:  - Identify patients at risk for skin breakdown  - Assess and monitor skin integrity  - Assess and monitor nutrition and hydration status  - Monitor labs   - Assess for incontinence   - Turn and reposition patient  - Assist with mobility/ambulation  - Relieve pressure over bony prominences  - Avoid friction and shearing  - Provide appropriate hygiene as needed including keeping skin clean and dry  - Evaluate need for skin moisturizer/barrier cream  - Collaborate with interdisciplinary team   - Patient/family teaching  - Consider wound care consult   Outcome: Progressing

## 2022-02-17 NOTE — PROGRESS NOTES
Internal Medicine Progress Note  Patient: Raul Muller  Age/sex: 78 y o  female  Medical Record #: [id number]      ASSESSMENT/PLAN: (Interval History)  Raul Muller is seen and examined and management for following issues:    Right Tibial plateau fracture  · Medical management per ortho  · Rehab/pain per PMR  · Discussed WB and ROM with ortho  PWB with passive ROM  · Hinged brace as needed for pain     DM II  · Previously on metformin however dc'd d/t hgbA1c of 6 1  · Cont sliding scale  · Blood sugars elevated  Resume metformin 500mg daily 2/18/22  · Continue diabetic diet      HTN  · Home: amlodipine 10mg qd; lisinopril 20mg qd; chlorthalidone 25mg qd; clonidine 0 1mg qd; labetolol 200mg q12 hours  · Here: same as above except increased clonidine to 0 2 2/16/22       PAF  · Rates controlled without any agents  · Cont eliquis     CKD  · Level 3  · Baseline 0 8-1 0  · Avoid nephrotoxic medications  · Avoid hypotension in the post operative setting  · Monitor bmp     Hypothyroid  · Cont levothyroxine     Hyperlipidemia  · Low cholesterol diet  · Continue statin therapy        The above assessment and plan was reviewed and updated as determined by my evaluation of the patient on 2/17/2022      Labs:   Results from last 7 days   Lab Units 02/17/22  0637 02/15/22  0656   WBC Thousand/uL 4 84 6 66   HEMOGLOBIN g/dL 10 3* 11 5   HEMATOCRIT % 32 2* 36 1   PLATELETS Thousands/uL 246 257     Results from last 7 days   Lab Units 02/17/22  0637 02/15/22  0656   SODIUM mmol/L 133* 133*   POTASSIUM mmol/L 3 8 3 8   CHLORIDE mmol/L 98* 98*   CO2 mmol/L 30 29   BUN mg/dL 25 17   CREATININE mg/dL 0 97 0 96   CALCIUM mg/dL 8 8 9 0         Results from last 7 days   Lab Units 02/12/22  1139   INR  1 32*     Results from last 7 days   Lab Units 02/17/22  0641 02/16/22 2004 02/16/22  1513   POC GLUCOSE mg/dl 153* 177* 211*       Review of Scheduled Meds:  Current Facility-Administered Medications   Medication Dose Route Frequency Provider Last Rate    acetaminophen  975 mg Oral [facility] Mallory Landeros MD      amLODIPine  10 mg Oral Daily Mallory Landeros MD      apixaban  5 mg Oral BID Mallory Landeros MD      ascorbic acid  500 mg Oral Daily Mallory Landeros MD      atorvastatin  40 mg Oral After Remberto Howard MD      bisacodyl  10 mg Rectal Daily PRN Mallory Landeros MD      Carboxymethylcellulose Sodium  1 drop Ophthalmic BID Mallory Landeros MD      chlorthalidone  25 mg Oral Daily Mallory aLnderos MD      cholecalciferol  1,000 Units Oral Daily Mallory Landeros MD      cloNIDine  0 1 mg Oral Q12H [address] Mallory Landeros MD      Diclofenac Sodium  2 g Topical TID PRN Mallory Landeros MD      docusate sodium  100 mg Oral BID Mallory Landeros MD      insulin lispro  1-5 Units Subcutaneous HS Mallory Landeros MD      insulin lispro  1-5 Units Subcutaneous TID [facility] Mallory Landeros MD      labetalol  200 mg Oral Q12H [address] Mallory Landeros MD      levothyroxine  125 mcg Oral Early Morning Mallory Landeros MD      lidocaine  1 patch Topical Daily Mallory Landeros MD      lisinopril  20 mg Oral BID Mallory Landeros MD      LORazepam  0 5 mg Oral BID Mallory Landeros MD      ondansetron  4 mg Oral Q8H PRN Mallory Landeros MD      oxyCODONE  2 5 mg Oral Q4H PRN Mallory Landeros MD      polyethylene glycol  17 g Oral Daily PRN Mallory Landeros MD      PreserVision AREDS 2  1 capsule Oral BID Mallory Landeros MD      senna  1 tablet Oral BID Mallory Landeros MD         Subjective/ HPI: Patient seen and examined  Patients overnight issues or events were reviewed with nursing or staff during rounds or morning huddle session  New or overnight issues include the following:     Pt seen in her room  She states that she is doing well  She denies any current complaints  ROS:   A 10 point ROS was performed; negative except as noted above         Imaging:     No orders to display       *Labs /Radiology studies Reviewed  *Medications  reviewed and reconciled as needed  *Please refer to order section for additional ordered labs studies  *Case discussed with primary attending during morning huddle case rounds    Physical Examination:  Vitals:   Vitals:    02/16/22 1519 02/16/22 2101 02/17/22 0011 02/17/22 0725   BP: 137/70 152/68 107/58 163/94   BP Location: Left arm  Left arm Left arm   Pulse: 68 72 (!) 52 59   Resp: 18  18 18   Temp: 97 9 °F (36 6 °C)  98 6 °F (37 °C) 97 7 °F (36 5 °C)   TempSrc: Oral  Oral Oral   SpO2: 98%  97% 95%   Weight:       Height:         GEN: No apparent distress, interactive; frail  NEURO: Alert and oriented x3  HEENT: Pupils are equal and reactive, EOMI, mucous membranes are moist, face symmetrical  CV: S1 S2 regular, no MRG, mild right knee swelling  RESP: Lungs are clear bilaterally, no wheezes, rales or rhonchi noted, on room air, respirations easy and non labored  GI: Flat, soft non tender, non distended; +BS x4  : Voiding without difficulty  MUSC: Moves all extremities; except RLE  SKIN: pink, warm and dry, normal turgor, no rashes, lesions    The above physical exam was reviewed and updated as determined by my evaluation of the patient on 2/17/2022  Invasive Devices  Report    None                    VTE Pharmacologic Prophylaxis: Eliquis  Code Status: Level 1 - Full Code  Current Length of Stay: 2 day(s)      Total time spent:  30 minutes with more than 50% spent counseling/coordinating care  Counseling includes discussion with patient re: progress  and discussion with patient of his/her current medical state/information  Coordination of patient's care was performed in conjunction with primary service  Time invested included review of patient's labs, vitals, and management of their comorbidities with continued monitoring  In addition, this patient was discussed with medical team including physician and advanced extenders   The care of the patient was extensively discussed and appropriate treatment plan was formulated unique for this patient  ** Please Note:  voice to text software may have been used in the creation of this document   Although proof errors in transcription or interpretation are a potential of such software**

## 2022-02-17 NOTE — PROGRESS NOTES
02/17/22 1500   Pain Assessment   Pain Assessment Tool 0-10   Pain Score 5   Pain Location/Orientation Orientation: Right;Location: Knee   Restrictions/Precautions   RLE Weight Bearing Per Order PWB   Cognition   Overall Cognitive Status WFL   Subjective   Subjective pt agreeable to therapy   Sit to Lying   Type of Assistance Needed Supervision   Sit to Lying CARE Score 4   Lying to Sitting on Side of Bed   Type of Assistance Needed Supervision   Lying to Sitting on Side of Bed CARE Score 4   Sit to Stand   Type of Assistance Needed Incidental touching   Sit to Stand CARE Score 4   Bed-Chair Transfer   Type of Assistance Needed Incidental touching; Adaptive equipment   Comment with RW   Chair/Bed-to-Chair Transfer CARE Score 4   Walk 10 Feet   Type of Assistance Needed Incidental touching;Verbal cues; Adaptive equipment   Comment cues to maintain WBS   Walk 10 Feet CARE Score 4   Walk 50 Feet with Two Turns   Type of Assistance Needed Incidental touching; Adaptive equipment   Comment with RW limited by pain   Walk 50 Feet with Two Turns CARE Score 4   Walk 150 Feet   Comment limited by pain   Reason if not Attempted Safety concerns   Walk 150 Feet CARE Score 88   Walking 10 Feet on Uneven Surfaces   Reason if not Attempted Safety concerns   Walking 10 Feet on Uneven Surfaces CARE Score 88   Ambulation   Distance Walked (feet) 60 ft  (x2)   Assist Device Roller Walker   Gait Pattern Antalgic; Inconsistant Mariangel;Decreased foot clearance; Slow Mariangel; Improper weight shift   Findings slower speed to maintain WBS   Toilet Transfer   Type of Assistance Needed Incidental touching   Toilet Transfer CARE Score 4   Assessment   Treatment Assessment 30min session focusing on gait to imrpove activity tolerance and safety  pt still gets increase in pain with longer distances  cont to work on functional strenghtening nad ROM to improve safety and functional ind for d/c      Problem List Decreased strength;Decreased range of motion;Decreased endurance; Impaired balance;Decreased mobility;Pain;Orthopedic restrictions   Barriers to Discharge Inaccessible home environment;Decreased caregiver support   PT Barriers   Functional Limitation Walking;Transfers;Car transfers   Plan   Progress Progressing toward goals   Recommendation   PT Discharge Recommendation Home with outpatient rehabilitation   Equipment Recommended Walker   PT Therapy Minutes   PT Time In 1500   PT Time Out 1530   PT Total Time (minutes) 30   PT Mode of treatment - Individual (minutes) 30   PT Mode of treatment - Concurrent (minutes) 0   PT Mode of treatment - Group (minutes) 0   PT Mode of treatment - Co-treat (minutes) 0   PT Mode of Treatment - Total time(minutes) 30 minutes   PT Cumulative Minutes 180   Therapy Time missed   Time missed?  No

## 2022-02-17 NOTE — PROGRESS NOTES
02/17/22 1000   Pain Assessment   Pain Assessment Tool 0-10   Pain Score 7   Pain Location/Orientation Orientation: Right;Location: Knee   Pain Onset/Description Frequency: Constant/Continuous   Restrictions/Precautions   Precautions Fall Risk;Pain;Supervision on toilet/commode   RLE Weight Bearing Per Order PWB   RLE ROM Restriction   (no resisted exercises on R knee)   Cognition   Overall Cognitive Status WFL   Subjective   Subjective pt emotional this morning; nursing in to provide medication start of session   Lying to Sitting on Side of Bed   Type of Assistance Needed Supervision   Lying to Sitting on Side of Bed CARE Score 4   Sit to Stand   Type of Assistance Needed Incidental touching   Sit to Stand CARE Score 4   Bed-Chair Transfer   Type of Assistance Needed Incidental touching   Comment with Rw   Chair/Bed-to-Chair Transfer CARE Score 4   Transfer Bed/Chair/Wheelchair   Findings needs cues for RW management with SPT; doesn't complete turn fully with RW   Walk 10 Feet   Type of Assistance Needed Incidental touching; Adaptive equipment   Comment with RW   Walk 10 Feet CARE Score 4   Walk 50 Feet with Two Turns   Type of Assistance Needed Incidental touching; Adaptive equipment   Comment with RW   Walk 50 Feet with Two Turns CARE Score 4   Ambulation   Distance Walked (feet) 50 ft   Assist Device Roller Walker   Gait Pattern Inconsistant Mariangel; Slow Mariangel;Decreased foot clearance; Improper weight shift;Decreased R stance   Limitations Noted In Balance; Endurance;Speed;Strength   Wheel 50 Feet with Two Turns   Reason if not Attempted Activity not applicable   Wheel 50 Feet with Two Turns CARE Score 9   Wheel 150 Feet   Reason if not Attempted Activity not applicable   Wheel 100 Feet CARE Score 9   Toilet Transfer   Type of Assistance Needed Incidental touching   Comment with RW   Toilet Transfer CARE Score 4   Therapeutic Interventions   Strengthening PROM RLE knee flex/ext x20, LLE 2# LAQ and hip flex x20 ech   Flexibility gentle B gastroc and HS x3mins   Assessment   Treatment Assessment pt emotional about situation she is in, but agreeable to therapy  pt good carryover with PWB status  pt cont with therapy with PT Cy, at end of session  will cont to work on strength and functional safety to achieve safe d/c goals for home  Problem List Decreased strength;Decreased range of motion;Decreased endurance; Impaired balance;Decreased mobility;Pain;Orthopedic restrictions   Barriers to Discharge Inaccessible home environment;Decreased caregiver support   PT Barriers   Functional Limitation Walking;Transfers;Car transfers   Plan   Progress Progressing toward goals   Recommendation   PT Discharge Recommendation Home with outpatient rehabilitation   PT Therapy Minutes   PT Time In 1000   PT Time Out 1030   PT Total Time (minutes) 30   PT Mode of treatment - Individual (minutes) 30   PT Mode of treatment - Concurrent (minutes) 0   PT Mode of treatment - Group (minutes) 0   PT Mode of treatment - Co-treat (minutes) 0   PT Mode of Treatment - Total time(minutes) 30 minutes   PT Cumulative Minutes 120   Therapy Time missed   Time missed?  No

## 2022-02-17 NOTE — PROGRESS NOTES
02/17/22 0700   Pain Assessment   Pain Assessment Tool 0-10   Pain Score 2   Pain Location/Orientation Orientation: Right;Location: Knee   Hospital Pain Intervention(s) Medication (See MAR); Repositioned;Rest;Shower/Bath   Restrictions/Precautions   Precautions Fall Risk;Limb alert;Pain;Visual deficit  (macular degeneration; PWBing R LE)   RUE Weight Bearing Per Order WBAT   LUE Weight Bearing Per Order WBAT   RLE Weight Bearing Per Order PWB   LLE Weight Bearing Per Order WBAT   ROM Restrictions Yes   RLE ROM Restriction Range Limitation  ( no resisted exercise elvira on R knee )   Lifestyle   Autonomy "Can I wash my hair?"   Eating   Type of Assistance Needed Independent   Physical Assistance Level No physical assistance   Comment seated EOB   Eating CARE Score 6   Oral Hygiene   Type of Assistance Needed Supervision   Physical Assistance Level No physical assistance   Comment CS in stance at sink   Oral Hygiene CARE Score 4   Grooming   Able To Initiate Tasks; Acquire Items;Comb/Brush Hair   Limitation Noted In Timeliness   Findings pt seated in w/c to complete hair care due to ext time required to complete  Shower/Bathe Self   Type of Assistance Needed Incidental touching   Physical Assistance Level No physical assistance   Comment Pt completes full shower routine seated on shower chair  Pt bathes 10/10 parts with CG for balance/safety in stance when bathing sal/rear hygiene  Pt able to reach lower legs/feet by performing func reach  Shower/Bathe Self CARE Score 4   Bathing   Assessed Bath Style Shower   Able to Jeimy Toan Yes   Able to [employer] Temperature Yes   Able to Wash/Rinse/Dry (body part) Left Arm;Right Arm;L Upper Leg;R Upper Leg;L Lower Leg/Foot;R Lower Leg/Foot;Chest;Abdomen;Perineal Area; Buttocks   Limitations Noted in Balance;ROM;Strength   Positioning Seated;Standing   Adaptive Equipment Shower Bars; Shower Seat;Hand [employer]   Findings Pt reports having tub/shower with a shower chair that she does not use as "it does not fit in shower properly"  Discussed potential need for shower chair/transfer bench as pt with PWBing on R LE at this time and will not be able to complete step over method until ortho WBing restrictions are upgraded  Upper Body Dressing   Type of Assistance Needed Supervision   Physical Assistance Level No physical assistance   Comment seated    Upper Body Dressing CARE Score 4   Lower Body Dressing   Type of Assistance Needed Incidental touching   Physical Assistance Level No physical assistance   Comment seated to thread underwear/pants over feet; CG in stance to complete CM   Lower Body Dressing CARE Score 4   Putting On/Taking Off Footwear   Type of Assistance Needed Supervision   Physical Assistance Level No physical assistance   Comment seated to don socks utilizing func reach   Putting On/Taking Off Footwear CARE Score 4   Sit to Stand   Type of Assistance Needed Incidental touching   Physical Assistance Level No physical assistance   Comment CG with RW   Sit to Stand CARE Score 4   Bed-Chair Transfer   Type of Assistance Needed Incidental touching   Physical Assistance Level No physical assistance   Comment CG with RW   Chair/Bed-to-Chair Transfer CARE Score 4   Toileting Hygiene   Type of Assistance Needed Incidental touching   Physical Assistance Level No physical assistance   Comment in stance for hygiene/CM   Toileting Hygiene CARE Score 4   Toilet Transfer   Type of Assistance Needed Incidental touching   Physical Assistance Level No physical assistance   Comment CG with RW   Toilet Transfer CARE Score 4   Cognition   Overall Cognitive Status WFL   Arousal/Participation Alert; Cooperative   Attention Within functional limits   Orientation Level Oriented X4   Memory Decreased recall of precautions   Following Commands Follows one step commands with increased time or repetition   Activity Tolerance   Activity Tolerance Patient tolerated treatment well   Assessment   Treatment Assessment Pt engages in 90 minute skilled OT session focusing on ADL routine seated in w/c at sink, func transfers  See above for full functional details  Pt andreas's CG level for all basic transfers including STS, toilet, bed and w/c with RW  Pt andreas's ability to maintain PWBing to RLE during all standing and func transfers  Pt noted with tub/shower at home, does have a shower chair however does not use it as pt reports it does not fit properly in tub  Briefly discussed potential need for shower chair/transfer bench as pt will having PWBing restrictions at time of discharge and will not be able to compete tub transfer using step over method until [address] restrictions are upgraded  Recommend continued skilled care to focus on ADL retraining, func transfers with RW, IADLs inlcuding meal prep/kitchen mobility, standing osmani/bal, in order to decrease burden of care at d/c  Prognosis Good   Problem List Decreased strength;Decreased range of motion;Decreased endurance; Impaired balance;Decreased mobility;Pain;Orthopedic restrictions   Barriers to Discharge Inaccessible home environment;Decreased caregiver support   Plan   Treatment/Interventions ADL retraining;Functional transfer training; Therapeutic exercise; Endurance training;Patient/family training;Equipment eval/education; Compensatory technique education   OT Therapy Minutes   OT Time In 0700   OT Time Out 0830   OT Total Time (minutes) 90   OT Mode of treatment - Individual (minutes) 90   OT Mode of treatment - Concurrent (minutes) 0   OT Mode of treatment - Group (minutes) 0   OT Mode of treatment - Co-treat (minutes) 0   OT Mode of Treatment - Total time(minutes) 90 minutes   OT Cumulative Minutes 180   Therapy Time missed   Time missed?  No

## 2022-02-18 LAB
GLUCOSE SERPL-MCNC: 121 MG/DL (ref 65–140)
GLUCOSE SERPL-MCNC: 152 MG/DL (ref 65–140)
GLUCOSE SERPL-MCNC: 180 MG/DL (ref 65–140)
GLUCOSE SERPL-MCNC: 196 MG/DL (ref 65–140)

## 2022-02-18 PROCEDURE — 97530 THERAPEUTIC ACTIVITIES: CPT

## 2022-02-18 PROCEDURE — 99232 SBSQ HOSP IP/OBS MODERATE 35: CPT | Performed by: INTERNAL MEDICINE

## 2022-02-18 PROCEDURE — 82948 REAGENT STRIP/BLOOD GLUCOSE: CPT

## 2022-02-18 PROCEDURE — 97110 THERAPEUTIC EXERCISES: CPT

## 2022-02-18 PROCEDURE — 99233 SBSQ HOSP IP/OBS HIGH 50: CPT | Performed by: PHYSICAL MEDICINE & REHABILITATION

## 2022-02-18 RX ADMIN — INSULIN LISPRO 1 UNITS: 100 INJECTION, SOLUTION INTRAVENOUS; SUBCUTANEOUS at 21:21

## 2022-02-18 RX ADMIN — ATORVASTATIN CALCIUM 40 MG: 40 TABLET, FILM COATED ORAL at 17:16

## 2022-02-18 RX ADMIN — LABETALOL HYDROCHLORIDE 200 MG: 200 TABLET, FILM COATED ORAL at 21:17

## 2022-02-18 RX ADMIN — LEVOTHYROXINE SODIUM 125 MCG: 125 TABLET ORAL at 06:40

## 2022-02-18 RX ADMIN — DOCUSATE SODIUM 100 MG: 100 CAPSULE ORAL at 17:16

## 2022-02-18 RX ADMIN — LISINOPRIL 20 MG: 20 TABLET ORAL at 08:39

## 2022-02-18 RX ADMIN — LABETALOL HYDROCHLORIDE 200 MG: 200 TABLET, FILM COATED ORAL at 10:44

## 2022-02-18 RX ADMIN — METFORMIN HYDROCHLORIDE 500 MG: 500 TABLET, FILM COATED ORAL at 08:32

## 2022-02-18 RX ADMIN — STANDARDIZED SENNA CONCENTRATE 8.6 MG: 8.6 TABLET ORAL at 17:16

## 2022-02-18 RX ADMIN — INSULIN LISPRO 1 UNITS: 100 INJECTION, SOLUTION INTRAVENOUS; SUBCUTANEOUS at 08:26

## 2022-02-18 RX ADMIN — OXYCODONE HYDROCHLORIDE AND ACETAMINOPHEN 500 MG: 500 TABLET ORAL at 08:33

## 2022-02-18 RX ADMIN — CARBOXYMETHYLCELLULOSE SODIUM 1 DROP: 2.5 SOLUTION/ DROPS OPHTHALMIC at 08:36

## 2022-02-18 RX ADMIN — LORAZEPAM 0.5 MG: 0.5 TABLET ORAL at 17:16

## 2022-02-18 RX ADMIN — CHLORTHALIDONE 25 MG: 25 TABLET ORAL at 10:45

## 2022-02-18 RX ADMIN — ACETAMINOPHEN 975 MG: 325 TABLET, FILM COATED ORAL at 06:40

## 2022-02-18 RX ADMIN — ACETAMINOPHEN 975 MG: 325 TABLET, FILM COATED ORAL at 14:17

## 2022-02-18 RX ADMIN — CLONIDINE HYDROCHLORIDE 0.1 MG: 0.1 TABLET ORAL at 21:17

## 2022-02-18 RX ADMIN — Medication 1 DROP: at 17:18

## 2022-02-18 RX ADMIN — LISINOPRIL 20 MG: 20 TABLET ORAL at 21:17

## 2022-02-18 RX ADMIN — Medication 1 DROP: at 21:19

## 2022-02-18 RX ADMIN — APIXABAN 5 MG: 5 TABLET, FILM COATED ORAL at 08:33

## 2022-02-18 RX ADMIN — CLONIDINE HYDROCHLORIDE 0.1 MG: 0.1 TABLET ORAL at 08:33

## 2022-02-18 RX ADMIN — Medication 1 CAPSULE: at 21:18

## 2022-02-18 RX ADMIN — AMLODIPINE BESYLATE 10 MG: 10 TABLET ORAL at 08:33

## 2022-02-18 RX ADMIN — INSULIN LISPRO 1 UNITS: 100 INJECTION, SOLUTION INTRAVENOUS; SUBCUTANEOUS at 11:54

## 2022-02-18 RX ADMIN — ACETAMINOPHEN 975 MG: 325 TABLET, FILM COATED ORAL at 21:17

## 2022-02-18 RX ADMIN — LORAZEPAM 0.5 MG: 0.5 TABLET ORAL at 08:33

## 2022-02-18 RX ADMIN — Medication 1 CAPSULE: at 08:36

## 2022-02-18 RX ADMIN — APIXABAN 5 MG: 5 TABLET, FILM COATED ORAL at 17:16

## 2022-02-18 RX ADMIN — Medication 1000 UNITS: at 08:33

## 2022-02-18 RX ADMIN — LIDOCAINE 5% 1 PATCH: 700 PATCH TOPICAL at 08:41

## 2022-02-18 NOTE — PROGRESS NOTES
02/18/22 0830   Pain Assessment   Pain Assessment Tool 0-10   Pain Score 3   Pain Location/Orientation Orientation: Right;Location: Knee   Pain Onset/Description Onset: Ongoing;Frequency: Intermittent   Patient's Stated Pain Goal No pain   Hospital Pain Intervention(s) Repositioned; Rest   Restrictions/Precautions   Precautions Fall Risk;Supervision on toilet/commode;Visual deficit  (PWB RLE, macular degeneration)   RLE Weight Bearing Per Order PWB   RLE ROM Restriction Other  (no resisted exercise elvira on R knee )   Cognition   Overall Cognitive Status [facility]   Arousal/Participation Alert; Cooperative   Attention Within functional limits   Orientation Level Oriented X4   Memory Decreased recall of precautions   Comments VC's to maintain PWB   Sit to Stand   Type of Assistance Needed Supervision   Comment CS with RW   Sit to Stand CARE Score 4   Bed-Chair Transfer   Type of Assistance Needed Incidental touching;Supervision; Adaptive equipment   Comment CS/CGA with RW   Chair/Bed-to-Chair Transfer CARE Score 4   Walk 10 Feet   Type of Assistance Needed Incidental touching; Adaptive equipment   Physical Assistance Level No physical assistance   Comment CGA with RW, cont VC's for PWB   Walk 10 Feet CARE Score 4   Walk 50 Feet with Two Turns   Type of Assistance Needed Incidental touching; Adaptive equipment;Verbal cues   Physical Assistance Level No physical assistance   Comment CGA with RW, cont VC's for PWB   Walk 50 Feet with Two Turns CARE Score 4   Ambulation   Does the patient walk? 2  Yes   Primary Mode of Locomotion Prior to Admission Walk   Distance Walked (feet) 60 ft  (x 4)   Assist Device Roller Walker   Gait Pattern Antalgic; Slow Mariangel;Decreased foot clearance; Forward Flexion;Narrow NABIL; Decreased R stance; Improper weight shift   Limitations Noted In Balance; Endurance; Safety;Speed;Strength   Provided Assistance with: Balance;Direction   Walk Assist Level Contact Guard   Wheel 50 Feet with Two Turns   Reason if not Attempted Activity not applicable   Wheel 50 Feet with Two Turns CARE Score 9   Wheel 150 Feet   Reason if not Attempted Activity not applicable   Wheel 674 Feet CARE Score 9   4 Steps   Reason if not Attempted Activity not applicable   4 Steps CARE Score 9   12 Steps   Reason if not Attempted Activity not applicable   12 Steps CARE Score 9   Therapeutic Interventions   Strengthening seated LAQ, hip flex, hip ADD vs ball, hip ABD vs green tband, ankle DF/PF and glute sets 2 x15 reps 2# to LLE only  Standing RLE hip flex and LAQ 2 x15 reps  Flexibility B HS/gastroc sretch in seated    Equipment Use   NuStep L1 x10 min LLE, BUE only, SPM > 40   Assessment   Treatment Assessment Pt participated in skilled PT session with increased focus on gait and LE strengthening  Pt limited to increased pain with gait and unsure how well pt is maintaining PWB to RLE  Pt requires rest breaks but recovers quickly  Pt cont to show steady improvements now requiring CA/CGA with transfers and CGA with gait  Pt will cont to benefit from increased LE strength, increased I with all gait and functional transfers for discharge  Cont POC as tolerated  Problem List Decreased strength;Decreased range of motion;Decreased endurance; Impaired balance;Decreased mobility; Decreased safety awareness; Impaired tone;Orthopedic restrictions;Pain   Barriers to Discharge Inaccessible home environment;Decreased caregiver support   PT Barriers   Functional Limitation Car transfers;Stair negotiation;Standing;Walking;Transfers   Plan   Treatment/Interventions Functional transfer training;LE strengthening/ROM; Therapeutic exercise; Endurance training;Gait training   Progress Progressing toward goals   Recommendation   PT Discharge Recommendation Home with outpatient rehabilitation   Equipment Recommended Walker   PT Therapy Minutes   PT Time In 0830   PT Time Out 1000   PT Total Time (minutes) 90   PT Mode of treatment - Individual (minutes) 90   PT Mode of treatment - Concurrent (minutes) 0   PT Mode of treatment - Group (minutes) 0   PT Mode of treatment - Co-treat (minutes) 0   PT Mode of Treatment - Total time(minutes) 90 minutes   PT Cumulative Minutes 270   Therapy Time missed   Time missed?  No

## 2022-02-18 NOTE — PROGRESS NOTES
PM&R Coverage Progress Note:    Rehabilitation Diagnosis: Right tibial plateau fracture and suspected meniscus tear    ASSESSMENT: Stable      PLAN:    Rehabilitation   Continue current rehabilitation plan of care to maximize function   Functionally, patient requiring contact guard assist with transfers and ambulation using rolling walker, Min assist level with ADLs  Still trying to figure out if patient will require a shower chair most likely for home for bathing   Goals to return home:  Modified independent level with mobility and self-care  Patient does live alone however has a daughter and a granddaughter in the area who will be assisting her partially  o Confirmed the following with orthopedics on 2/17/22:  Patient is partial weight-bearing right lower extremity (PWB RLE 30-50%) and can have passive range of motion performed in physical therapy as tolerated  Other than her usual movements of right leg, patient is not to perform any additional active or active assist range of motion in therapies  Medical issues     Right tibial plateau fracture and suspected right meniscal tear:  Treated non operatively  Already managed on Eliquis for DVT prophylaxis  PWB RLE   Acute pain:  Pain is managed fairly well  Pain is located on medial right knee  Managed currently with topical Lidoderm patch, Tylenol, p r n  Oxycodone, p r n  Voltaren gel     Diabetes mellitus type 2:  Elevated blood glucose likely related to recent stress  Patient restarted on metformin 500 mg daily to start 2/18/22 per IM consultants   Hypertension:  Elevated at times however not a barrier to her participation  Blood pressure to be taken manually only for improved accuracy  IM consultants managing  Continue amlodipine, lisinopril, chlorthalidone, labetalol  Patient had an increased dose of her clonidine from 0 1 mg daily to 0 2 mg daily    Continue monitoring BP     Paroxysmal atrial fibrillation:  Currently rate controlled on labetalol  Anticoagulated with Eliquis     Anxiety:  Currently stable and not a barrier to rehabilitation  Continue Ativan 0 5 mg b i d      CKD 3:  Stable     Hypothyroidism:  Managed on Synthroid     Hyponatremia:  Chronic  Mild  Na = 133  Asymptomatic     Continue current medical plan of care  Appreciate IM consultants co-management  SUBJECTIVE:  Patient seen face to face today in her room and then later in physical therapy participating  Reports her knee pain is fairly managed  Doing well with transfers and ambulation at her partial weight-bearing and is compliant with this  + BM after suppository 2/17/22  Denies chest pain or shortness of breath  Personally updated her son Valeria Chris today at his request   Questions were answered  He states he will be visiting on Saturday    ROS:  A ten point review of systems was completed on 02/18/22 and pertinent positives are listed in subjective section  All other systems reviewed were negative  OBJECTIVE:   /72 (BP Location: Left arm)   Pulse 63   Temp 97 7 °F (36 5 °C) (Oral)   Resp 18   Ht 5' 6" (1 676 m)   Wt 59 2 kg (130 lb 9 6 oz)   LMP 01/12/1980 (Approximate)   SpO2 95%   BMI 21 08 kg/m²       Physical Exam  Vitals and nursing note reviewed  Constitutional:       General: She is not in acute distress  HENT:      Head: Normocephalic and atraumatic  Nose: Nose normal       Mouth/Throat:      Mouth: Mucous membranes are moist    Eyes:      Conjunctiva/sclera: Conjunctivae normal    Cardiovascular:      Rate and Rhythm: Normal rate and regular rhythm  Pulses: Normal pulses  Pulmonary:      Effort: Pulmonary effort is normal       Breath sounds: Normal breath sounds  No wheezing or rales  Abdominal:      General: Bowel sounds are normal  There is no distension  Palpations: Abdomen is soft  Tenderness: There is no abdominal tenderness     Musculoskeletal:         General: Swelling (  Mild surrounding right knee) present  Cervical back: Neck supple  Skin:     General: Skin is warm  Neurological:      Mental Status: She is alert and oriented to person, place, and time  Sensory: No sensory deficit  Comments: Personally seen patient performing a sit to stand transfer with rolling walker requiring contact guard assist   Also seen ambulating with good compliance with partial weight-bearing of right lower extremity also requiring contact guard assist with rolling walker    Right lower extremity strength overall proximally graded as a 3/5, distally 4+/5   Psychiatric:         Mood and Affect: Mood normal           Lab Results   Component Value Date    WBC 4 84 02/17/2022    HGB 10 3 (L) 02/17/2022    HCT 32 2 (L) 02/17/2022    MCV 94 02/17/2022     02/17/2022     Lab Results   Component Value Date    SODIUM 133 (L) 02/17/2022    K 3 8 02/17/2022    CL 98 (L) 02/17/2022    CO2 30 02/17/2022    BUN 25 02/17/2022    CREATININE 0 97 02/17/2022    GLUC 134 02/17/2022    CALCIUM 8 8 02/17/2022     Lab Results   Component Value Date    INR 1 32 (H) 02/12/2022    INR 1 41 (H) 06/03/2019    INR 0 96 07/12/2016    PROTIME 15 8 (H) 02/12/2022    PROTIME 17 4 (H) 06/03/2019    PROTIME 12 9 07/12/2016           Current Facility-Administered Medications:     acetaminophen (TYLENOL) tablet 975 mg, 975 mg, Oral, Q8H [address], Susanne Fregoso MD, 975 mg at 02/18/22 0640    amLODIPine (NORVASC) tablet 10 mg, 10 mg, Oral, Daily, Susanne Fregoso MD, 10 mg at 02/17/22 8171    apixaban (ELIQUIS) tablet 5 mg, 5 mg, Oral, BID, Susanne Fregoso MD, 5 mg at 02/17/22 1822    ascorbic acid (VITAMIN C) tablet 500 mg, 500 mg, Oral, Daily, Susanne Fregoso MD, 500 mg at 02/17/22 3535    atorvastatin (LIPITOR) tablet 40 mg, 40 mg, Oral, After Dinner, Susanne Fregoso MD, 40 mg at 02/17/22 1822    bisacodyl (DULCOLAX) rectal suppository 10 mg, 10 mg, Rectal, Daily PRN, Susanne Fregoso MD, 10 mg at 02/17/22 1827   Carboxymethylcellulose Sodium 0 25 % SOLN 1 drop, 1 drop, Ophthalmic, BID, Bridget Dumont MD, 1 drop at 02/17/22 2153    chlorthalidone tablet 25 mg, 25 mg, Oral, Daily, Bridget Dumont MD, 25 mg at 02/17/22 1002    cholecalciferol (VITAMIN D3) tablet 1,000 Units, 1,000 Units, Oral, Daily, Bridget Dumont MD, 1,000 Units at 02/17/22 0959    cloNIDine (CATAPRES) tablet 0 1 mg, 0 1 mg, Oral, Q12H [address], Bridget Dumont MD, 0 1 mg at 02/17/22 2151    Diclofenac Sodium (VOLTAREN) 1 % topical gel 2 g, 2 g, Topical, TID PRN, Bridget Dumont MD, 2 g at 02/16/22 2102    docusate sodium (COLACE) capsule 100 mg, 100 mg, Oral, BID, Bridget Dumont MD, 100 mg at 02/17/22 1822    insulin lispro (HumaLOG) 100 units/mL subcutaneous injection 1-5 Units, 1-5 Units, Subcutaneous, HS, Bridget Dumont MD, 1 Units at 02/17/22 2154    insulin lispro (HumaLOG) 100 units/mL subcutaneous injection 1-5 Units, 1-5 Units, Subcutaneous, TID AC, 1 Units at 02/18/22 0826 **AND** Fingerstick Glucose (POCT), , , TID AC, Bridget Dumont MD    labetalol (NORMODYNE) tablet 200 mg, 200 mg, Oral, Q12H [address], Bridget Dumont MD, 200 mg at 02/17/22 2151    levothyroxine tablet 125 mcg, 125 mcg, Oral, Early Morning, Bridget Dumont MD, 125 mcg at 02/18/22 0640    lidocaine (LIDODERM) 5 % patch 1 patch, 1 patch, Topical, Daily, Bridget Dumont MD, 1 patch at 02/17/22 1000    lisinopril (ZESTRIL) tablet 20 mg, 20 mg, Oral, BID, Bridget Dumont MD, 20 mg at 02/17/22 2151    LORazepam (ATIVAN) tablet 0 5 mg, 0 5 mg, Oral, BID, Bridget Dumont MD, 0 5 mg at 02/17/22 1822    metFORMIN (GLUCOPHAGE) tablet 500 mg, 500 mg, Oral, Daily With Breakfast, Esther Thomas PA-C    ondansetron (ZOFRAN-ODT) dispersible tablet 4 mg, 4 mg, Oral, Q8H PRN, Bridget Dumont MD    oxyCODONE (ROXICODONE) IR tablet 2 5 mg, 2 5 mg, Oral, Q4H PRN, Bridget Dumont MD    polyethylene glycol [facility]) packet 17 g, 17 g, Oral, Daily PRN, Bridget Dumont MD, 17 g at 02/17/22 1001   PreserVision AREDS 2 CAPS 1 capsule, 1 capsule, Oral, BID, Kortney MD Mena, 1 capsule at 02/17/22 1002    senna (SENOKOT) tablet 8 6 mg, 1 tablet, Oral, BID, [place], MD, 8 6 mg at 02/17/22 9378    Past Medical History:   Diagnosis Date    Anxiety     Atrial fibrillation (HCC)     Bowel obstruction (HCC)     Cancer ([facility] [address] )     LEFT BREAST CA 22 YEARS AGO     Depression     Diabetes mellitus ([facility] )     Disease of thyroid gland     Hypertension     Hypothyroidism        Patient Active Problem List    Diagnosis Date Noted    Pain 02/15/2022    At risk for venous thromboembolism (VTE) 02/15/2022    Macular degeneration 02/15/2022    Fracture of right tibial plateau 60/12/5647    Renal vascular disease 01/24/2020    Primary osteoarthritis of right knee 11/20/2019    Synovial cyst of right popliteal space 11/20/2019    Hemarthrosis of right knee 09/18/2019    History of breast cancer 09/18/2019    Moderate protein-calorie malnutrition  09/18/2019    Asymptomatic bilateral carotid artery stenosis 08/15/2019    High blood pressure     Hypertensive urgency 07/20/2019    Hypertension     Hypo-osmolality and hyponatremia 06/26/2019    Fall 06/04/2019    Chronic hyponatremia 06/04/2019    Syncope vs presyncope 06/03/2019    Paroxysmal A-fib ([facility] ) 06/03/2019    Diabetes ([facility] ) 06/03/2019    Weight loss 06/03/2019    CKD (chronic kidney disease) stage 3, GFR 30-59 ml/min ([facility]) 06/03/2019    Iron deficiency anemia due to chronic blood loss 12/07/2018    Incisional hernia, without obstruction or gangrene 07/10/2018    Postop check 03/02/2018    Delirium 02/05/2018    Physical deconditioning 02/05/2018    Ambulatory dysfunction 02/05/2018    Hx of fall 02/05/2018    Anxiety 02/05/2018    Acute kidney injury ([facility] [address] ) 02/02/2018    Hordeolum externum of right upper eyelid 03/08/2017    Malignant neoplasm of right breast, stage 1, estrogen receptor positive ([facility] [address] ) 02/08/2017    Malignant neoplasm of central portion of right female breast ([facility] [address] ) 01/13/2017    Tobacco abuse 01/11/2017    Heart palpitations 07/11/2016    Nicotine abuse 07/11/2016    Atrial fibrillation  07/11/2016    Diabetes mellitus type 2 07/11/2016    Essential hypertension 07/11/2016    Hypothyroidism 07/11/2016    Nontraumatic compression fracture of T4 vertebra (HCC) 10/06/2015    Low back pain without sciatica 09/17/2015    Gastroesophageal reflux disease without esophagitis 02/23/2015    Depression 02/23/2015    History of CEA (carotid endarterectomy) 02/23/2015    Hyperlipidemia associated with type 2 diabetes mellitus ([facility] ) 02/23/2015    Hypothyroidism due to acquired atrophy of thyroid 02/23/2015    Mitral insufficiency and aortic stenosis 02/23/2015    Vitamin D deficiency 02/23/2015    Varicose vein of leg 02/23/2015    Non-seasonal allergic rhinitis due to pollen 02/23/2015          Matthias Almeida MD  PM&R    Total time spent:  30 minutes with more than 50% spent counseling/coordinating care  Counseling includes discussion with patient re: progress and discussion with patient of his/her current medical/functional state/information  Coordination of patient's care was performed in conjunction with consulting services  Time invested included review of patient's labs, vitals, and management of their comorbidities with continued monitoring  The care of the patient was extensively discussed and appropriate treatment plan was formulated unique for this patient  ** Please Note:  voice to text software may have been used in the creation of this document   Although proof errors in transcription or interpretation are a potential of such software**

## 2022-02-18 NOTE — PROGRESS NOTES
02/18/22 1233   Pain Assessment   Pain Assessment Tool 0-10   Pain Score 4   Pain Location/Orientation Orientation: Right;Location: Knee   Restrictions/Precautions   Precautions Fall Risk;Supervision on toilet/commode;Visual deficit  (PWB RLE, macular degeneration)   RLE Weight Bearing Per Order PWB   ROM Restrictions Yes   RLE ROM Restriction   (no resisted exercise elvira on R knee )   Lifestyle   Autonomy "When I used a pill organizer it mixed me up more than anything else"   Putting On/Taking Off Footwear   Type of Assistance Needed Supervision   Physical Assistance Level No physical assistance   Comment sup to doff sneakers while seated EOB   Putting On/Taking Off Footwear CARE Score 4   Sit to Lying   Type of Assistance Needed Supervision   Physical Assistance Level No physical assistance   Sit to Lying CARE Score 4   Sit to Stand   Type of Assistance Needed Incidental touching   Physical Assistance Level No physical assistance   Comment CGA progressed to  w/ RW   Sit to Stand CARE Score 4   Meal Prep   Meal Prep Level Walker   Meal Preparation Completed meal prep task of making grilled cheese  Completed at CGA-Csup level w/ RW  Pt demo'd ability to retrieve items from refrigerator, countertop height, drawers and cabinet that was thigh height  Pt required one cue when using stove d/t vision limitation related to macular degeneration  Pt overall demo good safety when completing task in terms of operating stove and making grilled cheese  At times it is unclear if pt is maintaining PWB to RLE, pt continues to require cues to maintain PWB but she consistently states that she is not putting more than 50% WB  Would benefit from additional training w/ kitchen IADLs as pt reports that she cooks her food at home in order to control sodium intake and that she avoids prepared food for this reason  Health Management   Health Management Reviewed pt's current med list w/ her   W/ review pt is able to identify 13 meds from the list that she was taking at home PTA, and is aware that med routine may be altered at time of DC  Pt able to recall previous med routine for home including keeps all pill bottles in a basket, each morning placing morning meds in dish, and then taking 1 noontime med from the bottle and taking PM meds from the bottle  Pt states that at times she "forget to take them until later" and often has to count out pills by number when she forgets what she has already put in her dish  Reviewed w/ pt use of pill organizer  Pt very much against this intervention stating she has tried this in the past and it mixed her up even more  Suggested w/ pt that after taking pill bottle from basket and putting in dish that she could put pill bottle to her other side to signify that pill has already been dispense  Pt liked this idea and states she will incorporate this in her home med plan  Prior to Dc pt may benefit from simmulated med management task to ensure IND as pt has reported trouble taking pills as prescribed in the past on occasion  Therapeutic Excerise-Strength   UE Strength Yes   Right Upper Extremity- Strength   RUE Strength Comment UE TE using 3# dowel olinda for 2x15 for following exercises: forward/backward circles, elbow flx/extn  Tolerated well w/ rest breaks  Completed to increase BUE strength for increased IND w/ ADLs/IADLs and fxnl mobility w/ RW  Left Upper Extremity-Strength   LUE Strength Comment UE TE using 3# dowel olinda for 2x15 for following exercises: forward/backward circles, elbow flx/extn  Tolerated well w/ rest breaks  Completed to increase BUE strength for increased IND w/ ADLs/IADLs and fxnl mobility w/ RW  Cognition   Overall Cognitive Status WFL   Arousal/Participation Alert; Cooperative   Attention Within functional limits   Orientation Level Oriented X4   Memory Decreased recall of precautions   Following Commands Follows one step commands with increased time or repetition   Comments VCs to maintain PWB   Additional Activities   Additional Activities Comments  fxnl mobility w/ RW to collect targetted items from around room and inside cabinets  Pt able to complete w/ min cues to maintain PWB of RLE  Pt otherwise completes w/ CGA when reaching overhead/down to thigh height, and CSup  Tolerated well  Completed to improve activity tolerance, assess ability to maintain PWB with tasks requiring unilateral release of RW in prep for completing fxnl tasks  Activity Tolerance   Activity Tolerance Patient tolerated treatment well   Assessment   Treatment Assessment OT session focusing on fxnl mobilty w/ RW, ability to maintain PWB RLE, UE TE, kitchen mobility/meal prep, and review of med management strategies  Pt is progressing towards goals, however; questionable comprehension of maintaining PWB at times  See note above for all treatment details  At this time pt continues to require skilled hands on assist in order to maintain her safety  Continue to treat and follow according to established POC at this time  Prognosis Good   Problem List Decreased strength;Decreased range of motion;Decreased endurance; Impaired balance;Decreased mobility; Decreased coordination; Impaired judgement;Decreased safety awareness; Impaired vision;Orthopedic restrictions;Pain   Plan   Treatment/Interventions ADL retraining;Functional transfer training; Therapeutic exercise; Endurance training;Patient/family training;Equipment eval/education; Compensatory technique education;Continued evaluation   Progress Progressing toward goals   OT Therapy Minutes   OT Time In 1233   OT Time Out 1403   OT Total Time (minutes) 90   OT Mode of treatment - Individual (minutes) 90   OT Mode of treatment - Concurrent (minutes) 0   OT Mode of treatment - Group (minutes) 0   OT Mode of treatment - Co-treat (minutes) 0   OT Mode of Treatment - Total time(minutes) 90 minutes   OT Cumulative Minutes 270   Therapy Time missed   Time missed?  No

## 2022-02-18 NOTE — PROGRESS NOTES
Internal Medicine Progress Note  Patient: Jake Conroy  Age/sex: 78 y o  female  Medical Record #: [id number]      ASSESSMENT/PLAN: (Interval History)  Jake Conroy is seen and examined and management for following issues:    Right Tibial plateau fracture  · Medical management per ortho  · Rehab/pain per PMR  · Discussed WB and ROM with ortho  PWB 30-50% with passive ROM  · Hinged brace as needed for pain     DM II  · Cont sliding scale  · Continue metformin 500mg daily  · Continue diabetic diet      HTN  · Home: amlodipine 10mg qd; lisinopril 20mg qd; chlorthalidone 25mg qd; clonidine 0 1mg qd; labetolol 200mg q12 hours  · Here: same as above except increased clonidine to 0 2 2/16/22  · Continue to monitor trend        PAF  · Rates controlled without any agents  · Cont eliquis     CKD  · Level 3  · Baseline 0 8-1 0  · Avoid nephrotoxic medications  · Avoid hypotension in the post operative setting  · Monitor bmp     Hypothyroid  · Cont levothyroxine     Hyperlipidemia  · Low cholesterol diet  · Continue statin therapy        The above assessment and plan was reviewed and updated as determined by my evaluation of the patient on 2/18/2022      Labs:   Results from last 7 days   Lab Units 02/17/22  0637 02/15/22  0656   WBC Thousand/uL 4 84 6 66   HEMOGLOBIN g/dL 10 3* 11 5   HEMATOCRIT % 32 2* 36 1   PLATELETS Thousands/uL 246 257     Results from last 7 days   Lab Units 02/17/22  0637 02/15/22  0656   SODIUM mmol/L 133* 133*   POTASSIUM mmol/L 3 8 3 8   CHLORIDE mmol/L 98* 98*   CO2 mmol/L 30 29   BUN mg/dL 25 17   CREATININE mg/dL 0 97 0 96   CALCIUM mg/dL 8 8 9 0         Results from last 7 days   Lab Units 02/12/22  1139   INR  1 32*     Results from last 7 days   Lab Units 02/18/22  0621 02/17/22  2032 02/17/22  1524   POC GLUCOSE mg/dl 152* 209* 116       Review of Scheduled Meds:  Current Facility-Administered Medications   Medication Dose Route Frequency Provider Last Rate    acetaminophen  975 mg Oral [facility] Sagar Clement MD      amLODIPine  10 mg Oral Daily Sagar Clement MD      apixaban  5 mg Oral BID Sagar Clement MD      ascorbic acid  500 mg Oral Daily Sagar Clement MD      atorvastatin  40 mg Oral After Ashley Huddleston MD      bisacodyl  10 mg Rectal Daily PRN Sagar Clement MD      Carboxymethylcellulose Sodium  1 drop Ophthalmic BID Sagar Clement MD      chlorthalidone  25 mg Oral Daily Sagar Clement MD      cholecalciferol  1,000 Units Oral Daily Sagar Clement MD      cloNIDine  0 1 mg Oral Q12H [address] Sagar Clement MD      Diclofenac Sodium  2 g Topical TID PRN Sagar Clement MD      docusate sodium  100 mg Oral BID Sagar Clement MD      insulin lispro  1-5 Units Subcutaneous HS Sagar Clement MD      insulin lispro  1-5 Units Subcutaneous TID [facility] Sagar Clement MD      labetalol  200 mg Oral Q12H [address] Sagar Clement MD      levothyroxine  125 mcg Oral Early Morning Sagar Clement MD      lidocaine  1 patch Topical Daily Sagar Clement MD      lisinopril  20 mg Oral BID Sagar Clement MD      LORazepam  0 5 mg Oral BID Sagar Clement MD      metFORMIN  500 mg Oral Daily With Breakfast Esther Thomas PA-C      ondansetron  4 mg Oral Q8H PRN Sagar Clement MD      oxyCODONE  2 5 mg Oral Q4H PRN Sagar Clement MD      polyethylene glycol  17 g Oral Daily PRN Sagar Clement MD      PreserVision AREDS 2  1 capsule Oral BID Sagar Clement MD      senna  1 tablet Oral BID Sagar Clement MD         Subjective/ HPI: Patient seen and examined  Patients overnight issues or events were reviewed with nursing or staff during rounds or morning huddle session  New or overnight issues include the following:     Pt seen in her room  Pt requesting manual BPs only  She denies any other complaints  ROS:   A 10 point ROS was performed; negative except as noted above         Imaging:     No orders to display       *Labs /Radiology studies Reviewed  *Medications  reviewed and reconciled as needed  *Please refer to order section for additional ordered labs studies  *Case discussed with primary attending during morning huddle case rounds    Physical Examination:  Vitals:   Vitals:    02/17/22 0953 02/17/22 1523 02/17/22 2036 02/18/22 0755   BP: 166/52 141/84 164/64 159/72   BP Location: Left arm Left arm Left arm Left arm   Pulse:  78 57 63   Resp:  18 18 18   Temp:  97 7 °F (36 5 °C) 98 °F (36 7 °C) 97 7 °F (36 5 °C)   TempSrc:  Oral Oral Oral   SpO2:  98% 95% 95%   Weight:       Height:         GEN: No apparent distress, interactive; frail  NEURO: Alert and oriented x3  HEENT: Pupils are equal and reactive, EOMI, mucous membranes are moist, face symmetrical  CV: S1 S2 regular, no MRG, mild right knee swelling  RESP: Lungs are clear bilaterally, no wheezes, rales or rhonchi noted, on room air, respirations easy and non labored  GI: Flat, soft non tender, non distended; +BS x4  : Voiding without difficulty  MUSC: Moves all extremities; except RLE  SKIN: pink, warm and dry, normal turgor, no rashes, lesions    The above physical exam was reviewed and updated as determined by my evaluation of the patient on 2/18/2022  Invasive Devices  Report    None                    VTE Pharmacologic Prophylaxis: Eliquis  Code Status: Level 1 - Full Code  Current Length of Stay: 3 day(s)      Total time spent:  30 minutes with more than 50% spent counseling/coordinating care  Counseling includes discussion with patient re: progress  and discussion with patient of his/her current medical state/information  Coordination of patient's care was performed in conjunction with primary service  Time invested included review of patient's labs, vitals, and management of their comorbidities with continued monitoring  In addition, this patient was discussed with medical team including physician and advanced extenders   The care of the patient was extensively discussed and appropriate treatment plan was formulated unique for this patient  ** Please Note:  voice to text software may have been used in the creation of this document   Although proof errors in transcription or interpretation are a potential of such software**

## 2022-02-19 LAB
GLUCOSE SERPL-MCNC: 154 MG/DL (ref 65–140)
GLUCOSE SERPL-MCNC: 203 MG/DL (ref 65–140)
GLUCOSE SERPL-MCNC: 330 MG/DL (ref 65–140)
GLUCOSE SERPL-MCNC: 90 MG/DL (ref 65–140)

## 2022-02-19 PROCEDURE — 97110 THERAPEUTIC EXERCISES: CPT

## 2022-02-19 PROCEDURE — 99232 SBSQ HOSP IP/OBS MODERATE 35: CPT | Performed by: NURSE PRACTITIONER

## 2022-02-19 PROCEDURE — 97535 SELF CARE MNGMENT TRAINING: CPT

## 2022-02-19 PROCEDURE — 97116 GAIT TRAINING THERAPY: CPT

## 2022-02-19 PROCEDURE — 82948 REAGENT STRIP/BLOOD GLUCOSE: CPT

## 2022-02-19 PROCEDURE — 97530 THERAPEUTIC ACTIVITIES: CPT

## 2022-02-19 RX ADMIN — OXYCODONE HYDROCHLORIDE AND ACETAMINOPHEN 500 MG: 500 TABLET ORAL at 09:05

## 2022-02-19 RX ADMIN — Medication 1 CAPSULE: at 21:08

## 2022-02-19 RX ADMIN — ACETAMINOPHEN 975 MG: 325 TABLET, FILM COATED ORAL at 14:28

## 2022-02-19 RX ADMIN — Medication 1000 UNITS: at 09:05

## 2022-02-19 RX ADMIN — STANDARDIZED SENNA CONCENTRATE 8.6 MG: 8.6 TABLET ORAL at 09:04

## 2022-02-19 RX ADMIN — Medication 1 CAPSULE: at 09:12

## 2022-02-19 RX ADMIN — LIDOCAINE 5% 1 PATCH: 700 PATCH TOPICAL at 09:06

## 2022-02-19 RX ADMIN — Medication 1 DROP: at 17:19

## 2022-02-19 RX ADMIN — LISINOPRIL 20 MG: 20 TABLET ORAL at 09:05

## 2022-02-19 RX ADMIN — ACETAMINOPHEN 975 MG: 325 TABLET, FILM COATED ORAL at 21:07

## 2022-02-19 RX ADMIN — CLONIDINE HYDROCHLORIDE 0.1 MG: 0.1 TABLET ORAL at 09:06

## 2022-02-19 RX ADMIN — CHLORTHALIDONE 25 MG: 25 TABLET ORAL at 09:19

## 2022-02-19 RX ADMIN — Medication 1 DROP: at 09:13

## 2022-02-19 RX ADMIN — METFORMIN HYDROCHLORIDE 500 MG: 500 TABLET, FILM COATED ORAL at 09:05

## 2022-02-19 RX ADMIN — DOCUSATE SODIUM 100 MG: 100 CAPSULE ORAL at 09:04

## 2022-02-19 RX ADMIN — LORAZEPAM 0.5 MG: 0.5 TABLET ORAL at 09:10

## 2022-02-19 RX ADMIN — INSULIN LISPRO 4 UNITS: 100 INJECTION, SOLUTION INTRAVENOUS; SUBCUTANEOUS at 11:55

## 2022-02-19 RX ADMIN — STANDARDIZED SENNA CONCENTRATE 8.6 MG: 8.6 TABLET ORAL at 17:17

## 2022-02-19 RX ADMIN — Medication 1 DROP: at 21:08

## 2022-02-19 RX ADMIN — AMLODIPINE BESYLATE 10 MG: 10 TABLET ORAL at 09:04

## 2022-02-19 RX ADMIN — ACETAMINOPHEN 975 MG: 325 TABLET, FILM COATED ORAL at 06:23

## 2022-02-19 RX ADMIN — ATORVASTATIN CALCIUM 40 MG: 40 TABLET, FILM COATED ORAL at 17:17

## 2022-02-19 RX ADMIN — APIXABAN 5 MG: 5 TABLET, FILM COATED ORAL at 17:17

## 2022-02-19 RX ADMIN — LABETALOL HYDROCHLORIDE 200 MG: 200 TABLET, FILM COATED ORAL at 21:07

## 2022-02-19 RX ADMIN — LISINOPRIL 20 MG: 20 TABLET ORAL at 21:07

## 2022-02-19 RX ADMIN — APIXABAN 5 MG: 5 TABLET, FILM COATED ORAL at 09:06

## 2022-02-19 RX ADMIN — INSULIN LISPRO 1 UNITS: 100 INJECTION, SOLUTION INTRAVENOUS; SUBCUTANEOUS at 21:09

## 2022-02-19 RX ADMIN — LEVOTHYROXINE SODIUM 125 MCG: 125 TABLET ORAL at 06:23

## 2022-02-19 RX ADMIN — CLONIDINE HYDROCHLORIDE 0.1 MG: 0.1 TABLET ORAL at 21:07

## 2022-02-19 RX ADMIN — DOCUSATE SODIUM 100 MG: 100 CAPSULE ORAL at 17:17

## 2022-02-19 RX ADMIN — LORAZEPAM 0.5 MG: 0.5 TABLET ORAL at 17:16

## 2022-02-19 RX ADMIN — LABETALOL HYDROCHLORIDE 200 MG: 200 TABLET, FILM COATED ORAL at 09:10

## 2022-02-19 NOTE — PROGRESS NOTES
02/19/22 0700   Pain Assessment   Pain Assessment Tool 0-10   Pain Score 2   Pain Location/Orientation Orientation: Right;Location: Leg   Restrictions/Precautions   Precautions Fall Risk;Supervision on toilet/commode;Visual deficit  (PWBing RLE; macular degeneration)   RUE Weight Bearing Per Order WBAT   LUE Weight Bearing Per Order WBAT   RLE Weight Bearing Per Order PWB   LLE Weight Bearing Per Order WBAT   ROM Restrictions Yes   RLE ROM Restriction Other  (no resisted exercise elvira on R knee )   Lifestyle   Autonomy "I sit on the toilet with the lid closed to do my hair"   Eating   Type of Assistance Needed Independent   Physical Assistance Level No physical assistance   Eating CARE Score 6   Oral Hygiene   Type of Assistance Needed Supervision   Physical Assistance Level No physical assistance   Comment in stance at sink   Oral Hygiene CARE Score 4   Shower/Bathe Self   Type of Assistance Needed Supervision   Physical Assistance Level No physical assistance   Comment Pt completes full shower routine seated on shower chair with overall CS when in stance for sal/rear hygiene  No LOB noted  Min VCs for ensuring PWBing status maintained  Shower/Bathe Self CARE Score 4   Bathing   Assessed Bath Style Shower   Anticipated D/C Bath Style Shower  (if pt can obtain shower chair)   Able to Gather/Transport Yes   Able to [employer] Temperature Yes   Able to Wash/Rinse/Dry (body part) Right Arm;L Upper Leg;R Upper Leg;L Lower Leg/Foot;R Lower Leg/Foot;Chest;Abdomen;Perineal Area; Buttocks   Limitations Noted in Balance;ROM;Safety;Strength   Positioning Seated;Standing   Adaptive Equipment Shower Bars; Shower Seat;Hand [employer]   Type of Assistance Needed Supervision   Physical Assistance Level No physical assistance   Comment seated to don shirt   Upper Body Dressing CARE Score 4   Lower Body Dressing   Type of Assistance Needed Supervision   Physical Assistance Level No physical assistance Comment seated to thread underwear/pants over feet, CS in stance for CM    Lower Body Dressing CARE Score 4   Putting On/Taking Off Footwear   Type of Assistance Needed Supervision   Physical Assistance Level No physical assistance   Comment seated to don/doff socks   Putting On/Taking Off Footwear CARE Score 4   Sit to Stand   Type of Assistance Needed Supervision   Physical Assistance Level No physical assistance   Comment CS with RW   Sit to Stand CARE Score 4   Bed-Chair Transfer   Type of Assistance Needed Supervision   Physical Assistance Level No physical assistance   Comment CS with RW   Chair/Bed-to-Chair Transfer CARE Score 4   Toileting Hygiene   Type of Assistance Needed Supervision   Physical Assistance Level No physical assistance   Comment CS in stance for hygiene/CM   Toileting Hygiene CARE Score 4   Toilet Transfer   Type of Assistance Needed Supervision   Physical Assistance Level No physical assistance   Comment CS with RW and BSC over top toilet   Toilet Transfer CARE Score 4   Cognition   Overall Cognitive Status [facility]   Arousal/Participation Alert; Cooperative   Attention Within functional limits   Orientation Level Oriented X4   Memory Decreased recall of precautions   Following Commands Follows one step commands with increased time or repetition   Activity Tolerance   Activity Tolerance Patient tolerated treatment well   Medical Staff Made Aware    Assessment   Treatment Assessment Pt engages in 90 minute skilled OT session focusing on ADL routine, func transfers  See above for full functional details  Pt demo's progress with shower and transfers completing all at CS level with RW with min VCs throughout to ensure adherence to R LE PWBing restrictions  Discussed safety recommendations for completing hair care as pt requires ext time to complete   Discussed sitting down in front of mirror vs standing with pt reporting that she sits on her toilet seated with the lid closed and is able to complete that way  Recommend continued skilled care to focus on ADL retraining, functional transfers, standing osmani/bal, IADLs, in order to decrease burden of care at d/c  Prognosis Good   Problem List Decreased strength;Decreased range of motion;Decreased endurance; Impaired balance;Decreased mobility; Decreased coordination; Impaired judgement;Decreased safety awareness; Impaired vision;Orthopedic restrictions;Pain   Barriers to Discharge Inaccessible home environment;Decreased caregiver support   Plan   Treatment/Interventions ADL retraining;Functional transfer training; Therapeutic exercise; Endurance training;Patient/family training;Equipment eval/education; Compensatory technique education   OT Therapy Minutes   OT Time In 0700   OT Time Out 0830   OT Total Time (minutes) 90   OT Mode of treatment - Individual (minutes) 90   OT Mode of treatment - Concurrent (minutes) 0   OT Mode of treatment - Group (minutes) 0   OT Mode of treatment - Co-treat (minutes) 0   OT Mode of Treatment - Total time(minutes) 90 minutes   OT Cumulative Minutes 360   Therapy Time missed   Time missed?  No

## 2022-02-19 NOTE — PROGRESS NOTES
Internal Medicine Progress Note  Patient: Armaan Neil  Age/sex: 78 y o  female  Medical Record #: [id number]      ASSESSMENT/PLAN: (Interval History)  Armaan Neil is seen and examined and management for following issues:    Right Tibial plateau fracture  · Medical management per ortho  · Rehab/pain per PMR  · Discussed WB and ROM with ortho  PWB 30-50% with passive ROM  · Hinged brace as needed for pain     DM II  · Last HbA1C was 10/2020 and was 6 1 (pre-DM)  · Says wasn't taking Metformin at home  · OP PCP notes = prev on Amaryl 1mg prn and taken off 11/19/2021  · On 2/18, started Metformin 500mg daily  · May need to uptitrate Metformin  · Continue diabetic diet and Accuchecks with SSI  · HbA1C 2/21/22     HTN  · Home: Norvasc 10mg qd; Chlorthalidone 25mg qd, Clonidine 0 1 mg qd, Labetolol 200mg q12 hours, Lisinopril 20mg BID  · Here: Norvasc 10mg qd, Chlorthalidone 25mg qd, Clonidine 0 1mg q12hrs, Labetolol 200mg q12hrs, Lisinopril 20mg BID  · Stable; no changes today      PAF  · Cont Eliquis/Labetolol  · Apical is irregular today     CKD III  · Baseline 0 8-1 0  · Avoid nephrotoxic medications  · stable     Hypothyroid  · Cont levothyroxine     Hyperlipidemia  · Continue statin therapy       The above assessment and plan was reviewed and updated as determined by my evaluation of the patient on 2/19/2022      Labs:   Results from last 7 days   Lab Units 02/17/22  0637 02/15/22  0656   WBC Thousand/uL 4 84 6 66   HEMOGLOBIN g/dL 10 3* 11 5   HEMATOCRIT % 32 2* 36 1   PLATELETS Thousands/uL 246 257     Results from last 7 days   Lab Units 02/17/22  0637 02/15/22  0656   SODIUM mmol/L 133* 133*   POTASSIUM mmol/L 3 8 3 8   CHLORIDE mmol/L 98* 98*   CO2 mmol/L 30 29   BUN mg/dL 25 17   CREATININE mg/dL 0 97 0 96   CALCIUM mg/dL 8 8 9 0         Results from last 7 days   Lab Units 02/12/22  1139   INR  1 32*     Results from last 7 days   Lab Units 02/19/22  0618 02/18/22 2027 02/18/22  1542   POC GLUCOSE mg/dl 154* 196* 121       Review of Scheduled Meds:  Current Facility-Administered Medications   Medication Dose Route Frequency Provider Last Rate    acetaminophen  975 mg Oral [facility] Glenn Nuñez MD      amLODIPine  10 mg Oral Daily Glenn Nuñez MD      apixaban  5 mg Oral BID Glenn Nuñez MD      ascorbic acid  500 mg Oral Daily Glenn Nuñez MD      atorvastatin  40 mg Oral After Chichi Fernández MD      bisacodyl  10 mg Rectal Daily PRN Glenn Nuñez MD      Carboxymethylcellulose Sodium  1 drop Ophthalmic TID Cathryn Hairston MD      chlorthalidone  25 mg Oral Daily Glenn Nuñez MD      cholecalciferol  1,000 Units Oral Daily Glenn Nuñez MD      cloNIDine  0 1 mg Oral Q12H [address] Glenn Nuñez MD      Diclofenac Sodium  2 g Topical TID PRN Glenn Nuñez MD      docusate sodium  100 mg Oral BID Glenn Nuñez MD      insulin lispro  1-5 Units Subcutaneous HS Glenn Nuñez MD      insulin lispro  1-5 Units Subcutaneous TID [facility] Glenn Nuñez MD      labetalol  200 mg Oral Q12H [address] Glenn Nuñez MD      levothyroxine  125 mcg Oral Early Morning Glenn Nuñez MD      lidocaine  1 patch Topical Daily Glenn Nuñez MD      lisinopril  20 mg Oral BID Glenn Nuñez MD      LORazepam  0 5 mg Oral BID Glenn Nuñez MD      metFORMIN  500 mg Oral Daily With Breakfast Esther Thomas PA-C      ondansetron  4 mg Oral Q8H PRN Glenn Nuñez MD      oxyCODONE  2 5 mg Oral Q4H PRN Glenn Nuñez MD      polyethylene glycol  17 g Oral Daily PRN Glenn Nuñez MD      PreserVision AREDS 2  1 capsule Oral BID Glenn Nuñez MD      senna  1 tablet Oral BID Glenn Nuñez MD         Subjective/ HPI: Patient seen and examined  Patients overnight issues or events were reviewed with nursing or staff during rounds or morning huddle session  New or overnight issues include the following:     No new or overnight issues  Offers no complaints    Had BM 2/18    ROS:   A 10 point ROS was performed; negative except as noted above  Imaging:     No orders to display       *Labs /Radiology studies reviewed  *Medications reviewed and reconciled as needed  *Please refer to order section for additional ordered labs studies  *Case discussed with primary attending during morning huddle case rounds      Physical Examination:  Vitals:   Vitals:    02/18/22 1444 02/18/22 2059 02/19/22 0641 02/19/22 0902   BP: 138/56 118/58 142/62 152/60   BP Location: Left arm Left arm Left arm    Pulse: 60 69 56    Resp: 18 16 18    Temp: (!) 97 4 °F (36 3 °C) 98 6 °F (37 °C) 98 5 °F (36 9 °C)    TempSrc: Oral Oral Oral    SpO2: 98% 96% 96%    Weight:       Height:           General Appearance: no distress, conversive  HEENT: PERRLA, conjuctiva normal; oropharynx clear; mucous membranes moist   Neck:  Supple, normal ROM, no JVD  Lungs: CTA, normal respiratory effort, no retractions, expiratory effort normal  CV: irregular rate and rhythm; no rubs/murmurs/gallops, PMI normal   ABD: soft; ND/NT; +BS  EXT: no edema  Skin: normal turgor, normal texture, no rashes  Psych: affect normal, mood normal  Neuro: AAO      The above physical exam was reviewed and updated as determined by my evaluation of the patient on 2/19/2022  Invasive Devices  Report    None                    VTE Pharmacologic Prophylaxis: Eliquis  Code Status: Level 1 - Full Code  Current Length of Stay: 4 day(s)      Total time spent:  30 minutes with more than 50% spent counseling/coordinating care  Counseling includes discussion with patient re: progress  and discussion with patient of his/her current medical state/information  Coordination of patient's care was performed in conjunction with primary service  Time invested included review of patient's labs, vitals, and management of their comorbidities with continued monitoring  In addition, this patient was discussed with medical team including physician and advanced extenders   The care of the patient was extensively discussed and appropriate treatment plan was formulated unique for this patient  ** Please Note:  voice to text software may have been used in the creation of this document   Although proof errors in transcription or interpretation are a potential of such software**

## 2022-02-19 NOTE — PROGRESS NOTES
02/19/22 1230   Pain Assessment   Pain Assessment Tool 0-10   Pain Score 4   Pain Location/Orientation Orientation: Right;Location: Knee  (medial aspect)   Pain Onset/Description Onset: Ongoing;Frequency: Intermittent   Hospital Pain Intervention(s) Repositioned;Rest;Relaxation technique   Restrictions/Precautions   Precautions Fall Risk;Supervision on toilet/commode;Pain;Limb alert;Visual deficit  (macular degeneration L eye)   RLE Weight Bearing Per Order PWB   RLE ROM Restriction AAROM;AROM;Range Limitation   General   Change In Medical/Functional Status per Ortho " outside of pt's normal movements and abilities to avoid AROM/AAROM with PT due to possible meniscal tear"   Ok for PROM in therapy   Cognition   Arousal/Participation Alert; Cooperative   Subjective   Subjective pt reported onset of R knee after 45 mins into the session  Lying to Sitting on Side of Bed   Type of Assistance Needed Set-up / clean-up   Lying to Sitting on Side of Bed CARE Score 5   Sit to Stand   Type of Assistance Needed Supervision;Verbal cues; Adaptive equipment   Comment initially required max VC for hand placement on chair versus holding walker during sit to stand transition    Sit to Stand CARE Score 4   Bed-Chair Transfer   Type of Assistance Needed Supervision;Verbal cues; Adaptive equipment   Comment VC to offload R LE in stance by relying on L LE and UE with walker    Chair/Bed-to-Chair Transfer CARE Score 4   Car Transfer   Type of Assistance Needed Incidental touching;Verbal cues; Adaptive equipment   Comment RW   Car Transfer CARE Score 4   Walk 10 Feet   Type of Assistance Needed Verbal cues; Incidental touching; Adaptive equipment   Comment repeated 10' w/c to/from car/ramp    Walk 10 Feet CARE Score 4   Walk 50 Feet with Two Turns   Type of Assistance Needed Incidental touching;Verbal cues; Adaptive equipment   Comment 100' with RW   Walk 50 Feet with Two Turns CARE Score 4   Walk 150 Feet   Type of Assistance Needed Incidental touching;Verbal cues; Adaptive equipment   Comment CG- CS x 200', 150'  with RW including getting in/out of elevator   Walk 150 Feet CARE Score 4   Walking 10 Feet on Uneven Surfaces   Type of Assistance Needed Adaptive equipment;Physical assistance;Verbal cues   Physical Assistance Level 25% or less   Comment min-CG with step by step VC for sequencing and walker management walking over ramp, VC to maintain PWB walking up/down upstairs ramp with RW   Walking 10 Feet on Uneven Surfaces CARE Score 3   Picking Up Object   Type of Assistance Needed Incidental touching;Verbal cues; Adaptive equipment   Comment reacher- marker with walker support   Picking Up Object CARE Score 4   Therapeutic Interventions   Flexibility L HS and gastroc mm stretching x 3 reps   Equipment Use   NuStep L2 x 10 mins L UE and bilat UE only  R LE resting on 2" block , SPM> 35    Assessment   Treatment Assessment Pt initially given increase VC to maintain PWB on R LE elvira when turning around and for proper hand placement during sit to stand transition  Pt also reeducated on importance of maintaining WB status during mobilities to facilitate R knee healing  PT also reviewed with pt ongoing ortho ROM restrictions with good understanding expressed  To facilitate appreciation/ understanding of PWB PT had pt step on the weighing scale with R foot and placed a cracker underneath R foot while walking  Pt expressed realization that she must put more weight on UE/walker during stance phase of R LE to be in compliant with PWB  Pt reported onset of R knee pain after repeated gait training but no LOB or knee buckling and PT repositioned lidocaine patch placement with RN approval  PT will practice ramp negotiation on the ground floor next session since pt expressed upstairs ramp being too steep that what she is to navigate at her apt  Cont functional mobility training to promote indep with consistent carry over of PWB on R LE and safety practices  Barriers to Discharge Inaccessible home environment;Decreased caregiver support   Plan   Treatment/Interventions Functional transfer training;LE strengthening/ROM; Therapeutic exercise; Endurance training;Bed mobility;Gait training;Spoke to nursing;OT   Progress Progressing toward goals   Recommendation   Equipment Recommended Walker  (will need DME for RW - has a SW at home )   PT - OK to Discharge No   PT Therapy Minutes   PT Time In 1230   PT Time Out 1400   PT Total Time (minutes) 90   PT Mode of treatment - Individual (minutes) 90   PT Mode of treatment - Concurrent (minutes) 0   PT Mode of treatment - Group (minutes) 0   PT Mode of treatment - Co-treat (minutes) 0   PT Mode of Treatment - Total time(minutes) 90 minutes   PT Cumulative Minutes 360

## 2022-02-19 NOTE — PLAN OF CARE
Problem: Potential for Falls  Goal: Patient will remain free of falls  Description: INTERVENTIONS:  - Educate patient/family on patient safety including physical limitations  - Instruct patient to call for assistance with activity   - Consult OT/PT to assist with strengthening/mobility   - Keep Call bell within reach  - Keep bed low and locked with side rails adjusted as appropriate  - Keep care items and personal belongings within reach  - Initiate and maintain comfort rounds  - Make Fall Risk Sign visible to staff  - Offer Toileting every 2-4 Hours, in advance of need  - Initiate/Maintain bed/chair alarm  - Obtain necessary fall risk management equipment: alarms  - Apply yellow socks and bracelet for high fall risk patients  - Consider moving patient to room near nurses station  Outcome: Progressing     Problem: PAIN - ADULT  Goal: Verbalizes/displays adequate comfort level or baseline comfort level  Description: Interventions:  - Encourage patient to monitor pain and request assistance  - Assess pain using appropriate pain scale  - Administer analgesics based on type and severity of pain and evaluate response  - Implement non-pharmacological measures as appropriate and evaluate response  - Consider cultural and social influences on pain and pain management  - Notify physician/advanced practitioner if interventions unsuccessful or patient reports new pain  Outcome: Progressing     Problem: INFECTION - ADULT  Goal: Absence or prevention of progression during hospitalization  Description: INTERVENTIONS:  - Assess and monitor for signs and symptoms of infection  - Monitor lab/diagnostic results  - Monitor all insertion sites, i e  indwelling lines, tubes, and drains  - Monitor endotracheal if appropriate and nasal secretions for changes in amount and color  - [place] appropriate cooling/warming therapies per order  - Administer medications as ordered  - Instruct and encourage patient and family to use good hand hygiene technique  - Identify and instruct in appropriate isolation precautions for identified infection/condition  Outcome: Progressing     Problem: SAFETY ADULT  Goal: Maintain or return to baseline ADL function  Description: INTERVENTIONS:  -  Assess patient's ability to carry out ADLs; assess patient's baseline for ADL function and identify physical deficits which impact ability to perform ADLs (bathing, care of mouth/teeth, toileting, grooming, dressing, etc )  - Assess/evaluate cause of self-care deficits   - Assess range of motion  - Assess patient's mobility; develop plan if impaired  - Assess patient's need for assistive devices and provide as appropriate  - Encourage maximum independence but intervene and supervise when necessary  - Involve family in performance of ADLs  - Assess for home care needs following discharge   - Consider OT consult to assist with ADL evaluation and planning for discharge  - Provide patient education as appropriate  Outcome: Progressing  Goal: Maintains/Returns to pre admission functional level  Description: INTERVENTIONS:  - Perform BMAT or MOVE assessment daily    - Set and communicate daily mobility goal to care team and patient/family/caregiver  - Collaborate with rehabilitation services on mobility goals if consulted  - Perform Range of Motion 3 times a day  - Reposition patient every 2 hours    - Dangle patient 3 times a day  - Stand patient 3 times a day  - Ambulate patient 3 times a day  - Out of bed to chair 3 times a day   - Out of bed for meals 3 times a day  - Out of bed for toileting  - Record patient progress and toleration of activity level   Outcome: Progressing     Problem: DISCHARGE PLANNING  Goal: Discharge to home or other facility with appropriate resources  Description: INTERVENTIONS:  - Identify barriers to discharge w/patient and caregiver  - Arrange for needed discharge resources and transportation as appropriate  - Identify discharge learning needs (meds, wound care, etc )  - Arrange for interpretive services to assist at discharge as needed  - Refer to Case Management Department for coordinating discharge planning if the patient needs post-hospital services based on physician/advanced practitioner order or complex needs related to functional status, cognitive ability, or social support system  Outcome: Progressing     Problem: Prexisting or High Potential for Compromised Skin Integrity  Goal: Skin integrity is maintained or improved  Description: INTERVENTIONS:  - Identify patients at risk for skin breakdown  - Assess and monitor skin integrity  - Assess and monitor nutrition and hydration status  - Monitor labs   - Assess for incontinence   - Turn and reposition patient  - Assist with mobility/ambulation  - Relieve pressure over bony prominences  - Avoid friction and shearing  - Provide appropriate hygiene as needed including keeping skin clean and dry  - Evaluate need for skin moisturizer/barrier cream  - Collaborate with interdisciplinary team   - Patient/family teaching  - Consider wound care consult   Outcome: Progressing

## 2022-02-20 LAB
GLUCOSE SERPL-MCNC: 143 MG/DL (ref 65–140)
GLUCOSE SERPL-MCNC: 147 MG/DL (ref 65–140)
GLUCOSE SERPL-MCNC: 147 MG/DL (ref 65–140)
GLUCOSE SERPL-MCNC: 166 MG/DL (ref 65–140)

## 2022-02-20 PROCEDURE — 99232 SBSQ HOSP IP/OBS MODERATE 35: CPT | Performed by: NURSE PRACTITIONER

## 2022-02-20 PROCEDURE — 82948 REAGENT STRIP/BLOOD GLUCOSE: CPT

## 2022-02-20 RX ADMIN — Medication 1 CAPSULE: at 08:24

## 2022-02-20 RX ADMIN — LEVOTHYROXINE SODIUM 125 MCG: 125 TABLET ORAL at 05:36

## 2022-02-20 RX ADMIN — ACETAMINOPHEN 975 MG: 325 TABLET, FILM COATED ORAL at 21:27

## 2022-02-20 RX ADMIN — APIXABAN 5 MG: 5 TABLET, FILM COATED ORAL at 08:22

## 2022-02-20 RX ADMIN — CLONIDINE HYDROCHLORIDE 0.1 MG: 0.1 TABLET ORAL at 21:28

## 2022-02-20 RX ADMIN — OXYCODONE HYDROCHLORIDE AND ACETAMINOPHEN 500 MG: 500 TABLET ORAL at 08:23

## 2022-02-20 RX ADMIN — Medication 1000 UNITS: at 08:22

## 2022-02-20 RX ADMIN — AMLODIPINE BESYLATE 10 MG: 10 TABLET ORAL at 08:23

## 2022-02-20 RX ADMIN — ACETAMINOPHEN 975 MG: 325 TABLET, FILM COATED ORAL at 05:36

## 2022-02-20 RX ADMIN — LABETALOL HYDROCHLORIDE 200 MG: 200 TABLET, FILM COATED ORAL at 08:32

## 2022-02-20 RX ADMIN — APIXABAN 5 MG: 5 TABLET, FILM COATED ORAL at 17:20

## 2022-02-20 RX ADMIN — INSULIN LISPRO 1 UNITS: 100 INJECTION, SOLUTION INTRAVENOUS; SUBCUTANEOUS at 21:29

## 2022-02-20 RX ADMIN — LISINOPRIL 20 MG: 20 TABLET ORAL at 21:28

## 2022-02-20 RX ADMIN — METFORMIN HYDROCHLORIDE 500 MG: 500 TABLET, FILM COATED ORAL at 07:51

## 2022-02-20 RX ADMIN — Medication 1 CAPSULE: at 21:31

## 2022-02-20 RX ADMIN — LISINOPRIL 20 MG: 20 TABLET ORAL at 08:32

## 2022-02-20 RX ADMIN — Medication 1 DROP: at 08:26

## 2022-02-20 RX ADMIN — ACETAMINOPHEN 975 MG: 325 TABLET, FILM COATED ORAL at 13:41

## 2022-02-20 RX ADMIN — METFORMIN HYDROCHLORIDE 500 MG: 500 TABLET, FILM COATED ORAL at 17:20

## 2022-02-20 RX ADMIN — Medication 1 DROP: at 21:29

## 2022-02-20 RX ADMIN — CLONIDINE HYDROCHLORIDE 0.1 MG: 0.1 TABLET ORAL at 08:22

## 2022-02-20 RX ADMIN — CHLORTHALIDONE 25 MG: 25 TABLET ORAL at 08:25

## 2022-02-20 RX ADMIN — DOCUSATE SODIUM 100 MG: 100 CAPSULE ORAL at 17:19

## 2022-02-20 RX ADMIN — LORAZEPAM 0.5 MG: 0.5 TABLET ORAL at 08:31

## 2022-02-20 RX ADMIN — Medication 1 DROP: at 17:21

## 2022-02-20 RX ADMIN — LABETALOL HYDROCHLORIDE 200 MG: 200 TABLET, FILM COATED ORAL at 21:27

## 2022-02-20 RX ADMIN — LORAZEPAM 0.5 MG: 0.5 TABLET ORAL at 17:20

## 2022-02-20 RX ADMIN — ATORVASTATIN CALCIUM 40 MG: 40 TABLET, FILM COATED ORAL at 17:20

## 2022-02-20 NOTE — PROGRESS NOTES
Internal Medicine Progress Note  Patient: April Jose  Age/sex: 78 y o  female  Medical Record #: [id number]      ASSESSMENT/PLAN: (Interval History)  April Jose is seen and examined and management for following issues:    Right Tibial plateau fracture  · Medical management per ortho  · Rehab/pain per PMR  · Discussed WB and ROM with ortho  PWB 30-50% with passive ROM  · Hinged brace as needed for pain     DM II  · Last HbA1C was 10/2020 and was 6 1 (pre-DM)  · Says wasn't taking anything at home for DM  · Has a meter at home and still checks her BSs AM/dinnertime, runs 100 and never higher than 150  · OP PCP notes = prev on Amaryl 1mg prn and taken off 11/19/2021  · On 2/18, started Metformin 500mg daily  · Continue diabetic diet and Accuchecks with SSI  · HbA1C 2/21/22  · Will increase Metformin to 500mg BID to start this evening 2/20/22     HTN  · Home: Norvasc 10mg qd; Chlorthalidone 25mg qd, Clonidine 0 1 mg qd, Labetolol 200mg q12 hours, Lisinopril 20mg BID  · Here: Norvasc 10mg qd, Chlorthalidone 25mg qd, Clonidine 0 1mg q12hrs, Labetolol 200mg q12hrs, Lisinopril 20mg BID  · Stable; no changes today      PAF  · Cont Eliquis/Labetolol  · Apical is irregular today     CKD III  · Baseline 0 8-1 0  · Avoid nephrotoxic medications  · stable     Hypothyroid  · Cont levothyroxine     Hyperlipidemia  · Continue statin therapy       The above assessment and plan was reviewed and updated as determined by my evaluation of the patient on 2/20/2022      Labs:   Results from last 7 days   Lab Units 02/17/22  0637 02/15/22  0656   WBC Thousand/uL 4 84 6 66   HEMOGLOBIN g/dL 10 3* 11 5   HEMATOCRIT % 32 2* 36 1   PLATELETS Thousands/uL 246 257     Results from last 7 days   Lab Units 02/17/22  0637 02/15/22  0656   SODIUM mmol/L 133* 133*   POTASSIUM mmol/L 3 8 3 8   CHLORIDE mmol/L 98* 98*   CO2 mmol/L 30 29   BUN mg/dL 25 17   CREATININE mg/dL 0 97 0 96   CALCIUM mg/dL 8 8 9 0             Results from last 7 days   Lab Units 02/20/22  0629 02/19/22 2059 02/19/22  1554   POC GLUCOSE mg/dl 147* 203* 90       Review of Scheduled Meds:  Current Facility-Administered Medications   Medication Dose Route Frequency Provider Last Rate    acetaminophen  975 mg Oral [facility] Rimma Calvert MD      amLODIPine  10 mg Oral Daily Rimma Calvert MD      apixaban  5 mg Oral BID Rimma Calvert MD      ascorbic acid  500 mg Oral Daily Rimma Calvert MD      atorvastatin  40 mg Oral After Mac Fermin MD      bisacodyl  10 mg Rectal Daily PRN Rimma Calvert MD      Carboxymethylcellulose Sodium  1 drop Ophthalmic TID Tami Hayes MD      chlorthalidone  25 mg Oral Daily Rimma Calvert MD      cholecalciferol  1,000 Units Oral Daily Rimma Calvert MD      cloNIDine  0 1 mg Oral Q12H [address] Rimma Calvert MD      Diclofenac Sodium  2 g Topical TID PRN Rimma Calvert MD      docusate sodium  100 mg Oral BID Rimma Calvert MD      insulin lispro  1-5 Units Subcutaneous HS Rimma Calvert MD      insulin lispro  1-5 Units Subcutaneous TID [facility] Rimma Calvert MD      labetalol  200 mg Oral Q12H [address] Rimma Calvert MD      levothyroxine  125 mcg Oral Early Morning Rimma Calvert MD      lidocaine  1 patch Topical Daily Rimma Calvert MD      lisinopril  20 mg Oral BID Rimma Calvert MD      LORazepam  0 5 mg Oral BID Rimma Calvert MD      metFORMIN  500 mg Oral Daily With Breakfast Esther Thomas PA-C      ondansetron  4 mg Oral Q8H PRN Rimma Calvert MD      oxyCODONE  2 5 mg Oral Q4H PRN Rimma Calvert MD      polyethylene glycol  17 g Oral Daily PRN Rimma Calvert MD      PreserVision AREDS 2  1 capsule Oral BID Rimma Calvert MD      senna  1 tablet Oral BID Rimma Calvert MD         Subjective/ HPI: Patient seen and examined  Patients overnight issues or events were reviewed with nursing or staff during rounds or morning huddle session   New or overnight issues include the following:     No new or overnight issues  Offers no complaints    ROS:   A 10 point ROS was performed; negative except as noted above  Imaging:     No orders to display       *Labs /Radiology studies reviewed  *Medications reviewed and reconciled as needed  *Please refer to order section for additional ordered labs studies  *Case discussed with primary attending during morning huddle case rounds      Physical Examination:  Vitals:   Vitals:    02/19/22 1700 02/19/22 2341 02/20/22 0608 02/20/22 0820   BP: 158/70 126/56 148/67 154/50   BP Location: Left arm Left arm Left arm Left arm   Pulse: 58 57 (!) 54 56   Resp: 18 20 20    Temp: 97 6 °F (36 4 °C) 98 °F (36 7 °C) 98 4 °F (36 9 °C)    TempSrc: Oral Oral Oral    SpO2: 96% 99% 98%    Weight:       Height:           General Appearance: no distress, conversive  HEENT: PERRLA, conjuctiva normal; oropharynx clear; mucous membranes moist   Neck:  Supple, normal ROM, no JVD  Lungs: BBS without crackles/wheeze/rhonci; respirations unlabored with normal inspiratory/expiratory effort  No retractions noted  On RA  CV: S1/S2, RRR, no rubs/murmurs/gallops; PMI normal   ABD: soft; ND/NT; +BS  EXT: no edema  Skin: normal turgor, normal texture, no rashes  Psych: affect normal, mood normal  Neuro: AAO      The above physical exam was reviewed and updated as determined by my evaluation of the patient on 2/20/2022  Invasive Devices  Report    None                    VTE Pharmacologic Prophylaxis: Eliquis  Code Status: Level 1 - Full Code  Current Length of Stay: 5 day(s)      Total time spent:  30 minutes with more than 50% spent counseling/coordinating care  Counseling includes discussion with patient re: progress  and discussion with patient of his/her current medical state/information  Coordination of patient's care was performed in conjunction with primary service  Time invested included review of patient's labs, vitals, and management of their comorbidities with continued monitoring   In addition, this patient was discussed with medical team including physician and advanced extenders  The care of the patient was extensively discussed and appropriate treatment plan was formulated unique for this patient  ** Please Note:  voice to text software may have been used in the creation of this document   Although proof errors in transcription or interpretation are a potential of such software**

## 2022-02-20 NOTE — PLAN OF CARE
Problem: Potential for Falls  Goal: Patient will remain free of falls  Description: INTERVENTIONS:  - Educate patient/family on patient safety including physical limitations  - Instruct patient to call for assistance with activity   - Consult OT/PT to assist with strengthening/mobility   - Keep Call bell within reach  - Keep bed low and locked with side rails adjusted as appropriate  - Keep care items and personal belongings within reach  - Initiate and maintain comfort rounds  - Make Fall Risk Sign visible to staff  - Offer Toileting every 2-4 Hours, in advance of need  - Initiate/Maintain bed/chair alarm  - Obtain necessary fall risk management equipment: alarms  - Apply yellow socks and bracelet for high fall risk patients  - Consider moving patient to room near nurses station  Outcome: Progressing     Problem: PAIN - ADULT  Goal: Verbalizes/displays adequate comfort level or baseline comfort level  Description: Interventions:  - Encourage patient to monitor pain and request assistance  - Assess pain using appropriate pain scale  - Administer analgesics based on type and severity of pain and evaluate response  - Implement non-pharmacological measures as appropriate and evaluate response  - Consider cultural and social influences on pain and pain management  - Notify physician/advanced practitioner if interventions unsuccessful or patient reports new pain  Outcome: Progressing     Problem: INFECTION - ADULT  Goal: Absence or prevention of progression during hospitalization  Description: INTERVENTIONS:  - Assess and monitor for signs and symptoms of infection  - Monitor lab/diagnostic results  - Monitor all insertion sites, i e  indwelling lines, tubes, and drains  - Monitor endotracheal if appropriate and nasal secretions for changes in amount and color  - [place] appropriate cooling/warming therapies per order  - Administer medications as ordered  - Instruct and encourage patient and family to use good hand hygiene technique  - Identify and instruct in appropriate isolation precautions for identified infection/condition  Outcome: Progressing     Problem: SAFETY ADULT  Goal: Maintain or return to baseline ADL function  Description: INTERVENTIONS:  -  Assess patient's ability to carry out ADLs; assess patient's baseline for ADL function and identify physical deficits which impact ability to perform ADLs (bathing, care of mouth/teeth, toileting, grooming, dressing, etc )  - Assess/evaluate cause of self-care deficits   - Assess range of motion  - Assess patient's mobility; develop plan if impaired  - Assess patient's need for assistive devices and provide as appropriate  - Encourage maximum independence but intervene and supervise when necessary  - Involve family in performance of ADLs  - Assess for home care needs following discharge   - Consider OT consult to assist with ADL evaluation and planning for discharge  - Provide patient education as appropriate  Outcome: Progressing  Goal: Maintains/Returns to pre admission functional level  Description: INTERVENTIONS:  - Perform BMAT or MOVE assessment daily    - Set and communicate daily mobility goal to care team and patient/family/caregiver  - Collaborate with rehabilitation services on mobility goals if consulted  - Perform Range of Motion 3 times a day  - Reposition patient every 2 hours    - Dangle patient 3 times a day  - Stand patient 3 times a day  - Ambulate patient 3 times a day  - Out of bed to chair 3 times a day   - Out of bed for meals 3 times a day  - Out of bed for toileting  - Record patient progress and toleration of activity level   Outcome: Progressing     Problem: DISCHARGE PLANNING  Goal: Discharge to home or other facility with appropriate resources  Description: INTERVENTIONS:  - Identify barriers to discharge w/patient and caregiver  - Arrange for needed discharge resources and transportation as appropriate  - Identify discharge learning needs (meds, wound care, etc )  - Arrange for interpretive services to assist at discharge as needed  - Refer to Case Management Department for coordinating discharge planning if the patient needs post-hospital services based on physician/advanced practitioner order or complex needs related to functional status, cognitive ability, or social support system  Outcome: Progressing     Problem: Prexisting or High Potential for Compromised Skin Integrity  Goal: Skin integrity is maintained or improved  Description: INTERVENTIONS:  - Identify patients at risk for skin breakdown  - Assess and monitor skin integrity  - Assess and monitor nutrition and hydration status  - Monitor labs   - Assess for incontinence   - Turn and reposition patient  - Assist with mobility/ambulation  - Relieve pressure over bony prominences  - Avoid friction and shearing  - Provide appropriate hygiene as needed including keeping skin clean and dry  - Evaluate need for skin moisturizer/barrier cream  - Collaborate with interdisciplinary team   - Patient/family teaching  - Consider wound care consult   Outcome: Progressing

## 2022-02-21 PROBLEM — D64.9 ANEMIA: Status: ACTIVE | Noted: 2022-02-21

## 2022-02-21 LAB
ANION GAP SERPL CALCULATED.3IONS-SCNC: 7 MMOL/L (ref 4–13)
BASOPHILS # BLD AUTO: 0.04 THOUSANDS/ΜL (ref 0–0.1)
BASOPHILS NFR BLD AUTO: 1 % (ref 0–1)
BUN SERPL-MCNC: 30 MG/DL (ref 5–25)
CALCIUM SERPL-MCNC: 9.1 MG/DL (ref 8.3–10.1)
CHLORIDE SERPL-SCNC: 99 MMOL/L (ref 100–108)
CO2 SERPL-SCNC: 28 MMOL/L (ref 21–32)
CREAT SERPL-MCNC: 1.12 MG/DL (ref 0.6–1.3)
EOSINOPHIL # BLD AUTO: 0.24 THOUSAND/ΜL (ref 0–0.61)
EOSINOPHIL NFR BLD AUTO: 5 % (ref 0–6)
ERYTHROCYTE [DISTWIDTH] IN BLOOD BY AUTOMATED COUNT: 15.9 % (ref 11.6–15.1)
EST. AVERAGE GLUCOSE BLD GHB EST-MCNC: 137 MG/DL
GFR SERPL CREATININE-BSD FRML MDRD: 45 ML/MIN/1.73SQ M
GLUCOSE P FAST SERPL-MCNC: 118 MG/DL (ref 65–99)
GLUCOSE SERPL-MCNC: 113 MG/DL (ref 65–140)
GLUCOSE SERPL-MCNC: 118 MG/DL (ref 65–140)
GLUCOSE SERPL-MCNC: 135 MG/DL (ref 65–140)
GLUCOSE SERPL-MCNC: 145 MG/DL (ref 65–140)
GLUCOSE SERPL-MCNC: 160 MG/DL (ref 65–140)
HBA1C MFR BLD: 6.4 %
HCT VFR BLD AUTO: 31.5 % (ref 34.8–46.1)
HGB BLD-MCNC: 10 G/DL (ref 11.5–15.4)
IMM GRANULOCYTES # BLD AUTO: 0.02 THOUSAND/UL (ref 0–0.2)
IMM GRANULOCYTES NFR BLD AUTO: 0 % (ref 0–2)
LYMPHOCYTES # BLD AUTO: 1.21 THOUSANDS/ΜL (ref 0.6–4.47)
LYMPHOCYTES NFR BLD AUTO: 24 % (ref 14–44)
MCH RBC QN AUTO: 30.1 PG (ref 26.8–34.3)
MCHC RBC AUTO-ENTMCNC: 31.7 G/DL (ref 31.4–37.4)
MCV RBC AUTO: 95 FL (ref 82–98)
MONOCYTES # BLD AUTO: 0.7 THOUSAND/ΜL (ref 0.17–1.22)
MONOCYTES NFR BLD AUTO: 14 % (ref 4–12)
NEUTROPHILS # BLD AUTO: 2.93 THOUSANDS/ΜL (ref 1.85–7.62)
NEUTS SEG NFR BLD AUTO: 56 % (ref 43–75)
NRBC BLD AUTO-RTO: 0 /100 WBCS
PLATELET # BLD AUTO: 245 THOUSANDS/UL (ref 149–390)
PMV BLD AUTO: 10.6 FL (ref 8.9–12.7)
POTASSIUM SERPL-SCNC: 3.7 MMOL/L (ref 3.5–5.3)
RBC # BLD AUTO: 3.32 MILLION/UL (ref 3.81–5.12)
SODIUM SERPL-SCNC: 134 MMOL/L (ref 136–145)
WBC # BLD AUTO: 5.14 THOUSAND/UL (ref 4.31–10.16)

## 2022-02-21 PROCEDURE — 97530 THERAPEUTIC ACTIVITIES: CPT

## 2022-02-21 PROCEDURE — 82948 REAGENT STRIP/BLOOD GLUCOSE: CPT

## 2022-02-21 PROCEDURE — 99232 SBSQ HOSP IP/OBS MODERATE 35: CPT | Performed by: INTERNAL MEDICINE

## 2022-02-21 PROCEDURE — 80048 BASIC METABOLIC PNL TOTAL CA: CPT | Performed by: PHYSICIAN ASSISTANT

## 2022-02-21 PROCEDURE — 83036 HEMOGLOBIN GLYCOSYLATED A1C: CPT | Performed by: NURSE PRACTITIONER

## 2022-02-21 PROCEDURE — 97110 THERAPEUTIC EXERCISES: CPT

## 2022-02-21 PROCEDURE — 99232 SBSQ HOSP IP/OBS MODERATE 35: CPT

## 2022-02-21 PROCEDURE — 85025 COMPLETE CBC W/AUTO DIFF WBC: CPT | Performed by: PHYSICIAN ASSISTANT

## 2022-02-21 RX ORDER — ACETAMINOPHEN 500 MG
1000 TABLET ORAL 3 TIMES DAILY PRN
Start: 2022-02-21

## 2022-02-21 RX ADMIN — Medication 1 DROP: at 09:46

## 2022-02-21 RX ADMIN — APIXABAN 5 MG: 5 TABLET, FILM COATED ORAL at 17:27

## 2022-02-21 RX ADMIN — APIXABAN 5 MG: 5 TABLET, FILM COATED ORAL at 09:45

## 2022-02-21 RX ADMIN — Medication 1000 UNITS: at 09:44

## 2022-02-21 RX ADMIN — LABETALOL HYDROCHLORIDE 200 MG: 200 TABLET, FILM COATED ORAL at 21:14

## 2022-02-21 RX ADMIN — LISINOPRIL 20 MG: 20 TABLET ORAL at 21:15

## 2022-02-21 RX ADMIN — AMLODIPINE BESYLATE 10 MG: 10 TABLET ORAL at 09:45

## 2022-02-21 RX ADMIN — LISINOPRIL 20 MG: 20 TABLET ORAL at 09:50

## 2022-02-21 RX ADMIN — METFORMIN HYDROCHLORIDE 500 MG: 500 TABLET, FILM COATED ORAL at 16:29

## 2022-02-21 RX ADMIN — LORAZEPAM 0.5 MG: 0.5 TABLET ORAL at 09:50

## 2022-02-21 RX ADMIN — METFORMIN HYDROCHLORIDE 500 MG: 500 TABLET, FILM COATED ORAL at 07:25

## 2022-02-21 RX ADMIN — Medication 1 CAPSULE: at 09:47

## 2022-02-21 RX ADMIN — OXYCODONE HYDROCHLORIDE AND ACETAMINOPHEN 500 MG: 500 TABLET ORAL at 09:45

## 2022-02-21 RX ADMIN — ACETAMINOPHEN 975 MG: 325 TABLET, FILM COATED ORAL at 21:14

## 2022-02-21 RX ADMIN — CLONIDINE HYDROCHLORIDE 0.1 MG: 0.1 TABLET ORAL at 21:15

## 2022-02-21 RX ADMIN — CHLORTHALIDONE 25 MG: 25 TABLET ORAL at 09:46

## 2022-02-21 RX ADMIN — ATORVASTATIN CALCIUM 40 MG: 40 TABLET, FILM COATED ORAL at 17:27

## 2022-02-21 RX ADMIN — LORAZEPAM 0.5 MG: 0.5 TABLET ORAL at 17:27

## 2022-02-21 RX ADMIN — Medication 1 DROP: at 21:11

## 2022-02-21 RX ADMIN — ACETAMINOPHEN 975 MG: 325 TABLET, FILM COATED ORAL at 05:22

## 2022-02-21 RX ADMIN — LABETALOL HYDROCHLORIDE 200 MG: 200 TABLET, FILM COATED ORAL at 09:45

## 2022-02-21 RX ADMIN — LEVOTHYROXINE SODIUM 125 MCG: 125 TABLET ORAL at 05:22

## 2022-02-21 RX ADMIN — LIDOCAINE 5% 1 PATCH: 700 PATCH TOPICAL at 09:54

## 2022-02-21 RX ADMIN — ACETAMINOPHEN 975 MG: 325 TABLET, FILM COATED ORAL at 13:12

## 2022-02-21 RX ADMIN — CLONIDINE HYDROCHLORIDE 0.1 MG: 0.1 TABLET ORAL at 09:44

## 2022-02-21 RX ADMIN — Medication 1 DROP: at 16:29

## 2022-02-21 RX ADMIN — Medication 1 CAPSULE: at 21:14

## 2022-02-21 RX ADMIN — INSULIN LISPRO 1 UNITS: 100 INJECTION, SOLUTION INTRAVENOUS; SUBCUTANEOUS at 11:29

## 2022-02-21 NOTE — DISCHARGE SUMMARY
Discharge Summary - PMR   Raegan Jerome 78 y o  female MRN: [id number]  Unit/Bed#: -01 Encounter: [id number]    Admission Date: 2/15/2022     Discharge Date: 2/25/2022    Rehabilitation/Etiologic Diagnosis:   Orthopedic Disorders:  08 9  Other Orthopedic See below  R medial tibial plateau fracture, complex medial meniscus tear, knee hemarthrosis, calf hematoma    Discharge Diagnoses:      Patient Active Problem List   Diagnosis    Atrial fibrillation     Diabetes mellitus type 2    Essential hypertension    Hypothyroidism    Anxiety    CKD (chronic kidney disease) stage 3, GFR 30-59 ml/min ([facility])    Chronic hyponatremia    Gastroesophageal reflux disease without esophagitis    Depression    History of CEA (carotid endarterectomy)    Hypothyroidism due to acquired atrophy of thyroid    Hypertension    Asymptomatic bilateral carotid artery stenosis    Hemarthrosis of right knee    History of breast cancer    Moderate protein-calorie malnutrition     Primary osteoarthritis of right knee    Fracture of right tibial plateau    Pain    Macular degeneration    Anemia       Acute Rehabilitation Center Course:     Patient participated in a comprehensive interdisciplinary inpatient rehabilitation program which included involvment of MD, therapies (PT, OT), RN, CM/SW, dietary  She made significant functional gains and was able to be advanced to a largely modified independent level of assist and is considered adequately safe for discharge home  Medical issues with comanagement from our internal medicine team as outlined below  Please see below for patient's hospital course and day to day management of medical needs with significant findings, complications (if applicable), treatment, and services provided in problem list format        Functional assessment:                  Admit                                     Recent  Total assist       Significant assist       Mod assist  To hygiene, bathing, LBD     Min assist        Supervision       Contact Guard       Mod Indep/Indep   To hygiene, bathing, dressing    Transfers Mod assist  Mod indep    Ambulation  Min assist  Mod indep    Stairs  Total assist/NT Supervision for 12 steps      Dispo: Home with HH PT, OT      * Fracture of right tibial plateau  Assessment & Plan  - Function much improved, pain much improved, decreased fall risk, ambulating adequately with PWB status; pt feels ready for discharge as well   - D/c home with HH PT, OT    R medial tibial plateau fracture, complex medial meniscus tear, knee hemarthrosis, calf hematoma with significant decline in ADLs and ambulation   · Per discussion with Dr Andrey Govea 2/14, ortho- PWB 30-50%               - Not required to wear brace               - PROM ok but no additional knee specific rehab expected for approx 8 weeks   - Dr Selena Aguilar confirmed with ortho 2/17 - PWB RLE 30-50% and can have passive range of motion performed in physical therapy as tolerated  Other than her usual movements of right leg, patient is not to perform any additional active or active assist range of motion in therapies   Completed acute comprehensive interdisciplinary inpatient rehabilitation to include intensive skilled therapies (PT, OT) as outlined with oversight and management by rehabilitation physician as well as inpatient rehab level nursing, case management and weekly interdisciplinary team meetings   Follow-up with Ortho Sx after d/c   Has follow-up appt 3/1/2022 3:15 PM Antoinette Lui MD [facility] [place]   o Patient requesting to see ortho at [facility] - patient will call and adjust appt accordingly     - 1/16 - ED Visit R knee swelling started 4 days ago denies any trauma - XR No acute osseous abnormality   Joint effusion;  - 1/19 - OP ortho- Hemarthrosis of R knee - R knee hemarthrosis likely 2/2 spontaneous bleeding in setting of Eliquis v occult trauma, pt denies trauma, R knee med arthritis; 45 cc blood aspirate; 1/9 XR There is no acute fracture or dislocation  There is no joint effusion  No significant degenerative changes  Soft tissues are unremarkable  IMPRESSION: No acute osseous abnormality  - 2/12 Ortho c/s - Dr Bere Lucio - A 29-year-old female with history of multiple falls as well as right knee pain and recurrent effusions  Presents back to the hospital today due to worsening pain and continued swelling  She was sent for an MRI to rule out an occult fracture  On review of imaging this morning she is found to have a nondisplaced impaction fracture of the medial tibial plateau  On my exam patient has a mild effusion she is tender medial tibial plateau  The knee is stable to varus valgus and anterior-posterior drawer testing  Plan is going to be toe-touch weight-bearing on the right lower extremity with the assistance of a walker  She is going to work with PT and Case Management regarding placement  Patient will require follow-up to confirm improvement in her symptoms 2 weeks following discharge  - 2/14 spoke with ortho Ca Chase (resident) to confirm WB status, query need for brace, and ROM as order still said NWB; Per discussion with Dr Ca Chase [facility] [place] 30-50%; brace not required but could consider if helpful; PROM immediately; PWB order (without % parameters entered by Dr Ca Chase)  - 2/17 Dr Jovel [place] confirmed with ortho PWB RLE 30-50% and can have passive range of motion performed in physical therapy as tolerated  Other than her usual movements of right leg, patient is not to perform any additional active or active assist range of motion in therapies            Pain  Assessment & Plan  Improved significantly   Discharge only on APAP TID PRN       Anxiety  Assessment & Plan  Improved, stable  On chronic ativan 0 5mg BID   Provided additional supportive counseling  Counseled on and continue to encourage deep breathing/relaxation/behavioral management techniques:          Asymptomatic bilateral carotid artery stenosis  Assessment & Plan  OP cards follow-up  IM consulted and with overall management at their discretion during ARC course  Antithrombotic: Eliquis  Statin  Optimal blood pressure and blood sugar control      Atrial fibrillation   Assessment & Plan  IM consulted and with overall management at their discretion during ARC course  Rate control: labetolol  Antithrombotic: Eliquis       Anemia  Assessment & Plan  Hb stable recently   OP PCP follow-up     Macular degeneration  Assessment & Plan  OP optho follow-up   Home Preservision and Theretears    Chronic hyponatremia  Assessment & Plan  Slightly down but within chronic range   OP PCP follow-up     CKD (chronic kidney disease) stage 3, GFR 30-59 ml/min ([facility])  Assessment & Plan  Fairly stable   OP PCP      Hypothyroidism  Assessment & Plan  Levothyroxine    Essential hypertension  Assessment & Plan  Acceptable control  Internal medicine consulted and mgmt during course   Current meds is home regimen: clonidine, chlorthalidone, amlodipine, lisinopril, labetalol     Diabetes mellitus type 2  Assessment & Plan  Lab Results   Component Value Date    HGBA1C 6 4 (H) 02/21/2022     Internal medicine consulted and management at their discretion  DC meds per IM: Metformin 500mg BID (new Rx sent to pharmacy and family/pt will  on way home)  DM2 diet   Has glucometer and able to use  OP PCP follow-up       At risk for venous thromboembolism (VTE)-resolved as of 2/25/2022  Assessment & Plan  Decreased on chronic A/C with Eliquis       Follow-up providers and other issues to be followed up after discharge  · PCP  · Ortho  · Cards     CODE: Level 1: Full Code    - See AVS (After visit summary) with discharge instructions, discharge medications, and other details  Imaging (See above as well):  No results found      Pertinent Recent Labs (See Course above, EPIC EMR, and if needed request additional records):  Results for Stanley Mayes (MRN [id number]) as of 2/24/2022 18:43   Ref   Range 2/24/2022 06:12   Sodium Latest Ref Range: 136 - 145 mmol/L 132 (L)   Potassium Latest Ref Range: 3 5 - 5 3 mmol/L 3 9   Chloride Latest Ref Range: 100 - 108 mmol/L 97 (L)   CO2 Latest Ref Range: 21 - 32 mmol/L 29   Anion Gap Latest Ref Range: 4 - 13 mmol/L 6   BUN Latest Ref Range: 5 - 25 mg/dL 27 (H)   Creatinine Latest Ref Range: 0 60 - 1 30 mg/dL 0 93   Glucose, Random Latest Ref Range: 65 - 140 mg/dL 127   Calcium Latest Ref Range: 8 3 - 10 1 mg/dL 9 0   eGFR Latest Units: ml/min/1 73sq m 57   WBC Latest Ref Range: 4 31 - 10 16 Thousand/uL 4 99   Red Blood Cell Count Latest Ref Range: 3 81 - 5 12 Million/uL 3 23 (L)   Hemoglobin Latest Ref Range: 11 5 - 15 4 g/dL 9 9 (L)   HCT Latest Ref Range: 34 8 - 46 1 % 29 6 (L)   MCV Latest Ref Range: 82 - 98 fL 92   MCH Latest Ref Range: 26 8 - 34 3 pg 30 7   MCHC Latest Ref Range: 31 4 - 37 4 g/dL 33 4   RDW Latest Ref Range: 11 6 - 15 1 % 15 5 (H)   Platelet Count Latest Ref Range: 149 - 390 Thousands/uL 240       Procedures Performed During ARC Admission: None    Discharge Physical Examination:  Temp:  [97 6 °F (36 4 °C)-99 °F (37 2 °C)] 97 6 °F (36 4 °C)  HR:  [62-88] 62  Resp:  [18] 18  BP: (128-164)/(56-62) 164/62  SpO2:  [98 %-100 %] 98 %  GEN:  Sitting in NAD   HEENT/NECK: MMM  CARDIAC: Regular rate rhythm, no murmers, no rubs, no gallops  LUNGS:  clear to auscultation, no wheezes, rales, or rhonchi  ABDOMEN: Soft, non-tender, non-distended, normal active bowel sounds  EXTREMITIES/SKIN:  no calf edema, no calf tenderness to palpation and MSK: R knee without significant edema, increased warmth or tenderness, improving PROM  NEURO:   MENTAL STATUS:  Appropriate wakefulness and interaction; strength grossly intact  PSYCH:  Affect:  Euthymic     HPI:   78year old F with PMH of Afib on anticoagulation with Eliquis, HTN, DM2, hyperlipidemia, hypothyroidism who developed progressive R knee pain and significant ambulatory dysfunction who had outpatient work-up which showed R knee effusion and R knee OA on X-ray  She underwent knee aspiration on 1/19 with bloody fluid   Pain continued to progress and she could no longer safely ambulate, wt bear, or perform ADLs and presented to hospital where MRI R knee performed and showed R medial tibial plateau fracture, complex medial meniscus tear, knee hemarthrosis, calf hematoma   Orthopedics consulted and initially recommended NWB and now per discussion Dr Katina Sethi patient can be PWB 30-50%  Jaimie Wagner is not required to wear brace but can be considered if helpful   She can start passive ROM now but no significant R knee PT expected until 8 weeks  Patient was evaluated by skilled therapies and was found to have significant decline in ADLs and ambulation        Condition at Discharge: good     Discharge instructions/Information to patient and family:   See after visit summary for information provided to patient and family  Provisions for Follow-Up Care:  See after visit summary for information related to follow-up care and any pertinent home health orders  Disposition: Home     Planned Readmission:  No    Discharge Statement   Total time spent examining patient, counseling patient (or patient/family) on condition, medication, rehabilitation/medical plan, and coordinating care on day of discharge: at least 45 minutes  Greater than 50% of the total time was spent examining patient, answering all questions, directly discussing plan of care and post-discharge instructions with patient (or patient and family)  Additional time spent on coordinating care and other discharge activities  Discharge Medications:  See after visit summary for reconciled discharge medications provided to patient and family

## 2022-02-21 NOTE — PROGRESS NOTES
02/21/22 0830   Pain Assessment   Pain Score No Pain   Restrictions/Precautions   Precautions Fall Risk;Limb alert;Supervision on toilet/commode;Visual deficit   RLE Weight Bearing Per Order PWB   Cognition   Overall Cognitive Status WFL   Subjective   Subjective "i'm bored doing the same things over and over"   Sit to Lying   Type of Assistance Needed Set-up / clean-up   Sit to Lying CARE Score 5   Lying to Sitting on Side of Bed   Type of Assistance Needed Set-up / clean-up   Lying to Sitting on Side of Bed CARE Score 5   Sit to Stand   Type of Assistance Needed Supervision   Sit to Stand CARE Score 4   Bed-Chair Transfer   Type of Assistance Needed Supervision; Adaptive equipment   Comment with RW   Chair/Bed-to-Chair Transfer CARE Score 4   Transfer Bed/Chair/Wheelchair   Findings still requires VCs at times, elvira with turns to keep RW on ground and off load on RLE to maintain PWB   Walk 10 Feet   Type of Assistance Needed Supervision; Adaptive equipment   Comment with RW   Walk 10 Feet CARE Score 4   Walk 50 Feet with Two Turns   Type of Assistance Needed Supervision; Adaptive equipment   Comment with RW   Walk 50 Feet with Two Turns CARE Score 4   Ambulation   Does the patient walk? 2  Yes   Distance Walked (feet) 60 ft  (x3)   Assist Device Roller Walker   Gait Pattern Antalgic; Inconsistant Mariangel; Slow Mariangel;Decreased foot clearance; Improper weight shift; Poor UE WB   Limitations Noted In Balance; Safety;Speed;Strength   Provided Assistance with: Balance   Findings occasional cueing to maintain PWB   Wheel 50 Feet with Two Turns   Reason if not Attempted Activity not applicable   Wheel 50 Feet with Two Turns CARE Score 9   Wheel 150 Feet   Reason if not Attempted Activity not applicable   Wheel 941 Feet CARE Score 9   Therapeutic Interventions   Strengthening seated LAQ and hip flex on LLE 2x20 2#, gentle PROM of knee ext/flex on R x20   Flexibility B gastroc and HS x3mins   Assessment   Treatment Assessment spoke with pt about d/c planning during session, recommendation for home PT as pt is fearful of showering, f/u with OT in regards to what we talked about to review walker tray and shower bench  pt cont to have difficulty offloading wt on RLE elvira during standing activities  will cont to work on safety and balance with functional tasks to decrease burden of care  will need to contact family to set up family training to discuss support at time of d/c  Problem List Decreased strength;Decreased range of motion;Decreased endurance; Impaired balance;Decreased mobility; Decreased coordination; Impaired judgement;Decreased safety awareness; Impaired vision;Orthopedic restrictions;Pain   Barriers to Discharge Inaccessible home environment;Decreased caregiver support   PT Barriers   Functional Limitation Car transfers;Stair negotiation;Standing;Walking;Transfers   Plan   Progress Progressing toward goals   Recommendation   PT Discharge Recommendation Home with home health rehabilitation   Equipment Recommended Walker   PT Therapy Minutes   PT Time In 0830   PT Time Out 0930   PT Total Time (minutes) 60   PT Mode of treatment - Individual (minutes) 60   PT Mode of treatment - Concurrent (minutes) 0   PT Mode of treatment - Group (minutes) 0   PT Mode of treatment - Co-treat (minutes) 0   PT Mode of Treatment - Total time(minutes) 60 minutes   PT Cumulative Minutes 420   Therapy Time missed   Time missed?  No

## 2022-02-21 NOTE — PROGRESS NOTES
02/21/22 1030   Pain Assessment   Pain Assessment Tool 0-10   Pain Score 3   Pain Location/Orientation Orientation: Right;Location: Knee   Restrictions/Precautions   Precautions Limb alert;Visual deficit; Fall Risk  (PWB RLE, mac degeneration)   RLE Weight Bearing Per Order PWB   Lifestyle   Autonomy "I guess I assumed I would be here until the middle of next week"   Tub/Shower Transfer   Findings Trialed dry shower tub transfer using tub bench and RW  Pt completed at S level w/ only min cues for technique  Pt does not have tub bench at home and will need to purchase  Please provide pt w/ handout during next session  Sit to Stand   Type of Assistance Needed Supervision   Physical Assistance Level No physical assistance   Sit to Stand CARE Score 4   Bed-Chair Transfer   Type of Assistance Needed Supervision   Comment W/RW   Chair/Bed-to-Chair Transfer CARE Score 4   Toileting Hygiene   Type of Assistance Needed Supervision   Physical Assistance Level No physical assistance   Comment CS in stance w/ RW   Toileting Hygiene CARE Score 4   Toilet Transfer   Type of Assistance Needed Supervision   Physical Assistance Level No physical assistance   Comment CS in stance w/ RW and BSC over toilet   Toilet Transfer CARE Score 4   Cognition   Overall Cognitive Status [facility]   Arousal/Participation Alert; Cooperative   Attention Within functional limits   Orientation Level Oriented X4   Memory Decreased recall of precautions   Following Commands Follows multistep commands with increased time or repetition   Additional Activities   Additional Activities Comments Reviewed pt's plan for IADL completion when at home  Pt does not have laundry in unit and PTA used her "food cart" to transport laundry to first floor to complete  Pt in agreement that she will need support to complete this task at home  Reluctant to say that family will be able to assist, despite have family nearby  Does state that a friend may be able to help her   PTA pt gets her groceries delivered and states that the  typically places the groceries on the floor inside her apartment  Pt states that it should not be a problem to have  place her groceries on countertop while she continues w/ PWB  Pt does express concern about who will be able to  her medications as her pharmacy does not deliver  Encouraged pt to reach out to a family member to see if they can assist w/ this  Will need to follow up w/ pt and case management regarding this  Reviewed med management w/ pt last week, briefly reviewed today and pt continues to decline trial of pill organizer despite reporting some difficulties w/ med management at home  Prior to DC pt would benefit from further review of strategies to assist w/ this (I e  placing already sorted pill container to the side as to increase organization ) Introduced pt to [address] [place] [address] tray, basket, bag  Pt ultimately reports not wishing to get a tray d/t not being able to walk inside of the walker (could discuss use of folding tray again in future but pt seems resistant to purchase equipment that may not be necessary ) Pt reports that there is "just enough room for RW to get between counter and kitchen table" will trial transport of objects from counter to table in PM session w/o use tray to see if feasible while maintaining PWB  Activity Tolerance   Activity Tolerance Patient tolerated treatment well   Assessment   Treatment Assessment OT session completing dry shower tub transfer w/ tub bench and discussion of IADL completion following DC  Pt reluctant to state that family will be able to assist often following DC w/ IADLs, but also makes it clear that she does not wish to be a burden  Therapy team to follow up w/ Dtr to discuss how much help family can provide w/ IADLs following DC  OT to continue to treat and follow established POC  Problem List Decreased strength;Decreased range of motion;Decreased endurance; Impaired balance;Decreased mobility; Decreased coordination; Impaired judgement;Decreased safety awareness; Impaired vision;Orthopedic restrictions;Pain;Decreased skin integrity   Plan   Treatment/Interventions ADL retraining;Functional transfer training; Therapeutic exercise; Endurance training;Patient/family training;Equipment eval/education; Compensatory technique education;Continued evaluation   Progress Progressing toward goals   OT Therapy Minutes   OT Time In 1030   OT Time Out 1130   OT Total Time (minutes) 60   OT Mode of treatment - Individual (minutes) 60   OT Mode of treatment - Concurrent (minutes) 0   OT Mode of treatment - Group (minutes) 0   OT Mode of treatment - Co-treat (minutes) 0   OT Mode of Treatment - Total time(minutes) 60 minutes   OT Cumulative Minutes 420   Therapy Time missed   Time missed?  No

## 2022-02-21 NOTE — PCC PHYSICAL THERAPY
Pt making progress towards d/c goals  Pt functioning at CS with VCs to safely maintain WBS of PWB of RLE  Pt needs cues for RW management at times but overall demonstrates good safety  Pt limited with ROM and strengthening of RLE per ortho orders to perform PROM only at this time  Will require A for IADLs upon d/c to decrease fall risk  Recommendation for home therapy to improve safety and functional ind in own environment  Recommendation for cont skilled therapy to improve strenghtening, safety awareness and overall activity tolerance to achieve d/c goals  Phone calls have been made to pt's daughter to arrange for FT sessions and to make clear the recommendations that pt has A for IADLs from family and friends  Barriers for home at this time include need for CS/VC with mobility as LTGs are set for Leah with RW

## 2022-02-21 NOTE — PCC OCCUPATIONAL THERAPY
Admission due to R knee pain  A workup was completed indicating: R medial tibial plateau fracture, complex medial meniscus tear, knee hemarthrosis, calf hematoma  Pt's current problems include: dec CG support, pain, and WB status  Pt's comorbidities include: A-fib, HTN, DM 2, anxiety  Pt's precuations include: PWB RLE  Prior to admission pt was completing ADL's independently  Currently, pt requires sup for ADLs and fxnl mobility  Pt is currently limited due to the following deficits impacting occupational performance, activity tolerance, endurance, standing balance/tolerance and sitting balance/tolerance  Occupational Therapy plan of care to focus on the following areas:  ADL re-training, [address] training, IADL re-training, functional transfers, standing tolerance, standing balance, DME training/education, family training/education, and EC techniques/education  Anticipate DC later this week at [facility] for ADLs and family support for IADLs

## 2022-02-21 NOTE — PROGRESS NOTES
02/21/22 1230   Pain Assessment   Pain Assessment Tool 0-10   Pain Score No Pain   Restrictions/Precautions   Precautions Fall Risk;Limb alert;Supervision on toilet/commode;Visual deficit   RLE Weight Bearing Per Order PWB   Lifestyle   Autonomy "already time for more therapy"   Sit to Stand   Type of Assistance Needed Supervision   Physical Assistance Level No physical assistance   Comment w/ RW   Sit to Stand CARE Score 4   Therapeutic Excerise-Strength   UE Strength Yes   Right Upper Extremity- Strength   RUE Strength Comment UE TE using 3# dowel olinda for 2x15 for following exercises: forward/backward circles, elbow flx/extn  Tolerated well w/ rest breaks  Completed to increase BUE strength for increased IND w/ ADLs/IADLs and fxnl mobility w/ RW  Left Upper Extremity-Strength   LUE Strength Comment UE TE using 3# dowel olinda for 2x15 for following exercises: forward/backward circles, elbow flx/extn  Tolerated well w/ rest breaks  Completed to increase BUE strength for increased IND w/ ADLs/IADLs and fxnl mobility w/ RW  Cognition   Overall Cognitive Status WFL   Arousal/Participation Alert; Cooperative   Attention Within functional limits   Orientation Level Oriented X4   Memory Decreased recall of precautions   Following Commands Follows multistep commands with increased time or repetition   Additional Activities   Additional Activities Comments Dynamic balance fxnl task transporting weighted bowls and filled cups from counter to table  Placed bedside table next to counter at distance that pt says her counter and table are at home  Pt reports having only enough room to pass through w/ RW  Instructed pt on technique to complete while maintaining 30-50% PWB RLE  Pt would need to stand between countertop and table facing parallel to surfaces  Demo unilateral release with one hand to retrive object and bring to midline, then switch hands and place on surface on other side   Pt reports understanding how this does not break her precautions  Discussed w/ pt that she is to not shift weight on RLE during task as this would break precautions  Pt in agreement  Trialed task pt required min cues to complete task as demo'd to her and to not WS  W/ cueing pt able to complete w/o breaking precautions  Activity Tolerance   Activity Tolerance Patient tolerated treatment well   Assessment   Treatment Assessment OT session focusing on fxnl activity, UE TE  Pt required cues during dynamic balance task to not break PWB, pt responsive to cues  Will benefit from additional higher level IADLs and fxnl balance tasks  OT to continue to treat and follow established POC  Problem List Decreased strength;Decreased range of motion;Decreased endurance; Impaired balance;Decreased mobility; Decreased coordination; Impaired judgement;Decreased safety awareness; Impaired vision;Orthopedic restrictions;Pain   Plan   Treatment/Interventions ADL retraining;Functional transfer training; Therapeutic exercise; Endurance training;Patient/family training;Equipment eval/education; Compensatory technique education;Continued evaluation   Progress Progressing toward goals   OT Therapy Minutes   OT Time In 1230   OT Time Out 1300   OT Total Time (minutes) 30   OT Mode of treatment - Individual (minutes) 30   OT Mode of treatment - Concurrent (minutes) 0   OT Mode of treatment - Group (minutes) 0   OT Mode of treatment - Co-treat (minutes) 0   OT Mode of Treatment - Total time(minutes) 30 minutes   OT Cumulative Minutes 450   Therapy Time missed   Time missed?  No

## 2022-02-21 NOTE — PROGRESS NOTES
Internal Medicine Progress Note  Patient: Bhumika Borja  Age/sex: 78 y o  female  Medical Record #: [id number]      ASSESSMENT/PLAN: (Interval History)  Bhumika Borja is seen and examined and management for following issues:    Right Tibial plateau fracture  · Medical management per ortho  · Rehab/pain per PMR  · Discussed WB and ROM with ortho  PWB 30-50% with passive ROM  · Hinged brace as needed for pain     DM II  · HA1C 6 4   · Continue metformin 500mg 2x daily  · Conitnue accuchecks and constant carb diet      HTN  · Home: Norvasc 10mg qd; Chlorthalidone 25mg qd, Clonidine 0 1 mg qd, Labetolol 200mg q12 hours, Lisinopril 20mg BID  · Here: Norvasc 10mg qd, Chlorthalidone 25mg qd, Clonidine 0 1mg q12hrs, Labetolol 200mg q12hrs, Lisinopril 20mg BID  · Stable; no changes today      PAF  · Cont Eliquis/Labetolol     CKD III  · Baseline 0 8-1 0  · Avoid nephrotoxic medications  · stable      Hypothyroid  · Cont levothyroxine     Hyperlipidemia  · Continue statin therapy       The above assessment and plan was reviewed and updated as determined by my evaluation of the patient on 2/21/2022      Labs:   Results from last 7 days   Lab Units 02/21/22  0543 02/17/22  0637   WBC Thousand/uL 5 14 4 84   HEMOGLOBIN g/dL 10 0* 10 3*   HEMATOCRIT % 31 5* 32 2*   PLATELETS Thousands/uL 245 246     Results from last 7 days   Lab Units 02/21/22  0543 02/17/22  0637   SODIUM mmol/L 134* 133*   POTASSIUM mmol/L 3 7 3 8   CHLORIDE mmol/L 99* 98*   CO2 mmol/L 28 30   BUN mg/dL 30* 25   CREATININE mg/dL 1 12 0 97   CALCIUM mg/dL 9 1 8 8     Results from last 7 days   Lab Units 02/21/22  0543   HEMOGLOBIN A1C % 6 4*         Results from last 7 days   Lab Units 02/21/22  0643 02/20/22  2055 02/20/22  1555   POC GLUCOSE mg/dl 145* 166* 143*       Review of Scheduled Meds:  Current Facility-Administered Medications   Medication Dose Route Frequency Provider Last Rate    acetaminophen  975 mg Oral [facility] MD Caryl Quinn amLODIPine  10 mg Oral Daily Hali Duarte MD      apixaban  5 mg Oral BID Hali Duarte MD      ascorbic acid  500 mg Oral Daily Hali Duarte MD      atorvastatin  40 mg Oral After Pipo Freed MD      bisacodyl  10 mg Rectal Daily PRN Hali Duarte MD      Carboxymethylcellulose Sodium  1 drop Ophthalmic TID Maryann Carlin MD      chlorthalidone  25 mg Oral Daily Hali Duarte MD      cholecalciferol  1,000 Units Oral Daily Hali Duarte MD      cloNIDine  0 1 mg Oral Q12H [facility] Hali Duarte MD      Diclofenac Sodium  2 g Topical TID PRN Hali Duarte MD      docusate sodium  100 mg Oral BID Hali Duarte MD      insulin lispro  1-5 Units Subcutaneous HS Hali Duarte MD      insulin lispro  1-5 Units Subcutaneous TID [facility] Hali Duarte MD      labetalol  200 mg Oral Q12H [facility] Hali Duarte MD      levothyroxine  125 mcg Oral Early Morning Hali Duarte MD      lidocaine  1 patch Topical Daily Hali Duarte MD      lisinopril  20 mg Oral BID Hali Duarte MD      LORazepam  0 5 mg Oral BID Hali Duarte MD      metFORMIN  500 mg Oral BID With Meals RUCHI Parham      ondansetron  4 mg Oral Q8H PRN Hali Duarte MD      oxyCODONE  2 5 mg Oral Q4H PRN Hali Duarte MD      polyethylene glycol  17 g Oral Daily PRN Hali Duarte MD      PreserVision AREDS 2  1 capsule Oral BID Hali Duarte MD      senna  1 tablet Oral BID Hali Duarte MD         Subjective/ HPI: Patient seen and examined  Patients overnight issues or events were reviewed with nursing or staff during rounds or morning huddle session  New or overnight issues include the following:     Pt seen in her room  She denies any current complaints  ROS:   A 10 point ROS was performed; negative except as noted above         Imaging:     No orders to display       *Labs /Radiology studies reviewed  *Medications reviewed and reconciled as needed  *Please refer to order section for additional ordered labs studies  *Case discussed with primary attending during morning huddle case rounds      Physical Examination:  Vitals:   Vitals:    02/20/22 0608 02/20/22 0820 02/20/22 2100 02/21/22 0634   BP: 148/67 154/50 135/63 138/56   BP Location: Left arm Left arm Left arm Left arm   Pulse: (!) 54 56 66 55   Resp: 20  18 18   Temp: 98 4 °F (36 9 °C)  97 9 °F (36 6 °C) 97 9 °F (36 6 °C)   TempSrc: Oral  Oral Oral   SpO2: 98%  97% 96%   Weight:       Height:         General Appearance: no distress, conversive  HEENT: PERRLA, conjuctiva normal; oropharynx clear; mucous membranes moist   Neck:  Supple, normal ROM, no JVD  Lungs: BBS without crackles/wheeze/rhonci; respirations unlabored with normal inspiratory/expiratory effort  No retractions noted  On RA  CV: S1/S2, RRR, no rubs/murmurs/gallops; PMI normal   ABD: soft; ND/NT; +BS  EXT: no edema  Skin: normal turgor, normal texture, no rashes  Psych: affect normal, mood normal  Neuro: AAO      The above physical exam was reviewed and updated as determined by my evaluation of the patient on 2/21/2022  Invasive Devices  Report    None                    VTE Pharmacologic Prophylaxis: Eliquis  Code Status: Level 1 - Full Code  Current Length of Stay: 6 day(s)      Total time spent:  30 minutes with more than 50% spent counseling/coordinating care  Counseling includes discussion with patient re: progress  and discussion with patient of his/her current medical state/information  Coordination of patient's care was performed in conjunction with primary service  Time invested included review of patient's labs, vitals, and management of their comorbidities with continued monitoring  In addition, this patient was discussed with medical team including physician and advanced extenders  The care of the patient was extensively discussed and appropriate treatment plan was formulated unique for this patient       ** Please Note:  voice to text software may have been used in the creation of this document   Although proof errors in transcription or interpretation are a potential of such software**

## 2022-02-21 NOTE — PROGRESS NOTES
Physical Medicine and Rehabilitation Progress Note  Artemio Tolentino 78 y o  female MRN: [id number]  Unit/Bed#: [facility] 453-01 Encounter: [id number]      Assessment & Plan:     Functional assessment:        Admit    Recent  Total assist     Significant assist     Mod assist  To hygiene, bathing, LBD    Min assist       Supervision   To hygiene, bathing, dressing   Contact Guard     Mod Indep/Indep     Transfers Mod assist  Supervision   Ambulation  Min assist  CGA    Stairs  Total assist/NT      Goal: Mod indep for ADLs and ambulation except for possibly bathing, LBD  Major barriers:  Pain, WB precautions, lives alone  Dispo & ELOS: Home with ELOS 12 days from admission    * Fracture of right tibial plateau  Assessment & Plan  R medial tibial plateau fracture, complex medial meniscus tear, knee hemarthrosis, calf hematoma with significant decline in ADLs and ambulation   · Per discussion with Dr Carol Gonzalez 2/14, ortho- PWB 30-50%               - Not required to wear brace but consider if helpful during rehab               - PROM ok but no additional knee specific rehab expected for approx 8 weeks   - Dr Jamilah Reina confirmed with ortho 2/17 - PWB RLE 30-50% and can have passive range of motion performed in physical therapy as tolerated  Other than her usual movements of right leg, patient is not to perform any additional active or active assist range of motion in therapies  · Optimal pain control  · Fall precautions   Monitor for signs/symptoms of VTE, atelectasis, acute blood loss anemia, or other infection    Continue acute comprehensive interdisciplinary inpatient rehabilitation to include intensive skilled therapies (PT, OT) as outlined with oversight and management by rehabilitation physician as well as inpatient rehab level nursing, case management and weekly interdisciplinary team meetings      Follow-up with Ortho Sx after d/c and ortho if needed during ARC course     - 1/16 - ED Visit R knee swelling started 4 days ago denies any trauma - XR No acute osseous abnormality  Joint effusion;  - 1/19 - OP ortho- Hemarthrosis of R knee - R knee hemarthrosis likely 2/2 spontaneous bleeding in setting of Eliquis v occult trauma, pt denies trauma, R knee med arthritis; 45 cc blood aspirate; 1/9 XR There is no acute fracture or dislocation  There is no joint effusion  No significant degenerative changes  Soft tissues are unremarkable  IMPRESSION: No acute osseous abnormality  - 2/12 Ortho c/s - Dr Chayito Todd - A 17-year-old female with history of multiple falls as well as right knee pain and recurrent effusions  Presents back to the hospital today due to worsening pain and continued swelling  She was sent for an MRI to rule out an occult fracture  On review of imaging this morning she is found to have a nondisplaced impaction fracture of the medial tibial plateau  On my exam patient has a mild effusion she is tender medial tibial plateau  The knee is stable to varus valgus and anterior-posterior drawer testing  Plan is going to be toe-touch weight-bearing on the right lower extremity with the assistance of a walker  She is going to work with PT and Case Management regarding placement  Patient will require follow-up to confirm improvement in her symptoms 2 weeks following discharge  - 2/14 spoke with ortho Romina Tejeda (resident) to confirm WB status, query need for brace, and ROM as order still said NWB; Per discussion with Dr Romina Tejeda [facility] 30-50%; brace not required but could consider if helpful; PROM immediately; PWB order (without % parameters entered by Dr Romina Tejeda)  - 2/17 Dr Jeana Hanson confirmed with ortho PWB RLE 30-50% and can have passive range of motion performed in physical therapy as tolerated  Other than her usual movements of right leg, patient is not to perform any additional active or active assist range of motion in therapies            Pain  Assessment & Plan  Acceptable control - continue current mgmt plan   APAP TID  Oxy 2 5mg Q4H PRN (prefers not to take but willing to keep as back up) - she reports only some N/V from morphine in past likely SE but no opiate allergy   LD patch  PRN Voltaren gel   Counseled on and continue to encourage deep breathing/relaxation/behavioral pain management techniques:     Deep breathin seconds in, 5 seconds out, 5 times per hour when awake and PRN when in pain or anticipate pain; avoid holding breath and tightening muscles and instead breathe slowly and deeply  Monitor for oversedation, AMS/delirium, and respiratory depression   If being administered - hold opiates, muscle relaxants, benzodiazepines, and gabapentin for oversedation, AMS, or RR<12  Anxiety  Assessment & Plan  On chronic ativan 0 5mg BID - care with concurrent benzo  Continue supportive counseling  Consider Psychology consult while in [place] on and continue to encourage deep breathing/relaxation/behavioral management techniques:     Deep breathin seconds in, 5 seconds out, 5 times per hour when awake and PRN when experiencing pain or anxiety    Avoid holding breath and tightening muscles and instead breathe slowly and deeply        Asymptomatic bilateral carotid artery stenosis  Assessment & Plan  OP cards follow-up  IM consulted and with overall management at their discretion during ARC course  Antithrombotic: Eliquis  Statin  Optimal blood pressure and blood sugar control      Atrial fibrillation   Assessment & Plan  IM consulted and with overall management at their discretion during ARC course  Rate control: labetolol  Antithrombotic: Eliquis       Anemia  Assessment & Plan  Hb fairly stable - slight trend down   IM consulted, with overall monitoring, and management at their discretion during ARC course  Monitor H/H, vitals, signs/symptoms of acute bleeding  OP PCP follow-up     Macular degeneration  Assessment & Plan  OP optho follow-up   Home Preservision and Theretears    At risk for venous thromboembolism (VTE)  Assessment & Plan  SCDs, ambulation, and Eliquis       Chronic hyponatremia  Assessment & Plan  Stable  Mild, monitor intermittently     CKD (chronic kidney disease) stage 3, GFR 30-59 ml/min ([facility])  Assessment & Plan  Fairly stable   Monitor BUN/Cr intermittently as well as electrolytes   IM consulted to manage  Limit nephrotoxic agents when possible      Hypothyroidism  Assessment & Plan  Levothyroxine    Essential hypertension  Assessment & Plan  Acceptable control  Internal medicine consulted and management at their discretion  Monitor for signs and symptoms of hypoglycemia   Current meds: clonidine, chlorthalidone, amlodipine, lisinopril, labetalol     Diabetes mellitus type 2  Assessment & Plan  Lab Results   Component Value Date    HGBA1C 6 1 (H) 10/10/2020     Metformin d/c'd previously  Internal medicine consulted and management at their discretion  Monitor for signs and symptoms of hypoglycemia   Current meds: Lispro AC/HS and new Metformin 500mg BID (needs new Rx)  DM2 diet   Has glucometer - DM2 teaching         Other Medical Issues:   None    Follow-up providers and other issues to be followed up after discharge  · PCP  · Ortho  · Cards    CODE: Level 1: Full Code    Restrictions include: Fall precautions    Objective:     Allergies per EMR  Diagnostic Studies: Reviewed, no new imaging  No orders to display     See above as well    Laboratory: Labs reviewed  Results from last 7 days   Lab Units 02/21/22  0543 02/17/22  0637 02/15/22  0656   HEMOGLOBIN g/dL 10 0* 10 3* 11 5   HEMATOCRIT % 31 5* 32 2* 36 1   WBC Thousand/uL 5 14 4 84 6 66     Results from last 7 days   Lab Units 02/21/22  0543 02/17/22  0637 02/15/22  0656   BUN mg/dL 30* 25 17   SODIUM mmol/L 134* 133* 133*   POTASSIUM mmol/L 3 7 3 8 3 8   CHLORIDE mmol/L 99* 98* 98*   CREATININE mg/dL 1 12 0 97 0 96   AST U/L  --   --  21   ALT U/L  --   --  26            Drug regimen reviewed, all potential adverse effects identified and addressed:    Scheduled Meds:  Current Facility-Administered Medications   Medication Dose Route Frequency Provider Last Rate    acetaminophen  975 mg Oral [facility] Ezequiel Lindsay MD      amLODIPine  10 mg Oral Daily Ezequiel Lindsay MD      apixaban  5 mg Oral BID Ezequiel Lindsay MD      ascorbic acid  500 mg Oral Daily Ezequiel Lindsay MD      atorvastatin  40 mg Oral After Anant Crane MD      bisacodyl  10 mg Rectal Daily PRN Ezequiel Lindsay MD      Carboxymethylcellulose Sodium  1 drop Ophthalmic TID Luis Madera MD      chlorthalidone  25 mg Oral Daily Ezequiel Lindsay MD      cholecalciferol  1,000 Units Oral Daily Ezequiel Lindsay MD      cloNIDine  0 1 mg Oral Q12H [address] Ezequiel Lindsay MD      Diclofenac Sodium  2 g Topical TID PRN Ezequiel Lindsay MD      docusate sodium  100 mg Oral BID Ezequiel Lindsay MD      insulin lispro  1-5 Units Subcutaneous HS Ezequiel Lindsay MD      insulin lispro  1-5 Units Subcutaneous TID [facility] Ezequiel Lindsay MD      labetalol  200 mg Oral Q12H [address] Ezequiel Lindsay MD      levothyroxine  125 mcg Oral Early Morning Ezequiel Lindsay MD      lidocaine  1 patch Topical Daily Ezequiel Lindsay MD      lisinopril  20 mg Oral BID Ezequiel Lindsay MD      LORazepam  0 5 mg Oral BID Ezequiel Lindsay MD      metFORMIN  500 mg Oral BID With Meals RUCHI Antonio      ondansetron  4 mg Oral Q8H PRN Ezequiel Lindsay MD      oxyCODONE  2 5 mg Oral Q4H PRN Ezequiel Lindsay MD      polyethylene glycol  17 g Oral Daily PRN Ezequiel Lindsay MD      PreserVision AREDS 2  1 capsule Oral BID Ezequiel Lindsay MD      senna  1 tablet Oral BID Ezequiel Lindsay MD         Chief Complaints:  R knee pain     Subjective:     Patient reports R knee pain much improved  She notes still feeling a little overwhelmed at times but notes moving around better  She denies lightheadedness, SOB, constipation, or other new complaints  ROS: A 10 point ROS was performed; negative except as noted above         Physical Exam:  Vitals:    02/21/22 0928   BP: 166/60   Pulse: 60   Resp:    Temp:    SpO2:        GEN:  Sitting in NAD   HEENT/NECK: MMM  CARDIAC: Regular rate rhythm, no murmers, no rubs, no gallops  LUNGS:  clear to auscultation, no wheezes, rales, or rhonchi  ABDOMEN: Soft, non-tender, non-distended, normal active bowel sounds  EXTREMITIES/SKIN:  R knee with decreased edema, no calf TTP  NEURO:   MENTAL STATUS:  Appropriate wakefulness and interaction   PSYCH:  Affect:  Euthymic        HPI:  78year old F with PMH of Afib on anticoagulation with Eliquis, HTN, DM2, hyperlipidemia, hypothyroidism who developed progressive R knee pain and significant ambulatory dysfunction who had outpatient work-up which showed R knee effusion and R knee OA on X-ray  She underwent knee aspiration on 1/19 with bloody fluid   Pain continued to progress and she could no longer safely ambulate, wt bear, or perform ADLs and presented to hospital where MRI R knee performed and showed R medial tibial plateau fracture, complex medial meniscus tear, knee hemarthrosis, calf hematoma   Orthopedics consulted and initially recommended NWB and now per discussion Dr Kareen Vargas patient can be PWB 30-50%  Esthela Muniz is not required to wear brace but can be considered if helpful   She can start passive ROM now but no significant R knee PT expected until 8 weeks  Patient was evaluated by skilled therapies and was found to have significant decline in ADLs and ambulation        ** Please Note: Fluency Direct voice to text software may have been used in the creation of this document   **

## 2022-02-21 NOTE — ASSESSMENT & PLAN NOTE
OP cards follow-up  IM consulted and with overall management at their discretion during ARC course  Antithrombotic: Eliquis  Statin  Optimal blood pressure and blood sugar control

## 2022-02-21 NOTE — PLAN OF CARE
Problem: Potential for Falls  Goal: Patient will remain free of falls  Description: INTERVENTIONS:  - Educate patient/family on patient safety including physical limitations  - Instruct patient to call for assistance with activity   - Consult OT/PT to assist with strengthening/mobility   - Keep Call bell within reach  - Keep bed low and locked with side rails adjusted as appropriate  - Keep care items and personal belongings within reach  - Initiate and maintain comfort rounds  - Make Fall Risk Sign visible to staff  - Offer Toileting every 2-4 Hours, in advance of need  - Initiate/Maintain bed/chair alarm  - Obtain necessary fall risk management equipment: alarms  - Apply yellow socks and bracelet for high fall risk patients  - Consider moving patient to room near nurses station  Outcome: Progressing     Problem: PAIN - ADULT  Goal: Verbalizes/displays adequate comfort level or baseline comfort level  Description: Interventions:  - Encourage patient to monitor pain and request assistance  - Assess pain using appropriate pain scale  - Administer analgesics based on type and severity of pain and evaluate response  - Implement non-pharmacological measures as appropriate and evaluate response  - Consider cultural and social influences on pain and pain management  - Notify physician/advanced practitioner if interventions unsuccessful or patient reports new pain  Outcome: Progressing     Problem: INFECTION - ADULT  Goal: Absence or prevention of progression during hospitalization  Description: INTERVENTIONS:  - Assess and monitor for signs and symptoms of infection  - Monitor lab/diagnostic results  - Monitor all insertion sites, i e  indwelling lines, tubes, and drains  - Monitor endotracheal if appropriate and nasal secretions for changes in amount and color  - Oak appropriate cooling/warming therapies per order  - Administer medications as ordered  - Instruct and encourage patient and family to use good hand hygiene technique  - Identify and instruct in appropriate isolation precautions for identified infection/condition  Outcome: Progressing     Problem: SAFETY ADULT  Goal: Maintain or return to baseline ADL function  Description: INTERVENTIONS:  -  Assess patient's ability to carry out ADLs; assess patient's baseline for ADL function and identify physical deficits which impact ability to perform ADLs (bathing, care of mouth/teeth, toileting, grooming, dressing, etc )  - Assess/evaluate cause of self-care deficits   - Assess range of motion  - Assess patient's mobility; develop plan if impaired  - Assess patient's need for assistive devices and provide as appropriate  - Encourage maximum independence but intervene and supervise when necessary  - Involve family in performance of ADLs  - Assess for home care needs following discharge   - Consider OT consult to assist with ADL evaluation and planning for discharge  - Provide patient education as appropriate  Outcome: Progressing  Goal: Maintains/Returns to pre admission functional level  Description: INTERVENTIONS:  - Perform BMAT or MOVE assessment daily    - Set and communicate daily mobility goal to care team and patient/family/caregiver  - Collaborate with rehabilitation services on mobility goals if consulted  - Perform Range of Motion 3 times a day  - Reposition patient every 2 hours    - Dangle patient 3 times a day  - Stand patient 3 times a day  - Ambulate patient 3 times a day  - Out of bed to chair 3 times a day   - Out of bed for meals 3 times a day  - Out of bed for toileting  - Record patient progress and toleration of activity level   Outcome: Progressing     Problem: DISCHARGE PLANNING  Goal: Discharge to home or other facility with appropriate resources  Description: INTERVENTIONS:  - Identify barriers to discharge w/patient and caregiver  - Arrange for needed discharge resources and transportation as appropriate  - Identify discharge learning needs (meds, wound care, etc )  - Arrange for interpretive services to assist at discharge as needed  - Refer to Case Management Department for coordinating discharge planning if the patient needs post-hospital services based on physician/advanced practitioner order or complex needs related to functional status, cognitive ability, or social support system  Outcome: Progressing     Problem: Prexisting or High Potential for Compromised Skin Integrity  Goal: Skin integrity is maintained or improved  Description: INTERVENTIONS:  - Identify patients at risk for skin breakdown  - Assess and monitor skin integrity  - Assess and monitor nutrition and hydration status  - Monitor labs   - Assess for incontinence   - Turn and reposition patient  - Assist with mobility/ambulation  - Relieve pressure over bony prominences  - Avoid friction and shearing  - Provide appropriate hygiene as needed including keeping skin clean and dry  - Evaluate need for skin moisturizer/barrier cream  - Collaborate with interdisciplinary team   - Patient/family teaching  - Consider wound care consult   Outcome: Progressing

## 2022-02-21 NOTE — PROGRESS NOTES
02/21/22 1430   Pain Assessment   Pain Score No Pain   Restrictions/Precautions   Precautions Fall Risk;Supervision on toilet/commode;Limb alert   RLE Weight Bearing Per Order PWB   Cognition   Overall Cognitive Status WFL   Subjective   Subjective pt agreeable to therapy at this time time   Sit to Lying   Type of Assistance Needed Set-up / clean-up   Sit to Lying CARE Score 5   Lying to Sitting on Side of Bed   Type of Assistance Needed Set-up / clean-up   Lying to Sitting on Side of Bed CARE Score 5   Sit to Stand   Type of Assistance Needed Supervision   Comment CS   Sit to Stand CARE Score 4   Bed-Chair Transfer   Type of Assistance Needed Supervision; Adaptive equipment   Comment CS with RW   Chair/Bed-to-Chair Transfer CARE Score 4   Transfer Bed/Chair/Wheelchair   Findings cont with cues for PWB and safe RW management   Car Transfer   Type of Assistance Needed Set-up / clean-up   Car Transfer CARE Score 5   Walk 10 Feet   Type of Assistance Needed Supervision; Adaptive equipment   Comment with RW   Walk 10 Feet CARE Score 4   Walk 50 Feet with Two Turns   Type of Assistance Needed Supervision; Adaptive equipment   Comment with RW   Walk 50 Feet with Two Turns CARE Score 4   Walking 10 Feet on Uneven Surfaces   Type of Assistance Needed Supervision; Adaptive equipment   Comment CS on ramp on ground floor   Walking 10 Feet on Uneven Surfaces CARE Score 4   Ambulation   Does the patient walk? 2  Yes   Distance Walked (feet) 50 ft  (not limited)   Assist Device Roller Walker   Gait Pattern Inconsistant Mariangel; Slow Mariangel;Decreased foot clearance; Improper weight shift; Poor UE WB   Limitations Noted In Balance; Safety;Strength   Provided Assistance with: Balance   Findings tends to move quick, decrease use of UEs to offset on RLE to maintain wt bearing   Wheel 50 Feet with Two Turns   Reason if not Attempted Activity not applicable   Wheel 50 Feet with Two Turns CARE Score 9   Wheel 150 Feet   Reason if not Attempted Activity not applicable   Wheel 176 Feet CARE Score 9   Toilet Transfer   Type of Assistance Needed Supervision   Toilet Transfer CARE Score 4   Equipment Use   NuStep pt utilized B LEs no active assist, gentle ROM x5mins   Assessment   Treatment Assessment pt tolerated tx well with focusing on d/c planning and assessing height of bed and functional mobility  based on pt's estimated height of bed, measured 29"  Jasmin Harder, contacted pt's dtr to come in for family training  pt making good progress but still needs cues for RW management and maintain safe PWB of RLE   Problem List Decreased strength;Decreased range of motion;Decreased endurance; Impaired balance;Decreased mobility; Decreased coordination; Impaired judgement;Decreased safety awareness; Impaired vision;Orthopedic restrictions;Pain   Barriers to Discharge Inaccessible home environment;Decreased caregiver support   PT Barriers   Functional Limitation Car transfers;Stair negotiation;Standing;Walking;Transfers   Plan   Progress Progressing toward goals   Recommendation   PT Discharge Recommendation Home with home health rehabilitation   Equipment Recommended Walker   PT Therapy Minutes   PT Time In 1430   PT Time Out 1530   PT Total Time (minutes) 60   PT Mode of treatment - Individual (minutes) 60   PT Mode of treatment - Concurrent (minutes) 0   PT Mode of treatment - Group (minutes) 0   PT Mode of treatment - Co-treat (minutes) 0   PT Mode of Treatment - Total time(minutes) 60 minutes   PT Cumulative Minutes 480   Therapy Time missed   Time missed?  No

## 2022-02-21 NOTE — PCC CARE MANAGEMENT
Pt is participating in therapy sessions and rehab program  She lives by self in a 3rd floor apartment  She has a granddtr and dtr help out with ordering groceries and some transport  She has a cane and an older walker  She has a raised toilet seat  She has a supportive family  Potential d/c recommendations have been communicated to family  Following to assist w/ d/c planning

## 2022-02-22 LAB
GLUCOSE SERPL-MCNC: 104 MG/DL (ref 65–140)
GLUCOSE SERPL-MCNC: 119 MG/DL (ref 65–140)
GLUCOSE SERPL-MCNC: 126 MG/DL (ref 65–140)
GLUCOSE SERPL-MCNC: 141 MG/DL (ref 65–140)

## 2022-02-22 PROCEDURE — 97110 THERAPEUTIC EXERCISES: CPT

## 2022-02-22 PROCEDURE — 82948 REAGENT STRIP/BLOOD GLUCOSE: CPT

## 2022-02-22 PROCEDURE — 99232 SBSQ HOSP IP/OBS MODERATE 35: CPT | Performed by: INTERNAL MEDICINE

## 2022-02-22 PROCEDURE — 97116 GAIT TRAINING THERAPY: CPT

## 2022-02-22 PROCEDURE — 99233 SBSQ HOSP IP/OBS HIGH 50: CPT

## 2022-02-22 PROCEDURE — 97530 THERAPEUTIC ACTIVITIES: CPT

## 2022-02-22 RX ORDER — ACETAMINOPHEN 325 MG/1
975 TABLET ORAL EVERY 6 HOURS PRN
Status: DISCONTINUED | OUTPATIENT
Start: 2022-02-22 | End: 2022-02-25 | Stop reason: HOSPADM

## 2022-02-22 RX ADMIN — LORAZEPAM 0.5 MG: 0.5 TABLET ORAL at 08:20

## 2022-02-22 RX ADMIN — LABETALOL HYDROCHLORIDE 200 MG: 200 TABLET, FILM COATED ORAL at 08:24

## 2022-02-22 RX ADMIN — Medication 1 DROP: at 21:10

## 2022-02-22 RX ADMIN — METFORMIN HYDROCHLORIDE 500 MG: 500 TABLET, FILM COATED ORAL at 16:31

## 2022-02-22 RX ADMIN — Medication 1 CAPSULE: at 08:25

## 2022-02-22 RX ADMIN — ATORVASTATIN CALCIUM 40 MG: 40 TABLET, FILM COATED ORAL at 17:12

## 2022-02-22 RX ADMIN — LEVOTHYROXINE SODIUM 125 MCG: 125 TABLET ORAL at 06:27

## 2022-02-22 RX ADMIN — LISINOPRIL 20 MG: 20 TABLET ORAL at 21:08

## 2022-02-22 RX ADMIN — METFORMIN HYDROCHLORIDE 500 MG: 500 TABLET, FILM COATED ORAL at 08:23

## 2022-02-22 RX ADMIN — LIDOCAINE 5% 1 PATCH: 700 PATCH TOPICAL at 08:26

## 2022-02-22 RX ADMIN — Medication 1 DROP: at 08:24

## 2022-02-22 RX ADMIN — DICLOFENAC SODIUM 2 G: 10 GEL TOPICAL at 06:48

## 2022-02-22 RX ADMIN — STANDARDIZED SENNA CONCENTRATE 8.6 MG: 8.6 TABLET ORAL at 17:12

## 2022-02-22 RX ADMIN — CLONIDINE HYDROCHLORIDE 0.1 MG: 0.1 TABLET ORAL at 08:22

## 2022-02-22 RX ADMIN — ACETAMINOPHEN 975 MG: 325 TABLET, FILM COATED ORAL at 06:26

## 2022-02-22 RX ADMIN — CHLORTHALIDONE 25 MG: 25 TABLET ORAL at 08:25

## 2022-02-22 RX ADMIN — CLONIDINE HYDROCHLORIDE 0.1 MG: 0.1 TABLET ORAL at 21:08

## 2022-02-22 RX ADMIN — AMLODIPINE BESYLATE 10 MG: 10 TABLET ORAL at 08:22

## 2022-02-22 RX ADMIN — Medication 1 CAPSULE: at 21:09

## 2022-02-22 RX ADMIN — APIXABAN 5 MG: 5 TABLET, FILM COATED ORAL at 08:23

## 2022-02-22 RX ADMIN — Medication 1 DROP: at 16:31

## 2022-02-22 RX ADMIN — OXYCODONE HYDROCHLORIDE AND ACETAMINOPHEN 500 MG: 500 TABLET ORAL at 08:21

## 2022-02-22 RX ADMIN — LABETALOL HYDROCHLORIDE 200 MG: 200 TABLET, FILM COATED ORAL at 21:08

## 2022-02-22 RX ADMIN — APIXABAN 5 MG: 5 TABLET, FILM COATED ORAL at 17:11

## 2022-02-22 RX ADMIN — Medication 1000 UNITS: at 08:22

## 2022-02-22 RX ADMIN — LORAZEPAM 0.5 MG: 0.5 TABLET ORAL at 17:12

## 2022-02-22 RX ADMIN — LISINOPRIL 20 MG: 20 TABLET ORAL at 08:21

## 2022-02-22 NOTE — PLAN OF CARE
Problem: Potential for Falls  Goal: Patient will remain free of falls  Description: INTERVENTIONS:  - Educate patient/family on patient safety including physical limitations  - Instruct patient to call for assistance with activity   - Consult OT/PT to assist with strengthening/mobility   - Keep Call bell within reach  - Keep bed low and locked with side rails adjusted as appropriate  - Keep care items and personal belongings within reach  - Initiate and maintain comfort rounds  - Make Fall Risk Sign visible to staff  - Offer Toileting every 2-4 Hours, in advance of need  - Initiate/Maintain bed/chair alarm  - Obtain necessary fall risk management equipment: alarms  - Apply yellow socks and bracelet for high fall risk patients  - Consider moving patient to room near nurses station  Outcome: Progressing     Problem: PAIN - ADULT  Goal: Verbalizes/displays adequate comfort level or baseline comfort level  Description: Interventions:  - Encourage patient to monitor pain and request assistance  - Assess pain using appropriate pain scale  - Administer analgesics based on type and severity of pain and evaluate response  - Implement non-pharmacological measures as appropriate and evaluate response  - Consider cultural and social influences on pain and pain management  - Notify physician/advanced practitioner if interventions unsuccessful or patient reports new pain  Outcome: Progressing     Problem: INFECTION - ADULT  Goal: Absence or prevention of progression during hospitalization  Description: INTERVENTIONS:  - Assess and monitor for signs and symptoms of infection  - Monitor lab/diagnostic results  - Monitor all insertion sites, i e  indwelling lines, tubes, and drains  - Monitor endotracheal if appropriate and nasal secretions for changes in amount and color  - [place] appropriate cooling/warming therapies per order  - Administer medications as ordered  - Instruct and encourage patient and family to use good hand hygiene technique  - Identify and instruct in appropriate isolation precautions for identified infection/condition  Outcome: Progressing     Problem: SAFETY ADULT  Goal: Maintain or return to baseline ADL function  Description: INTERVENTIONS:  -  Assess patient's ability to carry out ADLs; assess patient's baseline for ADL function and identify physical deficits which impact ability to perform ADLs (bathing, care of mouth/teeth, toileting, grooming, dressing, etc )  - Assess/evaluate cause of self-care deficits   - Assess range of motion  - Assess patient's mobility; develop plan if impaired  - Assess patient's need for assistive devices and provide as appropriate  - Encourage maximum independence but intervene and supervise when necessary  - Involve family in performance of ADLs  - Assess for home care needs following discharge   - Consider OT consult to assist with ADL evaluation and planning for discharge  - Provide patient education as appropriate  Outcome: Progressing  Goal: Maintains/Returns to pre admission functional level  Description: INTERVENTIONS:  - Perform BMAT or MOVE assessment daily    - Set and communicate daily mobility goal to care team and patient/family/caregiver  - Collaborate with rehabilitation services on mobility goals if consulted  - Perform Range of Motion 3 times a day  - Reposition patient every 2 hours    - Dangle patient 3 times a day  - Stand patient 3 times a day  - Ambulate patient 3 times a day  - Out of bed to chair 3 times a day   - Out of bed for meals 3 times a day  - Out of bed for toileting  - Record patient progress and toleration of activity level   Outcome: Progressing     Problem: DISCHARGE PLANNING  Goal: Discharge to home or other facility with appropriate resources  Description: INTERVENTIONS:  - Identify barriers to discharge w/patient and caregiver  - Arrange for needed discharge resources and transportation as appropriate  - Identify discharge learning needs (meds, wound care, etc )  - Arrange for interpretive services to assist at discharge as needed  - Refer to Case Management Department for coordinating discharge planning if the patient needs post-hospital services based on physician/advanced practitioner order or complex needs related to functional status, cognitive ability, or social support system  Outcome: Progressing     Problem: Prexisting or High Potential for Compromised Skin Integrity  Goal: Skin integrity is maintained or improved  Description: INTERVENTIONS:  - Identify patients at risk for skin breakdown  - Assess and monitor skin integrity  - Assess and monitor nutrition and hydration status  - Monitor labs   - Assess for incontinence   - Turn and reposition patient  - Assist with mobility/ambulation  - Relieve pressure over bony prominences  - Avoid friction and shearing  - Provide appropriate hygiene as needed including keeping skin clean and dry  - Evaluate need for skin moisturizer/barrier cream  - Collaborate with interdisciplinary team   - Patient/family teaching  - Consider wound care consult   Outcome: Progressing

## 2022-02-22 NOTE — PLAN OF CARE
Problem: Potential for Falls  Goal: Patient will remain free of falls  Description: INTERVENTIONS:  - Educate patient/family on patient safety including physical limitations  - Instruct patient to call for assistance with activity   - Consult OT/PT to assist with strengthening/mobility   - Keep Call bell within reach  - Keep bed low and locked with side rails adjusted as appropriate  - Keep care items and personal belongings within reach  - Initiate and maintain comfort rounds  - Make Fall Risk Sign visible to staff  - Offer Toileting every 2-4 Hours, in advance of need  - Initiate/Maintain bed/chair alarm  - Obtain necessary fall risk management equipment: alarms  - Apply yellow socks and bracelet for high fall risk patients  - Consider moving patient to room near nurses station  Outcome: Progressing     Problem: PAIN - ADULT  Goal: Verbalizes/displays adequate comfort level or baseline comfort level  Description: Interventions:  - Encourage patient to monitor pain and request assistance  - Assess pain using appropriate pain scale  - Administer analgesics based on type and severity of pain and evaluate response  - Implement non-pharmacological measures as appropriate and evaluate response  - Consider cultural and social influences on pain and pain management  - Notify physician/advanced practitioner if interventions unsuccessful or patient reports new pain  Outcome: Progressing     Problem: INFECTION - ADULT  Goal: Absence or prevention of progression during hospitalization  Description: INTERVENTIONS:  - Assess and monitor for signs and symptoms of infection  - Monitor lab/diagnostic results  - Monitor all insertion sites, i e  indwelling lines, tubes, and drains  - Monitor endotracheal if appropriate and nasal secretions for changes in amount and color  - [place] appropriate cooling/warming therapies per order  - Administer medications as ordered  - Instruct and encourage patient and family to use good hand hygiene technique  - Identify and instruct in appropriate isolation precautions for identified infection/condition  Outcome: Progressing     Problem: SAFETY ADULT  Goal: Maintain or return to baseline ADL function  Description: INTERVENTIONS:  -  Assess patient's ability to carry out ADLs; assess patient's baseline for ADL function and identify physical deficits which impact ability to perform ADLs (bathing, care of mouth/teeth, toileting, grooming, dressing, etc )  - Assess/evaluate cause of self-care deficits   - Assess range of motion  - Assess patient's mobility; develop plan if impaired  - Assess patient's need for assistive devices and provide as appropriate  - Encourage maximum independence but intervene and supervise when necessary  - Involve family in performance of ADLs  - Assess for home care needs following discharge   - Consider OT consult to assist with ADL evaluation and planning for discharge  - Provide patient education as appropriate  Outcome: Progressing  Goal: Maintains/Returns to pre admission functional level  Description: INTERVENTIONS:  - Perform BMAT or MOVE assessment daily    - Set and communicate daily mobility goal to care team and patient/family/caregiver  - Collaborate with rehabilitation services on mobility goals if consulted  - Perform Range of Motion 3 times a day  - Reposition patient every 2 hours    - Dangle patient 3 times a day  - Stand patient 3 times a day  - Ambulate patient 3 times a day  - Out of bed to chair 3 times a day   - Out of bed for meals 3 times a day  - Out of bed for toileting  - Record patient progress and toleration of activity level   Outcome: Progressing     Problem: DISCHARGE PLANNING  Goal: Discharge to home or other facility with appropriate resources  Description: INTERVENTIONS:  - Identify barriers to discharge w/patient and caregiver  - Arrange for needed discharge resources and transportation as appropriate  - Identify discharge learning needs (meds, wound care, etc )  - Arrange for interpretive services to assist at discharge as needed  - Refer to Case Management Department for coordinating discharge planning if the patient needs post-hospital services based on physician/advanced practitioner order or complex needs related to functional status, cognitive ability, or social support system  Outcome: Progressing     Problem: Prexisting or High Potential for Compromised Skin Integrity  Goal: Skin integrity is maintained or improved  Description: INTERVENTIONS:  - Identify patients at risk for skin breakdown  - Assess and monitor skin integrity  - Assess and monitor nutrition and hydration status  - Monitor labs   - Assess for incontinence   - Turn and reposition patient  - Assist with mobility/ambulation  - Relieve pressure over bony prominences  - Avoid friction and shearing  - Provide appropriate hygiene as needed including keeping skin clean and dry  - Evaluate need for skin moisturizer/barrier cream  - Collaborate with interdisciplinary team   - Patient/family teaching  - Consider wound care consult   Outcome: Progressing

## 2022-02-22 NOTE — PROGRESS NOTES
Physical Medicine and Rehabilitation Progress Note  Danny Duran 78 y o  female MRN: [id number]  Unit/Bed#: -01 Encounter: [id number]      Assessment & Plan:     Functional assessment:        Admit    Recent  Total assist     Significant assist     Mod assist  To hygiene, bathing, LBD    Min assist       Supervision   To hygiene, bathing, dressing   Contact Guard     Mod Indep/Indep     Transfers Mod assist  Supervision   Ambulation  Min assist   ft    Stairs  Total assist/NT      Goal: Mod indep for ADLs and ambulation except for possibly bathing, LBD  Major barriers:  Pain, WB precautions, lives alone  Dispo & ELOS: Home with ELOS Saturday with [facility], OT (will need assist for bathing, some iADLs)    * Fracture of right tibial plateau  Assessment & Plan  - Function improving well   - EL[facility] Saturday to home with [facility], OT  - Will need assist with bathing, some iADLs   R medial tibial plateau fracture, complex medial meniscus tear, knee hemarthrosis, calf hematoma with significant decline in ADLs and ambulation   · Per discussion with Dr Shakila Chino 2/14, ortho- PWB 30-50%               - Not required to wear brace but consider if helpful during rehab               - PROM ok but no additional knee specific rehab expected for approx 8 weeks   - Dr Jeannine Seay confirmed with ortho 2/17 - PWB RLE 30-50% and can have passive range of motion performed in physical therapy as tolerated  Other than her usual movements of right leg, patient is not to perform any additional active or active assist range of motion in therapies      · Optimal pain control  · Fall precautions   Monitor for signs/symptoms of VTE, atelectasis, acute blood loss anemia, or other infection    Continue acute comprehensive interdisciplinary inpatient rehabilitation to include intensive skilled therapies (PT, OT) as outlined with oversight and management by rehabilitation physician as well as inpatient rehab level nursing, case management and weekly interdisciplinary team meetings   Follow-up with Ortho Sx after d/c and ortho if needed during ARC course     - 1/16 - ED Visit R knee swelling started 4 days ago denies any trauma - XR No acute osseous abnormality  Joint effusion;  - 1/19 - OP ortho- Hemarthrosis of R knee - R knee hemarthrosis likely 2/2 spontaneous bleeding in setting of Eliquis v occult trauma, pt denies trauma, R knee med arthritis; 45 cc blood aspirate; 1/9 XR There is no acute fracture or dislocation  There is no joint effusion  No significant degenerative changes  Soft tissues are unremarkable  IMPRESSION: No acute osseous abnormality  - 2/12 Ortho c/s - Dr Ray Rico - A 49-year-old female with history of multiple falls as well as right knee pain and recurrent effusions  Presents back to the hospital today due to worsening pain and continued swelling  She was sent for an MRI to rule out an occult fracture  On review of imaging this morning she is found to have a nondisplaced impaction fracture of the medial tibial plateau  On my exam patient has a mild effusion she is tender medial tibial plateau  The knee is stable to varus valgus and anterior-posterior drawer testing  Plan is going to be toe-touch weight-bearing on the right lower extremity with the assistance of a walker  She is going to work with PT and Case Management regarding placement  Patient will require follow-up to confirm improvement in her symptoms 2 weeks following discharge  - 2/14 spoke with ortho Lakhwinder Alfaro (resident) to confirm WB status, query need for brace, and ROM as order still said NWB; Per discussion with Dr Lakhwinder Alfaro [facility] 30-50%; brace not required but could consider if helpful; PROM immediately; PWB order (without % parameters entered by Dr Lakhwinder Alfaro)  - 2/17 Dr Yamil Pérez confirmed with ortho PWB RLE 30-50% and can have passive range of motion performed in physical therapy as tolerated    Other than her usual movements of right leg, patient is not to perform any additional active or active assist range of motion in therapies  Pain  Assessment & Plan  Improved   APAP TID > change to PRN   D/C oxy not needing   LD patch  PRN Voltaren gel   Counseled on and continue to encourage deep breathing/relaxation/behavioral pain management techniques:     Deep breathin seconds in, 5 seconds out, 5 times per hour when awake and PRN when in pain or anticipate pain; avoid holding breath and tightening muscles and instead breathe slowly and deeply  Monitor for oversedation, AMS/delirium, and respiratory depression   If being administered - hold opiates, muscle relaxants, benzodiazepines, and gabapentin for oversedation, AMS, or RR<12  Anxiety  Assessment & Plan  Well controlled  On chronic ativan 0 5mg BID - care with concurrent benzo  Continue supportive counseling  Consider Psychology consult while in [place] on and continue to encourage deep breathing/relaxation/behavioral management techniques:     Deep breathin seconds in, 5 seconds out, 5 times per hour when awake and PRN when experiencing pain or anxiety    Avoid holding breath and tightening muscles and instead breathe slowly and deeply        Asymptomatic bilateral carotid artery stenosis  Assessment & Plan  OP cards follow-up  IM consulted and with overall management at their discretion during ARC course  Antithrombotic: Eliquis  Statin  Optimal blood pressure and blood sugar control      Atrial fibrillation   Assessment & Plan  IM consulted and with overall management at their discretion during ARC course  Rate control: labetolol  Antithrombotic: Eliquis       Anemia  Assessment & Plan  Hb fairly stable - slight trend down   IM consulted, with overall monitoring, and management at their discretion during ARC course  Monitor H/H, vitals, signs/symptoms of acute bleeding  OP PCP follow-up     Macular degeneration  Assessment & Plan  OP optho follow-up   Home Preservision and Theretears    At risk for venous thromboembolism (VTE)  Assessment & Plan  SCDs, ambulation, and Eliquis       Chronic hyponatremia  Assessment & Plan  Stable  Mild, monitor intermittently     CKD (chronic kidney disease) stage 3, GFR 30-59 ml/min (HCC)  Assessment & Plan  Fairly stable   Monitor BUN/Cr intermittently as well as electrolytes   IM consulted to manage  Limit nephrotoxic agents when possible      Hypothyroidism  Assessment & Plan  Levothyroxine    Essential hypertension  Assessment & Plan  Acceptable control  Internal medicine consulted and management at their discretion  Monitor for signs and symptoms of hypoglycemia   Current meds: clonidine, chlorthalidone, amlodipine, lisinopril, labetalol     Diabetes mellitus type 2  Assessment & Plan  Lab Results   Component Value Date    HGBA1C 6 1 (H) 10/10/2020     Metformin d/c'd previously  Internal medicine consulted and management at their discretion  Monitor for signs and symptoms of hypoglycemia   Current meds: Lispro AC/HS and new Metformin 500mg BID (needs new Rx)  DM2 diet   Has glucometer - DM2 teaching         Other Medical Issues:   None    Follow-up providers and other issues to be followed up after discharge  · PCP  · Ortho  · Cards    CODE: Level 1: Full Code    Restrictions include: Fall precautions    Objective:     Allergies per EMR  Diagnostic Studies: Reviewed, no new imaging  No orders to display     See above as well    Laboratory: Labs reviewed  Results from last 7 days   Lab Units 02/21/22  0543 02/17/22  0637   HEMOGLOBIN g/dL 10 0* 10 3*   HEMATOCRIT % 31 5* 32 2*   WBC Thousand/uL 5 14 4 84     Results from last 7 days   Lab Units 02/21/22  0543 02/17/22  0637   BUN mg/dL 30* 25   SODIUM mmol/L 134* 133*   POTASSIUM mmol/L 3 7 3 8   CHLORIDE mmol/L 99* 98*   CREATININE mg/dL 1 12 0 97            Drug regimen reviewed, all potential adverse effects identified and addressed:    Scheduled Meds:  Current Facility-Administered Medications   Medication Dose Route Frequency Provider Last Rate    acetaminophen  975 mg Oral Q6H PRN Mallory Landeros MD      amLODIPine  10 mg Oral Daily Mallory Landeros MD      apixaban  5 mg Oral BID Mallory Landeros MD      ascorbic acid  500 mg Oral Daily Mallory Landeros MD      atorvastatin  40 mg Oral After Remberto Howard MD      bisacodyl  10 mg Rectal Daily PRN Mallory Landeros MD      Carboxymethylcellulose Sodium  1 drop Ophthalmic TID Sunday Soto MD      chlorthalidone  25 mg Oral Daily Malloyr Landeros MD      cholecalciferol  1,000 Units Oral Daily Mallory Landeros MD      cloNIDine  0 1 mg Oral Q12H [address] Mallory Landeros MD      Diclofenac Sodium  2 g Topical TID PRN Mallory Landeros MD      docusate sodium  100 mg Oral BID Mallory Landeros MD      insulin lispro  1-5 Units Subcutaneous HS Mallory Landeros MD      insulin lispro  1-5 Units Subcutaneous TID [facility] Mallory Landeros MD      labetalol  200 mg Oral Q12H [address] Mallory Landeros MD      levothyroxine  125 mcg Oral Early Morning Mallory Landeros MD      lidocaine  1 patch Topical Daily Mallory Landeros MD      lisinopril  20 mg Oral BID Mallory Landeros MD      LORazepam  0 5 mg Oral BID Mallory Landeros MD      metFORMIN  500 mg Oral BID With Meals RUCHI Fernandes      ondansetron  4 mg Oral Q8H PRN Mallory Landeros MD      polyethylene glycol  17 g Oral Daily PRN Mallory Landeros MD      PreserVision AREDS 2  1 capsule Oral BID Mallory Landeros MD      senna  1 tablet Oral BID Mallory Landeros MD         Chief Complaints:  Rehab follow-up     Subjective:     Patient reports pain in knee much improved  She reports moving better and feels like she will be able to transfer and toilet herself well at home  Patient denies lightheadedness, SOB, or other new complaints  ROS: A 10 point ROS was performed; negative except as noted above         Physical Exam:  Vitals:    02/22/22 0802   BP: 140/68   Pulse: (!) 54   Resp: 18   Temp: 97 9 °F (36 6 °C)   SpO2: 100%       GEN: Sitting in NAD   HEENT/NECK: MMM  CARDIAC: Regular rate rhythm, no murmers, no rubs, no gallops  LUNGS:  clear to auscultation, no wheezes, rales, or rhonchi  ABDOMEN: Soft, non-tender, non-distended, normal active bowel sounds  EXTREMITIES/SKIN:  no significant calf swelling or TTP  NEURO:   MENTAL STATUS:  Appropriate wakefulness and interaction   PSYCH:  Affect:  Euthymic        HPI:  78year old F with PMH of Afib on anticoagulation with Eliquis, HTN, DM2, hyperlipidemia, hypothyroidism who developed progressive R knee pain and significant ambulatory dysfunction who had outpatient work-up which showed R knee effusion and R knee OA on X-ray  She underwent knee aspiration on 1/19 with bloody fluid   Pain continued to progress and she could no longer safely ambulate, wt bear, or perform ADLs and presented to hospital where MRI R knee performed and showed R medial tibial plateau fracture, complex medial meniscus tear, knee hemarthrosis, calf hematoma   Orthopedics consulted and initially recommended NWB and now per discussion Dr Andrey Govea patient can be PWB 30-50%  Helena Villanueva is not required to wear brace but can be considered if helpful   She can start passive ROM now but no significant R knee PT expected until 8 weeks  Patient was evaluated by skilled therapies and was found to have significant decline in ADLs and ambulation        ** Please Note: Fluency Direct voice to text software may have been used in the creation of this document  **    Total time spent:  35 minutes, with more than 50% spent counseling/coordinating care  Counseling includes discussion with patient re: progress in therapies, functional issues observed by therapy staff, and discussion with patient his/her current medical state/wellbeing  Coordination of patient's care was performed in conjunction with Internal Medicine service to monitor patient's labs, vitals, and management of their comorbidities   In addition, this patient was discussed by the interdisciplinary team in weekly case conference today  The care of the patient was extensively discussed with all care providers and an appropriate rehabilitation plan was formulated unique for this patient  Barriers were identified preventing progression of therapy and appropriate interventions were discussed with each discipline  Please see the team note for input from all disciplines regarding barriers, intervention, and discharge planning

## 2022-02-22 NOTE — PROGRESS NOTES
Internal Medicine Progress Note  Patient: Khalida Hudson  Age/sex: 78 y o  female  Medical Record #: [id number]      ASSESSMENT/PLAN: (Interval History)  Khalida Hudson is seen and examined and management for following issues:    Right Tibial plateau fracture  · Medical management per ortho  · Rehab/pain per PMR  · Discussed WB and ROM with ortho  PWB 30-50% with passive ROM  · Hinged brace as needed for pain     DM II  · HA1C 6 4   · Continue metformin 500mg 2x daily  · Conitnue accuchecks and constant carb diet  · BS stable     HTN  · Home: Norvasc 10mg qd; Chlorthalidone 25mg qd, Clonidine 0 1 mg qd, Labetolol 200mg q12 hours, Lisinopril 20mg BID  · Here: Norvasc 10mg qd, Chlorthalidone 25mg qd, Clonidine 0 1mg q12hrs, Labetolol 200mg q12hrs, Lisinopril 20mg BID  · Stable     PAF  · Cont Eliquis/Labetolol     CKD III  · Baseline 0 8-1 0  · Avoid nephrotoxic medications  · stable      Hypothyroid  · Cont levothyroxine     Hyperlipidemia  · Continue statin therapy       The above assessment and plan was reviewed and updated as determined by my evaluation of the patient on 2/22/2022      Labs:   Results from last 7 days   Lab Units 02/21/22  0543 02/17/22  0637   WBC Thousand/uL 5 14 4 84   HEMOGLOBIN g/dL 10 0* 10 3*   HEMATOCRIT % 31 5* 32 2*   PLATELETS Thousands/uL 245 246     Results from last 7 days   Lab Units 02/21/22  0543 02/17/22  0637   SODIUM mmol/L 134* 133*   POTASSIUM mmol/L 3 7 3 8   CHLORIDE mmol/L 99* 98*   CO2 mmol/L 28 30   BUN mg/dL 30* 25   CREATININE mg/dL 1 12 0 97   CALCIUM mg/dL 9 1 8 8     Results from last 7 days   Lab Units 02/21/22  0543   HEMOGLOBIN A1C % 6 4*         Results from last 7 days   Lab Units 02/21/22  2014 02/21/22  1555 02/21/22  1058   POC GLUCOSE mg/dl 113 135 160*       Review of Scheduled Meds:  Current Facility-Administered Medications   Medication Dose Route Frequency Provider Last Rate    acetaminophen  975 mg Oral [facility] Zhou Flores MD      amLODIPine 10 mg Oral Daily Elly Estrada MD      apixaban  5 mg Oral BID Elly Estrada MD      ascorbic acid  500 mg Oral Daily Elly Estrada MD      atorvastatin  40 mg Oral After Mara Sanders MD      bisacodyl  10 mg Rectal Daily PRN Elly Estrada MD      Carboxymethylcellulose Sodium  1 drop Ophthalmic TID Karl Tripathi MD      chlorthalidone  25 mg Oral Daily Elly Estrada MD      cholecalciferol  1,000 Units Oral Daily Elly Estrada MD      cloNIDine  0 1 mg Oral Q12H [address] Elly Estrada MD      Diclofenac Sodium  2 g Topical TID PRN Elly Estrada MD      docusate sodium  100 mg Oral BID Elly Estrada MD      insulin lispro  1-5 Units Subcutaneous HS Elly Estrada MD      insulin lispro  1-5 Units Subcutaneous TID [facility] Elly Estrada MD      labetalol  200 mg Oral Q12H [address] Elly Estrada MD      levothyroxine  125 mcg Oral Early Morning Elly Estrada MD      lidocaine  1 patch Topical Daily Elly Estrada MD      lisinopril  20 mg Oral BID Elly Estrada MD      LORazepam  0 5 mg Oral BID Elly Estrada MD      metFORMIN  500 mg Oral BID With Meals RUCHI Garza      ondansetron  4 mg Oral Q8H PRN Elly Estrada MD      oxyCODONE  2 5 mg Oral Q4H PRN Elly Estrada MD      polyethylene glycol  17 g Oral Daily PRN Elly Estrada MD      PreserVision AREDS 2  1 capsule Oral BID Elly Esrtada MD      senna  1 tablet Oral BID Elly Estrada MD         Subjective/ HPI: Patient seen and examined  Patients overnight issues or events were reviewed with nursing or staff during rounds or morning huddle session  New or overnight issues include the following:     Pt seen in therapy  Ambulating without issues, no complaints overnight    ROS:   A 10 point ROS was performed; negative except as noted above         Imaging:     No orders to display       *Labs /Radiology studies reviewed  *Medications reviewed and reconciled as needed  *Please refer to order section for additional ordered labs studies  *Case discussed with primary attending during morning huddle case rounds      Physical Examination:  Vitals:   Vitals:    02/21/22 0928 02/21/22 1541 02/21/22 2114 02/22/22 0802   BP: 166/60 134/62 158/60 140/68   BP Location:  Left arm Left arm Right arm   Pulse: 60 59 69 (!) 54   Resp:  17 18 18   Temp:  97 6 °F (36 4 °C) 97 9 °F (36 6 °C) 97 9 °F (36 6 °C)   TempSrc:  Oral Oral Oral   SpO2:  100% 98% 100%   Weight:       Height:         GEN: No apparent distress, interactive; frail  NEURO: Alert and oriented x3  HEENT: Pupils are equal and reactive, EOMI, mucous membranes are moist, face symmetrical  CV: S1 S2 regular, no MRG, no peripheral edema noted  RESP: Lungs are clear bilaterally, no wheezes, rales or rhonchi noted, on room air, respirations easy and non labored  GI: Flat, soft non tender, non distended; +BS x4  : Voiding without difficulty  MUSC: Moves all extremities; except RLE  SKIN: pink, warm and dry, normal turgor, no rashes, lesions         The above physical exam was reviewed and updated as determined by my evaluation of the patient on 2/22/2022  Invasive Devices  Report    None                    VTE Pharmacologic Prophylaxis: Eliquis  Code Status: Level 1 - Full Code  Current Length of Stay: 7 day(s)      Total time spent:  30 minutes with more than 50% spent counseling/coordinating care  Counseling includes discussion with patient re: progress  and discussion with patient of his/her current medical state/information  Coordination of patient's care was performed in conjunction with primary service  Time invested included review of patient's labs, vitals, and management of their comorbidities with continued monitoring  In addition, this patient was discussed with medical team including physician and advanced extenders  The care of the patient was extensively discussed and appropriate treatment plan was formulated unique for this patient       ** Please Note: voice to text software may have been used in the creation of this document   Although proof errors in transcription or interpretation are a potential of such software**

## 2022-02-22 NOTE — CASE MANAGEMENT
Cm spoke with pt and reviewed team meeting update  D/c scheduled for 2/25 with home PT and OT  Pt requested this to be through [facility]  In preparation for d/c, cm placed referral to [facility] for home PT and OT through ecin  Awaiting determination  In preparation for d/c, cm received DME order form from therapy and placed order for roller walker and folding commode to [facility] through [facility] to be delivered to pt room prior to discharge  Following to assist w/ d/c planning needs

## 2022-02-22 NOTE — PLAN OF CARE
Problem: Potential for Falls  Goal: Patient will remain free of falls  Description: INTERVENTIONS:  - Educate patient/family on patient safety including physical limitations  - Instruct patient to call for assistance with activity   - Consult OT/PT to assist with strengthening/mobility   - Keep Call bell within reach  - Keep bed low and locked with side rails adjusted as appropriate  - Keep care items and personal belongings within reach  - Initiate and maintain comfort rounds  - Make Fall Risk Sign visible to staff  - Offer Toileting every 2-4 Hours, in advance of need  - Initiate/Maintain bed/chair alarm  - Obtain necessary fall risk management equipment: alarms  - Apply yellow socks and bracelet for high fall risk patients  - Consider moving patient to room near nurses station  Outcome: Progressing     Problem: PAIN - ADULT  Goal: Verbalizes/displays adequate comfort level or baseline comfort level  Description: Interventions:  - Encourage patient to monitor pain and request assistance  - Assess pain using appropriate pain scale  - Administer analgesics based on type and severity of pain and evaluate response  - Implement non-pharmacological measures as appropriate and evaluate response  - Consider cultural and social influences on pain and pain management  - Notify physician/advanced practitioner if interventions unsuccessful or patient reports new pain  Outcome: Progressing     Problem: INFECTION - ADULT  Goal: Absence or prevention of progression during hospitalization  Description: INTERVENTIONS:  - Assess and monitor for signs and symptoms of infection  - Monitor lab/diagnostic results  - Monitor all insertion sites, i e  indwelling lines, tubes, and drains  - Monitor endotracheal if appropriate and nasal secretions for changes in amount and color  - [place] appropriate cooling/warming therapies per order  - Administer medications as ordered  - Instruct and encourage patient and family to use good hand hygiene technique  - Identify and instruct in appropriate isolation precautions for identified infection/condition  Outcome: Progressing     Problem: SAFETY ADULT  Goal: Maintain or return to baseline ADL function  Description: INTERVENTIONS:  -  Assess patient's ability to carry out ADLs; assess patient's baseline for ADL function and identify physical deficits which impact ability to perform ADLs (bathing, care of mouth/teeth, toileting, grooming, dressing, etc )  - Assess/evaluate cause of self-care deficits   - Assess range of motion  - Assess patient's mobility; develop plan if impaired  - Assess patient's need for assistive devices and provide as appropriate  - Encourage maximum independence but intervene and supervise when necessary  - Involve family in performance of ADLs  - Assess for home care needs following discharge   - Consider OT consult to assist with ADL evaluation and planning for discharge  - Provide patient education as appropriate  Outcome: Progressing  Goal: Maintains/Returns to pre admission functional level  Description: INTERVENTIONS:  - Perform BMAT or MOVE assessment daily    - Set and communicate daily mobility goal to care team and patient/family/caregiver  - Collaborate with rehabilitation services on mobility goals if consulted  - Perform Range of Motion 3 times a day  - Reposition patient every 2 hours    - Dangle patient 3 times a day  - Stand patient 3 times a day  - Ambulate patient 3 times a day  - Out of bed to chair 3 times a day   - Out of bed for meals 3 times a day  - Out of bed for toileting  - Record patient progress and toleration of activity level   Outcome: Progressing     Problem: DISCHARGE PLANNING  Goal: Discharge to home or other facility with appropriate resources  Description: INTERVENTIONS:  - Identify barriers to discharge w/patient and caregiver  - Arrange for needed discharge resources and transportation as appropriate  - Identify discharge learning needs (meds, wound care, etc )  - Arrange for interpretive services to assist at discharge as needed  - Refer to Case Management Department for coordinating discharge planning if the patient needs post-hospital services based on physician/advanced practitioner order or complex needs related to functional status, cognitive ability, or social support system  Outcome: Progressing     Problem: Prexisting or High Potential for Compromised Skin Integrity  Goal: Skin integrity is maintained or improved  Description: INTERVENTIONS:  - Identify patients at risk for skin breakdown  - Assess and monitor skin integrity  - Assess and monitor nutrition and hydration status  - Monitor labs   - Assess for incontinence   - Turn and reposition patient  - Assist with mobility/ambulation  - Relieve pressure over bony prominences  - Avoid friction and shearing  - Provide appropriate hygiene as needed including keeping skin clean and dry  - Evaluate need for skin moisturizer/barrier cream  - Collaborate with interdisciplinary team   - Patient/family teaching  - Consider wound care consult   Outcome: Progressing

## 2022-02-22 NOTE — PROGRESS NOTES
02/22/22 0830   Pain Assessment   Pain Assessment Tool 0-10   Pain Score No Pain   Restrictions/Precautions   Precautions Fall Risk;Supervision on toilet/commode;Limb alert   RLE Weight Bearing Per Order PWB   Cognition   Overall Cognitive Status WFL   Arousal/Participation Alert; Cooperative   Attention Within functional limits   Orientation Level Oriented X4   Memory Decreased recall of precautions   Following Commands Follows multistep commands with increased time or repetition   Sit to Lying   Type of Assistance Needed Set-up / clean-up   Sit to Lying CARE Score 5   Lying to Sitting on Side of Bed   Type of Assistance Needed Set-up / clean-up   Lying to Sitting on Side of Bed CARE Score 5   Sit to Stand   Type of Assistance Needed Supervision   Comment DS   Sit to Stand CARE Score 4   Bed-Chair Transfer   Type of Assistance Needed Supervision; Adaptive equipment   Comment DS with RW   Chair/Bed-to-Chair Transfer CARE Score 4   Transfer Bed/Chair/Wheelchair   Adaptive Equipment Roller Walker   Car Transfer   Type of Assistance Needed Set-up / clean-up   Car Transfer CARE Score 5   Walk 10 Feet   Type of Assistance Needed Supervision; Adaptive equipment   Comment DS with RW   Walk 10 Feet CARE Score 4   Walk 50 Feet with Two Turns   Type of Assistance Needed Supervision; Adaptive equipment   Comment with RW   Walk 50 Feet with Two Turns CARE Score 4   Walk 150 Feet   Type of Assistance Needed Supervision; Adaptive equipment   Comment with RW up to 200'   Walk 150 Feet CARE Score 4   Walking 10 Feet on Uneven Surfaces   Type of Assistance Needed Supervision; Adaptive equipment   Comment over ramp   Walking 10 Feet on Uneven Surfaces CARE Score 4   Ambulation   Does the patient walk? 2  Yes   Primary Mode of Locomotion Prior to Admission Walk   Distance Walked (feet) 200 ft  (x2, 50' x2)   Assist Device Roller Walker   Gait Pattern Antalgic; Slow [place];Decreased foot clearance; Step to;Decreased R stance; Improper weight shift   Limitations Noted In Balance;Device Management;Speed;Strength   Provided Assistance with: Direction   Walk Assist Level Distant Supervision;Supervision   Wheel 50 Feet with Two Turns   Reason if not Attempted Activity not applicable   Wheel 50 Feet with Two Turns CARE Score 9   Wheel 150 Feet   Reason if not Attempted Activity not applicable   Wheel 950 Feet CARE Score 9   Curb or Single Stair   Style negotiated Curb   Type of Assistance Needed Supervision; Adaptive equipment   Physical Assistance Level No physical assistance   Comment CS on 6" curb step, VC's for sequencing only   1 Step (Curb) CARE Score 4   4 Steps   Reason if not Attempted Activity not applicable   4 Steps CARE Score 9   12 Steps   Reason if not Attempted Activity not applicable   12 Steps CARE Score 9   Therapeutic Interventions   Strengthening seated LAQ, hip flex, hip ADD vs ball, hip ABD vs green tband, glute set and ankle DF/PF 2 x15 reps BLE LLE with 2#, PROM to R knee  Equipment Use   NuStep for ROM only x15 min, verbalized to increase use of UE vs RLE  Assessment   Treatment Assessment Pt participated in skilled PT session with increased focus on gait, increased ROM to R knee, increased functional transfers and curb step negotiation  Pt able to maintain DS with RW HH dist amb and functional transfers and S with increased dist amb  Pt maintained PWB to RLE throughout session and cont to be educated on increased WB to RW to prevent excess weight to RLE  Pt states understanding, will work towards IRP for discharge home by end of the week  Discussed sponge bathing at home until OT is able to perform with pt in home and determines if pt will be safe in her own environment  Pt agreed and showed G understanding  Cont POC as tolerated  Problem List Decreased strength;Decreased range of motion;Decreased mobility; Impaired tone;Orthopedic restrictions   Barriers to Discharge Inaccessible home environment;Decreased caregiver support   PT Barriers   Functional Limitation Car transfers;Stair negotiation; Walking   Plan   Treatment/Interventions Functional transfer training;LE strengthening/ROM; Therapeutic exercise;Gait training;Patient/family training   Progress Progressing toward goals   Recommendation   PT Discharge Recommendation Home with home health rehabilitation   Equipment Recommended Walker   PT Therapy Minutes   PT Time In 0830   PT Time Out 1000   PT Total Time (minutes) 90   PT Mode of treatment - Individual (minutes) 90   PT Mode of treatment - Concurrent (minutes) 0   PT Mode of treatment - Group (minutes) 0   PT Mode of treatment - Co-treat (minutes) 0   PT Mode of Treatment - Total time(minutes) 90 minutes   PT Cumulative Minutes 570   Therapy Time missed   Time missed?  No

## 2022-02-22 NOTE — PCC NURSING
78year old F with PMH of Afib on anticoagulation with Eliquis, HTN, DM2, hyperlipidemia, hypothyroidism who developed progressive R knee pain and significant ambulatory dysfunction who had outpatient work-up which showed R knee effusion and R knee OA on X-ray  She underwent knee aspiration on 1/19 with bloody fluid  Pain continued to progress and she could no longer safely ambulate, wt bear, or perform ADLs and presented to hospital where MRI R knee performed and showed R medial tibial plateau fracture, complex medial meniscus tear, knee hemarthrosis, calf hematoma  Orthopedics consulted and initially recommended NWB and now per discussion Dr Katya Mina patient can be PWB 30-50%  She is not required to wear brace but can be considered if helpful  She can start passive ROM now but no significant R knee PT expected until 8 weeks  Patient was evaluated by skilled therapies and was found to have significant decline in ADLs and ambulation  Patient ordered a diabetic diet  Skin is intact  Continent for bowel and bladder  Alarms not required for safety, rings appropriately  This week we will encourage independence with ADLs while monitoring labs and vitals and maintaining skin integrity  We will keep the patient free from falls with patient education through safe transferring and maintaining safety precautions

## 2022-02-22 NOTE — TEAM CONFERENCE
Acute RehabilitationTeam Conference Note  Date: 2/22/2022   Time: 10:43 AM       Patient Name:  Leeann Kimbrough       Medical Record Number: [id number]   YOB: 1940  Sex: Female          Room/Bed:  [place] [address]/[facility] 453-01  Payor Info:  Payor: Filippo Jackson / Plan: MEDICARE A AND B / Product Type: Medicare A & B Fee for Service /      Admitting Diagnosis: Tibial plateau fracture [C10 871G]   Admit Date/Time:  2/15/2022  2:42 PM  Admission Comments: No comment available     Primary Diagnosis:  Fracture of right tibial plateau  Principal Problem: Fracture of right tibial plateau    Patient Active Problem List    Diagnosis Date Noted    Anemia 02/21/2022    Pain 02/15/2022    At risk for venous thromboembolism (VTE) 02/15/2022    Macular degeneration 02/15/2022    Fracture of right tibial plateau 91/13/2446    Renal vascular disease 01/24/2020    Primary osteoarthritis of right knee 11/20/2019    Synovial cyst of right popliteal space 11/20/2019    Hemarthrosis of right knee 09/18/2019    History of breast cancer 09/18/2019    Moderate protein-calorie malnutrition  09/18/2019    Asymptomatic bilateral carotid artery stenosis 08/15/2019    High blood pressure     Hypertensive urgency 07/20/2019    Hypertension     Hypo-osmolality and hyponatremia 06/26/2019    Fall 06/04/2019    Chronic hyponatremia 06/04/2019    Syncope vs presyncope 06/03/2019    Paroxysmal A-fib ([facility] [address] ) 06/03/2019    Diabetes ([facility] [address] ) 06/03/2019    Weight loss 06/03/2019    CKD (chronic kidney disease) stage 3, GFR 30-59 ml/min ([facility]) 06/03/2019    Iron deficiency anemia due to chronic blood loss 12/07/2018    Incisional hernia, without obstruction or gangrene 07/10/2018    Postop check 03/02/2018    Delirium 02/05/2018    Physical deconditioning 02/05/2018    Ambulatory dysfunction 02/05/2018    Hx of fall 02/05/2018    Anxiety 02/05/2018    Acute kidney injury ([facility] [address] ) 02/02/2018    Hordeolum externum of right upper eyelid 03/08/2017    Malignant neoplasm of right breast, stage 1, estrogen receptor positive ([facility] [address] ) 02/08/2017    Malignant neoplasm of central portion of right female breast ([facility] ) 01/13/2017    Tobacco abuse 01/11/2017    Heart palpitations 07/11/2016    Nicotine abuse 07/11/2016    Atrial fibrillation  07/11/2016    Diabetes mellitus type 2 07/11/2016    Essential hypertension 07/11/2016    Hypothyroidism 07/11/2016    Nontraumatic compression fracture of T4 vertebra (HCC) 10/06/2015    Low back pain without sciatica 09/17/2015    Gastroesophageal reflux disease without esophagitis 02/23/2015    Depression 02/23/2015    History of CEA (carotid endarterectomy) 02/23/2015    Hyperlipidemia associated with type 2 diabetes mellitus ([facility] ) 02/23/2015    Hypothyroidism due to acquired atrophy of thyroid 02/23/2015    Mitral insufficiency and aortic stenosis 02/23/2015    Vitamin D deficiency 02/23/2015    Varicose vein of leg 02/23/2015    Non-seasonal allergic rhinitis due to pollen 02/23/2015       Physical Therapy:    Weight Bearing Status: Partial (RLE)  Transfers: Supervision  Bed Mobility: Supervision  Amulation Distance (ft): 200 feet  Ambulation: Supervision  Assistive Device for Ambulation: Roller Walker  Ramp: Supervision  Assistive Device for Ramp: Roller Walker  Discharge Recommendations: Home with:  [address] with[de-identified] Family Support,Home Physical Therapy    Pt making progress towards d/c goals  Pt functioning at  with VCs to safely maintain WBS of PWB of RLE  Pt needs cues for RW management at times but overall demonstrates good safety  Pt limited with ROM and strengthening of RLE per ortho orders to perform PROM only at this time  Will require A for IADLs upon d/c to decrease fall risk  Recommendation for home therapy to improve safety and functional ind in own environment    Recommendation for cont skilled therapy to improve strenghtening, safety awareness and overall activity tolerance to achieve d/c goals  Phone calls have been made to pt's daughter to arrange for FT sessions and to make clear the recommendations that pt has A for IADLs from family and friends  Barriers for home at this time include need for CS/VC with mobility as LTGs are set for Leah with RW  Occupational Therapy:  Eating: Independent  Grooming: Supervision  Bathing: Supervision  Bathing: Supervision  Upper Body Dressing: Independent  Lower Body Dressing: Supervision  Toileting: Supervision  Tub/Shower Transfer: Supervision  Toilet Transfer: Supervision  Cognition: Within Defined Limits  Orientation: Person,Place,Time,Situation  Discharge Recommendations: Home with:  [address] with[de-identified] Family Support,Home Occupational Therapy       Admission due to R knee pain  A workup was completed indicating: R medial tibial plateau fracture, complex medial meniscus tear, knee hemarthrosis, calf hematoma  Pt's current problems include: dec CG support, pain, and WB status  Pt's comorbidities include: A-fib, HTN, DM 2, anxiety  Pt's precuations include: PWB RLE  Prior to admission pt was completing ADL's independently  Currently, pt requires sup for ADLs and fxnl mobility  Pt is currently limited due to the following deficits impacting occupational performance, activity tolerance, endurance, standing balance/tolerance and sitting balance/tolerance  Occupational Therapy plan of care to focus on the following areas:  ADL re-training, [address] training, IADL re-training, functional transfers, standing tolerance, standing balance, DME training/education, family training/education, and EC techniques/education  Anticipate DC later this week at Mod I for ADLs and family support for IADLs        Speech Therapy:           No notes on file    Nursing Notes:  Appetite: Good  Diet Type: Diabetic                                                                     Pain Location/Orientation: Orientation: Right,Location: Neck  Pain Score: 0 Hospital Pain Intervention(s): Medication (See MAR)          78year old F with PMH of Afib on anticoagulation with Eliquis, HTN, DM2, hyperlipidemia, hypothyroidism who developed progressive R knee pain and significant ambulatory dysfunction who had outpatient work-up which showed R knee effusion and R knee OA on X-ray  She underwent knee aspiration on 1/19 with bloody fluid  Pain continued to progress and she could no longer safely ambulate, wt bear, or perform ADLs and presented to hospital where MRI R knee performed and showed R medial tibial plateau fracture, complex medial meniscus tear, knee hemarthrosis, calf hematoma  Orthopedics consulted and initially recommended NWB and now per discussion Dr Romina Tejeda patient can be PWB 30-50%  She is not required to wear brace but can be considered if helpful  She can start passive ROM now but no significant R knee PT expected until 8 weeks  Patient was evaluated by skilled therapies and was found to have significant decline in ADLs and ambulation  Patient ordered a diabetic diet  Skin is intact  Continent for bowel and bladder  Alarms not required for safety, rings appropriately  This week we will encourage independence with ADLs while monitoring labs and vitals and maintaining skin integrity  We will keep the patient free from falls with patient education through safe transferring and maintaining safety precautions  Case Management:     Discharge Planning  Living Arrangements: Lives Alone  Support Systems: Daughter,Friends/neighbors,Family members  Assistance Needed: tbd  Type of Current Residence: Other (Comment) (apartment building)  Current Home Care Services: No  Pt is participating in therapy sessions and rehab program  She lives by self in a 3rd floor apartment  She has a granddtr and dtr help out with ordering groceries and some transport  She has a cane and an older walker  She has a raised toilet seat  She has a supportive family  Potential d/c recommendations have been communicated to family  Following to assist w/ d/c planning  Is the patient actively participating in therapies? yes  List any modifications to the treatment plan:     Barriers Interventions   IADLS Recommending assistance   PWB Walker, limits ability to do IADLS, compensatory strategies                 Is the patient making expected progress toward goals? yes  List any update or changes to goals:     Medical Goals: Patient will be medically stable for discharge to [facility] upon completion of rehab program and Patient will be able to manage medical conditions and comorbid conditions with medications and follow up upon completion of rehab program    Weekly Team Goals:   Rehab Team Goals  ADL Team Goal: Patient will be independent with ADLs with least restrictive device upon completion of rehab program  Bowel/Bladder Team Goal: Patient will require supervision with bladder/bowel management with least restrictive device upon completion of rehab program  Transfer Team Goal: Patient will be independent with transfers with least restrictive device upon completion of rehab program  Locomotion Team Goal: Patient will be independent with locomotion with least restrictive device upon completion of rehab program    Discussion: Pt presents with the above barriers  Team is recommending assistance with IADLS  Cm and therapy will discuss with dtr  Recommendations for home PT and OT  Anticipated Discharge Date:  2/26    [facility] Team Members Present: The following team members are supervising care for this patient and were present during this Weekly Team Conference      Physician: Dr Bladimir Mayberry MD  : JONNY Inman  Registered Nurse: Davie Mcneil RN  Physical Therapist: MORA DejesusT  Occupational Therapist: Tristan Del Valle MS, OTR/L  Speech Therapist: Stanley Lujan, [address] [place] [address]

## 2022-02-22 NOTE — PROGRESS NOTES
02/22/22 1230   Pain Assessment   Pain Assessment Tool 0-10   Pain Score No Pain   Restrictions/Precautions   Precautions Fall Risk;Limb alert;Supervision on toilet/commode   RLE Weight Bearing Per Order PWB   ROM Restrictions Yes   RLE ROM Restriction Range Limitation  (no resisted exercise elvira on R knee)   Lifestyle   Autonomy "I enjoy doing things like this, that was nice"   Sit to Stand   Type of Assistance Needed Supervision   Physical Assistance Level No physical assistance   Comment DS with RW   Sit to Stand CARE Score 4   Bed-Chair Transfer   Type of Assistance Needed Supervision   Physical Assistance Level No physical assistance   Comment DS with RW   Chair/Bed-to-Chair Transfer CARE Score 4   Toileting Hygiene   Type of Assistance Needed Supervision   Physical Assistance Level No physical assistance   Comment DS in stance for hygiene/CM    Toileting Hygiene CARE Score 4   Toilet Transfer   Type of Assistance Needed Supervision   Physical Assistance Level No physical assistance   Comment DS with RW   Toilet Transfer CARE Score 4   Light Housekeeping   Light Housekeeping Level Walker   Light Housekeeping Level of Assistance Distant supervision   Light Housekeeping Pt engages in various IADL tasks focusing on simulated laundry including folding wash, washing dishes, and setting place settings at kitchen table  Simulated laundry task completed with pt using RW, walker bag and placing laundry in walker bag  Pt reports the distances are "short" from her apartment to elevator, elevator to laundry room  Simulated home set up to walk distance of above with RW, place clothing in washer/dryer  Discussed recommendations of using reacher to retrieve items from back of dryer to decrease risk of falls, ensure adherence to [address] on R LE  Pt reports that there is a chair in the community laundry room that she would sit on and retrieve her clothing from the dryer--discussed with pt that would be another safe option   Pt completed task overall with DS  Pt then stands to fold laundry in order to inc stand osmani/bal, however instructed pt to sit to fold laundry when at home  Pt verbalizes understanding  Pt then engages in washing/drying dishes focusing on standing osmani/bal, and overall strength/endurance/activity tolerance  Pt maintains PWBing to R LE during dish washing w/o cueing  Finally, pt engages in setting kitchen table as this is something she does at home  Pt requires one VC for proper positioning between counter tops and kitchen table, in order to ensure adherence to [facility] precautions with pt stating, "Yeah I realized that right when I did it" referring to putting too much weight on R LE  Overall supervision/DS provided  Cognition   Overall Cognitive Status WFL   Arousal/Participation Alert; Cooperative   Attention Within functional limits   Orientation Level Oriented X4   Memory Decreased recall of precautions   Following Commands Follows multistep commands with increased time or repetition   Activity Tolerance   Activity Tolerance Patient tolerated treatment well   Assessment   Treatment Assessment Pt engages in 90 minute skilled OT session focusing on IADLs, assessing for IRPs  See above for full functional details  Initiated assessing for IRPs with fair problem solving noted throughout with safely managing, moving and placing bed side table/RW--plan to re-assess tomorrow as able  Pt completes various IADLs focusing on adhering to [address] on R LE, overall safety, problem solving, attention, and balance/stand osmani/bal  Recommend continued skilled care to focus on ADL retraining, func transfers, standing osmani/bal, IADLs, func cog, safety, in order to decrease burden of care at d/c  Prognosis Good   Problem List Decreased strength;Decreased range of motion;Decreased mobility; Impaired tone;Orthopedic restrictions   Barriers to Discharge Inaccessible home environment;Decreased caregiver support   Plan   Treatment/Interventions ADL retraining;Functional transfer training; Therapeutic exercise; Endurance training;Cognitive reorientation;Patient/family training;Equipment eval/education; Compensatory technique education   OT Therapy Minutes   OT Time In 1230   OT Time Out 1400   OT Total Time (minutes) 90   OT Mode of treatment - Individual (minutes) 90   OT Mode of treatment - Concurrent (minutes) 0   OT Mode of treatment - Group (minutes) 0   OT Mode of treatment - Co-treat (minutes) 0   OT Mode of Treatment - Total time(minutes) 90 minutes   OT Cumulative Minutes 540   Therapy Time missed   Time missed?  No

## 2022-02-23 LAB
GLUCOSE SERPL-MCNC: 122 MG/DL (ref 65–140)
GLUCOSE SERPL-MCNC: 140 MG/DL (ref 65–140)
GLUCOSE SERPL-MCNC: 155 MG/DL (ref 65–140)
GLUCOSE SERPL-MCNC: 170 MG/DL (ref 65–140)

## 2022-02-23 PROCEDURE — 97530 THERAPEUTIC ACTIVITIES: CPT

## 2022-02-23 PROCEDURE — 82948 REAGENT STRIP/BLOOD GLUCOSE: CPT

## 2022-02-23 PROCEDURE — 99232 SBSQ HOSP IP/OBS MODERATE 35: CPT

## 2022-02-23 PROCEDURE — 99232 SBSQ HOSP IP/OBS MODERATE 35: CPT | Performed by: INTERNAL MEDICINE

## 2022-02-23 PROCEDURE — 97535 SELF CARE MNGMENT TRAINING: CPT

## 2022-02-23 RX ADMIN — LORAZEPAM 0.5 MG: 0.5 TABLET ORAL at 17:30

## 2022-02-23 RX ADMIN — METFORMIN HYDROCHLORIDE 500 MG: 500 TABLET, FILM COATED ORAL at 17:29

## 2022-02-23 RX ADMIN — APIXABAN 5 MG: 5 TABLET, FILM COATED ORAL at 17:30

## 2022-02-23 RX ADMIN — CLONIDINE HYDROCHLORIDE 0.1 MG: 0.1 TABLET ORAL at 08:32

## 2022-02-23 RX ADMIN — Medication 1 DROP: at 21:36

## 2022-02-23 RX ADMIN — ATORVASTATIN CALCIUM 40 MG: 40 TABLET, FILM COATED ORAL at 17:29

## 2022-02-23 RX ADMIN — OXYCODONE HYDROCHLORIDE AND ACETAMINOPHEN 500 MG: 500 TABLET ORAL at 08:31

## 2022-02-23 RX ADMIN — AMLODIPINE BESYLATE 10 MG: 10 TABLET ORAL at 08:30

## 2022-02-23 RX ADMIN — METFORMIN HYDROCHLORIDE 500 MG: 500 TABLET, FILM COATED ORAL at 08:31

## 2022-02-23 RX ADMIN — LEVOTHYROXINE SODIUM 125 MCG: 125 TABLET ORAL at 05:37

## 2022-02-23 RX ADMIN — LIDOCAINE 5% 1 PATCH: 700 PATCH TOPICAL at 08:35

## 2022-02-23 RX ADMIN — INSULIN LISPRO 1 UNITS: 100 INJECTION, SOLUTION INTRAVENOUS; SUBCUTANEOUS at 11:16

## 2022-02-23 RX ADMIN — Medication 1 DROP: at 17:29

## 2022-02-23 RX ADMIN — APIXABAN 5 MG: 5 TABLET, FILM COATED ORAL at 08:32

## 2022-02-23 RX ADMIN — CHLORTHALIDONE 25 MG: 25 TABLET ORAL at 08:32

## 2022-02-23 RX ADMIN — LABETALOL HYDROCHLORIDE 200 MG: 200 TABLET, FILM COATED ORAL at 08:38

## 2022-02-23 RX ADMIN — INSULIN LISPRO 1 UNITS: 100 INJECTION, SOLUTION INTRAVENOUS; SUBCUTANEOUS at 21:35

## 2022-02-23 RX ADMIN — LISINOPRIL 20 MG: 20 TABLET ORAL at 21:33

## 2022-02-23 RX ADMIN — CLONIDINE HYDROCHLORIDE 0.1 MG: 0.1 TABLET ORAL at 21:33

## 2022-02-23 RX ADMIN — LORAZEPAM 0.5 MG: 0.5 TABLET ORAL at 08:31

## 2022-02-23 RX ADMIN — LABETALOL HYDROCHLORIDE 200 MG: 200 TABLET, FILM COATED ORAL at 21:33

## 2022-02-23 RX ADMIN — LISINOPRIL 20 MG: 20 TABLET ORAL at 08:29

## 2022-02-23 RX ADMIN — Medication 1 CAPSULE: at 21:34

## 2022-02-23 RX ADMIN — Medication 1000 UNITS: at 08:31

## 2022-02-23 RX ADMIN — Medication 1 CAPSULE: at 08:33

## 2022-02-23 RX ADMIN — Medication 1 DROP: at 08:34

## 2022-02-23 NOTE — PLAN OF CARE
Problem: Potential for Falls  Goal: Patient will remain free of falls  Description: INTERVENTIONS:  - Educate patient/family on patient safety including physical limitations  - Instruct patient to call for assistance with activity   - Consult OT/PT to assist with strengthening/mobility   - Keep Call bell within reach  - Keep bed low and locked with side rails adjusted as appropriate  - Keep care items and personal belongings within reach  - Initiate and maintain comfort rounds  - Make Fall Risk Sign visible to staff  - Offer Toileting every 2-4 Hours, in advance of need  - Initiate/Maintain bed/chair alarm  - Obtain necessary fall risk management equipment: alarms  - Apply yellow socks and bracelet for high fall risk patients  - Consider moving patient to room near nurses station  Outcome: Progressing     Problem: PAIN - ADULT  Goal: Verbalizes/displays adequate comfort level or baseline comfort level  Description: Interventions:  - Encourage patient to monitor pain and request assistance  - Assess pain using appropriate pain scale  - Administer analgesics based on type and severity of pain and evaluate response  - Implement non-pharmacological measures as appropriate and evaluate response  - Consider cultural and social influences on pain and pain management  - Notify physician/advanced practitioner if interventions unsuccessful or patient reports new pain  Outcome: Progressing     Problem: INFECTION - ADULT  Goal: Absence or prevention of progression during hospitalization  Description: INTERVENTIONS:  - Assess and monitor for signs and symptoms of infection  - Monitor lab/diagnostic results  - Monitor all insertion sites, i e  indwelling lines, tubes, and drains  - Monitor endotracheal if appropriate and nasal secretions for changes in amount and color  - [place] appropriate cooling/warming therapies per order  - Administer medications as ordered  - Instruct and encourage patient and family to use good hand hygiene technique  - Identify and instruct in appropriate isolation precautions for identified infection/condition  Outcome: Progressing     Problem: SAFETY ADULT  Goal: Maintain or return to baseline ADL function  Description: INTERVENTIONS:  -  Assess patient's ability to carry out ADLs; assess patient's baseline for ADL function and identify physical deficits which impact ability to perform ADLs (bathing, care of mouth/teeth, toileting, grooming, dressing, etc )  - Assess/evaluate cause of self-care deficits   - Assess range of motion  - Assess patient's mobility; develop plan if impaired  - Assess patient's need for assistive devices and provide as appropriate  - Encourage maximum independence but intervene and supervise when necessary  - Involve family in performance of ADLs  - Assess for home care needs following discharge   - Consider OT consult to assist with ADL evaluation and planning for discharge  - Provide patient education as appropriate  Outcome: Progressing  Goal: Maintains/Returns to pre admission functional level  Description: INTERVENTIONS:  - Perform BMAT or MOVE assessment daily    - Set and communicate daily mobility goal to care team and patient/family/caregiver  - Collaborate with rehabilitation services on mobility goals if consulted  - Perform Range of Motion 3 times a day  - Reposition patient every 2 hours    - Dangle patient 3 times a day  - Stand patient 3 times a day  - Ambulate patient 3 times a day  - Out of bed to chair 3 times a day   - Out of bed for meals 3 times a day  - Out of bed for toileting  - Record patient progress and toleration of activity level   Outcome: Progressing     Problem: DISCHARGE PLANNING  Goal: Discharge to home or other facility with appropriate resources  Description: INTERVENTIONS:  - Identify barriers to discharge w/patient and caregiver  - Arrange for needed discharge resources and transportation as appropriate  - Identify discharge learning needs (meds, wound care, etc )  - Arrange for interpretive services to assist at discharge as needed  - Refer to Case Management Department for coordinating discharge planning if the patient needs post-hospital services based on physician/advanced practitioner order or complex needs related to functional status, cognitive ability, or social support system  Outcome: Progressing     Problem: Prexisting or High Potential for Compromised Skin Integrity  Goal: Skin integrity is maintained or improved  Description: INTERVENTIONS:  - Identify patients at risk for skin breakdown  - Assess and monitor skin integrity  - Assess and monitor nutrition and hydration status  - Monitor labs   - Assess for incontinence   - Turn and reposition patient  - Assist with mobility/ambulation  - Relieve pressure over bony prominences  - Avoid friction and shearing  - Provide appropriate hygiene as needed including keeping skin clean and dry  - Evaluate need for skin moisturizer/barrier cream  - Collaborate with interdisciplinary team   - Patient/family teaching  - Consider wound care consult   Outcome: Progressing

## 2022-02-23 NOTE — PROGRESS NOTES
02/23/22 1200   Pain Assessment   Pain Score No Pain   Restrictions/Precautions   Precautions Fall Risk   RLE Weight Bearing Per Order PWB   Cognition   Arousal/Participation Cooperative   Subjective   Subjective pt reported feeling well overall/  reviewed POC for next couple of days before dc home  Reviewed she will have therapy on Friday since d/c is now at 6pm     Roll Left and Right   Type of Assistance Needed Independent   Roll Left and Right CARE Score 6   Sit to Lying   Type of Assistance Needed Independent   Sit to Lying CARE Score 6   Lying to Sitting on Side of Bed   Type of Assistance Needed Independent   Lying to Sitting on Side of Bed CARE Score 6   Sit to Stand   Type of Assistance Needed Independent; Adaptive equipment   Sit to Stand CARE Score 6   Bed-Chair Transfer   Type of Assistance Needed Independent; Adaptive equipment   Chair/Bed-to-Chair Transfer CARE Score 6   Transfer Bed/Chair/Wheelchair   Limitations Noted In   (PWB RLE)   Adaptive Equipment Roller Walker   Stand Pivot Modified Independent   Sit to Stand Modified Independent   Stand to Sit Modified Independent   Supine to Sit Modified Independent   Sit to Supine Modified Independent   Car Transfer Modified Independent   Car Transfer   Type of Assistance Needed Independent; Adaptive equipment   Car Transfer CARE Score 6   Walk 10 Feet   Type of Assistance Needed Independent; Adaptive equipment   Walk 10 Feet CARE Score 6   Walk 50 Feet with Two Turns   Type of Assistance Needed Adaptive equipment; Independent   Walk 50 Feet with Two Turns CARE Score 6   Walk 150 Feet   Type of Assistance Needed Supervision; Adaptive equipment   Walk 150 Feet CARE Score 4   Ambulation   Distance Walked (feet) 200 ft  (x2, 150x2, 50x2)   Assist Device Roller Walker   Gait Pattern Step through; Slow Mariangel   Walk Assist Level Supervision;Modified Independent   Findings S for longer distances, Leah for shorter household distances    Curb or Single Stair   Style negotiated Curb   Type of Assistance Needed Verbal cues; Supervision   Comment VC to descend with RLE first, to maintain PWB RLE   1 Step (Curb) CARE Score 4   Therapeutic Interventions   Strengthening reviewed LAQ, ankle pumps, seated marching RLE as AROM that she can do here and at home; pt was asking what she should do at home for exercises  Reviewed she can perform these in standing with BUE support on RW if she were to feel stiff  Reviewed no resistance exercises at this time per ortho  Other went to pharmacy on the G floor for pt to get a RW for home as insruance covered a RW in the last 5 years  Reviewed balance/maintaining PWB RLE when performing household chores ; reviewed that she can pivot/lean/reach in her kitchen or bedroom to put items away,  etc, however she can only do this to the L side so she doesn't break WB precautions on RLE  Reviewed how she can stand statically at the counter  Pt demonstrated understanding  Also reviewed how she can tie a small garbage bag to the front of her RW and walk it to the garbage shoot, which is very close to her apt door  Reviewed how she can use the same technique for laundry  Reviewed that she has to use the RW for all standing mobility at this time due to ABBE CO [facility] RLE  Other Comments   Comments session reviewed how she can break up mobility at home; discussed alternating seated vs standing activities, that she can walk household distances (not community distances), and she demonstrated understanding  Assessment   Treatment Assessment Pt is progressing well towards Leah for mobility for home; she needed VC on curb step, therefore this task is still as S level  Pt demonsrates safety with the RW and demonstrated understanding of how to perform household chores leaning to the L side to maintain PWB RLE  Will print out HEP to review AROM that she can perform on RLE   Pt is able to mobilize with RW to complete small load of laundry or throw out a small garbage bag; currently family will help with some IADLS like grocery shopping and driving, etc  Current plan is to dc home Friday evening at 6pm based upon when family can pick her up  PT Barriers   Physical Impairment Decreased strength; Impaired balance;Decreased endurance;Decreased range of motion;Orthopedic restrictions   Functional Limitation Car transfers;Stair negotiation;Standing;Transfers; Walking   Plan   Treatment/Interventions Functional transfer training;LE strengthening/ROM; Therapeutic exercise;Elevations; Endurance training;Patient/family training;Equipment eval/education; Bed mobility;Gait training   Progress Progressing toward goals   Recommendation   PT Discharge Recommendation Home with home health rehabilitation   Equipment Recommended Walker   PT Therapy Minutes   PT Time In 1200   PT Time Out 1300   PT Total Time (minutes) 60   PT Mode of treatment - Individual (minutes) 60   PT Mode of treatment - Concurrent (minutes) 0   PT Mode of treatment - Group (minutes) 0   PT Mode of treatment - Co-treat (minutes) 0   PT Mode of Treatment - Total time(minutes) 60 minutes   PT Cumulative Minutes 630   Therapy Time missed   Time missed?  No

## 2022-02-23 NOTE — PROGRESS NOTES
Internal Medicine Progress Note  Patient: Sima Dietz  Age/sex: 78 y o  female  Medical Record #: [id number]      ASSESSMENT/PLAN: (Interval History)  Sima Dietz is seen and examined and management for following issues:    Right Tibial plateau fracture  · Medical management per ortho  · Rehab/pain per PMR  · Discussed WB and ROM with ortho  PWB 30-50% with passive ROM  · Hinged brace as needed for pain     DM II  · HA1C 6 4   · Continue metformin 500mg 2x daily  · Conitnue accuchecks and constant carb diet  · BS stable  · Would recommend dc on same she will need close f/u with PCP as she was previously taken off of this prior to hospitalization     HTN  · Home: Norvasc 10mg qd; Chlorthalidone 25mg qd, Clonidine 0 1 mg qd, Labetolol 200mg q12 hours, Lisinopril 20mg BID  · Here: Norvasc 10mg qd, Chlorthalidone 25mg qd, Clonidine 0 1mg q12hrs, Labetolol 200mg q12hrs, Lisinopril 20mg BID  · Stable     PAF  · Cont Eliquis/Labetolol     CKD III  · Baseline 0 8-1 0  · Avoid nephrotoxic medications  · stable      Hypothyroid  · Cont levothyroxine     Hyperlipidemia  · Continue statin therapy     DC plannin/25  The above assessment and plan was reviewed and updated as determined by my evaluation of the patient on 2022      Labs:   Results from last 7 days   Lab Units 22  0543 22  0637   WBC Thousand/uL 5 14 4 84   HEMOGLOBIN g/dL 10 0* 10 3*   HEMATOCRIT % 31 5* 32 2*   PLATELETS Thousands/uL 245 246     Results from last 7 days   Lab Units 22  0543 22  0637   SODIUM mmol/L 134* 133*   POTASSIUM mmol/L 3 7 3 8   CHLORIDE mmol/L 99* 98*   CO2 mmol/L 28 30   BUN mg/dL 30* 25   CREATININE mg/dL 1 12 0 97   CALCIUM mg/dL 9 1 8 8     Results from last 7 days   Lab Units 22  0543   HEMOGLOBIN A1C % 6 4*         Results from last 7 days   Lab Units 22  0649 22  2107 22  1540   POC GLUCOSE mg/dl 140 119 104       Review of Scheduled Meds:  Current Facility-Administered Medications   Medication Dose Route Frequency Provider Last Rate    acetaminophen  975 mg Oral Q6H PRN Ruslan Lujan MD      amLODIPine  10 mg Oral Daily Ruslan Lujan MD      apixaban  5 mg Oral BID Ruslan Lujan MD      ascorbic acid  500 mg Oral Daily Ruslan Lujan MD      atorvastatin  40 mg Oral After Nito Gallegos MD      bisacodyl  10 mg Rectal Daily PRN Ruslan Lujan MD      Carboxymethylcellulose Sodium  1 drop Ophthalmic TID Neida Chen MD      chlorthalidone  25 mg Oral Daily Ruslan Lujan MD      cholecalciferol  1,000 Units Oral Daily Ruslan Lujan MD      cloNIDine  0 1 mg Oral Q12H [address] Ruslan Lujan MD      Diclofenac Sodium  2 g Topical TID PRN Ruslan Lujan MD      docusate sodium  100 mg Oral BID Ruslan Lujan MD      insulin lispro  1-5 Units Subcutaneous HS Ruslan Lujan MD      insulin lispro  1-5 Units Subcutaneous TID [facility] Ruslan Lujan MD      labetalol  200 mg Oral Q12H [address] Ruslan Lujan MD      levothyroxine  125 mcg Oral Early Morning Ruslan Lujan MD      lidocaine  1 patch Topical Daily Ruslan Lujan MD      lisinopril  20 mg Oral BID Ruslan Lujan MD      LORazepam  0 5 mg Oral BID Ruslan Lujan MD      metFORMIN  500 mg Oral BID With Meals RUCHI Gay      ondansetron  4 mg Oral Q8H PRN Ruslan Lujan MD      polyethylene glycol  17 g Oral Daily PRN Ruslan Lujan MD      PreserVision AREDS 2  1 capsule Oral BID Ruslan Lujan MD      senna  1 tablet Oral BID Ruslan Lujan MD         Subjective/ HPI: Patient seen and examined  Patients overnight issues or events were reviewed with nursing or staff during rounds or morning huddle session  New or overnight issues include the following:     Pt seen in bed  Concerned regarding her ride for dc not able to come now until the evening   I told her I would pass along to her team but not to worry    ROS:   A 10 point ROS was performed; negative except as noted above  Imaging:     No orders to display       *Labs /Radiology studies reviewed  *Medications reviewed and reconciled as needed  *Please refer to order section for additional ordered labs studies  *Case discussed with primary attending during morning huddle case rounds      Physical Examination:  Vitals:   Vitals:    02/22/22 1228 02/22/22 2018 02/23/22 0539 02/23/22 0825   BP: 142/54 150/54 132/68 160/60   BP Location: Left arm Left arm Left arm Left arm   Pulse: 59 71 77 80   Resp: 18 16 17    Temp: 98 4 °F (36 9 °C) 98 4 °F (36 9 °C) 98 8 °F (37 1 °C)    TempSrc: Oral Oral     SpO2:  98% 99%    Weight:   62 1 kg (136 lb 14 5 oz)    Height:         GEN: No apparent distress, interactive; frail  NEURO: Alert and oriented x3  HEENT: Pupils are equal and reactive, EOMI, mucous membranes are moist, face symmetrical  CV: S1 S2 regular, no MRG, no peripheral edema noted  RESP: Lungs are clear bilaterally, no wheezes, rales or rhonchi noted, on room air, respirations easy and non labored  GI: Flat, soft non tender, non distended; +BS x4  : Voiding without difficulty  MUSC: Moves all extremities; except RLE  SKIN: pink, warm and dry, normal turgor, no rashes, lesions      The above physical exam was reviewed and updated as determined by my evaluation of the patient on 2/23/2022  Invasive Devices  Report    None                    VTE Pharmacologic Prophylaxis: Eliquis  Code Status: Level 1 - Full Code  Current Length of Stay: 8 day(s)      Total time spent:  30 minutes  with more than 50% spent counseling/coordinating care  Counseling includes discussion with patient re: progress  and discussion with patient of his/her current medical state/information  Coordination of patient's care was performed in conjunction with primary service  Time invested included review of patient's labs, vitals, and management of their comorbidities with continued monitoring   In addition, this patient was discussed with medical team including physician and advanced extenders  The care of the patient was extensively discussed and appropriate treatment plan was formulated unique for this patient  ** Please Note:  voice to text software may have been used in the creation of this document   Although proof errors in transcription or interpretation are a potential of such software**

## 2022-02-23 NOTE — PROGRESS NOTES
02/23/22 1030   Pain Assessment   Pain Assessment Tool 0-10   Pain Score No Pain   Restrictions/Precautions   Precautions Fall Risk;Limb alert;Supervision on toilet/commode   RLE Weight Bearing Per Order PWB   RLE ROM Restriction Range Limitation  (no resisted exercise elvira on R knee)   Sit to Stand   Type of Assistance Needed Independent   Physical Assistance Level No physical assistance   Comment I with RW   Sit to Stand CARE Score 6   Bed-Chair Transfer   Type of Assistance Needed Independent   Physical Assistance Level No physical assistance   Comment I with RW   Chair/Bed-to-Chair Transfer CARE Score 6   Toileting Hygiene   Type of Assistance Needed Independent   Physical Assistance Level No physical assistance   Comment I with RW   Toileting Hygiene CARE Score 6   Toilet Transfer   Type of Assistance Needed Independent   Physical Assistance Level No physical assistance   Comment I with RW   Toilet Transfer CARE Score 6   Commmunity Re-entry   Community Re-entry Assessed, completed, and progressed to IRP with RW for daytime only as of 2/23  Pt demo good ability to problem solve use of RW and placement when moving hospital tray, where to place call bell and other items  Reviewed need for proper lighting,  socks, RW and to follow PWB precuations  Cognition   Overall Cognitive Status WFL   Arousal/Participation Alert; Cooperative   Attention Within functional limits   Orientation Level Oriented X4   Memory Decreased recall of precautions   Following Commands Follows multistep commands with increased time or repetition   Additional Activities   Additional Activities Comments Reviewed importance and provided handout for lifealert systems  Pt reported fear of falling and fear of not being able to contact someone if something happens  Educated on safety recommendations upon DC and provided with CR of 2020 of best life alert systems for pt to have more information for increased safety at time of DC   Also reviewed rec of having BSC at bedside for night time to decreased risk of falls  Pt resisitive to idea however was provided handout and information if she feels she wants it upon DC  Activity Tolerance   Activity Tolerance Patient tolerated treatment well   Assessment   Treatment Assessment Pt engaged in skilled OT session with focus on: assessing for IRP, toileting,and prep for DC home  Pt tolerated session well, progressing to IRP with RW for daytime only for today then to be progress to all day tomorrow  Pt provided life alert information and BSC information to maximize safety upon DC home  Pt will benefit from continued skilled OT to maximize indep and safety with ADLs and functional transfers  Continue POC with plan for DC Friday with HHOT  Prognosis Good   Problem List Decreased strength;Decreased range of motion;Decreased mobility; Impaired tone;Orthopedic restrictions   Barriers to Discharge Inaccessible home environment;Decreased caregiver support   Plan   Treatment/Interventions ADL retraining;Functional transfer training; Therapeutic exercise; Endurance training;Patient/family training;Equipment eval/education; Compensatory technique education   Progress Progressing toward goals   Recommendation   OT Discharge Recommendation Home with home health rehabilitation   OT Therapy Minutes   OT Time In 1030   OT Time Out 1100   OT Total Time (minutes) 30   OT Mode of treatment - Individual (minutes) 30   OT Mode of treatment - Concurrent (minutes) 0   OT Mode of treatment - Group (minutes) 0   OT Mode of treatment - Co-treat (minutes) 0   OT Mode of Treatment - Total time(minutes) 30 minutes   OT Cumulative Minutes 660

## 2022-02-23 NOTE — PROGRESS NOTES
02/23/22 0700   Pain Assessment   Pain Assessment Tool 0-10   Pain Score No Pain   Restrictions/Precautions   Precautions Fall Risk;Limb alert;Supervision on toilet/commode   RUE Weight Bearing Per Order WBAT   LUE Weight Bearing Per Order WBAT   RLE Weight Bearing Per Order PWB   LLE Weight Bearing Per Order WBAT   ROM Restrictions Yes   RLE ROM Restriction Range Limitation  (no resisted exercise elvira on R knee)   Lifestyle   Autonomy "I want to do this but I don't" referring to shower   Oral Hygiene   Type of Assistance Needed Supervision   Physical Assistance Level No physical assistance   Comment DS in stance at sink   Oral Hygiene CARE Score 4   Grooming   Able To Initiate Tasks; Acquire Items; Wash/Dry Face;Brush/Clean Teeth;Wash/Dry Hands;Comb/Brush Hair   Limitation Noted In Timeliness   Findings pt completes hair care simulated to home set up by sitting in w/c at mirror  Pt was and will continue to sit at time of d/c on toilet seat wtih lid closed in front of mirror  pt requires ext time to complete hair care  Shower/Bathe Self   Type of Assistance Needed Supervision   Physical Assistance Level No physical assistance   Comment DS to complete full shower seated on shower chair, bathing 10/10 parts  will need a shower chair at time of d/c  Shower/Bathe Self CARE Score 4   Bathing   Assessed Bath Style Shower   Anticipated D/C Bath Style Shower   Able to [place] Toan Yes   Able to [employer] Temperature Yes   Able to Wash/Rinse/Dry (body part) Left Arm;Right Arm;L Upper Leg;R Upper Leg;L Lower Leg/Foot;R Lower Leg/Foot;Chest;Abdomen;Perineal Area; Buttocks   Limitations Noted in Balance; Safety;Strength   Positioning Seated;Standing   Adaptive Equipment Shower Bars; Shower Seat;Hand [employer]   Type of Assistance Needed Supervision   Physical Assistance Level No physical assistance   Comment to don shirt   Upper Body Dressing CARE Score 4   Lower Body Dressing   Type of Assistance Needed Supervision   Physical Assistance Level No physical assistance   Comment seated to thread underwear/pants over feet, DS in stance for CM   Lower Body Dressing CARE Score 4   Putting On/Taking Off Footwear   Type of Assistance Needed Supervision   Physical Assistance Level No physical assistance   Comment seated to don socks and shoes   Putting On/Taking Off Footwear CARE Score 4   Sit to Stand   Type of Assistance Needed Supervision   Physical Assistance Level No physical assistance   Comment DS with RW   Sit to Stand CARE Score 4   Bed-Chair Transfer   Type of Assistance Needed Supervision   Physical Assistance Level No physical assistance   Comment DS with RW   Chair/Bed-to-Chair Transfer CARE Score 4   Toileting Hygiene   Type of Assistance Needed Supervision   Physical Assistance Level No physical assistance   Comment DS with RW   Toileting Hygiene CARE Score 4   Toilet Transfer   Type of Assistance Needed Supervision   Physical Assistance Level No physical assistance   Comment DS with RW   Toilet Transfer CARE Score 4   Cognition   Overall Cognitive Status WFL   Arousal/Participation Alert; Cooperative   Attention Within functional limits   Orientation Level Oriented X4   Memory Decreased recall of precautions   Following Commands Follows multistep commands with increased time or repetition   Activity Tolerance   Activity Tolerance Patient tolerated treatment well   Assessment   Treatment Assessment Pt engages in 90 minute skilled OT session focusing on ADL routine in shower, func transfers  See above for full func details  Pt demo's DS level for all basic transfers as well as self care tasks including full shower  Pt will need/benefit from shower chair at time of d/c  Plan to assess for IRPs next session as pt is home alone with D/C date for Friday   Recommend continued skilled care to focus on ADL retraining, func transfers, standing osmani/bal, IADLs, func cog, safety, in order to decrease burden of care at d/c  Prognosis Good   Problem List Decreased strength;Decreased range of motion;Decreased mobility; Impaired tone;Orthopedic restrictions   Barriers to Discharge Inaccessible home environment;Decreased caregiver support   Plan   Treatment/Interventions ADL retraining;Functional transfer training; Therapeutic exercise; Endurance training;Cognitive reorientation;Patient/family training;Equipment eval/education; Compensatory technique education   OT Therapy Minutes   OT Time In 0700   OT Time Out 0830   OT Total Time (minutes) 90   OT Mode of treatment - Individual (minutes) 90   OT Mode of treatment - Concurrent (minutes) 0   OT Mode of treatment - Group (minutes) 0   OT Mode of treatment - Co-treat (minutes) 0   OT Mode of Treatment - Total time(minutes) 90 minutes   OT Cumulative Minutes 630   Therapy Time missed   Time missed?  No

## 2022-02-23 NOTE — PROGRESS NOTES
Physical Medicine and Rehabilitation Progress Note  Lida Rayo 78 y o  female MRN: [id number]  Unit/Bed#: -01 Encounter: [id number]      Assessment & Plan:     Functional assessment:        Admit    Recent  Total assist     Significant assist     Mod assist  To hygiene, bathing, LBD    Min assist       Supervision   To hygiene, bathing, dressing   Contact Guard     Mod Indep/Indep     Transfers Mod assist  Supervision   Ambulation  Min assist  Supervision     Stairs  Total assist/NT Supervision for curb step     Goal: Mod indep for ADLs and ambulation  Major barriers:  Pain, WB precautions, lives alone  Dispo & ELOS: Home with ELOS Saturday with [facility], OT (will need assist for bathing, some iADLs)    * Fracture of right tibial plateau  Assessment & Plan  - Function continues to improve    - ELOS now Friday to home with [facility], OT  - Tx feels patient will be largely mod indep at d/c; and can do sponge bathing at d/c and will be close to mod indep even for regular bathing but can wait for [facility]  R medial tibial plateau fracture, complex medial meniscus tear, knee hemarthrosis, calf hematoma with significant decline in ADLs and ambulation   · Per discussion with Dr Marcelina Dale 2/14, ortho- PWB 30-50%               - Not required to wear brace but consider if helpful during rehab               - PROM ok but no additional knee specific rehab expected for approx 8 weeks   - Dr Jess Coats confirmed with ortho 2/17 - PWB RLE 30-50% and can have passive range of motion performed in physical therapy as tolerated  Other than her usual movements of right leg, patient is not to perform any additional active or active assist range of motion in therapies      · Optimal pain control  · Fall precautions   Monitor for signs/symptoms of VTE, atelectasis, acute blood loss anemia, or other infection    Continue acute comprehensive interdisciplinary inpatient rehabilitation to include intensive skilled therapies (PT, OT) as outlined with oversight and management by rehabilitation physician as well as inpatient rehab level nursing, case management and weekly interdisciplinary team meetings   Follow-up with Ortho Sx after d/c and ortho if needed during ARC course     - 1/16 - ED Visit R knee swelling started 4 days ago denies any trauma - XR No acute osseous abnormality  Joint effusion;  - 1/19 - OP ortho- Hemarthrosis of R knee - R knee hemarthrosis likely 2/2 spontaneous bleeding in setting of Eliquis v occult trauma, pt denies trauma, R knee med arthritis; 45 cc blood aspirate; 1/9 XR There is no acute fracture or dislocation  There is no joint effusion  No significant degenerative changes  Soft tissues are unremarkable  IMPRESSION: No acute osseous abnormality  - 2/12 Ortho c/s - Dr Mick Naranjo - A 68-year-old female with history of multiple falls as well as right knee pain and recurrent effusions  Presents back to the hospital today due to worsening pain and continued swelling  She was sent for an MRI to rule out an occult fracture  On review of imaging this morning she is found to have a nondisplaced impaction fracture of the medial tibial plateau  On my exam patient has a mild effusion she is tender medial tibial plateau  The knee is stable to varus valgus and anterior-posterior drawer testing  Plan is going to be toe-touch weight-bearing on the right lower extremity with the assistance of a walker  She is going to work with PT and Case Management regarding placement  Patient will require follow-up to confirm improvement in her symptoms 2 weeks following discharge     - 2/14 spoke with theresa Bell (resident) to confirm WB status, query need for brace, and ROM as order still said NWB; Per discussion with Dr Jose Bell [facility] 30-50%; brace not required but could consider if helpful; PROM immediately; PWB order (without % parameters entered by Dr Jose Bell)  - 2/17 Dr Amber Denise confirmed with ortho PWB RLE 30-50% and can have passive range of motion performed in physical therapy as tolerated  Other than her usual movements of right leg, patient is not to perform any additional active or active assist range of motion in therapies  Pain  Assessment & Plan  Improved   APAP TID PRN   D/C'd oxy not needing   LD patch  PRN Voltaren gel   Counseled on and continue to encourage deep breathing/relaxation/behavioral pain management techniques:     Deep breathin seconds in, 5 seconds out, 5 times per hour when awake and PRN when in pain or anticipate pain; avoid holding breath and tightening muscles and instead breathe slowly and deeply  Monitor for oversedation, AMS/delirium, and respiratory depression   If being administered - hold opiates, muscle relaxants, benzodiazepines, and gabapentin for oversedation, AMS, or RR<12  Anxiety  Assessment & Plan  Some increased frustration this am - related to stress of recent fx, hospitalization, and family   On chronic ativan 0 5mg BID - care with concurrent pain meds > but not needing opiate pain meds now   Provided additional supportive counseling  Consider Psychology consult while in [facility] on and continue to encourage deep breathing/relaxation/behavioral management techniques:     Deep breathin seconds in, 5 seconds out, 5 times per hour when awake and PRN when experiencing pain or anxiety    Avoid holding breath and tightening muscles and instead breathe slowly and deeply        Asymptomatic bilateral carotid artery stenosis  Assessment & Plan  OP cards follow-up  IM consulted and with overall management at their discretion during ARC course  Antithrombotic: Eliquis  Statin  Optimal blood pressure and blood sugar control      Atrial fibrillation   Assessment & Plan  IM consulted and with overall management at their discretion during ARC course  Rate control: labetolol  Antithrombotic: Eliquis       Anemia  Assessment & Plan  Hb fairly stable - slight trend down   IM consulted, with overall monitoring, and management at their discretion during ARC course  Monitor H/H, vitals, signs/symptoms of acute bleeding  OP PCP follow-up     Macular degeneration  Assessment & Plan  OP optho follow-up   Home Preservision and Theretears    At risk for venous thromboembolism (VTE)  Assessment & Plan  SCDs, ambulation, and Eliquis       Chronic hyponatremia  Assessment & Plan  Stable  Mild, monitor intermittently     CKD (chronic kidney disease) stage 3, GFR 30-59 ml/min (HCC)  Assessment & Plan  Fairly stable   Monitor BUN/Cr intermittently as well as electrolytes   IM consulted to manage  Limit nephrotoxic agents when possible      Hypothyroidism  Assessment & Plan  Levothyroxine    Essential hypertension  Assessment & Plan  Acceptable control  Internal medicine consulted and management at their discretion  Monitor for signs and symptoms of hypoglycemia   Current meds is home regimen: clonidine, chlorthalidone, amlodipine, lisinopril, labetalol     Diabetes mellitus type 2  Assessment & Plan  Lab Results   Component Value Date    HGBA1C 6 4 (H) 02/21/2022     Metformin d/c'd previously  Internal medicine consulted and management at their discretion  Monitor for signs and symptoms of hypoglycemia   Current meds: Lispro AC/HS and new Metformin 500mg BID (needs new Rx)  DM2 diet   Has glucometer and able to use  OP PCP follow-up         Other Medical Issues:   None    Follow-up providers and other issues to be followed up after discharge  · PCP  · Ortho  · Cards    CODE: Level 1: Full Code    Restrictions include: Fall precautions    Objective:     Allergies per EMR  Diagnostic Studies: Reviewed, no new imaging  No orders to display     See above as well    Laboratory: Labs reviewed  Results from last 7 days   Lab Units 02/21/22  0543 02/17/22  0637   HEMOGLOBIN g/dL 10 0* 10 3*   HEMATOCRIT % 31 5* 32 2*   WBC Thousand/uL 5 14 4 84     Results from last 7 days   Lab Units 02/21/22  0543 02/17/22  0637   BUN mg/dL 30* 25   SODIUM mmol/L 134* 133*   POTASSIUM mmol/L 3 7 3 8   CHLORIDE mmol/L 99* 98*   CREATININE mg/dL 1 12 0 97            Drug regimen reviewed, all potential adverse effects identified and addressed:    Scheduled Meds:  Current Facility-Administered Medications   Medication Dose Route Frequency Provider Last Rate    acetaminophen  975 mg Oral Q6H PRN Marcos Lai MD      amLODIPine  10 mg Oral Daily Marcos Lai MD      apixaban  5 mg Oral BID Marcos Lai MD      ascorbic acid  500 mg Oral Daily Marcos Lai MD      atorvastatin  40 mg Oral After Florentin Maza MD      bisacodyl  10 mg Rectal Daily PRN Marcos Lai MD      Carboxymethylcellulose Sodium  1 drop Ophthalmic TID Sherrell Manning MD      chlorthalidone  25 mg Oral Daily Marcos Lai MD      cholecalciferol  1,000 Units Oral Daily Marcos Lai MD      cloNIDine  0 1 mg Oral Q12H [address], MD      Diclofenac Sodium  2 g Topical TID PRN Marcos Lai MD      docusate sodium  100 mg Oral BID Marcos Lai MD      insulin lispro  1-5 Units Subcutaneous HS Marcos Lai MD      insulin lispro  1-5 Units Subcutaneous TID [facility] Marcos Lai MD      labetalol  200 mg Oral Q12H [address] Marcos Lai MD      levothyroxine  125 mcg Oral Early Morning Marcos Lai MD      lidocaine  1 patch Topical Daily Marcos Lai MD      lisinopril  20 mg Oral BID Marcos Lai MD      LORazepam  0 5 mg Oral BID Marcos Lai MD      metFORMIN  500 mg Oral BID With Meals RUCHI Deluca      ondansetron  4 mg Oral Q8H PRN Marcos Lai MD      polyethylene glycol  17 g Oral Daily PRN Marcos Lai MD      PreserVision AREDS 2  1 capsule Oral BID Marcos Lai MD      senna  1 tablet Oral BID Marcos Lai MD         Chief Complaints:  Rehab follow-up     Subjective:     Patient reports some frustration with her recent fx ,hospitalization, and some family issues    She reports pain and function much improved and feels like she will be ready for d/c       ROS: A 10 point ROS was performed; negative except as noted above  Physical Exam:  Vitals:    02/23/22 0825   BP: 160/60   Pulse: 80   Resp:    Temp:    SpO2:        GEN:  Lying in bed in NAD   HEENT/NECK: MMM  CARDIAC: Regular rate rhythm, no murmers, no rubs, no gallops  LUNGS:  clear to auscultation, no wheezes, rales, or rhonchi  ABDOMEN: Soft, non-tender, non-distended, normal active bowel sounds  EXTREMITIES/SKIN:  no significant calf swelling or TTP  NEURO:   MENTAL STATUS:  Appropriate wakefulness and interaction   PSYCH:  Affect:  Frustratrated        HPI:  78year old F with PMH of Afib on anticoagulation with Eliquis, HTN, DM2, hyperlipidemia, hypothyroidism who developed progressive R knee pain and significant ambulatory dysfunction who had outpatient work-up which showed R knee effusion and R knee OA on X-ray  She underwent knee aspiration on 1/19 with bloody fluid   Pain continued to progress and she could no longer safely ambulate, wt bear, or perform ADLs and presented to hospital where MRI R knee performed and showed R medial tibial plateau fracture, complex medial meniscus tear, knee hemarthrosis, calf hematoma   Orthopedics consulted and initially recommended NWB and now per discussion Dr Mercy Ly patient can be PWB 30-50%  Anish Lin is not required to wear brace but can be considered if helpful   She can start passive ROM now but no significant R knee PT expected until 8 weeks  Patient was evaluated by skilled therapies and was found to have significant decline in ADLs and ambulation        ** Please Note: Fluency Direct voice to text software may have been used in the creation of this document   **

## 2022-02-24 LAB
ANION GAP SERPL CALCULATED.3IONS-SCNC: 6 MMOL/L (ref 4–13)
BASOPHILS # BLD AUTO: 0.04 THOUSANDS/ΜL (ref 0–0.1)
BASOPHILS NFR BLD AUTO: 1 % (ref 0–1)
BUN SERPL-MCNC: 27 MG/DL (ref 5–25)
CALCIUM SERPL-MCNC: 9 MG/DL (ref 8.3–10.1)
CHLORIDE SERPL-SCNC: 97 MMOL/L (ref 100–108)
CO2 SERPL-SCNC: 29 MMOL/L (ref 21–32)
CREAT SERPL-MCNC: 0.93 MG/DL (ref 0.6–1.3)
EOSINOPHIL # BLD AUTO: 0.21 THOUSAND/ΜL (ref 0–0.61)
EOSINOPHIL NFR BLD AUTO: 4 % (ref 0–6)
ERYTHROCYTE [DISTWIDTH] IN BLOOD BY AUTOMATED COUNT: 15.5 % (ref 11.6–15.1)
GFR SERPL CREATININE-BSD FRML MDRD: 57 ML/MIN/1.73SQ M
GLUCOSE SERPL-MCNC: 107 MG/DL (ref 65–140)
GLUCOSE SERPL-MCNC: 127 MG/DL (ref 65–140)
GLUCOSE SERPL-MCNC: 128 MG/DL (ref 65–140)
GLUCOSE SERPL-MCNC: 137 MG/DL (ref 65–140)
GLUCOSE SERPL-MCNC: 96 MG/DL (ref 65–140)
HCT VFR BLD AUTO: 29.6 % (ref 34.8–46.1)
HGB BLD-MCNC: 9.9 G/DL (ref 11.5–15.4)
IMM GRANULOCYTES # BLD AUTO: 0.01 THOUSAND/UL (ref 0–0.2)
IMM GRANULOCYTES NFR BLD AUTO: 0 % (ref 0–2)
LYMPHOCYTES # BLD AUTO: 0.91 THOUSANDS/ΜL (ref 0.6–4.47)
LYMPHOCYTES NFR BLD AUTO: 18 % (ref 14–44)
MCH RBC QN AUTO: 30.7 PG (ref 26.8–34.3)
MCHC RBC AUTO-ENTMCNC: 33.4 G/DL (ref 31.4–37.4)
MCV RBC AUTO: 92 FL (ref 82–98)
MONOCYTES # BLD AUTO: 0.74 THOUSAND/ΜL (ref 0.17–1.22)
MONOCYTES NFR BLD AUTO: 15 % (ref 4–12)
NEUTROPHILS # BLD AUTO: 3.08 THOUSANDS/ΜL (ref 1.85–7.62)
NEUTS SEG NFR BLD AUTO: 62 % (ref 43–75)
NRBC BLD AUTO-RTO: 0 /100 WBCS
PLATELET # BLD AUTO: 240 THOUSANDS/UL (ref 149–390)
PMV BLD AUTO: 10.4 FL (ref 8.9–12.7)
POTASSIUM SERPL-SCNC: 3.9 MMOL/L (ref 3.5–5.3)
RBC # BLD AUTO: 3.23 MILLION/UL (ref 3.81–5.12)
SODIUM SERPL-SCNC: 132 MMOL/L (ref 136–145)
WBC # BLD AUTO: 4.99 THOUSAND/UL (ref 4.31–10.16)

## 2022-02-24 PROCEDURE — 99232 SBSQ HOSP IP/OBS MODERATE 35: CPT | Performed by: INTERNAL MEDICINE

## 2022-02-24 PROCEDURE — 80048 BASIC METABOLIC PNL TOTAL CA: CPT | Performed by: NURSE PRACTITIONER

## 2022-02-24 PROCEDURE — 99232 SBSQ HOSP IP/OBS MODERATE 35: CPT

## 2022-02-24 PROCEDURE — 97530 THERAPEUTIC ACTIVITIES: CPT

## 2022-02-24 PROCEDURE — 97110 THERAPEUTIC EXERCISES: CPT

## 2022-02-24 PROCEDURE — 82948 REAGENT STRIP/BLOOD GLUCOSE: CPT

## 2022-02-24 PROCEDURE — 85025 COMPLETE CBC W/AUTO DIFF WBC: CPT | Performed by: NURSE PRACTITIONER

## 2022-02-24 PROCEDURE — 97116 GAIT TRAINING THERAPY: CPT

## 2022-02-24 RX ORDER — CLONIDINE HYDROCHLORIDE 0.1 MG/1
0.1 TABLET ORAL EVERY 12 HOURS SCHEDULED
Refills: 0
Start: 2022-02-24 | End: 2022-03-24

## 2022-02-24 RX ORDER — ACETAMINOPHEN 500 MG
1000 TABLET ORAL 3 TIMES DAILY PRN
Start: 2022-02-24

## 2022-02-24 RX ADMIN — Medication 1 DROP: at 08:29

## 2022-02-24 RX ADMIN — LABETALOL HYDROCHLORIDE 200 MG: 200 TABLET, FILM COATED ORAL at 08:32

## 2022-02-24 RX ADMIN — LISINOPRIL 20 MG: 20 TABLET ORAL at 21:27

## 2022-02-24 RX ADMIN — Medication 1 CAPSULE: at 08:29

## 2022-02-24 RX ADMIN — Medication 1 DROP: at 21:28

## 2022-02-24 RX ADMIN — METFORMIN HYDROCHLORIDE 500 MG: 500 TABLET, FILM COATED ORAL at 08:28

## 2022-02-24 RX ADMIN — LORAZEPAM 0.5 MG: 0.5 TABLET ORAL at 08:28

## 2022-02-24 RX ADMIN — APIXABAN 5 MG: 5 TABLET, FILM COATED ORAL at 08:28

## 2022-02-24 RX ADMIN — LISINOPRIL 20 MG: 20 TABLET ORAL at 08:27

## 2022-02-24 RX ADMIN — CHLORTHALIDONE 25 MG: 25 TABLET ORAL at 08:28

## 2022-02-24 RX ADMIN — STANDARDIZED SENNA CONCENTRATE 8.6 MG: 8.6 TABLET ORAL at 17:32

## 2022-02-24 RX ADMIN — DOCUSATE SODIUM 100 MG: 100 CAPSULE ORAL at 17:32

## 2022-02-24 RX ADMIN — ATORVASTATIN CALCIUM 40 MG: 40 TABLET, FILM COATED ORAL at 17:33

## 2022-02-24 RX ADMIN — CLONIDINE HYDROCHLORIDE 0.1 MG: 0.1 TABLET ORAL at 08:27

## 2022-02-24 RX ADMIN — LEVOTHYROXINE SODIUM 125 MCG: 125 TABLET ORAL at 05:45

## 2022-02-24 RX ADMIN — AMLODIPINE BESYLATE 10 MG: 10 TABLET ORAL at 08:27

## 2022-02-24 RX ADMIN — METFORMIN HYDROCHLORIDE 500 MG: 500 TABLET, FILM COATED ORAL at 16:29

## 2022-02-24 RX ADMIN — APIXABAN 5 MG: 5 TABLET, FILM COATED ORAL at 17:33

## 2022-02-24 RX ADMIN — Medication 1 CAPSULE: at 21:28

## 2022-02-24 RX ADMIN — Medication 1000 UNITS: at 08:26

## 2022-02-24 RX ADMIN — LABETALOL HYDROCHLORIDE 200 MG: 200 TABLET, FILM COATED ORAL at 21:27

## 2022-02-24 RX ADMIN — LORAZEPAM 0.5 MG: 0.5 TABLET ORAL at 17:32

## 2022-02-24 RX ADMIN — OXYCODONE HYDROCHLORIDE AND ACETAMINOPHEN 500 MG: 500 TABLET ORAL at 08:27

## 2022-02-24 RX ADMIN — CLONIDINE HYDROCHLORIDE 0.1 MG: 0.1 TABLET ORAL at 21:27

## 2022-02-24 RX ADMIN — Medication 1 DROP: at 16:29

## 2022-02-24 NOTE — PROGRESS NOTES
02/24/22 1230   Pain Assessment   Pain Assessment Tool 0-10   Pain Score No Pain   Restrictions/Precautions   Precautions Fall Risk  (macular degeneration)   RUE Weight Bearing Per Order WBAT   LUE Weight Bearing Per Order WBAT   RLE Weight Bearing Per Order PWB   LLE Weight Bearing Per Order WBAT   ROM Restrictions Yes   RLE ROM Restriction Range Limitation  (No resistance exercises R knee)   Lifestyle   Autonomy "No, I am ready to go  I'm excited"   Sit to Stand   Type of Assistance Needed Independent   Physical Assistance Level No physical assistance   Comment RW   Sit to Stand CARE Score 6   Bed-Chair Transfer   Type of Assistance Needed Independent   Physical Assistance Level No physical assistance   Comment RW   Chair/Bed-to-Chair Transfer CARE Score 6   Meal Prep   Meal Prep Level Walker   Meal Prep Level of Assistance Independent   Meal Preparation Pt engages in stove top meal prep making pasta salad  Pt noted with good safety awareness with identifying what items need to be brought to higher surface ie pots/pans in order to adhere to precautions and decrease risk of falls  Pt demo's no safety concerns with managing stove top buttons, turning on burner accurately and turns off w/o assist/VCs  Due to pt now needing RW, discussed ways of transporting hot pot across counter top while still managing RW safely ie, placing pot on hot pads and SLOWLY sliding across counter and then proceed to strain food  Pt completes with no concerns noted  Pt stands for approx 50 minutes during meal prep task ND with RW, however recommended that when pt is at home, to sit down if need be  Kitchen Mobility   Kitchen-Mobility Level Walker   Kitchen Activity Retrieve items;Transport items   Kitchen Mobility Comments Pt engages in kitchen mobility using RW and simulated set up of environment to simulate home set up   Pt identifies need for bringing pots/pans up to higher surface to decrease risk of falls and to ensure adherence to [address] restrictions  Pt andreas's no safety concerns with using RW and retrieving/transporting items as pt does well with transporting along counter tops even before prompted by this KLEIN  Pt completes at an overall IND level with RW  Functional Standing Tolerance   Time 50 min during meal prep, and 20 min during ther activity   Comments pt stands during meal prep for 50 minutes, see above for details  Following rest break, pt then stands 20 minutes to engage in table top ther activity focusing on standing osmani/bal and adherence to [address] on RLE  Good tolerance noted with no VCs require to maintain ortho restrictions  Cognition   Overall Cognitive Status WFL   Arousal/Participation Alert; Cooperative   Attention Within functional limits   Orientation Level Oriented X4   Memory Decreased recall of precautions   Following Commands Follows all commands and directions without difficulty   Activity Tolerance   Activity Tolerance Patient tolerated treatment well   Assessment   Treatment Assessment Pt engages in 90 minute skilled OT session focusing on stove top meal prep, func transfers, standing osmani/bal and home safety recommendations  See above for full functional details  Pt andreas's no safety concerns with stove top meal prep and kitchen mobility with RW, see above for more details  Pt inquiring if she would be allowed to "sweep the floor and vacuum"  Instructed pt that while she continues to need the use of a RW, and remains with WBing restrictions, it is not recommended and safe at this time to do above, and that she is only safe to complete laundry, dishes, light dusting/wiping down counter tops  Pt verbalizes understanding  Pt scheduled for D/C home alone with intermittent assist from family with recommended home OT services  Continue OT POC with scheduled D/C ADL tomorrow  Prognosis Good   Problem List Decreased strength;Decreased endurance;Decreased range of motion; Impaired balance;Decreased mobility;Orthopedic restrictions   Barriers to Discharge None   Plan   Treatment/Interventions ADL retraining;Functional transfer training; Therapeutic exercise; Endurance training;Patient/family training;Equipment eval/education; Compensatory technique education   OT Therapy Minutes   OT Time In 1230   OT Time Out 1400   OT Total Time (minutes) 90   OT Mode of treatment - Individual (minutes) 90   OT Mode of treatment - Concurrent (minutes) 0   OT Mode of treatment - Group (minutes) 0   OT Mode of treatment - Co-treat (minutes) 0   OT Mode of Treatment - Total time(minutes) 90 minutes   OT Cumulative Minutes 750   Therapy Time missed   Time missed?  No

## 2022-02-24 NOTE — PROGRESS NOTES
02/24/22 0959   Pain Assessment   Pain Assessment Tool 0-10   Pain Score No Pain   Restrictions/Precautions   Precautions Fall Risk   RUE Weight Bearing Per Order WBAT   LUE Weight Bearing Per Order WBAT   RLE Weight Bearing Per Order PWB  (30-50% WB RLE)   LLE Weight Bearing Per Order WBAT   RLE ROM Restriction Range Limitation  (No resistance exercises R knee)   Cognition   Overall Cognitive Status WFL   Arousal/Participation Alert; Cooperative   Attention Within functional limits   Orientation Level Oriented X4   Following Commands Follows all commands and directions without difficulty   Subjective   Subjective "I don't have any pain "   Roll Left and Right   Type of Assistance Needed Independent   Physical Assistance Level No physical assistance   Roll Left and Right CARE Score 6   Sit to Lying   Type of Assistance Needed Independent   Physical Assistance Level No physical assistance   Sit to Lying CARE Score 6   Lying to Sitting on Side of Bed   Type of Assistance Needed Independent   Physical Assistance Level No physical assistance   Lying to Sitting on Side of Bed CARE Score 6   Sit to Stand   Type of Assistance Needed Independent   Physical Assistance Level No physical assistance   Sit to Stand CARE Score 6   Bed-Chair Transfer   Type of Assistance Needed Independent; Adaptive equipment   Physical Assistance Level No physical assistance   Comment RW   Chair/Bed-to-Chair Transfer CARE Score 6   Transfer Bed/Chair/Wheelchair   Adaptive Equipment Roller Walker   Car Transfer   Type of Assistance Needed Independent; Adaptive equipment   Physical Assistance Level No physical assistance   Comment RW   Car Transfer CARE Score 6   Walk 10 Feet   Type of Assistance Needed Independent; Adaptive equipment   Physical Assistance Level No physical assistance   Comment RW   Walk 10 Feet CARE Score 6   Walk 50 Feet with Two Turns   Type of Assistance Needed Independent; Adaptive equipment   Physical Assistance Level No physical assistance   Comment RW   Walk 50 Feet with Two Turns CARE Score 6   Walk 150 Feet   Type of Assistance Needed Independent; Adaptive equipment   Physical Assistance Level No physical assistance   Comment RW   Walk 150 Feet CARE Score 6   Walking 10 Feet on Uneven Surfaces   Type of Assistance Needed Supervision; Adaptive equipment   Physical Assistance Level No physical assistance   Comment RW   Walking 10 Feet on Uneven Surfaces CARE Score 4   Ambulation   Does the patient walk? 2  Yes   Primary Mode of Locomotion Prior to Admission Walk   Distance Walked (feet) 200 ft   Assist Device Roller Walker   Gait Pattern Inconsistant Mariangel; Slow Mariangel;Decreased R stance   Walk Assist Level Modified Independent   Wheel 50 Feet with Two Turns   Reason if not Attempted Activity not applicable   Wheel 50 Feet with Two Turns CARE Score 9   Wheel 150 Feet   Reason if not Attempted Activity not applicable   Wheel 916 Feet CARE Score 9   Wheelchair mobility   Does the patient use a wheelchair? 0  No   Curb or Single Stair   Style negotiated Curb   Type of Assistance Needed Independent; Adaptive equipment   Physical Assistance Level No physical assistance   1 Step (Curb) CARE Score 6   4 Steps   Type of Assistance Needed Supervision; Adaptive equipment   Physical Assistance Level No physical assistance   4 Steps CARE Score 4   12 Steps   Type of Assistance Needed Supervision; Adaptive equipment   Physical Assistance Level No physical assistance   12 Steps CARE Score 4   Stairs   Type Stairs   # of Steps 12   Weight Bearing Precautions PWB;RLE   Assist Devices Bilateral Rail;Single Rail   Findings Trialed 8 steps with bilat HR, 4 steps with single HR  Sup/occasional cue provided to descend with R LE  Picking Up Object   Type of Assistance Needed Independent; Adaptive equipment   Physical Assistance Level No physical assistance   Comment RW for support, no reacher   Picking Up Object CARE Score 6   Toilet Transfer   Type of Assistance Needed Independent   Physical Assistance Level No physical assistance   Toilet Transfer CARE Score 6   Therapeutic Interventions   Strengthening Reinforced no resistance exercises, per ortho  Pt understanding  Reviewed seated LAQ, hip flex, supine AROM exercises to be performed  Amb through cones, over hurdles, over mat, up/down curb  Equipment Use   NuStep For LE ROM, cues for inc use of UE vs RLE  No pain, complaints reported  Dr Dulce Maria Melo consulted re: use of NuStep  May want to confirm with ortho clearance to cont with NuStep even for ROM use  Assessment   Treatment Assessment Pt participated in 90 min PT session with focus on LE AROM/ROM, mobility with RW and prep for d/c home tomorrow 2/25/22 with family support and [facility]  Pt demo safe mobility at mod I level with RW  She is able to indep maintain PWB on RLE with RW  Demo good safety around obstacles, narrow pathways, and with functional tasks  Reviewed HEP, PT goals, f/u care services, ongoing goals and progression through continuum of care including f/u appts with ortho  Pt receptive to all edu  She is appropriate for d/c home tomorrow  Problem List Decreased strength;Decreased endurance;Decreased range of motion; Impaired balance;Decreased mobility;Orthopedic restrictions   Barriers to Discharge None   PT Barriers   Physical Impairment Decreased strength;Decreased range of motion; Impaired balance;Decreased endurance;Decreased mobility;Orthopedic restrictions   Functional Limitation Stair negotiation;Standing;Walking;Transfers   Plan   Treatment/Interventions Functional transfer training;LE strengthening/ROM; Therapeutic exercise;Elevations; Endurance training;Gait training   Progress Progressing toward goals   Recommendation   PT Discharge Recommendation Home with home health rehabilitation   Equipment Recommended Walker   PT Therapy Minutes   PT Time In 0930   PT Time Out 1100   PT Total Time (minutes) 90   PT Mode of treatment - Individual (minutes) 90   PT Mode of treatment - Concurrent (minutes) 0   PT Mode of treatment - Group (minutes) 0   PT Mode of treatment - Co-treat (minutes) 0   PT Mode of Treatment - Total time(minutes) 90 minutes   PT Cumulative Minutes 720   Therapy Time missed   Time missed?  No

## 2022-02-24 NOTE — DISCHARGE INSTRUCTIONS
DISCHARGE INSTRUCTIONS: Hector Rodriguez [age]    Bring these instructions with you to your Outpatient Physician appointments so they can order and follow-up any additional lab work or imaging recommended at time of discharge  It  is you or your caregivers responsibility to obtain follow-up MEDICATION REFILLS  As indicated through your Primary Care Physician (PCP) and other outpatient specialty provider(s) after discharge  Please follow-up with your PCP as soon as possible after discharge to set-up follow-up management and when appropriate refills  If you (or your health care proxy) have any questions or concerns regarding your acute rehabilitation stay including issues with medications, rehabilitation, and follow-up plan, please call:          [address] [place] [address] at [phone] or [phone]  Should you develop fevers, chills, new weakness, changes in sensation, difficulty speaking, facial weakness, confusion, shortness of breath, chest pain, or other concerning symptoms please call 911 and/or obtain transportation to nearest ER immediately  PHYSICIANS to see:  Please see your doctors listed in the follow up providers section of your discharge paperwork, and take the discharge paperwork with you to your appointments  Home health has been ordered for you: Your home health agency should reach out to you or your family soon to arrange follow-up  (If [facility] is your provider their phone number is [phone])      LAB WORK recommended after discharge: Follow-up lab work at discretion of your outpatient physicians to be determined at time of your future appointments  Also, obtain the following lab orders and follow-up management through primary care physician and outpatient specialists       CBC and BMP to monitor Anemia and hyponatremia (hemoglobin, white blood cell count, electrolytes and kidney function) at next visit within 1-2 weeks     IMAGING to follow-up:  Follow-up imaging as discussed with your recent physicians or at discretion of your outpatient physicians to be determined at time of your appointments  ADDITIONAL FINDINGS and ISSUES to follow-up:    Check under the "DISCHARGE PROVIDER" section of these DISCHARGE INSTRUCTIONS for provider specific issues as well  Excessive delay or lack of appropriate follow-up could potentially increase your risk of complications which could be severe and even life-threatening  It is you or your caregiver's responsibility to ensure these tests are ordered by your outpatient providers and followed up with accordingly  SKIN CARE INSTRUCTIONS to follow:    Monitor skin for increased redness or breadown and promptly notify your physician should these develop    Be sure you stand and walk some every 1-2 hours during day  While seated or lying in bed shift positions and from side to side often  Can use barrier type cream such as Hydragaurd 1-2 times per day  Turn patient (yourself) every 2 hours  WEIGHTBEARING/ACTIVITY PRECAUTIONS to follow:  Partial weightbearing right leg as discussed (30-50%) and can have passive range of motion performed in physical therapy as tolerated  Other usual movements of right leg, patient is not to perform any additional active or active assist range of motion in therapies  Driving restrictions: You are recommended against driving until cleared by an outpatient physician  Work restrictions: You should NOT return to work (if working) until cleared by an outpatient physician  You should not operate heavy machinery (if applicable) until cleared by an outpatient physician  Alcohol restrictions: You are recommended to not drink alcohol at this time unless cleared by an outpatient physician  Drinking alcohol in your current functional condition can increase your risk of injury which could be severe    Drinking alcohol given your current health problems can lead to increased medical complications which could be severe  Combining alcohol with your current medications can increase your risk of injury which could be severe  Smoking restrictions: You are recommended to not smoke nicotine  Smoking increases your risk of heart attack, stroke, emphysema/COPD, and lung cancer  MEDICATIONS:  Please see a full list of your medications outlined in the After Visit Summary that is attached to these Discharge Instructions  Please note changes may have been made to your medications please refer to your discharge paperwork for your current medications and take this list with you to all your doctors appointments for your doctors to review  Please do not resume a home medication unless the medication reconciliation sheet indicates to do so, please do not assume that a medication that you were given a prescription for is the same as a medication you have at home based on both medications having the same name as dosages and frequency may have changed  Unless specifically noted in your medication list provided to you in your discharge paper work do not resume prior vitamins, minerals, or supplements you may have been taking prior to your hospitalization unless instructed by an outpatient physician in the future  Blood thinners prescribed to optimize long and short term health and decrease risk of complications but with risks related to bleeding and other complications  Eliquis (Apixiban) instructions: You have been prescribed apixiban(Eliquis)  This medication is used to prevent clots which can cause severe disability and even death  This medication will need to be managed by your outpatient physician(s) after discharge  Follow-up with the appropriate provider as soon as possible to ensure appropriate use  This is a strong anticoagulant (blood thinner)    It is being recommended for use by you (or your family) to decrease the risk of clotting which can be severely disabling and even life-threatening  Even when provided as recommended it can cause severe disabling and even life-threatening bleeding  Too much or too little of this blood thinner further increases your risk of this medication causing serious and even life-threatening complications such as severe bleeding, clots, strokes, and death  With that said, at this time based on available evidence and consensus agreement, your physicians recommend you take Eliquis (Ever Custard) medication based on your overall risks and benefits in your specific medical situation  If you (or your family/caregiver) notice black stools, bloody stools, vomit blood, develop new weakness, slurred speech, confusion or have any other concerning symptoms call 911 or obtain transportation to nearest emergency room immediately  MEDICAL MANAGEMENT AT HOME specific to you:    Diabetes Management:    Please check your blood sugars 2 times daily before breakfast and dinner and record them to provide to your doctors, contact your family doctor as soon as possible for blood sugars higher than 220 or lower than 100  Follow-up with PCP/family doctor regularly to ensure blood sugars remain adequately controlled  Hypertension Management:  Only take the medications prescribed for you at time of discharge - overly high or low blood pressure increases your risk for health complications    Follow-up with PCP/family doctor regularly to ensure blood pressure remains adequately controlled  NSAID Warning:  Please avoid NSAID (including but not limited to advil, aleve, motrin, naproxen, ibuprofen, mobic, meloxicam, diclofenac etc) medications as NSAID medications may increase your risk of bleeding (which can be life-threatening)  Please note a summary of your hospital stay with relevant information for your doctors will try to be sent to them    Please confirm with your doctors at your follow up visits that they have received this summary and have them contact [facility] if they have not received them along with any other medical records they may require  Rain Garrett Phone Number:  [phone]          Leg Fracture   WHAT YOU NEED TO KNOW:   A leg fracture is a break in a bone in your leg  DISCHARGE INSTRUCTIONS:   Call your local emergency number (911 in the [facility] [facility]) if:   · You suddenly feel lightheaded and short of breath  · You have chest pain when you take a deep breath or cough  · You cough up blood  Seek care immediately if:   · Your leg feels warm, tender, and painful  It may look swollen and red  · The pain in your leg gets worse even after you rest and take medicine  · Your cast gets wet or damaged  · Your leg or toes are numb  · Your leg becomes swollen, cold, or blue  Call your doctor or bone specialist if:   · You have a fever  · Your cast or brace is too tight  · You notice new blood stains or a bad smell coming from under the cast     · You have new or worsening trouble moving your leg  · You have questions or concerns about your condition or care  Medicines: You may need any of the following:  · Prescription pain medicine  may be given  Ask your healthcare provider how to take this medicine safely  Some prescription pain medicines contain acetaminophen  Do not take other medicines that contain acetaminophen without talking to your healthcare provider  Too much acetaminophen may cause liver damage  Prescription pain medicine may cause constipation  Ask your healthcare provider how to prevent or treat constipation  · Acetaminophen  decreases pain and fever  It is available without a doctor's order  Ask how much to take and how often to take it  Follow directions   Read the labels of all other medicines you are using to see if they also contain acetaminophen, or ask your doctor or pharmacist  Acetaminophen can cause liver damage if not taken correctly  Do not use more than 4 grams (4,000 milligrams) total of acetaminophen in one day  · Take your medicine as directed  Contact your healthcare provider if you think your medicine is not helping or if you have side effects  Tell him of her if you are allergic to any medicine  Keep a list of the medicines, vitamins, and herbs you take  Include the amounts, and when and why you take them  Bring the list or the pill bottles to follow-up visits  Carry your medicine list with you in case of an emergency  Self-care:   · Rest  your leg as directed and avoid activities that cause leg pain  ·   Apply ice  on your leg for 15 to 20 minutes every hour or as directed  Use an ice pack, or put crushed ice in a plastic bag  Cover it with a towel before you apply it  Ice helps prevent tissue damage and decreases swelling and pain  · Use crutches or a walker  as directed  Crutches will help you walk and take some weight off your leg while it heals  · Physical therapy  may be recommended  A physical therapist teaches you exercises to help improve movement and strength, and to decrease pain  Follow up with your doctor or bone specialist as directed: You may need to return to have your splint or cast removed  You may need an x-ray of your leg to check how well the bone has healed  Write down your questions so you remember to ask them during your visits  © Copyright [employer] 2021 Information is for End User's use only and may not be sold, redistributed or otherwise used for commercial purposes  All illustrations and images included in CareNotes® are the copyrighted property of A D A M , Inc  or Shiv Paz  The above information is an  only  It is not intended as medical advice for individual conditions or treatments   Talk to your doctor, nurse or pharmacist before following any medical regimen to see if it is safe and effective for you

## 2022-02-24 NOTE — PLAN OF CARE
Problem: Potential for Falls  Goal: Patient will remain free of falls  Description: INTERVENTIONS:  - Educate patient/family on patient safety including physical limitations  - Instruct patient to call for assistance with activity   - Consult OT/PT to assist with strengthening/mobility   - Keep Call bell within reach  - Keep bed low and locked with side rails adjusted as appropriate  - Keep care items and personal belongings within reach  - Initiate and maintain comfort rounds  - Make Fall Risk Sign visible to staff  - Offer Toileting every 2-4 Hours, in advance of need  - Initiate/Maintain bed/chair alarm  - Obtain necessary fall risk management equipment: alarms  - Apply yellow socks and bracelet for high fall risk patients  - Consider moving patient to room near nurses station  Outcome: Progressing     Problem: PAIN - ADULT  Goal: Verbalizes/displays adequate comfort level or baseline comfort level  Description: Interventions:  - Encourage patient to monitor pain and request assistance  - Assess pain using appropriate pain scale  - Administer analgesics based on type and severity of pain and evaluate response  - Implement non-pharmacological measures as appropriate and evaluate response  - Consider cultural and social influences on pain and pain management  - Notify physician/advanced practitioner if interventions unsuccessful or patient reports new pain  Outcome: Progressing     Problem: INFECTION - ADULT  Goal: Absence or prevention of progression during hospitalization  Description: INTERVENTIONS:  - Assess and monitor for signs and symptoms of infection  - Monitor lab/diagnostic results  - Monitor all insertion sites, i e  indwelling lines, tubes, and drains  - Monitor endotracheal if appropriate and nasal secretions for changes in amount and color  - [place] appropriate cooling/warming therapies per order  - Administer medications as ordered  - Instruct and encourage patient and family to use good hand hygiene technique  - Identify and instruct in appropriate isolation precautions for identified infection/condition  Outcome: Progressing     Problem: SAFETY ADULT  Goal: Maintain or return to baseline ADL function  Description: INTERVENTIONS:  -  Assess patient's ability to carry out ADLs; assess patient's baseline for ADL function and identify physical deficits which impact ability to perform ADLs (bathing, care of mouth/teeth, toileting, grooming, dressing, etc )  - Assess/evaluate cause of self-care deficits   - Assess range of motion  - Assess patient's mobility; develop plan if impaired  - Assess patient's need for assistive devices and provide as appropriate  - Encourage maximum independence but intervene and supervise when necessary  - Involve family in performance of ADLs  - Assess for home care needs following discharge   - Consider OT consult to assist with ADL evaluation and planning for discharge  - Provide patient education as appropriate  Outcome: Progressing  Goal: Maintains/Returns to pre admission functional level  Description: INTERVENTIONS:  - Perform BMAT or MOVE assessment daily    - Set and communicate daily mobility goal to care team and patient/family/caregiver  - Collaborate with rehabilitation services on mobility goals if consulted  - Perform Range of Motion 3 times a day  - Reposition patient every 2 hours    - Dangle patient 3 times a day  - Stand patient 3 times a day  - Ambulate patient 3 times a day  - Out of bed to chair 3 times a day   - Out of bed for meals 3 times a day  - Out of bed for toileting  - Record patient progress and toleration of activity level   Outcome: Progressing     Problem: DISCHARGE PLANNING  Goal: Discharge to home or other facility with appropriate resources  Description: INTERVENTIONS:  - Identify barriers to discharge w/patient and caregiver  - Arrange for needed discharge resources and transportation as appropriate  - Identify discharge learning needs (meds, wound care, etc )  - Arrange for interpretive services to assist at discharge as needed  - Refer to Case Management Department for coordinating discharge planning if the patient needs post-hospital services based on physician/advanced practitioner order or complex needs related to functional status, cognitive ability, or social support system  Outcome: Progressing     Problem: Prexisting or High Potential for Compromised Skin Integrity  Goal: Skin integrity is maintained or improved  Description: INTERVENTIONS:  - Identify patients at risk for skin breakdown  - Assess and monitor skin integrity  - Assess and monitor nutrition and hydration status  - Monitor labs   - Assess for incontinence   - Turn and reposition patient  - Assist with mobility/ambulation  - Relieve pressure over bony prominences  - Avoid friction and shearing  - Provide appropriate hygiene as needed including keeping skin clean and dry  - Evaluate need for skin moisturizer/barrier cream  - Collaborate with interdisciplinary team   - Patient/family teaching  - Consider wound care consult   Outcome: Progressing

## 2022-02-24 NOTE — PROGRESS NOTES
Internal Medicine Progress Note  Patient: Trenton Daniels  Age/sex: 78 y o  female  Medical Record #: [id number]      ASSESSMENT/PLAN: (Interval History)  Trenton Daniels is seen and examined and management for following issues:    Right Tibial plateau fracture  · Medical management per ortho  · Rehab/pain per PMR  · Discussed WB and ROM with ortho  PWB 30-50% with passive ROM  · Hinged brace as needed for pain     DM II  · HA1C 6 4   · Continue metformin 500mg 2x daily  · Conitnue accuchecks and constant carb diet  · BS stable  · Would recommend dc on same she will need close f/u with PCP as she was previously taken off of this prior to hospitalization     HTN  · Home: Norvasc 10mg qd; Chlorthalidone 25mg qd, Clonidine 0 1 mg qd, Labetolol 200mg q12 hours, Lisinopril 20mg BID  · Here: Norvasc 10mg qd, Chlorthalidone 25mg qd, Clonidine 0 1mg q12hrs, Labetolol 200mg q12hrs, Lisinopril 20mg BID  · Stable     PAF  · Cont Eliquis/Labetolol     CKD III  · Baseline 0 8-1 0  · Avoid nephrotoxic medications  · stable      Hypothyroid  · Cont levothyroxine     Hyperlipidemia  · Continue statin therapy     DC plannin/25  The above assessment and plan was reviewed and updated as determined by my evaluation of the patient on 2022      Labs:   Results from last 7 days   Lab Units 22  0612 22  0543   WBC Thousand/uL 4 99 5 14   HEMOGLOBIN g/dL 9 9* 10 0*   HEMATOCRIT % 29 6* 31 5*   PLATELETS Thousands/uL 240 245     Results from last 7 days   Lab Units 22  0612 22  0543   SODIUM mmol/L 132* 134*   POTASSIUM mmol/L 3 9 3 7   CHLORIDE mmol/L 97* 99*   CO2 mmol/L 29 28   BUN mg/dL 27* 30*   CREATININE mg/dL 0 93 1 12   CALCIUM mg/dL 9 0 9 1     Results from last 7 days   Lab Units 22  0543   HEMOGLOBIN A1C % 6 4*         Results from last 7 days   Lab Units 22  0701 22  1547   POC GLUCOSE mg/dl 137 155* 122       Review of Scheduled Meds:  Current Facility-Administered Medications   Medication Dose Route Frequency Provider Last Rate    acetaminophen  975 mg Oral Q6H PRN Mouna Leung MD      amLODIPine  10 mg Oral Daily Mouna Leung MD      apixaban  5 mg Oral BID Mouna Leung MD      ascorbic acid  500 mg Oral Daily Mouna Leung MD      atorvastatin  40 mg Oral After Yanci Paul MD      bisacodyl  10 mg Rectal Daily PRN Mouna Leung MD      Carboxymethylcellulose Sodium  1 drop Ophthalmic TID Tawanna Cheng MD      chlorthalidone  25 mg Oral Daily Mouna Leung MD      cholecalciferol  1,000 Units Oral Daily Mouna Leung MD      cloNIDine  0 1 mg Oral Q12H [address] Mouna Leung MD      Diclofenac Sodium  2 g Topical TID PRN Mouna Leung MD      docusate sodium  100 mg Oral BID Mouna Leung MD      insulin lispro  1-5 Units Subcutaneous HS Mouna Leung MD      insulin lispro  1-5 Units Subcutaneous TID [facility] Mouna Leung MD      labetalol  200 mg Oral Q12H [address] Mouna Leung MD      levothyroxine  125 mcg Oral Early Morning Mouna Leung MD      lidocaine  1 patch Topical Daily Mouna Leung MD      lisinopril  20 mg Oral BID Mouna Leung MD      LORazepam  0 5 mg Oral BID Mouna Leung MD      metFORMIN  500 mg Oral BID With Meals RUCHI Butler      ondansetron  4 mg Oral Q8H PRN Mouna Leung MD      polyethylene glycol  17 g Oral Daily PRN Mouna Leung MD      PreserVision AREDS 2  1 capsule Oral BID Mouna Leung MD      senna  1 tablet Oral BID Mouna Leung MD         Subjective/ HPI: Patient seen and examined  Patients overnight issues or events were reviewed with nursing or staff during rounds or morning huddle session  New or overnight issues include the following:     Pt seen in her room  She states that she is doing well  She denies any current complaints  ROS:   A 10 point ROS was performed; negative except as noted above         Imaging:     No orders to display *Labs /Radiology studies reviewed  *Medications reviewed and reconciled as needed  *Please refer to order section for additional ordered labs studies  *Case discussed with primary attending during morning huddle case rounds      Physical Examination:  Vitals:   Vitals:    02/23/22 0825 02/23/22 1312 02/23/22 2040 02/24/22 0801   BP: 160/60 142/52 136/60 158/82   BP Location: Left arm Left arm Left arm Left arm   Pulse: 80 65 62 65   Resp:  18 18 18   Temp:  97 8 °F (36 6 °C) 97 9 °F (36 6 °C) 98 °F (36 7 °C)   TempSrc:  Oral Oral Oral   SpO2:  99% 99% 98%   Weight:       Height:         GEN: No apparent distress, interactive; frail  NEURO: Alert and oriented x3  HEENT: Pupils are equal and reactive, EOMI, mucous membranes are moist, face symmetrical  CV: S1 S2 regular, no MRG, no peripheral edema noted  RESP: Lungs are clear bilaterally, no wheezes, rales or rhonchi noted, on room air, respirations easy and non labored  GI: Flat, soft non tender, non distended; +BS x4  : Voiding without difficulty  MUSC: Moves all extremities; except RLE  SKIN: pink, warm and dry, normal turgor, no rashes, lesions    The above physical exam was reviewed and updated as determined by my evaluation of the patient on 2/24/2022  Invasive Devices  Report    None                    VTE Pharmacologic Prophylaxis: Eliquis  Code Status: Level 1 - Full Code  Current Length of Stay: 9 day(s)      Total time spent:  30 minutes  with more than 50% spent counseling/coordinating care  Counseling includes discussion with patient re: progress  and discussion with patient of his/her current medical state/information  Coordination of patient's care was performed in conjunction with primary service  Time invested included review of patient's labs, vitals, and management of their comorbidities with continued monitoring  In addition, this patient was discussed with medical team including physician and advanced extenders   The care of the patient was extensively discussed and appropriate treatment plan was formulated unique for this patient  ** Please Note:  voice to text software may have been used in the creation of this document   Although proof errors in transcription or interpretation are a potential of such software**

## 2022-02-24 NOTE — PROGRESS NOTES
Physical Medicine and Rehabilitation Progress Note  Stanley Pope 78 y o  female MRN: [id number]  Unit/Bed#: [facility] 453-01 Encounter: [id number]      Assessment & Plan:     Functional assessment:        Admit    Recent  Total assist     Significant assist     Mod assist  To hygiene, bathing, LBD    Min assist       Supervision   Bathing, LBD   Contact Guard     Mod Indep/Indep     Transfers Mod assist  Mod indep    Ambulation  Min assist  Mod indep    Stairs  Total assist/NT Supervision for 12 stepss     Goal: Largely Mod indep for ADLs and ambulation  Major barriers:  Pain, WB precautions, lives alone  Dispo & ELOS: Home with ELOS Friday with  PT, OT    * Fracture of right tibial plateau  Assessment & Plan  - Function much improved  - ELOS now Friday to home with [facility] PT, OT  - Tx feels patient will be largely mod indep at d/c; and can do sponge bathing at d/c and will be close to mod indep even for regular bathing but can wait for [facility] OT  R medial tibial plateau fracture, complex medial meniscus tear, knee hemarthrosis, calf hematoma with significant decline in ADLs and ambulation   · Per discussion with Dr Taurus Strange 2/14, ortho- PWB 30-50%               - Not required to wear brace but consider if helpful during rehab               - PROM ok but no additional knee specific rehab expected for approx 8 weeks   - Dr Artie Cooper confirmed with ortho 2/17 - PWB RLE 30-50% and can have passive range of motion performed in physical therapy as tolerated  Other than her usual movements of right leg, patient is not to perform any additional active or active assist range of motion in therapies      · Optimal pain control  · Fall precautions   Monitor for signs/symptoms of VTE, atelectasis, acute blood loss anemia, or other infection    Continue acute comprehensive interdisciplinary inpatient rehabilitation to include intensive skilled therapies (PT, OT) as outlined with oversight and management by rehabilitation physician as well as inpatient rehab level nursing, case management and weekly interdisciplinary team meetings   Follow-up with Ortho Sx after d/c and ortho if needed during ARC course     - 1/16 - ED Visit R knee swelling started 4 days ago denies any trauma - XR No acute osseous abnormality  Joint effusion;  - 1/19 - OP ortho- Hemarthrosis of R knee - R knee hemarthrosis likely 2/2 spontaneous bleeding in setting of Eliquis v occult trauma, pt denies trauma, R knee med arthritis; 45 cc blood aspirate; 1/9 XR There is no acute fracture or dislocation  There is no joint effusion  No significant degenerative changes  Soft tissues are unremarkable  IMPRESSION: No acute osseous abnormality  - 2/12 Ortho c/s - Dr Chair Butler - A 49-year-old female with history of multiple falls as well as right knee pain and recurrent effusions  Presents back to the hospital today due to worsening pain and continued swelling  She was sent for an MRI to rule out an occult fracture  On review of imaging this morning she is found to have a nondisplaced impaction fracture of the medial tibial plateau  On my exam patient has a mild effusion she is tender medial tibial plateau  The knee is stable to varus valgus and anterior-posterior drawer testing  Plan is going to be toe-touch weight-bearing on the right lower extremity with the assistance of a walker  She is going to work with PT and Case Management regarding placement  Patient will require follow-up to confirm improvement in her symptoms 2 weeks following discharge  - 2/14 spoke with theresa Chino (resident) to confirm WB status, query need for brace, and ROM as order still said NWB; Per discussion with Dr Shakila Chino [facility] 30-50%; brace not required but could consider if helpful; PROM immediately; PWB order (without % parameters entered by Dr Shakila Chino)  - 2/17 Dr Jeannine Seay confirmed with ortho PWB RLE 30-50% and can have passive range of motion performed in physical therapy as tolerated    Other than her usual movements of right leg, patient is not to perform any additional active or active assist range of motion in therapies  Pain  Assessment & Plan  Improved   APAP TID PRN   D/C'd oxy not needing   LD patch  PRN Voltaren gel   Counseled on and continue to encourage deep breathing/relaxation/behavioral pain management techniques:     Deep breathin seconds in, 5 seconds out, 5 times per hour when awake and PRN when in pain or anticipate pain; avoid holding breath and tightening muscles and instead breathe slowly and deeply  Monitor for oversedation, AMS/delirium, and respiratory depression   If being administered - hold opiates, muscle relaxants, benzodiazepines, and gabapentin for oversedation, AMS, or RR<12  Anxiety  Assessment & Plan  Improved   On chronic ativan 0 5mg BID - care with concurrent pain meds > but not needing opiate pain meds now   Provided additional supportive counseling  [facility] and continue to encourage deep breathing/relaxation/behavioral management techniques:     Deep breathin seconds in, 5 seconds out, 5 times per hour when awake and PRN when experiencing pain or anxiety    Avoid holding breath and tightening muscles and instead breathe slowly and deeply        Asymptomatic bilateral carotid artery stenosis  Assessment & Plan  OP cards follow-up  IM consulted and with overall management at their discretion during ARC course  Antithrombotic: Eliquis  Statin  Optimal blood pressure and blood sugar control      Atrial fibrillation   Assessment & Plan  IM consulted and with overall management at their discretion during ARC course  Rate control: labetolol  Antithrombotic: Eliquis       Anemia  Assessment & Plan  Hb fairly stable - slight trend down   IM consulted, with overall monitoring, and management at their discretion during ARC course  Monitor H/H, vitals, signs/symptoms of acute bleeding  OP PCP follow-up     Macular degeneration  Assessment & Plan  OP optho follow-up   Home Preservision and Theretears    At risk for venous thromboembolism (VTE)  Assessment & Plan  SCDs, ambulation, and Eliquis       Chronic hyponatremia  Assessment & Plan  Slightly down but within chronic range   Mild, monitor intermittently     CKD (chronic kidney disease) stage 3, GFR 30-59 ml/min ([facility])  Assessment & Plan  Fairly stable   Monitor BUN/Cr intermittently as well as electrolytes   IM consulted to manage  Limit nephrotoxic agents when possible      Hypothyroidism  Assessment & Plan  Levothyroxine    Essential hypertension  Assessment & Plan  Acceptable control  Internal medicine consulted and management at their discretion  Monitor for signs and symptoms of hypoglycemia   Current meds is home regimen: clonidine, chlorthalidone, amlodipine, lisinopril, labetalol     Diabetes mellitus type 2  Assessment & Plan  Lab Results   Component Value Date    HGBA1C 6 4 (H) 02/21/2022     Metformin d/c'd previously  Internal medicine consulted and management at their discretion  Monitor for signs and symptoms of hypoglycemia   Current meds: Lispro AC/HS and new Metformin 500mg BID (needs new Rx)  DM2 diet   Has glucometer and able to use  OP PCP follow-up         Other Medical Issues:   None    Follow-up providers and other issues to be followed up after discharge  · PCP  · Ortho  · Cards    CODE: Level 1: Full Code    Restrictions include: Fall precautions    Objective:     Allergies per EMR  Diagnostic Studies: Reviewed, no new imaging  No orders to display     See above as well    Laboratory: Labs reviewed  Results from last 7 days   Lab Units 02/24/22  0612 02/21/22  0543   HEMOGLOBIN g/dL 9 9* 10 0*   HEMATOCRIT % 29 6* 31 5*   WBC Thousand/uL 4 99 5 14     Results from last 7 days   Lab Units 02/24/22  0612 02/21/22  0543   BUN mg/dL 27* 30*   SODIUM mmol/L 132* 134*   POTASSIUM mmol/L 3 9 3 7   CHLORIDE mmol/L 97* 99*   CREATININE mg/dL 0 93 1 12 Drug regimen reviewed, all potential adverse effects identified and addressed:    Scheduled Meds:  Current Facility-Administered Medications   Medication Dose Route Frequency Provider Last Rate    acetaminophen  975 mg Oral Q6H PRN Augusto Melendez MD      amLODIPine  10 mg Oral Daily Augusto Melendez MD      apixaban  5 mg Oral BID Augusto Melendez MD      ascorbic acid  500 mg Oral Daily Augusto Melendez MD      atorvastatin  40 mg Oral After Juliocesar Guerrero MD      bisacodyl  10 mg Rectal Daily PRN Augusto Melendez MD      Carboxymethylcellulose Sodium  1 drop Ophthalmic TID Felix Thorpe MD      chlorthalidone  25 mg Oral Daily Augusto Melendez MD      cholecalciferol  1,000 Units Oral Daily Augusto Melendez MD      cloNIDine  0 1 mg Oral Q12H [address] Augusto Melendez MD      Diclofenac Sodium  2 g Topical TID PRN Augusto Melendez MD      docusate sodium  100 mg Oral BID Augusto Melendez MD      insulin lispro  1-5 Units Subcutaneous HS Augusto Melendez MD      insulin lispro  1-5 Units Subcutaneous TID [facility] Augusto Melendez MD      labetalol  200 mg Oral Q12H [address] Augusto Melendez MD      levothyroxine  125 mcg Oral Early Morning Augusto Melendez MD      lidocaine  1 patch Topical Daily Augusto FlatterMD otilia      lisinopril  20 mg Oral BID Augusto Melendez MD      LORazepam  0 5 mg Oral BID Augusto Melendez MD      metFORMIN  500 mg Oral BID With Meals RUCHI Junior      ondansetron  4 mg Oral Q8H PRN Augusto Melendez MD      polyethylene glycol  17 g Oral Daily PRN Augusto Melendez MD      PreserVision AREDS 2  1 capsule Oral BID Augusto Melendez MD      senna  1 tablet Oral BID Augusto Melendez MD         Chief Complaints:  Rehab follow-up     Subjective:     Patient reports she is ready for discharge  She reports pain much improved  Patient denies lightheadedness, calf pain, or other new issues  ROS: A 10 point ROS was performed; negative except as noted above         Physical Exam:  Vitals: 02/24/22 1532   BP: 128/62   Pulse: 70   Resp: 18   Temp: 99 °F (37 2 °C)   SpO2: 100%       GEN:  Lying in bed in NAD   HEENT/NECK: MMM  CARDIAC: Regular rate rhythm, no murmers, no rubs, no gallops  LUNGS:  clear to auscultation, no wheezes, rales, or rhonchi  ABDOMEN: Soft, non-tender, non-distended, normal active bowel sounds  EXTREMITIES/SKIN:  no significant calf swelling or TTP  NEURO:   MENTAL STATUS:  Appropriate wakefulness and interaction   PSYCH:  Affect:  Frustratrated        HPI:  78year old F with PMH of Afib on anticoagulation with Eliquis, HTN, DM2, hyperlipidemia, hypothyroidism who developed progressive R knee pain and significant ambulatory dysfunction who had outpatient work-up which showed R knee effusion and R knee OA on X-ray  She underwent knee aspiration on 1/19 with bloody fluid   Pain continued to progress and she could no longer safely ambulate, wt bear, or perform ADLs and presented to hospital where MRI R knee performed and showed R medial tibial plateau fracture, complex medial meniscus tear, knee hemarthrosis, calf hematoma   Orthopedics consulted and initially recommended NWB and now per discussion Dr Penaloza Getting patient can be PWB 30-50%  Mendez Score is not required to wear brace but can be considered if helpful   She can start passive ROM now but no significant R knee PT expected until 8 weeks  Patient was evaluated by skilled therapies and was found to have significant decline in ADLs and ambulation        ** Please Note: Fluency Direct voice to text software may have been used in the creation of this document   **

## 2022-02-25 VITALS
RESPIRATION RATE: 18 BRPM | OXYGEN SATURATION: 98 % | HEART RATE: 62 BPM | HEIGHT: 66 IN | BODY MASS INDEX: 22 KG/M2 | TEMPERATURE: 97.6 F | SYSTOLIC BLOOD PRESSURE: 164 MMHG | DIASTOLIC BLOOD PRESSURE: 62 MMHG | WEIGHT: 136.91 LBS

## 2022-02-25 PROBLEM — Z91.89 AT RISK FOR VENOUS THROMBOEMBOLISM (VTE): Status: RESOLVED | Noted: 2022-02-15 | Resolved: 2022-02-25

## 2022-02-25 LAB
GLUCOSE SERPL-MCNC: 124 MG/DL (ref 65–140)
GLUCOSE SERPL-MCNC: 135 MG/DL (ref 65–140)

## 2022-02-25 PROCEDURE — 97535 SELF CARE MNGMENT TRAINING: CPT

## 2022-02-25 PROCEDURE — 99232 SBSQ HOSP IP/OBS MODERATE 35: CPT | Performed by: INTERNAL MEDICINE

## 2022-02-25 PROCEDURE — 99239 HOSP IP/OBS DSCHRG MGMT >30: CPT

## 2022-02-25 PROCEDURE — 82948 REAGENT STRIP/BLOOD GLUCOSE: CPT

## 2022-02-25 RX ADMIN — CLONIDINE HYDROCHLORIDE 0.1 MG: 0.1 TABLET ORAL at 09:01

## 2022-02-25 RX ADMIN — LABETALOL HYDROCHLORIDE 200 MG: 200 TABLET, FILM COATED ORAL at 09:01

## 2022-02-25 RX ADMIN — Medication 1000 UNITS: at 09:02

## 2022-02-25 RX ADMIN — LORAZEPAM 0.5 MG: 0.5 TABLET ORAL at 09:01

## 2022-02-25 RX ADMIN — OXYCODONE HYDROCHLORIDE AND ACETAMINOPHEN 500 MG: 500 TABLET ORAL at 09:02

## 2022-02-25 RX ADMIN — AMLODIPINE BESYLATE 10 MG: 10 TABLET ORAL at 09:01

## 2022-02-25 RX ADMIN — LEVOTHYROXINE SODIUM 125 MCG: 125 TABLET ORAL at 06:16

## 2022-02-25 RX ADMIN — APIXABAN 5 MG: 5 TABLET, FILM COATED ORAL at 09:01

## 2022-02-25 RX ADMIN — CHLORTHALIDONE 25 MG: 25 TABLET ORAL at 09:03

## 2022-02-25 RX ADMIN — METFORMIN HYDROCHLORIDE 500 MG: 500 TABLET, FILM COATED ORAL at 09:06

## 2022-02-25 RX ADMIN — Medication 1 DROP: at 09:02

## 2022-02-25 RX ADMIN — LISINOPRIL 20 MG: 20 TABLET ORAL at 09:01

## 2022-02-25 RX ADMIN — Medication 1 CAPSULE: at 09:04

## 2022-02-25 NOTE — PROGRESS NOTES
Internal Medicine Progress Note  Patient: Jane Gao  Age/sex: 78 y o  female  Medical Record #: [id number]      ASSESSMENT/PLAN: (Interval History)  Jane Gao is seen and examined and management for following issues:    Right Tibial plateau fracture  · Medical management per ortho  · Rehab/pain per PMR  · Discussed WB and ROM with ortho  PWB 30-50% with passive ROM  · Hinged brace as needed for pain     DM II  · HA1C 6 4   · Continue metformin 500mg 2x daily  · Conitnue accuchecks and constant carb diet  · BS stable  · Would recommend dc on same she will need close f/u with PCP as she was previously taken off of this prior to hospitalization     HTN  · Home: Norvasc 10mg qd; Chlorthalidone 25mg qd, Clonidine 0 1 mg qd, Labetolol 200mg q12 hours, Lisinopril 20mg BID  · Here: Norvasc 10mg qd, Chlorthalidone 25mg qd, Clonidine 0 1mg q12hrs, Labetolol 200mg q12hrs, Lisinopril 20mg BID  · Stable     PAF  · Cont Eliquis/Labetolol     CKD III  · Baseline 0 8-1 0  · Avoid nephrotoxic medications  · stable      Hypothyroid  · Cont levothyroxine     Hyperlipidemia  · Continue statin therapy     DC plannin/25  The above assessment and plan was reviewed and updated as determined by my evaluation of the patient on 2022      Labs:   Results from last 7 days   Lab Units 22  0612 22  0543   WBC Thousand/uL 4 99 5 14   HEMOGLOBIN g/dL 9 9* 10 0*   HEMATOCRIT % 29 6* 31 5*   PLATELETS Thousands/uL 240 245     Results from last 7 days   Lab Units 22  0612 22  0543   SODIUM mmol/L 132* 134*   POTASSIUM mmol/L 3 9 3 7   CHLORIDE mmol/L 97* 99*   CO2 mmol/L 29 28   BUN mg/dL 27* 30*   CREATININE mg/dL 0 93 1 12   CALCIUM mg/dL 9 0 9 1     Results from last 7 days   Lab Units 22  0543   HEMOGLOBIN A1C % 6 4*         Results from last 7 days   Lab Units 22  0641 22  1542   POC GLUCOSE mg/dl 135 128 107       Review of Scheduled Meds:  Current Facility-Administered Medications   Medication Dose Route Frequency Provider Last Rate    acetaminophen  975 mg Oral Q6H PRN Mariajose Muller MD      amLODIPine  10 mg Oral Daily Mariajose Muller MD      apixaban  5 mg Oral BID Mariajose Muller MD      ascorbic acid  500 mg Oral Daily Mariajose Muller MD      atorvastatin  40 mg Oral After Mercy Kaur MD      bisacodyl  10 mg Rectal Daily PRN Mariajose Muller MD      Carboxymethylcellulose Sodium  1 drop Ophthalmic TID Amanda Calderon MD      chlorthalidone  25 mg Oral Daily Mariajose Muller MD      cholecalciferol  1,000 Units Oral Daily Mariajose Muller MD      cloNIDine  0 1 mg Oral Q12H [address] Mariajose Muller MD      Diclofenac Sodium  2 g Topical TID PRN Mariajose Muller MD      docusate sodium  100 mg Oral BID Mariajose Muller MD      insulin lispro  1-5 Units Subcutaneous HS Mariajose Muller MD      insulin lispro  1-5 Units Subcutaneous TID [facility] Mariajose Muller MD      labetalol  200 mg Oral Q12H [address] Mariajose Muller MD      levothyroxine  125 mcg Oral Early Morning Mariajose Muller MD      lidocaine  1 patch Topical Daily Mariajose Muller MD      lisinopril  20 mg Oral BID Mariajose Muller MD      LORazepam  0 5 mg Oral BID Mariajose Muller MD      metFORMIN  500 mg Oral BID With Meals RUCHI Reed      ondansetron  4 mg Oral Q8H PRN Mariajose Muller MD      polyethylene glycol  17 g Oral Daily PRN Mariajose Muller MD      PreserVision AREDS 2  1 capsule Oral BID Mariajose Muller MD      senna  1 tablet Oral BID Mariajose Muller MD         Subjective/ HPI: Patient seen and examined  Patients overnight issues or events were reviewed with nursing or staff during rounds or morning huddle session  New or overnight issues include the following:     Pt seen in her room  She will be discharged this evening  She denies any current complaints  ROS:   A 10 point ROS was performed; negative except as noted above         Imaging:     No orders to display *Labs /Radiology studies reviewed  *Medications reviewed and reconciled as needed  *Please refer to order section for additional ordered labs studies  *Case discussed with primary attending during morning huddle case rounds      Physical Examination:  Vitals:   Vitals:    02/24/22 0801 02/24/22 1532 02/24/22 2008 02/25/22 0804   BP: 158/82 128/62 150/56 164/62   BP Location: Left arm Left arm Left arm Left arm   Pulse: 65 70 88 62   Resp: 18 18 18 18   Temp: 98 °F (36 7 °C) 99 °F (37 2 °C) 97 8 °F (36 6 °C) 97 6 °F (36 4 °C)   TempSrc: Oral Oral Oral Oral   SpO2: 98% 100% 98%    Weight:       Height:         GEN: No apparent distress, interactive; frail  NEURO: Alert and oriented x3  HEENT: Pupils are equal and reactive, EOMI, mucous membranes are moist, face symmetrical  CV: S1 S2 regular, no MRG, no peripheral edema noted  RESP: Lungs are clear bilaterally, no wheezes, rales or rhonchi noted, on room air, respirations easy and non labored  GI: Flat, soft non tender, non distended; +BS x4  : Voiding without difficulty  MUSC: Moves all extremities; except RLE  SKIN: pink, warm and dry, normal turgor, no rashes, lesions    The above physical exam was reviewed and updated as determined by my evaluation of the patient on 2/25/2022  Invasive Devices  Report    None                    VTE Pharmacologic Prophylaxis: Eliquis  Code Status: Level 1 - Full Code  Current Length of Stay: 10 day(s)      Total time spent:  30 minutes  with more than 50% spent counseling/coordinating care  Counseling includes discussion with patient re: progress  and discussion with patient of his/her current medical state/information  Coordination of patient's care was performed in conjunction with primary service  Time invested included review of patient's labs, vitals, and management of their comorbidities with continued monitoring  In addition, this patient was discussed with medical team including physician and advanced extenders  The care of the patient was extensively discussed and appropriate treatment plan was formulated unique for this patient  ** Please Note:  voice to text software may have been used in the creation of this document   Although proof errors in transcription or interpretation are a potential of such software**

## 2022-02-25 NOTE — PROGRESS NOTES
02/25/22 0900   Pain Assessment   Pain Assessment Tool 0-10   Pain Score No Pain   Restrictions/Precautions   Precautions Fall Risk  (macular degeneration)   RLE Weight Bearing Per Order PWB   RLE ROM Restriction Range Limitation  (No resistance exercises R knee)   Lifestyle   Autonomy 'This is torture"   Eating   Type of Assistance Needed Independent   Physical Assistance Level No physical assistance   Eating CARE Score 6   Oral Hygiene   Type of Assistance Needed Independent   Physical Assistance Level No physical assistance   Comment I in stance and seated at sink   Oral Hygiene CARE Score 6   Shower/Bathe Self   Type of Assistance Needed Independent   Physical Assistance Level No physical assistance   Comment indep with all aspects of bathing  able to wash 10/10 body parts without A  Will have shower chair upon D/C   Shower/Bathe Self CARE Score 6   Tub/Shower Transfer   Findings Pt reports daughter purchased tub transfer bench for D/C home  Complete sit swivel tub transfer with bench  Demo ability to complete at indep level  Upper Body Dressing   Type of Assistance Needed Independent   Physical Assistance Level No physical assistance   Comment overhead sweater   Upper Body Dressing CARE Score 6   Lower Body Dressing   Type of Assistance Needed Independent   Physical Assistance Level No physical assistance   Comment seated to thread   I for Cm over hips in stance   Lower Body Dressing CARE Score 6   Putting On/Taking Off Footwear   Type of Assistance Needed Independent   Physical Assistance Level No physical assistance   Comment I for socks and shoes with cross leg tech   Putting On/Taking Off Footwear CARE Score 6   Sit to Stand   Type of Assistance Needed Independent   Physical Assistance Level No physical assistance   Comment RW   Sit to Stand CARE Score 6   Bed-Chair Transfer   Type of Assistance Needed Independent   Physical Assistance Level No physical assistance   Comment RW   Chair/Bed-to-Chair Transfer CARE Score 6   Toileting Hygiene   Type of Assistance Needed Independent   Physical Assistance Level No physical assistance   Comment I with RW   Toileting Hygiene CARE Score 6   Toilet Transfer   Type of Assistance Needed Independent   Physical Assistance Level No physical assistance   Comment I with RW   Toilet Transfer CARE Score 6   Cognition   Overall Cognitive Status WFL   Arousal/Participation Alert; Cooperative   Attention Within functional limits   Orientation Level Oriented X4   Memory Decreased recall of precautions   Following Commands Follows all commands and directions without difficulty   Activity Tolerance   Activity Tolerance Patient tolerated treatment well   Assessment   Treatment Assessment Pt engaged in skilled OT session with focus on completing D/C ADL, tub transfers, addressing any concerns prior to D/C  Pt tolerated session well  Pt completed entire ADL at indep level, achieving goals  Recommending home OT to continue to assess functional safety and indep within home environment  Recommending tub transfer bench for safety with tub transfers, recommending walker tray, however pt does not want to use at this time  Pt continues to require RW 2* PWB for RLE  Plan to return to apartment with support from daughter/granddaughter  Recommend home OT upon D/C  Pt appropriate to D/C today      Barriers to Discharge None   Recommendation   OT Discharge Recommendation Home with home health rehabilitation   OT Therapy Minutes   OT Time In 0900   OT Time Out 1030   OT Total Time (minutes) 90   OT Mode of treatment - Individual (minutes) 90   OT Mode of treatment - Concurrent (minutes) 0   OT Mode of treatment - Group (minutes) 0   OT Mode of treatment - Co-treat (minutes) 0   OT Mode of Treatment - Total time(minutes) 90 minutes   OT Cumulative Minutes 840

## 2022-02-25 NOTE — PLAN OF CARE
Problem: Potential for Falls  Goal: Patient will remain free of falls  Description: INTERVENTIONS:  - Educate patient/family on patient safety including physical limitations  - Instruct patient to call for assistance with activity   - Consult OT/PT to assist with strengthening/mobility   - Keep Call bell within reach  - Keep bed low and locked with side rails adjusted as appropriate  - Keep care items and personal belongings within reach  - Initiate and maintain comfort rounds  - Make Fall Risk Sign visible to staff  - Offer Toileting every 2-4 Hours, in advance of need  - Initiate/Maintain bed/chair alarm  - Obtain necessary fall risk management equipment: alarms  - Apply yellow socks and bracelet for high fall risk patients  - Consider moving patient to room near nurses station  Outcome: Progressing     Problem: PAIN - ADULT  Goal: Verbalizes/displays adequate comfort level or baseline comfort level  Description: Interventions:  - Encourage patient to monitor pain and request assistance  - Assess pain using appropriate pain scale  - Administer analgesics based on type and severity of pain and evaluate response  - Implement non-pharmacological measures as appropriate and evaluate response  - Consider cultural and social influences on pain and pain management  - Notify physician/advanced practitioner if interventions unsuccessful or patient reports new pain  Outcome: Progressing     Problem: INFECTION - ADULT  Goal: Absence or prevention of progression during hospitalization  Description: INTERVENTIONS:  - Assess and monitor for signs and symptoms of infection  - Monitor lab/diagnostic results  - Monitor all insertion sites, i e  indwelling lines, tubes, and drains  - Monitor endotracheal if appropriate and nasal secretions for changes in amount and color  - [place] appropriate cooling/warming therapies per order  - Administer medications as ordered  - Instruct and encourage patient and family to use good hand hygiene technique  - Identify and instruct in appropriate isolation precautions for identified infection/condition  Outcome: Progressing     Problem: SAFETY ADULT  Goal: Maintain or return to baseline ADL function  Description: INTERVENTIONS:  -  Assess patient's ability to carry out ADLs; assess patient's baseline for ADL function and identify physical deficits which impact ability to perform ADLs (bathing, care of mouth/teeth, toileting, grooming, dressing, etc )  - Assess/evaluate cause of self-care deficits   - Assess range of motion  - Assess patient's mobility; develop plan if impaired  - Assess patient's need for assistive devices and provide as appropriate  - Encourage maximum independence but intervene and supervise when necessary  - Involve family in performance of ADLs  - Assess for home care needs following discharge   - Consider OT consult to assist with ADL evaluation and planning for discharge  - Provide patient education as appropriate  Outcome: Progressing  Goal: Maintains/Returns to pre admission functional level  Description: INTERVENTIONS:  - Perform BMAT or MOVE assessment daily    - Set and communicate daily mobility goal to care team and patient/family/caregiver  - Collaborate with rehabilitation services on mobility goals if consulted  - Perform Range of Motion 3 times a day  - Reposition patient every 2 hours    - Dangle patient 3 times a day  - Stand patient 3 times a day  - Ambulate patient 3 times a day  - Out of bed to chair 3 times a day   - Out of bed for meals 3 times a day  - Out of bed for toileting  - Record patient progress and toleration of activity level   Outcome: Progressing     Problem: DISCHARGE PLANNING  Goal: Discharge to home or other facility with appropriate resources  Description: INTERVENTIONS:  - Identify barriers to discharge w/patient and caregiver  - Arrange for needed discharge resources and transportation as appropriate  - Identify discharge learning needs (meds, wound care, etc )  - Arrange for interpretive services to assist at discharge as needed  - Refer to Case Management Department for coordinating discharge planning if the patient needs post-hospital services based on physician/advanced practitioner order or complex needs related to functional status, cognitive ability, or social support system  Outcome: Progressing     Problem: Prexisting or High Potential for Compromised Skin Integrity  Goal: Skin integrity is maintained or improved  Description: INTERVENTIONS:  - Identify patients at risk for skin breakdown  - Assess and monitor skin integrity  - Assess and monitor nutrition and hydration status  - Monitor labs   - Assess for incontinence   - Turn and reposition patient  - Assist with mobility/ambulation  - Relieve pressure over bony prominences  - Avoid friction and shearing  - Provide appropriate hygiene as needed including keeping skin clean and dry  - Evaluate need for skin moisturizer/barrier cream  - Collaborate with interdisciplinary team   - Patient/family teaching  - Consider wound care consult   Outcome: Progressing

## 2022-02-25 NOTE — PLAN OF CARE
Problem: Potential for Falls  Goal: Patient will remain free of falls  Description: INTERVENTIONS:  - Educate patient/family on patient safety including physical limitations  - Instruct patient to call for assistance with activity   - Consult OT/PT to assist with strengthening/mobility   - Keep Call bell within reach  - Keep bed low and locked with side rails adjusted as appropriate  - Keep care items and personal belongings within reach  - Initiate and maintain comfort rounds  - Make Fall Risk Sign visible to staff  - Offer Toileting every 2-4 Hours, in advance of need  - Initiate/Maintain bed/chair alarm  - Obtain necessary fall risk management equipment: alarms  - Apply yellow socks and bracelet for high fall risk patients  - Consider moving patient to room near nurses station  Outcome: Adequate for Discharge     Problem: PAIN - ADULT  Goal: Verbalizes/displays adequate comfort level or baseline comfort level  Description: Interventions:  - Encourage patient to monitor pain and request assistance  - Assess pain using appropriate pain scale  - Administer analgesics based on type and severity of pain and evaluate response  - Implement non-pharmacological measures as appropriate and evaluate response  - Consider cultural and social influences on pain and pain management  - Notify physician/advanced practitioner if interventions unsuccessful or patient reports new pain  Outcome: Adequate for Discharge     Problem: INFECTION - ADULT  Goal: Absence or prevention of progression during hospitalization  Description: INTERVENTIONS:  - Assess and monitor for signs and symptoms of infection  - Monitor lab/diagnostic results  - Monitor all insertion sites, i e  indwelling lines, tubes, and drains  - Monitor endotracheal if appropriate and nasal secretions for changes in amount and color  - [place] appropriate cooling/warming therapies per order  - Administer medications as ordered  - Instruct and encourage patient and family to use good hand hygiene technique  - Identify and instruct in appropriate isolation precautions for identified infection/condition  Outcome: Adequate for Discharge     Problem: SAFETY ADULT  Goal: Maintain or return to baseline ADL function  Description: INTERVENTIONS:  -  Assess patient's ability to carry out ADLs; assess patient's baseline for ADL function and identify physical deficits which impact ability to perform ADLs (bathing, care of mouth/teeth, toileting, grooming, dressing, etc )  - Assess/evaluate cause of self-care deficits   - Assess range of motion  - Assess patient's mobility; develop plan if impaired  - Assess patient's need for assistive devices and provide as appropriate  - Encourage maximum independence but intervene and supervise when necessary  - Involve family in performance of ADLs  - Assess for home care needs following discharge   - Consider OT consult to assist with ADL evaluation and planning for discharge  - Provide patient education as appropriate  Outcome: Adequate for Discharge  Goal: Maintains/Returns to pre admission functional level  Description: INTERVENTIONS:  - Perform BMAT or MOVE assessment daily    - Set and communicate daily mobility goal to care team and patient/family/caregiver  - Collaborate with rehabilitation services on mobility goals if consulted  - Perform Range of Motion 3 times a day  - Reposition patient every 2 hours    - Dangle patient 3 times a day  - Stand patient 3 times a day  - Ambulate patient 3 times a day  - Out of bed to chair 3 times a day   - Out of bed for meals 3 times a day  - Out of bed for toileting  - Record patient progress and toleration of activity level   Outcome: Adequate for Discharge     Problem: DISCHARGE PLANNING  Goal: Discharge to home or other facility with appropriate resources  Description: INTERVENTIONS:  - Identify barriers to discharge w/patient and caregiver  - Arrange for needed discharge resources and transportation as appropriate  - Identify discharge learning needs (meds, wound care, etc )  - Arrange for interpretive services to assist at discharge as needed  - Refer to Case Management Department for coordinating discharge planning if the patient needs post-hospital services based on physician/advanced practitioner order or complex needs related to functional status, cognitive ability, or social support system  Outcome: Adequate for Discharge     Problem: Prexisting or High Potential for Compromised Skin Integrity  Goal: Skin integrity is maintained or improved  Description: INTERVENTIONS:  - Identify patients at risk for skin breakdown  - Assess and monitor skin integrity  - Assess and monitor nutrition and hydration status  - Monitor labs   - Assess for incontinence   - Turn and reposition patient  - Assist with mobility/ambulation  - Relieve pressure over bony prominences  - Avoid friction and shearing  - Provide appropriate hygiene as needed including keeping skin clean and dry  - Evaluate need for skin moisturizer/barrier cream  - Collaborate with interdisciplinary team   - Patient/family teaching  - Consider wound care consult   Outcome: Adequate for Discharge

## 2022-02-25 NOTE — PLAN OF CARE
Problem: Potential for Falls  Goal: Patient will remain free of falls  Description: INTERVENTIONS:  - Educate patient/family on patient safety including physical limitations  - Instruct patient to call for assistance with activity   - Consult OT/PT to assist with strengthening/mobility   - Keep Call bell within reach  - Keep bed low and locked with side rails adjusted as appropriate  - Keep care items and personal belongings within reach  - Initiate and maintain comfort rounds  - Make Fall Risk Sign visible to staff  - Offer Toileting every 2-4 Hours, in advance of need  - Initiate/Maintain bed/chair alarm  - Obtain necessary fall risk management equipment: alarms  - Apply yellow socks and bracelet for high fall risk patients  - Consider moving patient to room near nurses station  2/25/2022 1022 by Rajeev Marroquin RN  Outcome: Completed  2/25/2022 1022 by Rajeev Marroquin RN  Outcome: Adequate for Discharge     Problem: PAIN - ADULT  Goal: Verbalizes/displays adequate comfort level or baseline comfort level  Description: Interventions:  - Encourage patient to monitor pain and request assistance  - Assess pain using appropriate pain scale  - Administer analgesics based on type and severity of pain and evaluate response  - Implement non-pharmacological measures as appropriate and evaluate response  - Consider cultural and social influences on pain and pain management  - Notify physician/advanced practitioner if interventions unsuccessful or patient reports new pain  2/25/2022 1022 by Rajeev Marroquin RN  Outcome: Completed  2/25/2022 1022 by Rajeev Marroquin RN  Outcome: Adequate for Discharge     Problem: INFECTION - ADULT  Goal: Absence or prevention of progression during hospitalization  Description: INTERVENTIONS:  - Assess and monitor for signs and symptoms of infection  - Monitor lab/diagnostic results  - Monitor all insertion sites, i e  indwelling lines, tubes, and drains  - Monitor endotracheal if appropriate and nasal secretions for changes in amount and color  - [place] appropriate cooling/warming therapies per order  - Administer medications as ordered  - Instruct and encourage patient and family to use good hand hygiene technique  - Identify and instruct in appropriate isolation precautions for identified infection/condition  2/25/2022 1022 by Henri Lira RN  Outcome: Completed  2/25/2022 1022 by Henri Lira RN  Outcome: Adequate for Discharge     Problem: SAFETY ADULT  Goal: Maintain or return to baseline ADL function  Description: INTERVENTIONS:  -  Assess patient's ability to carry out ADLs; assess patient's baseline for ADL function and identify physical deficits which impact ability to perform ADLs (bathing, care of mouth/teeth, toileting, grooming, dressing, etc )  - Assess/evaluate cause of self-care deficits   - Assess range of motion  - Assess patient's mobility; develop plan if impaired  - Assess patient's need for assistive devices and provide as appropriate  - Encourage maximum independence but intervene and supervise when necessary  - Involve family in performance of ADLs  - Assess for home care needs following discharge   - Consider OT consult to assist with ADL evaluation and planning for discharge  - Provide patient education as appropriate  2/25/2022 1022 by Henri Lira RN  Outcome: Completed  2/25/2022 1022 by Henri Lira RN  Outcome: Adequate for Discharge  Goal: Maintains/Returns to pre admission functional level  Description: INTERVENTIONS:  - Perform BMAT or MOVE assessment daily    - Set and communicate daily mobility goal to care team and patient/family/caregiver  - Collaborate with rehabilitation services on mobility goals if consulted  - Perform Range of Motion 3 times a day  - Reposition patient every 2 hours    - Dangle patient 3 times a day  - Stand patient 3 times a day  - Ambulate patient 3 times a day  - Out of bed to chair 3 times a day   - Out of bed for meals 3 times a day  - Out of bed for toileting  - Record patient progress and toleration of activity level   2/25/2022 1022 by Yamileth Soliz RN  Outcome: Completed  2/25/2022 1022 by Yamileth Soliz RN  Outcome: Adequate for Discharge     Problem: DISCHARGE PLANNING  Goal: Discharge to home or other facility with appropriate resources  Description: INTERVENTIONS:  - Identify barriers to discharge w/patient and caregiver  - Arrange for needed discharge resources and transportation as appropriate  - Identify discharge learning needs (meds, wound care, etc )  - Arrange for interpretive services to assist at discharge as needed  - Refer to Case Management Department for coordinating discharge planning if the patient needs post-hospital services based on physician/advanced practitioner order or complex needs related to functional status, cognitive ability, or social support system  2/25/2022 1022 by Yamileth Soliz RN  Outcome: Completed  2/25/2022 1022 by Yamileth Soliz RN  Outcome: Adequate for Discharge     Problem: Prexisting or High Potential for Compromised Skin Integrity  Goal: Skin integrity is maintained or improved  Description: INTERVENTIONS:  - Identify patients at risk for skin breakdown  - Assess and monitor skin integrity  - Assess and monitor nutrition and hydration status  - Monitor labs   - Assess for incontinence   - Turn and reposition patient  - Assist with mobility/ambulation  - Relieve pressure over bony prominences  - Avoid friction and shearing  - Provide appropriate hygiene as needed including keeping skin clean and dry  - Evaluate need for skin moisturizer/barrier cream  - Collaborate with interdisciplinary team   - Patient/family teaching  - Consider wound care consult   2/25/2022 1022 by Yamileth Soliz RN  Outcome: Completed  2/25/2022 1022 by Yamileth Soliz RN  Outcome: Adequate for Discharge

## 2022-02-27 NOTE — CASE MANAGEMENT
Team d/c summary:    Pt returned home with her grand dtr  She will receive continued home PT and OT through  [facility]  She purchased her own walker and had a commode  Pt's granddtr present for d/c instructions and aware of functional ability

## 2022-02-28 NOTE — PROGRESS NOTES
02/28/22 1239   Hello, [Guardians Name / Jose Raul Martinez, this is [Caller Neisha Michael from [facility], and our clinical care team wanted to check on you / your child after your recent visit to the hospital  It will only take 3-5 minutes  Is this a good time? Discharge Call Type/ Specific Diagnosis: General Call   General Discharge Phone Call   Were your/your child's discharge instructions clear and understandable? Please tell me in your own words how to care for yourself/your child now that you're home Yes;Patient understood instructions   Have you filled your/your child's new prescriptions yet? Yes   What questions do you have about those medications? No questions   Are you/your child having any unusual symptoms or problems? (Specific to problem- i e , dressing, pain, bruising or swelling, procedure, etc ) No reported symptoms/problems   Do you have follow up appointment with your/your child's physician? Yes   Is there anything preventing you from keeping that appointment? No;Patient able to keep appointment   Are there any physicians, nurses, or hospital staff you would like us to recognize for doing a very good job? Nurse;PCA/Tech;PT/OT/RT/SLP  (Everyone was christina, Delfina Alexandro job!)   Thank you for taking the time to share with me about your care and recovery  Do you have any suggestions for us? No   This call resulted in: No interventions needed   Call Complete   Attempted Number of Calls 1   Discharge phone call complete? Complete   Hi, This is ________ from [facility]  This is just a courtesy call, and there is no need to call us back  Have a great day     (Called by [address])

## 2022-03-07 NOTE — PROGRESS NOTES
PT D/C SUMMARY    Pt progressed well to [place] for transfers, bed mobility and walking and curb step, but S for stairs  Pt cont to have PWB RLE and was able to maintain this with use of RW  Pt purchased a RW prior to dc home as she had previously had a RW covered through insurance in the last 5 years  Pt d/c home with family support; family was contacted and reviewed with them recommendations for A with IADLs  Pt was also able to practice carrying a small load of laundry/small garbage bag on front of RW to increase her (I) with performing small loads of laundry and emptying small garbage bags  We also recommended use of online grocery deliveries  Pt received an HEP and practiced that here prior to dc home as recommendations are to cont with outpt PT when her WBing status has been progressed  Pt demonstrated safety with use of RW  She was able to dc home with family support at this time

## 2022-03-07 NOTE — OCCUPATIONAL THERAPY NOTE
OT Discharge Summary    Pt made good progress during their stay on the [facility] following admission s/p R tibial plateau fracture  Pt progress to independent for overall ADLs and independent for functional transfers  FT was not completed prior to D/C  Family was contacted and reviewed recommendations for S with IADL management  Family to have purchased tub transfer bench  At this time, recommending home OT services and pt purchase BSC and RW for DME prior to D/C       Leia Spivey MS, OTR/L

## 2022-03-13 DIAGNOSIS — M25.061 HEMARTHROSIS OF RIGHT KNEE: Primary | ICD-10-CM

## 2022-03-18 ENCOUNTER — OFFICE VISIT (OUTPATIENT)
Dept: OBGYN CLINIC | Facility: HOSPITAL | Age: 82
End: 2022-03-18
Payer: MEDICARE

## 2022-03-18 ENCOUNTER — HOSPITAL ENCOUNTER (OUTPATIENT)
Dept: RADIOLOGY | Facility: HOSPITAL | Age: 82
Discharge: HOME | End: 2022-03-18
Attending: ORTHOPAEDIC SURGERY
Payer: MEDICARE

## 2022-03-18 VITALS
SYSTOLIC BLOOD PRESSURE: 185 MMHG | HEART RATE: 81 BPM | HEIGHT: 66 IN | BODY MASS INDEX: 21.86 KG/M2 | DIASTOLIC BLOOD PRESSURE: 78 MMHG | WEIGHT: 136 LBS

## 2022-03-18 DIAGNOSIS — M25.061 HEMARTHROSIS OF RIGHT KNEE: ICD-10-CM

## 2022-03-18 DIAGNOSIS — S82.141A CLOSED FRACTURE OF RIGHT TIBIAL PLATEAU, INITIAL ENCOUNTER: Primary | ICD-10-CM

## 2022-03-18 PROCEDURE — 99213 OFFICE O/P EST LOW 20 MIN: CPT | Performed by: ORTHOPAEDIC SURGERY

## 2022-03-18 PROCEDURE — 73560 X-RAY EXAM OF KNEE 1 OR 2: CPT

## 2022-03-18 NOTE — PROGRESS NOTES
Orthopaedics Office Visit - New Patient Visit    ASSESSMENT/PLAN:    Assessment:   Right insufficiency fracture of medial tibial plateau, unknown date of injury, asymptomatic  Right knee osteoarthritis - medial compartment narrowing at baseline    Plan: May be weight bearing as tolerated  Transition from rolling walker to cane  Continue home exercise program as tolerated, avoiding painful maneuvers  She will follow up in 6 weeks for re evaluation of symptoms    _____________________________________________________  CHIEF COMPLAINT:  Chief Complaint   Patient presents with    Right Knee - Pain         SUBJECTIVE:  Justin Arteaga is a 78 y o  female who presents today for initial visit for her right knee  Patient reports that about 6 weeks ago she developed severe right knee pain/swelling without any acute injury or trauma  Patient previously saw Dr Anamaria Gresham who aspirated bloody fluid from her knee and sent her for an MRI to rule out fracture  Patient reports that on MRI they did find a nondisplaced medial tibial plateau fracture and she was instructed to be toe-touch weight-bearing with a rolling walker and begin physical therapy  Today, she notes that she has been compliant with the toe-touch weight-bearing instructions and has been performing physical therapy all with benefit  She reports a minimal soreness about the medial aspect of the knee  Overall, she states that she has had much improvement of her right knee pain      MEDICAL HISTORY:  Past Medical History:   Diagnosis Date    Anxiety     Atrial fibrillation ([address]     Bowel obstruction (HCC)     Cancer (HCC)     LEFT BREAST CA 22 YEARS AGO     Depression     Diabetes mellitus ([address] )     Disease of thyroid gland     Hypertension     Hypothyroidism        PAST SURGICAL HISTORY:  Past Surgical History:   Procedure Laterality Date    ABDOMINAL ADHESION SURGERY N/A 2/4/2018    Procedure: LYSIS ADHESIONS;  Surgeon: Natalie Estrada DO;  [facility];  Service: General    BREAST SURGERY      CHOLECYSTECTOMY      COLON SURGERY  2017    EXPLORATORY LAPAROTOMY      JOINT REPLACEMENT      LEFT KNEE REPLACEMENT     LAPAROTOMY N/A 2/4/2018    Procedure: LAPAROTOMY EXPLORATORY,;  Surgeon: Nilesh Terrell DO;  [facility];  Service: General    MASTECTOMY      MASTECTOMY Left 1995    MASTECTOMY Right 2017    OH BIOPSY/EXCISION, LYMPH NODE(S) Right 1/13/2017    Procedure: SENTINEL LYMPH NODE BIOPSY RIGHT AXILLA;   Surgeon: Salima Gonzalez MD;  [facility];  Service: General    OH MASTECTOMY, SIMPLE, COMPLETE Right 1/13/2017    Procedure: MASTECTOMY SIMPLE;  Surgeon: Salima Gonzalez MD;  [facility];  Service: General    SMALL INTESTINE SURGERY N/A 2/4/2018    Procedure: RESECTION SMALL BOWEL;  Surgeon: Nilesh Terrell DO;  [facility];  Service: General    US GUIDED BREAST BIOPSY RIGHT COMPLETE Right 11/29/2016       FAMILY HISTORY:  Family History   Problem Relation Age of Onset    Cancer Mother     No Known Problems Father        SOCIAL HISTORY:  Social History     Tobacco Use    Smoking status: Current Every Day Smoker     Packs/day: 1 00     Types: Cigarettes    Smokeless tobacco: Never Used   Vaping Use    Vaping Use: Never used   Substance Use Topics    Alcohol use: Never    Drug use: Never       MEDICATIONS:    Current Outpatient Medications:     acetaminophen (TYLENOL) 500 mg tablet, Take 2 tablets (1,000 mg total) by mouth 3 (three) times a day as needed for mild pain, moderate pain or fever, Disp: , Rfl:     acetaminophen (TYLENOL) 500 mg tablet, Take 2 tablets (1,000 mg total) by mouth 3 (three) times a day as needed for mild pain or moderate pain, Disp: , Rfl:     amLODIPine (NORVASC) 10 mg tablet, Take 10 mg by mouth daily, Disp: , Rfl:     ascorbic acid (VITAMIN C) 500 mg tablet, Take 500 mg by mouth daily, Disp: , Rfl:     atorvastatin (LIPITOR) 40 mg tablet, Take 40 mg by mouth, Disp: , Rfl:   chlorthalidone 25 mg tablet, Take 25 mg by mouth daily, Disp: , Rfl: 3    cholecalciferol (VITAMIN D3) 1,000 units tablet, Take 2,000 Units by mouth daily , Disp: , Rfl:     cloNIDine (CATAPRES) 0 1 mg tablet, Take 1 tablet (0 1 mg total) by mouth every 12 (twelve) hours, Disp: , Rfl: 0    Eliquis 5 MG, TAKE 1 TABLET BY MOUTH TWICE A DAY, Disp: 60 tablet, Rfl: 8    labetalol (NORMODYNE) 200 mg tablet, Take 1 tablet (200 mg total) by mouth every 12 (twelve) hours, Disp: 120 tablet, Rfl: 0    levothyroxine 125 mcg tablet, Take 125 mcg by mouth daily, Disp: , Rfl:     lisinopril (ZESTRIL) 20 mg tablet, TAKE 1 TABLET (20 MG TOTAL) BY MOUTH 2 (TWO) TIMES A DAY, Disp: 180 tablet, Rfl: 3    LORazepam (ATIVAN) 0 5 mg tablet, Take 0 5 mg by mouth 2 (two) times a day, Disp: , Rfl:     metFORMIN (GLUCOPHAGE) 500 mg tablet, Take 1 tablet (500 mg total) by mouth 2 (two) times a day with meals, Disp: 60 tablet, Rfl: 0    Multiple Vitamins-Minerals (PRESERVISION AREDS 2 PO), Take by mouth 2 (two) times a day  , Disp: , Rfl:     multivitamin (THERAGRAN) TABS, Take 1 tablet by mouth daily, Disp: , Rfl:     ALLERGIES:  Allergies   Allergen Reactions    Meloxicam GI Intolerance    Morphine GI Intolerance    Morphine     Penicillins     Percocet [Oxycodone-Acetaminophen]     Sitagliptin      SOB       REVIEW OF SYSTEMS:  MSK: Right knee pain  Neuro: WNL  Pertinent items are otherwise noted in HPI  A comprehensive review of systems was otherwise negative      LABS:  HgA1c:   Lab Results   Component Value Date    HGBA1C 6 4 (H) 02/21/2022     BMP:   Lab Results   Component Value Date    GLUCOSE 126 06/03/2019    CALCIUM 9 0 02/24/2022     09/29/2015    K 3 9 02/24/2022    CO2 29 02/24/2022    CL 97 (L) 02/24/2022    BUN 27 (H) 02/24/2022    CREATININE 0 93 02/24/2022     CBC: No components found for: CBC    _____________________________________________________  PHYSICAL EXAMINATION:  Vital signs: BP (!) 185/78 Pulse 81   Ht 5' 6" (1 676 m)   Wt 61 7 kg (136 lb)   LMP 01/12/1980 (Approximate)   BMI 21 95 kg/m²   General: No acute distress, awake and alert  Psychiatric: Mood and affect appear appropriate  HEENT: Trachea Midline, No torticollis, no apparent facial trauma  Cardiovascular: No audible murmurs; Extremities appear perfused  Pulmonary: No audible wheezing or stridor  Skin: No open lesions; see further details (if any) below    MUSCULOSKELETAL EXAMINATION:  Extremities:  Right lower extremity is neurovascularly intact  Toes are pink and mobile   Compartments are soft  No warmth, erythema or ecchymosis  ROM of knee is from 0-120 degrees without pain  Negative Lachman, drawer or pivot shift  No medial instability  No joint line tenderness  _____________________________________________________  STUDIES REVIEWED:  I personally reviewed the images and interpretation is as follows:    MRI Right Knee 2/12/2022 demonstrates a nondisplaced medial tibial plateau fracture with associated bone marrow edema  Complex tear of medial meniscus  Mild patellofemoral and medial compartment osteoarthritis  X Ray Right Knee 3/18/2022 demonstrates healing/sclerosis about the medial tibial plateau with mild to moderate osteoarthritis present  No other acute osseous abnormality      PROCEDURES PERFORMED:  Procedures    None performed today    Scribe Attestation    I,:  Shay Douglas am acting as a scribe while in the presence of the attending physician :       I,:  Dung Mariee MD personally performed the services described in this documentation    as scribed in my presence :

## 2022-03-24 DIAGNOSIS — I10 HYPERTENSION, UNSPECIFIED TYPE: ICD-10-CM

## 2022-03-24 RX ORDER — CLONIDINE HYDROCHLORIDE 0.1 MG/1
TABLET ORAL
Qty: 180 TABLET | Refills: 3 | Status: SHIPPED | OUTPATIENT
Start: 2022-03-24

## 2022-04-01 NOTE — PROGRESS NOTES
Case , Kaitlyn, reached out to RSW to call patient's daughter about housing resources. RSW reached out to patient's daughter and gave daughter HANO resources and a list of elderly apartments with available waiting lists. RSW to follow up.    Deandra Stevenson, NATE     Progress Note - Acute Care Surgery   Yana Qureshi 76 y o  female MRN: [id number]  Unit/Bed#: [facility] 802-01 Encounter: [id number]    Assessment:  76 F with high grade small bowel obstruction, s/p ex-alp, SBR POD 6    CT scan w/ evidence of ileus, contrast in colon    Plan:  Continue NPO; replace NGT  Awaiting ROBF  TPN and IVF to total 84 cc/hr  Obtain U/A for persistent leukocytosis    Subjective/Objective     Subjective: NGT removed by patient this AM  Objective:    Blood pressure 142/67, pulse 97, temperature 98 3 °F (36 8 °C), temperature source Oral, resp  rate 18, height 5' 6 5" (1 689 m), weight 64 7 kg (142 lb 10 2 oz), SpO2 97 %  ,Body mass index is 22 68 kg/m²  Intake/Output Summary (Last 24 hours) at 02/10/18 1020  Last data filed at 02/10/18 0448   Gross per 24 hour   Intake          3982 17 ml   Output             2100 ml   Net          1882 17 ml       Invasive Devices     Peripherally Inserted Central Catheter Line            PICC Line 02/06/18 3 days          Drain            NG/OG/Enteral Tube Nasogastric 18 Fr Left nares 1 day                Physical Exam:   General: NAD, AAOx3  CV: RRR +S1/S2  Chest: breath sounds bilaterally   Abdomen: round, distended, tympanitic   Incision c/d/i    Extremities: atraumatic, no edema        Results from last 7 days  Lab Units 02/10/18  0449 02/09/18  1604 02/09/18  0442   WBC Thousand/uL 16 60* 15 28* 13 09*   HEMOGLOBIN g/dL 10 4* 10 7* 9 6*   HEMATOCRIT % 29 9* 30 9* 28 2*   PLATELETS Thousands/uL 260 255 216       Results from last 7 days  Lab Units 02/10/18  0449 02/09/18  1906 02/09/18  0442   SODIUM mmol/L 132* 133* 137   POTASSIUM mmol/L 4 4 4 3 4 1   CHLORIDE mmol/L 102 102 106   CO2 mmol/L 23 24 24   BUN mg/dL 18 17 11   CREATININE mg/dL 0 61 0 62 0 52*   GLUCOSE RANDOM mg/dL 184* 189* 203*   CALCIUM mg/dL 8 5 8 2* 7 6*

## 2022-04-05 ENCOUNTER — HOSPITAL ENCOUNTER (INPATIENT)
Facility: HOSPITAL | Age: 82
LOS: 1 days | Discharge: HOME | DRG: 194 | End: 2022-04-06
Attending: EMERGENCY MEDICINE | Admitting: INTERNAL MEDICINE
Payer: MEDICARE

## 2022-04-05 ENCOUNTER — APPOINTMENT (EMERGENCY)
Dept: RADIOLOGY | Facility: HOSPITAL | Age: 82
DRG: 194 | End: 2022-04-05
Payer: MEDICARE

## 2022-04-05 DIAGNOSIS — R09.82 POST-NASAL DRIP: ICD-10-CM

## 2022-04-05 DIAGNOSIS — R53.1 GENERALIZED WEAKNESS: Primary | ICD-10-CM

## 2022-04-05 DIAGNOSIS — J18.9 COMMUNITY ACQUIRED PNEUMONIA OF RIGHT MIDDLE LOBE OF LUNG: ICD-10-CM

## 2022-04-05 LAB
ALBUMIN SERPL BCP-MCNC: 3.4 G/DL (ref 3.5–5)
ALP SERPL-CCNC: 81 U/L (ref 46–116)
ALT SERPL W P-5'-P-CCNC: 27 U/L (ref 12–78)
ANION GAP SERPL CALCULATED.3IONS-SCNC: 5 MMOL/L (ref 4–13)
AST SERPL W P-5'-P-CCNC: 20 U/L (ref 5–45)
BASOPHILS # BLD AUTO: 0.02 THOUSANDS/ΜL (ref 0–0.1)
BASOPHILS NFR BLD AUTO: 0 % (ref 0–1)
BILIRUB SERPL-MCNC: 0.97 MG/DL (ref 0.2–1)
BUN SERPL-MCNC: 17 MG/DL (ref 5–25)
CALCIUM ALBUM COR SERPL-MCNC: 9 MG/DL (ref 8.3–10.1)
CALCIUM SERPL-MCNC: 8.5 MG/DL (ref 8.3–10.1)
CARDIAC TROPONIN I PNL SERPL HS: 6 NG/L
CHLORIDE SERPL-SCNC: 103 MMOL/L (ref 100–108)
CO2 SERPL-SCNC: 25 MMOL/L (ref 21–32)
CREAT SERPL-MCNC: 0.74 MG/DL (ref 0.6–1.3)
EOSINOPHIL # BLD AUTO: 0 THOUSAND/ΜL (ref 0–0.61)
EOSINOPHIL NFR BLD AUTO: 0 % (ref 0–6)
ERYTHROCYTE [DISTWIDTH] IN BLOOD BY AUTOMATED COUNT: 15.4 % (ref 11.6–15.1)
GFR SERPL CREATININE-BSD FRML MDRD: 75 ML/MIN/1.73SQ M
GLUCOSE SERPL-MCNC: 161 MG/DL (ref 65–140)
HCT VFR BLD AUTO: 32.3 % (ref 34.8–46.1)
HGB BLD-MCNC: 10.5 G/DL (ref 11.5–15.4)
IMM GRANULOCYTES # BLD AUTO: 0.05 THOUSAND/UL (ref 0–0.2)
IMM GRANULOCYTES NFR BLD AUTO: 1 % (ref 0–2)
LACTATE SERPL-SCNC: 0.9 MMOL/L (ref 0.5–2)
LIPASE SERPL-CCNC: 280 U/L (ref 73–393)
LYMPHOCYTES # BLD AUTO: 0.63 THOUSANDS/ΜL (ref 0.6–4.47)
LYMPHOCYTES NFR BLD AUTO: 9 % (ref 14–44)
MCH RBC QN AUTO: 30.3 PG (ref 26.8–34.3)
MCHC RBC AUTO-ENTMCNC: 32.5 G/DL (ref 31.4–37.4)
MCV RBC AUTO: 93 FL (ref 82–98)
MONOCYTES # BLD AUTO: 0.23 THOUSAND/ΜL (ref 0.17–1.22)
MONOCYTES NFR BLD AUTO: 3 % (ref 4–12)
NEUTROPHILS # BLD AUTO: 6.17 THOUSANDS/ΜL (ref 1.85–7.62)
NEUTS SEG NFR BLD AUTO: 87 % (ref 43–75)
NRBC BLD AUTO-RTO: 0 /100 WBCS
PLATELET # BLD AUTO: 230 THOUSANDS/UL (ref 149–390)
PMV BLD AUTO: 9.9 FL (ref 8.9–12.7)
POTASSIUM SERPL-SCNC: 3.5 MMOL/L (ref 3.5–5.3)
PROT SERPL-MCNC: 6.3 G/DL (ref 6.4–8.2)
RBC # BLD AUTO: 3.46 MILLION/UL (ref 3.81–5.12)
SODIUM SERPL-SCNC: 133 MMOL/L (ref 136–145)
WBC # BLD AUTO: 7.1 THOUSAND/UL (ref 4.31–10.16)

## 2022-04-05 PROCEDURE — 96365 THER/PROPH/DIAG IV INF INIT: CPT

## 2022-04-05 PROCEDURE — 74177 CT ABD & PELVIS W/CONTRAST: CPT

## 2022-04-05 PROCEDURE — 36415 COLL VENOUS BLD VENIPUNCTURE: CPT | Performed by: EMERGENCY MEDICINE

## 2022-04-05 PROCEDURE — 93005 ELECTROCARDIOGRAM TRACING: CPT

## 2022-04-05 PROCEDURE — 83605 ASSAY OF LACTIC ACID: CPT | Performed by: EMERGENCY MEDICINE

## 2022-04-05 PROCEDURE — 85025 COMPLETE CBC W/AUTO DIFF WBC: CPT | Performed by: EMERGENCY MEDICINE

## 2022-04-05 PROCEDURE — 80053 COMPREHEN METABOLIC PANEL: CPT | Performed by: EMERGENCY MEDICINE

## 2022-04-05 PROCEDURE — G1004 CDSM NDSC: HCPCS

## 2022-04-05 PROCEDURE — 99285 EMERGENCY DEPT VISIT HI MDM: CPT

## 2022-04-05 PROCEDURE — 83690 ASSAY OF LIPASE: CPT | Performed by: EMERGENCY MEDICINE

## 2022-04-05 PROCEDURE — 84484 ASSAY OF TROPONIN QUANT: CPT | Performed by: EMERGENCY MEDICINE

## 2022-04-05 PROCEDURE — 99285 EMERGENCY DEPT VISIT HI MDM: CPT | Performed by: EMERGENCY MEDICINE

## 2022-04-05 RX ORDER — ONDANSETRON 2 MG/ML
1 INJECTION INTRAMUSCULAR; INTRAVENOUS ONCE
Status: COMPLETED | OUTPATIENT
Start: 2022-04-05 | End: 2022-04-05

## 2022-04-05 RX ORDER — SODIUM CHLORIDE, SODIUM GLUCONATE, SODIUM ACETATE, POTASSIUM CHLORIDE, MAGNESIUM CHLORIDE, SODIUM PHOSPHATE, DIBASIC, AND POTASSIUM PHOSPHATE .53; .5; .37; .037; .03; .012; .00082 G/100ML; G/100ML; G/100ML; G/100ML; G/100ML; G/100ML; G/100ML
500 INJECTION, SOLUTION INTRAVENOUS ONCE
Status: COMPLETED | OUTPATIENT
Start: 2022-04-05 | End: 2022-04-05

## 2022-04-05 RX ADMIN — IOHEXOL 100 ML: 350 INJECTION, SOLUTION INTRAVENOUS at 22:24

## 2022-04-05 RX ADMIN — SODIUM CHLORIDE, SODIUM GLUCONATE, SODIUM ACETATE, POTASSIUM CHLORIDE, MAGNESIUM CHLORIDE, SODIUM PHOSPHATE, DIBASIC, AND POTASSIUM PHOSPHATE 500 ML: .53; .5; .37; .037; .03; .012; .00082 INJECTION, SOLUTION INTRAVENOUS at 22:41

## 2022-04-06 ENCOUNTER — APPOINTMENT (EMERGENCY)
Dept: RADIOLOGY | Facility: HOSPITAL | Age: 82
DRG: 194 | End: 2022-04-06
Payer: MEDICARE

## 2022-04-06 VITALS
TEMPERATURE: 97.4 F | SYSTOLIC BLOOD PRESSURE: 162 MMHG | RESPIRATION RATE: 18 BRPM | DIASTOLIC BLOOD PRESSURE: 78 MMHG | HEART RATE: 78 BPM | OXYGEN SATURATION: 95 %

## 2022-04-06 PROBLEM — R53.1 GENERALIZED WEAKNESS: Status: ACTIVE | Noted: 2022-04-06

## 2022-04-06 PROBLEM — R10.9 ABDOMINAL PAIN: Status: RESOLVED | Noted: 2022-04-06 | Resolved: 2022-04-06

## 2022-04-06 PROBLEM — R10.9 ABDOMINAL PAIN: Status: ACTIVE | Noted: 2022-04-06

## 2022-04-06 LAB
ALBUMIN SERPL BCP-MCNC: 3.8 G/DL (ref 3.5–5)
ALP SERPL-CCNC: 82 U/L (ref 46–116)
ALT SERPL W P-5'-P-CCNC: 28 U/L (ref 12–78)
ANION GAP SERPL CALCULATED.3IONS-SCNC: 5 MMOL/L (ref 4–13)
AST SERPL W P-5'-P-CCNC: 19 U/L (ref 5–45)
ATRIAL RATE: 78 BPM
BACTERIA UR QL AUTO: NORMAL /HPF
BASOPHILS # BLD AUTO: 0.03 THOUSANDS/ΜL (ref 0–0.1)
BASOPHILS NFR BLD AUTO: 0 % (ref 0–1)
BILIRUB SERPL-MCNC: 1.02 MG/DL (ref 0.2–1)
BILIRUB UR QL STRIP: NEGATIVE
BUN SERPL-MCNC: 16 MG/DL (ref 5–25)
CALCIUM SERPL-MCNC: 8.9 MG/DL (ref 8.3–10.1)
CHLORIDE SERPL-SCNC: 100 MMOL/L (ref 100–108)
CLARITY UR: CLEAR
CO2 SERPL-SCNC: 27 MMOL/L (ref 21–32)
COLOR UR: YELLOW
CREAT SERPL-MCNC: 0.81 MG/DL (ref 0.6–1.3)
EOSINOPHIL # BLD AUTO: 0.01 THOUSAND/ΜL (ref 0–0.61)
EOSINOPHIL NFR BLD AUTO: 0 % (ref 0–6)
ERYTHROCYTE [DISTWIDTH] IN BLOOD BY AUTOMATED COUNT: 15.3 % (ref 11.6–15.1)
EST. AVERAGE GLUCOSE BLD GHB EST-MCNC: 126 MG/DL
FLUAV RNA RESP QL NAA+PROBE: NEGATIVE
FLUBV RNA RESP QL NAA+PROBE: NEGATIVE
GFR SERPL CREATININE-BSD FRML MDRD: 67 ML/MIN/1.73SQ M
GLUCOSE SERPL-MCNC: 118 MG/DL (ref 65–140)
GLUCOSE SERPL-MCNC: 166 MG/DL (ref 65–140)
GLUCOSE UR STRIP-MCNC: NEGATIVE MG/DL
HBA1C MFR BLD: 6 %
HCT VFR BLD AUTO: 32.4 % (ref 34.8–46.1)
HGB BLD-MCNC: 10.7 G/DL (ref 11.5–15.4)
HGB UR QL STRIP.AUTO: NEGATIVE
IMM GRANULOCYTES # BLD AUTO: 0.04 THOUSAND/UL (ref 0–0.2)
IMM GRANULOCYTES NFR BLD AUTO: 1 % (ref 0–2)
KETONES UR STRIP-MCNC: NEGATIVE MG/DL
L PNEUMO1 AG UR QL IA.RAPID: NEGATIVE
LEUKOCYTE ESTERASE UR QL STRIP: NEGATIVE
LYMPHOCYTES # BLD AUTO: 1.23 THOUSANDS/ΜL (ref 0.6–4.47)
LYMPHOCYTES NFR BLD AUTO: 17 % (ref 14–44)
MAGNESIUM SERPL-MCNC: 2 MG/DL (ref 1.6–2.6)
MCH RBC QN AUTO: 30.6 PG (ref 26.8–34.3)
MCHC RBC AUTO-ENTMCNC: 33 G/DL (ref 31.4–37.4)
MCV RBC AUTO: 93 FL (ref 82–98)
MONOCYTES # BLD AUTO: 0.81 THOUSAND/ΜL (ref 0.17–1.22)
MONOCYTES NFR BLD AUTO: 11 % (ref 4–12)
NEUTROPHILS # BLD AUTO: 5.19 THOUSANDS/ΜL (ref 1.85–7.62)
NEUTS SEG NFR BLD AUTO: 71 % (ref 43–75)
NITRITE UR QL STRIP: NEGATIVE
NON-SQ EPI CELLS URNS QL MICRO: NORMAL /HPF
NRBC BLD AUTO-RTO: 0 /100 WBCS
P AXIS: 98 DEGREES
PH UR STRIP.AUTO: 7 [PH] (ref 4.5–8)
PHOSPHATE SERPL-MCNC: 3.4 MG/DL (ref 2.3–4.1)
PLATELET # BLD AUTO: 239 THOUSANDS/UL (ref 149–390)
PMV BLD AUTO: 10.4 FL (ref 8.9–12.7)
POTASSIUM SERPL-SCNC: 3.6 MMOL/L (ref 3.5–5.3)
PROCALCITONIN SERPL-MCNC: <0.05 NG/ML
PROT SERPL-MCNC: 6.5 G/DL (ref 6.4–8.2)
PROT UR STRIP-MCNC: ABNORMAL MG/DL
QRS AXIS: -39 DEGREES
QRSD INTERVAL: 90 MS
QT INTERVAL: 402 MS
QTC INTERVAL: 466 MS
RBC # BLD AUTO: 3.5 MILLION/UL (ref 3.81–5.12)
RBC #/AREA URNS AUTO: NORMAL /HPF
RSV RNA RESP QL NAA+PROBE: NEGATIVE
S PNEUM AG UR QL: NEGATIVE
SARS-COV-2 RNA RESP QL NAA+PROBE: NEGATIVE
SODIUM SERPL-SCNC: 132 MMOL/L (ref 136–145)
SP GR UR STRIP.AUTO: 1.01 (ref 1–1.03)
T WAVE AXIS: 71 DEGREES
T4 FREE SERPL-MCNC: 1.48 NG/DL (ref 0.76–1.46)
TSH SERPL DL<=0.05 MIU/L-ACNC: 0.2 UIU/ML (ref 0.45–4.5)
UROBILINOGEN UR QL STRIP.AUTO: 1 E.U./DL
VENTRICULAR RATE: 81 BPM
WBC # BLD AUTO: 7.31 THOUSAND/UL (ref 4.31–10.16)
WBC #/AREA URNS AUTO: NORMAL /HPF

## 2022-04-06 PROCEDURE — 82948 REAGENT STRIP/BLOOD GLUCOSE: CPT

## 2022-04-06 PROCEDURE — 84439 ASSAY OF FREE THYROXINE: CPT | Performed by: PHYSICIAN ASSISTANT

## 2022-04-06 PROCEDURE — 0241U HB NFCT DS VIR RESP RNA 4 TRGT: CPT | Performed by: EMERGENCY MEDICINE

## 2022-04-06 PROCEDURE — 87449 NOS EACH ORGANISM AG IA: CPT | Performed by: INTERNAL MEDICINE

## 2022-04-06 PROCEDURE — 84100 ASSAY OF PHOSPHORUS: CPT | Performed by: INTERNAL MEDICINE

## 2022-04-06 PROCEDURE — 80053 COMPREHEN METABOLIC PANEL: CPT | Performed by: INTERNAL MEDICINE

## 2022-04-06 PROCEDURE — 85025 COMPLETE CBC W/AUTO DIFF WBC: CPT | Performed by: INTERNAL MEDICINE

## 2022-04-06 PROCEDURE — 99223 1ST HOSP IP/OBS HIGH 75: CPT | Performed by: INTERNAL MEDICINE

## 2022-04-06 PROCEDURE — 83735 ASSAY OF MAGNESIUM: CPT | Performed by: INTERNAL MEDICINE

## 2022-04-06 PROCEDURE — 99238 HOSP IP/OBS DSCHRG MGMT 30/<: CPT | Performed by: PHYSICIAN ASSISTANT

## 2022-04-06 PROCEDURE — 84145 PROCALCITONIN (PCT): CPT | Performed by: INTERNAL MEDICINE

## 2022-04-06 PROCEDURE — 87040 BLOOD CULTURE FOR BACTERIA: CPT | Performed by: INTERNAL MEDICINE

## 2022-04-06 PROCEDURE — 81001 URINALYSIS AUTO W/SCOPE: CPT

## 2022-04-06 PROCEDURE — 71045 X-RAY EXAM CHEST 1 VIEW: CPT

## 2022-04-06 PROCEDURE — 84443 ASSAY THYROID STIM HORMONE: CPT | Performed by: INTERNAL MEDICINE

## 2022-04-06 PROCEDURE — 99204 OFFICE O/P NEW MOD 45 MIN: CPT | Performed by: INTERNAL MEDICINE

## 2022-04-06 PROCEDURE — 83036 HEMOGLOBIN GLYCOSYLATED A1C: CPT | Performed by: INTERNAL MEDICINE

## 2022-04-06 PROCEDURE — 36415 COLL VENOUS BLD VENIPUNCTURE: CPT | Performed by: INTERNAL MEDICINE

## 2022-04-06 PROCEDURE — 93010 ELECTROCARDIOGRAM REPORT: CPT | Performed by: INTERNAL MEDICINE

## 2022-04-06 RX ORDER — AZELASTINE 1 MG/ML
1 SPRAY, METERED NASAL 2 TIMES DAILY
Status: DISCONTINUED | OUTPATIENT
Start: 2022-04-06 | End: 2022-04-06 | Stop reason: HOSPADM

## 2022-04-06 RX ORDER — DOCUSATE SODIUM 100 MG/1
100 CAPSULE, LIQUID FILLED ORAL 2 TIMES DAILY PRN
Status: DISCONTINUED | OUTPATIENT
Start: 2022-04-06 | End: 2022-04-06 | Stop reason: HOSPADM

## 2022-04-06 RX ORDER — LABETALOL 200 MG/1
200 TABLET, FILM COATED ORAL EVERY 12 HOURS SCHEDULED
Status: DISCONTINUED | OUTPATIENT
Start: 2022-04-06 | End: 2022-04-06 | Stop reason: HOSPADM

## 2022-04-06 RX ORDER — ALBUTEROL SULFATE 90 UG/1
2 AEROSOL, METERED RESPIRATORY (INHALATION) EVERY 4 HOURS PRN
Status: DISCONTINUED | OUTPATIENT
Start: 2022-04-06 | End: 2022-04-06 | Stop reason: HOSPADM

## 2022-04-06 RX ORDER — CLONIDINE HYDROCHLORIDE 0.1 MG/1
0.1 TABLET ORAL 2 TIMES DAILY
Status: DISCONTINUED | OUTPATIENT
Start: 2022-04-06 | End: 2022-04-06 | Stop reason: HOSPADM

## 2022-04-06 RX ORDER — MAGNESIUM HYDROXIDE/ALUMINUM HYDROXICE/SIMETHICONE 120; 1200; 1200 MG/30ML; MG/30ML; MG/30ML
30 SUSPENSION ORAL EVERY 6 HOURS PRN
Status: DISCONTINUED | OUTPATIENT
Start: 2022-04-06 | End: 2022-04-06 | Stop reason: HOSPADM

## 2022-04-06 RX ORDER — CHLORTHALIDONE 25 MG/1
25 TABLET ORAL DAILY
Status: DISCONTINUED | OUTPATIENT
Start: 2022-04-06 | End: 2022-04-06 | Stop reason: HOSPADM

## 2022-04-06 RX ORDER — ASCORBIC ACID 500 MG
500 TABLET ORAL DAILY
Status: DISCONTINUED | OUTPATIENT
Start: 2022-04-06 | End: 2022-04-06 | Stop reason: HOSPADM

## 2022-04-06 RX ORDER — LEVOTHYROXINE SODIUM 0.12 MG/1
125 TABLET ORAL
Status: DISCONTINUED | OUTPATIENT
Start: 2022-04-06 | End: 2022-04-06 | Stop reason: HOSPADM

## 2022-04-06 RX ORDER — ONDANSETRON 2 MG/ML
4 INJECTION INTRAMUSCULAR; INTRAVENOUS EVERY 6 HOURS PRN
Status: DISCONTINUED | OUTPATIENT
Start: 2022-04-06 | End: 2022-04-06 | Stop reason: HOSPADM

## 2022-04-06 RX ORDER — NICOTINE 21 MG/24HR
1 PATCH, TRANSDERMAL 24 HOURS TRANSDERMAL DAILY
Status: DISCONTINUED | OUTPATIENT
Start: 2022-04-06 | End: 2022-04-06 | Stop reason: HOSPADM

## 2022-04-06 RX ORDER — AZELASTINE 1 MG/ML
1 SPRAY, METERED NASAL 2 TIMES DAILY
Qty: 30 ML | Refills: 0 | Status: SHIPPED | OUTPATIENT
Start: 2022-04-06 | End: 2022-05-06

## 2022-04-06 RX ORDER — MELATONIN
2000 DAILY
Status: DISCONTINUED | OUTPATIENT
Start: 2022-04-06 | End: 2022-04-06 | Stop reason: HOSPADM

## 2022-04-06 RX ORDER — ACETAMINOPHEN 325 MG/1
650 TABLET ORAL EVERY 6 HOURS PRN
Status: DISCONTINUED | OUTPATIENT
Start: 2022-04-06 | End: 2022-04-06 | Stop reason: HOSPADM

## 2022-04-06 RX ORDER — LORAZEPAM 0.5 MG/1
0.5 TABLET ORAL 2 TIMES DAILY
Status: DISCONTINUED | OUTPATIENT
Start: 2022-04-06 | End: 2022-04-06 | Stop reason: HOSPADM

## 2022-04-06 RX ORDER — AMLODIPINE BESYLATE 5 MG/1
10 TABLET ORAL DAILY
Status: DISCONTINUED | OUTPATIENT
Start: 2022-04-06 | End: 2022-04-06 | Stop reason: HOSPADM

## 2022-04-06 RX ORDER — AZITHROMYCIN 500 MG/1
500 TABLET, FILM COATED ORAL EVERY 24 HOURS
Status: DISCONTINUED | OUTPATIENT
Start: 2022-04-06 | End: 2022-04-06

## 2022-04-06 RX ORDER — LISINOPRIL 20 MG/1
20 TABLET ORAL 2 TIMES DAILY
Status: DISCONTINUED | OUTPATIENT
Start: 2022-04-06 | End: 2022-04-06 | Stop reason: HOSPADM

## 2022-04-06 RX ORDER — ATORVASTATIN CALCIUM 40 MG/1
40 TABLET, FILM COATED ORAL
Status: DISCONTINUED | OUTPATIENT
Start: 2022-04-06 | End: 2022-04-06 | Stop reason: HOSPADM

## 2022-04-06 RX ADMIN — LISINOPRIL 20 MG: 20 TABLET ORAL at 09:49

## 2022-04-06 RX ADMIN — OXYCODONE HYDROCHLORIDE AND ACETAMINOPHEN 500 MG: 500 TABLET ORAL at 09:03

## 2022-04-06 RX ADMIN — AMLODIPINE BESYLATE 10 MG: 5 TABLET ORAL at 09:03

## 2022-04-06 RX ADMIN — CHLORTHALIDONE 25 MG: 25 TABLET ORAL at 09:49

## 2022-04-06 RX ADMIN — LEVOTHYROXINE SODIUM 125 MCG: 125 TABLET ORAL at 06:30

## 2022-04-06 RX ADMIN — LORAZEPAM 0.5 MG: 0.5 TABLET ORAL at 09:03

## 2022-04-06 RX ADMIN — Medication 2000 UNITS: at 09:03

## 2022-04-06 RX ADMIN — LABETALOL HYDROCHLORIDE 200 MG: 200 TABLET, FILM COATED ORAL at 09:49

## 2022-04-06 RX ADMIN — Medication 1 TABLET: at 09:03

## 2022-04-06 RX ADMIN — CLONIDINE HYDROCHLORIDE 0.1 MG: 0.1 TABLET ORAL at 09:03

## 2022-04-06 RX ADMIN — AZITHROMYCIN 500 MG: 500 TABLET, FILM COATED ORAL at 03:55

## 2022-04-06 RX ADMIN — INSULIN LISPRO 1 UNITS: 100 INJECTION, SOLUTION INTRAVENOUS; SUBCUTANEOUS at 06:42

## 2022-04-06 RX ADMIN — APIXABAN 5 MG: 5 TABLET, FILM COATED ORAL at 09:06

## 2022-04-06 RX ADMIN — CEFTRIAXONE SODIUM 1000 MG: 10 INJECTION, POWDER, FOR SOLUTION INTRAVENOUS at 03:58

## 2022-04-06 NOTE — ASSESSMENT & PLAN NOTE
When compared to previous x-ray; there is new developing infiltrate in the right middle lobe  Will start Rocephin with azithromycin  Patient is an active smoker; questionable structural changes of the lung  Will consult Pulmonary  Although lactic acid normal at 0 9; will get procalcitonin  Pneumonia protocol; include pneumonia studies and blood culture  Follow CBC with metabolic profile

## 2022-04-06 NOTE — H&P
[address] [facility] [place] 1940, 78 y o  female MRN: [id number]  Unit/Bed#: ED 08 Encounter: [id number]  Primary Care Provider: Camryn Watters DO   Date and time admitted to hospital: 4/5/2022  8:29 PM    Community acquired pneumonia of right middle lobe of lung  Assessment & Plan  When compared to previous x-ray; there is new developing infiltrate in the right middle lobe  Will start Rocephin with azithromycin  Patient is an active smoker; questionable structural changes of the lung  Will consult Pulmonary  Although lactic acid normal at 0 9; will get procalcitonin  Pneumonia protocol; include pneumonia studies and blood culture  Follow CBC with metabolic profile  Hypothyroidism  Assessment & Plan  Levothyroxine 125 mcg daily  Essential hypertension  Assessment & Plan  On Zestril 20 mg twice daily  Labetalol 1200 mg q 12  Clonidine 0 1 mg twice daily  Chlorthalidone 25 mg daily  Diabetes mellitus type 2  Assessment & Plan  Lab Results   Component Value Date    HGBA1C 6 4 (H) 02/21/2022     Continue Humalog sliding scale and Accu-Cheks per protocol  No results for input(s): POCGLU in the last 72 hours  Blood Sugar Average: Last 72 hrs:      Gastroesophageal reflux disease without esophagitis  Assessment & Plan  Continue Mylanta as needed          VTE Prophylaxis: Enoxaparin (Lovenox)  / sequential compression device   Code Status: Level 1 - Full Code as discussed with patient  POLST: There is no POLST form on file for this patient (pre-hospital)    Anticipated Length of Stay:  Patient will be admitted on an Inpatient basis with an anticipated length of stay of  greater than 2 midnights  Justification for Hospital Stay: Please see detailed plans noted above      Chief Complaint:     Feeling generalized weakness and initially with dry heaving  History of Present Illness:  Jose Benoit is a 78 y o  female who has past medical history significant for breast cancer, vitamin-D deficiency, active smoker of 1 pack per day, macular degeneration, diabetes mellitus type 2 on metformin, depression, CKD stage stage III, essential hypertension and hypothyroidism  Patient comes in of generalized weakness when she woke up this morning she felt unwell, ate breakfast and since then has been dry heaving with mild abdominal discomfort  She came to the emergency room and was feeling much better after given some Zofran  The CT scan of the abdomen did not reveal anything acute  However she continues to be very worn out  She reports that a few years ago after she had trauma to her face after a fall, she has been having postnasal drip  She does not complain of any fevers or chills  A chest x-ray was taken this evening which showed right middle lobe infiltrate  The patient was therefore started on Rocephin with azithromycin  Currently, patient is lying flat on bed and seems to be comfortable  Review of Systems:    Constitutional:  Denies fever or chills   Eyes:  Denies change in visual acuity   HENT:  Denies nasal congestion or sore throat   Respiratory:  Patient has occasional cough but without shortness of breath      Cardiovascular:  Denies chest pain or edema   GI:  Denies abdominal pain currently, initially with nausea, dry heaving, vomiting, bloody stools or diarrhea   :  Denies dysuria   Musculoskeletal:  Denies back pain or joint pain   Integument:  Denies rash   Neurologic:  Denies headache, focal weakness or sensory changes   Endocrine:  Denies polyuria or polydipsia   Lymphatic:  Denies swollen glands   Psychiatric:  Denies depression or anxiety     Past Medical and Surgical History:   Past Medical History:   Diagnosis Date    Anxiety     Atrial fibrillation (HCC)     Bowel obstruction (HCC)     Cancer ([facility] )     LEFT BREAST CA 22 YEARS AGO     Depression     Diabetes mellitus [facility]     Disease of thyroid gland     Hypertension     Hypothyroidism      Past Surgical History:   Procedure Laterality Date    ABDOMINAL ADHESION SURGERY N/A 2/4/2018    Procedure: LYSIS ADHESIONS;  Surgeon: Phill Duke DO;  [facility];  Service: General    BREAST SURGERY      CHOLECYSTECTOMY      COLON SURGERY  2017    EXPLORATORY LAPAROTOMY      JOINT REPLACEMENT      LEFT KNEE REPLACEMENT     LAPAROTOMY N/A 2/4/2018    Procedure: LAPAROTOMY EXPLORATORY,;  Surgeon: Phill Duke DO;  [facility];  Service: General    MASTECTOMY      MASTECTOMY Left 1995    MASTECTOMY Right 2017    MO BIOPSY/EXCISION, LYMPH NODE(S) Right 1/13/2017    Procedure: SENTINEL LYMPH NODE BIOPSY RIGHT AXILLA; Surgeon: Raul Marx MD;  [facility];  Service: General    MO MASTECTOMY, SIMPLE, COMPLETE Right 1/13/2017    Procedure: MASTECTOMY SIMPLE;  Surgeon: Raul Marx MD;  [facility];  Service: General    SMALL INTESTINE SURGERY N/A 2/4/2018    Procedure: RESECTION SMALL BOWEL;  Surgeon: Phill Duke DO;  [facility];  Service: General    US GUIDED BREAST BIOPSY RIGHT COMPLETE Right 11/29/2016       Meds/Allergies:  (Not in a hospital admission)      Allergies: Allergies   Allergen Reactions    Meloxicam GI Intolerance    Morphine GI Intolerance    Morphine     Penicillins     Percocet [Oxycodone-Acetaminophen]     Sitagliptin      SOB     History:  Marital Status:     Occupation:   Patient Pre-hospital Living Situation:  Lives at home  Patient Pre-hospital Level of Mobility:ambulatory  Patient Pre-hospital Diet Restrictions:  Diabetic  Substance Use History:   Social History     Substance and Sexual Activity   Alcohol Use Never     Social History     Tobacco Use   Smoking Status Current Every Day Smoker    Packs/day: 1 00    Types: Cigarettes   Smokeless Tobacco Never Used     Social History     Substance and Sexual Activity   Drug Use Never       Family History:  Family History   Problem Relation Age of Onset    Cancer Mother     No Known Problems Father        Physical Exam:     Vitals:   Blood Pressure: 163/71 (04/06/22 0230)  Pulse: 78 (04/06/22 0230)  Temperature: (!) 97 4 °F (36 3 °C) (04/05/22 2038)  Temp Source: Oral (04/05/22 2038)  Respirations: 18 (04/06/22 0230)  SpO2: 95 % (04/06/22 0230) saturations on room air stayed approximately 92 to 93%    Constitutional:  Well developed, well nourished, no acute distress, non-toxic appearance   Eyes:  PERRL, conjunctiva normal   HENT:  Atraumatic, external ears normal, nose normal, oropharynx moist, no pharyngeal exudates  Neck- normal range of motion, no tenderness, supple   Respiratory:  No respiratory distress bronchial breath sounds with bilateral wheezes and crackles more so on the right rather than the left especially on the right middle lobe and bilateral bases  Cardiovascular:  Normal rate, normal rhythm, no murmurs, no gallops, no rubs   GI:  Soft, nondistended, normal bowel sounds, nontender, no organomegaly, no mass, no rebound, no guarding   :  No costovertebral angle tenderness   Musculoskeletal:  No edema, no tenderness, no deformities  Back- no tenderness  Integument:  Well hydrated, no rash   Lymphatic:  No lymphadenopathy noted   Neurologic:  Alert &awake, communicative, CN 2-12 normal, normal motor function, normal sensory function, no focal deficits noted   Psychiatric:  Speech and behavior appropriate       Lab Results: I have personally reviewed pertinent reports        Results from last 7 days   Lab Units 04/05/22  2134   WBC Thousand/uL 7 10   HEMOGLOBIN g/dL 10 5*   HEMATOCRIT % 32 3*   PLATELETS Thousands/uL 230   NEUTROS PCT % 87*   LYMPHS PCT % 9*   MONOS PCT % 3*   EOS PCT % 0     Results from last 7 days   Lab Units 04/05/22  2134   POTASSIUM mmol/L 3 5   CHLORIDE mmol/L 103   CO2 mmol/L 25   BUN mg/dL 17   CREATININE mg/dL 0 74   CALCIUM mg/dL 8 5   ALK PHOS U/L 81   ALT U/L 27   AST U/L 20               Imaging: I have personally reviewed pertinent reports  XR knee 1 or 2 vw right    Result Date: 3/23/2022  Narrative: RIGHT KNEE INDICATION:   Follow-up medial tibial plateau fracture with hemarthrosis  COMPARISON:  9/18/2019, 2/12/2022 VIEWS:  XR KNEE 1 OR 2 VW RIGHT FINDINGS: There is subchondral sclerosis in the medial tibial plateau consistent with healing fracture as seen on MRI  There is a moderate joint effusion consistent with hemarthrosis seen on MRI  This is similar in size  There is narrowing of the medial compartment  No lytic or blastic osseous lesion  Soft tissues are unremarkable  Impression: Healing medial tibial plateau fracture with sclerosis with stable hemarthrosis  Workstation performed: [id number]     CT abdomen pelvis with contrast    Result Date: 4/6/2022  Narrative: CT ABDOMEN AND PELVIS WITH IV CONTRAST INDICATION:   Abdominal pain, acute, nonlocalized nausea, abdominal distension, Hx SBO  COMPARISON:  None  TECHNIQUE:  CT examination of the abdomen and pelvis was performed  Axial, sagittal, and coronal 2D reformatted images were created from the source data and submitted for interpretation  Radiation dose length product (DLP) for this visit:  304 54 mGy-cm   This examination, like all CT scans performed in the [facility], was performed utilizing techniques to minimize radiation dose exposure, including the use of iterative  reconstruction and automated exposure control  IV Contrast:  100 mL of iohexol (OMNIPAQUE) Enteric Contrast:  Enteric contrast was not administered  FINDINGS: ABDOMEN LOWER CHEST:  Mild bibasilar atelectasis  LIVER/BILIARY TREE:  Unremarkable  GALLBLADDER:  Gallbladder is surgically absent  SPLEEN:  Unremarkable  PANCREAS:  Unremarkable  ADRENAL GLANDS:  Stable 2 cm right adrenal nodule, probably an adenoma  Unremarkable left adrenal gland  KIDNEYS/URETERS:  No hydronephrosis  Bilateral renal cysts and renal hypodensities too small to characterize  STOMACH AND BOWEL:  Postsurgical changes in the right lower quadrant from prior small bowel resection and anastomosis  Moderate fecal retention in the transverse colon  Relatively decompressed ascending and descending colon  No bowel obstruction  APPENDIX:  No findings to suggest appendicitis  ABDOMINOPELVIC CAVITY:  No ascites  No pneumoperitoneum  No lymphadenopathy  VESSELS:  Atherosclerotic changes are present  Surgical changes of prior aortobiiliac bypass graft  PELVIS REPRODUCTIVE ORGANS:  Calcified uterine fibroids  URINARY BLADDER:  Unremarkable  ABDOMINAL WALL/INGUINAL REGIONS:  Postsurgical changes in the ventral wall  Diastases recti and laxity of the ventral abdominal musculature  OSSEOUS STRUCTURES:  No acute fracture or destructive osseous lesion  Stable appearance of chronic severe compression deformity of T12  Impression: No acute findings in the abdomen or pelvis  Workstation performed: [id number]         ** Please Note: Dragon 360 Dictation voice to text software was used in the creation of this document   **

## 2022-04-06 NOTE — DISCHARGE SUMMARY
[facility]  Discharge- Jonn Crest 1940, 78 y o  female MRN: [id number]  Unit/Bed#: ED 08 Encounter: [id number]  Primary Care Provider: Wilmer Bryant DO   Date and time admitted to hospital: 4/5/2022  8:29 PM    * Generalized weakness  Assessment & Plan  · Patient presented due to generalized weakness, abdominal pain and nausea   · CT abdomen and pelvis unremarkable  · Concern for new developing infiltrate in the right middle lobe on CXR compared to prior   · Afebrile, no leukocytosis  Only respiratory symptom is PND chronic cough    · Rocephin with azithromycin started on admission however given negative procal without infectious signs/symptoms, subsequently discontinued   · Evaluated by Pulmonary, appreciate recommendations  · Added azelastine nasal spray  · Agreed with discontinuation of antibiotics given no clear evidence of pneumonia  · TSH low, free T4   · Patient declined PT/OT evaluations stating that generalized weakness has improved, will place ambulatory referral at discharge    Abdominal pain-resolved as of 4/6/2022  Assessment & Plan  · Presented due to abdominal pain with dry heaving   · CT AP on admission unremarkable   · Supportive cares     Iron deficiency anemia due to chronic blood loss  Assessment & Plan  · Hemoglobin stable within baseline of approximately 9-11   · Monitor as needed     Gastroesophageal reflux disease without esophagitis  Assessment & Plan  · Continue Mylanta as needed    Chronic hyponatremia  Assessment & Plan  · Na 132 which appears to be within baseline  · Follows with nephrology outpatient      CKD (chronic kidney disease) stage 3, GFR 30-59 ml/min [facility])  Assessment & Plan  Lab Results   Component Value Date    EGFR 67 04/06/2022    EGFR 75 04/05/2022    EGFR 57 02/24/2022    CREATININE 0 81 04/06/2022    CREATININE 0 74 04/05/2022    CREATININE 0 93 02/24/2022     · Renal function stable within basline   · Avoid nephrotoxins and hypotension   · BMP with morning labs     Hypothyroidism  Assessment & Plan  · On Levothyroxine 125 mcg daily  · TSH low, free T4    Essential hypertension  Assessment & Plan  · BP elevated on admission, monitor routinely   · Continue home medical management    Type 2 diabetes mellitus, without long-term current use of insulin [facility])  Assessment & Plan  Lab Results   Component Value Date    HGBA1C 6 0 (H) 04/06/2022       Recent Labs     04/06/22  0634   POCGLU 166*       Blood Sugar Average: Last 72 hrs:  (P) 166     · Hole metformin while inpatient   · QID Accu-Cheks with SSI   · Diabetic diet   · Hypoglycemia protocol     Paroxysmal atrial fibrillation ([facility] [address] )  Assessment & Plan  · Continue with home dose metoprolol   · Continue with Eliquis for [facility]       Medical Problems             Resolved Problems  Date Reviewed: 4/6/2022          Resolved    Abdominal pain 4/6/2022     Resolved by  Priti Lundberg PA-C              Discharging Physician / Practitioner: Priti Lundberg PA-C  PCP: Inga Dumont DO  Admission Date:   Admission Orders (From admission, onward)     Ordered        04/06/22 0302  Inpatient Admission  Once                      Discharge Date: 04/06/22    Consultations During Hospital Stay:  · Pulmonary    Procedures Performed:   · None    Significant Findings / Test Results:   · Outlined above    Incidental Findings:   · None     Test Results Pending at Discharge (will require follow up): · None     Outpatient Tests Requested:  · Follow-up with PCP    Complications:  None    Reason for Admission:  Generalized weakness, abdominal pain    Hospital Course:   Trang Dykes is a 78 y o  female patient who originally presented to the hospital on 4/5/2022 due to abdominal pain associated with dry heaving as well as generalized weakness and malaise  Patient's abdominal pain and nausea improved after receiving Zofran in the ED    Infectious workup including CT abdomen and pelvis and chest x-ray were negative, as outlined above  Due to concern for pneumonia on chest x-ray, pulmonary was consulted  They agree with discontinuation of antibiotics, given no clear evidence of pneumonia at this time with lack of respiratory symptoms  Patient was started on azelastine nasal spay BID to assist with PND and chronic cough  Otherwise no changes to patient's medication regimen were made at this time  Patient had reported resolution in the generalized weakness that she had felt, she was able to ambulate to the bathroom with cane and had steady gait per ED RN  Patient requesting to be discharged home  Will place ambulatory referral for PT/OT evaluations  Patient was advised to schedule follow-up appoint with her PCP as soon as possible, she expressed understanding and is in agreement with this plan of care  Please see above list of diagnoses and related plan for additional information  Condition at Discharge: good    Discharge Day Visit / Exam:   Subjective:  Patient reports feeling better this morning  Denies abdominal pain or nausea  States the weakness she felt yesterday  Has resolved and she just feels tired  Ambulated to the bathroom without assistance or difficulty  She would like to go home and says she doesn't think she needs any therapy, advised I would still place referrals  Vitals: Blood Pressure: 162/78 (04/06/22 0903)  Pulse: 78 (04/06/22 0949)  Temperature: (!) 97 4 °F (36 3 °C) (04/05/22 2038)  Temp Source: Oral (04/05/22 2038)  Respirations: 18 (04/06/22 0636)  SpO2: 95 % (04/06/22 0636)  Exam:   Physical Exam  Vitals reviewed  Constitutional:       General: She is not in acute distress  Cardiovascular:      Rate and Rhythm: Normal rate and regular rhythm  Heart sounds: No murmur heard  Pulmonary:      Effort: Pulmonary effort is normal  No respiratory distress  Breath sounds: Decreased breath sounds present  No wheezing, rhonchi or rales     Abdominal:      General: Abdomen is flat  There is no distension  Palpations: Abdomen is soft  Tenderness: There is no abdominal tenderness  Musculoskeletal:      Right lower leg: No edema  Left lower leg: No edema  Skin:     General: Skin is warm and dry  Coloration: Skin is not pale  Findings: No erythema  Neurological:      General: No focal deficit present  Mental Status: She is alert and oriented to person, place, and time  Mental status is at baseline  Discussion with Family: Patient declined call to   Discharge instructions/Information to patient and family:   See after visit summary for information provided to patient and family  Provisions for Follow-Up Care:  See after visit summary for information related to follow-up care and any pertinent home health orders  Disposition:   Home    Planned Readmission: no     Discharge Statement:  I spent 20 minutes discharging the patient  This time was spent on the day of discharge  I had direct contact with the patient on the day of discharge  Greater than 50% of the total time was spent examining patient, answering all patient questions, arranging and discussing plan of care with patient as well as directly providing post-discharge instructions  Additional time then spent on discharge activities  Discharge Medications:  See after visit summary for reconciled discharge medications provided to patient and/or family        **Please Note: This note may have been constructed using a voice recognition system**

## 2022-04-06 NOTE — ASSESSMENT & PLAN NOTE
Lab Results   Component Value Date    HGBA1C 6 0 (H) 04/06/2022       Recent Labs     04/06/22  0634   POCGLU 166*       Blood Sugar Average: Last 72 hrs:  (P) 166     · Hole metformin while inpatient   · QID Accu-Cheks with SSI   · Diabetic diet   · Hypoglycemia protocol

## 2022-04-06 NOTE — ASSESSMENT & PLAN NOTE
On Zestril 20 mg twice daily  Labetalol 1200 mg q 12  Clonidine 0 1 mg twice daily  Chlorthalidone 25 mg daily

## 2022-04-06 NOTE — ASSESSMENT & PLAN NOTE
· Patient presented due to generalized weakness, abdominal pain and nausea   · CT abdomen and pelvis unremarkable  · Concern for new developing infiltrate in the right middle lobe on CXR compared to prior   · Afebrile, no leukocytosis  Only respiratory symptom is PND chronic cough    · Rocephin with azithromycin started on admission however given negative procal without infectious signs/symptoms, subsequently discontinued   · Evaluated by Pulmonary, appreciate recommendations  · Added azelastine nasal spray  · Agreed with discontinuation of antibiotics given no clear evidence of pneumonia  · TSH low, free T4   · Patient declined PT/OT evaluations stating that generalized weakness has improved, will place ambulatory referral at discharge

## 2022-04-06 NOTE — ED NOTES
Per BRIT, pt being admitted for PT/OT eval  Pt aware - states that she's already had visiting nurses with PT/OT, which she has since been cleared from  Pt ambulated to bathroom earlier with cane and no assist  SLIM made aware - will come niyah pt in ED       Frankey Ewing, RN  04/06/22 0895

## 2022-04-06 NOTE — ASSESSMENT & PLAN NOTE
Lab Results   Component Value Date    EGFR 67 04/06/2022    EGFR 75 04/05/2022    EGFR 57 02/24/2022    CREATININE 0 81 04/06/2022    CREATININE 0 74 04/05/2022    CREATININE 0 93 02/24/2022     · Renal function stable within basline   · Avoid nephrotoxins and hypotension   · BMP with morning labs

## 2022-04-06 NOTE — ED ATTENDING ATTESTATION
4/5/2022  I, Sleene Olsen MD, saw and evaluated the patient  I have discussed the patient with the resident/non-physician practitioner and agree with the resident's/non-physician practitioner's findings, Plan of Care, and MDM as documented in the resident's/non-physician practitioner's note, except where noted  All available labs and Radiology studies were reviewed  I was present for key portions of any procedure(s) performed by the resident/non-physician practitioner and I was immediately available to provide assistance  At this point I agree with the current assessment done in the Emergency Department    I have conducted an independent evaluation of this patient a history and physical is as follows:  C/o nausea dry heaves this am    intemittent abd discomfort  Had bm this am    No urine symptoms no fever   Prior bowel obstruction   No melena or brbpr    Mild chronic cough   Exam the patient is in no acute distress sclerae anicteric mucous membranes are moist lungs clear heart regular abdomen is somewhat obese and slightly distended there is some mild tenderness with no rebound or guarding bowel sounds are present  Impression abdominal pain concern for bowel obstruction  Lab workup CT abdomen pelvis IV fluids patient received antiemetics prior to presentation on the ambulance she is no longer nauseous patient declines pain medications  ED Course         Critical Care Time  Procedures

## 2022-04-06 NOTE — ED PROVIDER NOTES
History  Chief Complaint   Patient presents with    Abdominal Pain     pt states after she ate breakfast this morning she has been having dry-heaves all day; pt states she feels better after prehospital zofran    Hip Pain     pt states she has been having R hip pain for 3x days     Sunday Puckett is a 78y o  year old female with PMH of PAF on eliquis, DM, HTN, hypothyroidism, SBO presenting to the [facility] [address] ED for generalized weakness and abdominal pain  After awaking this morning patient noted to have episodes of profound weakness, nausea/dry heaves and generalized abdominal pain  Patient passed BM earlier today though states she is no longer passing flatus  Patient reports chronic, nonproductive cough which is reported to be at baseline  Patient denies fevers/chills, chest pain, dyspnea, vomiting, diarrhea, new-onset back pain, leg pain/swelling  The patient has received zofran from EMS PTA  Patient reports to have received COVID19 and influenza immunization  Patient lives at home alone  History provided by:  Patient   used: No    Abdominal Pain  Associated symptoms: constipation, cough, fatigue and nausea    Associated symptoms: no chest pain, no chills, no diarrhea, no dysuria, no fever, no hematuria, no shortness of breath and no vomiting    Hip Pain  Associated symptoms: abdominal pain, cough, fatigue and nausea    Associated symptoms: no chest pain, no congestion, no diarrhea, no fever, no headaches, no rash, no rhinorrhea, no shortness of breath and no vomiting        Prior to Admission Medications   Prescriptions Last Dose Informant Patient Reported? Taking?    Eliquis 5 MG   No No   Sig: TAKE 1 TABLET BY MOUTH TWICE A DAY   LORazepam (ATIVAN) 0 5 mg tablet  Self Yes No   Sig: Take 0 5 mg by mouth 2 (two) times a day   Multiple Vitamins-Minerals (PRESERVISION AREDS 2 PO)  Self Yes No   Sig: Take by mouth 2 (two) times a day     acetaminophen (TYLENOL) 500 mg tablet   No No Sig: Take 2 tablets (1,000 mg total) by mouth 3 (three) times a day as needed for mild pain, moderate pain or fever   acetaminophen (TYLENOL) 500 mg tablet   No No   Sig: Take 2 tablets (1,000 mg total) by mouth 3 (three) times a day as needed for mild pain or moderate pain   amLODIPine (NORVASC) 10 mg tablet   Yes No   Sig: Take 10 mg by mouth daily   ascorbic acid (VITAMIN C) 500 mg tablet  Self Yes No   Sig: Take 500 mg by mouth daily   atorvastatin (LIPITOR) 40 mg tablet  Self Yes No   Sig: Take 40 mg by mouth   chlorthalidone 25 mg tablet  Self Yes No   Sig: Take 25 mg by mouth daily   cholecalciferol (VITAMIN D3) 1,000 units tablet  Self Yes No   Sig: Take 2,000 Units by mouth daily    cloNIDine (CATAPRES) 0 1 mg tablet   No No   Sig: TAKE 1 TABLET BY MOUTH TWICE A DAY   labetalol (NORMODYNE) 200 mg tablet  Self No No   Sig: Take 1 tablet (200 mg total) by mouth every 12 (twelve) hours   levothyroxine 125 mcg tablet  Self Yes No   Sig: Take 125 mcg by mouth daily   lisinopril (ZESTRIL) 20 mg tablet  Self No No   Sig: TAKE 1 TABLET (20 MG TOTAL) BY MOUTH 2 (TWO) TIMES A DAY   metFORMIN (GLUCOPHAGE) 500 mg tablet   No No   Sig: Take 1 tablet (500 mg total) by mouth 2 (two) times a day with meals   multivitamin (THERAGRAN) TABS  Self Yes No   Sig: Take 1 tablet by mouth daily      Facility-Administered Medications: None       Past Medical History:   Diagnosis Date    Anxiety     Atrial fibrillation (HCC)     Bowel obstruction (HCC)     Cancer (HCC)     LEFT BREAST CA 22 YEARS AGO     Depression     Diabetes mellitus ([facility] [address] )     Disease of thyroid gland     Hypertension     Hypothyroidism        Past Surgical History:   Procedure Laterality Date    ABDOMINAL ADHESION SURGERY N/A 2/4/2018    Procedure: LYSIS ADHESIONS;  Surgeon: Susanne Hunter DO;  [facility];  Service: General    BREAST SURGERY      CHOLECYSTECTOMY      COLON SURGERY  2017    EXPLORATORY LAPAROTOMY      JOINT REPLACEMENT LEFT KNEE REPLACEMENT     LAPAROTOMY N/A 2/4/2018    Procedure: LAPAROTOMY EXPLORATORY,;  Surgeon: Shiloh Sandoval DO;  [facility];  Service: General    MASTECTOMY      MASTECTOMY Left 1995    MASTECTOMY Right 2017    IN BIOPSY/EXCISION, LYMPH NODE(S) Right 1/13/2017    Procedure: SENTINEL LYMPH NODE BIOPSY RIGHT AXILLA; Surgeon: Karina Sousa MD;  [facility];  Service: General    IN MASTECTOMY, SIMPLE, COMPLETE Right 1/13/2017    Procedure: MASTECTOMY SIMPLE;  Surgeon: Karina Sousa MD;  [facility];  Service: General    SMALL INTESTINE SURGERY N/A 2/4/2018    Procedure: RESECTION SMALL BOWEL;  Surgeon: Shiloh Sandoval DO;  [facility];  Service: General    US GUIDED BREAST BIOPSY RIGHT COMPLETE Right 11/29/2016       Family History   Problem Relation Age of Onset    Cancer Mother     No Known Problems Father      I have reviewed and agree with the history as documented  E-Cigarette/Vaping    E-Cigarette Use Never User      E-Cigarette/Vaping Substances    Nicotine No     Flavoring No      Social History     Tobacco Use    Smoking status: Current Every Day Smoker     Packs/day: 1 00     Types: Cigarettes    Smokeless tobacco: Never Used   Vaping Use    Vaping Use: Never used   Substance Use Topics    Alcohol use: Never    Drug use: Never        Review of Systems   Constitutional: Positive for fatigue  Negative for chills and fever  HENT: Negative for congestion and rhinorrhea  Eyes: Negative for visual disturbance  Respiratory: Positive for cough  Negative for choking and shortness of breath  Cardiovascular: Negative for chest pain and leg swelling  Gastrointestinal: Positive for abdominal pain, constipation and nausea  Negative for abdominal distention, diarrhea and vomiting  Endocrine: Negative for polyuria  Genitourinary: Negative for difficulty urinating, dysuria, flank pain and hematuria     Musculoskeletal: Positive for arthralgias (chronic right hip pain)  Negative for neck pain and neck stiffness  Skin: Negative for rash  Neurological: Negative for syncope, light-headedness and headaches  Psychiatric/Behavioral: Negative for behavioral problems and confusion  All other systems reviewed and are negative  Physical Exam  ED Triage Vitals [04/05/22 2038]   Temperature Pulse Respirations Blood Pressure SpO2   (!) 97 4 °F (36 3 °C) 83 18 (!) 177/78 97 %      Temp Source Heart Rate Source Patient Position - Orthostatic VS BP Location FiO2 (%)   Oral Monitor Lying Left arm --      Pain Score       2             Orthostatic Vital Signs  Vitals:    04/05/22 2230 04/06/22 0037 04/06/22 0230 04/06/22 0330   BP: 150/65 150/78 163/71 153/69   Pulse: 76 81 78 70   Patient Position - Orthostatic VS:  Lying         Physical Exam  Vitals and nursing note reviewed  Constitutional:       General: She is not in acute distress  Appearance: Normal appearance  She is well-developed  She is not ill-appearing, toxic-appearing or diaphoretic  HENT:      Head: Normocephalic and atraumatic  Nose: No congestion or rhinorrhea  Eyes:      General:         Right eye: No discharge  Left eye: No discharge  Cardiovascular:      Rate and Rhythm: Normal rate and regular rhythm  Pulmonary:      Effort: Pulmonary effort is normal  No accessory muscle usage or respiratory distress  Breath sounds: Normal breath sounds  No stridor  No decreased breath sounds, wheezing, rhonchi or rales  Abdominal:      General: Bowel sounds are normal  There is distension  Palpations: Abdomen is soft  Tenderness: There is generalized abdominal tenderness  There is no right CVA tenderness, left CVA tenderness, guarding or rebound  Musculoskeletal:      Cervical back: Normal range of motion and neck supple  No rigidity  Right lower leg: No tenderness  No edema  Left lower leg: No tenderness  No edema     Skin:     Capillary Refill: Capillary refill takes less than 2 seconds  Findings: No rash  Neurological:      Mental Status: She is alert and oriented to person, place, and time     Psychiatric:         Mood and Affect: Mood normal          Behavior: Behavior normal          ED Medications  Medications   amLODIPine (NORVASC) tablet 10 mg (has no administration in time range)   ascorbic acid (VITAMIN C) tablet 500 mg (has no administration in time range)   atorvastatin (LIPITOR) tablet 40 mg (has no administration in time range)   chlorthalidone tablet 25 mg (has no administration in time range)   cholecalciferol (VITAMIN D3) tablet 2,000 Units (has no administration in time range)   cloNIDine (CATAPRES) tablet 0 1 mg (has no administration in time range)   apixaban (ELIQUIS) tablet 5 mg (has no administration in time range)   labetalol (NORMODYNE) tablet 200 mg (has no administration in time range)   levothyroxine tablet 125 mcg (has no administration in time range)   lisinopril (ZESTRIL) tablet 20 mg (has no administration in time range)   LORazepam (ATIVAN) tablet 0 5 mg (has no administration in time range)   multivitamin-minerals (CENTRUM) tablet 1 tablet (has no administration in time range)   nicotine (NICODERM CQ) 21 mg/24 hr TD 24 hr patch 1 patch (has no administration in time range)   acetaminophen (TYLENOL) tablet 650 mg (has no administration in time range)   docusate sodium (COLACE) capsule 100 mg (has no administration in time range)   ondansetron (ZOFRAN) injection 4 mg (has no administration in time range)   aluminum-magnesium hydroxide-simethicone (MYLANTA) oral suspension 30 mL (has no administration in time range)   enoxaparin (LOVENOX) subcutaneous injection 40 mg (has no administration in time range)   insulin lispro (HumaLOG) 100 units/mL subcutaneous injection 1-5 Units (has no administration in time range)   insulin lispro (HumaLOG) 100 units/mL subcutaneous injection 1-5 Units (has no administration in time range)   ceftriaxone (ROCEPHIN) 1 g/50 mL in dextrose IVPB (1,000 mg Intravenous New Bag 4/6/22 0358)   azithromycin (ZITHROMAX) tablet 500 mg (500 mg Oral Given 4/6/22 0355)   ondansetron (FOR EMS ONLY) (ZOFRAN) 4 mg/2 mL injection 4 mg (0 mg Does not apply Given to EMS 4/5/22 2040)   multi-electrolyte (ISOLYTE-S PH 7 4) bolus 500 mL (0 mL Intravenous Stopped 4/5/22 2341)   iohexol (OMNIPAQUE) 350 MG/ML injection (SINGLE-DOSE) 100 mL (100 mL Intravenous Given 4/5/22 2224)       Diagnostic Studies  Results Reviewed     Procedure Component Value Units Date/Time    Hemoglobin A1c w/EAG Estimation (Orders if not completed within the last 90 days) [[id number]]  (Abnormal) Collected: 04/06/22 0358    Lab Status: Final result Specimen: Blood from Arm, Left Updated: 04/06/22 0510     Hemoglobin A1C 6 0 %       mg/dl     TSH, 3rd generation [[id number]]  (Abnormal) Collected: 04/06/22 0403    Lab Status: Final result Specimen: Blood from Arm, Left Updated: 04/06/22 0443     TSH 3RD GENERATON 0 198 uIU/mL     Narrative:      Patients undergoing fluorescein dye angiography may retain small amounts of fluorescein in the body for 48-72 hours post procedure  Samples containing fluorescein can produce falsely depressed TSH values  If the patient had this procedure,a specimen should be resubmitted post fluorescein clearance        Comprehensive metabolic panel [[id number]]  (Abnormal) Collected: 04/06/22 0403    Lab Status: Final result Specimen: Blood from Arm, Left Updated: 04/06/22 0437     Sodium 132 mmol/L      Potassium 3 6 mmol/L      Chloride 100 mmol/L      CO2 27 mmol/L      ANION GAP 5 mmol/L      BUN 16 mg/dL      Creatinine 0 81 mg/dL      Glucose 118 mg/dL      Calcium 8 9 mg/dL      AST 19 U/L      ALT 28 U/L      Alkaline Phosphatase 82 U/L      Total Protein 6 5 g/dL      Albumin 3 8 g/dL      Total Bilirubin 1 02 mg/dL      eGFR 67 ml/min/1 73sq m     Narrative:      [place] guidelines for Chronic Kidney Disease (CKD):     Stage 1 with normal or high GFR (GFR > 90 mL/min/1 73 square meters)    Stage 2 Mild CKD (GFR = 60-89 mL/min/1 73 square meters)    Stage 3A Moderate CKD (GFR = 45-59 mL/min/1 73 square meters)    Stage 3B Moderate CKD (GFR = 30-44 mL/min/1 73 square meters)    Stage 4 Severe CKD (GFR = 15-29 mL/min/1 73 square meters)    Stage 5 End Stage CKD (GFR <15 mL/min/1 73 square meters)  Note: GFR calculation is accurate only with a steady state creatinine    Magnesium [[id number]]  (Normal) Collected: 04/06/22 0403    Lab Status: Final result Specimen: Blood from Arm, Left Updated: 04/06/22 0437     Magnesium 2 0 mg/dL     Phosphorus [[id number]]  (Normal) Collected: 04/06/22 0403    Lab Status: Final result Specimen: Blood from Arm, Left Updated: 04/06/22 0437     Phosphorus 3 4 mg/dL     CBC and differential [[id number]]  (Abnormal) Collected: 04/06/22 0403    Lab Status: Final result Specimen: Blood from Arm, Left Updated: 04/06/22 0416     WBC 7 31 Thousand/uL      RBC 3 50 Million/uL      Hemoglobin 10 7 g/dL      Hematocrit 32 4 %      MCV 93 fL      MCH 30 6 pg      MCHC 33 0 g/dL      RDW 15 3 %      MPV 10 4 fL      Platelets 737 Thousands/uL      nRBC 0 /100 WBCs      Neutrophils Relative 71 %      Immat GRANS % 1 %      Lymphocytes Relative 17 %      Monocytes Relative 11 %      Eosinophils Relative 0 %      Basophils Relative 0 %      Neutrophils Absolute 5 19 Thousands/µL      Immature Grans Absolute 0 04 Thousand/uL      Lymphocytes Absolute 1 23 Thousands/µL      Monocytes Absolute 0 81 Thousand/µL      Eosinophils Absolute 0 01 Thousand/µL      Basophils Absolute 0 03 Thousands/µL     Procalcitonin [[id number]] Collected: 04/06/22 0358    Lab Status: In process Specimen: Blood from Arm, Left Updated: 04/06/22 0408    Blood culture [[id number]] Collected: 04/06/22 0358    Lab Status:  In process Specimen: Blood from Arm, Left Updated: 04/06/22 0408    Blood culture [[id number]] Collected: 04/06/22 0358    Lab Status: In process Specimen: Blood from Arm, Left Updated: 04/06/22 0408    COVID/FLU/RSV - 2 hour TAT [[id number]]  (Normal) Collected: 04/06/22 0301    Lab Status: Final result Specimen: Nares from Nose Updated: 04/06/22 0400     SARS-CoV-2 Negative     INFLUENZA A PCR Negative     INFLUENZA B PCR Negative     RSV PCR Negative    Narrative:      FOR PEDIATRIC PATIENTS - copy/paste COVID Guidelines URL to browser: [email or web address]  [employer]    SARS-CoV-2 assay is a Nucleic Acid Amplification assay intended for the  qualitative detection of nucleic acid from SARS-CoV-2 in nasopharyngeal  swabs  Results are for the presumptive identification of SARS-CoV-2 RNA  Positive results are indicative of infection with SARS-CoV-2, the virus  causing COVID-19, but do not rule out bacterial infection or co-infection  with other viruses  Laboratories within the [place] and its  territories are required to report all positive results to the appropriate  public health authorities  Negative results do not preclude SARS-CoV-2  infection and should not be used as the sole basis for treatment or other  patient management decisions  Negative results must be combined with  clinical observations, patient history, and epidemiological information  This test has not been FDA cleared or approved  This test has been authorized by FDA under an Emergency Use Authorization  (EUA)  This test is only authorized for the duration of time the  declaration that circumstances exist justifying the authorization of the  emergency use of an in vitro diagnostic tests for detection of SARS-CoV-2  virus and/or diagnosis of COVID-19 infection under section 564(b)(1) of  the Act, 21 U  S C  826FMH-0(J)(6), unless the authorization is terminated  or revoked sooner  The test has been validated but independent review by FDA  and CLIA is pending      Test performed using [employer] GeneXpert: This RT-PCR assay targets N2,  a region unique to SARS-CoV-2  A conserved region in the E-gene was chosen  for pan-Sarbecovirus detection which includes SARS-CoV-2  Urine Microscopic [[id number]]  (Normal) Collected: 04/06/22 0058    Lab Status: Final result Specimen: Urine, Clean Catch Updated: 04/06/22 0332     RBC, UA 1-2 /hpf      WBC, UA 1-2 /hpf      Epithelial Cells Occasional /hpf      Bacteria, UA None Seen /hpf     Legionella antigen, Urine [[id number]] Collected: 04/06/22 0324    Lab Status: In process Specimen: Urine, Clean Catch Updated: 04/06/22 0330    Strep Pneumoniae, Urine [[id number]] Collected: 04/06/22 0324    Lab Status: In process Specimen: Urine, Clean Catch Updated: 04/06/22 0330    Sputum culture and Gram stain [[id number]]     Lab Status: No result Specimen: Sputum     Urine Macroscopic, POC [[id number]]  (Abnormal) Collected: 04/06/22 0058    Lab Status: Final result Specimen: Urine Updated: 04/06/22 0121     Color, UA Yellow     Clarity, UA Clear     pH, UA 7 0     Leukocytes, UA Negative     Nitrite, UA Negative     Protein,  (2+) mg/dl      Glucose, UA Negative mg/dl      Ketones, UA Negative mg/dl      Urobilinogen, UA 1 0 E U /dl      Bilirubin, UA Negative     Blood, UA Negative     Specific Gravity, UA 1 015    Narrative:      CLINITEK RESULT    Lactic acid, plasma [[id number]]  (Normal) Collected: 04/05/22 2241    Lab Status: Final result Specimen: Blood from Arm, Left Updated: 04/05/22 2315     LACTIC ACID 0 9 mmol/L     Narrative:      Result may be elevated if tourniquet was used during collection      HS Troponin 0hr (reflex protocol) [[id number]]  (Normal) Collected: 04/05/22 2134    Lab Status: Final result Specimen: Blood from Arm, Left Updated: 04/05/22 2218     hs TnI 0hr 6 ng/L     Comprehensive metabolic panel [[id number]]  (Abnormal) Collected: 04/05/22 2134    Lab Status: Final result Specimen: Blood from Arm, Left Updated: 04/05/22 2207     Sodium 133 mmol/L      Potassium 3 5 mmol/L      Chloride 103 mmol/L      CO2 25 mmol/L      ANION GAP 5 mmol/L      BUN 17 mg/dL      Creatinine 0 74 mg/dL      Glucose 161 mg/dL      Calcium 8 5 mg/dL      Corrected Calcium 9 0 mg/dL      AST 20 U/L      ALT 27 U/L      Alkaline Phosphatase 81 U/L      Total Protein 6 3 g/dL      Albumin 3 4 g/dL      Total Bilirubin 0 97 mg/dL      eGFR 75 ml/min/1 73sq m     Narrative:      National Kidney Disease Foundation guidelines for Chronic Kidney Disease (CKD):     Stage 1 with normal or high GFR (GFR > 90 mL/min/1 73 square meters)    Stage 2 Mild CKD (GFR = 60-89 mL/min/1 73 square meters)    Stage 3A Moderate CKD (GFR = 45-59 mL/min/1 73 square meters)    Stage 3B Moderate CKD (GFR = 30-44 mL/min/1 73 square meters)    Stage 4 Severe CKD (GFR = 15-29 mL/min/1 73 square meters)    Stage 5 End Stage CKD (GFR <15 mL/min/1 73 square meters)  Note: GFR calculation is accurate only with a steady state creatinine    Lipase [[id number]]  (Normal) Collected: 04/05/22 2134    Lab Status: Final result Specimen: Blood from Arm, Left Updated: 04/05/22 2207     Lipase 280 u/L     CBC and differential [[id number]]  (Abnormal) Collected: 04/05/22 2134    Lab Status: Final result Specimen: Blood from Arm, Left Updated: 04/05/22 2147     WBC 7 10 Thousand/uL      RBC 3 46 Million/uL      Hemoglobin 10 5 g/dL      Hematocrit 32 3 %      MCV 93 fL      MCH 30 3 pg      MCHC 32 5 g/dL      RDW 15 4 %      MPV 9 9 fL      Platelets 865 Thousands/uL      nRBC 0 /100 WBCs      Neutrophils Relative 87 %      Immat GRANS % 1 %      Lymphocytes Relative 9 %      Monocytes Relative 3 %      Eosinophils Relative 0 %      Basophils Relative 0 %      Neutrophils Absolute 6 17 Thousands/µL      Immature Grans Absolute 0 05 Thousand/uL      Lymphocytes Absolute 0 63 Thousands/µL      Monocytes Absolute 0 23 Thousand/µL      Eosinophils Absolute 0 00 Thousand/µL      Basophils Absolute 0 02 Thousands/µL                  CT abdomen pelvis with contrast   Final Result by Laura Richards MD (04/06 0009)      No acute findings in the abdomen or pelvis  Workstation performed: [id number]         XR chest 1 view portable    (Results Pending)         Procedures  Procedures      ED Course  ED Course as of 04/06/22 0514   Tue Apr 05, 2022 2133 Procedure Note: EKG  Date/Time: 04/06/22 9:33 PM   Interpreted by: Rosalee Vazquez DO  Indications / Diagnosis: weakness  ECG reviewed by me, the ED Provider: yes   The EKG demonstrates:  Rhythm: normal sinus rhythm 81 BPM  Intervals: Normal MD and QT intervals  Axis: Left axis deviation  QRS/Blocks: Normal QRS  ST Changes: No acute ST/T waves changes  No KYLAH  No TWI  Comparison: Compared to prior EKG performed on 07/19/2019                             SBIRT 20yo+      Most Recent Value   SBIRT (23 yo +)    In order to provide better care to our patients, we are screening all of our patients for alcohol and drug use  Would it be okay to ask you these screening questions? No Filed at: 04/05/2022 2036                MDM  Number of Diagnoses or Management Options  Generalized weakness  Diagnosis management comments:   78 y o  female presenting for generalized weakness, nausea and abdominal discomfort  Will order labs, CXR, CT and UA to screen for etiology of symptoms  Patient declines nausea or pain medication in the ED  Impression: generalized weakness, unclear etiology  Will admit for further evaluation and management  Patient care discussed with Dr Parish Mendoza who will assume care and admit patient to the hospital  I have discussed with the patient our recommendation of inpatient admission for further medical care  I have answered all of the patient's questions and concerns   The patient is in agreement with the plan to proceed with admission to the hospital         Amount and/or Complexity of Data Reviewed  Clinical lab tests: ordered and reviewed  Tests in the radiology section of CPT®: ordered and reviewed  Review and summarize past medical records: yes  Discuss the patient with other providers: yes  Independent visualization of images, tracings, or specimens: yes    Patient Progress  Patient progress: stable      Disposition  Final diagnoses:   Generalized weakness     Time reflects when diagnosis was documented in both MDM as applicable and the Disposition within this note     Time User Action Codes Description Comment    4/6/2022  2:57 AM Vaughn Joshi Add [R53 1] Generalized weakness     4/6/2022  2:58 AM Guerrero Spence Add [J18 9] Community acquired pneumonia of right middle lobe of lung       ED Disposition     ED Disposition Condition Date/Time Comment    Admit Stable Wed Apr 6, 2022  2:57 AM Case was discussed with Dr Cristal Quigley and the patient's admission status was agreed to be Admission Status: observation status to the service of Dr Cristal Quigley  Follow-up Information    None         Patient's Medications   Discharge Prescriptions    No medications on file     No discharge procedures on file  PDMP Review       Value Time User    PDMP Reviewed  Yes 2/15/2022  7:41 AM Anastasia Mccarthy MD           ED Provider  Attending physically available and evaluated Froilangiana Coleman PASTOR managed the patient along with the ED Attending      Electronically Signed by         Francis Samuels DO  04/06/22 0700

## 2022-04-06 NOTE — CASE MANAGEMENT
Case Management Assessment & Discharge Planning Note    Patient name Lavon Larry  Location ED 08/ED 08 MRN [id number]  : 1940 Date 2022       Current Admission Date: 2022  Current Admission Diagnosis:Community acquired pneumonia of right middle lobe of lung   Patient Active Problem List    Diagnosis Date Noted    Generalized weakness 2022    Abdominal pain 2022    Anemia 2022    Pain 02/15/2022    Macular degeneration 02/15/2022    Fracture of right tibial plateau     Renal vascular disease 2020    Primary osteoarthritis of right knee 2019    Synovial cyst of right popliteal space 2019    Hemarthrosis of right knee 2019    History of breast cancer 2019    Moderate protein-calorie malnutrition  2019    Asymptomatic bilateral carotid artery stenosis 08/15/2019    High blood pressure     Hypertensive urgency 2019    Hypertension     Hypo-osmolality and hyponatremia 2019    Fall 2019    Chronic hyponatremia 2019    Syncope vs presyncope 2019    Paroxysmal A-fib ([facility] ) 2019    Diabetes ([facility] ) 2019    Weight loss 2019    CKD (chronic kidney disease) stage 3, GFR 30-59 ml/min ([facility]) 2019    Iron deficiency anemia due to chronic blood loss 2018    Incisional hernia, without obstruction or gangrene 07/10/2018    Community acquired pneumonia of right middle lobe of lung 2018    Postop check 2018    Delirium 2018    Physical deconditioning 2018    Ambulatory dysfunction 2018    Hx of fall 2018    Anxiety 2018    Acute kidney injury ([address] ) 2018    Hordeolum externum of right upper eyelid 2017    Malignant neoplasm of right breast, stage 1, estrogen receptor positive ([address] ) 2017    Malignant neoplasm of central portion of right female breast ([facility] ) 2017    Tobacco abuse 2017    Heart palpitations 07/11/2016    Nicotine abuse 07/11/2016    Paroxysmal atrial fibrillation ([facility] ) 07/11/2016    Type 2 diabetes mellitus, without long-term current use of insulin ([facility] ) 07/11/2016    Essential hypertension 07/11/2016    Hypothyroidism 07/11/2016    Nontraumatic compression fracture of T4 vertebra (HCC) 10/06/2015    Low back pain without sciatica 09/17/2015    Gastroesophageal reflux disease without esophagitis 02/23/2015    Depression 02/23/2015    History of CEA (carotid endarterectomy) 02/23/2015    Hyperlipidemia associated with type 2 diabetes mellitus ([facility] ) 02/23/2015    Hypothyroidism due to acquired atrophy of thyroid 02/23/2015    Mitral insufficiency and aortic stenosis 02/23/2015    Vitamin D deficiency 02/23/2015    Varicose vein of leg 02/23/2015    Non-seasonal allergic rhinitis due to pollen 02/23/2015      LOS (days): 0  Geometric Mean LOS (GMLOS) (days): 2 50  Days to GMLOS:2 2     OBJECTIVE:    Risk of Unplanned Readmission Score: 28         Current admission status: Inpatient  Referral Reason: Other    Preferred Pharmacy:   [address] [place] [address], [address] [place] [address]  [address] [address]  Phone: [phone] Fax: [phone]    Primary Care Provider: Klarissa Mari DO    Primary Insurance: MEDICARE  Secondary Insurance: BLUE CROSS    ASSESSMENT:  [address] [place] [address] Representative - Daughter   Primary Phone: [phone] (Home)           [place] [address] Representative - Daughter   Primary Phone: [phone] (Mobile)                    Readmission Root Cause  30 Day Readmission: No    Patient Information  Admitted from[de-identified] Home  Mental Status: Alert  During Assessment patient was accompanied by: Not accompanied during assessment  Assessment information provided by[de-identified] Patient  Primary Caregiver: Self  Support Systems: Daughter,Friends/neighbors,Family members  [place] of Residence: [address] [facility] do you live in?: [address] entry access options   Select all that apply : Elevator  Type of Current Residence: Apartment  Floor Level: 3  Upon entering residence, is there a bedroom on the main floor (no further steps)?: Yes  Upon entering residence, is there a bathroom on the main floor (no further steps)?: Yes  In the last 12 months, was there a time when you were not able to pay the mortgage or rent on time?: No  In the last 12 months, how many places have you lived?: 1  In the last 12 months, was there a time when you did not have a steady place to sleep or slept in a shelter (including now)?: No  Homeless/housing insecurity resource given?: No  Living Arrangements: Lives Alone    Activities of Daily Living Prior to Admission  Functional Status: Independent  Completes ADLs independently?: Yes  Ambulates independently?: Yes  Does patient use assisted devices?: Yes  Assisted Devices (DME) used: [employer]  Does patient currently own DME?: Yes  What DME does the patient currently own?: [employer]  Does patient have a history of Outpatient Therapy (PT/OT)?: No  Does the patient have a history of Short-Term Rehab?: No  Does patient have a history of HHC?: No  Does patient currently have [address]?: No    Patient Information Continued  Income Source: Pension/long term  Does patient have prescription coverage?: Yes  Within the past 12 months, you worried that your food would run out before you got the money to buy more : Never true  Within the past 12 months, the food you bought just didnt last and you didnt have money to get more : Never true  Food insecurity resource given?: N/A  Does patient receive dialysis treatments?: No  Does patient have a history of substance abuse?: No  Does patient have a history of Mental Health Diagnosis?: Yes (anxiety)  Is patient receiving treatment for mental health?: Yes  Has patient received inpatient treatment related to mental health in the last 2 years?: No    Means of Transportation  Means of Transport to Appts[de-identified] Drives Self  In the past 12 months, has lack of transportation kept you from medical appointments or from getting medications?: No  In the past 12 months, has lack of transportation kept you from meetings, work, or from getting things needed for daily living?: No  Was application for public transport provided?: N/A    DISCHARGE DETAILS:    Discharge planning discussed with[de-identified] patient  Freedom of Choice: Yes     CM contacted family/caregiver?: No- see comments (Pt able to complete Open without difficulty)     Treatment Team Recommendation: Home  Discharge Destination Plan[de-identified] Home  Transport at Discharge : Family

## 2022-04-06 NOTE — RESPIRATORY THERAPY NOTE
RT Protocol Note  Catrachita Whitaker 78 y o  female MRN: [id number]  Unit/Bed#: ED 08 Encounter: [id number]    Assessment    Active Problems:    Diabetes mellitus type 2    Essential hypertension    Hypothyroidism    Community acquired pneumonia of right middle lobe of lung    Gastroesophageal reflux disease without esophagitis      Home Pulmonary Medications:  None     04/06/22 0601   Respiratory Protocol   Protocol Initiated? No   Language Barrier? No   Medical & Social History Reviewed? Yes   Diagnostic Studies Reviewed? Yes   Physical Assessment Performed? Yes   Respiratory Assessment   Assessment Type Assess only   General Appearance Alert; Awake   Respiratory Pattern Normal   Chest Assessment Chest expansion symmetrical   Bilateral Breath Sounds Diminished   Cough None   Resp Comments Patient presented to ED with CAP RML  She does not have a history of lung disease or use of inhaled medications  She is an active tobacco user having abstained in the past  Will provide her with prn abuterol HFA for SOB/Wheezing  O2 Device RA          Past Medical History:   Diagnosis Date    Anxiety     Atrial fibrillation (HCC)     Bowel obstruction (HCC)     Cancer (HCC)     LEFT BREAST CA 22 YEARS AGO     Depression     Diabetes mellitus ([address] )     Disease of thyroid gland     Hypertension     Hypothyroidism      Social History     Socioeconomic History    Marital status:       Spouse name: None    Number of children: None    Years of education: None    Highest education level: None   Occupational History    None   Tobacco Use    Smoking status: Current Every Day Smoker     Packs/day: 1 00     Types: Cigarettes    Smokeless tobacco: Never Used   Vaping Use    Vaping Use: Never used   Substance and Sexual Activity    Alcohol use: Never    Drug use: Never    Sexual activity: Not Currently   Other Topics Concern    None   Social History Narrative    ** Merged History Encounter **          Social Determinants of Health     Financial Resource Strain: Not on file   Food Insecurity: No Food Insecurity    Worried About Running Out of Food in the Last Year: Never true    Ran Out of Food in the Last Year: Never true   Transportation Needs: No Transportation Needs    Lack of Transportation (Medical): No    Lack of Transportation (Non-Medical): No   Physical Activity: Not on file   Stress: Not on file   Social Connections: Not on file   Intimate Partner Violence: Not on file   Housing Stability: Unknown    Unable to Pay for Housing in the Last Year: No    Number of Places Lived in the Last Year: Not on file    Unstable Housing in the Last Year: No       Subjective         Objective    Physical Exam:   Assessment Type: (P) Assess only  General Appearance: (P) Alert,Awake  Respiratory Pattern: (P) Normal  Chest Assessment: (P) Chest expansion symmetrical  Bilateral Breath Sounds: (P) Diminished  Cough: (P) None  O2 Device: (P) RA    Vitals:  Blood pressure 153/69, pulse 70, temperature (!) 97 4 °F (36 3 °C), temperature source Oral, resp  rate 18, last menstrual period 01/12/1980, SpO2 94 %, not currently breastfeeding  Imaging and other studies: I have personally reviewed pertinent reports  O2 Device: (P) RA     Plan             Resp Comments: (P) Patient presented to ED with CAP RML  She does not have a history of lung disease or use of inhaled medications  She is an active tobacco user having abstained in the past  Will provide her with prn abuterol HFA for SOB/Wheezing

## 2022-04-06 NOTE — ED NOTES
Pt's granddaughter Alexis Staton left bedside to go home and would like to be notified with any updates    [address]  04/05/22 8530

## 2022-04-06 NOTE — ASSESSMENT & PLAN NOTE
· Concern for new developing infiltrate in the right middle lobe on CXR compared to prior   · Afebrile, no leukocytosis  Only respiratory symptom is PND chronic cough    · Rocephin with azithromycin started on admission however given negative procal without infectious signs/symptoms, will discontinue and monitor   · Appreciate pulmonary input  · Added azelastine nasal spray  ·

## 2022-04-06 NOTE — ASSESSMENT & PLAN NOTE
· Presented due to abdominal pain with dry heaving   · CT AP on admission unremarkable   · Supportive cares

## 2022-04-06 NOTE — CONSULTS
PULMONOLOGY CONSULT NOTE     Name: Viviana Serrano   Age & Sex: 78 y o  female   MRN: [id number]  Unit/Bed#: ED 08   Encounter: [id number]    Reason for consultation: Concern for community-acquired pneumonia, cough    Requesting physician: Shahida Wanerfield    Assessment/Plan  1  Possible RML pneumonia  · Patient denies pulmonary complaints  · CXR tentatively read as RML pneumonia, official read noted emphysematous changes without superimposed acute pulmonary process  · No fever or leukocytosis  Procal 0 05 in the ED  · Ceftriaxone and Azithromycin started in ED, have been discontinued  Plan  · No indication for abx at this time    2  Upper airway cough syndrome  · Patient describes chronic cough, not worsened at this time  Believes she has both a post-nasal drip cough 2/2 maxillary sinus fracture in 2016, as well as a dry "smoker's cough " Possible COPD component given emphysematous changes on imaging, extensive smoking history  Plan  · Will add azelastine nasal spray to manage post-nasal drip  · Low suspicion for ACE-induced cough, however could consider changing lisinopril to ARB if patient is concerned about cough  · Smoking cessation encouraged    History of Present Illness   HPI:  Viviana Serrano is a 78 y o  female with a PMH of a fib on eliquis, DM2, CKD, htn with renal artery stenosis, hypothyroidism, and prior history of breast cancer who presented to the [facility] on 4/5 after feeling unwell at home  She states that she woke up yesterday not feeling like herself, and noticed that her BP and blood sugar were both very elevated without any explanation  She developed weakness, abdominal pain, and began dry heaving, so she called EMS  She received zofran en route, and her abdominal symptoms improved but she continued to feel weak  In the ED, she received a CXR that was tentatively read as possible RML pneumonia, and pulmonology was consulted      This AM, Ms Christine Miranda states that she is feeling well and denies any pulmonary complaints  No SOB, not requiring supplemental O2  She denies any history of pulmonary problems, takes no pulmonary medicines, and does not have an outpatient pulmonologist  Describes an active lifestyle that includes household chores and visiting friends, and said that her activity is limited by aches and pains but never SOB  She does have a chronic cough that she believes is multifactorial--she describes a "smokers cough" as well as a post-nasal drip cough that started after she fractured her maxillary sinuses by falling in 2016  Has smoked 0 5-1 ppd of cigarettes since her youth with a 9 month period of abstinence, and is not interested in quitting at this time  Review of systems:  12 point review of systems was completed and was otherwise negative except as listed in HPI  Historical Information   Past Medical History:   Diagnosis Date    Anxiety     Atrial fibrillation ([facility] )     [facility] obstruction [facility]     Cancer ([facility] )     LEFT BREAST CA 22 YEARS AGO     Depression     Diabetes mellitus ([facility] )     Disease of thyroid gland     Hypertension     Hypothyroidism      Past Surgical History:   Procedure Laterality Date    ABDOMINAL ADHESION SURGERY N/A 2/4/2018    Procedure: LYSIS ADHESIONS;  Surgeon: Aidan Kelley DO;  [facility];  Service: General    BREAST SURGERY      CHOLECYSTECTOMY      COLON SURGERY  2017    EXPLORATORY LAPAROTOMY      JOINT REPLACEMENT      LEFT KNEE REPLACEMENT     LAPAROTOMY N/A 2/4/2018    Procedure: LAPAROTOMY EXPLORATORY,;  Surgeon: Aidan Kelley DO;  [facility];  Service: General    MASTECTOMY      MASTECTOMY Left 1995    MASTECTOMY Right 2017    MO BIOPSY/EXCISION, LYMPH NODE(S) Right 1/13/2017    Procedure: SENTINEL LYMPH NODE BIOPSY RIGHT AXILLA;   Surgeon: Darcy Kennedy MD;  [facility];  Service: General    MO MASTECTOMY, SIMPLE, COMPLETE Right 1/13/2017    Procedure: MASTECTOMY SIMPLE;  Surgeon: Darcy Kennedy MD;  [facility];  Service: General    SMALL INTESTINE SURGERY N/A 2/4/2018    Procedure: RESECTION SMALL BOWEL;  Surgeon: Alberta Rose DO;  [facility];  Service: General    US GUIDED BREAST BIOPSY RIGHT COMPLETE Right 11/29/2016     Family History   Problem Relation Age of Onset    Cancer Mother     No Known Problems Father        Social History: Has smoked 0 5-1 ppd of cigarettes since youth, was previously abstinent for 9 months but is not interested in quitting at this time    Meds/Allergies   Current Facility-Administered Medications   Medication Dose Route Frequency    acetaminophen (TYLENOL) tablet 650 mg  650 mg Oral Q6H PRN    albuterol (PROVENTIL HFA,VENTOLIN HFA) inhaler 2 puff  2 puff Inhalation Q4H PRN    aluminum-magnesium hydroxide-simethicone (MYLANTA) oral suspension 30 mL  30 mL Oral Q6H PRN    amLODIPine (NORVASC) tablet 10 mg  10 mg Oral Daily    apixaban (ELIQUIS) tablet 5 mg  5 mg Oral BID    ascorbic acid (VITAMIN C) tablet 500 mg  500 mg Oral Daily    atorvastatin (LIPITOR) tablet 40 mg  40 mg Oral Daily With Dinner    chlorthalidone tablet 25 mg  25 mg Oral Daily    cholecalciferol (VITAMIN D3) tablet 2,000 Units  2,000 Units Oral Daily    cloNIDine (CATAPRES) tablet 0 1 mg  0 1 mg Oral BID    docusate sodium (COLACE) capsule 100 mg  100 mg Oral BID PRN    insulin lispro (HumaLOG) 100 units/mL subcutaneous injection 1-5 Units  1-5 Units Subcutaneous TID AC    insulin lispro (HumaLOG) 100 units/mL subcutaneous injection 1-5 Units  1-5 Units Subcutaneous HS    labetalol (NORMODYNE) tablet 200 mg  200 mg Oral Q12H YADIRA    levothyroxine tablet 125 mcg  125 mcg Oral Early Morning    lisinopril (ZESTRIL) tablet 20 mg  20 mg Oral BID    LORazepam (ATIVAN) tablet 0 5 mg  0 5 mg Oral BID    multivitamin-minerals (CENTRUM) tablet 1 tablet  1 tablet Oral Daily    nicotine (NICODERM CQ) 21 mg/24 hr TD 24 hr patch 1 patch  1 patch Transdermal Daily    ondansetron (ZOFRAN) injection 4 mg  4 mg Intravenous Q6H PRN     (Not in a hospital admission)    Allergies   Allergen Reactions    Meloxicam GI Intolerance    Morphine GI Intolerance    Morphine     Penicillins     Percocet [Oxycodone-Acetaminophen]     Sitagliptin      SOB       Vitals: Blood pressure 154/71, pulse 59, temperature (!) 97 4 °F (36 3 °C), temperature source Oral, resp  rate 18, last menstrual period 01/12/1980, SpO2 95 %, not currently breastfeeding  , There is no height or weight on file to calculate BMI  Intake/Output Summary (Last 24 hours) at 4/6/2022 0854  Last data filed at 4/6/2022 0656  Gross per 24 hour   Intake 296 67 ml   Output --   Net 296 67 ml       Physical Exam  Constitutional:       General: She is not in acute distress  Appearance: She is not ill-appearing or diaphoretic  HENT:      Head: Normocephalic and atraumatic  Nose: No congestion or rhinorrhea  Mouth/Throat:      Mouth: Mucous membranes are moist    Eyes:      Extraocular Movements: Extraocular movements intact  Pupils: Pupils are equal, round, and reactive to light  Cardiovascular:      Rate and Rhythm: Normal rate  Rhythm irregular  Pulmonary:      Effort: Pulmonary effort is normal       Breath sounds: Normal breath sounds  No wheezing, rhonchi or rales  Comments: Trace crackles in R lung base  Musculoskeletal:      Cervical back: Normal range of motion and neck supple  Right lower leg: No edema  Left lower leg: No edema  Skin:     General: Skin is warm and dry  Neurological:      General: No focal deficit present  Mental Status: She is alert and oriented to person, place, and time  Psychiatric:         Mood and Affect: Mood normal          Labs: I have personally reviewed pertinent lab results    Laboratory and Diagnostics  Results from last 7 days   Lab Units 04/06/22  0403 04/05/22  2134   WBC Thousand/uL 7 31 7 10   HEMOGLOBIN g/dL 10 7* 10 5*   HEMATOCRIT % 32 4* 32 3*   PLATELETS Thousands/uL 239 230   NEUTROS PCT % 71 87*   MONOS PCT % 11 3*     Results from last 7 days   Lab Units 04/06/22  0403 04/05/22  2134   SODIUM mmol/L 132* 133*   POTASSIUM mmol/L 3 6 3 5   CHLORIDE mmol/L 100 103   CO2 mmol/L 27 25   ANION GAP mmol/L 5 5   BUN mg/dL 16 17   CREATININE mg/dL 0 81 0 74   CALCIUM mg/dL 8 9 8 5   GLUCOSE RANDOM mg/dL 118 161*   ALT U/L 28 27   AST U/L 19 20   ALK PHOS U/L 82 81   ALBUMIN g/dL 3 8 3 4*   TOTAL BILIRUBIN mg/dL 1 02* 0 97     Results from last 7 days   Lab Units 04/06/22  0403   MAGNESIUM mg/dL 2 0   PHOSPHORUS mg/dL 3 4               Results from last 7 days   Lab Units 04/05/22  2241   LACTIC ACID mmol/L 0 9                     Results from last 7 days   Lab Units 04/06/22  0358   PROCALCITONIN ng/ml <0 05       ABG:       Imaging and other studies: I have personally reviewed pertinent films in PACS  CT abdomen pelvis with contrast    Result Date: 4/6/2022  Impression: No acute findings in the abdomen or pelvis  Workstation performed: [id number]     Pulmonary function testing: none    EKG, Pathology, and Other Studies: I have personally reviewed pertinent reports  Code Status: Level 1 - Full Code    VTE Pharmacologic Prophylaxis: on Eliquis for a fib  VTE Mechanical Prophylaxis: sequential compression device    Disclaimer: Portions of the record may have been created with voice recognition software  Occasional wrong word or "sound a like" substitutions may have occurred due to the inherent limitations of voice recognition software  Careful consideration should be taken to recognize, using context, where substitutions have occurred      Purvi Munson MS4

## 2022-04-06 NOTE — ED NOTES
Pt ambulated to the bathroom with cane, no assist  Steady gait noted       Francia Montez RN  04/06/22 6339

## 2022-04-06 NOTE — ASSESSMENT & PLAN NOTE
Lab Results   Component Value Date    HGBA1C 6 4 (H) 02/21/2022     Continue Humalog sliding scale and Accu-Cheks per protocol  No results for input(s): POCGLU in the last 72 hours      Blood Sugar Average: Last 72 hrs:

## 2022-04-06 NOTE — ED NOTES
Upon attempting transport pt to inpatient room, pt alerted RN that Pulmonology stated that she could go home  RN alerted BRIT Noble RN  04/06/22 3907

## 2022-04-11 LAB
BACTERIA BLD CULT: NORMAL
BACTERIA BLD CULT: NORMAL

## 2022-04-24 DIAGNOSIS — S82.141A CLOSED FRACTURE OF RIGHT TIBIAL PLATEAU, INITIAL ENCOUNTER: Primary | ICD-10-CM

## 2022-04-25 NOTE — PHYSICIAN ADVISOR
Current patient class: Inpatient  The patient is currently on Hospital Day: 2 at [address] [place] [address]      The patient was admitted to the hospital  on 4/6/22 at  3:02 AM for the following diagnosis:  Abdominal pain [R10 9]     After review of the relevant documentation, labs, vital signs and test results, this is a PROVIDER LIABLE case  In this particular case the patient was admitted to the hospital as an inpatient  The patient however failed to satisfy the 2 midnight benchmark and closer scrutiny of the case was warranted  After review of the patient presentation and relevant labs the patient was most appropriate for observation or outpatient class at the time of admission  Given that this patient has already been discharged prior to this review they become a provider liable case  Rationale is as follows: The patient is an 49-year-old female who presented with generalized weakness, dry heaving, mild abdominal discomfort  She felt better after treatment with Zofran but continued to feel worn out  She was admitted as an inpatient  The patient was hospitalized for 18 hours, one midnight  She was found to have questionable lung infiltrate but remained afebrile without leukocytosis and had normal procalcitonin  After the one midnight she was able to ambulate better and had less weakness  Based on her length of stay and diagnoses, observation class would have been appropriate to determine if a longer hospitalization was necessary due to persistent symptoms or developing infection      The patients vitals on arrival were   ED Triage Vitals [04/05/22 2038]   Temperature Pulse Respirations Blood Pressure SpO2   (!) 97 4 °F (36 3 °C) 83 18 (!) 177/78 97 %      Temp Source Heart Rate Source Patient Position - Orthostatic VS BP Location FiO2 (%)   Oral Monitor Lying Left arm --      Pain Score       2           Past Medical History:   Diagnosis Date    Anxiety     Atrial [facility]     Bowel obstruction [facility]     Cancer ([facility] )     LEFT BREAST CA 22 YEARS AGO     Depression     Diabetes mellitus ([facility]     Disease of thyroid gland     Hypertension     Hypothyroidism      Past Surgical History:   Procedure Laterality Date    ABDOMINAL ADHESION SURGERY N/A 2/4/2018    Procedure: LYSIS ADHESIONS;  Surgeon: Alberta Rose DO;  [facility];  Service: General    BREAST SURGERY      CHOLECYSTECTOMY      COLON SURGERY  2017    EXPLORATORY LAPAROTOMY      JOINT REPLACEMENT      LEFT KNEE REPLACEMENT     LAPAROTOMY N/A 2/4/2018    Procedure: LAPAROTOMY EXPLORATORY,;  Surgeon: Alberta Rose DO;  [facility];  Service: General    MASTECTOMY      MASTECTOMY Left 1995    MASTECTOMY Right 2017    MD BIOPSY/EXCISION, LYMPH NODE(S) Right 1/13/2017    Procedure: SENTINEL LYMPH NODE BIOPSY RIGHT AXILLA; Surgeon: Tom Cummings MD;  [facility];  Service: General    MD MASTECTOMY, SIMPLE, COMPLETE Right 1/13/2017    Procedure: MASTECTOMY SIMPLE;  Surgeon: Tom Cummings MD;  [facility];  Service: General    SMALL INTESTINE SURGERY N/A 2/4/2018    Procedure: RESECTION SMALL BOWEL;  Surgeon: Alberta Rose DO;  [facility];  Service: General    US GUIDED BREAST BIOPSY RIGHT COMPLETE Right 11/29/2016           Consults have been placed to:   IP CONSULT TO PULMONOLOGY  IP CONSULT TO CASE MANAGEMENT    Vitals:    04/06/22 0330 04/06/22 0636 04/06/22 0903 04/06/22 0949   BP: 153/69 154/71 162/78    BP Location:       Pulse: 70 59  78   Resp: 18 18     Temp:       TempSrc:       SpO2: 94% 95%         Most recent labs:    No results for input(s): WBC, HGB, HCT, PLT, K, NA, CALCIUM, BUN, CREATININE, LIPASE, AMYLASE, INR, TROPONINI, CKTOTAL, AST, ALT, ALKPHOS, BILITOT in the last 72 hours  Scheduled Meds:  Continuous Infusions:No current facility-administered medications for this encounter  PRN Meds:      Surgical procedures (if appropriate):

## 2022-05-21 DIAGNOSIS — I48.91 ATRIAL FIBRILLATION, UNSPECIFIED TYPE (HCC): ICD-10-CM

## 2022-05-23 RX ORDER — APIXABAN 5 MG/1
TABLET, FILM COATED ORAL
Qty: 60 TABLET | Refills: 0 | Status: SHIPPED | OUTPATIENT
Start: 2022-05-23 | End: 2022-06-27

## 2022-05-25 NOTE — PROGRESS NOTES
05/25/22 1244   Hello, [Guardians Name / Jose Raul Martinez, this is [Caller Neisha Michael from [facility], and our clinical care team wanted to check on you / your child after your recent visit to the hospital  It will only take 3-5 minutes  Is this a good time? Discharge Call Type/ Specific Diagnosis: ARC 90 Day Call   ARC 90 Day Discharge Call   Assessment Source 4   Call Complete for 90 Days call back? Other (Comment)  (number not working)   Call Complete   Hi, This is ________ from [facility]  This is just a courtesy call, and there is no need to call us back  Have a great day     (Called by [facility])

## 2022-06-24 DIAGNOSIS — I48.91 ATRIAL FIBRILLATION, UNSPECIFIED TYPE (HCC): ICD-10-CM

## 2022-06-24 NOTE — TELEPHONE ENCOUNTER
Requested medication(s) are due for refill today: yes   Patient has already received a courtesy refill: No  Other reason request has been forwarded to provider: Per protocol

## 2022-06-27 RX ORDER — APIXABAN 5 MG/1
TABLET, FILM COATED ORAL
Qty: 60 TABLET | Refills: 0 | Status: SHIPPED | OUTPATIENT
Start: 2022-06-27 | End: 2022-07-08

## 2022-07-08 DIAGNOSIS — I48.91 ATRIAL FIBRILLATION, UNSPECIFIED TYPE (HCC): ICD-10-CM

## 2022-07-08 RX ORDER — APIXABAN 5 MG/1
TABLET, FILM COATED ORAL
Qty: 60 TABLET | Refills: 0 | Status: SHIPPED | OUTPATIENT
Start: 2022-07-08 | End: 2022-08-15

## 2022-07-25 ENCOUNTER — OFFICE VISIT (OUTPATIENT)
Dept: CARDIOLOGY CLINIC | Facility: CLINIC | Age: 82
End: 2022-07-25
Payer: MEDICARE

## 2022-07-25 VITALS
SYSTOLIC BLOOD PRESSURE: 156 MMHG | WEIGHT: 131.9 LBS | HEART RATE: 67 BPM | BODY MASS INDEX: 21.2 KG/M2 | OXYGEN SATURATION: 97 % | HEIGHT: 66 IN | DIASTOLIC BLOOD PRESSURE: 84 MMHG

## 2022-07-25 DIAGNOSIS — I10 PRIMARY HYPERTENSION: ICD-10-CM

## 2022-07-25 DIAGNOSIS — I10 HYPERTENSION, ESSENTIAL, BENIGN: Chronic | ICD-10-CM

## 2022-07-25 DIAGNOSIS — I48.0 PAROXYSMAL A-FIB (HCC): ICD-10-CM

## 2022-07-25 DIAGNOSIS — I08.0 MITRAL INSUFFICIENCY AND AORTIC STENOSIS: ICD-10-CM

## 2022-07-25 DIAGNOSIS — R00.2 HEART PALPITATIONS: ICD-10-CM

## 2022-07-25 DIAGNOSIS — I65.23 ASYMPTOMATIC BILATERAL CAROTID ARTERY STENOSIS: Primary | ICD-10-CM

## 2022-07-25 PROCEDURE — 93000 ELECTROCARDIOGRAM COMPLETE: CPT | Performed by: INTERNAL MEDICINE

## 2022-07-25 PROCEDURE — 99214 OFFICE O/P EST MOD 30 MIN: CPT | Performed by: INTERNAL MEDICINE

## 2022-07-25 RX ORDER — SPIRONOLACTONE 25 MG/1
TABLET ORAL
COMMUNITY
Start: 2022-07-14

## 2022-07-25 NOTE — PROGRESS NOTES
Cardiology Follow Up    Kelly Leon  1940  [id number]  [address]  [facility] [address]    1  Asymptomatic bilateral carotid artery stenosis  POCT ECG    VAS carotid complete study   2  Essential hypertension     3  Primary hypertension     4  Mitral insufficiency and aortic stenosis     5  Paroxysmal A-fib ([address] )     6  Heart palpitations         Discussion/SummARY:    Overall she has been doing well from a cardiac standpoint  She does some moderate activity around the house with good functional capacity for her age  She denies any complaints today in the office  Blood pressure on my manual recheck was 138/78  Continue current treatment plan  Lipids have been stable  She needs to quit smoking and provided strategies to do so  She is due for carotid Doppler see her back on a yearly basis  Has a history of paroxysmal atrial fibrillation tolerating anticoagulation  Interval History:  13-year-old female with paroxysmal atrial fibrillation, diabetes, hypertension, hyperlipidemia presents to discuss anticoagulation  I had recommended Coumadin however she is fearful of the medicine and wants talk about other options  She is resting comfortably with no complaints  There has been no chest pain or shortness of breath  Mild palpitations are minimal   Blood pressure has been hard to control recent changes in medication seems to be somewhat confusing for the patient  She is here with her daughter to discuss things  Since her last visit she has been doing well  Denies any chest pain, shortness of breath, palpitations, lightheadedness, dizziness, or syncope  There has been lower extremity edema, PND, orthopnea  She has been taking all medications as prescribed  No adverse bleeding on anticoagulation    Problem List     Heart palpitations    Nicotine abuse Transient atrial fibrillation (HCC)    Diabetes mellitus type 2 in nonobese (HCC) (Chronic)    Hypertension, essential, benign (Chronic)    Acquired hypothyroidism (Chronic)    Malignant neoplasm of central portion of right female breast ([facility] )    Acute kidney injury ([facility] )    Delirium    Physical deconditioning    Ambulatory dysfunction    Hx of fall    Anxiety    Postop check    Community acquired pneumonia of right middle lobe of lung ([place] [address] )        Past Medical History:   Diagnosis Date    Anxiety     Atrial fibrillation (HCC)     Bowel obstruction (HCC)     Cancer ([facility] )     LEFT BREAST CA 22 YEARS AGO     Depression     Diabetes mellitus ([facility] )     Disease of thyroid gland     Hypertension     Hypothyroidism      Social History     Socioeconomic History    Marital status:      Spouse name: Not on file    Number of children: Not on file    Years of education: Not on file    Highest education level: Not on file   Occupational History    Not on file   Tobacco Use    Smoking status: Current Every Day Smoker     Packs/day: 1 00     Types: Cigarettes    Smokeless tobacco: Never Used   Vaping Use    Vaping Use: Never used   Substance and Sexual Activity    Alcohol use: Never    Drug use: Never    Sexual activity: Not Currently   Other Topics Concern    Not on file   Social History Narrative    ** Merged History Encounter **          Social Determinants of Health     Financial Resource Strain: Not on file   Food Insecurity: No Food Insecurity    Worried About Running Out of Food in the Last Year: Never true    [address] in the Last Year: Never true   Transportation Needs: No Transportation Needs    Lack of Transportation (Medical): No    Lack of Transportation (Non-Medical):  No   Physical Activity: Not on file   Stress: Not on file   Social Connections: Not on file   Intimate Partner Violence: Not on file   Housing Stability: Low Risk     Unable to Pay for Housing in the Last Year: No  Number of Places Lived in the Last Year: 1    Unstable Housing in the Last Year: No      Family History   Problem Relation Age of Onset    Cancer Mother     No Known Problems Father      Past Surgical History:   Procedure Laterality Date    ABDOMINAL ADHESION SURGERY N/A 2/4/2018    Procedure: LYSIS ADHESIONS;  Surgeon: Maryann Simpson DO;  [facility];  Service: General    BREAST SURGERY      CHOLECYSTECTOMY      COLON SURGERY  2017    EXPLORATORY LAPAROTOMY      JOINT REPLACEMENT      LEFT KNEE REPLACEMENT     LAPAROTOMY N/A 2/4/2018    Procedure: LAPAROTOMY EXPLORATORY,;  Surgeon: Maryann Simpson DO;  [facility];  Service: General    MASTECTOMY      MASTECTOMY Left 1995    MASTECTOMY Right 2017    ID BIOPSY/EXCISION, LYMPH NODE(S) Right 1/13/2017    Procedure: SENTINEL LYMPH NODE BIOPSY RIGHT AXILLA;   Surgeon: Troy Garsia MD;  [facility];  Service: General    ID MASTECTOMY, SIMPLE, COMPLETE Right 1/13/2017    Procedure: MASTECTOMY SIMPLE;  Surgeon: Troy Garsia MD;  [facility];  Service: General    SMALL INTESTINE SURGERY N/A 2/4/2018    Procedure: RESECTION SMALL BOWEL;  Surgeon: Maryann Simpson DO;  [facility];  Service: General    US GUIDED BREAST BIOPSY RIGHT COMPLETE Right 11/29/2016       Current Outpatient Medications:     amLODIPine (NORVASC) 10 mg tablet, Take 10 mg by mouth daily, Disp: , Rfl:     ascorbic acid (VITAMIN C) 500 mg tablet, Take 500 mg by mouth daily, Disp: , Rfl:     atorvastatin (LIPITOR) 40 mg tablet, Take 40 mg by mouth, Disp: , Rfl:     cholecalciferol (VITAMIN D3) 1,000 units tablet, Take 2,000 Units by mouth daily , Disp: , Rfl:     cloNIDine (CATAPRES) 0 1 mg tablet, TAKE 1 TABLET BY MOUTH TWICE A DAY, Disp: 180 tablet, Rfl: 3    Eliquis 5 MG, TAKE 1 TABLET BY MOUTH TWICE A DAY, Disp: 60 tablet, Rfl: 0    labetalol (NORMODYNE) 200 mg tablet, Take 1 tablet (200 mg total) by mouth every 12 (twelve) hours, Disp: 120 tablet, Rfl: 0    levothyroxine 100 mcg tablet, Take 100 mcg by mouth daily, Disp: , Rfl:     lisinopril (ZESTRIL) 20 mg tablet, TAKE 1 TABLET (20 MG TOTAL) BY MOUTH 2 (TWO) TIMES A DAY, Disp: 180 tablet, Rfl: 3    LORazepam (ATIVAN) 0 5 mg tablet, Take 0 5 mg by mouth 2 (two) times a day, Disp: , Rfl:     metFORMIN (GLUCOPHAGE) 500 mg tablet, Take 1 tablet (500 mg total) by mouth 2 (two) times a day with meals, Disp: 60 tablet, Rfl: 0    Multiple Vitamins-Minerals (PRESERVISION AREDS 2 PO), Take by mouth 2 (two) times a day  , Disp: , Rfl:     multivitamin (THERAGRAN) TABS, Take 1 tablet by mouth daily, Disp: , Rfl:     spironolactone (ALDACTONE) 25 mg tablet, TAKE 1 TABLET (25 MG TOTAL) BY MOUTH DAILY  , Disp: , Rfl:     acetaminophen (TYLENOL) 500 mg tablet, Take 2 tablets (1,000 mg total) by mouth 3 (three) times a day as needed for mild pain, moderate pain or fever (Patient not taking: No sig reported), Disp: , Rfl:     acetaminophen (TYLENOL) 500 mg tablet, Take 2 tablets (1,000 mg total) by mouth 3 (three) times a day as needed for mild pain or moderate pain (Patient not taking: No sig reported), Disp: , Rfl:     azelastine (ASTELIN) 0 1 % nasal spray, 1 spray into each nostril 2 (two) times a day Use in each nostril as directed, Disp: 30 mL, Rfl: 0    chlorthalidone 25 mg tablet, Take 25 mg by mouth daily, Disp: , Rfl: 3  Allergies   Allergen Reactions    Meloxicam GI Intolerance    Morphine GI Intolerance    Morphine     Penicillins     Percocet [Oxycodone-Acetaminophen]     Sitagliptin      SOB       Labs:     Chemistry        Component Value Date/Time     09/29/2015 1034    K 3 6 04/06/2022 0403    K 4 1 09/29/2015 1034     04/06/2022 0403     09/29/2015 1034    CO2 27 04/06/2022 0403    CO2 27 06/03/2019 1203    BUN 16 04/06/2022 0403    BUN 14 09/29/2015 1034    CREATININE 0 81 04/06/2022 0403    CREATININE 0 89 09/29/2015 1034        Component Value Date/Time CALCIUM 8 9 04/06/2022 0403    CALCIUM 9 2 09/29/2015 1034    ALKPHOS 82 04/06/2022 0403    AST 19 04/06/2022 0403    ALT 28 04/06/2022 0403            No results found for: CHOL  Lab Results   Component Value Date    HDL 45 07/12/2016     Lab Results   Component Value Date    LDLCALC 85 07/12/2016     Lab Results   Component Value Date    TRIG 71 02/06/2018    TRIG 81 07/12/2016     No results found for: CHOLHDL    Imaging: No results found  ECG:      Sinus bradycardia LVH 1st degree AV block nonspecific ST changes      Review of Systems   Constitutional: Negative  HENT: Negative  Eyes: Negative  Cardiovascular: Negative  Respiratory: Negative  Endocrine: Negative  Hematologic/Lymphatic: Negative  Skin: Negative  Musculoskeletal: Negative  Gastrointestinal: Negative  Genitourinary: Negative  Neurological: Negative  Psychiatric/Behavioral: Negative  Vitals:    07/25/22 1108   BP: 156/84   Pulse: 67   SpO2: 97%     Vitals:    07/25/22 1108   Weight: 59 8 kg (131 lb 14 4 oz)     Height: 5' 6" (167 6 cm)   Body mass index is 21 29 kg/m²  Physical Exam:  Vital signs reviewed  General:  Alert and cooperative, appears stated age, no acute distress  HEENT:  PERRLA, EOMI, no scleral icterus, no conjunctival pallor  Neck:  No lymphadenopathy, no thyromegaly, no carotid bruits, no elevated JVP  Heart:  Regular rate and rhythm, normal S1/S2, no S3/S4, no murmur, rubs or gallops  PMI nondisplaced  Lungs:  Clear to auscultation bilaterally, no wheezes rales or rhonchi  Abdomen:  Soft, non-tender, positive bowel sounds, no rebound or guarding,   no organomegaly   Extremities:  Normal range of motion    No clubbing, cyanosis or edema   Vascular:  2+ pedal pulses  Skin:  No rashes or lesions on exposed skin  Neurologic:  Cranial nerves II-XII grossly intact without focal deficits  Psych:  Normal mood and affect

## 2022-08-13 DIAGNOSIS — I48.91 ATRIAL FIBRILLATION, UNSPECIFIED TYPE (HCC): ICD-10-CM

## 2022-08-15 RX ORDER — APIXABAN 5 MG/1
TABLET, FILM COATED ORAL
Qty: 60 TABLET | Refills: 11 | Status: SHIPPED | OUTPATIENT
Start: 2022-08-15

## 2022-08-31 ENCOUNTER — HOSPITAL ENCOUNTER (OUTPATIENT)
Dept: NON INVASIVE DIAGNOSTICS | Facility: HOSPITAL | Age: 82
Discharge: HOME | End: 2022-08-31
Attending: INTERNAL MEDICINE
Payer: MEDICARE

## 2022-08-31 DIAGNOSIS — I65.23 ASYMPTOMATIC BILATERAL CAROTID ARTERY STENOSIS: ICD-10-CM

## 2022-08-31 PROCEDURE — 93880 EXTRACRANIAL BILAT STUDY: CPT

## 2022-09-01 PROCEDURE — 93880 EXTRACRANIAL BILAT STUDY: CPT | Performed by: SURGERY

## 2022-09-06 ENCOUNTER — TELEPHONE (OUTPATIENT)
Dept: CARDIOLOGY CLINIC | Facility: CLINIC | Age: 82
End: 2022-09-06

## 2022-09-12 NOTE — TELEPHONE ENCOUNTER
Please let her know blockages in carotid are moderate and I would like her to see a vascular to review the scans    thanks

## 2023-01-02 ENCOUNTER — OFFICE VISIT (OUTPATIENT)
Dept: URGENT CARE | Facility: CLINIC | Age: 83
End: 2023-01-02

## 2023-01-02 ENCOUNTER — APPOINTMENT (OUTPATIENT)
Dept: RADIOLOGY | Facility: CLINIC | Age: 83
End: 2023-01-02

## 2023-01-02 VITALS
DIASTOLIC BLOOD PRESSURE: 75 MMHG | RESPIRATION RATE: 16 BRPM | TEMPERATURE: 97.1 F | OXYGEN SATURATION: 99 % | HEART RATE: 76 BPM | SYSTOLIC BLOOD PRESSURE: 151 MMHG

## 2023-01-02 DIAGNOSIS — M25.572 ACUTE LEFT ANKLE PAIN: ICD-10-CM

## 2023-01-02 DIAGNOSIS — S92.355A CLOSED NONDISPLACED FRACTURE OF FIFTH METATARSAL BONE OF LEFT FOOT, INITIAL ENCOUNTER: Primary | ICD-10-CM

## 2023-01-02 RX ORDER — AMLODIPINE BESYLATE 5 MG/1
10 TABLET ORAL DAILY
COMMUNITY
Start: 2022-09-29

## 2023-01-02 RX ORDER — OMEPRAZOLE 20 MG/1
20 CAPSULE, DELAYED RELEASE ORAL DAILY
COMMUNITY
Start: 2022-10-09 | End: 2023-01-22

## 2023-01-02 RX ORDER — OMEPRAZOLE 20 MG/1
20 CAPSULE, DELAYED RELEASE ORAL DAILY
COMMUNITY
Start: 2022-04-13 | End: 2023-01-22

## 2023-01-02 RX ORDER — FERROUS SULFATE 325(65) MG
325 TABLET ORAL
COMMUNITY
Start: 2022-08-17 | End: 2023-01-25

## 2023-01-02 NOTE — PATIENT INSTRUCTIONS
Foot Fracture in Adults   AMBULATORY CARE:   A foot fracture  is a break in a bone in your foot  Common signs and symptoms:   Tenderness over the injured area    Foot pain that increases when you try to stand or walk    Numbness in your foot or toes    Cracking sounds when you move your foot    Swelling, bruising, blistering, or open skin breaks    Trouble moving your foot or walking    Foot shape that is not normal    Call your local emergency number (911 in the [address] [facility]) if:   You suddenly feel lightheaded and short of breath  You have chest pain when you take a deep breath or cough  You cough up blood  Seek care immediately if:   The pain in your injured foot gets worse even after you rest and take pain medicine  The skin or toes of your foot become numb, swollen, cold, white, or blue  You have more pain or swelling than you did before a cast was put on  Your leg feels warm, tender, and painful  It may look swollen and red  Call your doctor if:   You have a fever  You have new sores around your boot, cast, or splint  You have new or worsening trouble moving your foot  You notice a foul smell coming from under your cast     Your boot, cast, or splint gets damaged  You have questions or concerns about your condition or care  Treatment  depends on the kind of fracture you have and how bad it is  You may need any of the following:  A boot, cast, or splint  may be put on your foot and lower leg to decrease your foot movement  These work to hold the broken bones in place, decrease pain, and prevent more damage to your foot  Medicines  may be given to prevent or treat pain or a bacterial infection  You may also need a vaccine to prevent tetanus if bone broke through the skin  A tetanus shot is given if you have not had a booster in the past 5 to 10 years  Surgery  may be used to put your bones back into the correct position   Wires, pins, plates, or screws may be used to keep the broken pieces lined up correctly and hold them together  Self-care:   Rest  your foot and avoid activities that cause pain  Apply ice  to decrease swelling and pain, and to prevent tissue damage  Use an ice pack, or put crushed ice in a plastic bag  Cover it with a towel before you apply it  Use ice for 15 to 20 minutes every hour or as directed  Elevate your foot  above the level of your heart as often as you can  This will help decrease swelling and pain  Prop your foot on pillows or blankets to keep it elevated comfortably  Physical therapy  may be needed when your foot has healed  A physical therapist can teach you exercises to help improve movement and strength, and to decrease pain  Cast or splint care: Ask when it is okay to take a bath or shower  Do not let your cast or splint get wet  Before you bathe, cover the cast or splint with a plastic bag  Tape the bag to your skin above the splint to seal out water  Keep your foot out of the water in case the bag leaks  Check the skin around your splint daily for any redness or open areas  Do not use a sharp or pointed object to scratch your skin under the splint  Do not remove your splint unless your healthcare provider or orthopedic surgeon says it is okay  Assistive devices: You may be given a hard-soled shoe to wear while your foot is healing  You also may need to use crutches to help you walk while your foot heals  It is important to use your crutches correctly  Ask for more information about how to use crutches  Follow up with your doctor or bone specialist as directed: You may need to return to have your splint or stitches removed  You may also need to return for tests to make sure your foot is healing  Write down your questions so you remember to ask them during your visits    © Copyright [employer] 2022 Information is for End User's use only and may not be sold, redistributed or otherwise used for commercial purposes  All illustrations and images included in CareNotes® are the copyrighted property of A D A M [employer] Inc  or [employer]  The above information is an  only  It is not intended as medical advice for individual conditions or treatments  Talk to your doctor, nurse or pharmacist before following any medical regimen to see if it is safe and effective for you

## 2023-01-03 ENCOUNTER — TELEPHONE (OUTPATIENT)
Dept: OBGYN CLINIC | Facility: HOSPITAL | Age: 83
End: 2023-01-03

## 2023-01-03 NOTE — TELEPHONE ENCOUNTER
Hello,  Please advise if the following patient can be forced onto the schedule:    Patient: Lakshmi Rm    : 1940    MRN: [id number]    Call back #: [phone]    Insurance: [facility]    Reason for appointment: Nondisplaced fx of the left 5th metatarsal    Requested doctor/location: Kathe/Debbie      Thank you

## 2023-01-11 ENCOUNTER — OFFICE VISIT (OUTPATIENT)
Dept: OBGYN CLINIC | Facility: HOSPITAL | Age: 83
End: 2023-01-11

## 2023-01-11 VITALS
BODY MASS INDEX: 21.05 KG/M2 | SYSTOLIC BLOOD PRESSURE: 174 MMHG | HEIGHT: 66 IN | HEART RATE: 75 BPM | DIASTOLIC BLOOD PRESSURE: 53 MMHG | WEIGHT: 131 LBS

## 2023-01-11 DIAGNOSIS — S92.355A CLOSED NONDISPLACED FRACTURE OF FIFTH METATARSAL BONE OF LEFT FOOT, INITIAL ENCOUNTER: ICD-10-CM

## 2023-01-11 NOTE — PROGRESS NOTES
Assessment:  1  Closed nondisplaced fracture of fifth metatarsal bone of left foot, initial encounter  Ambulatory Referral to Orthopedic Surgery          Plan:  Nondisplaced fracture at base of 5th metatarsal  Not indicated for operative management  · Patient provided with Low CAM boot  · Patient can fully weight bear  · Patient uses Eliquis longstanding  · Follow up in 3 weeks with repeat x-rays    To do next visit:  Return in about 3 weeks (around 2/1/2023) for re-check with x-rays  The above stated was discussed in layman's terms and the patient expressed understanding  All questions were answered to the patient's satisfaction  Scribe Attestation    I,:  Rashel Peraza am acting as a scribe while in the presence of the attending physician :       I,:  Johnson Michael MD personally performed the services described in this documentation    as scribed in my presence :             Subjective:   Saleem Javed is a 78 y o  female who presents for initial evaluation of left 5th metatarsal fracture sustained 1/2//2023  He initialy had onset of left lateral midfoot pain while walking in her house  She was seen at Urgent Care and placed in post-op shoe  Today she complains f left lateral foot pain  She does use walker as baseline  She does use Eliquis longstanding          Review of systems negative unless otherwise specified in HPI    Past Medical History:   Diagnosis Date   • Anxiety    • Atrial fibrillation ([address] )    • Bowel obstruction (HCC)    • Cancer ([address] )     LEFT BREAST CA 22 YEARS AGO    • Depression    • Diabetes mellitus ([address] )    • Disease of thyroid gland    • Hypertension    • Hypothyroidism        Past Surgical History:   Procedure Laterality Date   • ABDOMINAL ADHESION SURGERY N/A 2/4/2018    Procedure: LYSIS ADHESIONS;  Surgeon: Alfonso Hdz DO;  Location: [facility];  Service: General   • BREAST SURGERY     • CHOLECYSTECTOMY     • COLON SURGERY  2017   • EXPLORATORY LAPAROTOMY     • JOINT REPLACEMENT      LEFT KNEE REPLACEMENT    • LAPAROTOMY N/A 2/4/2018    Procedure: LAPAROTOMY EXPLORATORY,;  Surgeon: Nani Still DO;  Location: [facility];  Service: General   • MASTECTOMY     • MASTECTOMY Left 1995   • MASTECTOMY Right 2017   • CT BX/EXC LYMPH NODE OPEN SUPERFICIAL Right 1/13/2017    Procedure: SENTINEL LYMPH NODE BIOPSY RIGHT AXILLA;   Surgeon: Jose Juan Mason MD;  Location: [facility];  Service: General   • CT MASTECTOMY SIMPLE COMPLETE Right 1/13/2017    Procedure: MASTECTOMY SIMPLE;  Surgeon: Jose Juan Mason MD;  Location: [facility];  Service: General   • SMALL INTESTINE SURGERY N/A 2/4/2018    Procedure: RESECTION SMALL BOWEL;  Surgeon: Nani Still DO;  Location: [facility];  Service: General   • US GUIDED BREAST BIOPSY RIGHT COMPLETE Right 11/29/2016       Family History   Problem Relation Age of Onset   • Cancer Mother    • No Known Problems Father        Social History     Occupational History   • Not on file   Tobacco Use   • Smoking status: Every Day     Packs/day: 1 00     Types: Cigarettes   • Smokeless tobacco: Never   Vaping Use   • Vaping Use: Never used   Substance and Sexual Activity   • Alcohol use: Never   • Drug use: Never   • Sexual activity: Not Currently         Current Outpatient Medications:   •  amLODIPine (NORVASC) 10 mg tablet, Take 10 mg by mouth daily, Disp: , Rfl:   •  amLODIPine (NORVASC) 5 mg tablet, Take 5 mg by mouth 2 (two) times a day, Disp: , Rfl:   •  ascorbic acid (VITAMIN C) 500 mg tablet, Take 500 mg by mouth daily, Disp: , Rfl:   •  atorvastatin (LIPITOR) 40 mg tablet, Take 40 mg by mouth, Disp: , Rfl:   •  chlorthalidone 25 mg tablet, Take 25 mg by mouth daily, Disp: , Rfl: 3  •  cholecalciferol (VITAMIN D3) 1,000 units tablet, Take 2,000 Units by mouth daily , Disp: , Rfl:   •  cloNIDine (CATAPRES) 0 1 mg tablet, TAKE 1 TABLET BY MOUTH TWICE A DAY, Disp: 180 tablet, Rfl: 3  •  Eliquis 5 MG, TAKE 1 TABLET BY MOUTH TWICE A DAY, Disp: 60 tablet, Rfl: 11  •  ferrous sulfate 325 (65 Fe) mg tablet, Take 325 mg by mouth, Disp: , Rfl:   •  labetalol (NORMODYNE) 200 mg tablet, Take 1 tablet (200 mg total) by mouth every 12 (twelve) hours, Disp: 120 tablet, Rfl: 0  •  levothyroxine 100 mcg tablet, Take 100 mcg by mouth daily, Disp: , Rfl:   •  lisinopril (ZESTRIL) 20 mg tablet, TAKE 1 TABLET (20 MG TOTAL) BY MOUTH 2 (TWO) TIMES A DAY, Disp: 180 tablet, Rfl: 3  •  LORazepam (ATIVAN) 0 5 mg tablet, Take 0 5 mg by mouth 2 (two) times a day, Disp: , Rfl:   •  metFORMIN (GLUCOPHAGE) 1000 MG tablet, TAKE 1/2 TABLET BY MOUTH 2 TIMES A DAY WITH MEALS , Disp: , Rfl:   •  metFORMIN (GLUCOPHAGE) 500 mg tablet, Take 1 tablet (500 mg total) by mouth 2 (two) times a day with meals, Disp: 60 tablet, Rfl: 0  •  Multiple Vitamins-Minerals (PRESERVISION AREDS 2 PO), Take by mouth 2 (two) times a day  , Disp: , Rfl:   •  multivitamin (THERAGRAN) TABS, Take 1 tablet by mouth daily, Disp: , Rfl:   •  omeprazole (PriLOSEC) 20 mg delayed release capsule, Take 20 mg by mouth daily, Disp: , Rfl:   •  omeprazole (PriLOSEC) 20 mg delayed release capsule, Take 20 mg by mouth daily, Disp: , Rfl:   •  spironolactone (ALDACTONE) 25 mg tablet, TAKE 1 TABLET (25 MG TOTAL) BY MOUTH DAILY  , Disp: , Rfl:   •  acetaminophen (TYLENOL) 500 mg tablet, Take 2 tablets (1,000 mg total) by mouth 3 (three) times a day as needed for mild pain, moderate pain or fever (Patient not taking: Reported on 7/25/2022), Disp: , Rfl:   •  acetaminophen (TYLENOL) 500 mg tablet, Take 2 tablets (1,000 mg total) by mouth 3 (three) times a day as needed for mild pain or moderate pain (Patient not taking: Reported on 7/25/2022), Disp: , Rfl:   •  azelastine (ASTELIN) 0 1 % nasal spray, 1 spray into each nostril 2 (two) times a day Use in each nostril as directed, Disp: 30 mL, Rfl: 0    Allergies   Allergen Reactions   • Meloxicam GI Intolerance   • Montelukast Other (See Comments)     headache   • Morphine GI Intolerance   • Morphine    • Penicillins    • Percocet [Oxycodone-Acetaminophen]    • Sitagliptin      SOB            Vitals:    01/11/23 1129   BP: (!) 174/53   Pulse: 75       Objective:  Physical exam  · General: Awake, Alert, Oriented  · Eyes: Pupils equal, round and reactive to light  · Heart: regular rate and rhythm  · Lungs: No audible wheezing  · Abdomen: soft                    Ortho Exam  Left foot:  Post-op shoe removed for exam  TTP over base of 5th metatarsal  Calf compartments soft and supple  Sensation intact  Toes are warm sensate and mobile      Diagnostics, reviewed and taken today if performed as documented: The attending physician has personally reviewed the pertinent films in PACS and interpretation is as follows:  Left foot x-ray of 1/2/2023:  Nondisplaced fracture at base of 5th metatarsal     Procedures, if performed today:    Procedures    None performed      Portions of the record may have been created with voice recognition software  Occasional wrong word or "sound a like" substitutions may have occurred due to the inherent limitations of voice recognition software  Read the chart carefully and recognize, using context, where substitutions have occurred

## 2023-01-19 ENCOUNTER — HOSPITAL ENCOUNTER (EMERGENCY)
Facility: HOSPITAL | Age: 83
Discharge: HOME | End: 2023-01-19
Attending: EMERGENCY MEDICINE

## 2023-01-19 ENCOUNTER — APPOINTMENT (EMERGENCY)
Dept: RADIOLOGY | Facility: HOSPITAL | Age: 83
End: 2023-01-19

## 2023-01-19 VITALS
SYSTOLIC BLOOD PRESSURE: 133 MMHG | TEMPERATURE: 97.7 F | DIASTOLIC BLOOD PRESSURE: 62 MMHG | RESPIRATION RATE: 24 BRPM | OXYGEN SATURATION: 100 % | HEART RATE: 64 BPM

## 2023-01-19 DIAGNOSIS — N39.0 UTI (URINARY TRACT INFECTION): ICD-10-CM

## 2023-01-19 DIAGNOSIS — J40 BRONCHITIS: Primary | ICD-10-CM

## 2023-01-19 LAB
2HR DELTA HS TROPONIN: -1 NG/L
ANION GAP SERPL CALCULATED.3IONS-SCNC: 6 MMOL/L (ref 4–13)
BACTERIA UR QL AUTO: ABNORMAL /HPF
BASOPHILS # BLD AUTO: 0.02 THOUSANDS/ÂΜL (ref 0–0.1)
BASOPHILS NFR BLD AUTO: 1 % (ref 0–1)
BILIRUB UR QL STRIP: NEGATIVE
BUN SERPL-MCNC: 24 MG/DL (ref 5–25)
CALCIUM SERPL-MCNC: 9.2 MG/DL (ref 8.3–10.1)
CARDIAC TROPONIN I PNL SERPL HS: 4 NG/L
CARDIAC TROPONIN I PNL SERPL HS: 5 NG/L
CHLORIDE SERPL-SCNC: 100 MMOL/L (ref 96–108)
CLARITY UR: CLEAR
CO2 SERPL-SCNC: 26 MMOL/L (ref 21–32)
COLOR UR: YELLOW
CREAT SERPL-MCNC: 1.17 MG/DL (ref 0.6–1.3)
EOSINOPHIL # BLD AUTO: 0.11 THOUSAND/ÂΜL (ref 0–0.61)
EOSINOPHIL NFR BLD AUTO: 3 % (ref 0–6)
ERYTHROCYTE [DISTWIDTH] IN BLOOD BY AUTOMATED COUNT: 14.9 % (ref 11.6–15.1)
FLUAV RNA RESP QL NAA+PROBE: NEGATIVE
FLUBV RNA RESP QL NAA+PROBE: NEGATIVE
GFR SERPL CREATININE-BSD FRML MDRD: 42 ML/MIN/1.73SQ M
GLUCOSE SERPL-MCNC: 106 MG/DL (ref 65–140)
GLUCOSE UR STRIP-MCNC: NEGATIVE MG/DL
HCT VFR BLD AUTO: 30.5 % (ref 34.8–46.1)
HGB BLD-MCNC: 10 G/DL (ref 11.5–15.4)
HGB UR QL STRIP.AUTO: NEGATIVE
HYALINE CASTS #/AREA URNS LPF: ABNORMAL /LPF
IMM GRANULOCYTES # BLD AUTO: 0.03 THOUSAND/UL (ref 0–0.2)
IMM GRANULOCYTES NFR BLD AUTO: 1 % (ref 0–2)
KETONES UR STRIP-MCNC: NEGATIVE MG/DL
LEUKOCYTE ESTERASE UR QL STRIP: ABNORMAL
LYMPHOCYTES # BLD AUTO: 0.78 THOUSANDS/ÂΜL (ref 0.6–4.47)
LYMPHOCYTES NFR BLD AUTO: 18 % (ref 14–44)
MCH RBC QN AUTO: 32.6 PG (ref 26.8–34.3)
MCHC RBC AUTO-ENTMCNC: 32.8 G/DL (ref 31.4–37.4)
MCV RBC AUTO: 99 FL (ref 82–98)
MONOCYTES # BLD AUTO: 0.5 THOUSAND/ÂΜL (ref 0.17–1.22)
MONOCYTES NFR BLD AUTO: 11 % (ref 4–12)
MUCOUS THREADS UR QL AUTO: ABNORMAL
NEUTROPHILS # BLD AUTO: 2.93 THOUSANDS/ÂΜL (ref 1.85–7.62)
NEUTS SEG NFR BLD AUTO: 66 % (ref 43–75)
NITRITE UR QL STRIP: NEGATIVE
NON-SQ EPI CELLS URNS QL MICRO: ABNORMAL /HPF
NRBC BLD AUTO-RTO: 0 /100 WBCS
PH UR STRIP.AUTO: 5.5 [PH]
PLATELET # BLD AUTO: 246 THOUSANDS/UL (ref 149–390)
PMV BLD AUTO: 10.2 FL (ref 8.9–12.7)
POTASSIUM SERPL-SCNC: 4.9 MMOL/L (ref 3.5–5.3)
PROT UR STRIP-MCNC: ABNORMAL MG/DL
RBC # BLD AUTO: 3.07 MILLION/UL (ref 3.81–5.12)
RBC #/AREA URNS AUTO: ABNORMAL /HPF
RSV RNA RESP QL NAA+PROBE: NEGATIVE
SARS-COV-2 RNA RESP QL NAA+PROBE: NEGATIVE
SODIUM SERPL-SCNC: 132 MMOL/L (ref 135–147)
SP GR UR STRIP.AUTO: >=1.03 (ref 1–1.03)
UROBILINOGEN UR STRIP-ACNC: 2 MG/DL
WBC # BLD AUTO: 4.37 THOUSAND/UL (ref 4.31–10.16)
WBC #/AREA URNS AUTO: ABNORMAL /HPF

## 2023-01-19 RX ORDER — ALBUTEROL SULFATE 2.5 MG/3ML
2.5 SOLUTION RESPIRATORY (INHALATION) ONCE
Status: COMPLETED | OUTPATIENT
Start: 2023-01-19 | End: 2023-01-19

## 2023-01-19 RX ORDER — CEPHALEXIN 500 MG/1
500 CAPSULE ORAL ONCE
Status: COMPLETED | OUTPATIENT
Start: 2023-01-19 | End: 2023-01-19

## 2023-01-19 RX ORDER — CEPHALEXIN 500 MG/1
500 CAPSULE ORAL EVERY 12 HOURS SCHEDULED
Qty: 10 CAPSULE | Refills: 0 | Status: SHIPPED | OUTPATIENT
Start: 2023-01-19 | End: 2023-01-22

## 2023-01-19 RX ORDER — IPRATROPIUM BROMIDE AND ALBUTEROL SULFATE .5; 3 MG/3ML; MG/3ML
1 SOLUTION RESPIRATORY (INHALATION) ONCE
Status: COMPLETED | OUTPATIENT
Start: 2023-01-19 | End: 2023-01-19

## 2023-01-19 RX ADMIN — ALBUTEROL SULFATE 2.5 MG: 2.5 SOLUTION RESPIRATORY (INHALATION) at 14:45

## 2023-01-19 RX ADMIN — IPRATROPIUM BROMIDE 0.5 MG: 0.5 SOLUTION RESPIRATORY (INHALATION) at 14:45

## 2023-01-19 RX ADMIN — CEPHALEXIN 500 MG: 500 CAPSULE ORAL at 17:38

## 2023-01-19 NOTE — ED ATTENDING ATTESTATION
Mariano Currie MD, saw and evaluated the patient  I have discussed the patient with the resident and agree with the resident's findings, Plan of Care, and MDM as documented in the resident's note, except where noted  All available labs and Radiology studies were reviewed  I was present for key portions of any procedure(s) performed by the resident and I was immediately available to provide assistance  At this point I agree with the current assessment done in the Emergency Department  I have conducted an independent evaluation of this patient a history and physical is as follows:    77 yo female with a complicated past medical history including HTN, hypothyroidism, paroxysmal atrial fibrillation on Eliquis, anxiety, DM, CKD, GERD, major depressive disorder, hyperlipidemia, chronic low back pain, and macular degeneration brought to the ED by EMS for evaluation of shortness of breath and occasional wheezing x several days  The patient also reports a mild cough productive of clear sputum x 2 days  (+) Mild BURNS  (+) Generalized fatigue  No chest pain  She denies lightheadedness/dizziness  No LE swelling or pain  No fever or chills  No nausea, vomiting, or diarrhea  She says she feels "pretty good" at present  No other specific complaints  ROS: per resident physician note    Gen: NAD, AA&Ox3  HEENT: PERRL, EOMI  Neck: supple  CV: irregularly irregular, normal rate  Lungs: CTA B/L  Abdomen: soft, NT/ND  Ext: no swelling or deformity  Neuro: 5/5 strength all extremities, sensation grossly intact  Skin: no rash    ED Course  The patient is well appearing with stable vital signs and a benign exam despite her complaints  Lungs are CTA B/L  Symptoms are most consistent with a viral URI  COPD exacerbation, ACS, PE, and pneumonia are less likely but not entirely ruled out  Will check EKG, CXR, basic labs, troponin, UA, and viral swab  Duo-neb ordered  Will continue to monitor in the ED   The patient is adamant that she be discharged home "if everything looks ok"  Disposition per results and reassessment        Critical Care Time  Procedures

## 2023-01-19 NOTE — ED PROVIDER NOTES
History  Chief Complaint   Patient presents with   • Shortness of Breath     Pt from home, 94% on RA increased wheezing and dyspnea on exertion per EMS     Patient is an 73-year-old female past medical history hypertension diabetes A  fib on Eliquis presenting to the emergency department for evaluation of dyspnea on exertion with associated cough and generalized fatigue x1 day  Patient reports she was in her normal state of health 2 days ago and yesterday developed cough productive of clear sputum  Patient endorses dyspnea on exertion such as when she gets up to the bathroom but this improves with rest   Patient presented today given worsening fatigue  Patient denies any associated chest pain diaphoresis lower extremity edema abdominal pain nausea vomiting fever chills  No recent sick contacts or recent travel  Patient is a current half pack a day smoker  Patient denying any other complaints at this time  Patient does have a COVID and flu vaccine          Prior to Admission Medications   Prescriptions Last Dose Informant Patient Reported? Taking?    Eliquis 5 MG   No No   Sig: TAKE 1 TABLET BY MOUTH TWICE A DAY   LORazepam (ATIVAN) 0 5 mg tablet   Yes No   Sig: Take 0 5 mg by mouth 2 (two) times a day   Multiple Vitamins-Minerals (PRESERVISION AREDS 2 PO)   Yes No   Sig: Take by mouth 2 (two) times a day     acetaminophen (TYLENOL) 500 mg tablet   No No   Sig: Take 2 tablets (1,000 mg total) by mouth 3 (three) times a day as needed for mild pain, moderate pain or fever   Patient not taking: Reported on 7/25/2022   acetaminophen (TYLENOL) 500 mg tablet   No No   Sig: Take 2 tablets (1,000 mg total) by mouth 3 (three) times a day as needed for mild pain or moderate pain   Patient not taking: Reported on 7/25/2022   amLODIPine (NORVASC) 10 mg tablet   Yes No   Sig: Take 10 mg by mouth daily   amLODIPine (NORVASC) 5 mg tablet   Yes No   Sig: Take 5 mg by mouth 2 (two) times a day   ascorbic acid (VITAMIN C) 500 mg tablet   Yes No   Sig: Take 500 mg by mouth daily   atorvastatin (LIPITOR) 40 mg tablet   Yes No   Sig: Take 40 mg by mouth   azelastine (ASTELIN) 0 1 % nasal spray   No No   Si spray into each nostril 2 (two) times a day Use in each nostril as directed   chlorthalidone 25 mg tablet   Yes No   Sig: Take 25 mg by mouth daily   cholecalciferol (VITAMIN D3) 1,000 units tablet   Yes No   Sig: Take 2,000 Units by mouth daily    cloNIDine (CATAPRES) 0 1 mg tablet   No No   Sig: TAKE 1 TABLET BY MOUTH TWICE A DAY   ferrous sulfate 325 (65 Fe) mg tablet   Yes No   Sig: Take 325 mg by mouth   labetalol (NORMODYNE) 200 mg tablet   No No   Sig: Take 1 tablet (200 mg total) by mouth every 12 (twelve) hours   levothyroxine 100 mcg tablet   Yes No   Sig: Take 100 mcg by mouth daily   lisinopril (ZESTRIL) 20 mg tablet   No No   Sig: TAKE 1 TABLET (20 MG TOTAL) BY MOUTH 2 (TWO) TIMES A DAY   metFORMIN (GLUCOPHAGE) 1000 MG tablet   Yes No   Sig: TAKE 1/2 TABLET BY MOUTH 2 TIMES A DAY WITH MEALS    metFORMIN (GLUCOPHAGE) 500 mg tablet   No No   Sig: Take 1 tablet (500 mg total) by mouth 2 (two) times a day with meals   multivitamin (THERAGRAN) TABS   Yes No   Sig: Take 1 tablet by mouth daily   omeprazole (PriLOSEC) 20 mg delayed release capsule   Yes No   Sig: Take 20 mg by mouth daily   omeprazole (PriLOSEC) 20 mg delayed release capsule   Yes No   Sig: Take 20 mg by mouth daily   spironolactone (ALDACTONE) 25 mg tablet   Yes No   Sig: TAKE 1 TABLET (25 MG TOTAL) BY MOUTH DAILY        Facility-Administered Medications: None       Past Medical History:   Diagnosis Date   • Anxiety    • Atrial fibrillation (HCC)    • Bowel obstruction (HCC)    • Cancer (HCC)     LEFT BREAST CA 22 YEARS AGO    • Depression    • Diabetes mellitus ([facility] [address] )    • Disease of thyroid gland    • Hypertension    • Hypothyroidism        Past Surgical History:   Procedure Laterality Date   • ABDOMINAL ADHESION SURGERY N/A 2018    Procedure: LYSIS ADHESIONS; Surgeon: Flakito Wren DO;  Location: [facility];  Service: General   • BREAST SURGERY     • CHOLECYSTECTOMY     • COLON SURGERY  2017   • EXPLORATORY LAPAROTOMY     • JOINT REPLACEMENT      LEFT KNEE REPLACEMENT    • LAPAROTOMY N/A 2/4/2018    Procedure: LAPAROTOMY EXPLORATORY,;  Surgeon: Flakito Wren DO;  [facility];  Service: General   • MASTECTOMY     • MASTECTOMY Left 1995   • MASTECTOMY Right 2017   • ID BX/EXC LYMPH NODE OPEN SUPERFICIAL Right 1/13/2017    Procedure: SENTINEL LYMPH NODE BIOPSY RIGHT AXILLA; Surgeon: Kelly Robles MD;  [facility];  Service: General   • ID MASTECTOMY SIMPLE COMPLETE Right 1/13/2017    Procedure: MASTECTOMY SIMPLE;  Surgeon: Kelly Robles MD;  [facility];  Service: General   • SMALL INTESTINE SURGERY N/A 2/4/2018    Procedure: RESECTION SMALL BOWEL;  Surgeon: Flakito Wren DO;  [facility];  Service: General   • US GUIDED BREAST BIOPSY RIGHT COMPLETE Right 11/29/2016       Family History   Problem Relation Age of Onset   • Cancer Mother    • No Known Problems Father      I have reviewed and agree with the history as documented  E-Cigarette/Vaping   • E-Cigarette Use Never User      E-Cigarette/Vaping Substances   • Nicotine No    • Flavoring No      Social History     Tobacco Use   • Smoking status: Every Day     Packs/day: 1 00     Types: Cigarettes   • Smokeless tobacco: Never   Vaping Use   • Vaping Use: Never used   Substance Use Topics   • Alcohol use: Never   • Drug use: Never        Review of Systems   Constitutional: Positive for appetite change and fatigue  Negative for chills and fever  HENT: Positive for postnasal drip  Negative for congestion  Eyes: Negative for visual disturbance  Respiratory: Positive for cough and shortness of breath  Cardiovascular: Negative for chest pain and leg swelling  Gastrointestinal: Negative for abdominal pain, nausea and vomiting     Genitourinary: Positive for decreased urine volume  Negative for dysuria, frequency and hematuria  Musculoskeletal: Negative for back pain  Skin: Negative for rash  Neurological: Negative for headaches  All other systems reviewed and are negative  Physical Exam  ED Triage Vitals   Temperature Pulse Respirations Blood Pressure SpO2   01/19/23 1223 01/19/23 1221 01/19/23 1221 01/19/23 1221 01/19/23 1221   97 7 °F (36 5 °C) 66 20 121/57 97 %      Temp Source Heart Rate Source Patient Position - Orthostatic VS BP Location FiO2 (%)   01/19/23 1221 01/19/23 1221 01/19/23 1221 01/19/23 1221 --   Oral Monitor Lying Right arm       Pain Score       01/19/23 1221       No Pain             Orthostatic Vital Signs  Vitals:    01/19/23 1221 01/19/23 1230 01/19/23 1515   BP: 121/57 121/57 133/62   Pulse: 66 64 64   Patient Position - Orthostatic VS: Lying  Lying       Physical Exam  Vitals and nursing note reviewed  Constitutional:       General: She is not in acute distress  Appearance: She is not ill-appearing or diaphoretic  HENT:      Head: Normocephalic and atraumatic  Mouth/Throat:      Mouth: Mucous membranes are moist    Eyes:      Extraocular Movements: Extraocular movements intact  Cardiovascular:      Rate and Rhythm: Normal rate  Rhythm irregular  Heart sounds: No murmur heard  Pulmonary:      Effort: Pulmonary effort is normal  No tachypnea or respiratory distress  Breath sounds: Examination of the right-upper field reveals decreased breath sounds  Examination of the left-upper field reveals decreased breath sounds  Examination of the right-middle field reveals decreased breath sounds  Examination of the left-middle field reveals decreased breath sounds  Examination of the right-lower field reveals decreased breath sounds  Examination of the left-lower field reveals decreased breath sounds  Decreased breath sounds present  No wheezing, rhonchi or rales  Abdominal:      Palpations: Abdomen is soft        Tenderness: There is no abdominal tenderness  Musculoskeletal:         General: Normal range of motion  Cervical back: Normal range of motion  Right lower leg: No edema  Left lower leg: No edema  Skin:     General: Skin is warm and dry  Neurological:      General: No focal deficit present  Mental Status: She is alert and oriented to person, place, and time           ED Medications  Medications   ipratropium-albuterol (FOR EMS ONLY) (DUO-NEB) 0 5-2 5 mg/3 mL inhalation solution 3 mL (0 mL Does not apply Given to EMS 1/19/23 1220)   albuterol inhalation solution 2 5 mg (2 5 mg Nebulization Given 1/19/23 1445)   ipratropium (ATROVENT) 0 02 % inhalation solution 0 5 mg (0 5 mg Nebulization Given 1/19/23 1445)   cephalexin (KEFLEX) capsule 500 mg (500 mg Oral Given 1/19/23 1738)       Diagnostic Studies  Results Reviewed     Procedure Component Value Units Date/Time    Urine Microscopic [[id number]]  (Abnormal) Collected: 01/19/23 1650    Lab Status: Final result Specimen: Urine, Clean Catch Updated: 01/19/23 1730     RBC, UA 1-2 /hpf      WBC, UA 4-10 /hpf      Epithelial Cells Occasional /hpf      Bacteria, UA None Seen /hpf      MUCUS THREADS Occasional     Hyaline Casts, UA 5-10 /lpf     UA w Reflex to Microscopic w Reflex to Culture [[id number]]  (Abnormal) Collected: 01/19/23 1650    Lab Status: Final result Specimen: Urine, Clean Catch Updated: 01/19/23 1715     Color, UA Yellow     Clarity, UA Clear     Specific Gravity, UA >=1 030     pH, UA 5 5     Leukocytes, UA Moderate     Nitrite, UA Negative     Protein, UA 30 (1+) mg/dl      Glucose, UA Negative mg/dl      Ketones, UA Negative mg/dl      Urobilinogen, UA 2 0 mg/dl      Bilirubin, UA Negative     Occult Blood, UA Negative    HS Troponin I 2hr [[id number]]  (Normal) Collected: 01/19/23 1515    Lab Status: Final result Specimen: Blood from Arm, Left Updated: 01/19/23 1552     hs TnI 2hr 4 ng/L      Delta 2hr hsTnI -1 ng/L     HS Troponin I 4hr [[id number]]     Lab Status: No result Specimen: Blood     FLU/RSV/COVID - if FLU/RSV clinically relevant [[id number]]  (Normal) Collected: 01/19/23 1314    Lab Status: Final result Specimen: Nares from Nose Updated: 01/19/23 1421     SARS-CoV-2 Negative     INFLUENZA A PCR Negative     INFLUENZA B PCR Negative     RSV PCR Negative    Narrative:      FOR PEDIATRIC PATIENTS - copy/paste COVID Guidelines URL to browser: [email or web address]  [employer]    SARS-CoV-2 assay is a Nucleic Acid Amplification assay intended for the  qualitative detection of nucleic acid from SARS-CoV-2 in nasopharyngeal  swabs  Results are for the presumptive identification of SARS-CoV-2 RNA  Positive results are indicative of infection with SARS-CoV-2, the virus  causing COVID-19, but do not rule out bacterial infection or co-infection  with other viruses  Laboratories within the [place] and its  territories are required to report all positive results to the appropriate  public health authorities  Negative results do not preclude SARS-CoV-2  infection and should not be used as the sole basis for treatment or other  patient management decisions  Negative results must be combined with  clinical observations, patient history, and epidemiological information  This test has not been FDA cleared or approved  This test has been authorized by FDA under an Emergency Use Authorization  (EUA)  This test is only authorized for the duration of time the  declaration that circumstances exist justifying the authorization of the  emergency use of an in vitro diagnostic tests for detection of SARS-CoV-2  virus and/or diagnosis of COVID-19 infection under section 564(b)(1) of  the Act, 21 U  S C  824CUL-0(Y)(9), unless the authorization is terminated  or revoked sooner  The test has been validated but independent review by FDA  and CLIA is pending  Test performed using [employer] GeneXpert:  This RT-PCR assay targets N2,  a region unique to SARS-CoV-2  A conserved region in the E-gene was chosen  for pan-Sarbecovirus detection which includes SARS-CoV-2  According to CMS-2020-01-R, this platform meets the definition of high-throughput technology      HS Troponin 0hr (reflex protocol) [[id number]]  (Normal) Collected: 01/19/23 1314    Lab Status: Final result Specimen: Blood from Arm, Left Updated: 01/19/23 1410     hs TnI 0hr 5 ng/L     Basic metabolic panel [[id number]]  (Abnormal) Collected: 01/19/23 1314    Lab Status: Final result Specimen: Blood from Arm, Left Updated: 01/19/23 1349     Sodium 132 mmol/L      Potassium 4 9 mmol/L      Chloride 100 mmol/L      CO2 26 mmol/L      ANION GAP 6 mmol/L      BUN 24 mg/dL      Creatinine 1 17 mg/dL      Glucose 106 mg/dL      Calcium 9 2 mg/dL      eGFR 42 ml/min/1 73sq m     Narrative:      [facility] guidelines for Chronic Kidney Disease (CKD):   •  Stage 1 with normal or high GFR (GFR > 90 mL/min/1 73 square meters)  •  Stage 2 Mild CKD (GFR = 60-89 mL/min/1 73 square meters)  •  Stage 3A Moderate CKD (GFR = 45-59 mL/min/1 73 square meters)  •  Stage 3B Moderate CKD (GFR = 30-44 mL/min/1 73 square meters)  •  Stage 4 Severe CKD (GFR = 15-29 mL/min/1 73 square meters)  •  Stage 5 End Stage CKD (GFR <15 mL/min/1 73 square meters)  Note: GFR calculation is accurate only with a steady state creatinine    CBC and differential [[id number]]  (Abnormal) Collected: 01/19/23 1314    Lab Status: Final result Specimen: Blood from Arm, Left Updated: 01/19/23 1336     WBC 4 37 Thousand/uL      RBC 3 07 Million/uL      Hemoglobin 10 0 g/dL      Hematocrit 30 5 %      MCV 99 fL      MCH 32 6 pg      MCHC 32 8 g/dL      RDW 14 9 %      MPV 10 2 fL      Platelets 112 Thousands/uL      nRBC 0 /100 WBCs      Neutrophils Relative 66 %      Immat GRANS % 1 %      Lymphocytes Relative 18 %      Monocytes Relative 11 %      Eosinophils Relative 3 %      Basophils Relative 1 %      Neutrophils Absolute 2 93 Thousands/µL      Immature Grans Absolute 0 03 Thousand/uL      Lymphocytes Absolute 0 78 Thousands/µL      Monocytes Absolute 0 50 Thousand/µL      Eosinophils Absolute 0 11 Thousand/µL      Basophils Absolute 0 02 Thousands/µL                  XR chest 1 view portable   ED Interpretation by Radha Varela DO (01/19 6987)   Increased interstitial markings c/w cxr 4/6/22  No PNA pleural effusion PTX seen      Final Result by Alonso Guillory MD (01/19 0101)      No acute cardiopulmonary disease  Workstation performed: [id number]               Procedures  ECG 12 Lead Documentation Only    Date/Time: 1/19/2023 1:23 PM  Performed by: Radha Varela DO  Authorized by: Radha Varela DO     Indications / Diagnosis:  Dyspnea on exertion  ECG reviewed by me, the ED Provider: yes    Previous ECG:     Previous ECG:  Compared to current    Similarity:  Changes noted  Interpretation:     Interpretation: abnormal    Rate:     ECG rate:  61    ECG rate assessment: normal    Rhythm:     Rhythm: A-V block      Rhythm comment:  2nd degree mobitz 1 with breakthrough sinus beats  Ectopy:     Ectopy: none    QRS:     QRS axis:  Normal    QRS intervals:  Normal  Conduction:     Conduction: normal    ST segments:     ST segments:  Normal  T waves:     T waves: normal    Q waves:     Q waves:  V1, V2, V3, aVL and aVR          ED Course  ED Course as of 01/19/23 1739   Thu Jan 19, 2023   1252 SpO2: 97 %   1315 Temperature: 97 7 °F (36 5 °C)   1337 Hemoglobin(!): 10 0  Consistent w/ prior lab work   1356 WBC: 4 37   1357 SpO2: 96 %   6982 Basic metabolic panel(!)  No LITA or acidosis  Mild hypoNa otherwise E-lytes wnl   1416 hs TnI 0hr: 5  Will trend 2hr delta   1426 SARS-COV-2: Negative   1426 INFLU A PCR: Negative   1426 INFLU B PCR: Negative   1426 RSV PCR: Negative   1517 On reassessment pt feeling somewhat improved   Able to cough up phlegm easier feels that the neb loosened it up  On re-exam decreased breath sounds but with improvement in air movement   1620 Delta 2hr hsTnI: -1   1718 Leukocytes, UA(!): Moderate   1738 WBC, UA(!): 4-10                             SBIRT 22yo+    Flowsheet Row Most Recent Value   SBIRT (23 yo +)    In order to provide better care to our patients, we are screening all of our patients for alcohol and drug use  Would it be okay to ask you these screening questions? No Filed at: 01/19/2023 1342                Medical Decision Making  Is an 78-year-old female past medical history hypertension diabetes A  fib on Eliquis current smoker presenting to the ED for evaluation of dyspnea on exertion and fatigue    Initial presentation patient is in no respiratory distress satting well on room air afebrile vitals within normal limits  Has wet sounding cough on exam   Heart is irregular but no murmurs appreciated lung sounds decreased diffusely no wheezes rales or rhonchi no lower extremity edema abdomen soft nontender    DDx: Bronchitis versus COPD exacerbation versus viral illness versus ACS    Given patient's smoking history complaint of cough productive of clear sputum and dyspnea on exertion patient likely has bronchitis or COPD exacerbation  Viral illness is also possible, pneumonia less likely given patient is afebrile  ACS lower on the differential as well given no complaints of chest pain abdominal pain nausea vomiting  PE unlikely given stable vitals and anticoagulation, however per med rec patient is on labetalol which may mask any compensatory tachycardia  No PTX given bilateral breath sounds  Given patient's age and vague complaints of generalized fatigue and malaise will also order UA to assess for UTI    Plan: CBC BMP Trop EKG chest x-ray UA viral panel swab continue to monitor and reassess    EKG interpreted by me results as discussed above  CXR normal no PNA  Viral panel swab (-)    Labs unremarkable   Leuks and WBC seen on UA will treat pt for UTI  Could be cause of pt's generalized fatigue and malaise  Pt feels some improvement after nebs  Pt hemodynamically stable and clear for d/c home with outpt f/u with PCP  Return precautions given pt verbalized understanding     Bronchitis: acute illness or injury  UTI (urinary tract infection): acute illness or injury  Amount and/or Complexity of Data Reviewed  Labs: ordered  Decision-making details documented in ED Course  Radiology: ordered  Risk  Prescription drug management  Disposition  Final diagnoses:   Bronchitis   UTI (urinary tract infection)     Time reflects when diagnosis was documented in both MDM as applicable and the Disposition within this note     Time User Action Codes Description Comment    1/19/2023  5:20 PM Roselia Bio Add [J40] Bronchitis     1/19/2023  5:20 PM Roselia Bio Add [N39 0] UTI (urinary tract infection)       ED Disposition     ED Disposition   Discharge    Condition   Stable    Date/Time   Thu Jan 19, 2023  5:20 PM    Comment   Tyra Harvey discharge to home/self care  Follow-up Information     Follow up With Specialties Details Why [address],  Internal Medicine Call in 2 days  [address] [place] [address],Third Floor, [address] [facility]  [phone]            Patient's Medications   Discharge Prescriptions    CEPHALEXIN (KEFLEX) 500 MG CAPSULE    Take 1 capsule (500 mg total) by mouth every 12 (twelve) hours for 5 days       Start Date: 1/19/2023 End Date: 1/24/2023       Order Dose: 500 mg       Quantity: 10 capsule    Refills: 0     No discharge procedures on file  PDMP Review       Value Time User    PDMP Reviewed  Yes 2/15/2022  7:41 AM Radha Barry MD           ED Provider  Attending physically available and evaluated Tyra Harvey  I managed the patient along with the ED Attending      Electronically Signed by         Luis Miguel Wang DO  01/20/23 4127

## 2023-01-19 NOTE — DISCHARGE INSTRUCTIONS
You were seen and evaluated in the ER for shortness of breath and fatigue - your symptoms are likely due to bronchitis    Your chest xray was normal no pneumonia  Lab work unremarkable  Your EKG showed a benign form of heart block called 2nd degree Deysi 1  Please follow up with your primary care physician about this  You have a urinary tract infection   You received the first dose of antibiotics here, a 5 day course was sent to your pharmacy - please take the whole 5 days    Please follow up with your primary care physician within 48 hours    If you develop worsening shortness of breath, chest pain, fever/chills, palpitations, if you pass out or develop confusion abdominal pain flank pain nausea vomiting please return to the ER for further evaluation and management

## 2023-01-20 LAB
ATRIAL RATE: 174 BPM
P AXIS: 88 DEGREES
QRS AXIS: -79 DEGREES
QRSD INTERVAL: 88 MS
QT INTERVAL: 404 MS
QTC INTERVAL: 406 MS
T WAVE AXIS: 85 DEGREES
VENTRICULAR RATE: 61 BPM

## 2023-01-22 ENCOUNTER — HOSPITAL ENCOUNTER (INPATIENT)
Facility: HOSPITAL | Age: 83
LOS: 3 days | Discharge: HOME HEALTH | End: 2023-01-25
Attending: EMERGENCY MEDICINE | Admitting: INTERNAL MEDICINE

## 2023-01-22 ENCOUNTER — APPOINTMENT (EMERGENCY)
Dept: RADIOLOGY | Facility: HOSPITAL | Age: 83
End: 2023-01-22
Attending: STUDENT IN AN ORGANIZED HEALTH CARE EDUCATION/TRAINING PROGRAM

## 2023-01-22 ENCOUNTER — APPOINTMENT (INPATIENT)
Dept: NON INVASIVE DIAGNOSTICS | Facility: HOSPITAL | Age: 83
End: 2023-01-22

## 2023-01-22 ENCOUNTER — APPOINTMENT (EMERGENCY)
Dept: RADIOLOGY | Facility: HOSPITAL | Age: 83
End: 2023-01-22

## 2023-01-22 DIAGNOSIS — R05.1 ACUTE COUGH: ICD-10-CM

## 2023-01-22 DIAGNOSIS — J40 BRONCHITIS: Primary | ICD-10-CM

## 2023-01-22 DIAGNOSIS — Z91.199 NONCOMPLIANCE: ICD-10-CM

## 2023-01-22 DIAGNOSIS — E87.1 HYPONATREMIA: ICD-10-CM

## 2023-01-22 DIAGNOSIS — D64.9 CHRONIC ANEMIA: ICD-10-CM

## 2023-01-22 DIAGNOSIS — R09.02 HYPOXIA: ICD-10-CM

## 2023-01-22 DIAGNOSIS — R06.00 DYSPNEA: ICD-10-CM

## 2023-01-22 PROBLEM — J20.9 ACUTE BRONCHITIS: Status: ACTIVE | Noted: 2023-01-22

## 2023-01-22 PROBLEM — J96.01 ACUTE RESPIRATORY FAILURE WITH HYPOXIA (HCC): Status: ACTIVE | Noted: 2023-01-22

## 2023-01-22 PROBLEM — J43.9 EMPHYSEMA LUNG (HCC): Status: ACTIVE | Noted: 2023-01-22

## 2023-01-22 PROBLEM — N39.0 URINARY TRACT INFECTION: Status: ACTIVE | Noted: 2023-01-22

## 2023-01-22 PROBLEM — J44.1 COPD EXACERBATION (HCC): Status: ACTIVE | Noted: 2023-01-22

## 2023-01-22 LAB
ALBUMIN SERPL BCP-MCNC: 3.7 G/DL (ref 3.5–5)
ALBUMIN SERPL BCP-MCNC: 3.7 G/DL (ref 3.5–5)
ALP SERPL-CCNC: 90 U/L (ref 46–116)
ALP SERPL-CCNC: 92 U/L (ref 46–116)
ALT SERPL W P-5'-P-CCNC: 24 U/L (ref 12–78)
ALT SERPL W P-5'-P-CCNC: 26 U/L (ref 12–78)
ANION GAP SERPL CALCULATED.3IONS-SCNC: 5 MMOL/L (ref 4–13)
ANION GAP SERPL CALCULATED.3IONS-SCNC: 7 MMOL/L (ref 4–13)
AORTIC ROOT: 2.8 CM
APICAL FOUR CHAMBER EJECTION FRACTION: 70 %
ASCENDING AORTA: 3.3 CM
AST SERPL W P-5'-P-CCNC: 21 U/L (ref 5–45)
AST SERPL W P-5'-P-CCNC: 23 U/L (ref 5–45)
ATRIAL RATE: 65 BPM
BASOPHILS # BLD AUTO: 0.03 THOUSANDS/ÂΜL (ref 0–0.1)
BASOPHILS # BLD AUTO: 0.03 THOUSANDS/ÂΜL (ref 0–0.1)
BASOPHILS NFR BLD AUTO: 0 % (ref 0–1)
BASOPHILS NFR BLD AUTO: 1 % (ref 0–1)
BILIRUB SERPL-MCNC: 0.7 MG/DL (ref 0.2–1)
BILIRUB SERPL-MCNC: 0.85 MG/DL (ref 0.2–1)
BUN SERPL-MCNC: 20 MG/DL (ref 5–25)
BUN SERPL-MCNC: 21 MG/DL (ref 5–25)
CALCIUM SERPL-MCNC: 8.7 MG/DL (ref 8.3–10.1)
CALCIUM SERPL-MCNC: 9.1 MG/DL (ref 8.3–10.1)
CHLORIDE SERPL-SCNC: 94 MMOL/L (ref 96–108)
CHLORIDE SERPL-SCNC: 95 MMOL/L (ref 96–108)
CO2 SERPL-SCNC: 27 MMOL/L (ref 21–32)
CO2 SERPL-SCNC: 28 MMOL/L (ref 21–32)
CREAT SERPL-MCNC: 0.96 MG/DL (ref 0.6–1.3)
CREAT SERPL-MCNC: 0.98 MG/DL (ref 0.6–1.3)
E WAVE DECELERATION TIME: 233 MS
EOSINOPHIL # BLD AUTO: 0 THOUSAND/ÂΜL (ref 0–0.61)
EOSINOPHIL # BLD AUTO: 0.06 THOUSAND/ÂΜL (ref 0–0.61)
EOSINOPHIL NFR BLD AUTO: 0 % (ref 0–6)
EOSINOPHIL NFR BLD AUTO: 1 % (ref 0–6)
ERYTHROCYTE [DISTWIDTH] IN BLOOD BY AUTOMATED COUNT: 14.5 % (ref 11.6–15.1)
ERYTHROCYTE [DISTWIDTH] IN BLOOD BY AUTOMATED COUNT: 14.6 % (ref 11.6–15.1)
FLUAV RNA RESP QL NAA+PROBE: NEGATIVE
FLUBV RNA RESP QL NAA+PROBE: NEGATIVE
FRACTIONAL SHORTENING: 29 (ref 28–44)
GFR SERPL CREATININE-BSD FRML MDRD: 53 ML/MIN/1.73SQ M
GFR SERPL CREATININE-BSD FRML MDRD: 54 ML/MIN/1.73SQ M
GLUCOSE SERPL-MCNC: 141 MG/DL (ref 65–140)
GLUCOSE SERPL-MCNC: 170 MG/DL (ref 65–140)
GLUCOSE SERPL-MCNC: 180 MG/DL (ref 65–140)
GLUCOSE SERPL-MCNC: 244 MG/DL (ref 65–140)
GLUCOSE SERPL-MCNC: 257 MG/DL (ref 65–140)
HCT VFR BLD AUTO: 30.4 % (ref 34.8–46.1)
HCT VFR BLD AUTO: 32.6 % (ref 34.8–46.1)
HGB BLD-MCNC: 10.2 G/DL (ref 11.5–15.4)
HGB BLD-MCNC: 11 G/DL (ref 11.5–15.4)
IMM GRANULOCYTES # BLD AUTO: 0.08 THOUSAND/UL (ref 0–0.2)
IMM GRANULOCYTES # BLD AUTO: 0.12 THOUSAND/UL (ref 0–0.2)
IMM GRANULOCYTES NFR BLD AUTO: 1 % (ref 0–2)
IMM GRANULOCYTES NFR BLD AUTO: 2 % (ref 0–2)
INTERVENTRICULAR SEPTUM IN DIASTOLE (PARASTERNAL SHORT AXIS VIEW): 0.9 CM
INTERVENTRICULAR SEPTUM: 0.9 CM (ref 0.6–1.1)
LAAS-AP2: 10.8 CM2
LAAS-AP4: 19.4 CM2
LEFT ATRIUM SIZE: 1.8 CM
LEFT INTERNAL DIMENSION IN SYSTOLE: 3 CM (ref 2.1–4)
LEFT VENTRICULAR INTERNAL DIMENSION IN DIASTOLE: 4.2 CM (ref 3.5–6)
LEFT VENTRICULAR POSTERIOR WALL IN END DIASTOLE: 0.8 CM
LEFT VENTRICULAR STROKE VOLUME: 44 ML
LVSV (TEICH): 44 ML
LYMPHOCYTES # BLD AUTO: 0.42 THOUSANDS/ÂΜL (ref 0.6–4.47)
LYMPHOCYTES # BLD AUTO: 1.17 THOUSANDS/ÂΜL (ref 0.6–4.47)
LYMPHOCYTES NFR BLD AUTO: 18 % (ref 14–44)
LYMPHOCYTES NFR BLD AUTO: 5 % (ref 14–44)
MCH RBC QN AUTO: 32 PG (ref 26.8–34.3)
MCH RBC QN AUTO: 32.1 PG (ref 26.8–34.3)
MCHC RBC AUTO-ENTMCNC: 33.6 G/DL (ref 31.4–37.4)
MCHC RBC AUTO-ENTMCNC: 33.7 G/DL (ref 31.4–37.4)
MCV RBC AUTO: 95 FL (ref 82–98)
MCV RBC AUTO: 96 FL (ref 82–98)
MONOCYTES # BLD AUTO: 0.13 THOUSAND/ÂΜL (ref 0.17–1.22)
MONOCYTES # BLD AUTO: 0.77 THOUSAND/ÂΜL (ref 0.17–1.22)
MONOCYTES NFR BLD AUTO: 12 % (ref 4–12)
MONOCYTES NFR BLD AUTO: 2 % (ref 4–12)
MV E'TISSUE VEL-SEP: 7 CM/S
MV PEAK A VEL: 0.82 M/S
MV PEAK E VEL: 100 CM/S
MV STENOSIS PRESSURE HALF TIME: 68 MS
MV VALVE AREA P 1/2 METHOD: 3.24
NEUTROPHILS # BLD AUTO: 4.25 THOUSANDS/ÂΜL (ref 1.85–7.62)
NEUTROPHILS # BLD AUTO: 7.4 THOUSANDS/ÂΜL (ref 1.85–7.62)
NEUTS SEG NFR BLD AUTO: 67 % (ref 43–75)
NEUTS SEG NFR BLD AUTO: 91 % (ref 43–75)
NRBC BLD AUTO-RTO: 1 /100 WBCS
NRBC BLD AUTO-RTO: 1 /100 WBCS
NT-PROBNP SERPL-MCNC: 833 PG/ML
P AXIS: 89 DEGREES
PLATELET # BLD AUTO: 270 THOUSANDS/UL (ref 149–390)
PLATELET # BLD AUTO: 294 THOUSANDS/UL (ref 149–390)
PMV BLD AUTO: 9.8 FL (ref 8.9–12.7)
PMV BLD AUTO: 9.9 FL (ref 8.9–12.7)
POTASSIUM SERPL-SCNC: 4.2 MMOL/L (ref 3.5–5.3)
POTASSIUM SERPL-SCNC: 4.7 MMOL/L (ref 3.5–5.3)
PR INTERVAL: 200 MS
PROCALCITONIN SERPL-MCNC: <0.05 NG/ML
PROT SERPL-MCNC: 6.6 G/DL (ref 6.4–8.4)
PROT SERPL-MCNC: 7 G/DL (ref 6.4–8.4)
QRS AXIS: -72 DEGREES
QRSD INTERVAL: 92 MS
QT INTERVAL: 420 MS
QTC INTERVAL: 415 MS
RA PRESSURE ESTIMATED: 3 MMHG
RBC # BLD AUTO: 3.18 MILLION/UL (ref 3.81–5.12)
RBC # BLD AUTO: 3.44 MILLION/UL (ref 3.81–5.12)
RIGHT ATRIAL 2D VOLUME: 26 ML
RIGHT ATRIUM AREA SYSTOLE A4C: 11.8 CM2
RIGHT VENTRICLE ID DIMENSION: 4.1 CM
RSV RNA RESP QL NAA+PROBE: NEGATIVE
RV PSP: 32 MMHG
SARS-COV-2 RNA RESP QL NAA+PROBE: NEGATIVE
SL CV LEFT ATRIUM LENGTH A2C: 4.6 CM
SL CV LV EF: 70
SL CV PED ECHO LEFT VENTRICLE DIASTOLIC VOLUME (MOD BIPLANE) 2D: 78 ML
SL CV PED ECHO LEFT VENTRICLE SYSTOLIC VOLUME (MOD BIPLANE) 2D: 35 ML
SODIUM SERPL-SCNC: 126 MMOL/L (ref 135–147)
SODIUM SERPL-SCNC: 128 MMOL/L (ref 135–147)
SODIUM SERPL-SCNC: 130 MMOL/L (ref 135–147)
T WAVE AXIS: 85 DEGREES
T4 FREE SERPL-MCNC: 1.3 NG/DL (ref 0.76–1.46)
TR MAX PG: 29 MMHG
TR PEAK VELOCITY: 2.7 M/S
TRICUSPID ANNULAR PLANE SYSTOLIC EXCURSION: 2.2 CM
TRICUSPID VALVE PEAK REGURGITATION VELOCITY: 2.7 M/S
TSH SERPL DL<=0.05 MIU/L-ACNC: 4.65 UIU/ML (ref 0.45–4.5)
VENTRICULAR RATE: 59 BPM
WBC # BLD AUTO: 6.36 THOUSAND/UL (ref 4.31–10.16)
WBC # BLD AUTO: 8.1 THOUSAND/UL (ref 4.31–10.16)

## 2023-01-22 RX ORDER — NICOTINE 21 MG/24HR
1 PATCH, TRANSDERMAL 24 HOURS TRANSDERMAL DAILY
Status: DISCONTINUED | OUTPATIENT
Start: 2023-01-22 | End: 2023-01-25 | Stop reason: HOSPADM

## 2023-01-22 RX ORDER — CHLORTHALIDONE 25 MG/1
25 TABLET ORAL DAILY
Status: DISCONTINUED | OUTPATIENT
Start: 2023-01-22 | End: 2023-01-22

## 2023-01-22 RX ORDER — LABETALOL 200 MG/1
200 TABLET, FILM COATED ORAL EVERY 12 HOURS SCHEDULED
Status: DISCONTINUED | OUTPATIENT
Start: 2023-01-22 | End: 2023-01-25 | Stop reason: HOSPADM

## 2023-01-22 RX ORDER — LEVALBUTEROL 1.25 MG/.5ML
1.25 SOLUTION, CONCENTRATE RESPIRATORY (INHALATION)
Status: DISCONTINUED | OUTPATIENT
Start: 2023-01-22 | End: 2023-01-25 | Stop reason: HOSPADM

## 2023-01-22 RX ORDER — BENZONATATE 100 MG/1
100 CAPSULE ORAL 3 TIMES DAILY PRN
Status: DISCONTINUED | OUTPATIENT
Start: 2023-01-22 | End: 2023-01-25 | Stop reason: HOSPADM

## 2023-01-22 RX ORDER — ACETAMINOPHEN 325 MG/1
650 TABLET ORAL EVERY 6 HOURS PRN
Status: DISCONTINUED | OUTPATIENT
Start: 2023-01-22 | End: 2023-01-25 | Stop reason: HOSPADM

## 2023-01-22 RX ORDER — SODIUM CHLORIDE 9 MG/ML
50 INJECTION, SOLUTION INTRAVENOUS CONTINUOUS
Status: DISPENSED | OUTPATIENT
Start: 2023-01-22 | End: 2023-01-22

## 2023-01-22 RX ORDER — CLONIDINE HYDROCHLORIDE 0.1 MG/1
0.1 TABLET ORAL 2 TIMES DAILY
Status: DISCONTINUED | OUTPATIENT
Start: 2023-01-22 | End: 2023-01-25 | Stop reason: HOSPADM

## 2023-01-22 RX ORDER — SPIRONOLACTONE 25 MG/1
25 TABLET ORAL DAILY
Status: DISCONTINUED | OUTPATIENT
Start: 2023-01-22 | End: 2023-01-25 | Stop reason: HOSPADM

## 2023-01-22 RX ORDER — LORAZEPAM 0.5 MG/1
0.5 TABLET ORAL 2 TIMES DAILY
Status: DISCONTINUED | OUTPATIENT
Start: 2023-01-22 | End: 2023-01-25 | Stop reason: HOSPADM

## 2023-01-22 RX ORDER — LEVOTHYROXINE SODIUM 0.1 MG/1
100 TABLET ORAL
Status: DISCONTINUED | OUTPATIENT
Start: 2023-01-22 | End: 2023-01-25 | Stop reason: HOSPADM

## 2023-01-22 RX ORDER — ATORVASTATIN CALCIUM 40 MG/1
40 TABLET, FILM COATED ORAL
Status: DISCONTINUED | OUTPATIENT
Start: 2023-01-22 | End: 2023-01-25 | Stop reason: HOSPADM

## 2023-01-22 RX ORDER — METHYLPREDNISOLONE SODIUM SUCCINATE 40 MG/ML
40 INJECTION, POWDER, LYOPHILIZED, FOR SOLUTION INTRAMUSCULAR; INTRAVENOUS EVERY 12 HOURS SCHEDULED
Status: DISCONTINUED | OUTPATIENT
Start: 2023-01-22 | End: 2023-01-25

## 2023-01-22 RX ORDER — IPRATROPIUM BROMIDE AND ALBUTEROL SULFATE .5; 3 MG/3ML; MG/3ML
1 SOLUTION RESPIRATORY (INHALATION) ONCE
Status: COMPLETED | OUTPATIENT
Start: 2023-01-22 | End: 2023-01-22

## 2023-01-22 RX ORDER — HEPARIN SODIUM 5000 [USP'U]/ML
5000 INJECTION, SOLUTION INTRAVENOUS; SUBCUTANEOUS EVERY 8 HOURS SCHEDULED
Status: DISCONTINUED | OUTPATIENT
Start: 2023-01-22 | End: 2023-01-22

## 2023-01-22 RX ORDER — INSULIN LISPRO 100 [IU]/ML
1-5 INJECTION, SOLUTION INTRAVENOUS; SUBCUTANEOUS
Status: DISCONTINUED | OUTPATIENT
Start: 2023-01-22 | End: 2023-01-25 | Stop reason: HOSPADM

## 2023-01-22 RX ORDER — FERROUS SULFATE 325(65) MG
325 TABLET ORAL
Status: DISCONTINUED | OUTPATIENT
Start: 2023-01-23 | End: 2023-01-25 | Stop reason: HOSPADM

## 2023-01-22 RX ORDER — ALBUTEROL SULFATE 2.5 MG/3ML
2.5 SOLUTION RESPIRATORY (INHALATION) EVERY 4 HOURS PRN
Status: DISCONTINUED | OUTPATIENT
Start: 2023-01-22 | End: 2023-01-25 | Stop reason: HOSPADM

## 2023-01-22 RX ORDER — IPRATROPIUM BROMIDE AND ALBUTEROL SULFATE 2.5; .5 MG/3ML; MG/3ML
3 SOLUTION RESPIRATORY (INHALATION)
Status: DISCONTINUED | OUTPATIENT
Start: 2023-01-22 | End: 2023-01-22

## 2023-01-22 RX ORDER — GUAIFENESIN 600 MG/1
600 TABLET, EXTENDED RELEASE ORAL EVERY 12 HOURS SCHEDULED
Status: DISCONTINUED | OUTPATIENT
Start: 2023-01-22 | End: 2023-01-25 | Stop reason: HOSPADM

## 2023-01-22 RX ORDER — ASCORBIC ACID 500 MG
500 TABLET ORAL
Status: DISCONTINUED | OUTPATIENT
Start: 2023-01-23 | End: 2023-01-25 | Stop reason: HOSPADM

## 2023-01-22 RX ORDER — METHYLPREDNISOLONE SODIUM SUCCINATE 40 MG/ML
40 INJECTION, POWDER, LYOPHILIZED, FOR SOLUTION INTRAMUSCULAR; INTRAVENOUS EVERY 8 HOURS
Status: DISCONTINUED | OUTPATIENT
Start: 2023-01-22 | End: 2023-01-22

## 2023-01-22 RX ORDER — ALBUTEROL SULFATE 2.5 MG/3ML
2 SOLUTION RESPIRATORY (INHALATION) ONCE
Status: COMPLETED | OUTPATIENT
Start: 2023-01-22 | End: 2023-01-22

## 2023-01-22 RX ORDER — AMLODIPINE BESYLATE 10 MG/1
10 TABLET ORAL DAILY
Status: DISCONTINUED | OUTPATIENT
Start: 2023-01-22 | End: 2023-01-25 | Stop reason: HOSPADM

## 2023-01-22 RX ORDER — LISINOPRIL 20 MG/1
20 TABLET ORAL 2 TIMES DAILY
Status: DISCONTINUED | OUTPATIENT
Start: 2023-01-22 | End: 2023-01-25 | Stop reason: HOSPADM

## 2023-01-22 RX ORDER — DOXYCYCLINE HYCLATE 100 MG/1
100 CAPSULE ORAL EVERY 12 HOURS
Status: DISCONTINUED | OUTPATIENT
Start: 2023-01-22 | End: 2023-01-25 | Stop reason: HOSPADM

## 2023-01-22 RX ORDER — METHYLPREDNISOLONE SOD SUCC 125 MG
1 VIAL (EA) INJECTION ONCE
Status: COMPLETED | OUTPATIENT
Start: 2023-01-22 | End: 2023-01-22

## 2023-01-22 RX ADMIN — IPRATROPIUM BROMIDE 0.5 MG: 0.5 SOLUTION RESPIRATORY (INHALATION) at 07:58

## 2023-01-22 RX ADMIN — INSULIN LISPRO 2 UNITS: 100 INJECTION, SOLUTION INTRAVENOUS; SUBCUTANEOUS at 21:07

## 2023-01-22 RX ADMIN — LEVOTHYROXINE SODIUM 100 MCG: 100 TABLET ORAL at 07:35

## 2023-01-22 RX ADMIN — IPRATROPIUM BROMIDE 0.5 MG: 0.5 SOLUTION RESPIRATORY (INHALATION) at 13:21

## 2023-01-22 RX ADMIN — LORAZEPAM 0.5 MG: 0.5 TABLET ORAL at 08:38

## 2023-01-22 RX ADMIN — LORAZEPAM 0.5 MG: 0.5 TABLET ORAL at 16:59

## 2023-01-22 RX ADMIN — LEVALBUTEROL HYDROCHLORIDE 1.25 MG: 1.25 SOLUTION, CONCENTRATE RESPIRATORY (INHALATION) at 07:58

## 2023-01-22 RX ADMIN — SODIUM CHLORIDE 50 ML/HR: 0.9 INJECTION, SOLUTION INTRAVENOUS at 10:10

## 2023-01-22 RX ADMIN — METHYLPREDNISOLONE SODIUM SUCCINATE 40 MG: 40 INJECTION, POWDER, FOR SOLUTION INTRAMUSCULAR; INTRAVENOUS at 07:39

## 2023-01-22 RX ADMIN — IPRATROPIUM BROMIDE 0.5 MG: 0.5 SOLUTION RESPIRATORY (INHALATION) at 19:08

## 2023-01-22 RX ADMIN — AMLODIPINE BESYLATE 10 MG: 10 TABLET ORAL at 08:38

## 2023-01-22 RX ADMIN — LABETALOL HYDROCHLORIDE 200 MG: 200 TABLET, FILM COATED ORAL at 21:01

## 2023-01-22 RX ADMIN — LISINOPRIL 20 MG: 20 TABLET ORAL at 17:00

## 2023-01-22 RX ADMIN — ATORVASTATIN CALCIUM 40 MG: 40 TABLET, FILM COATED ORAL at 17:00

## 2023-01-22 RX ADMIN — LEVALBUTEROL HYDROCHLORIDE 1.25 MG: 1.25 SOLUTION, CONCENTRATE RESPIRATORY (INHALATION) at 13:20

## 2023-01-22 RX ADMIN — GUAIFENESIN 600 MG: 600 TABLET, EXTENDED RELEASE ORAL at 07:35

## 2023-01-22 RX ADMIN — CLONIDINE HYDROCHLORIDE 0.1 MG: 0.1 TABLET ORAL at 08:38

## 2023-01-22 RX ADMIN — LEVALBUTEROL HYDROCHLORIDE 1.25 MG: 1.25 SOLUTION, CONCENTRATE RESPIRATORY (INHALATION) at 19:08

## 2023-01-22 RX ADMIN — APIXABAN 2.5 MG: 2.5 TABLET, FILM COATED ORAL at 08:38

## 2023-01-22 RX ADMIN — DOXYCYCLINE 100 MG: 100 CAPSULE ORAL at 16:59

## 2023-01-22 RX ADMIN — LISINOPRIL 20 MG: 20 TABLET ORAL at 08:38

## 2023-01-22 RX ADMIN — APIXABAN 2.5 MG: 2.5 TABLET, FILM COATED ORAL at 16:59

## 2023-01-22 RX ADMIN — IOHEXOL 75 ML: 350 INJECTION, SOLUTION INTRAVENOUS at 05:41

## 2023-01-22 RX ADMIN — LABETALOL HYDROCHLORIDE 200 MG: 200 TABLET, FILM COATED ORAL at 08:38

## 2023-01-22 RX ADMIN — INSULIN LISPRO 2 UNITS: 100 INJECTION, SOLUTION INTRAVENOUS; SUBCUTANEOUS at 17:00

## 2023-01-22 RX ADMIN — DOXYCYCLINE 100 MG: 100 CAPSULE ORAL at 07:35

## 2023-01-22 RX ADMIN — METHYLPREDNISOLONE SODIUM SUCCINATE 40 MG: 40 INJECTION, POWDER, FOR SOLUTION INTRAMUSCULAR; INTRAVENOUS at 21:01

## 2023-01-22 RX ADMIN — SPIRONOLACTONE 25 MG: 25 TABLET, FILM COATED ORAL at 08:38

## 2023-01-22 RX ADMIN — GUAIFENESIN 600 MG: 600 TABLET, EXTENDED RELEASE ORAL at 21:01

## 2023-01-22 RX ADMIN — CLONIDINE HYDROCHLORIDE 0.1 MG: 0.1 TABLET ORAL at 17:00

## 2023-01-22 NOTE — ASSESSMENT & PLAN NOTE
Rate controlled  Continue PTA labetalol 200mg BID   On eliquis 5mg BID but discussed with pharmacy and based on age and weight, will reduce to 2 5mg BID

## 2023-01-22 NOTE — ASSESSMENT & PLAN NOTE
UA showed leukocytes on 1/19/23 in ED and pt was discharged home with keflex  No urine culture obtained at the time so will collect now to specify organism  Doxycyline 100mg BID on board for COPD exacerbation and UTI purposes  Monitor I&Os, renal function

## 2023-01-22 NOTE — ASSESSMENT & PLAN NOTE
Pt p/w worsening dyspnea, requiring supplemental O2 (no home O2) likely 2/2 bronchitis diagnosed on 1/19/23  · CT PE showed no pulmonary embolus, stable left upper lobe nodule and right adrenal nodule  · Patient is currently on 2 L of oxygen just for comfort, she is saturating in the mid 90s    No wheezing appreciated  · Received Solu-Medrol 125 mg per EMS, received 40 mg in the ED  · Switch Solu-Medrol to 40 mg every 12 hours  · Continue doxycycline  · Tessalon Perles, Mucinex  · Respiratory protocol  · Ambulate TID   · Procalcitonin pending  · Sputum culture

## 2023-01-22 NOTE — ASSESSMENT & PLAN NOTE
Lab Results   Component Value Date    EGFR 54 01/22/2023    EGFR 42 01/19/2023    EGFR 67 04/06/2022    CREATININE 0 96 01/22/2023    CREATININE 1 17 01/19/2023    CREATININE 0 81 04/06/2022   creatinine at baseline  Receiving IV contrast so monitor closely

## 2023-01-22 NOTE — ASSESSMENT & PLAN NOTE
Hgb 10 2 and MCV 96  Supposed to be on iron supplementation but not compliant  Will start 3x weekly MWF with vit C 500mg   Recheck in 2-3 months outpatient   No active signs of bleeding

## 2023-01-22 NOTE — RESPIRATORY THERAPY NOTE
RT Protocol Note  Antoinette Jhaveri 78 y o  female MRN: [id number]  Unit/Bed#: ED 16 Encounter: [id number]    Assessment    Principal Problem:    COPD exacerbation ([facility] )  Active Problems:    Nicotine dependence    Type 2 diabetes mellitus, without long-term current use of insulin (HCC)    Hypothyroidism    Anxiety    Paroxysmal A-fib (HCC)    Renovascular hypertension    CKD (chronic kidney disease) stage 3, GFR 30-59 ml/min (HCC)    Chronic hyponatremia    Hyperlipidemia    Mitral insufficiency and aortic stenosis    Asymptomatic bilateral carotid artery stenosis    History of breast cancer    Normocytic anemia    Noncompliance    Urinary tract infection      Home Pulmonary Medications:  none       Past Medical History:   Diagnosis Date    Anxiety     Atrial fibrillation (HCC)     Bowel obstruction (HCC)     Cancer (HCC)     LEFT BREAST CA 22 YEARS AGO     Depression     Diabetes mellitus ([facility] )     Disease of thyroid gland     Hypertension     Hypothyroidism      Social History     Socioeconomic History    Marital status:      Spouse name: None    Number of children: None    Years of education: None    Highest education level: None   Occupational History    None   Tobacco Use    Smoking status: Every Day     Packs/day: 1 00     Types: Cigarettes    Smokeless tobacco: Never   Vaping Use    Vaping Use: Never used   Substance and Sexual Activity    Alcohol use: Never    Drug use: Never    Sexual activity: Not Currently   Other Topics Concern    None   Social History Narrative    ** Merged History Encounter **          Social Determinants of Health     Financial Resource Strain: Not on file   Food Insecurity: No Food Insecurity    Worried About Running Out of Food in the Last Year: Never true    Ran Out of Food in the Last Year: Never true   Transportation Needs: No Transportation Needs    Lack of Transportation (Medical): No    Lack of Transportation (Non-Medical):  No   Physical Activity: Not on file   Stress: Not on file   Social Connections: Not on file   Intimate Partner Violence: Not on file   Housing Stability: Low Risk     Unable to Pay for Housing in the Last Year: No    Number of Places Lived in the Last Year: 1    Unstable Housing in the Last Year: No       Subjective         Objective    Physical Exam:   Assessment Type: Assess only  General Appearance: Alert, Awake  Respiratory Pattern: Normal  Chest Assessment: Chest expansion symmetrical  Bilateral Breath Sounds: Diminished    Vitals:  Blood pressure 163/70, pulse 58, temperature 97 7 °F (36 5 °C), temperature source Tympanic, resp  rate 20, last menstrual period 01/12/1980, SpO2 98 %, not currently breastfeeding  Imaging and other studies:    01/22/23 9138   Respiratory Protocol   Protocol Initiated? Yes   Protocol Selection Respiratory; Airway Clearance   Language Barrier? No   Medical & Social History Reviewed? Yes   Diagnostic Studies Reviewed? Yes   Physical Assessment Performed? Yes   Airway Clearance Plan Incentive Spirometer   Respiratory Plan Mild Distress pathway   Respiratory Assessment   Assessment Type Assess only   General Appearance Alert; Awake   Respiratory Pattern Normal   Chest Assessment Chest expansion symmetrical   Bilateral Breath Sounds Diminished   Resp Comments Assessment of Respiratory and Airway clearance  Pt said she became short of breath and could not breath  She said she feels she has alot of flem / mucus in her chest  She said she has no hx of asthma or copd  She is not on any inhalers or nebulizers at home  No O2 BS Diminish few scattered wheezes  Pt feels she has bronchitis  Will Start IS, flutter   Pt did 750cc on IS  Respiratory tx TID and prn               Plan    Respiratory Plan: Mild Distress pathway  Airway Clearance Plan: Incentive Spirometer     Resp Comments: Assessment of Respiratory and Airway clearance  Pt said she became short of breath and could not breath    She said she feels she has alot of flem / mucus in her chest  She said she has no hx of asthma or copd  She is not on any inhalers or nebulizers at home  No O2 BS Diminish few scattered wheezes  Pt feels she has bronchitis  Will Start IS, flutter   Pt did 750cc on IS    Respiratory tx TID and prn

## 2023-01-22 NOTE — ASSESSMENT & PLAN NOTE
/75 on admission, significantly elevated, decreased to 163/70 without intervention  · Uncontrolled likely due to noncompliance as pt wasn't sure which medications she takes   See plan for noncompliance below  · In setting of BL renal artery stenosis, >60% stenosis bilaterally  · Will continue PTA amlodipine 10mg daily, chlorthalidone 25mg daily, clonidine 0 1mg BID, labetalol 200mg BID, lisinopril 20mg BID (per nephrology), spironolactone 25mg daily  · Consider inpatient nephrology consult if BP continues to be uncontrolled

## 2023-01-22 NOTE — ASSESSMENT & PLAN NOTE
Noncompliance with medications  Case management consulted for med management- consider pill packs to encourage adherence to medication regimen to avoid complications from uncompensated chronic conditions

## 2023-01-22 NOTE — ASSESSMENT & PLAN NOTE
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

## 2023-01-22 NOTE — ASSESSMENT & PLAN NOTE
· Patient required oxygen admission suspect due to acute bronchitis  · BNP elevated  · Clinically seems euvolemic, lungs are clear to auscultation, no JVD, no lower extremity edema  · Echo ordered  · She will be receiving due to poor p o  intake sodium chloride 50 cc/h for 10 hours  · Monitor volume status

## 2023-01-22 NOTE — ED ATTENDING ATTESTATION
1/22/2023  IFranky DO, saw and evaluated the patient  I have discussed the patient with the resident/non-physician practitioner and agree with the resident's/non-physician practitioner's findings, Plan of Care, and MDM as documented in the resident's/non-physician practitioner's note, except where noted  All available labs and Radiology studies were reviewed  I was present for key portions of any procedure(s) performed by the resident/non-physician practitioner and I was immediately available to provide assistance  At this point I agree with the current assessment done in the Emergency Department  I have conducted an independent evaluation of this patient a history and physical is as follows:    Patient is a 78-year-old female, atrial fibrillation on Eliquis, tobacco abuse, says about 4 days ago she began having some cough, generalized malaise, feeling weak and fatigued  Slight increased work of breathing  No abdominal pain, no nausea or vomiting, no dysuria hematuria  Seen January 19, 2023 in the emergency department, chest x-ray showed no acute pathology, labs showed negative COVID, negative cardiac work-up, urinalysis was suggestive of infection  Patient given breathing treatments by EMS and in the ED, said she felt better  She was discharged home with a presumptive diagnosis of bronchitis and urinary tract infection and started on Keflex  She has been taking the antibiotics but she is been having worsening coughing and increasing phlegm production  She says that she is never been formally diagnosed with COPD or breathing problems but does smoke tobacco   No travel history, no sick contacts  She does live in an independent living facility  Patient denies any prolonged travel history, no recent long travel, no immobilizations, or hospitalizations      Per EMS, they gave her a breathing treatment in route at night, and noticed that she was hypoxic requiring supplemental oxygen      General:  Patient is well-appearing  Head:  Atraumatic  Eyes:  Conjunctiva pink  ENT:  Mucous membranes are moist  Neck:  Supple  Cardiac:  S1-S2, without murmurs  Lungs: Slight end expiratory wheeze, no rales, no rhonchi, no accessory muscle usage  Abdomen:  Soft, nontender, normal bowel sounds, no CVA tenderness, no tympany, no rigidity, no guarding  Extremities:  Normal range of motion, no pedal edema or calf asymmetry, radial pulses are equal and symmetric bilaterally  Neurologic:  Awake, fluent speech, normal comprehension, AAOx3  Skin:  Pink warm and dry  Psychiatric:  Alert, pleasant, cooperative        ED Course  ED Course as of 01/22/23 0651   Monty [place] Jan 22, 2023   0510 ECG interpreted by me, Deysi 1 AV block, left axis deviation, old anteroseptal MI, no acute change from January 19, 2023     XR chest 2 views   ED Interpretation   Chest x-ray interpreted by me shows no acute cardiopulmonary disease, no change from January 19, 2023      CTA chest pe study    (Results Pending)       Labs Reviewed   CBC AND DIFFERENTIAL - Abnormal       Result Value Ref Range Status    WBC 6 36  4 31 - 10 16 Thousand/uL Final    RBC 3 18 (*) 3 81 - 5 12 Million/uL Final    Hemoglobin 10 2 (*) 11 5 - 15 4 g/dL Final    Hematocrit 30 4 (*) 34 8 - 46 1 % Final    MCV 96  82 - 98 fL Final    MCH 32 1  26 8 - 34 3 pg Final    MCHC 33 6  31 4 - 37 4 g/dL Final    RDW 14 6  11 6 - 15 1 % Final    MPV 9 9  8 9 - 12 7 fL Final    Platelets 936  571 - 390 Thousands/uL Final    nRBC 1  /100 WBCs Final    Neutrophils Relative 67  43 - 75 % Final    Immat GRANS % 1  0 - 2 % Final    Lymphocytes Relative 18  14 - 44 % Final    Monocytes Relative 12  4 - 12 % Final    Eosinophils Relative 1  0 - 6 % Final    Basophils Relative 1  0 - 1 % Final    Neutrophils Absolute 4 25  1 85 - 7 62 Thousands/µL Final    Immature Grans Absolute 0 08  0 00 - 0 20 Thousand/uL Final    Lymphocytes Absolute 1 17  0 60 - 4 47 Thousands/µL Final Monocytes Absolute 0 77  0 17 - 1 22 Thousand/µL Final    Eosinophils Absolute 0 06  0 00 - 0 61 Thousand/µL Final    Basophils Absolute 0 03  0 00 - 0 10 Thousands/µL Final   COMPREHENSIVE METABOLIC PANEL - Abnormal    Sodium 130 (*) 135 - 147 mmol/L Final    Potassium 4 2  3 5 - 5 3 mmol/L Final    Chloride 95 (*) 96 - 108 mmol/L Final    CO2 28  21 - 32 mmol/L Final    ANION GAP 7  4 - 13 mmol/L Final    BUN 21  5 - 25 mg/dL Final    Creatinine 0 96  0 60 - 1 30 mg/dL Final    Comment: Standardized to IDMS reference method    Glucose 141 (*) 65 - 140 mg/dL Final    Comment: If the patient is fasting, the ADA then defines impaired fasting glucose as > 100 mg/dL and diabetes as > or equal to 123 mg/dL  Specimen collection should occur prior to Sulfasalazine administration due to the potential for falsely depressed results  Specimen collection should occur prior to Sulfapyridine administration due to the potential for falsely elevated results  Calcium 8 7  8 3 - 10 1 mg/dL Final    AST 21  5 - 45 U/L Final    Comment: Specimen collection should occur prior to Sulfasalazine administration due to the potential for falsely depressed results  ALT 24  12 - 78 U/L Final    Comment: Specimen collection should occur prior to Sulfasalazine and/or Sulfapyridine administration due to the potential for falsely depressed results  Alkaline Phosphatase 92  46 - 116 U/L Final    Total Protein 6 6  6 4 - 8 4 g/dL Final    Albumin 3 7  3 5 - 5 0 g/dL Final    Total Bilirubin 0 70  0 20 - 1 00 mg/dL Final    Comment: Use of this assay is not recommended for patients undergoing treatment with eltrombopag due to the potential for falsely elevated results      eGFR 54  ml/min/1 73sq m Final    Narrative:     [place] guidelines for Chronic Kidney Disease (CKD):   •  Stage 1 with normal or high GFR (GFR > 90 mL/min/1 73 square meters)  •  Stage 2 Mild CKD (GFR = 60-89 mL/min/1 73 square meters)  • Stage 3A Moderate CKD (GFR = 45-59 mL/min/1 73 square meters)  •  Stage 3B Moderate CKD (GFR = 30-44 mL/min/1 73 square meters)  •  Stage 4 Severe CKD (GFR = 15-29 mL/min/1 73 square meters)  •  Stage 5 End Stage CKD (GFR <15 mL/min/1 73 square meters)  Note: GFR calculation is accurate only with a steady state creatinine   NT-BNP PRO-[facility] CAMPUSES ONLY - Abnormal    NT-proBNP 833 (*) <450 pg/mL Final   TSH, 3RD GENERATION WITH FREE T4 REFLEX - Abnormal    TSH 3RD GENERATON 4 650 (*) 0 450 - 4 500 uIU/mL Final    Comment: The recommended reference ranges for TSH during pregnancy are as follows:   First trimester 0 1 to 2 5 uIU/mL   Second trimester  0 2 to 3 0 uIU/mL   Third trimester 0 3 to 3 0 uIU/m    Note: Normal ranges may not apply to patients who are transgender, non-binary, or whose legal sex, sex at birth, and gender identity differ  Adult TSH (3rd generation) reference range follows the recommended guidelines of the [employer] Association, January, 2020  Narrative:     Patients undergoing fluorescein dye angiography may retain small amounts of fluorescein in the body for 48-72 hours post procedure  Samples containing fluorescein can produce falsely depressed TSH values  If the patient had this procedure,a specimen should be resubmitted post fluorescein clearance  T4, FREE - Normal    Free T4 1 30  0 76 - 1 46 ng/dL Final    Comment: Specimen collection should occur prior to Sulfasalazine administration due to the potential for falsely elevated results  URINE CULTURE   COVID19, INFLUENZA A/B, RSV PCR, [facility]   SPUTUM CULTURE AND GRAM STAIN   PROCALCITONIN TEST   CBC AND DIFFERENTIAL   COMPREHENSIVE METABOLIC PANEL     Patient is hypoxic, not in acute respiratory distress to the point where she would require noninvasive ventilation  No obvious pneumonia    Patient being admitted, admitting team will follow up on pending studies    [address] differential (includes but not limited to): pneumonia, pneumothorax (a life threat), viral illness    COLLECTION AND INTERPRETATION OF DATA  History obtained from: patient and EMS  Review of non-ED record: previous visit as noted above    Tests reviewed personally by me:  ECG: See my ED course  Labs: interpreted by me, hyponatrmia, CRI  Imaging: interpreted by me as above    Discussion with other providers: admitting service      Treatment:  Consideration of admission: admitted    Critical Care Time  Procedures

## 2023-01-22 NOTE — ASSESSMENT & PLAN NOTE
Lab Results   Component Value Date    EGFR 53 01/22/2023    EGFR 54 01/22/2023    EGFR 42 01/19/2023    CREATININE 0 98 01/22/2023    CREATININE 0 96 01/22/2023    CREATININE 1 17 01/19/2023   creatinine at baseline  Receiving IV contrast so monitor closely  Sodium chloride 50 cc/h 10-hour

## 2023-01-22 NOTE — ASSESSMENT & PLAN NOTE
Hypertensive  · Uncontrolled likely due to noncompliance as pt wasn't sure which medications she takes   See plan for noncompliance below  · In setting of BL renal artery stenosis, >60% stenosis bilaterally  · Will continue PTA amlodipine 10mg daily, clonidine 0 1mg BID, labetalol 200mg BID, lisinopril 20mg BID (per nephrology), spironolactone 25mg daily  · Patient states she is not taking chlorthalidone  · Will verify with pharmacy

## 2023-01-22 NOTE — ASSESSMENT & PLAN NOTE
Pt p/w worsening dyspnea, requiring supplemental O2 (no home O2) likely 2/2 bronchitis diagnosed on 1/19/23  · Currently on 3L O2, no home O2 at baseline, wean as tolerated, continuous pulse ox  · Start IV solumedrol 40mg Q8H, duoneb Q6H and doxycyline 100mg BID  · Tessalon perles PRN for cough, mucinez BID for secretions clearance   · Should start on daily maintenance inhaler on discharge with pulmonology referral  · CTA chest to r/o PE and malignancy  · BNP to r/o fluid overload as contributor   ECHO to assess cardiac function  · Respiratory and airway clearance protocol  · Ambulate TID   · Procalcitonin and sputum cultures ordered

## 2023-01-22 NOTE — ED PROVIDER NOTES
History  Chief Complaint   Patient presents with   • Shortness of Breath     Seen x2 days ago  Dx with UTI and bronchitis  Called 911 due to increase WOB and SOB over the course of day  Bringing up copious amounts of phlegm     24-year-old female with history of A  fib, breast cancer, diabetes, emphysema, presents after worsening respiratory status in context of recently diagnosed bronchitis  Patient notes that she has had significant difficulty breathing over the past 2 days since leaving the hospital   She has also been coughing up copious amounts of green appearing sputum  She has tried using home breathing treatments without improvement in her symptoms  She has been compliant with medications  She came to the hospital because she has not been able to sleep and has felt short of breath while attempting to ambulate  On EMS arrival she was hypoxic and required 2 L nasal cannula to maintain sats greater than 94%  She does not use baseline oxygen  Prior to Admission Medications   Prescriptions Last Dose Informant Patient Reported? Taking?    Eliquis 5 MG 1/21/2023  No Yes   Sig: TAKE 1 TABLET BY MOUTH TWICE A DAY   LORazepam (ATIVAN) 0 5 mg tablet 1/21/2023  Yes Yes   Sig: Take 0 5 mg by mouth 2 (two) times a day   Multiple Vitamins-Minerals (PRESERVISION AREDS 2 PO) 1/21/2023  Yes Yes   Sig: Take by mouth 2 (two) times a day     acetaminophen (TYLENOL) 500 mg tablet Not Taking  No No   Sig: Take 2 tablets (1,000 mg total) by mouth 3 (three) times a day as needed for mild pain, moderate pain or fever   Patient not taking: Reported on 7/25/2022   acetaminophen (TYLENOL) 500 mg tablet Not Taking  No No   Sig: Take 2 tablets (1,000 mg total) by mouth 3 (three) times a day as needed for mild pain or moderate pain   Patient not taking: Reported on 7/25/2022   amLODIPine (NORVASC) 5 mg tablet 1/21/2023  Yes Yes   Sig: Take 10 mg by mouth daily   ascorbic acid (VITAMIN C) 500 mg tablet 1/21/2023  Yes Yes   Sig: Take 500 mg by mouth daily   atorvastatin (LIPITOR) 40 mg tablet 2023  Yes Yes   Sig: Take 40 mg by mouth   azelastine (ASTELIN) 0 1 % nasal spray   No No   Si spray into each nostril 2 (two) times a day Use in each nostril as directed   chlorthalidone 25 mg tablet Not Taking  Yes No   Sig: Take 25 mg by mouth daily   Patient not taking: Reported on 2023   cholecalciferol (VITAMIN D3) 1,000 units tablet 2023  Yes Yes   Sig: Take 2,000 Units by mouth daily    cloNIDine (CATAPRES) 0 1 mg tablet 2023  No Yes   Sig: TAKE 1 TABLET BY MOUTH TWICE A DAY   ferrous sulfate 325 (65 Fe) mg tablet 2023  Yes Yes   Sig: Take 325 mg by mouth   labetalol (NORMODYNE) 200 mg tablet Unknown  No No   Sig: Take 1 tablet (200 mg total) by mouth every 12 (twelve) hours   levothyroxine 100 mcg tablet 2023  Yes Yes   Sig: Take 100 mcg by mouth daily   lisinopril (ZESTRIL) 20 mg tablet 2023  No Yes   Sig: TAKE 1 TABLET (20 MG TOTAL) BY MOUTH 2 (TWO) TIMES A DAY   metFORMIN (GLUCOPHAGE) 500 mg tablet 2023  No Yes   Sig: Take 1 tablet (500 mg total) by mouth 2 (two) times a day with meals   multivitamin (THERAGRAN) TABS 2023  Yes Yes   Sig: Take 1 tablet by mouth daily   spironolactone (ALDACTONE) 25 mg tablet 2023  Yes Yes   Sig: TAKE 1 TABLET (25 MG TOTAL) BY MOUTH DAILY        Facility-Administered Medications: None       Past Medical History:   Diagnosis Date   • Anxiety    • Atrial fibrillation ([facility] [address] )    • Bowel obstruction (HCC)    • Cancer (HCC)     LEFT BREAST CA 22 YEARS AGO    • Depression    • Diabetes mellitus ([facility] [address] )    • Disease of thyroid gland    • Hypertension    • Hypothyroidism        Past Surgical History:   Procedure Laterality Date   • ABDOMINAL ADHESION SURGERY N/A 2018    Procedure: LYSIS ADHESIONS;  Surgeon: Bell Castro DO;  Location: [facility];  Service: General   • BREAST SURGERY     • CHOLECYSTECTOMY     • COLON SURGERY  2017   • EXPLORATORY LAPAROTOMY     • JOINT REPLACEMENT      LEFT KNEE REPLACEMENT    • LAPAROTOMY N/A 2/4/2018    Procedure: LAPAROTOMY EXPLORATORY,;  Surgeon: Bell Castro DO;  [facility];  Service: General   • MASTECTOMY     • MASTECTOMY Left 1995   • MASTECTOMY Right 2017   • NC BX/EXC LYMPH NODE OPEN SUPERFICIAL Right 1/13/2017    Procedure: SENTINEL LYMPH NODE BIOPSY RIGHT AXILLA; Surgeon: Joyce Aguirre MD;  [facility]: [facility];  Service: General   • NC MASTECTOMY SIMPLE COMPLETE Right 1/13/2017    Procedure: MASTECTOMY SIMPLE;  Surgeon: Jocye Aguirre MD;  [facility];  Service: General   • SMALL INTESTINE SURGERY N/A 2/4/2018    Procedure: RESECTION SMALL BOWEL;  Surgeon: Bell Castro DO;  [facility]: [facility];  Service: General   • US GUIDED BREAST BIOPSY RIGHT COMPLETE Right 11/29/2016       Family History   Problem Relation Age of Onset   • Cancer Mother    • No Known Problems Father      I have reviewed and agree with the history as documented  E-Cigarette/Vaping   • E-Cigarette Use Never User      E-Cigarette/Vaping Substances   • Nicotine No    • Flavoring No      Social History     Tobacco Use   • Smoking status: Every Day     Packs/day: 1 00     Types: Cigarettes   • Smokeless tobacco: Never   Vaping Use   • Vaping Use: Never used   Substance Use Topics   • Alcohol use: Never   • Drug use: Never        Review of Systems   All other systems reviewed and are negative        Physical Exam  ED Triage Vitals   Temperature Pulse Respirations Blood Pressure SpO2   01/22/23 0359 01/22/23 0354 01/22/23 0354 01/22/23 0354 01/22/23 0354   97 7 °F (36 5 °C) 62 20 (!) 181/75 97 %      Temp Source Heart Rate Source Patient Position - Orthostatic VS BP Location FiO2 (%)   01/22/23 0359 01/22/23 0354 01/22/23 0354 01/22/23 0354 --   Tympanic Monitor Sitting Left arm       Pain Score       01/22/23 0826       No Pain             Orthostatic Vital Signs  Vitals:    01/22/23 1249 01/22/23 1508 01/22/23 2102 01/22/23 2143   BP: 123/53 122/52 133/67   Pulse: 61 61 65 66   Patient Position - Orthostatic VS:           Physical Exam  Vitals and nursing note reviewed  Constitutional:       General: She is not in acute distress  Appearance: She is well-developed  HENT:      Head: Normocephalic and atraumatic  Right Ear: External ear normal       Left Ear: External ear normal    Eyes:      Conjunctiva/sclera: Conjunctivae normal    Cardiovascular:      Rate and Rhythm: Normal rate and regular rhythm  Heart sounds: No murmur heard  Pulmonary:      Effort: Pulmonary effort is normal       Breath sounds: Wheezing ( Mild left upper lobe) present  Abdominal:      Palpations: Abdomen is soft  Tenderness: There is no abdominal tenderness  Musculoskeletal:         General: No swelling  Cervical back: Neck supple  Skin:     General: Skin is warm and dry  Capillary Refill: Capillary refill takes less than 2 seconds  Neurological:      General: No focal deficit present  Mental Status: She is alert and oriented to person, place, and time     Psychiatric:         Mood and Affect: Mood normal          ED Medications  Medications   guaiFENesin (MUCINEX) 12 hr tablet 600 mg (600 mg Oral Given 1/22/23 2101)   amLODIPine (NORVASC) tablet 10 mg (10 mg Oral Given 1/22/23 0838)   ascorbic acid (VITAMIN C) tablet 500 mg (has no administration in time range)   atorvastatin (LIPITOR) tablet 40 mg (40 mg Oral Given 1/22/23 1700)   cloNIDine (CATAPRES) tablet 0 1 mg (0 1 mg Oral Given 1/22/23 1700)   ferrous sulfate tablet 325 mg (has no administration in time range)   labetalol (NORMODYNE) tablet 200 mg (200 mg Oral Given 1/22/23 2101)   levothyroxine tablet 100 mcg (100 mcg Oral Given 1/22/23 0735)   lisinopril (ZESTRIL) tablet 20 mg (20 mg Oral Given 1/22/23 1700)   LORazepam (ATIVAN) tablet 0 5 mg ( Oral Canceled Entry 1/22/23 1700)   spironolactone (ALDACTONE) tablet 25 mg (25 mg Oral Given 1/22/23 0838)   acetaminophen (TYLENOL) tablet 650 mg (has no administration in time range)   nicotine (NICODERM CQ) 21 mg/24 hr TD 24 hr patch 1 patch (1 patch Transdermal Not Given 1/22/23 0831)   benzonatate (TESSALON PERLES) capsule 100 mg (has no administration in time range)   doxycycline hyclate (VIBRAMYCIN) capsule 100 mg ( Oral Canceled Entry 1/22/23 1700)   insulin lispro (HumaLOG) 100 units/mL subcutaneous injection 1-5 Units (2 Units Subcutaneous Given 1/22/23 2107)   levalbuterol (XOPENEX) inhalation solution 1 25 mg (1 25 mg Nebulization Given 1/22/23 1908)   ipratropium (ATROVENT) 0 02 % inhalation solution 0 5 mg (0 5 mg Nebulization Given 1/22/23 1908)   albuterol inhalation solution 2 5 mg (has no administration in time range)   apixaban (ELIQUIS) tablet 2 5 mg ( Oral Canceled Entry 1/22/23 1700)   sodium chloride 0 9 % infusion (0 mL/hr Intravenous Stopped 1/22/23 2103)   methylPREDNISolone sodium succinate (Solu-MEDROL) injection 40 mg (40 mg Intravenous Given 1/22/23 2101)   ipratropium-albuterol (FOR EMS ONLY) (DUO-NEB) 0 5-2 5 mg/3 mL inhalation solution 3 mL (0 mL Does not apply Given to EMS 1/22/23 0357)   methylPREDNISolone sodium succinate (FOR EMS ONLY) (Solu-MEDROL) 125 MG injection 125 mg (0 mg Does not apply Given to EMS 1/22/23 0357)   albuterol (FOR EMS ONLY) (2 5 mg/3 mL) 0 083 % inhalation solution 5 mg (0 mg Does not apply Given to EMS 1/22/23 0357)   iohexol (OMNIPAQUE) 350 MG/ML injection (SINGLE-DOSE) 100 mL (75 mL Intravenous Given 1/22/23 0541)       Diagnostic Studies  Results Reviewed     Procedure Component Value Units Date/Time    Sodium [[id number]]  (Abnormal) Collected: 01/22/23 1603    Lab Status: Final result Specimen: Blood from Arm, Left Updated: 01/22/23 1627     Sodium 128 mmol/L     Sputum culture and Gram stain [[id number]] Collected: 01/22/23 1409    Lab Status:  In process Specimen: Expectorated Sputum Updated: 01/22/23 1418    Procalcitonin [[id number]]  (Normal) Collected: 01/22/23 5874 Lab Status: Final result Specimen: Blood from Arm, Left Updated: 01/22/23 1234     Procalcitonin <0 05 ng/ml     CBC and differential [[id number]]  (Abnormal) Collected: 01/22/23 0726    Lab Status: Final result Specimen: Blood from Arm, Left Updated: 01/22/23 0901     WBC 8 10 Thousand/uL      RBC 3 44 Million/uL      Hemoglobin 11 0 g/dL      Hematocrit 32 6 %      MCV 95 fL      MCH 32 0 pg      MCHC 33 7 g/dL      RDW 14 5 %      MPV 9 8 fL      Platelets 813 Thousands/uL      nRBC 1 /100 WBCs      Neutrophils Relative 91 %      Immat GRANS % 2 %      Lymphocytes Relative 5 %      Monocytes Relative 2 %      Eosinophils Relative 0 %      Basophils Relative 0 %      Neutrophils Absolute 7 40 Thousands/µL      Immature Grans Absolute 0 12 Thousand/uL      Lymphocytes Absolute 0 42 Thousands/µL      Monocytes Absolute 0 13 Thousand/µL      Eosinophils Absolute 0 00 Thousand/µL      Basophils Absolute 0 03 Thousands/µL     Narrative: This is an appended report  These results have been appended to a previously verified report  FLU/RSV/COVID - if FLU/RSV clinically relevant [[id number]]  (Normal) Collected: 01/22/23 0726    Lab Status: Final result Specimen: Nares from Nose Updated: 01/22/23 0818     SARS-CoV-2 Negative     INFLUENZA A PCR Negative     INFLUENZA B PCR Negative     RSV PCR Negative    Narrative:      FOR PEDIATRIC PATIENTS - copy/paste COVID Guidelines URL to browser: [email or web address]    SARS-CoV-2 assay is a Nucleic Acid Amplification assay intended for the  qualitative detection of nucleic acid from SARS-CoV-2 in nasopharyngeal  swabs  Results are for the presumptive identification of SARS-CoV-2 RNA  Positive results are indicative of infection with SARS-CoV-2, the virus  causing COVID-19, but do not rule out bacterial infection or co-infection  with other viruses   Laboratories within the [place] and its  territories are required to report all positive results to the appropriate  public health authorities  Negative results do not preclude SARS-CoV-2  infection and should not be used as the sole basis for treatment or other  patient management decisions  Negative results must be combined with  clinical observations, patient history, and epidemiological information  This test has not been FDA cleared or approved  This test has been authorized by FDA under an Emergency Use Authorization  (EUA)  This test is only authorized for the duration of time the  declaration that circumstances exist justifying the authorization of the  emergency use of an in vitro diagnostic tests for detection of SARS-CoV-2  virus and/or diagnosis of COVID-19 infection under section 564(b)(1) of  the Act, 21 U  S C  199KGH-8(H)(7), unless the authorization is terminated  or revoked sooner  The test has been validated but independent review by FDA  and CLIA is pending  Test performed using [employer] GeneXpert: This RT-PCR assay targets N2,  a region unique to SARS-CoV-2  A conserved region in the E-gene was chosen  for pan-Sarbecovirus detection which includes SARS-CoV-2  According to CMS-2020-01-R, this platform meets the definition of high-throughput technology      Comprehensive metabolic panel [[id number]]  (Abnormal) Collected: 01/22/23 0726    Lab Status: Final result Specimen: Blood from Arm, Left Updated: 01/22/23 0754     Sodium 126 mmol/L      Potassium 4 7 mmol/L      Chloride 94 mmol/L      CO2 27 mmol/L      ANION GAP 5 mmol/L      BUN 20 mg/dL      Creatinine 0 98 mg/dL      Glucose 180 mg/dL      Calcium 9 1 mg/dL      AST 23 U/L      ALT 26 U/L      Alkaline Phosphatase 90 U/L      Total Protein 7 0 g/dL      Albumin 3 7 g/dL      Total Bilirubin 0 85 mg/dL      eGFR 53 ml/min/1 73sq m     Narrative:      [place] guidelines for Chronic Kidney Disease (CKD):   •  Stage 1 with normal or high GFR (GFR > 90 mL/min/1 73 square meters)  •  Stage 2 Mild CKD (GFR = 60-89 mL/min/1 73 square meters)  •  Stage 3A Moderate CKD (GFR = 45-59 mL/min/1 73 square meters)  •  Stage 3B Moderate CKD (GFR = 30-44 mL/min/1 73 square meters)  •  Stage 4 Severe CKD (GFR = 15-29 mL/min/1 73 square meters)  •  Stage 5 End Stage CKD (GFR <15 mL/min/1 73 square meters)  Note: GFR calculation is accurate only with a steady state creatinine    Fingerstick Glucose (POCT) [[id number]]  (Abnormal) Collected: 01/22/23 0738    Lab Status: Final result Updated: 01/22/23 0740     POC Glucose 170 mg/dl     Urine culture [[id number]] Collected: 01/22/23 0726    Lab Status: In process Specimen: Urine, Other Updated: 01/22/23 0731    T4, free [[id number]]  (Normal) Collected: 01/22/23 0445    Lab Status: Final result Specimen: Blood from Arm, Left Updated: 01/22/23 0627     Free T4 1 30 ng/dL     NT-BNP PRO-BE,[facility],[place] campuses only [[id number]]  (Abnormal) Collected: 01/22/23 0445    Lab Status: Final result Specimen: Blood from Arm, Left Updated: 01/22/23 0612     NT-proBNP 833 pg/mL     TSH, 3rd generation with Free T4 reflex [[id number]]  (Abnormal) Collected: 01/22/23 0445    Lab Status: Final result Specimen: Blood from Arm, Left Updated: 01/22/23 0612     TSH 3RD GENERATON 4 650 uIU/mL     Narrative:      Patients undergoing fluorescein dye angiography may retain small amounts of fluorescein in the body for 48-72 hours post procedure  Samples containing fluorescein can produce falsely depressed TSH values  If the patient had this procedure,a specimen should be resubmitted post fluorescein clearance        Comprehensive metabolic panel [[id number]]  (Abnormal) Collected: 01/22/23 0445    Lab Status: Final result Specimen: Blood from Arm, Left Updated: 01/22/23 0512     Sodium 130 mmol/L      Potassium 4 2 mmol/L      Chloride 95 mmol/L      CO2 28 mmol/L      ANION GAP 7 mmol/L      BUN 21 mg/dL      Creatinine 0 96 mg/dL      Glucose 141 mg/dL      Calcium 8 7 mg/dL      AST 21 U/L      ALT 24 U/L      Alkaline Phosphatase 92 U/L      Total Protein 6 6 g/dL      Albumin 3 7 g/dL      Total Bilirubin 0 70 mg/dL      eGFR 54 ml/min/1 73sq m     Narrative:      [place] guidelines for Chronic Kidney Disease (CKD):   •  Stage 1 with normal or high GFR (GFR > 90 mL/min/1 73 square meters)  •  Stage 2 Mild CKD (GFR = 60-89 mL/min/1 73 square meters)  •  Stage 3A Moderate CKD (GFR = 45-59 mL/min/1 73 square meters)  •  Stage 3B Moderate CKD (GFR = 30-44 mL/min/1 73 square meters)  •  Stage 4 Severe CKD (GFR = 15-29 mL/min/1 73 square meters)  •  Stage 5 End Stage CKD (GFR <15 mL/min/1 73 square meters)  Note: GFR calculation is accurate only with a steady state creatinine    CBC and differential [[id number]]  (Abnormal) Collected: 01/22/23 0445    Lab Status: Final result Specimen: Blood from Arm, Left Updated: 01/22/23 0451     WBC 6 36 Thousand/uL      RBC 3 18 Million/uL      Hemoglobin 10 2 g/dL      Hematocrit 30 4 %      MCV 96 fL      MCH 32 1 pg      MCHC 33 6 g/dL      RDW 14 6 %      MPV 9 9 fL      Platelets 299 Thousands/uL      nRBC 1 /100 WBCs      Neutrophils Relative 67 %      Immat GRANS % 1 %      Lymphocytes Relative 18 %      Monocytes Relative 12 %      Eosinophils Relative 1 %      Basophils Relative 1 %      Neutrophils Absolute 4 25 Thousands/µL      Immature Grans Absolute 0 08 Thousand/uL      Lymphocytes Absolute 1 17 Thousands/µL      Monocytes Absolute 0 77 Thousand/µL      Eosinophils Absolute 0 06 Thousand/µL      Basophils Absolute 0 03 Thousands/µL                  CTA chest pe study   Final Result by Zuri Haddad MD (01/22 2715)      No pulmonary embolus  Measured RV/LV ratio is within normal limits at less than 0 9  Stable left upper lobe nodule and right adrenal nodule        Workstation performed: [id number]         XR chest 2 views   ED Interpretation by Joshua Henson DO (01/22 5335)   Chest x-ray interpreted by me shows no acute cardiopulmonary disease, no change from January 19, 2023      Final Result by Quintin Holguin MD (01/22 1210)      No acute cardiopulmonary disease  Workstation performed: [id number]               Procedures  Procedures      ED Course                                       Medical Decision Making  68-year-old female presenting to the emergency department due to worsening respiratory status  Patient was recently diagnosed with bronchitis and started on antibiotics for UTI which should have some pulmonary coverage  Patient is requiring new supplemental oxygen  Will obtain basic labs and chest x-ray to evaluate for possible conversion to pneumonia  Work-up generally reassuring however patient is requiring new oxygen supplementation  Will admit patient for further management of this exacerbation  Acute cough: acute illness or injury  Bronchitis: self-limited or minor problem  Chronic anemia: self-limited or minor problem  Dyspnea: acute illness or injury  Hyponatremia: self-limited or minor problem  Hypoxia: self-limited or minor problem  Amount and/or Complexity of Data Reviewed  Labs: ordered  Radiology: independent interpretation performed  Risk  Prescription drug management  Decision regarding hospitalization              Disposition  Final diagnoses:   Bronchitis   Hypoxia   Hyponatremia   Chronic anemia   Dyspnea   Acute cough     Time reflects when diagnosis was documented in both MDM as applicable and the Disposition within this note     Time User Action Codes Description Comment    1/22/2023  5:45 AM Theresa Schmitz Add [J40] Bronchitis     1/22/2023  5:45 AM Theresa Schmitz Add [R09 02] Hypoxia     1/22/2023  5:52 AM Hussein Lizarraga Add [Z91 199] Noncompliance     1/22/2023  6:50 AM Brijesh Linda Add [E87 1] Hyponatremia     1/22/2023  6:50 AM Anita Alvares Add [D64 9] Chronic anemia     1/22/2023  6:50 AM Brijesh Linda Add [R06 00] Dyspnea 1/22/2023  6:50 AM Elisabeth Lockwood Add [R05 1] Acute cough       ED Disposition     ED Disposition   Admit    Condition   Stable    Date/Time   Sun Jan 22, 2023  5:45 AM    Comment   Case was discussed with Dr Laura Olivas and the patient's admission status was agreed to be Admission Status: inpatient status to the service of Dr Laura Olivas   Follow-up Information    None         Current Discharge Medication List      CONTINUE these medications which have NOT CHANGED    Details   amLODIPine (NORVASC) 5 mg tablet Take 10 mg by mouth daily      ascorbic acid (VITAMIN C) 500 mg tablet Take 500 mg by mouth daily      atorvastatin (LIPITOR) 40 mg tablet Take 40 mg by mouth      cholecalciferol (VITAMIN D3) 1,000 units tablet Take 2,000 Units by mouth daily       cloNIDine (CATAPRES) 0 1 mg tablet TAKE 1 TABLET BY MOUTH TWICE A DAY  Qty: 180 tablet, Refills: 3    Associated Diagnoses: Hypertension, unspecified type      Eliquis 5 MG TAKE 1 TABLET BY MOUTH TWICE A DAY  Qty: 60 tablet, Refills: 11    Associated Diagnoses: Atrial fibrillation, unspecified type (HCC)      ferrous sulfate 325 (65 Fe) mg tablet Take 325 mg by mouth      levothyroxine 100 mcg tablet Take 100 mcg by mouth daily      lisinopril (ZESTRIL) 20 mg tablet TAKE 1 TABLET (20 MG TOTAL) BY MOUTH 2 (TWO) TIMES A DAY  Qty: 180 tablet, Refills: 3    Associated Diagnoses: Essential hypertension      LORazepam (ATIVAN) 0 5 mg tablet Take 0 5 mg by mouth 2 (two) times a day      metFORMIN (GLUCOPHAGE) 500 mg tablet Take 1 tablet (500 mg total) by mouth 2 (two) times a day with meals  Qty: 60 tablet, Refills: 0    Associated Diagnoses: Diabetes (HCC)      Multiple Vitamins-Minerals (PRESERVISION AREDS 2 PO) Take by mouth 2 (two) times a day        multivitamin (THERAGRAN) TABS Take 1 tablet by mouth daily      spironolactone (ALDACTONE) 25 mg tablet TAKE 1 TABLET (25 MG TOTAL) BY MOUTH DAILY        !! acetaminophen (TYLENOL) 500 mg tablet Take 2 tablets (1,000 mg total) by mouth 3 (three) times a day as needed for mild pain, moderate pain or fever    Associated Diagnoses: Pain      !! acetaminophen (TYLENOL) 500 mg tablet Take 2 tablets (1,000 mg total) by mouth 3 (three) times a day as needed for mild pain or moderate pain    Associated Diagnoses: Pain      azelastine (ASTELIN) 0 1 % nasal spray 1 spray into each nostril 2 (two) times a day Use in each nostril as directed  Qty: 30 mL, Refills: 0    Associated Diagnoses: Post-nasal drip      chlorthalidone 25 mg tablet Take 25 mg by mouth daily  Refills: 3      labetalol (NORMODYNE) 200 mg tablet Take 1 tablet (200 mg total) by mouth every 12 (twelve) hours  Qty: 120 tablet, Refills: 0    Associated Diagnoses: Hypertensive urgency       ! ! - Potential duplicate medications found  Please discuss with provider  No discharge procedures on file  PDMP Review       Value Time User    PDMP Reviewed  Yes 2/15/2022  7:41 AM Jim Paris MD           ED Provider  Attending physically available and evaluated Yenni Ghotra  I managed the patient along with the ED Attending      Electronically Signed by         Sathya Toscano MD  01/23/23 0916

## 2023-01-22 NOTE — ASSESSMENT & PLAN NOTE
Lab Results   Component Value Date    HGBA1C 6 0 (H) 04/06/2022   pt reports taking metformin 500mg 1-2x daily depending on her BS  Due to pt's age and frailty, recommend discontinuing metformin on discharge as pt's Hgb A1c goal can be higher at 8% and lower Hgb A1c is concerning for potential hypoglycemic events    Started on Insulin sliding scale  Monitor closely as pt is on IV steroids

## 2023-01-22 NOTE — ASSESSMENT & PLAN NOTE
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

## 2023-01-22 NOTE — ASSESSMENT & PLAN NOTE
TSH 0 41 in 04/2022  [address] 4 6, free T4 normal  Continue PTO levothyroxine 100mcg early morning  Outpatient follow-up

## 2023-01-22 NOTE — PROGRESS NOTES
[address] [facility] admit Progress Note - Esther Jennings 1940, 78 y o  female MRN: [id number]  Unit/Bed#: ED 16 Encounter: [id number]  Primary Care Provider: Johanna Flores DO   Date and time admitted to hospital: 1/22/2023  3:50 AM    * Acute bronchitis  Assessment & Plan  Pt p/w worsening dyspnea, requiring supplemental O2 (no home O2) likely 2/2 bronchitis diagnosed on 1/19/23  · CT PE showed no pulmonary embolus, stable left upper lobe nodule and right adrenal nodule  · Patient is currently on 2 L of oxygen just for comfort, she is saturating in the mid 90s    No wheezing appreciated  · Received Solu-Medrol 125 mg per EMS, received 40 mg in the ED  · Switch Solu-Medrol to 40 mg every 12 hours  · Continue doxycycline  · Tessalon Perles, Mucinex  · Respiratory protocol  · Ambulate TID   · Procalcitonin pending  · Sputum culture    Acute respiratory failure with hypoxia (HCC)  Assessment & Plan  · Patient required oxygen admission suspect due to acute bronchitis  · BNP elevated  · Clinically seems euvolemic, lungs are clear to auscultation, no JVD, no lower extremity edema  · Echo ordered  · She will be receiving due to poor p o  intake sodium chloride 50 cc/h for 10 hours  · Monitor volume status    Urinary tract infection  Assessment & Plan  UA showed leukocytes on 1/19/23 in ED and pt was discharged home with keflex  No urine culture obtained at the time so will collect now to specify organism  Doxycyline 100mg BID on board for acute bronchitis and UTI purposes  Monitor I&Os, renal function    Noncompliance  Assessment & Plan  Noncompliance with medications  Case management consulted for med management- consider pill packs to encourage adherence to medication regimen to avoid complications from uncompensated chronic conditions    Chronic anemia  Assessment & Plan  Hgb 10 2 and MCV 96  Supposed to be on iron supplementation but not compliant  Will start 3x weekly MWF with vit C 500mg   Recheck in 2-3 months outpatient   No active signs of bleeding    History of breast cancer  Assessment & Plan  S/p R mastectomy    Asymptomatic bilateral carotid artery stenosis  Assessment & Plan  S/p L carotid endarterectomy on chart review  Used to follow up vascular surgery, last visit in 2019  Carotid duplex shows L >70% stenosis and R 50-69% stenosis  Continue optimal medication management with BP control, lipitor 40mg daily  Vascular surgery had recommended antiplatelet therapy which was never initiated, so will consider starting  Recommend vascular surgery referral on discharge    Hyperlipidemia  Assessment & Plan  Continue PTA lipitor 40mg daily    Chronic hyponatremia  Assessment & Plan  Chronic, Na 130 on admission, typically in low 130s  Last seen by nephrology 2 years ago  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 so 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  Patient with poor p o  intake in the past few days, sodium this morning 126  Will start gentle fluid challenge with sodium chloride 50 cc/h, repeat sodium level at 4 PM    CKD (chronic kidney disease) stage 3, GFR 30-59 ml/min [facility])  Assessment & Plan  Lab Results   Component Value Date    EGFR 53 01/22/2023    EGFR 54 01/22/2023    EGFR 42 01/19/2023    CREATININE 0 98 01/22/2023    CREATININE 0 96 01/22/2023    CREATININE 1 17 01/19/2023   creatinine at baseline  Receiving IV contrast so monitor closely  Sodium chloride 50 cc/h 10-hour    Renovascular hypertension  Assessment & Plan  Hypertensive  · Uncontrolled likely due to noncompliance as pt wasn't sure which medications she takes   See plan for noncompliance below  · In setting of BL renal artery stenosis, >60% stenosis bilaterally  · Will continue PTA amlodipine 10mg daily, clonidine 0 1mg BID, labetalol 200mg BID, lisinopril 20mg BID (per nephrology), spironolactone 25mg daily  · Patient states she is not taking chlorthalidone  · Will verify with pharmacy      Paroxysmal A-fib [facility])  Assessment & Plan  Rate controlled  Continue PTA labetalol 200mg BID   On eliquis 5mg BID but discussed with pharmacy and based on age and weight, will reduce to 2 5mg BID    Anxiety  Assessment & Plan  · Continue PTA ativan 0 5mg BID    Hypothyroidism  Assessment & Plan  TSH 0 41 in 04/2022  [address] 4 6, free T4 normal  Continue PTO levothyroxine 100mcg early morning  Outpatient follow-up    Type 2 diabetes mellitus, without long-term current use of insulin (HCC)  Assessment & Plan  Lab Results   Component Value Date    HGBA1C 6 0 (H) 04/06/2022   pt reports taking metformin 500mg 1-2x daily depending on her BS  Due to pt's age and frailty, recommend discontinuing metformin on discharge as pt's Hgb A1c goal can be higher at 8% and lower Hgb A1c is concerning for potential hypoglycemic events  Started on Insulin sliding scale  Monitor closely as pt is on IV steroids    Nicotine dependence  Assessment & Plan  Start nicotine patch 21mg daily         Patient was seen and evaluated bedside, feeling better  No respiratory distress, on 2 L of oxygen just for comfort saturating in the mid 90s    Used call to family    Constitutional: awake alert and oriented, appears well, no acute distress  Head: normocephalic atraumatic  Neck: supple  Cardiovascular: regular rate and rhythm, no murmur rub or gallop  Lungs: clear to auscultation  Abdomen: positive bowel sounds, soft, nondistended, nontender  Extremities: no edema  Neurologic exam nonfocal

## 2023-01-22 NOTE — PLAN OF CARE
Problem: PAIN - ADULT  Goal: Verbalizes/displays adequate comfort level or baseline comfort level  Description: Interventions:  - Encourage patient to monitor pain and request assistance  - Assess pain using appropriate pain scale  - Administer analgesics based on type and severity of pain and evaluate response  - Implement non-pharmacological measures as appropriate and evaluate response  - Consider cultural and social influences on pain and pain management  - Notify physician/advanced practitioner if interventions unsuccessful or patient reports new pain  Outcome: Progressing     Problem: INFECTION - ADULT  Goal: Absence or prevention of progression during hospitalization  Description: INTERVENTIONS:  - Assess and monitor for signs and symptoms of infection  - Monitor lab/diagnostic results  - Monitor all insertion sites, i e  indwelling lines, tubes, and drains  - Monitor endotracheal if appropriate and nasal secretions for changes in amount and color  - [place] appropriate cooling/warming therapies per order  - Administer medications as ordered  - Instruct and encourage patient and family to use good hand hygiene technique  - Identify and instruct in appropriate isolation precautions for identified infection/condition  Outcome: Progressing     Problem: SAFETY ADULT  Goal: Patient will remain free of falls  Description: INTERVENTIONS:  - Educate patient/family on patient safety including physical limitations  - Instruct patient to call for assistance with activity   - Consult OT/PT to assist with strengthening/mobility   - Keep Call bell within reach  - Keep bed low and locked with side rails adjusted as appropriate  - Keep care items and personal belongings within reach  - Initiate and maintain comfort rounds        Outcome: Progressing     Problem: DISCHARGE PLANNING  Goal: Discharge to home or other facility with appropriate resources  Description: INTERVENTIONS:  - Identify barriers to discharge w/patient and caregiver  - Arrange for needed discharge resources and transportation as appropriate  - Identify discharge learning needs (meds, wound care, etc )  - Arrange for interpretive services to assist at discharge as needed  - Refer to Case Management Department for coordinating discharge planning if the patient needs post-hospital services based on physician/advanced practitioner order or complex needs related to functional status, cognitive ability, or social support system  Outcome: Progressing

## 2023-01-22 NOTE — ASSESSMENT & PLAN NOTE
S/p L carotid endarterectomy on chart review  Used to follow up vascular surgery, last visit in 2019  Carotid duplex shows L >70% stenosis and R 50-69% stenosis  Continue optimal medication management with BP control, lipitor 40mg daily  Vascular surgery had recommended antiplatelet therapy which was never initiated, so will consider starting  Recommend vascular surgery referral on discharge

## 2023-01-22 NOTE — ASSESSMENT & PLAN NOTE
TSH 0 41 in 04/2022  Continue PTO levothyroxine 100mcg early morning  Recheck TSH since last one low normal

## 2023-01-22 NOTE — H&P
[address] [facility] [place] 1940, 78 y o  female MRN: [id number]  Unit/Bed#: ED 16 Encounter: [id number]  Primary Care Provider: Felecia Iniguez DO   Date and time admitted to hospital: 1/22/2023  3:50 AM    * COPD exacerbation ([address] )  Assessment & Plan  Pt p/w worsening dyspnea, requiring supplemental O2 (no home O2) likely 2/2 bronchitis diagnosed on 1/19/23  · Currently on 3L O2, no home O2 at baseline, wean as tolerated, continuous pulse ox  · Start IV solumedrol 40mg Q8H, duoneb Q6H and doxycyline 100mg BID  · Tessalon perles PRN for cough, mucinez BID for secretions clearance   · Should start on daily maintenance inhaler on discharge with pulmonology referral  · CTA chest to r/o PE and malignancy  · BNP to r/o fluid overload as contributor  ECHO to assess cardiac function  · Respiratory and airway clearance protocol  · Ambulate TID   · Procalcitonin and sputum cultures ordered    Urinary tract infection  Assessment & Plan  UA showed leukocytes on 1/19/23 in ED and pt was discharged home with keflex  No urine culture obtained at the time so will collect now to specify organism  Doxycyline 100mg BID on board for COPD exacerbation and UTI purposes  Monitor I&Os, renal function    Renovascular hypertension  Assessment & Plan  /75 on admission, significantly elevated, decreased to 163/70 without intervention  · Uncontrolled likely due to noncompliance as pt wasn't sure which medications she takes   See plan for noncompliance below  · In setting of BL renal artery stenosis, >60% stenosis bilaterally  · Will continue PTA amlodipine 10mg daily, chlorthalidone 25mg daily, clonidine 0 1mg BID, labetalol 200mg BID, lisinopril 20mg BID (per nephrology), spironolactone 25mg daily  · Consider inpatient nephrology consult if BP continues to be uncontrolled    Type 2 diabetes mellitus, without long-term current use of insulin [facility])  Assessment & Plan  Lab Results Component Value Date    HGBA1C 6 0 (H) 04/06/2022   pt reports taking metformin 500mg 1-2x daily depending on her BS  Due to pt's age and frailty, recommend discontinuing metformin on discharge as pt's Hgb A1c goal can be higher at 8% and lower Hgb A1c is concerning for potential hypoglycemic events  Started on Insulin sliding scale  Monitor closely as pt is on IV steroids    Normocytic anemia  Assessment & Plan  Hgb 10 2 and MCV 96  Supposed to be on iron supplementation but not compliant  Will start 3x weekly MWF with vit C 500mg   Recheck in 2-3 months outpatient   No active signs of bleeding    CKD (chronic kidney disease) stage 3, GFR 30-59 ml/min [facility])  Assessment & Plan  Lab Results   Component Value Date    EGFR 54 01/22/2023    EGFR 42 01/19/2023    EGFR 67 04/06/2022    CREATININE 0 96 01/22/2023    CREATININE 1 17 01/19/2023    CREATININE 0 81 04/06/2022   creatinine at baseline  Receiving IV contrast so monitor closely    Chronic hyponatremia  Assessment & Plan  Chronic, Na 130 on admission, typically in low 130s  Last seen by nephrology 2 years ago   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 so 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  Hyponatremia continues to persist  Concern for possible paraneoplastic syndrome as pt has had lung nodules in past but no recent CT so will get CTA chest to r/o malignancy  Needs to re-establish with nephrology    Paroxysmal A-fib [facility])  Assessment & Plan  Rate controlled  Continue PTA labetalol 200mg BID and eliquis 5mg BID    Noncompliance  Assessment & Plan  Noncompliance with medications  Case management consulted for med management- consider pill packs to encourage adherence to medication regimen to avoid complications from uncompensated chronic conditions    History of breast cancer  Assessment & Plan  S/p R mastectomy    Asymptomatic bilateral carotid artery stenosis  Assessment & Plan  S/p L carotid endarterectomy on chart review  Used to follow up vascular surgery, last visit in 2019  Carotid duplex shows L >70% stenosis and R 50-69% stenosis  Continue optimal medication management with BP control, lipitor 40mg daily  Vascular surgery had recommended antiplatelet therapy which was never initiated, so will consider starting  Recommend vascular surgery referral on discharge    Hyperlipidemia  Assessment & Plan  Continue PTA lipitor 40mg daily    Anxiety  Assessment & Plan  Continue PTA ativan 0 5mg BID    Hypothyroidism  Assessment & Plan  TSH 0 41 in 04/2022  Continue PTO levothyroxine 100mcg early morning  Recheck TSH since last one low normal    Nicotine dependence  Assessment & Plan  Start nicotine patch 59PT daily  Complicated by emphysema     VTE Pharmacologic Prophylaxis: VTE Score: 5 High Risk (Score >/= 5) - Pharmacological DVT Prophylaxis Ordered: apixaban (Eliquis)  Sequential Compression Devices Ordered  Code Status: Level 3 - DNAR and DNI     Anticipated Length of Stay: Patient will be admitted on an inpatient basis with an anticipated length of stay of greater than 2 midnights secondary to COPD exacerbation likely 2/2 bronchitis  Total Time for Visit, including Counseling / Coordination of Care: 90 minutes Greater than 50% of this total time spent on direct patient counseling and coordination of care  Chief Complaint: worsening dyspnea    History of Present Illness:  Marce Sorenson is a 78 y o  female with a PMH of HLD, HTN, BL ROS, Afib, T2DM who presents with worsening shortness of breath, increasing productive cough, weakness and fatigue for 1-2 days  Seen in ED 3-4 days ago with suspected diagnosis of bronchitis and UTI and discharged with keflex  Received breathing treatments and IV solumedrol 125mg by EMS and in ED with some improvement  Denies chest pain, nausea, vomiting, abdominal pain, headache  Continues to smoke a pack a day and unaware if diagnosed with COPD, reports no O2 at baseline  No sick contacts   Discussed code status with pt and pt would not like chest compressions or intubation so agrees with DNR DNI status  Review of Systems:  Review of Systems   Constitutional: Positive for fatigue  Negative for chills and fever  HENT: Negative for ear pain and sore throat  Eyes: Negative for pain and visual disturbance  Respiratory: Positive for cough and shortness of breath  Negative for chest tightness  Cardiovascular: Negative for chest pain and palpitations  Gastrointestinal: Negative for abdominal pain, constipation, diarrhea, nausea and vomiting  Genitourinary: Negative for dysuria, hematuria and menstrual problem  Musculoskeletal: Negative for arthralgias and back pain  Skin: Negative for color change and rash  Neurological: Positive for weakness  Negative for seizures and syncope  Psychiatric/Behavioral: Negative for dysphoric mood and suicidal ideas  All other systems reviewed and are negative  Past Medical and Surgical History:   Past Medical History:   Diagnosis Date   • Anxiety    • Atrial fibrillation ([facility] [address] )    • Bowel obstruction ([facility] [address] )    • Cancer ([facility] )     LEFT BREAST CA 22 YEARS AGO    • Depression    • Diabetes mellitus ([facility] )    • Disease of thyroid gland    • Hypertension    • Hypothyroidism        Past Surgical History:   Procedure Laterality Date   • ABDOMINAL ADHESION SURGERY N/A 2/4/2018    Procedure: LYSIS ADHESIONS;  Surgeon: Erin Vargas DO;  Location: [facility];  Service: General   • BREAST SURGERY     • CHOLECYSTECTOMY     • COLON SURGERY  2017   • EXPLORATORY LAPAROTOMY     • JOINT REPLACEMENT      LEFT KNEE REPLACEMENT    • LAPAROTOMY N/A 2/4/2018    Procedure: LAPAROTOMY EXPLORATORY,;  Surgeon: Erin Vargas DO;  [facility]: [facility];  Service: General   • MASTECTOMY     • MASTECTOMY Left 1995   • MASTECTOMY Right 2017   • MA BX/EXC LYMPH NODE OPEN SUPERFICIAL Right 1/13/2017    Procedure: SENTINEL LYMPH NODE BIOPSY RIGHT AXILLA;   Surgeon: Patricia Doshi MD;  Location: [facility];  Service: General   • MA MASTECTOMY SIMPLE COMPLETE Right 1/13/2017    Procedure: MASTECTOMY SIMPLE;  Surgeon: Alba Bush MD;  [facility]: [facility];  Service: General   • SMALL INTESTINE SURGERY N/A 2/4/2018    Procedure: RESECTION SMALL BOWEL;  Surgeon: Taty Raymond DO;  Location: [facility];  Service: General   • US GUIDED BREAST BIOPSY RIGHT COMPLETE Right 11/29/2016       Meds/Allergies:  Prior to Admission medications    Medication Sig Start Date End Date Taking? Authorizing Provider   amLODIPine (NORVASC) 5 mg tablet Take 10 mg by mouth daily 9/29/22  Yes Historical Provider, MD   ascorbic acid (VITAMIN C) 500 mg tablet Take 500 mg by mouth daily   Yes Historical Provider, MD   atorvastatin (LIPITOR) 40 mg tablet Take 40 mg by mouth 10/23/19  Yes Historical Provider, MD   cholecalciferol (VITAMIN D3) 1,000 units tablet Take 2,000 Units by mouth daily    Yes Historical Provider, MD   cloNIDine (CATAPRES) 0 1 mg tablet TAKE 1 TABLET BY MOUTH TWICE A DAY 3/24/22  Yes RUCHI Goldstein   Eliquis 5 MG TAKE 1 TABLET BY MOUTH TWICE A DAY 8/15/22  Yes Christelle Sorto DO   ferrous sulfate 325 (65 Fe) mg tablet Take 325 mg by mouth 8/17/22  Yes Historical Provider, MD   levothyroxine 100 mcg tablet Take 100 mcg by mouth daily   Yes Historical Provider, MD   lisinopril (ZESTRIL) 20 mg tablet TAKE 1 TABLET (20 MG TOTAL) BY MOUTH 2 (TWO) TIMES A DAY 3/23/20  Yes Natalia Stokes MD   LORazepam (ATIVAN) 0 5 mg tablet Take 0 5 mg by mouth 2 (two) times a day   Yes Historical Provider, MD   metFORMIN (GLUCOPHAGE) 500 mg tablet Take 1 tablet (500 mg total) by mouth 2 (two) times a day with meals 2/24/22  Yes Moustapha Tejada MD   Multiple Vitamins-Minerals (PRESERVISION AREDS 2 PO) Take by mouth 2 (two) times a day     Yes Historical Provider, MD   multivitamin (THERAGRAN) TABS Take 1 tablet by mouth daily   Yes Historical Provider, MD   spironolactone (ALDACTONE) 25 mg tablet TAKE 1 TABLET (25 MG TOTAL) BY MOUTH DAILY   7/14/22  Yes Historical Provider, MD   acetaminophen (TYLENOL) 500 mg tablet Take 2 tablets (1,000 mg total) by mouth 3 (three) times a day as needed for mild pain, moderate pain or fever  Patient not taking: Reported on 7/25/2022 2/21/22   Tor Dumont MD   acetaminophen (TYLENOL) 500 mg tablet Take 2 tablets (1,000 mg total) by mouth 3 (three) times a day as needed for mild pain or moderate pain  Patient not taking: Reported on 7/25/2022 2/24/22   Tor Dumont MD   azelastine (ASTELIN) 0 1 % nasal spray 1 spray into each nostril 2 (two) times a day Use in each nostril as directed 4/6/22 5/6/22  Valerie Daniel PA-C   chlorthalidone 25 mg tablet Take 25 mg by mouth daily  Patient not taking: Reported on 1/22/2023 10/18/19   Historical Provider, MD   labetalol (NORMODYNE) 200 mg tablet Take 1 tablet (200 mg total) by mouth every 12 (twelve) hours 7/21/19   Mt Byrne PA-C   amLODIPine (NORVASC) 10 mg tablet Take 10 mg by mouth daily  1/22/23  Historical Provider, MD   cephalexin (KEFLEX) 500 mg capsule Take 1 capsule (500 mg total) by mouth every 12 (twelve) hours for 5 days 1/19/23 1/22/23  Maricruz Chand DO   metFORMIN (GLUCOPHAGE) 1000 MG tablet TAKE 1/2 TABLET BY MOUTH 2 TIMES A DAY WITH MEALS  Patient not taking: Reported on 1/22/2023 12/19/22 1/22/23  Historical Provider, MD   omeprazole (PriLOSEC) 20 mg delayed release capsule Take 20 mg by mouth daily  Patient not taking: Reported on 1/22/2023 4/13/22 1/22/23  Historical Provider, MD   omeprazole (PriLOSEC) 20 mg delayed release capsule Take 20 mg by mouth daily  Patient not taking: Reported on 1/22/2023 10/9/22 1/22/23  Historical Provider, MD PASTOR have reviewed home medications with patient personally  Allergies:    Allergies   Allergen Reactions   • Meloxicam GI Intolerance   • Montelukast Other (See Comments)     headache   • Morphine GI Intolerance   • Morphine    • Penicillins    • Percocet [Oxycodone-Acetaminophen]    • Sitagliptin SOB       Substance Use History:   Social History     Substance and Sexual Activity   Alcohol Use Never     Social History     Tobacco Use   Smoking Status Every Day   • Packs/day: 1 00   • Types: Cigarettes   Smokeless Tobacco Never     Social History     Substance and Sexual Activity   Drug Use Never       Family History:  Family History   Problem Relation Age of Onset   • Cancer Mother    • No Known Problems Father        Physical Exam:     Vitals:   Blood Pressure: 163/70 (01/22/23 0515)  Pulse: 58 (01/22/23 0515)  Temperature: 97 7 °F (36 5 °C) (01/22/23 0359)  Temp Source: Tympanic (01/22/23 0359)  Respirations: 20 (01/22/23 0515)  SpO2: 98 % (01/22/23 0515)    Physical Exam  Constitutional:       General: She is in acute distress (mild)  Appearance: She is ill-appearing  HENT:      Head: Normocephalic and atraumatic  Cardiovascular:      Rate and Rhythm: Normal rate and regular rhythm  Pulmonary:      Effort: No respiratory distress  Breath sounds: Wheezing (diffuse) present  Musculoskeletal:      Right lower leg: No edema  Left lower leg: No edema  Neurological:      General: No focal deficit present  Mental Status: She is alert and oriented to person, place, and time     Psychiatric:         Mood and Affect: Mood normal          Behavior: Behavior normal        Additional Data:     Lab Results:  Results from last 7 days   Lab Units 01/22/23  0445   WBC Thousand/uL 6 36   HEMOGLOBIN g/dL 10 2*   HEMATOCRIT % 30 4*   PLATELETS Thousands/uL 270   NEUTROS PCT % 67   LYMPHS PCT % 18   MONOS PCT % 12   EOS PCT % 1     Results from last 7 days   Lab Units 01/22/23  0445   SODIUM mmol/L 130*   POTASSIUM mmol/L 4 2   CHLORIDE mmol/L 95*   CO2 mmol/L 28   BUN mg/dL 21   CREATININE mg/dL 0 96   ANION GAP mmol/L 7   CALCIUM mg/dL 8 7   ALBUMIN g/dL 3 7   TOTAL BILIRUBIN mg/dL 0 70   ALK PHOS U/L 92   ALT U/L 24   AST U/L 21   GLUCOSE RANDOM mg/dL 141* Lines/Drains:  Invasive Devices     Peripheral Intravenous Line  Duration           Peripheral IV 01/22/23 Dorsal (posterior); Left Wrist <1 day                    Imaging: Reviewed radiology reports from this admission including: chest xray, CTA pending   XR chest 2 views    (Results Pending)   CTA chest pe study    (Results Pending)       EKG and Other Studies Reviewed on Admission:   · EKG: NSR  HR 62     ** Please Note: This note has been constructed using a voice recognition system   **

## 2023-01-23 ENCOUNTER — HOME HEALTH ADMISSION (OUTPATIENT)
Dept: HOME HEALTH SERVICES | Facility: HOME HEALTHCARE | Age: 83
End: 2023-01-23

## 2023-01-23 LAB
ANION GAP SERPL CALCULATED.3IONS-SCNC: 6 MMOL/L (ref 4–13)
BACTERIA UR CULT: NORMAL
BASOPHILS # BLD AUTO: 0.03 THOUSANDS/ÂΜL (ref 0–0.1)
BASOPHILS NFR BLD AUTO: 0 % (ref 0–1)
BUN SERPL-MCNC: 27 MG/DL (ref 5–25)
CALCIUM SERPL-MCNC: 8.6 MG/DL (ref 8.3–10.1)
CHLORIDE SERPL-SCNC: 97 MMOL/L (ref 96–108)
CO2 SERPL-SCNC: 27 MMOL/L (ref 21–32)
CREAT SERPL-MCNC: 1.08 MG/DL (ref 0.6–1.3)
EOSINOPHIL # BLD AUTO: 0 THOUSAND/ÂΜL (ref 0–0.61)
EOSINOPHIL NFR BLD AUTO: 0 % (ref 0–6)
ERYTHROCYTE [DISTWIDTH] IN BLOOD BY AUTOMATED COUNT: 14.4 % (ref 11.6–15.1)
GFR SERPL CREATININE-BSD FRML MDRD: 47 ML/MIN/1.73SQ M
GLUCOSE SERPL-MCNC: 161 MG/DL (ref 65–140)
GLUCOSE SERPL-MCNC: 175 MG/DL (ref 65–140)
GLUCOSE SERPL-MCNC: 179 MG/DL (ref 65–140)
GLUCOSE SERPL-MCNC: 186 MG/DL (ref 65–140)
GLUCOSE SERPL-MCNC: 213 MG/DL (ref 65–140)
HCT VFR BLD AUTO: 29.8 % (ref 34.8–46.1)
HGB BLD-MCNC: 10.1 G/DL (ref 11.5–15.4)
IMM GRANULOCYTES # BLD AUTO: 0.16 THOUSAND/UL (ref 0–0.2)
IMM GRANULOCYTES NFR BLD AUTO: 2 % (ref 0–2)
LYMPHOCYTES # BLD AUTO: 0.5 THOUSANDS/ÂΜL (ref 0.6–4.47)
LYMPHOCYTES NFR BLD AUTO: 5 % (ref 14–44)
MCH RBC QN AUTO: 32.4 PG (ref 26.8–34.3)
MCHC RBC AUTO-ENTMCNC: 33.9 G/DL (ref 31.4–37.4)
MCV RBC AUTO: 96 FL (ref 82–98)
MONOCYTES # BLD AUTO: 0.44 THOUSAND/ÂΜL (ref 0.17–1.22)
MONOCYTES NFR BLD AUTO: 5 % (ref 4–12)
NEUTROPHILS # BLD AUTO: 8.27 THOUSANDS/ÂΜL (ref 1.85–7.62)
NEUTS SEG NFR BLD AUTO: 88 % (ref 43–75)
NRBC BLD AUTO-RTO: 0 /100 WBCS
OSMOLALITY UR/SERPL-RTO: 282 MMOL/KG (ref 282–298)
OSMOLALITY UR: 333 MMOL/KG
PLATELET # BLD AUTO: 274 THOUSANDS/UL (ref 149–390)
PMV BLD AUTO: 9.9 FL (ref 8.9–12.7)
POTASSIUM SERPL-SCNC: 5.1 MMOL/L (ref 3.5–5.3)
PROCALCITONIN SERPL-MCNC: <0.05 NG/ML
RBC # BLD AUTO: 3.12 MILLION/UL (ref 3.81–5.12)
SODIUM 24H UR-SCNC: 23 MOL/L
SODIUM SERPL-SCNC: 130 MMOL/L (ref 135–147)
WBC # BLD AUTO: 9.4 THOUSAND/UL (ref 4.31–10.16)

## 2023-01-23 RX ADMIN — APIXABAN 2.5 MG: 2.5 TABLET, FILM COATED ORAL at 08:34

## 2023-01-23 RX ADMIN — CLONIDINE HYDROCHLORIDE 0.1 MG: 0.1 TABLET ORAL at 08:34

## 2023-01-23 RX ADMIN — GUAIFENESIN 600 MG: 600 TABLET, EXTENDED RELEASE ORAL at 22:04

## 2023-01-23 RX ADMIN — LISINOPRIL 20 MG: 20 TABLET ORAL at 17:29

## 2023-01-23 RX ADMIN — ATORVASTATIN CALCIUM 40 MG: 40 TABLET, FILM COATED ORAL at 17:28

## 2023-01-23 RX ADMIN — INSULIN LISPRO 2 UNITS: 100 INJECTION, SOLUTION INTRAVENOUS; SUBCUTANEOUS at 17:28

## 2023-01-23 RX ADMIN — GUAIFENESIN 600 MG: 600 TABLET, EXTENDED RELEASE ORAL at 08:34

## 2023-01-23 RX ADMIN — LORAZEPAM 0.5 MG: 0.5 TABLET ORAL at 08:34

## 2023-01-23 RX ADMIN — APIXABAN 2.5 MG: 2.5 TABLET, FILM COATED ORAL at 17:28

## 2023-01-23 RX ADMIN — LEVALBUTEROL HYDROCHLORIDE 1.25 MG: 1.25 SOLUTION, CONCENTRATE RESPIRATORY (INHALATION) at 12:51

## 2023-01-23 RX ADMIN — LEVALBUTEROL HYDROCHLORIDE 1.25 MG: 1.25 SOLUTION, CONCENTRATE RESPIRATORY (INHALATION) at 07:42

## 2023-01-23 RX ADMIN — OXYCODONE HYDROCHLORIDE AND ACETAMINOPHEN 500 MG: 500 TABLET ORAL at 12:01

## 2023-01-23 RX ADMIN — DOXYCYCLINE 100 MG: 100 CAPSULE ORAL at 05:29

## 2023-01-23 RX ADMIN — LORAZEPAM 0.5 MG: 0.5 TABLET ORAL at 17:29

## 2023-01-23 RX ADMIN — IPRATROPIUM BROMIDE 0.5 MG: 0.5 SOLUTION RESPIRATORY (INHALATION) at 12:51

## 2023-01-23 RX ADMIN — IPRATROPIUM BROMIDE 0.5 MG: 0.5 SOLUTION RESPIRATORY (INHALATION) at 19:27

## 2023-01-23 RX ADMIN — AMLODIPINE BESYLATE 10 MG: 10 TABLET ORAL at 08:34

## 2023-01-23 RX ADMIN — LEVALBUTEROL HYDROCHLORIDE 1.25 MG: 1.25 SOLUTION, CONCENTRATE RESPIRATORY (INHALATION) at 19:27

## 2023-01-23 RX ADMIN — INSULIN LISPRO 1 UNITS: 100 INJECTION, SOLUTION INTRAVENOUS; SUBCUTANEOUS at 22:03

## 2023-01-23 RX ADMIN — LABETALOL HYDROCHLORIDE 200 MG: 200 TABLET, FILM COATED ORAL at 08:34

## 2023-01-23 RX ADMIN — INSULIN LISPRO 1 UNITS: 100 INJECTION, SOLUTION INTRAVENOUS; SUBCUTANEOUS at 12:01

## 2023-01-23 RX ADMIN — SPIRONOLACTONE 25 MG: 25 TABLET, FILM COATED ORAL at 08:34

## 2023-01-23 RX ADMIN — CLONIDINE HYDROCHLORIDE 0.1 MG: 0.1 TABLET ORAL at 17:28

## 2023-01-23 RX ADMIN — LEVOTHYROXINE SODIUM 100 MCG: 100 TABLET ORAL at 05:29

## 2023-01-23 RX ADMIN — FERROUS SULFATE TAB 325 MG (65 MG ELEMENTAL FE) 325 MG: 325 (65 FE) TAB at 12:01

## 2023-01-23 RX ADMIN — METHYLPREDNISOLONE SODIUM SUCCINATE 40 MG: 40 INJECTION, POWDER, FOR SOLUTION INTRAMUSCULAR; INTRAVENOUS at 08:34

## 2023-01-23 RX ADMIN — METHYLPREDNISOLONE SODIUM SUCCINATE 40 MG: 40 INJECTION, POWDER, FOR SOLUTION INTRAMUSCULAR; INTRAVENOUS at 22:03

## 2023-01-23 RX ADMIN — INSULIN LISPRO 1 UNITS: 100 INJECTION, SOLUTION INTRAVENOUS; SUBCUTANEOUS at 08:35

## 2023-01-23 RX ADMIN — LABETALOL HYDROCHLORIDE 200 MG: 200 TABLET, FILM COATED ORAL at 22:04

## 2023-01-23 RX ADMIN — LISINOPRIL 20 MG: 20 TABLET ORAL at 08:34

## 2023-01-23 RX ADMIN — DOXYCYCLINE 100 MG: 100 CAPSULE ORAL at 17:29

## 2023-01-23 RX ADMIN — IPRATROPIUM BROMIDE 0.5 MG: 0.5 SOLUTION RESPIRATORY (INHALATION) at 07:42

## 2023-01-23 NOTE — PHYSICAL THERAPY NOTE
Physical Therapy Evaluation     Patient's Name: Lacho Mckeon    Admitting Diagnosis  Hyponatremia [E87 1]  Dyspnea [R06 00]  SOB (shortness of breath) [R06 02]  Bronchitis [J40]  Hypoxia [R09 02]  Chronic anemia [D64 9]  Noncompliance [Z91 199]  Acute cough [R05 1]    Problem List  Patient Active Problem List   Diagnosis    Heart palpitations    Nicotine dependence    Paroxysmal atrial fibrillation (HCC)    Type 2 diabetes mellitus, without long-term current use of insulin (HCC)    Essential hypertension    Hypothyroidism    Malignant neoplasm of central portion of right female breast ([facility] )    Acute kidney injury ([facility] )    Delirium    Physical deconditioning    Ambulatory dysfunction    Hx of fall    Anxiety    Postop check    Incisional hernia, without obstruction or gangrene    Syncope vs presyncope    Paroxysmal A-fib (HCC)    Renovascular hypertension    Diabetes (HCC)    Weight loss    CKD (chronic kidney disease) stage 3, GFR 30-59 ml/min (HCC)    Fall    Chronic hyponatremia    Gastroesophageal reflux disease without esophagitis    Depression    Hordeolum externum of right upper eyelid    History of CEA (carotid endarterectomy)    Hyperlipidemia    Hypo-osmolality and hyponatremia    Hypothyroidism due to acquired atrophy of thyroid    Iron deficiency anemia due to chronic blood loss    Low back pain without sciatica    Malignant neoplasm of right breast, stage 1, estrogen receptor positive (HCC)    Vitamin D deficiency    Varicose vein of leg    Tobacco abuse    Nontraumatic compression fracture of T4 vertebra (HCC)    Non-seasonal allergic rhinitis due to pollen    Hypertension    Hypertensive urgency    High blood pressure    Asymptomatic bilateral carotid artery stenosis    Hemarthrosis of right knee    History of breast cancer    Moderate protein-calorie malnutrition     Primary osteoarthritis of right knee    Synovial cyst of right popliteal space    Renal vascular disease    Fracture of right tibial plateau    Pain    Macular degeneration    Chronic anemia    Generalized weakness    Closed nondisplaced fracture of fifth left metatarsal bone    Acute bronchitis    Noncompliance    Urinary tract infection    Hypoxia    Bronchitis    Acute respiratory failure with hypoxia [facility])       Past Medical History  Past Medical History:   Diagnosis Date    Anxiety     Atrial fibrillation (HCC)     Bowel obstruction (HCC)     Cancer ([facility] [address] )     LEFT BREAST CA 22 YEARS AGO     Depression     Diabetes mellitus ([facility] )     Disease of thyroid gland     Hypertension     Hypothyroidism        Past Surgical History  Past Surgical History:   Procedure Laterality Date    ABDOMINAL ADHESION SURGERY N/A 2/4/2018    Procedure: LYSIS ADHESIONS;  Surgeon: Ruma Dangelo DO;  [facility]: [facility];  Service: General    BREAST SURGERY      CHOLECYSTECTOMY      COLON SURGERY  2017    EXPLORATORY LAPAROTOMY      JOINT REPLACEMENT      LEFT KNEE REPLACEMENT     LAPAROTOMY N/A 2/4/2018    Procedure: LAPAROTOMY EXPLORATORY,;  Surgeon: Ruma Dangelo DO;  [facility];  Service: General    MASTECTOMY      MASTECTOMY Left 1995    MASTECTOMY Right 2017    NH BX/EXC LYMPH NODE OPEN SUPERFICIAL Right 1/13/2017    Procedure: SENTINEL LYMPH NODE BIOPSY RIGHT AXILLA;   Surgeon: Marco A Acosta MD;  [facility]: [facility];  Service: General    NH MASTECTOMY SIMPLE COMPLETE Right 1/13/2017    Procedure: MASTECTOMY SIMPLE;  Surgeon: Marco A Acosta MD;  [facility];  Service: General    SMALL INTESTINE SURGERY N/A 2/4/2018    Procedure: RESECTION SMALL BOWEL;  Surgeon: Ruma Dangelo DO;  [facility];  Service: General    US GUIDED BREAST BIOPSY RIGHT COMPLETE Right 11/29/2016 01/23/23 1332   PT Last Visit   PT Visit Date 01/23/23   Note Type   Note type Evaluation   Education   Education Provided Yes   End of Consult   Patient Position at End of Consult Supine   Restrictions/Precautions   Weight Bearing Precautions Per Order Yes   LLE Weight Bearing Per Order FWB   Braces or Orthoses   (Pt has Post op shoe on , states declined CAM boot at Ortho appt  Has been wearing Post op shoe and FWB)   Other Precautions WBS; Fall Risk   Home Living   Type of Home Apartment  (Senior living)   Home Layout Elevator   Bathroom Toilet Raised   Bathroom Equipment Grab bars in [facility] with functional mobility; Independent with ADLs   Lives With Alone   IADLs Independent with meal prep; Family/Friend/Other provides transportation  (Pt does drive if needed to grocery store)   Falls in the last 6 months 1 to 4   Vocational Retired   Comments Pt lives alone in senior apt, Ind for mobility   General   Family/Caregiver Present No   Cognition   Arousal/Participation Alert   Orientation Level Oriented to person;Oriented to place;Oriented to situation  (Oriented to month, year, unable to accurately tell date)   Following Commands Follows all commands and directions without difficulty   Comments Pt pleasant and cooperative during session   Subjective   Subjective I am doing good, I already walked in hallway   RLE Assessment   RLE Assessment WFL   LLE Assessment   LLE Assessment WFL  (not tested for ankle/foot)   Bed Mobility   Supine to Sit 7  Independent   Sit to Supine 7  Independent   Additional Comments Pt noted to be in bed upon arrival  Pt transfers in and OOB without asistance   Transfers   Sit to Stand 6  Modified independent   Additional items Armrests   Stand to Sit 6  Modified independent   Additional items Armrests   Additional Comments with RW   Ambulation/Elevation   Gait pattern   (slow but steady gait, post op shoe on L LE)   Gait Assistance 5  Supervision   Assistive Device Rolling walker   Distance 250'   Ambulation/Elevation Additional Comments Pt Mod Ind for transfers , ambulates in hallway with RW ,supervision to monitor O2 on RA, sats 92%   Balance   Static Sitting Good Dynamic Sitting Fair +   Static Standing Fair +   Dynamic Standing Fair   Ambulatory Fair -   Endurance Deficit   Endurance Deficit Yes   Endurance Deficit Description weakness, SOB   Activity Tolerance   Activity Tolerance Patient limited by fatigue;Patient tolerated treatment well   Nurse Made Aware RN cleared pt for therapy   Assessment   Prognosis Good   Problem List Decreased endurance   Assessment Pt is a 78 y o  female seen for PT evaluation s/p admit to [facility] on 1/22/2023  Pt was admitted with a primary dx of:Acute bronchitis  PT now consulted for assessment of mobility and d/c needs  Pt with Ambulate orders  Pts current comorbidities effecting treatment include: DMII, recent 5th MT fracture FWB, UTI, HTN,CKD  Hawa Shayla Pts current clinical presentation is Evolving (medium complexity) due to Ongoing medical management for primary dx  Prior to admission, pt was Ind for mobility, ADLs  Pt at this time is able to perform bed mobility with mod Ind, transfers with mod Ind and ambulates with S using RW, monitor O2 sats on RA with ambulation, sats at 92%  Pt with recent nondisplaced Fracture 5 MT, rec to have Low CAM boot per Ortho, but patient states had declined the boot at the appointment and has been using post op shoe L LE, FWB  Pt appears at functional baseline, encourage mobility with staff and restorative team At conclusion of PT session pt returned BTB with phone and call bell within reach  Pt denies any further questions at this time  The patient's AM-PAC Basic Mobility Inpatient Short Form Raw Score is 21  A Raw score of greater than 16 suggests the patient may benefit from discharge to home  Please also refer to the recommendation of the Physical Therapist for safe discharge planning  Recommend Home upon hospital D/C     Goals   Patient Goals to go home   Plan   Treatment/Interventions Spoke to nursing;Spoke to case management   PT Frequency Other (Comment)  (Pt at baseline, IP PT not indicated)   Recommendation   PT Discharge Recommendation No rehabilitation needs   Equipment Recommended Walker   AM-PAC Basic Mobility Inpatient   Turning in Flat Bed Without Bedrails 4   Lying on Back to Sitting on Edge of Flat Bed Without Bedrails 4   Moving Bed to Chair 4   Standing Up From Chair Using Arms 4   Walk in Room 3   Climb 3-5 Stairs With Railing 2   Basic Mobility Inpatient Raw Score 21   Basic Mobility Standardized Score 45 55   Highest Level Of Mobility   JH-HLM Goal 6: Walk 10 steps or more   JH-HLM Achieved 8: Walk 250 feet ot more   Modified [place] Scale   Modified [place] Scale 2   End of Consult   Patient Position at End of Consult Supine; All needs within reach           Crys Oreilly, PT  DPT

## 2023-01-23 NOTE — PLAN OF CARE
Problem: PAIN - ADULT  Goal: Verbalizes/displays adequate comfort level or baseline comfort level  Description: Interventions:  - Encourage patient to monitor pain and request assistance  - Assess pain using appropriate pain scale  - Administer analgesics based on type and severity of pain and evaluate response  - Implement non-pharmacological measures as appropriate and evaluate response  - Consider cultural and social influences on pain and pain management  - Notify physician/advanced practitioner if interventions unsuccessful or patient reports new pain  Outcome: Progressing     Problem: INFECTION - ADULT  Goal: Absence or prevention of progression during hospitalization  Description: INTERVENTIONS:  - Assess and monitor for signs and symptoms of infection  - Monitor lab/diagnostic results  - Monitor all insertion sites, i e  indwelling lines, tubes, and drains  - Monitor endotracheal if appropriate and nasal secretions for changes in amount and color  - [place] appropriate cooling/warming therapies per order  - Administer medications as ordered  - Instruct and encourage patient and family to use good hand hygiene technique  - Identify and instruct in appropriate isolation precautions for identified infection/condition  Outcome: Progressing     Problem: SAFETY ADULT  Goal: Patient will remain free of falls  Description: INTERVENTIONS:  - Educate patient/family on patient safety including physical limitations  - Instruct patient to call for assistance with activity   - Consult OT/PT to assist with strengthening/mobility   - Keep Call bell within reach  - Keep bed low and locked with side rails adjusted as appropriate  - Keep care items and personal belongings within reach  - Initiate and maintain comfort rounds        Outcome: Progressing     Problem: DISCHARGE PLANNING  Goal: Discharge to home or other facility with appropriate resources  Description: INTERVENTIONS:  - Identify barriers to discharge w/patient and caregiver  - Arrange for needed discharge resources and transportation as appropriate  - Identify discharge learning needs (meds, wound care, etc )  - Arrange for interpretive services to assist at discharge as needed  - Refer to Case Management Department for coordinating discharge planning if the patient needs post-hospital services based on physician/advanced practitioner order or complex needs related to functional status, cognitive ability, or social support system  Outcome: Progressing

## 2023-01-23 NOTE — ASSESSMENT & PLAN NOTE
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

## 2023-01-23 NOTE — ASSESSMENT & PLAN NOTE
Noncompliance with medications  · Case management consulted for med management- consider pill packs to encourage adherence to medication regimen to avoid complications from uncompensated chronic conditions

## 2023-01-23 NOTE — ASSESSMENT & PLAN NOTE
TSH 0 41 in 04/2022   TSH 4 6, free T4 normal  · Continue PTO levothyroxine 100mcg early morning  · Outpatient follow-up

## 2023-01-23 NOTE — OCCUPATIONAL THERAPY NOTE
Occupational Therapy Screen       01/23/23 1424   OT Last Visit   OT Visit Date 01/23/23   Note Type   Note type Screen         OT orders received and chart reviewed  Per PT and restorative, Pt I with ADLs and functional mobility  Per CM, Pt with supportive daughter who assist upon DC  No acute OT needs at this time  DC skilled OT services  Thank you        Tea Marr MS, OTR/L

## 2023-01-23 NOTE — ASSESSMENT & PLAN NOTE
Lab Results   Component Value Date    EGFR 47 01/23/2023    EGFR 53 01/22/2023    EGFR 54 01/22/2023    CREATININE 1 08 01/23/2023    CREATININE 0 98 01/22/2023    CREATININE 0 96 01/22/2023     Creatinine at baseline  · Receiving IV contrast so monitor closely

## 2023-01-23 NOTE — RESTORATIVE TECHNICIAN NOTE
Restorative Technician Note      Patient Name: Carson Lawrence     Restorative Tech Visit Date: 01/23/23  Note Type: Mobility  Patient Position Upon Consult: Supine  Activity Performed: Ambulated  Assistive Device: Standard walker  Education Provided: Yes  Patient Position at End of Consult: Seated edge of bed; All needs within reach    Reviewed home setup with pt; no questions or concerns at this time from pt   Encouraged to ambulate throughout the day and continue with respiratory devices    Camacho VELAZCOT, Restorative Technician

## 2023-01-23 NOTE — ASSESSMENT & PLAN NOTE
Lab Results   Component Value Date    HGBA1C 6 0 (H) 04/06/2022     Patient reports taking metformin 500mg 1-2x daily depending on her BS  · Due to pt's age and frailty, recommend discontinuing metformin on discharge as pt's Hgb A1c goal can be higher at 8% and lower Hgb A1c is concerning for potential hypoglycemic events    · Started on Insulin sliding scale  · Monitor closely as pt is on IV steroids

## 2023-01-23 NOTE — ASSESSMENT & PLAN NOTE
Rate controlled  · Continue PTA labetalol 200mg BID   · On eliquis 5mg BID but discussed with pharmacy and based on age and weight, will reduce to 2 5mg BID

## 2023-01-23 NOTE — PLAN OF CARE
Problem: PHYSICAL THERAPY ADULT  Goal: Performs mobility at highest level of function for planned discharge setting  See evaluation for individualized goals  Description: Treatment/Interventions: Spoke to nursing, Spoke to case management  Equipment Recommended: Chary Pinedo       See flowsheet documentation for full assessment, interventions and recommendations  Outcome: Adequate for Discharge  Note: Prognosis: Good  Problem List: Decreased endurance  Assessment: Pt is a 78 y o  female seen for PT evaluation s/p admit to [facility] on 1/22/2023  Pt was admitted with a primary dx of:Acute bronchitis  PT now consulted for assessment of mobility and d/c needs  Pt with Ambulate orders  Pts current comorbidities effecting treatment include: DMII, recent 5th MT fracture FWB, UTI, HTN,CKD  Delona Many Pts current clinical presentation is Evolving (medium complexity) due to Ongoing medical management for primary dx  Prior to admission, pt was Ind for mobility, ADLs  Pt at this time is able to perform bed mobility with mod Ind, transfers with mod Ind and ambulates with S using RW, monitor O2 sats on RA with ambulation, sats at 92%  Pt with recent nondisplaced Fracture 5 MT, rec to have Low CAM boot per Ortho, but patient states had declined the boot at the appointment and has been using post op shoe L LE, FWB  Pt appears at functional baseline, encourage mobility with staff and restorative team At conclusion of PT session pt returned BTB with phone and call bell within reach  Pt denies any further questions at this time  The patient's AM-PAC Basic Mobility Inpatient Short Form Raw Score is 21  A Raw score of greater than 16 suggests the patient may benefit from discharge to home  Please also refer to the recommendation of the Physical Therapist for safe discharge planning  Recommend Home upon hospital D/C  PT Discharge Recommendation: No rehabilitation needs    See flowsheet documentation for full assessment

## 2023-01-23 NOTE — ASSESSMENT & PLAN NOTE
Hgb 10 2 and MCV 96 on presentation   Supposed to be on iron supplementation but not compliant  · Will start 3x weekly MWF with vit C 500mg   · Recheck in 2-3 months outpatient   · No active signs of bleeding

## 2023-01-23 NOTE — CASE MANAGEMENT
Case Management Assessment & Discharge Planning Note    Patient name Lana Lima  Location [facility] 810/[facility] 810-01 MRN [id number]  : 1940 Date 2023       Current Admission Date: 2023  Current Admission Diagnosis:Acute bronchitis   Patient Active Problem List    Diagnosis Date Noted   • Acute bronchitis 2023   • Noncompliance 2023   • Urinary tract infection 2023   • Acute respiratory failure with hypoxia ([facility] ) 2023   • Hypoxia    • Bronchitis    • Closed nondisplaced fracture of fifth left metatarsal bone 2023   • Generalized weakness 2022   • Chronic anemia 2022   • Pain 02/15/2022   • Macular degeneration 02/15/2022   • Fracture of right tibial plateau    • Renal vascular disease 2020   • Primary osteoarthritis of right knee 2019   • Synovial cyst of right popliteal space 2019   • Hemarthrosis of right knee 2019   • History of breast cancer 2019   • Moderate protein-calorie malnutrition  2019   • Asymptomatic bilateral carotid artery stenosis 08/15/2019   • High blood pressure    • Hypertensive urgency 2019   • Hypertension    • Hypo-osmolality and hyponatremia 2019   • Fall 2019   • Chronic hyponatremia 2019   • Syncope vs presyncope 2019   • Paroxysmal A-fib ([facility] ) 2019   • Renovascular hypertension 2019   • Diabetes ([facility] ) 2019   • Weight loss 2019   • CKD (chronic kidney disease) stage 3, GFR 30-59 ml/min ([facility]) 2019   • Iron deficiency anemia due to chronic blood loss 2018   • Incisional hernia, without obstruction or gangrene 07/10/2018   • Postop check 2018   • Delirium 2018   • Physical deconditioning 2018   • Ambulatory dysfunction 2018   • Hx of fall 2018   • Anxiety 2018   • Acute kidney injury ([facility] ) 2018   • Hordeolum externum of right upper eyelid 2017   • Malignant neoplasm of right breast, stage 1, estrogen receptor positive ([place] [address] ) 02/08/2017   • Malignant neoplasm of central portion of right female breast ([place] [address] ) 01/13/2017   • Tobacco abuse 01/11/2017   • Heart palpitations 07/11/2016   • Nicotine dependence 07/11/2016   • Paroxysmal atrial fibrillation ([place] [address] ) 07/11/2016   • Type 2 diabetes mellitus, without long-term current use of insulin ([place] [address] ) 07/11/2016   • Essential hypertension 07/11/2016   • Hypothyroidism 07/11/2016   • Nontraumatic compression fracture of T4 vertebra ([place] [address] ) 10/06/2015   • Low back pain without sciatica 09/17/2015   • Gastroesophageal reflux disease without esophagitis 02/23/2015   • Depression 02/23/2015   • History of CEA (carotid endarterectomy) 02/23/2015   • Hyperlipidemia 02/23/2015   • Hypothyroidism due to acquired atrophy of thyroid 02/23/2015   • Mitral insufficiency and aortic stenosis 02/23/2015   • Vitamin D deficiency 02/23/2015   • Varicose vein of leg 02/23/2015   • Non-seasonal allergic rhinitis due to pollen 02/23/2015      LOS (days): 1  Geometric Mean LOS (GMLOS) (days): 3 80  Days to GMLOS:2 5     OBJECTIVE:    Risk of Unplanned Readmission Score: 31 57         Current admission status: Inpatient       Preferred Pharmacy:   [address] [place] [address]  Phone: [phone] Fax: [phone]    Primary Care Provider: Albertha Eisenmenger, DO    Primary Insurance: MEDICARE  Secondary Insurance: BLUE CROSS    ASSESSMENT:  [address] [place] [address], [facility] [address] Representative - Daughter   Primary Phone: [phone] (Home)           Sangeetha Snowors [facility] [address] Representative - Daughter   Primary Phone: [phone] (Mobile)                         Readmission Root Cause  30 Day Readmission: No    Patient Information  Admitted from[de-identified] Home  Mental Status: Alert  During Assessment patient was accompanied by: Not accompanied during assessment  Assessment information provided by[de-identified] Patient  Primary Caregiver: Self  Support Systems: Daughter, Family members, Domitila Casey 59 of Residence: [address] [facility] do you live in?: [place], [address] [place] [address] Street entry access options   Select all that apply : Elevator  Type of Current Residence: Apartment  Floor Level: 3  Upon entering residence, is there a bedroom on the main floor (no further steps)?: Yes  Upon entering residence, is there a bathroom on the main floor (no further steps)?: Yes  In the last 12 months, was there a time when you were not able to pay the mortgage or rent on time?: No  In the last 12 months, how many places have you lived?: 1  In the last 12 months, was there a time when you did not have a steady place to sleep or slept in a shelter (including now)?: No  Homeless/housing insecurity resource given?: N/A  Living Arrangements: Lives Alone    Activities of Daily Living Prior to Admission  Functional Status: Independent  Completes ADLs independently?: Yes  Ambulates independently?: Yes  Does patient use assisted devices?: Yes  Assisted Devices (DME) used: Khalida Huntley  Does patient currently own DME?: Yes  What DME does the patient currently own?: Khalida Huntley  Does patient have a history of Outpatient Therapy (PT/OT)?: Yes ([facility])  Does the patient have a history of Short-Term Rehab?: No  Does patient have a history of HHC?: Yes [facility])  Does patient currently have [facility]?: No         Patient Information Continued  Income Source: Pension/senior care  Does patient have prescription coverage?: Yes  Within the past 12 months, you worried that your food would run out before you got the money to buy more : Never true  Within the past 12 months, the food you bought just didn't last and you didn't have money to get more : Never true  Food insecurity resource given?: N/A  Does patient receive dialysis treatments?: No  Does patient have a history of substance abuse?: No  Does patient have a history of Mental Health Diagnosis?: No    PHQ 2/9 Screening   Reviewed PHQ 2/9 Depression Screening Score?: No    Means of Transportation  Means of Transport to Appts[de-identified] Drives Self  In the past 12 months, has lack of transportation kept you from medical appointments or from getting medications?: No  In the past 12 months, has lack of transportation kept you from meetings, work, or from getting things needed for daily living?: No  Was application for public transport provided?: Refused (Pt stated she already has [employer] application )        DISCHARGE DETAILS:    Discharge planning discussed with[de-identified] Pt  Freedom of Choice: Yes  Comments - Freedom of Choice: Pt agreeable to referral being made to [facility] for SN  CM contacted family/caregiver?: No- see comments (Pt alert and oriented)  Were Treatment Team discharge recommendations reviewed with patient/caregiver?: Yes  Did patient/caregiver verbalize understanding of patient care needs?: Yes  Were patient/caregiver advised of the risks associated with not following Treatment Team discharge recommendations?: Yes         [facility]         Is the patient interested in [place] [id number] at discharge?: Yes  [facility] [place] [place] requested[de-identified] [email or web address] Name[de-identified] [facility] Provider[de-identified] PCP  Home Health Services Needed[de-identified] Other (comment), Evaluate Functional Status and Safety (Medication education)  Homebound Criteria Met[de-identified] Requires the Assistance of Another Person for Safe Ambulation or to Leave the Home  Supporting Clincal Findings[de-identified] Limited Endurance    DME Referral Provided  Referral made for DME?: No    Other Referral/Resources/Interventions Provided:  Interventions: C  Referral Comments: Referral made to [facility]           Treatment Team Recommendation: Home with [facility] [employer]  Discharge Destination Plan[de-identified] Home with [facility] [employer]

## 2023-01-23 NOTE — ASSESSMENT & PLAN NOTE
Pt p/w worsening dyspnea, requiring supplemental O2 (no home O2) likely 2/2 bronchitis diagnosed on 1/19/23  CT PE showed no pulmonary embolus, stable left upper lobe nodule and right adrenal nodule  Received Solu-Medrol 125 mg per EMS, received 40 mg in the ED  Procalcitonin remains negative x2 days     · Currently receiving Solu-Medrol 40 mg every 12 hours; Plan to transition to prednisone tomorrow 1/24  · Continue doxycycline to complete 5 day course   · Tessalon Perles, Mucinex  · Respiratory protocol  · Follow-up sputum culture  · Patient requiring 2L O2 NC, now on room air  · Anticipate discharge within the next 24 hrs

## 2023-01-23 NOTE — ASSESSMENT & PLAN NOTE
Uncontrolled likely due to noncompliance as pt wasn't sure which medications she takes   In setting of BL renal artery stenosis, >60% stenosis bilaterally  · Will continue PTA amlodipine 10mg daily, clonidine 0 1mg BID, labetalol 200mg BID, lisinopril 20mg BID (per nephrology), spironolactone 25mg daily  · Patient states she is not taking chlorthalidone, therefore will hold

## 2023-01-23 NOTE — PROGRESS NOTES
[facility]  Progress Note - Lana Lima 1940, 78 y o  female MRN: [id number]  Unit/Bed#: [facility] 810-01 Encounter: [id number]  Primary Care Provider: Coral Carty DO   Date and time admitted to hospital: 1/22/2023  3:50 AM    * Acute bronchitis  Assessment & Plan  Pt p/w worsening dyspnea, requiring supplemental O2 (no home O2) likely 2/2 bronchitis diagnosed on 1/19/23  CT PE showed no pulmonary embolus, stable left upper lobe nodule and right adrenal nodule  Received Solu-Medrol 125 mg per EMS, received 40 mg in the ED  Procalcitonin remains negative x2 days  · Currently receiving Solu-Medrol 40 mg every 12 hours; Plan to transition to prednisone tomorrow 1/24  · Continue doxycycline to complete 5 day course   · Tessalon Perles, Mucinex  · Respiratory protocol  · Follow-up sputum culture  · Patient requiring 2L O2 NC, now on room air  · Anticipate discharge within the next 24 hrs    Chronic hyponatremia  Assessment & Plan  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  Patient with poor p o  intake during admission with drop in sodium to 126  S/p gentle fluid challenge with sodium improving to 130  · Obtain serum osmol, urine osmol, urine sodium  · Close outpatient PCP follow-up and nephrology follow-up  · Repeat BMP 1 week after DC    Acute respiratory failure with hypoxia [facility])  Assessment & Plan  Patient required oxygen admission suspect due to acute bronchitis  BNP elevated, however clinically seems euvolemic, lungs are clear to auscultation, no JVD, no lower extremity edema  TTE with normal EF, G2DD     · Monitor volume status  · Wean oxygen as tolerated   · Patient should have outpatient PFT's on discharge to assess for COPD given her significant smoking history     Urinary tract infection  Assessment & Plan  UA showed leukocytes on 1/19/23 in ED and pt was discharged home with keflex  Urine culture currently with no growth  Noncompliance  Assessment & Plan  Noncompliance with medications  · Case management consulted for med management- consider pill packs to encourage adherence to medication regimen to avoid complications from uncompensated chronic conditions    Chronic anemia  Assessment & Plan  Hgb 10 2 and MCV 96 on presentation  Supposed to be on iron supplementation but not compliant  · Will start 3x weekly MWF with vit C 500mg   · Recheck in 2-3 months outpatient   · No active signs of bleeding    History of breast cancer  Assessment & Plan  S/p R mastectomy    Asymptomatic bilateral carotid artery stenosis  Assessment & Plan  S/p L carotid endarterectomy on chart review  Used to follow up vascular surgery, last visit in 2019  Carotid duplex shows L >70% stenosis and R 50-69% stenosis  · Continue optimal medication management with BP control, lipitor 40mg daily  · Vascular surgery had recommended antiplatelet therapy which was never initiated, so will consider starting  · Recommend vascular surgery referral on discharge    Hyperlipidemia  Assessment & Plan  · Continue PTA lipitor 40mg daily    CKD (chronic kidney disease) stage 3, GFR 30-59 ml/min [facility])  Assessment & Plan  Lab Results   Component Value Date    EGFR 47 01/23/2023    EGFR 53 01/22/2023    EGFR 54 01/22/2023    CREATININE 1 08 01/23/2023    CREATININE 0 98 01/22/2023    CREATININE 0 96 01/22/2023     Creatinine at baseline  · Receiving IV contrast so monitor closely    Renovascular hypertension  Assessment & Plan  Uncontrolled likely due to noncompliance as pt wasn't sure which medications she takes   In setting of BL renal artery stenosis, >60% stenosis bilaterally  · Will continue PTA amlodipine 10mg daily, clonidine 0 1mg BID, labetalol 200mg BID, lisinopril 20mg BID (per nephrology), spironolactone 25mg daily  · Patient states she is not taking chlorthalidone, therefore will hold    Paroxysmal A-fib [facility])  Assessment & Plan  Rate controlled  · Continue PTA labetalol 200mg BID   · On eliquis 5mg BID but discussed with pharmacy and based on age and weight, will reduce to 2 5mg BID    Anxiety  Assessment & Plan  · Continue PTA ativan 0 5mg BID    Hypothyroidism  Assessment & Plan  TSH 0 41 in 04/2022  TSH 4 6, free T4 normal  · Continue PTO levothyroxine 100mcg early morning  · Outpatient follow-up    Type 2 diabetes mellitus, without long-term current use of insulin ([facility])  Assessment & Plan  Lab Results   Component Value Date    HGBA1C 6 0 (H) 04/06/2022     Patient reports taking metformin 500mg 1-2x daily depending on her BS  · Due to pt's age and frailty, recommend discontinuing metformin on discharge as pt's Hgb A1c goal can be higher at 8% and lower Hgb A1c is concerning for potential hypoglycemic events  · Started on Insulin sliding scale  · Monitor closely as pt is on IV steroids    Nicotine dependence  Assessment & Plan  · Start nicotine patch 21mg daily         VTE Pharmacologic Prophylaxis:   Pharmacologic: Apixaban (Eliquis)  Mechanical VTE Prophylaxis in Place: Yes    Patient Centered Rounds: I have performed bedside rounds with nursing staff today  Discussions with Specialists or Other Care Team Provider: Completed    Education and Discussions with Family / Patient: Completed     Time Spent for Care: 20 minutes  More than 50% of total time spent on counseling and coordination of care as described above  Current Length of Stay: 1 day(s)    Current Patient Status: Inpatient   Certification Statement: The patient will continue to require additional inpatient hospital stay due to bronchitis management    Code Status: Level 3 - DNAR and DNI    Subjective:   Patient reports feeling much better compared to when she was admitted in feels better compared to yesterday  She denies any chest pain, abdominal distention, lower extremity edema    She reports that she has been smoking most of her life  Right now she still has a mild cough, that appears to be improving  Otherwise denies any acute complaints at this time  Inquires if she will receive nebulizers when she goes home to help with her breathing  Objective:     Vitals:   Temp (24hrs), Av 4 °F (36 3 °C), Min:97 3 °F (36 3 °C), Max:97 5 °F (36 4 °C)    Temp:  [97 3 °F (36 3 °C)-97 5 °F (36 4 °C)] 97 3 °F (36 3 °C)  HR:  [58-66] 58  Resp:  [14-16] 14  BP: (122-143)/(52-67) 143/64  SpO2:  [95 %-96 %] 95 %  Body mass index is 21 14 kg/m²  Input and Output Summary (last 24 hours): Intake/Output Summary (Last 24 hours) at 2023 1357  Last data filed at 2023 0810  Gross per 24 hour   Intake 724 17 ml   Output 500 ml   Net 224 17 ml       Physical Exam:  General: No apparent distress, resting comfortably   Head: Normocephalic, atraumatic  Eyes: Anicteric, no conjunctival erythema  ENT: External ear normal, no nasal discharge  Neck: Trachea midline, no visible lymphadenopathy or goiter  Respiratory: No wheezing  Non-labored respirations, symmetric thorax expansion  Cardiovascular: No JVD  Regular rate  Extremities appear well-perfused  Abdomen: Non-tender, non-distended  Extremities: Moves extremities spontaneously, no peripheral edema  Neuro: A&O x 3, no gross focal deficits, no aphasia     Additional Data:     Labs:    Results from last 7 days   Lab Units 23  0451   WBC Thousand/uL 9 40   HEMOGLOBIN g/dL 10 1*   HEMATOCRIT % 29 8*   PLATELETS Thousands/uL 274   NEUTROS PCT % 88*   LYMPHS PCT % 5*   MONOS PCT % 5   EOS PCT % 0     Results from last 7 days   Lab Units 23  0451 23  0726   POTASSIUM mmol/L 5 1 4 7   CHLORIDE mmol/L 97 94*   CO2 mmol/L 27 27   BUN mg/dL 27* 20   CREATININE mg/dL 1 08 0 98   CALCIUM mg/dL 8 6 9 1   ALK PHOS U/L  --  90   ALT U/L  --  26   AST U/L  --  23           * I Have Reviewed All Lab Data Listed Above  * Additional Pertinent Lab Tests Reviewed:  [facility]    Imaging: All imaging reports reviewed       Recent Cultures (last 7 days):     Results from last 7 days   Lab Units 01/22/23  1409 01/22/23  0726   SPUTUM CULTURE  Culture too young- will reincubate  --    GRAM STAIN RESULT  2+ Gram positive cocci in pairs*  2+ Gram negative diplococci*  No polys seen*  --    URINE CULTURE   --  No Growth <1000 cfu/mL       Last 24 Hours Medication List:   Current Facility-Administered Medications   Medication Dose Route Frequency Provider Last Rate   • acetaminophen  650 mg Oral Q6H PRN Anibal Gonzalez, DO     • albuterol  2 5 mg Nebulization Q4H PRN Ana Herndon, DO     • amLODIPine  10 mg Oral Daily Rosio Chan, DO     • apixaban  2 5 mg Oral BID Rosio Chan, DO     • ascorbic acid  500 mg Oral Once per day on Mon Wed Fri Anibal Gonzalez, DO     • atorvastatin  40 mg Oral Daily With AURSOS, DO     • benzonatate  100 mg Oral TID PRN Anibal Gonzalez, DO     • cloNIDine  0 1 mg Oral BID Anibal Gonzalez, DO     • doxycycline hyclate  100 mg Oral Q12H Rosio Chan, DO     • ferrous sulfate  325 mg Oral Once per day on Mon Wed Fri Anibal Gonzalez, DO     • guaiFENesin  600 mg Oral Q12H [facility], DO     • insulin lispro  1-5 Units Subcutaneous 4x Daily (AC & HS) Anibal Gonzalez, DO     • ipratropium  0 5 mg Nebulization TID Ana Herndon, DO     • labetalol  200 mg Oral Q12H [facility], DO     • levalbuterol  1 25 mg Nebulization TID Ana Herndon, DO     • levothyroxine  100 mcg Oral Early Morning Rosio Chan, DO     • lisinopril  20 mg Oral BID Rosio Chan, DO     • LORazepam  0 5 mg Oral BID Anibal Gonzalez, DO     • methylPREDNISolone sodium succinate  40 mg Intravenous Q12H [facility] Nan Robles MD     • nicotine  1 patch Transdermal Daily Rosio Chan, DO     • spironolactone  25 mg Oral Daily Anibal Gonzalez, DO          Today, Patient Was Seen By: Dustin Oliver DO    ** Please Note: Dragon 360 Dictation voice to text software may have been used in the creation of this document   **    VARGHESE Stuart   PGY-2 Internal Medicine   [facility]

## 2023-01-23 NOTE — ASSESSMENT & PLAN NOTE
UA showed leukocytes on 1/19/23 in ED and pt was discharged home with keflex  Urine culture currently with no growth

## 2023-01-23 NOTE — PLAN OF CARE
Problem: PAIN - ADULT  Goal: Verbalizes/displays adequate comfort level or baseline comfort level  Description: Interventions:  - Encourage patient to monitor pain and request assistance  - Assess pain using appropriate pain scale  - Administer analgesics based on type and severity of pain and evaluate response  - Implement non-pharmacological measures as appropriate and evaluate response  - Consider cultural and social influences on pain and pain management  - Notify physician/advanced practitioner if interventions unsuccessful or patient reports new pain  Outcome: Progressing     Problem: INFECTION - ADULT  Goal: Absence or prevention of progression during hospitalization  Description: INTERVENTIONS:  - Assess and monitor for signs and symptoms of infection  - Monitor lab/diagnostic results  - Monitor all insertion sites, i e  indwelling lines, tubes, and drains  - Monitor endotracheal if appropriate and nasal secretions for changes in amount and color  - [place] appropriate cooling/warming therapies per order  - Administer medications as ordered  - Instruct and encourage patient and family to use good hand hygiene technique  - Identify and instruct in appropriate isolation precautions for identified infection/condition  Outcome: Progressing     Problem: DISCHARGE PLANNING  Goal: Discharge to home or other facility with appropriate resources  Description: INTERVENTIONS:  - Identify barriers to discharge w/patient and caregiver  - Arrange for needed discharge resources and transportation as appropriate  - Identify discharge learning needs (meds, wound care, etc )  - Arrange for interpretive services to assist at discharge as needed  - Refer to Case Management Department for coordinating discharge planning if the patient needs post-hospital services based on physician/advanced practitioner order or complex needs related to functional status, cognitive ability, or social support system  Outcome: Progressing

## 2023-01-23 NOTE — ASSESSMENT & PLAN NOTE
Patient required oxygen admission suspect due to acute bronchitis  BNP elevated, however clinically seems euvolemic, lungs are clear to auscultation, no JVD, no lower extremity edema  TTE with normal EF, G2DD     · Monitor volume status  · Wean oxygen as tolerated   · Patient should have outpatient PFT's on discharge to assess for COPD given her significant smoking history

## 2023-01-23 NOTE — ASSESSMENT & PLAN NOTE
S/p L carotid endarterectomy on chart review  Used to follow up vascular surgery, last visit in 2019   Carotid duplex shows L >70% stenosis and R 50-69% stenosis  · Continue optimal medication management with BP control, lipitor 40mg daily  · Vascular surgery had recommended antiplatelet therapy which was never initiated, so will consider starting  · Recommend vascular surgery referral on discharge

## 2023-01-24 LAB
ALBUMIN SERPL BCP-MCNC: 3.3 G/DL (ref 3.5–5)
ALP SERPL-CCNC: 85 U/L (ref 46–116)
ALT SERPL W P-5'-P-CCNC: 26 U/L (ref 12–78)
ANION GAP SERPL CALCULATED.3IONS-SCNC: 4 MMOL/L (ref 4–13)
ANION GAP SERPL CALCULATED.3IONS-SCNC: 5 MMOL/L (ref 4–13)
AST SERPL W P-5'-P-CCNC: 18 U/L (ref 5–45)
BACTERIA SPT RESP CULT: ABNORMAL
BILIRUB DIRECT SERPL-MCNC: 0.2 MG/DL (ref 0–0.2)
BILIRUB SERPL-MCNC: 0.7 MG/DL (ref 0.2–1)
BUN SERPL-MCNC: 27 MG/DL (ref 5–25)
BUN SERPL-MCNC: 29 MG/DL (ref 5–25)
CALCIUM SERPL-MCNC: 8.7 MG/DL (ref 8.3–10.1)
CALCIUM SERPL-MCNC: 8.9 MG/DL (ref 8.3–10.1)
CHLORIDE SERPL-SCNC: 96 MMOL/L (ref 96–108)
CHLORIDE SERPL-SCNC: 96 MMOL/L (ref 96–108)
CHOLEST SERPL-MCNC: 102 MG/DL
CO2 SERPL-SCNC: 27 MMOL/L (ref 21–32)
CO2 SERPL-SCNC: 27 MMOL/L (ref 21–32)
CREAT SERPL-MCNC: 1.07 MG/DL (ref 0.6–1.3)
CREAT SERPL-MCNC: 1.11 MG/DL (ref 0.6–1.3)
ERYTHROCYTE [DISTWIDTH] IN BLOOD BY AUTOMATED COUNT: 14.8 % (ref 11.6–15.1)
GFR SERPL CREATININE-BSD FRML MDRD: 45 ML/MIN/1.73SQ M
GFR SERPL CREATININE-BSD FRML MDRD: 47 ML/MIN/1.73SQ M
GLUCOSE SERPL-MCNC: 173 MG/DL (ref 65–140)
GLUCOSE SERPL-MCNC: 192 MG/DL (ref 65–140)
GLUCOSE SERPL-MCNC: 200 MG/DL (ref 65–140)
GLUCOSE SERPL-MCNC: 222 MG/DL (ref 65–140)
GLUCOSE SERPL-MCNC: 226 MG/DL (ref 65–140)
GLUCOSE SERPL-MCNC: 251 MG/DL (ref 65–140)
GRAM STN SPEC: ABNORMAL
HCT VFR BLD AUTO: 31.2 % (ref 34.8–46.1)
HDLC SERPL-MCNC: 57 MG/DL
HGB BLD-MCNC: 10.1 G/DL (ref 11.5–15.4)
LDLC SERPL CALC-MCNC: 36 MG/DL (ref 0–100)
MCH RBC QN AUTO: 31.8 PG (ref 26.8–34.3)
MCHC RBC AUTO-ENTMCNC: 32.4 G/DL (ref 31.4–37.4)
MCV RBC AUTO: 98 FL (ref 82–98)
NRBC BLD AUTO-RTO: 0 /100 WBCS
PLATELET # BLD AUTO: 271 THOUSANDS/UL (ref 149–390)
PMV BLD AUTO: 9.7 FL (ref 8.9–12.7)
POTASSIUM SERPL-SCNC: 4.9 MMOL/L (ref 3.5–5.3)
POTASSIUM SERPL-SCNC: 5.1 MMOL/L (ref 3.5–5.3)
PROT SERPL-MCNC: 6 G/DL (ref 6.4–8.4)
RBC # BLD AUTO: 3.18 MILLION/UL (ref 3.81–5.12)
SODIUM SERPL-SCNC: 127 MMOL/L (ref 135–147)
SODIUM SERPL-SCNC: 128 MMOL/L (ref 135–147)
TRIGL SERPL-MCNC: 44 MG/DL
WBC # BLD AUTO: 8.89 THOUSAND/UL (ref 4.31–10.16)

## 2023-01-24 RX ORDER — FERROUS SULFATE 325(65) MG
325 TABLET ORAL EVERY OTHER DAY
Qty: 30 TABLET | Refills: 0 | Status: SHIPPED | OUTPATIENT
Start: 2023-01-24 | End: 2023-03-25

## 2023-01-24 RX ORDER — DOXYCYCLINE HYCLATE 100 MG/1
100 CAPSULE ORAL EVERY 12 HOURS
Qty: 5 CAPSULE | Refills: 0 | Status: SHIPPED | OUTPATIENT
Start: 2023-01-24 | End: 2023-01-27

## 2023-01-24 RX ORDER — SODIUM CHLORIDE 9 MG/ML
50 INJECTION, SOLUTION INTRAVENOUS CONTINUOUS
Status: DISPENSED | OUTPATIENT
Start: 2023-01-24 | End: 2023-01-24

## 2023-01-24 RX ORDER — PREDNISONE 20 MG/1
TABLET ORAL
Qty: 10 TABLET | Refills: 0 | Status: SHIPPED | OUTPATIENT
Start: 2023-01-24 | End: 2023-01-28

## 2023-01-24 RX ADMIN — LISINOPRIL 20 MG: 20 TABLET ORAL at 08:42

## 2023-01-24 RX ADMIN — SODIUM CHLORIDE 50 ML/HR: 0.9 INJECTION, SOLUTION INTRAVENOUS at 12:41

## 2023-01-24 RX ADMIN — INSULIN LISPRO 1 UNITS: 100 INJECTION, SOLUTION INTRAVENOUS; SUBCUTANEOUS at 21:12

## 2023-01-24 RX ADMIN — LISINOPRIL 20 MG: 20 TABLET ORAL at 17:04

## 2023-01-24 RX ADMIN — INSULIN LISPRO 1 UNITS: 100 INJECTION, SOLUTION INTRAVENOUS; SUBCUTANEOUS at 08:42

## 2023-01-24 RX ADMIN — APIXABAN 2.5 MG: 2.5 TABLET, FILM COATED ORAL at 08:42

## 2023-01-24 RX ADMIN — AMLODIPINE BESYLATE 10 MG: 10 TABLET ORAL at 08:42

## 2023-01-24 RX ADMIN — CLONIDINE HYDROCHLORIDE 0.1 MG: 0.1 TABLET ORAL at 17:03

## 2023-01-24 RX ADMIN — LEVALBUTEROL HYDROCHLORIDE 1.25 MG: 1.25 SOLUTION, CONCENTRATE RESPIRATORY (INHALATION) at 07:11

## 2023-01-24 RX ADMIN — INSULIN LISPRO 2 UNITS: 100 INJECTION, SOLUTION INTRAVENOUS; SUBCUTANEOUS at 11:00

## 2023-01-24 RX ADMIN — LEVALBUTEROL HYDROCHLORIDE 1.25 MG: 1.25 SOLUTION, CONCENTRATE RESPIRATORY (INHALATION) at 13:13

## 2023-01-24 RX ADMIN — APIXABAN 2.5 MG: 2.5 TABLET, FILM COATED ORAL at 17:04

## 2023-01-24 RX ADMIN — METHYLPREDNISOLONE SODIUM SUCCINATE 40 MG: 40 INJECTION, POWDER, FOR SOLUTION INTRAMUSCULAR; INTRAVENOUS at 21:02

## 2023-01-24 RX ADMIN — GUAIFENESIN 600 MG: 600 TABLET, EXTENDED RELEASE ORAL at 08:42

## 2023-01-24 RX ADMIN — IPRATROPIUM BROMIDE 0.5 MG: 0.5 SOLUTION RESPIRATORY (INHALATION) at 07:11

## 2023-01-24 RX ADMIN — LABETALOL HYDROCHLORIDE 200 MG: 200 TABLET, FILM COATED ORAL at 08:42

## 2023-01-24 RX ADMIN — LORAZEPAM 0.5 MG: 0.5 TABLET ORAL at 08:42

## 2023-01-24 RX ADMIN — SPIRONOLACTONE 25 MG: 25 TABLET, FILM COATED ORAL at 08:42

## 2023-01-24 RX ADMIN — DOXYCYCLINE 100 MG: 100 CAPSULE ORAL at 05:28

## 2023-01-24 RX ADMIN — LABETALOL HYDROCHLORIDE 200 MG: 200 TABLET, FILM COATED ORAL at 21:12

## 2023-01-24 RX ADMIN — DOXYCYCLINE 100 MG: 100 CAPSULE ORAL at 17:04

## 2023-01-24 RX ADMIN — LEVALBUTEROL HYDROCHLORIDE 1.25 MG: 1.25 SOLUTION, CONCENTRATE RESPIRATORY (INHALATION) at 19:05

## 2023-01-24 RX ADMIN — METHYLPREDNISOLONE SODIUM SUCCINATE 40 MG: 40 INJECTION, POWDER, FOR SOLUTION INTRAMUSCULAR; INTRAVENOUS at 08:42

## 2023-01-24 RX ADMIN — IPRATROPIUM BROMIDE 0.5 MG: 0.5 SOLUTION RESPIRATORY (INHALATION) at 19:05

## 2023-01-24 RX ADMIN — CLONIDINE HYDROCHLORIDE 0.1 MG: 0.1 TABLET ORAL at 08:42

## 2023-01-24 RX ADMIN — LEVOTHYROXINE SODIUM 100 MCG: 100 TABLET ORAL at 05:28

## 2023-01-24 RX ADMIN — INSULIN LISPRO 2 UNITS: 100 INJECTION, SOLUTION INTRAVENOUS; SUBCUTANEOUS at 17:04

## 2023-01-24 RX ADMIN — IPRATROPIUM BROMIDE 0.5 MG: 0.5 SOLUTION RESPIRATORY (INHALATION) at 13:13

## 2023-01-24 RX ADMIN — GUAIFENESIN 600 MG: 600 TABLET, EXTENDED RELEASE ORAL at 21:12

## 2023-01-24 RX ADMIN — ATORVASTATIN CALCIUM 40 MG: 40 TABLET, FILM COATED ORAL at 17:04

## 2023-01-24 RX ADMIN — LORAZEPAM 0.5 MG: 0.5 TABLET ORAL at 17:04

## 2023-01-24 NOTE — ASSESSMENT & PLAN NOTE
Patient required oxygen admission suspect due to acute bronchitis  BNP elevated, however clinically seems euvolemic, lungs are clear to auscultation, no JVD, no lower extremity edema  TTE with normal EF, G2DD     · Monitor volume status  · Oxygen requirements decreased; currently on room air  · Patient should have outpatient PFT's on discharge to assess for COPD given her significant smoking history   · We will discharge with PRN albuterol inhaler  · Management as mentioned above

## 2023-01-24 NOTE — DISCHARGE INSTR - AVS FIRST PAGE
Complete steroid taper, prednisone, as directed  Also complete antibiotic course, doxycycline, as directed  A prescription for albuterol inhaler was sent to your pharmacy  Use as needed for difficulty breathing  Follow-up with your PCP within 1 week  Follow-up with nephrology regarding your low sodium level (hyponatremia)  Follow-up with pulmonology regarding your lung disease  You may need to complete breathing tests to evaluate for COPD  Detail Level: Zone

## 2023-01-24 NOTE — ASSESSMENT & PLAN NOTE
S/p L carotid endarterectomy on chart review  Used to follow up vascular surgery, last visit in 2019   Carotid duplex shows L >70% stenosis and R 50-69% stenosis  · Continue optimal medication management with BP control, lipitor 40mg daily  · Vascular surgery had recommended antiplatelet therapy  · Recommend vascular surgery follow-up outpatient

## 2023-01-24 NOTE — PROGRESS NOTES
[facility]        NAME: Nick Lundberg is a 78 y o  female  : 1940    MRN: [id number]  DATE: 2023  TIME: 6:08 AM    Assessment and Plan   Closed nondisplaced fracture of fifth metatarsal bone of left foot, initial encounter [S92 355A]  1  Closed nondisplaced fracture of fifth metatarsal bone of left foot, initial encounter  Ambulatory Referral to Orthopedic Surgery      2  Acute left ankle pain  XR foot 3+ vw left    XR ankle 3+ vw left            Patient Instructions       Follow up with PCP in 3-5 days  Proceed to  ER if symptoms worsen  Chief Complaint     Chief Complaint   Patient presents with   • Ankle Injury     Pt reports that she stood up from the kitchen table this morning, lost balance and stumbled to the side, bumping into the chair in the process and now reports pain in her left foot and ankle  - headstrike  History of Present Illness       she stood up from the kitchen table this morning, lost balance and stumbled to the side, bumping into the chair in the process and now reports pain in her left foot and ankle  Pt denies any head injury  Review of Systems   Review of Systems   Constitutional: Negative for chills and fever  Musculoskeletal: Positive for gait problem and myalgias  Negative for joint swelling  Skin: Negative for color change, rash and wound  Neurological: Negative for weakness and numbness  Current Medications     No current facility-administered medications for this visit  No current outpatient medications on file      Facility-Administered Medications Ordered in Other Visits:   •  acetaminophen (TYLENOL) tablet 650 mg, 650 mg, Oral, Q6H PRN, Jose Mendez, DO  •  albuterol inhalation solution 2 5 mg, 2 5 mg, Nebulization, Q4H PRN, Federico Noland DO  •  amLODIPine (NORVASC) tablet 10 mg, 10 mg, Oral, Daily, Rosio Chan DO, 10 mg at 23 [id number]  •  apixaban (ELIQUIS) tablet 2 5 mg, 2 5 mg, Oral, BID, Jose Mendez, DO, 2 5 mg at 01/23/23 1728  •  ascorbic acid (VITAMIN C) tablet 500 mg, 500 mg, Oral, Once per day on Mon Wed Fri, Rosio Chan, DO, 500 mg at 01/23/23 1201  •  atorvastatin (LIPITOR) tablet 40 mg, 40 mg, Oral, Daily With Lexi Chan DO, 40 mg at 01/23/23 1728  •  benzonatate (TESSALON PERLES) capsule 100 mg, 100 mg, Oral, TID PRN, Kala Olsen DO  •  cloNIDine (CATAPRES) tablet 0 1 mg, 0 1 mg, Oral, BID, Rosio Chan DO, 0 1 mg at 01/23/23 1728  •  doxycycline hyclate (VIBRAMYCIN) capsule 100 mg, 100 mg, Oral, Q12H, Rosio Chan DO, 100 mg at 01/24/23 9525  •  ferrous sulfate tablet 325 mg, 325 mg, Oral, Once per day on Mon Wed Fri, Rosio Chan DO, 325 mg at 01/23/23 1201  •  guaiFENesin (MUCINEX) 12 hr tablet 600 mg, 600 mg, Oral, Q12H [facility], [facility] DO, 600 mg at 01/23/23 2204  •  insulin lispro (HumaLOG) 100 units/mL subcutaneous injection 1-5 Units, 1-5 Units, Subcutaneous, 4x Daily (AC & HS), 1 Units at 01/23/23 2203 **AND** Fingerstick Glucose (POCT), , , 4x Daily AC and at bedtime, Rosio Chan DO  •  ipratropium (ATROVENT) 0 02 % inhalation solution 0 5 mg, 0 5 mg, Nebulization, TID, Nery Hipps, DO, 0 5 mg at 01/23/23 1927  •  labetalol (NORMODYNE) tablet 200 mg, 200 mg, Oral, Q12H [facility], [facility], DO, 200 mg at 01/23/23 2204  •  levalbuterol (Aguirre Folk) inhalation solution 1 25 mg, 1 25 mg, Nebulization, TID, Nery Hipps, DO, 1 25 mg at 01/23/23 1927  •  levothyroxine tablet 100 mcg, 100 mcg, Oral, Early Morning, Rosio Chan, DO, 100 mcg at 01/24/23 2563  •  lisinopril (ZESTRIL) tablet 20 mg, 20 mg, Oral, BID, Rosio Chan, DO, 20 mg at 01/23/23 1729  •  LORazepam (ATIVAN) tablet 0 5 mg, 0 5 mg, Oral, BID, Rosio Chan, DO, 0 5 mg at 01/23/23 1729  •  methylPREDNISolone sodium succinate (Solu-MEDROL) injection 40 mg, 40 mg, Intravenous, Q12H [facility], Whitney Patiño MD, 40 mg at 01/23/23 2203  •  nicotine (NICODERM CQ) 21 mg/24 hr TD 24 hr patch 1 patch, 1 patch, Transdermal, Daily, Rosio Chan DO  •  spironolactone (ALDACTONE) tablet 25 mg, 25 mg, Oral, Daily, Rosio Chan DO, 25 mg at 01/23/23 0834    Current Allergies     Allergies as of 01/02/2023 - Reviewed 01/02/2023   Allergen Reaction Noted   • Meloxicam GI Intolerance 09/17/2015   • Montelukast Other (See Comments) 12/13/2021   • Morphine GI Intolerance 11/22/2016   • Morphine  06/03/2019   • Penicillins  01/09/2017   • Percocet [oxycodone-acetaminophen]  01/09/2017   • Sitagliptin  02/23/2015            The following portions of the patient's history were reviewed and updated as appropriate: allergies, current medications, past family history, past medical history, past social history, past surgical history and problem list      Past Medical History:   Diagnosis Date   • Anxiety    • Atrial fibrillation ([address] )    • Bowel obstruction ([facility] )    • Cancer ([facility] [address] )     LEFT BREAST CA 22 YEARS AGO    • Depression    • Diabetes mellitus ([facility] [address] )    • Disease of thyroid gland    • Hypertension    • Hypothyroidism        Past Surgical History:   Procedure Laterality Date   • ABDOMINAL ADHESION SURGERY N/A 2/4/2018    Procedure: LYSIS ADHESIONS;  Surgeon: Regi Salinas DO;  [facility];  Service: General   • BREAST SURGERY     • CHOLECYSTECTOMY     • COLON SURGERY  2017   • EXPLORATORY LAPAROTOMY     • JOINT REPLACEMENT      LEFT KNEE REPLACEMENT    • LAPAROTOMY N/A 2/4/2018    Procedure: LAPAROTOMY EXPLORATORY,;  Surgeon: Regi Salinas DO;  [facility];  Service: General   • MASTECTOMY     • MASTECTOMY Left 1995   • MASTECTOMY Right 2017   • DC BX/EXC LYMPH NODE OPEN SUPERFICIAL Right 1/13/2017    Procedure: SENTINEL LYMPH NODE BIOPSY RIGHT AXILLA;   Surgeon: Fara Bryan MD;  Location: [facility];  Service: General   • DC MASTECTOMY SIMPLE COMPLETE Right 1/13/2017    Procedure: MASTECTOMY SIMPLE;  Surgeon: Fara Bryan MD;  [facility];  Service: General   • SMALL INTESTINE SURGERY N/A 2/4/2018    Procedure: RESECTION SMALL BOWEL;  Surgeon: Obey Lawrence DO;  [facility]: [facility];  Service: General   • US GUIDED BREAST BIOPSY RIGHT COMPLETE Right 11/29/2016       Family History   Problem Relation Age of Onset   • Cancer Mother    • No Known Problems Father          Medications have been verified  Objective   /75   Pulse 76   Temp (!) 97 1 °F (36 2 °C)   Resp 16   LMP 01/12/1980 (Approximate)   SpO2 99%   Patient's last menstrual period was 01/12/1980 (approximate)  Physical Exam     Physical Exam  Vitals and nursing note reviewed  Constitutional:       General: She is not in acute distress  Appearance: She is well-developed  She is not diaphoretic  HENT:      Head: Normocephalic and atraumatic  Pulmonary:      Effort: Pulmonary effort is normal    Musculoskeletal:         General: Normal range of motion  Left foot: Normal range of motion and normal capillary refill  Tenderness and bony tenderness present  No swelling or crepitus  Normal pulse  Comments: TTP to base of 5th metatarsal    Skin:     General: Skin is warm and dry  Capillary Refill: Capillary refill takes less than 2 seconds  Neurological:      Mental Status: She is alert and oriented to person, place, and time

## 2023-01-24 NOTE — ASSESSMENT & PLAN NOTE
UA showed leukocytes on 1/19/23 in ED and pt was discharged home with keflex     · Urine culture currently with no growth  · Currently being treated with doxycycline as mentioned above

## 2023-01-24 NOTE — ASSESSMENT & PLAN NOTE
57-year-old female with a history of long-term smoking history and emphysema (not on PTA inhalers or oxygen) presented 1/22 with worsening dyspnea, cough, and acute hypoxic respiratory failure initially requiring 3 L to maintain sats  Likely 2/2 bronchitis diagnosed on 1/19/23  CT PE showed no pulmonary embolus, stable left upper lobe nodule  Received Solu-Medrol 125 mg per EMS, received 40 mg in the ED  Procalcitonin remains negative x2 days  Sputum culture growing mixed respiratory michelle, no MRSA or Pseudomonas    · Status post Solu-Medrol and doxycycline x3 days with improvement in respiratory status, now on room air  · Will discharge on short prednisone taper; 40 mg x 2 days, followed by 20 mg x 2 days  · Continue doxycycline to complete 5 day course   · Patient will need close PCP follow-up and pulmonology follow-up  · Patient should have outpatient PFTs  · Will prescribe PRN albuterol inhaler on discharge  · Patient is stable for discharge 1/25/2023

## 2023-01-24 NOTE — ASSESSMENT & PLAN NOTE
Rate controlled  · Continue PTA labetalol 200mg BID   · On eliquis 5mg BID but discussed with pharmacy and based on age and weight, reduced to 2 5mg BID

## 2023-01-24 NOTE — PLAN OF CARE
Problem: PAIN - ADULT  Goal: Verbalizes/displays adequate comfort level or baseline comfort level  Description: Interventions:  - Encourage patient to monitor pain and request assistance  - Assess pain using appropriate pain scale  - Administer analgesics based on type and severity of pain and evaluate response  - Implement non-pharmacological measures as appropriate and evaluate response  - Consider cultural and social influences on pain and pain management  - Notify physician/advanced practitioner if interventions unsuccessful or patient reports new pain  Outcome: Progressing     Problem: INFECTION - ADULT  Goal: Absence or prevention of progression during hospitalization  Description: INTERVENTIONS:  - Assess and monitor for signs and symptoms of infection  - Monitor lab/diagnostic results  - Monitor all insertion sites, i e  indwelling lines, tubes, and drains  - Monitor endotracheal if appropriate and nasal secretions for changes in amount and color  - [place] appropriate cooling/warming therapies per order  - Administer medications as ordered  - Instruct and encourage patient and family to use good hand hygiene technique  - Identify and instruct in appropriate isolation precautions for identified infection/condition  Outcome: Progressing

## 2023-01-24 NOTE — ASSESSMENT & PLAN NOTE
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  Patient with poor p o  intake during admission with drop in sodium to 126  S/p gentle fluid challenge with sodium improvement to 130  · Serum osmolality 282, previously 287 during hyponatremia event as well  This is currently borderline hypotonic/isotonic  Initiated work-up for pseudohyponatremia to evaluate for paraproteinemia  Lipid panel normal   SPEP/UPEP/light chains ordered and pending at this time  · Other hyponatremia labs as follows: Urine osmolality 333, urine sodium 23  · Etiology of hyponatremia still unclear, could be dehydration versus SIADH from pulmonary process in the setting of her lung nodules/emphysema/acute bronchitis  · On day of discharge 1/24 patient sodium level decreased to 127, which corrected to 129 after adjustment for hyperglycemia  Explained to patient that ideally we would keep inpatient for further evaluation and management, however patient is insistent on discharge  Discussed in detail with patient that since she is otherwise asymptomatic and has had chronic hyponatremia for years, we will attempt another gentle fluid challenge 50 cc/hour x4 hours     · Explained in detail that patient will need close outpatient PCP follow-up and nephrology follow-up  · Repeat BMP Friday 1/27

## 2023-01-24 NOTE — ASSESSMENT & PLAN NOTE
Lab Results   Component Value Date    EGFR 47 01/24/2023    EGFR 47 01/23/2023    EGFR 53 01/22/2023    CREATININE 1 07 01/24/2023    CREATININE 1 08 01/23/2023    CREATININE 0 98 01/22/2023     Creatinine at baseline

## 2023-01-24 NOTE — PROGRESS NOTES
[facility]  Progress Note - Amna Jewell 1940, 78 y o  female MRN: [id number]  Unit/Bed#: [facility] 810-01 Encounter: [id number]  Primary Care Provider: Dian Weiss DO   Date and time admitted to hospital: 1/22/2023  3:50 AM    * Acute bronchitis  Assessment & Plan  66-year-old female with a history of long-term smoking history and emphysema (not on PTA inhalers or oxygen) presented 1/22 with worsening dyspnea, cough, and acute hypoxic respiratory failure initially requiring 3 L to maintain sats  Likely 2/2 bronchitis diagnosed on 1/19/23  CT PE showed no pulmonary embolus, stable left upper lobe nodule  Received Solu-Medrol 125 mg per EMS, received 40 mg in the ED  Procalcitonin remains negative x2 days  Sputum culture growing mixed respiratory michelle, no MRSA or Pseudomonas  · Status post Solu-Medrol and doxycycline x3 days with improvement in respiratory status, now on room air  · Will discharge on short prednisone taper; 40 mg x 2 days, followed by 20 mg x 2 days  · Continue doxycycline to complete 5 day course   · Patient will need close PCP follow-up and pulmonology follow-up  · Patient should have outpatient PFTs  · Will prescribe PRN albuterol inhaler on discharge  · Patient is stable for discharge, however no ride at this time  We will keep overnight and discharge in the morning of 1/25    Chronic hyponatremia  Assessment & Plan  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  Patient with poor p o  intake during admission with drop in sodium to 126  S/p gentle fluid challenge with sodium improvement to 130  · Serum osmolality 282, previously 287 during hyponatremia event as well  This is currently borderline hypotonic/isotonic  Initiated work-up for pseudohyponatremia to evaluate for paraproteinemia    Lipid panel normal  SPEP/UPEP/light chains ordered and pending at this time  · Other hyponatremia labs as follows: Urine osmolality 333, urine sodium 23  · Etiology of hyponatremia still unclear, could be dehydration versus SIADH from pulmonary process in the setting of her lung nodules/emphysema/acute bronchitis  · On day of discharge 1/24 patient sodium level decreased to 127, which corrected to 129 after adjustment for hyperglycemia  Explained to patient that ideally we would keep inpatient for further evaluation and management, however patient is insistent on discharge  Discussed in detail with patient that since she is otherwise asymptomatic and has had chronic hyponatremia for years, we will attempt another gentle fluid challenge 50 cc/hour x4 hours  · Explained in detail that patient will need close outpatient PCP follow-up and nephrology follow-up  · Repeat BMP Friday 1/27    Acute respiratory failure with hypoxia [facility])  Assessment & Plan  Patient required oxygen admission suspect due to acute bronchitis  BNP elevated, however clinically seems euvolemic, lungs are clear to auscultation, no JVD, no lower extremity edema  TTE with normal EF, G2DD  · Monitor volume status  · Oxygen requirements decreased; currently on room air  · Patient should have outpatient PFT's on discharge to assess for COPD given her significant smoking history   · We will discharge with PRN albuterol inhaler  · Management as mentioned above    Urinary tract infection  Assessment & Plan  UA showed leukocytes on 1/19/23 in ED and pt was discharged home with keflex     · Urine culture currently with no growth  · Currently being treated with doxycycline as mentioned above    Noncompliance  Assessment & Plan  Noncompliance with medications  · Case management consulted for med management- consider pill packs to encourage adherence to medication regimen to avoid complications from uncompensated chronic conditions    Chronic anemia  Assessment & Plan  Hgb 10 2 and MCV 96 on presentation  Supposed to be on iron supplementation but not compliant  · Recommend ferrous sulfate every other day and vitamin C  · Recheck in 2-3 months outpatient   · No active signs of bleeding    History of breast cancer  Assessment & Plan  S/p R mastectomy    Asymptomatic bilateral carotid artery stenosis  Assessment & Plan  S/p L carotid endarterectomy on chart review  Used to follow up vascular surgery, last visit in 2019  Carotid duplex shows L >70% stenosis and R 50-69% stenosis  · Continue optimal medication management with BP control, lipitor 40mg daily  · Vascular surgery had recommended antiplatelet therapy  · Recommend vascular surgery follow-up outpatient    Hyperlipidemia  Assessment & Plan  · Continue PTA lipitor 40mg daily  · Lipid panel obtained during admission this admission was normal    CKD (chronic kidney disease) stage 3, GFR 30-59 ml/min [facility])  Assessment & Plan  Lab Results   Component Value Date    EGFR 47 01/24/2023    EGFR 47 01/23/2023    EGFR 53 01/22/2023    CREATININE 1 07 01/24/2023    CREATININE 1 08 01/23/2023    CREATININE 0 98 01/22/2023     Creatinine at baseline    Renovascular hypertension  Assessment & Plan  Uncontrolled likely due to noncompliance as pt wasn't sure which medications she takes  In setting of BL renal artery stenosis, >60% stenosis bilaterally  · Will continue PTA amlodipine 10mg daily, clonidine 0 1mg BID, labetalol 200mg BID, lisinopril 20mg BID (per nephrology), spironolactone 25mg daily  · Patient states she is not taking chlorthalidone, therefore will discontinue especially in the setting of hyponatremia    Paroxysmal A-fib (HCC)  Assessment & Plan  Rate controlled  · Continue PTA labetalol 200mg BID   · On eliquis 5mg BID but discussed with pharmacy and based on age and weight, reduced to 2 5mg BID    Anxiety  Assessment & Plan  · Continue PTA ativan 0 5mg BID    Hypothyroidism  Assessment & Plan  TSH 0 41 in 04/2022   TSH 4 6, free T4 normal  · Continue PTO levothyroxine 100mcg early morning  · Outpatient follow-up    Type 2 diabetes mellitus, without long-term current use of insulin (HCC)  Assessment & Plan  Lab Results   Component Value Date    HGBA1C 6 0 (H) 2022     Patient reports taking metformin 500mg 1-2x daily depending on her BS  · Due to pt's age and frailty, recommend discontinuing metformin on discharge as pt's Hgb A1c goal can be higher at 8% and lower Hgb A1c is concerning for potential hypoglycemic events  · Treated with insulin sliding scale in the setting of steroids    Nicotine dependence  Assessment & Plan  · Provided nicotine patch 21mg daily  · Recommend smoking cessation        VTE Pharmacologic Prophylaxis:   Pharmacologic: Apixaban (Eliquis)  Mechanical VTE Prophylaxis in Place: Yes    Patient Centered Rounds: I have performed bedside rounds with nursing staff today  Discussions with Specialists or Other Care Team Provider: Completed    Education and Discussions with Family / Patient: Completed     Time Spent for Care: 20 minutes  More than 50% of total time spent on counseling and coordination of care as described above  Current Length of Stay: 2 day(s)    Current Patient Status: Inpatient   Certification Statement: The patient will continue to require additional inpatient hospital stay due to No ride home    Code Status: Level 3 - DNAR and DNI    Subjective:   Discussed with patient in great detail today plan going forward  No cardiopulmonary complaints  Breathing has significantly improved from admission  Otherwise denies any significant headaches, dizziness, disequilibrium  Objective:     Vitals:   Temp (24hrs), Av 5 °F (36 4 °C), Min:97 3 °F (36 3 °C), Max:97 7 °F (36 5 °C)    Temp:  [97 3 °F (36 3 °C)-97 7 °F (36 5 °C)] 97 7 °F (36 5 °C)  HR:  [58-63] 61  Resp:  [19-20] 20  BP: (143-149)/(53-61) 145/57  SpO2:  [92 %-96 %] 96 %  Body mass index is 21 14 kg/m²       Input and Output Summary (last 24 hours): Intake/Output Summary (Last 24 hours) at 1/24/2023 1800  Last data filed at 1/24/2023 7745  Gross per 24 hour   Intake 180 ml   Output 600 ml   Net -420 ml       Physical Exam:  General: Frail  No apparent distress, resting comfortably   Head: Normocephalic, atraumatic  Eyes: Anicteric, no conjunctival erythema  ENT: External ear normal, no nasal discharge  Neck: Trachea midline, no visible lymphadenopathy or goiter  Respiratory: Clear to auscultation non-labored respirations, symmetric thorax expansion  Cardiovascular: Controlled  Extremities appear well-perfused  Abdomen: Non-distended  Extremities: Moves extremities spontaneously, no peripheral edema  Skin: No visible rashes, wounds, or jaundice  Neuro: A&O x 3, no gross focal deficits, no aphasia       Additional Data:     Labs:    Results from last 7 days   Lab Units 01/24/23  0549 01/23/23  0451   WBC Thousand/uL 8 89 9 40   HEMOGLOBIN g/dL 10 1* 10 1*   HEMATOCRIT % 31 2* 29 8*   PLATELETS Thousands/uL 271 274   NEUTROS PCT %  --  88*   LYMPHS PCT %  --  5*   MONOS PCT %  --  5   EOS PCT %  --  0     Results from last 7 days   Lab Units 01/24/23  1700 01/24/23  0549   POTASSIUM mmol/L 5 1 4 9   CHLORIDE mmol/L 96 96   CO2 mmol/L 27 27   BUN mg/dL 29* 27*   CREATININE mg/dL 1 11 1 07   CALCIUM mg/dL 8 9 8 7   ALK PHOS U/L  --  85   ALT U/L  --  26   AST U/L  --  18           * I Have Reviewed All Lab Data Listed Above  * Additional Pertinent Lab Tests Reviewed: [facility]    Imaging: All imaging reports reviewed       Recent Cultures (last 7 days):     Results from last 7 days   Lab Units 01/22/23  1409 01/22/23  0726   SPUTUM CULTURE  3+ Growth of  --    GRAM STAIN RESULT  2+ Gram positive cocci in pairs*  2+ Gram negative diplococci*  No polys seen*  --    URINE CULTURE   --  No Growth <1000 cfu/mL       Last 24 Hours Medication List:   Current Facility-Administered Medications   Medication Dose Route Frequency Provider Last Rate   • acetaminophen  650 mg Oral Q6H PRN [facility] Hunters, DO     • albuterol  2 5 mg Nebulization Q4H PRN Johana Angle, DO     • amLODIPine  10 mg Oral Daily Rosio Chan, DO     • apixaban  2 5 mg Oral BID [facility], DO     • ascorbic acid  500 mg Oral Once per day on Mon Wed Fri [facility]     • atorvastatin  40 mg Oral Daily With [employer], DO     • benzonatate  100 mg Oral TID PRN [facility], DO     • cloNIDine  0 1 mg Oral BID [facility], DO     • doxycycline hyclate  100 mg Oral Q12H Rosio Chan, DO     • ferrous sulfate  325 mg Oral Once per day on Mon Wed Fri [facility], DO     • guaiFENesin  600 mg Oral Q12H [facility], DO     • insulin lispro  1-5 Units Subcutaneous 4x Daily (AC & HS) Maryann Lesia, DO     • ipratropium  0 5 mg Nebulization TID Johana [facility], DO     • labetalol  200 mg Oral Q12H [facility], DO     • levalbuterol  1 25 mg Nebulization TID Johana [facility], DO     • levothyroxine  100 mcg Oral Early Morning Rosio Chan, DO     • lisinopril  20 mg Oral BID [facility], DO     • LORazepam  0 5 mg Oral BID [facility], DO     • methylPREDNISolone sodium succinate  40 mg Intravenous Q12H [address] Laura Alegria MD     • nicotine  1 patch Transdermal Daily Rosio Chan, DO     • spironolactone  25 mg Oral Daily [facility] Lesia, DO          Today, Patient Was Seen By: Gladys Moses DO    ** Please Note: Dragon 360 Dictation voice to text software may have been used in the creation of this document   **    VARGHESE Hawk   PGY-2 Internal Medicine   [facility]

## 2023-01-24 NOTE — ASSESSMENT & PLAN NOTE
Uncontrolled likely due to noncompliance as pt wasn't sure which medications she takes   In setting of BL renal artery stenosis, >60% stenosis bilaterally  · Will continue PTA amlodipine 10mg daily, clonidine 0 1mg BID, labetalol 200mg BID, lisinopril 20mg BID (per nephrology), spironolactone 25mg daily  · Patient states she is not taking chlorthalidone, therefore will discontinue especially in the setting of hyponatremia

## 2023-01-24 NOTE — PLAN OF CARE
Problem: PAIN - ADULT  Goal: Verbalizes/displays adequate comfort level or baseline comfort level  Description: Interventions:  - Encourage patient to monitor pain and request assistance  - Assess pain using appropriate pain scale  - Administer analgesics based on type and severity of pain and evaluate response  - Implement non-pharmacological measures as appropriate and evaluate response  - Consider cultural and social influences on pain and pain management  - Notify physician/advanced practitioner if interventions unsuccessful or patient reports new pain  Outcome: Progressing     Problem: INFECTION - ADULT  Goal: Absence or prevention of progression during hospitalization  Description: INTERVENTIONS:  - Assess and monitor for signs and symptoms of infection  - Monitor lab/diagnostic results  - Monitor all insertion sites, i e  indwelling lines, tubes, and drains  - Monitor endotracheal if appropriate and nasal secretions for changes in amount and color  - [place] appropriate cooling/warming therapies per order  - Administer medications as ordered  - Instruct and encourage patient and family to use good hand hygiene technique  - Identify and instruct in appropriate isolation precautions for identified infection/condition  Outcome: Progressing     Problem: SAFETY ADULT  Goal: Patient will remain free of falls  Description: INTERVENTIONS:  - Educate patient/family on patient safety including physical limitations  - Instruct patient to call for assistance with activity   - Consult OT/PT to assist with strengthening/mobility   - Keep Call bell within reach  - Keep bed low and locked with side rails adjusted as appropriate  - Keep care items and personal belongings within reach  - Initiate and maintain comfort rounds        Outcome: Progressing     Problem: DISCHARGE PLANNING  Goal: Discharge to home or other facility with appropriate resources  Description: INTERVENTIONS:  - Identify barriers to discharge w/patient and caregiver  - Arrange for needed discharge resources and transportation as appropriate  - Identify discharge learning needs (meds, wound care, etc )  - Arrange for interpretive services to assist at discharge as needed  - Refer to Case Management Department for coordinating discharge planning if the patient needs post-hospital services based on physician/advanced practitioner order or complex needs related to functional status, cognitive ability, or social support system  Outcome: Progressing     Problem: RESPIRATORY - ADULT  Goal: Achieves optimal ventilation and oxygenation  Description: INTERVENTIONS:  - Assess for changes in respiratory status  - Assess for changes in mentation and behavior  - Position to facilitate oxygenation and minimize respiratory effort  - Oxygen administered by appropriate delivery if ordered  - Initiate smoking cessation education as indicated  - Encourage broncho-pulmonary hygiene including cough, deep breathe, Incentive Spirometry  - Assess the need for suctioning and aspirate as needed  - Assess and instruct to report SOB or any respiratory difficulty  - Respiratory Therapy support as indicated  Outcome: Progressing     Problem: METABOLIC, FLUID AND ELECTROLYTES - ADULT  Goal: Electrolytes maintained within normal limits  Description: INTERVENTIONS:  - Monitor labs and assess patient for signs and symptoms of electrolyte imbalances  - Administer electrolyte replacement as ordered  - Monitor response to electrolyte replacements, including repeat lab results as appropriate  - Instruct patient on fluid and nutrition as appropriate  Outcome: Progressing

## 2023-01-24 NOTE — ASSESSMENT & PLAN NOTE
Lab Results   Component Value Date    HGBA1C 6 0 (H) 04/06/2022     Patient reports taking metformin 500mg 1-2x daily depending on her BS  · Due to pt's age and frailty, recommend discontinuing metformin on discharge as pt's Hgb A1c goal can be higher at 8% and lower Hgb A1c is concerning for potential hypoglycemic events    · Treated with insulin sliding scale in the setting of steroids

## 2023-01-24 NOTE — DISCHARGE SUMMARY
[facility]  Discharge- [facility] 1940, 78 y o  female MRN: [id number]  Unit/Bed#: [facility] 810-01 Encounter: [id number]  Primary Care Provider: Jaye Oates DO   Date and time admitted to hospital: 1/22/2023  3:50 AM    * Acute bronchitis  Assessment & Plan  18-year-old female with a history of long-term smoking history and emphysema (not on PTA inhalers or oxygen) presented 1/22 with worsening dyspnea, cough, and acute hypoxic respiratory failure initially requiring 3 L to maintain sats  Likely 2/2 bronchitis diagnosed on 1/19/23  CT PE showed no pulmonary embolus, stable left upper lobe nodule  Received Solu-Medrol 125 mg per EMS, received 40 mg in the ED  Procalcitonin remains negative x2 days  Sputum culture growing mixed respiratory michelle, no MRSA or Pseudomonas  · Status post Solu-Medrol and doxycycline x3 days with improvement in respiratory status, now on room air  · Will discharge on short prednisone taper; 40 mg x 2 days, followed by 20 mg x 2 days  · Continue doxycycline to complete 5 day course   · Patient will need close PCP follow-up and pulmonology follow-up  · Patient should have outpatient PFTs  · Will prescribe PRN albuterol inhaler on discharge  · Patient is stable for discharge 1/25/2023    Chronic hyponatremia  Assessment & Plan  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  Patient with poor p o  intake during admission with drop in sodium to 126  S/p gentle fluid challenge with sodium improvement to 130  · Serum osmolality 282, previously 287 during hyponatremia event as well  This is currently borderline hypotonic/isotonic  Initiated work-up for pseudohyponatremia to evaluate for paraproteinemia  Lipid panel normal   SPEP/UPEP/light chains ordered and pending at this time    · Other hyponatremia labs as follows: Urine osmolality 333, urine sodium 23  · Etiology of hyponatremia still unclear, could be dehydration versus SIADH from pulmonary process in the setting of her lung nodules/emphysema/acute bronchitis  · On 1/24 patient sodium level decreased to 127, which corrected to 129 after adjustment for hyperglycemia  Discussed in detail with patient that since she is otherwise asymptomatic and has had chronic hyponatremia for years, we will attempt another gentle fluid challenge 50 cc/hour x4 hours  Corrected sodium improved to 130, which appears to be close to patient's baseline sodium level  · Explained in detail that patient will need close outpatient PCP follow-up and nephrology follow-up  · Repeat BMP Friday 1/27    Acute respiratory failure with hypoxia [facility])  Assessment & Plan  Patient required oxygen admission suspect due to acute bronchitis  BNP elevated, however clinically seems euvolemic, lungs are clear to auscultation, no JVD, no lower extremity edema  TTE with normal EF, G2DD  · Monitor volume status  · Oxygen requirements decreased; currently on room air  · Patient should have outpatient PFT's on discharge to assess for COPD given her significant smoking history   · We will discharge with PRN albuterol inhaler  · Management as mentioned above    Urinary tract infection  Assessment & Plan  UA showed leukocytes on 1/19/23 in ED and pt was discharged home with keflex  · Urine culture currently with no growth  · Currently being treated with doxycycline as mentioned above    Noncompliance  Assessment & Plan  Noncompliance with medications  · Case management consulted for med management- consider pill packs to encourage adherence to medication regimen to avoid complications from uncompensated chronic conditions    Chronic anemia  Assessment & Plan  Hgb 10 2 and MCV 96 on presentation   Supposed to be on iron supplementation but not compliant  · Recommend ferrous sulfate every other day and vitamin C  · Recheck in 2-3 months outpatient   · No active signs of bleeding    History of breast cancer  Assessment & Plan  S/p R mastectomy    Asymptomatic bilateral carotid artery stenosis  Assessment & Plan  S/p L carotid endarterectomy on chart review  Used to follow up vascular surgery, last visit in 2019  Carotid duplex shows L >70% stenosis and R 50-69% stenosis  · Continue optimal medication management with BP control, lipitor 40mg daily  · Vascular surgery had recommended antiplatelet therapy  · Recommend vascular surgery follow-up outpatient    Hyperlipidemia  Assessment & Plan  · Continue PTA lipitor 40mg daily  · Lipid panel obtained during admission this admission was normal    CKD (chronic kidney disease) stage 3, GFR 30-59 ml/min [facility])  Assessment & Plan  Lab Results   Component Value Date    EGFR 45 01/24/2023    EGFR 47 01/24/2023    EGFR 47 01/23/2023    CREATININE 1 11 01/24/2023    CREATININE 1 07 01/24/2023    CREATININE 1 08 01/23/2023     Creatinine at baseline    Renovascular hypertension  Assessment & Plan  Uncontrolled likely due to noncompliance as pt wasn't sure which medications she takes  In setting of BL renal artery stenosis, >60% stenosis bilaterally  · Will continue PTA amlodipine 10mg daily, clonidine 0 1mg BID, labetalol 200mg BID, lisinopril 20mg BID (per nephrology), spironolactone 25mg daily  · Patient states she is not taking chlorthalidone, therefore will discontinue especially in the setting of hyponatremia    Paroxysmal A-fib (HCC)  Assessment & Plan  Rate controlled  · Continue PTA labetalol 200mg BID   · On eliquis 5mg BID but discussed with pharmacy and based on age and weight, reduced to 2 5mg BID    Anxiety  Assessment & Plan  · Continue PTA ativan 0 5mg BID    Hypothyroidism  Assessment & Plan  TSH 0 41 in 04/2022   TSH 4 6, free T4 normal  · Continue PTO levothyroxine 100mcg early morning  · Outpatient follow-up    Type 2 diabetes mellitus, without long-term current use of insulin [facility])  Assessment & Plan  Lab Results   Component Value Date    HGBA1C 6 0 (H) 04/06/2022     Patient reports taking metformin 500mg 1-2x daily depending on her BS  · Due to pt's age and frailty, recommend discontinuing metformin on discharge as pt's Hgb A1c goal can be higher at 8% and lower Hgb A1c is concerning for potential hypoglycemic events  · Treated with insulin sliding scale in the setting of steroids    Nicotine dependence  Assessment & Plan  · Provided nicotine patch 21mg daily  · Recommend smoking cessation      Medical Problems     Resolved Problems  Date Reviewed: 1/22/2023   None         Admission Date:   Admission Orders (From admission, onward)     Ordered        01/22/23 0545  INPATIENT ADMISSION  Once            01/22/23 0551  Inpatient Admission  Once                        Admitting Diagnosis: Hyponatremia [E87 1]  Dyspnea [R06 00]  SOB (shortness of breath) [R06 02]  Bronchitis [J40]  Hypoxia [R09 02]  Chronic anemia [D64 9]  Noncompliance [Z91 199]  Acute cough [R05 1]    HPI: 78-year-old female with a past medical history significant for chronic tobacco use, emphysema not on PTA inhalers, HTN, BL ROS, DM2, LUIS, CKD 3, hyponatremia thought to be 2/2 SIADH from SSRI vs paraneoplastic syndrome in the setting of lung nodules, Afib on Eliquis, Hx breast Ca s/p mastectomy, BL CA stenosis, HLD, hypothyroidism  Presented initially 1/22/2023 with shortness of breath, cough, and acute hypoxic respiratory failure requiring 3 L to maintain sats in the ER  Recently diagnosed with bronchitis and UTI, recently treated with Keflex and has had persistent SOB since then  Summary of Hospital Course: On arrival, CT PE negative for PE  She received Solu-Medrol 125 mg x 1 per EMS and then 40 mg x 1 in the ED  She remained on Solu-Medrol 40 mg every 12 hours throughout hospitalization x3 days  Procalcitonin negative x2 days    Sputum culture growing mixed respiratory michelle without MRSA or Pseudomonas  Her respiratory status improved, and her oxygen requirements improved  She was weaned down to room air and maintained saturations on room air for 2 days  She likely has undiagnosed COPD, especially given her prior CT imaging demonstrating emphysema in the setting of her chronic tobacco use  However, no prior PFTs on file and not on PTA inhalers  Therefore will discharge patient with prednisone taper, 40 mg x 2 days and 20 mg x 2 days in addition to doxycycline course to complete 5 days  We will also prescribe as needed albuterol inhaler on discharge  Patient should have close PCP follow-up and pulmonology follow-up in addition to outpatient PFTs  Additionally, patient's sodium level trended down throughout hospitalization  She does have chronic hyponatremia initially 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  Hyponatremia work-up revealed borderline hypotonic hyponatremia, with previous normal serum osmolality  Therefore obtained lipid panel which was normal, also ordered SPEP/UPEP/light chains to rule out paraproteinemia with labs still pending  She was fluid challenge initially with improvement in sodium, and her sodium trended down again on 1/24 so we performed another gentle fluid challenge with improvement in sodium again to 130 which appears to be close to patient's baseline  Given that she is asymptomatic, with chronic hyponatremia, discussed with patient that she should have close outpatient follow-up with PCP and nephrology evaluation on discharge  Patient is in agreement with plan  Patient is stable for discharge on 1/25/2023  Significant Findings, Care, Treatment and Services Provided: As above    Complications: None    Condition at Discharge: good     Physical Exam:  General:  Frail  Lying in bed   No apparent distress, resting comfortably   Head: Normocephalic, atraumatic  Eyes: Anicteric, no conjunctival erythema  ENT: External ear normal, no nasal discharge  Neck: Trachea midline, no visible lymphadenopathy or goiter  Respiratory: No wheezing  Non-labored respirations, symmetric thorax expansion  Cardiovascular: No JVD  Regular rate  Extremities appear well-perfused  Abdomen: Non-tender, non-distended  Extremities: Moves extremities spontaneously, no peripheral edema  Neuro: A&O x 3, no gross focal deficits, no aphasia    Discharge instructions/Information to patient and family:   See after visit summary for information provided to patient and family  Provisions for Follow-Up Care:  See after visit summary for information related to follow-up care and any pertinent home health orders  PCP: Willie Browning DO    Disposition: See After Visit Summary for discharge disposition information  Planned Readmission: No    Discharge Statement   I spent 20 minutes discharging the patient  This time was spent on the day of discharge  I had direct contact with the patient on the day of discharge  Additional documentation is required if more than 30 minutes were spent on discharge  Discharge Medications:  See after visit summary for reconciled discharge medications provided to patient and family        VARGHESE Aguilera   PGY-2 Internal Medicine   Priyank Santos

## 2023-01-24 NOTE — ASSESSMENT & PLAN NOTE
· Continue PTA lipitor 40mg daily  · Lipid panel obtained during admission this admission was normal

## 2023-01-24 NOTE — ASSESSMENT & PLAN NOTE
Hgb 10 2 and MCV 96 on presentation   Supposed to be on iron supplementation but not compliant  · Recommend ferrous sulfate every other day and vitamin C  · Recheck in 2-3 months outpatient   · No active signs of bleeding

## 2023-01-25 VITALS
WEIGHT: 126.1 LBS | RESPIRATION RATE: 18 BRPM | TEMPERATURE: 97.7 F | OXYGEN SATURATION: 96 % | HEIGHT: 66 IN | DIASTOLIC BLOOD PRESSURE: 59 MMHG | BODY MASS INDEX: 20.27 KG/M2 | SYSTOLIC BLOOD PRESSURE: 162 MMHG | HEART RATE: 59 BPM

## 2023-01-25 LAB — GLUCOSE SERPL-MCNC: 194 MG/DL (ref 65–140)

## 2023-01-25 RX ORDER — PREDNISONE 20 MG/1
40 TABLET ORAL DAILY
Status: DISCONTINUED | OUTPATIENT
Start: 2023-01-25 | End: 2023-01-25 | Stop reason: HOSPADM

## 2023-01-25 RX ADMIN — LABETALOL HYDROCHLORIDE 200 MG: 200 TABLET, FILM COATED ORAL at 09:29

## 2023-01-25 RX ADMIN — CLONIDINE HYDROCHLORIDE 0.1 MG: 0.1 TABLET ORAL at 09:29

## 2023-01-25 RX ADMIN — APIXABAN 2.5 MG: 2.5 TABLET, FILM COATED ORAL at 09:29

## 2023-01-25 RX ADMIN — INSULIN LISPRO 1 UNITS: 100 INJECTION, SOLUTION INTRAVENOUS; SUBCUTANEOUS at 09:23

## 2023-01-25 RX ADMIN — GUAIFENESIN 600 MG: 600 TABLET, EXTENDED RELEASE ORAL at 09:29

## 2023-01-25 RX ADMIN — LEVALBUTEROL HYDROCHLORIDE 1.25 MG: 1.25 SOLUTION, CONCENTRATE RESPIRATORY (INHALATION) at 07:18

## 2023-01-25 RX ADMIN — DOXYCYCLINE 100 MG: 100 CAPSULE ORAL at 05:38

## 2023-01-25 RX ADMIN — LORAZEPAM 0.5 MG: 0.5 TABLET ORAL at 09:29

## 2023-01-25 RX ADMIN — PREDNISONE 40 MG: 20 TABLET ORAL at 09:29

## 2023-01-25 RX ADMIN — LISINOPRIL 20 MG: 20 TABLET ORAL at 09:29

## 2023-01-25 RX ADMIN — LEVOTHYROXINE SODIUM 100 MCG: 100 TABLET ORAL at 05:38

## 2023-01-25 RX ADMIN — IPRATROPIUM BROMIDE 0.5 MG: 0.5 SOLUTION RESPIRATORY (INHALATION) at 07:18

## 2023-01-25 RX ADMIN — SPIRONOLACTONE 25 MG: 25 TABLET, FILM COATED ORAL at 09:29

## 2023-01-25 RX ADMIN — AMLODIPINE BESYLATE 10 MG: 10 TABLET ORAL at 09:29

## 2023-01-25 NOTE — CASE MANAGEMENT
Case Management Discharge Planning Note    Patient name Maryanne Rojas  Location [facility] 810/[facility] 100-67 MRN [id number]  : 1940 Date 2023       Current Admission Date: 2023  Current Admission Diagnosis:Acute bronchitis   Patient Active Problem List    Diagnosis Date Noted   • Acute bronchitis 2023   • Noncompliance 2023   • Urinary tract infection 2023   • Acute respiratory failure with hypoxia ([facility] [address] ) 2023   • Hypoxia    • Bronchitis    • Closed nondisplaced fracture of fifth left metatarsal bone 2023   • Generalized weakness 2022   • Chronic anemia 2022   • Pain 02/15/2022   • Macular degeneration 02/15/2022   • Fracture of right tibial plateau    • Renal vascular disease 2020   • Primary osteoarthritis of right knee 2019   • Synovial cyst of right popliteal space 2019   • Hemarthrosis of right knee 2019   • History of breast cancer 2019   • Moderate protein-calorie malnutrition  2019   • Asymptomatic bilateral carotid artery stenosis 08/15/2019   • High blood pressure    • Hypertensive urgency 2019   • Hypertension    • Hypo-osmolality and hyponatremia 2019   • Fall 2019   • Chronic hyponatremia 2019   • Syncope vs presyncope 2019   • Paroxysmal A-fib ([facility] [address] ) 2019   • Renovascular hypertension 2019   • Diabetes ([facility] [address] ) 2019   • Weight loss 2019   • CKD (chronic kidney disease) stage 3, GFR 30-59 ml/min ([facility]) 2019   • Iron deficiency anemia due to chronic blood loss 2018   • Incisional hernia, without obstruction or gangrene 07/10/2018   • Postop check 2018   • Delirium 2018   • Physical deconditioning 2018   • Ambulatory dysfunction 2018   • Hx of fall 2018   • Anxiety 2018   • Acute kidney injury ([facility] [address] ) 2018   • Hordeolum externum of right upper eyelid 2017   • Malignant neoplasm of right breast, stage 1, estrogen receptor positive ([place] [address] ) 02/08/2017   • Malignant neoplasm of central portion of right female breast ([place] [address] ) 01/13/2017   • Tobacco abuse 01/11/2017   • Heart palpitations 07/11/2016   • Nicotine dependence 07/11/2016   • Paroxysmal atrial fibrillation ([place] [address] ) 07/11/2016   • Type 2 diabetes mellitus, without long-term current use of insulin ([place] [address] ) 07/11/2016   • Essential hypertension 07/11/2016   • Hypothyroidism 07/11/2016   • Nontraumatic compression fracture of T4 vertebra ([place] [address] ) 10/06/2015   • Low back pain without sciatica 09/17/2015   • Gastroesophageal reflux disease without esophagitis 02/23/2015   • Depression 02/23/2015   • History of CEA (carotid endarterectomy) 02/23/2015   • Hyperlipidemia 02/23/2015   • Hypothyroidism due to acquired atrophy of thyroid 02/23/2015   • Vitamin D deficiency 02/23/2015   • Varicose vein of leg 02/23/2015   • Non-seasonal allergic rhinitis due to pollen 02/23/2015      LOS (days): 3  Geometric Mean LOS (GMLOS) (days): 3 80  Days to GMLOS:0 6     OBJECTIVE:  Risk of Unplanned Readmission Score: 33 37         Current admission status: Inpatient   Preferred Pharmacy:   [address] [place] [address]  Phone: [phone] Fax: [phone]    Primary Care Provider: Jaye Oates DO    Primary Insurance: MEDICARE  Secondary Insurance: BLUE CROSS    DISCHARGE DETAILS:                  IMM Given (Date):: 01/25/23  IMM Given to[de-identified] Patient      IMM reviewed with patient, patient agrees with discharge determination

## 2023-01-25 NOTE — ASSESSMENT & PLAN NOTE
26-year-old female with a history of long-term smoking history and emphysema (not on PTA inhalers or oxygen) presented 1/22 with worsening dyspnea, cough, and acute hypoxic respiratory failure initially requiring 3 L to maintain sats  Likely 2/2 bronchitis diagnosed on 1/19/23  CT PE showed no pulmonary embolus, stable left upper lobe nodule  Received Solu-Medrol 125 mg per EMS, received 40 mg in the ED  Procalcitonin remains negative x2 days  Sputum culture growing mixed respiratory michelle, no MRSA or Pseudomonas    · Status post Solu-Medrol and doxycycline x3 days with improvement in respiratory status, now on room air  · Will discharge on short prednisone taper; 40 mg x 2 days, followed by 20 mg x 2 days  · Continue doxycycline to complete 5 day course   · Patient will need close PCP follow-up and pulmonology follow-up  · Patient should have outpatient PFTs  · Will prescribe PRN albuterol inhaler on discharge  · Patient is stable for discharge 1/25/2023

## 2023-01-25 NOTE — ASSESSMENT & PLAN NOTE
Lab Results   Component Value Date    EGFR 45 01/24/2023    EGFR 47 01/24/2023    EGFR 47 01/23/2023    CREATININE 1 11 01/24/2023    CREATININE 1 07 01/24/2023    CREATININE 1 08 01/23/2023     Creatinine at baseline

## 2023-01-25 NOTE — ASSESSMENT & PLAN NOTE
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  Patient with poor p o  intake during admission with drop in sodium to 126  S/p gentle fluid challenge with sodium improvement to 130  · Serum osmolality 282, previously 287 during hyponatremia event as well  This is currently borderline hypotonic/isotonic  Initiated work-up for pseudohyponatremia to evaluate for paraproteinemia  Lipid panel normal   SPEP/UPEP/light chains ordered and pending at this time  · Other hyponatremia labs as follows: Urine osmolality 333, urine sodium 23  · Etiology of hyponatremia still unclear, could be dehydration versus SIADH from pulmonary process in the setting of her lung nodules/emphysema/acute bronchitis  · On 1/24 patient sodium level decreased to 127, which corrected to 129 after adjustment for hyperglycemia  Discussed in detail with patient that since she is otherwise asymptomatic and has had chronic hyponatremia for years, we will attempt another gentle fluid challenge 50 cc/hour x4 hours  Corrected sodium improved to 130, which appears to be close to patient's baseline sodium level    · Explained in detail that patient will need close outpatient PCP follow-up and nephrology follow-up  · Repeat BMP Friday 1/27

## 2023-01-26 ENCOUNTER — HOME CARE VISIT (OUTPATIENT)
Dept: HOME HEALTH SERVICES | Facility: HOME HEALTHCARE | Age: 83
End: 2023-01-26

## 2023-01-26 VITALS
RESPIRATION RATE: 20 BRPM | WEIGHT: 130 LBS | SYSTOLIC BLOOD PRESSURE: 140 MMHG | TEMPERATURE: 99.6 F | HEIGHT: 67 IN | DIASTOLIC BLOOD PRESSURE: 50 MMHG | OXYGEN SATURATION: 93 % | BODY MASS INDEX: 20.4 KG/M2 | HEART RATE: 72 BPM

## 2023-01-26 DIAGNOSIS — S92.355A CLOSED NONDISPLACED FRACTURE OF FIFTH METATARSAL BONE OF LEFT FOOT, INITIAL ENCOUNTER: Primary | ICD-10-CM

## 2023-01-27 LAB
ALBUMIN SERPL ELPH-MCNC: 3.66 G/DL (ref 3.5–5)
ALBUMIN SERPL ELPH-MCNC: 61 % (ref 52–65)
ALPHA1 GLOB SERPL ELPH-MCNC: 0.31 G/DL (ref 0.1–0.4)
ALPHA1 GLOB SERPL ELPH-MCNC: 5.2 % (ref 2.5–5)
ALPHA2 GLOB SERPL ELPH-MCNC: 0.74 G/DL (ref 0.4–1.2)
ALPHA2 GLOB SERPL ELPH-MCNC: 12.3 % (ref 7–13)
BETA GLOB ABNORMAL SERPL ELPH-MCNC: 0.36 G/DL (ref 0.4–0.8)
BETA1 GLOB SERPL ELPH-MCNC: 6 % (ref 5–13)
BETA2 GLOB SERPL ELPH-MCNC: 4.5 % (ref 2–8)
BETA2+GAMMA GLOB SERPL ELPH-MCNC: 0.27 G/DL (ref 0.2–0.5)
GAMMA GLOB ABNORMAL SERPL ELPH-MCNC: 0.66 G/DL (ref 0.5–1.6)
GAMMA GLOB SERPL ELPH-MCNC: 11 % (ref 12–22)
IGG/ALB SER: 1.56 {RATIO} (ref 1.1–1.8)
PROT PATTERN SERPL ELPH-IMP: ABNORMAL
PROT SERPL-MCNC: 6 G/DL (ref 6.4–8.2)

## 2023-01-28 ENCOUNTER — HOME CARE VISIT (OUTPATIENT)
Dept: HOME HEALTH SERVICES | Facility: HOME HEALTHCARE | Age: 83
End: 2023-01-28

## 2023-01-28 VITALS
TEMPERATURE: 98.5 F | HEART RATE: 70 BPM | OXYGEN SATURATION: 98 % | SYSTOLIC BLOOD PRESSURE: 120 MMHG | RESPIRATION RATE: 18 BRPM | DIASTOLIC BLOOD PRESSURE: 64 MMHG

## 2023-01-30 ENCOUNTER — HOME CARE VISIT (OUTPATIENT)
Dept: HOME HEALTH SERVICES | Facility: HOME HEALTHCARE | Age: 83
End: 2023-01-30

## 2023-01-31 ENCOUNTER — HOME CARE VISIT (OUTPATIENT)
Dept: HOME HEALTH SERVICES | Facility: HOME HEALTHCARE | Age: 83
End: 2023-01-31

## 2023-01-31 ENCOUNTER — APPOINTMENT (OUTPATIENT)
Dept: LAB | Facility: CLINIC | Age: 83
End: 2023-01-31

## 2023-01-31 VITALS
TEMPERATURE: 97.3 F | DIASTOLIC BLOOD PRESSURE: 70 MMHG | SYSTOLIC BLOOD PRESSURE: 142 MMHG | OXYGEN SATURATION: 96 % | HEART RATE: 63 BPM | RESPIRATION RATE: 18 BRPM

## 2023-01-31 DIAGNOSIS — J40 BRONCHITIS: ICD-10-CM

## 2023-02-01 ENCOUNTER — HOSPITAL ENCOUNTER (OUTPATIENT)
Dept: RADIOLOGY | Facility: HOSPITAL | Age: 83
Discharge: HOME | End: 2023-02-01
Attending: ORTHOPAEDIC SURGERY

## 2023-02-01 ENCOUNTER — OFFICE VISIT (OUTPATIENT)
Dept: OBGYN CLINIC | Facility: HOSPITAL | Age: 83
End: 2023-02-01

## 2023-02-01 VITALS — HEIGHT: 67 IN | BODY MASS INDEX: 20.4 KG/M2 | WEIGHT: 130 LBS

## 2023-02-01 DIAGNOSIS — S92.355D CLOSED NONDISPLACED FRACTURE OF FIFTH METATARSAL BONE OF LEFT FOOT WITH ROUTINE HEALING, SUBSEQUENT ENCOUNTER: Primary | ICD-10-CM

## 2023-02-01 DIAGNOSIS — S92.355A CLOSED NONDISPLACED FRACTURE OF FIFTH METATARSAL BONE OF LEFT FOOT, INITIAL ENCOUNTER: ICD-10-CM

## 2023-02-01 NOTE — PROGRESS NOTES
Orthopaedics Office Visit - *** Patient Visit    ASSESSMENT/PLAN:    Assessment:   ***    Plan:   ***    To Do Next Visit:  ***    _____________________________________________________  CHIEF COMPLAINT:  Chief Complaint   Patient presents with   • Left Foot - Fracture, Follow-up         SUBJECTIVE:  Christal Gaona is a 78 y o  female who presents ***       PAST MEDICAL HISTORY:  Past Medical History:   Diagnosis Date   • Anxiety    • Atrial fibrillation ([address] )    • Bowel obstruction ([facility] [address] )    • Cancer ([facility] [address] )     LEFT BREAST CA 22 YEARS AGO    • Depression    • Diabetes mellitus ([facility] [address] )    • Disease of thyroid gland    • Hypertension    • Hypothyroidism        PAST SURGICAL HISTORY:  Past Surgical History:   Procedure Laterality Date   • ABDOMINAL ADHESION SURGERY N/A 2/4/2018    Procedure: LYSIS ADHESIONS;  Surgeon: Agnieszka Robison DO;  [facility];  Service: General   • BREAST SURGERY     • CHOLECYSTECTOMY     • COLON SURGERY  2017   • EXPLORATORY LAPAROTOMY     • JOINT REPLACEMENT      LEFT KNEE REPLACEMENT    • LAPAROTOMY N/A 2/4/2018    Procedure: LAPAROTOMY EXPLORATORY,;  Surgeon: Agnieszka Robison DO;  [facility];  Service: General   • MASTECTOMY     • MASTECTOMY Left 1995   • MASTECTOMY Right 2017   • MI BX/EXC LYMPH NODE OPEN SUPERFICIAL Right 1/13/2017    Procedure: SENTINEL LYMPH NODE BIOPSY RIGHT AXILLA;   Surgeon: Breann Lay MD;  [facility];  Service: General   • MI MASTECTOMY SIMPLE COMPLETE Right 1/13/2017    Procedure: MASTECTOMY SIMPLE;  Surgeon: Breann Lay MD;  [facility];  Service: General   • SMALL INTESTINE SURGERY N/A 2/4/2018    Procedure: RESECTION SMALL BOWEL;  Surgeon: Agnieszka Robison DO;  [facility];  Service: General   • US GUIDED BREAST BIOPSY RIGHT COMPLETE Right 11/29/2016       FAMILY HISTORY:  Family History   Problem Relation Age of Onset   • Cancer Mother    • No Known Problems Father        SOCIAL HISTORY:  Social History     Tobacco Use   • Smoking status: Every Day     Packs/day: 1 00     Types: Cigarettes   • Smokeless tobacco: Never   Vaping Use   • Vaping Use: Never used   Substance Use Topics   • Alcohol use: Never   • Drug use: Never       MEDICATIONS:    Current Outpatient Medications:   •  amLODIPine (NORVASC) 5 mg tablet, Take 10 mg by mouth daily, Disp: , Rfl:   •  apixaban (ELIQUIS) 2 5 mg, Take 1 tablet (2 5 mg total) by mouth 2 (two) times a day, Disp: 60 tablet, Rfl: 0  •  ascorbic acid (VITAMIN C) 500 mg tablet, Take 500 mg by mouth daily, Disp: , Rfl:   •  atorvastatin (LIPITOR) 40 mg tablet, Take 40 mg by mouth, Disp: , Rfl:   •  cholecalciferol (VITAMIN D3) 1,000 units tablet, Take 2,000 Units by mouth daily , Disp: , Rfl:   •  cloNIDine (CATAPRES) 0 1 mg tablet, TAKE 1 TABLET BY MOUTH TWICE A DAY, Disp: 180 tablet, Rfl: 3  •  ferrous sulfate 325 (65 Fe) mg tablet, Take 1 tablet (325 mg total) by mouth every other day, Disp: 30 tablet, Rfl: 0  •  labetalol (NORMODYNE) 200 mg tablet, Take 1 tablet (200 mg total) by mouth every 12 (twelve) hours, Disp: 120 tablet, Rfl: 0  •  levothyroxine 100 mcg tablet, Take 100 mcg by mouth daily, Disp: , Rfl:   •  lisinopril (ZESTRIL) 20 mg tablet, TAKE 1 TABLET (20 MG TOTAL) BY MOUTH 2 (TWO) TIMES A DAY, Disp: 180 tablet, Rfl: 3  •  LORazepam (ATIVAN) 0 5 mg tablet, Take 0 5 mg by mouth 2 (two) times a day, Disp: , Rfl:   •  Multiple Vitamins-Minerals (PRESERVISION AREDS 2 PO), Take by mouth 2 (two) times a day  , Disp: , Rfl:   •  multivitamin (THERAGRAN) TABS, Take 1 tablet by mouth daily, Disp: , Rfl:   •  spironolactone (ALDACTONE) 25 mg tablet, TAKE 1 TABLET (25 MG TOTAL) BY MOUTH DAILY  , Disp: , Rfl:   •  azelastine (ASTELIN) 0 1 % nasal spray, 1 spray into each nostril 2 (two) times a day Use in each nostril as directed, Disp: 30 mL, Rfl: 0    ALLERGIES:  Allergies   Allergen Reactions   • Meloxicam GI Intolerance   • Montelukast Other (See Comments)     headache   • Morphine GI Intolerance   • Morphine    • Penicillins    • Percocet [Oxycodone-Acetaminophen]    • Sitagliptin      SOB       REVIEW OF SYSTEMS:  MSK: left foot pain   Neuro: WNL   Pertinent items are otherwise noted in HPI  A comprehensive review of systems was otherwise negative  LABS:  HgA1c:   Lab Results   Component Value Date    HGBA1C 6 0 (H) 04/06/2022     BMP:   Lab Results   Component Value Date    GLUCOSE 126 06/03/2019    CALCIUM 8 9 01/24/2023     09/29/2015    K 5 1 01/24/2023    CO2 27 01/24/2023    CL 96 01/24/2023    BUN 29 (H) 01/24/2023    CREATININE 1 11 01/24/2023     CBC: No components found for: CBC    _____________________________________________________  PHYSICAL EXAMINATION:  Vital signs: Ht 5' 6 5" (1 689 m)   Wt 59 kg (130 lb)   LMP 01/12/1980 (Approximate)   BMI 20 67 kg/m²   General: No acute distress, awake and alert  Psychiatric: Mood and affect appear appropriate  HEENT: Trachea Midline, No torticollis, no apparent facial trauma  Cardiovascular: No audible murmurs; Extremities appear perfused  Pulmonary: No audible wheezing or stridor  Skin: No open lesions; see further details (if any) below      MUSCULOSKELETAL EXAMINATION:  Left foot examination:  - Patient sitting comfortably in the office in no acute distress   -   -   -   -   - Special Tests       -  - NV intact    _____________________________________________________  STUDIES REVIEWED:  I personally reviewed the images and interpretation is as follows:  Left foot XR 3 views:        PROCEDURES PERFORMED:  Procedures          Gal Palacios PA-C                      Portions of the record may have been created with voice recognition software  Occasional wrong word or "sound a like" substitutions may have occurred due to the inherent limitations of voice recognition software  Read the chart carefully and recognize, using context, where substitutions have occurred

## 2023-02-01 NOTE — PROGRESS NOTES
Assessment:  1  Closed nondisplaced fracture of fifth metatarsal bone of left foot with routine healing, subsequent encounter            Plan:  Nondisplaced fracture at base of 5th metatarsal  Not indicated for operative management, DOI 1/2/2023; healed clinically and radiographically  · Radiograph displays callus formation over fracture site  · Patient allowed to wear regular shoe-wear  · Weight bear as tolerated  · The patient can follow up as needed and is welcome to return at any point with any new or old issue  To do next visit:  Return if symptoms worsen or fail to improve  The above stated was discussed in layman's terms and the patient expressed understanding  All questions were answered to the patient's satisfaction  Scribe Attestation    I,:  Tor Santana am acting as a scribe while in the presence of the attending physician :       I,:  Geetha Fernández MD personally performed the services described in this documentation    as scribed in my presence :             Subjective:   Marce Sorenson is a 78 y o  female who presents for initial evaluation of left 5th metatarsal fracture sustained 1/2//2023  She is feeling better  Today she complains of occasional lateral left foot pain  She has been compliant with post-op shoe  She now uses walker yet had used cane prior to injury          Review of systems negative unless otherwise specified in HPI    Past Medical History:   Diagnosis Date   • Anxiety    • Atrial fibrillation ([facility] [address] )    • Bowel obstruction ([facility])    • Cancer ([facility] [address] )     LEFT BREAST CA 22 YEARS AGO    • Depression    • Diabetes mellitus ([facility] [address] )    • Disease of thyroid gland    • Hypertension    • Hypothyroidism        Past Surgical History:   Procedure Laterality Date   • ABDOMINAL ADHESION SURGERY N/A 2/4/2018    Procedure: LYSIS ADHESIONS;  Surgeon: Daniela Apodaca DO;  [facility];  Service: General   • BREAST SURGERY     • CHOLECYSTECTOMY     • COLON SURGERY  2017   • EXPLORATORY LAPAROTOMY     • JOINT REPLACEMENT      LEFT KNEE REPLACEMENT    • LAPAROTOMY N/A 2/4/2018    Procedure: LAPAROTOMY EXPLORATORY,;  Surgeon: Susie Lou DO;  [facility];  Service: General   • MASTECTOMY     • MASTECTOMY Left 1995   • MASTECTOMY Right 2017   • NE BX/EXC LYMPH NODE OPEN SUPERFICIAL Right 1/13/2017    Procedure: SENTINEL LYMPH NODE BIOPSY RIGHT AXILLA;   Surgeon: Apolonia Owens MD;  [facility];  Service: General   • NE MASTECTOMY SIMPLE COMPLETE Right 1/13/2017    Procedure: MASTECTOMY SIMPLE;  Surgeon: Apolonia Owens MD;  [facility];  Service: General   • SMALL INTESTINE SURGERY N/A 2/4/2018    Procedure: RESECTION SMALL BOWEL;  Surgeon: Susie Lou DO;  [facility];  Service: General   • US GUIDED BREAST BIOPSY RIGHT COMPLETE Right 11/29/2016       Family History   Problem Relation Age of Onset   • Cancer Mother    • No Known Problems Father        Social History     Occupational History   • Not on file   Tobacco Use   • Smoking status: Every Day     Packs/day: 1 00     Types: Cigarettes   • Smokeless tobacco: Never   Vaping Use   • Vaping Use: Never used   Substance and Sexual Activity   • Alcohol use: Never   • Drug use: Never   • Sexual activity: Not Currently         Current Outpatient Medications:   •  amLODIPine (NORVASC) 5 mg tablet, Take 10 mg by mouth daily, Disp: , Rfl:   •  apixaban (ELIQUIS) 2 5 mg, Take 1 tablet (2 5 mg total) by mouth 2 (two) times a day, Disp: 60 tablet, Rfl: 0  •  ascorbic acid (VITAMIN C) 500 mg tablet, Take 500 mg by mouth daily, Disp: , Rfl:   •  atorvastatin (LIPITOR) 40 mg tablet, Take 40 mg by mouth, Disp: , Rfl:   •  cholecalciferol (VITAMIN D3) 1,000 units tablet, Take 2,000 Units by mouth daily , Disp: , Rfl:   •  cloNIDine (CATAPRES) 0 1 mg tablet, TAKE 1 TABLET BY MOUTH TWICE A DAY, Disp: 180 tablet, Rfl: 3  •  ferrous sulfate 325 (65 Fe) mg tablet, Take 1 tablet (325 mg total) by mouth every other day, Disp: 30 tablet, Rfl: 0  •  labetalol (NORMODYNE) 200 mg tablet, Take 1 tablet (200 mg total) by mouth every 12 (twelve) hours, Disp: 120 tablet, Rfl: 0  •  levothyroxine 100 mcg tablet, Take 100 mcg by mouth daily, Disp: , Rfl:   •  lisinopril (ZESTRIL) 20 mg tablet, TAKE 1 TABLET (20 MG TOTAL) BY MOUTH 2 (TWO) TIMES A DAY, Disp: 180 tablet, Rfl: 3  •  LORazepam (ATIVAN) 0 5 mg tablet, Take 0 5 mg by mouth 2 (two) times a day, Disp: , Rfl:   •  Multiple Vitamins-Minerals (PRESERVISION AREDS 2 PO), Take by mouth 2 (two) times a day  , Disp: , Rfl:   •  multivitamin (THERAGRAN) TABS, Take 1 tablet by mouth daily, Disp: , Rfl:   •  spironolactone (ALDACTONE) 25 mg tablet, TAKE 1 TABLET (25 MG TOTAL) BY MOUTH DAILY  , Disp: , Rfl:   •  azelastine (ASTELIN) 0 1 % nasal spray, 1 spray into each nostril 2 (two) times a day Use in each nostril as directed, Disp: 30 mL, Rfl: 0    Allergies   Allergen Reactions   • Meloxicam GI Intolerance   • Montelukast Other (See Comments)     headache   • Morphine GI Intolerance   • Morphine    • Penicillins    • Percocet [Oxycodone-Acetaminophen]    • Sitagliptin      SOB            There were no vitals filed for this visit  Objective:  Physical exam  · General: Awake, Alert, Oriented  · Eyes: Pupils equal, round and reactive to light  · Heart: regular rate and rhythm  · Lungs: No audible wheezing  · Abdomen: soft                    Ortho Exam  Left foot:  Post-op shoe removed for exam  Non tender over base of 5th metatarsal  Calf compartments soft and supple  Sensation intact  Toes are warm sensate and mobile      Diagnostics, reviewed and taken today if performed as documented: The attending physician has personally reviewed the pertinent films in PACS and interpretation is as follows:  Left foot x-ray :  Nondisplaced fracture at base of 5th metatarsal with callus formation ofver fracture site          Procedures, if performed today:    Procedures    None performed Portions of the record may have been created with voice recognition software  Occasional wrong word or "sound a like" substitutions may have occurred due to the inherent limitations of voice recognition software  Read the chart carefully and recognize, using context, where substitutions have occurred

## 2023-02-02 ENCOUNTER — HOME CARE VISIT (OUTPATIENT)
Dept: HOME HEALTH SERVICES | Facility: HOME HEALTHCARE | Age: 83
End: 2023-02-02

## 2023-02-03 ENCOUNTER — APPOINTMENT (OUTPATIENT)
Dept: LAB | Facility: CLINIC | Age: 83
End: 2023-02-03

## 2023-02-03 ENCOUNTER — HOME CARE VISIT (OUTPATIENT)
Dept: HOME HEALTH SERVICES | Facility: HOME HEALTHCARE | Age: 83
End: 2023-02-03

## 2023-02-03 ENCOUNTER — TRANSCRIBE ORDERS (OUTPATIENT)
Dept: LAB | Facility: CLINIC | Age: 83
End: 2023-02-03

## 2023-02-03 VITALS
HEART RATE: 55 BPM | DIASTOLIC BLOOD PRESSURE: 60 MMHG | SYSTOLIC BLOOD PRESSURE: 128 MMHG | OXYGEN SATURATION: 99 % | TEMPERATURE: 97.8 F | RESPIRATION RATE: 16 BRPM

## 2023-02-03 DIAGNOSIS — N18.30 STAGE 3 CHRONIC KIDNEY DISEASE, UNSPECIFIED WHETHER STAGE 3A OR 3B CKD (HCC): Primary | ICD-10-CM

## 2023-02-03 DIAGNOSIS — N18.30 STAGE 3 CHRONIC KIDNEY DISEASE, UNSPECIFIED WHETHER STAGE 3A OR 3B CKD (HCC): ICD-10-CM

## 2023-02-03 LAB
ANION GAP SERPL CALCULATED.3IONS-SCNC: 4 MMOL/L (ref 4–13)
BUN SERPL-MCNC: 22 MG/DL (ref 5–25)
CALCIUM SERPL-MCNC: 8.8 MG/DL (ref 8.3–10.1)
CHLORIDE SERPL-SCNC: 97 MMOL/L (ref 96–108)
CO2 SERPL-SCNC: 27 MMOL/L (ref 21–32)
CREAT SERPL-MCNC: 1.1 MG/DL (ref 0.6–1.3)
GFR SERPL CREATININE-BSD FRML MDRD: 46 ML/MIN/1.73SQ M
GLUCOSE SERPL-MCNC: 108 MG/DL (ref 65–140)
POTASSIUM SERPL-SCNC: 4.9 MMOL/L (ref 3.5–5.3)
SODIUM SERPL-SCNC: 128 MMOL/L (ref 135–147)

## 2023-02-06 ENCOUNTER — HOME CARE VISIT (OUTPATIENT)
Dept: HOME HEALTH SERVICES | Facility: HOME HEALTHCARE | Age: 83
End: 2023-02-06

## 2023-02-06 VITALS
SYSTOLIC BLOOD PRESSURE: 128 MMHG | DIASTOLIC BLOOD PRESSURE: 60 MMHG | OXYGEN SATURATION: 99 % | TEMPERATURE: 97.5 F | RESPIRATION RATE: 16 BRPM | HEART RATE: 80 BPM

## 2023-02-08 ENCOUNTER — HOME CARE VISIT (OUTPATIENT)
Dept: HOME HEALTH SERVICES | Facility: HOME HEALTHCARE | Age: 83
End: 2023-02-08

## 2023-02-08 VITALS
DIASTOLIC BLOOD PRESSURE: 66 MMHG | TEMPERATURE: 97.7 F | HEART RATE: 91 BPM | SYSTOLIC BLOOD PRESSURE: 128 MMHG | RESPIRATION RATE: 16 BRPM | OXYGEN SATURATION: 98 %

## 2023-02-15 ENCOUNTER — HOME CARE VISIT (OUTPATIENT)
Dept: HOME HEALTH SERVICES | Facility: HOME HEALTHCARE | Age: 83
End: 2023-02-15

## 2023-02-15 VITALS
DIASTOLIC BLOOD PRESSURE: 62 MMHG | RESPIRATION RATE: 16 BRPM | SYSTOLIC BLOOD PRESSURE: 122 MMHG | OXYGEN SATURATION: 98 % | TEMPERATURE: 97.6 F | HEART RATE: 96 BPM

## 2023-03-08 ENCOUNTER — TELEPHONE (OUTPATIENT)
Dept: NEPHROLOGY | Facility: CLINIC | Age: 83
End: 2023-03-08

## 2023-03-08 NOTE — TELEPHONE ENCOUNTER
Attempted to reach patient to reschedule 4/4 new patient appointment with Dr Christian Carbone as he does not see new patients and this was not cleared with him  Appointment is also double booked  Could not leave message  ORIANA in case patient calls [place]

## 2023-03-09 NOTE — TELEPHONE ENCOUNTER
Second attempt - could not leave message  Canceled appointment and sending letter  Will reach out to patient next week

## 2023-03-13 NOTE — TELEPHONE ENCOUNTER
Called patient to r/s 4/4 appt - patient declines rescheduling, stating she didn't think she needed a nephrology appointment at this time  Asked patient to call back if she changes her mind

## 2023-03-15 NOTE — TELEPHONE ENCOUNTER
Pt called to question the letter she got from us tell her that her appt was canceled bc an appt was made with Dr Jose Mendoza that doesn't take new pts   I let her know the situation and she stated she doesn't need to see a nephrologist

## 2023-03-23 PROBLEM — N39.0 URINARY TRACT INFECTION: Status: RESOLVED | Noted: 2023-01-22 | Resolved: 2023-03-23

## 2023-04-18 PROBLEM — J43.9 PULMONARY EMPHYSEMA (HCC): Status: ACTIVE | Noted: 2023-04-18

## 2023-04-26 DIAGNOSIS — I10 HYPERTENSION, UNSPECIFIED TYPE: ICD-10-CM

## 2023-04-27 RX ORDER — CLONIDINE HYDROCHLORIDE 0.1 MG/1
TABLET ORAL
Qty: 180 TABLET | Refills: 3 | Status: SHIPPED | OUTPATIENT
Start: 2023-04-27

## 2023-10-17 ENCOUNTER — HOSPITAL ENCOUNTER (OUTPATIENT)
Dept: RADIOLOGY | Facility: HOSPITAL | Age: 83
Discharge: HOME | End: 2023-10-17
Payer: MEDICARE

## 2023-10-17 DIAGNOSIS — G45.9 TRANSIENT ISCHEMIC ATTACK: ICD-10-CM

## 2023-10-17 PROCEDURE — G1004 CDSM NDSC: HCPCS

## 2023-10-17 PROCEDURE — 70450 CT HEAD/BRAIN W/O DYE: CPT

## 2024-01-01 ENCOUNTER — RESULTS FOLLOW-UP (OUTPATIENT)
Dept: PULMONOLOGY | Facility: CLINIC | Age: 84
End: 2024-01-01

## 2024-03-06 DIAGNOSIS — I10 HYPERTENSION, UNSPECIFIED TYPE: ICD-10-CM

## 2024-03-06 RX ORDER — CLONIDINE HYDROCHLORIDE 0.1 MG/1
TABLET ORAL
Qty: 180 TABLET | Refills: 3 | Status: SHIPPED | OUTPATIENT
Start: 2024-03-06

## 2024-03-14 NOTE — PLAN OF CARE
Problem: Nutrition/Hydration-ADULT  Goal: Nutrient/Hydration intake appropriate for improving, restoring or maintaining nutritional needs  Monitor and assess patient's nutrition/hydration status for malnutrition (ex- brittle hair, bruises, dry skin, pale skin and conjunctiva, muscle wasting, smooth red tongue, and disorientation)  Collaborate with interdisciplinary team and initiate plan and interventions as ordered  Monitor patient's weight and dietary intake as ordered or per policy  Utilize nutrition screening tool and intervene per policy  Determine patient's food preferences and provide high-protein, high-caloric foods as appropriate       INTERVENTIONS:  - Monitor oral intake, urinary output, labs, and treatment plans  - Assess nutrition and hydration status and recommend course of action  - Evaluate amount of meals eaten  - Assist patient with eating if necessary   - Allow adequate time for meals  - Recommend/ encourage appropriate diets, oral nutritional supplements, and vitamin/mineral supplements  - Order, calculate, and assess calorie counts as needed  - Recommend, monitor, and adjust tube feedings and TPN/PPN based on assessed needs  - Assess need for intravenous fluids  - Provide specific nutrition/hydration education as appropriate  - Include patient/family/caregiver in decisions related to nutrition   Outcome: Progressing General Sunscreen Counseling: I recommended a broad spectrum sunscreen with a SPF of 30 or higher.  I explained that SPF 30 sunscreens block approximately 97 percent of the sun's harmful rays.  Sunscreens should be applied at least 15 minutes prior to expected sun exposure and then every 2 hours after that as long as sun exposure continues. If swimming or exercising sunscreen should be reapplied every 45 minutes to an hour after getting wet or sweating.  One ounce, or the equivalent of a shot glass full of sunscreen, is adequate to protect the skin not covered by a bathing suit. I also recommended a lip balm with a sunscreen as well. Sun protective clothing can be used in lieu of sunscreen but must be worn the entire time you are exposed to the sun's rays. Detail Level: Generalized

## 2024-11-11 NOTE — ASSESSMENT & PLAN NOTE
UA showed leukocytes on 1/19/23 in ED and pt was discharged home with keflex  No urine culture obtained at the time so will collect now to specify organism  Doxycyline 100mg BID on board for acute bronchitis and UTI purposes  Monitor I&Os, renal function none

## 2024-11-28 ENCOUNTER — APPOINTMENT (EMERGENCY)
Dept: RADIOLOGY | Facility: HOSPITAL | Age: 84
DRG: 543 | End: 2024-11-28
Payer: MEDICARE

## 2024-11-28 ENCOUNTER — HOSPITAL ENCOUNTER (INPATIENT)
Facility: HOSPITAL | Age: 84
LOS: 1 days | Discharge: HOME HEALTH | DRG: 543 | End: 2024-11-29
Attending: EMERGENCY MEDICINE | Admitting: INTERNAL MEDICINE
Payer: MEDICARE

## 2024-11-28 DIAGNOSIS — E27.8 ADRENAL MASS (HCC): ICD-10-CM

## 2024-11-28 DIAGNOSIS — J90 PLEURAL EFFUSION: ICD-10-CM

## 2024-11-28 DIAGNOSIS — R91.8 LUNG MASS: ICD-10-CM

## 2024-11-28 DIAGNOSIS — S32.029A L2 VERTEBRAL FRACTURE (HCC): Primary | ICD-10-CM

## 2024-11-28 PROBLEM — Z71.89 GOALS OF CARE, COUNSELING/DISCUSSION: Status: ACTIVE | Noted: 2024-11-28

## 2024-11-28 LAB
ALBUMIN SERPL BCG-MCNC: 4 G/DL (ref 3.5–5)
ALP SERPL-CCNC: 117 U/L (ref 34–104)
ALT SERPL W P-5'-P-CCNC: 16 U/L (ref 7–52)
ANION GAP SERPL CALCULATED.3IONS-SCNC: 7 MMOL/L (ref 4–13)
AST SERPL W P-5'-P-CCNC: 19 U/L (ref 13–39)
BASOPHILS # BLD AUTO: 0.03 THOUSANDS/ΜL (ref 0–0.1)
BASOPHILS NFR BLD AUTO: 0 % (ref 0–1)
BILIRUB SERPL-MCNC: 0.96 MG/DL (ref 0.2–1)
BUN SERPL-MCNC: 26 MG/DL (ref 5–25)
CALCIUM SERPL-MCNC: 9.7 MG/DL (ref 8.4–10.2)
CHLORIDE SERPL-SCNC: 98 MMOL/L (ref 96–108)
CO2 SERPL-SCNC: 30 MMOL/L (ref 21–32)
CREAT SERPL-MCNC: 1.01 MG/DL (ref 0.6–1.3)
EOSINOPHIL # BLD AUTO: 0.06 THOUSAND/ΜL (ref 0–0.61)
EOSINOPHIL NFR BLD AUTO: 1 % (ref 0–6)
ERYTHROCYTE [DISTWIDTH] IN BLOOD BY AUTOMATED COUNT: 15.1 % (ref 11.6–15.1)
GFR SERPL CREATININE-BSD FRML MDRD: 50 ML/MIN/1.73SQ M
GLUCOSE SERPL-MCNC: 134 MG/DL (ref 65–140)
HCT VFR BLD AUTO: 33.4 % (ref 34.8–46.1)
HGB BLD-MCNC: 11 G/DL (ref 11.5–15.4)
IMM GRANULOCYTES # BLD AUTO: 0.1 THOUSAND/UL (ref 0–0.2)
IMM GRANULOCYTES NFR BLD AUTO: 1 % (ref 0–2)
LYMPHOCYTES # BLD AUTO: 0.8 THOUSANDS/ΜL (ref 0.6–4.47)
LYMPHOCYTES NFR BLD AUTO: 8 % (ref 14–44)
MCH RBC QN AUTO: 32.4 PG (ref 26.8–34.3)
MCHC RBC AUTO-ENTMCNC: 32.9 G/DL (ref 31.4–37.4)
MCV RBC AUTO: 99 FL (ref 82–98)
MONOCYTES # BLD AUTO: 0.77 THOUSAND/ΜL (ref 0.17–1.22)
MONOCYTES NFR BLD AUTO: 8 % (ref 4–12)
NEUTROPHILS # BLD AUTO: 8.03 THOUSANDS/ΜL (ref 1.85–7.62)
NEUTS SEG NFR BLD AUTO: 82 % (ref 43–75)
NRBC BLD AUTO-RTO: 0 /100 WBCS
PLATELET # BLD AUTO: 275 THOUSANDS/UL (ref 149–390)
PMV BLD AUTO: 10.1 FL (ref 8.9–12.7)
POTASSIUM SERPL-SCNC: 4 MMOL/L (ref 3.5–5.3)
PROCALCITONIN SERPL-MCNC: 0.08 NG/ML
PROT SERPL-MCNC: 6.4 G/DL (ref 6.4–8.4)
RBC # BLD AUTO: 3.39 MILLION/UL (ref 3.81–5.12)
SODIUM SERPL-SCNC: 135 MMOL/L (ref 135–147)
WBC # BLD AUTO: 9.79 THOUSAND/UL (ref 4.31–10.16)

## 2024-11-28 PROCEDURE — 85025 COMPLETE CBC W/AUTO DIFF WBC: CPT

## 2024-11-28 PROCEDURE — 36415 COLL VENOUS BLD VENIPUNCTURE: CPT

## 2024-11-28 PROCEDURE — 99223 1ST HOSP IP/OBS HIGH 75: CPT | Performed by: INTERNAL MEDICINE

## 2024-11-28 PROCEDURE — 84145 PROCALCITONIN (PCT): CPT | Performed by: INTERNAL MEDICINE

## 2024-11-28 PROCEDURE — 71275 CT ANGIOGRAPHY CHEST: CPT

## 2024-11-28 PROCEDURE — 80053 COMPREHEN METABOLIC PANEL: CPT

## 2024-11-28 PROCEDURE — 74174 CTA ABD&PLVS W/CONTRAST: CPT

## 2024-11-28 PROCEDURE — 99284 EMERGENCY DEPT VISIT MOD MDM: CPT

## 2024-11-28 PROCEDURE — 99285 EMERGENCY DEPT VISIT HI MDM: CPT | Performed by: EMERGENCY MEDICINE

## 2024-11-28 RX ORDER — HYDRALAZINE HYDROCHLORIDE 20 MG/ML
5 INJECTION INTRAMUSCULAR; INTRAVENOUS EVERY 6 HOURS PRN
Status: DISCONTINUED | OUTPATIENT
Start: 2024-11-28 | End: 2024-11-29 | Stop reason: HOSPADM

## 2024-11-28 RX ORDER — AZELASTINE 1 MG/ML
1 SPRAY, METERED NASAL 2 TIMES DAILY
Status: DISCONTINUED | OUTPATIENT
Start: 2024-11-28 | End: 2024-11-29 | Stop reason: HOSPADM

## 2024-11-28 RX ORDER — AMLODIPINE BESYLATE 5 MG/1
10 TABLET ORAL DAILY
Status: DISCONTINUED | OUTPATIENT
Start: 2024-11-29 | End: 2024-11-29 | Stop reason: HOSPADM

## 2024-11-28 RX ORDER — LISINOPRIL 20 MG/1
20 TABLET ORAL 2 TIMES DAILY
Status: DISCONTINUED | OUTPATIENT
Start: 2024-11-28 | End: 2024-11-29 | Stop reason: HOSPADM

## 2024-11-28 RX ORDER — ACETAMINOPHEN 325 MG/1
650 TABLET ORAL ONCE
Status: COMPLETED | OUTPATIENT
Start: 2024-11-28 | End: 2024-11-29

## 2024-11-28 RX ORDER — CLONIDINE HYDROCHLORIDE 0.1 MG/1
0.1 TABLET ORAL 2 TIMES DAILY
Status: DISCONTINUED | OUTPATIENT
Start: 2024-11-28 | End: 2024-11-29 | Stop reason: HOSPADM

## 2024-11-28 RX ORDER — LEVOTHYROXINE SODIUM 100 UG/1
100 TABLET ORAL
Status: DISCONTINUED | OUTPATIENT
Start: 2024-11-29 | End: 2024-11-29 | Stop reason: HOSPADM

## 2024-11-28 RX ORDER — ATORVASTATIN CALCIUM 40 MG/1
40 TABLET, FILM COATED ORAL
Status: DISCONTINUED | OUTPATIENT
Start: 2024-11-28 | End: 2024-11-29 | Stop reason: HOSPADM

## 2024-11-28 RX ORDER — LABETALOL 200 MG/1
200 TABLET, FILM COATED ORAL EVERY 12 HOURS SCHEDULED
Status: DISCONTINUED | OUTPATIENT
Start: 2024-11-28 | End: 2024-11-29 | Stop reason: HOSPADM

## 2024-11-28 RX ORDER — LABETALOL 200 MG/1
200 TABLET, FILM COATED ORAL ONCE
Status: COMPLETED | OUTPATIENT
Start: 2024-11-28 | End: 2024-11-28

## 2024-11-28 RX ORDER — ASCORBIC ACID 500 MG
500 TABLET ORAL DAILY
Status: DISCONTINUED | OUTPATIENT
Start: 2024-11-29 | End: 2024-11-29 | Stop reason: HOSPADM

## 2024-11-28 RX ORDER — NICOTINE 21 MG/24HR
1 PATCH, TRANSDERMAL 24 HOURS TRANSDERMAL DAILY
Status: DISCONTINUED | OUTPATIENT
Start: 2024-11-29 | End: 2024-11-29 | Stop reason: HOSPADM

## 2024-11-28 RX ORDER — FERROUS SULFATE 325(65) MG
325 TABLET ORAL EVERY OTHER DAY
Status: DISCONTINUED | OUTPATIENT
Start: 2024-11-28 | End: 2024-11-29 | Stop reason: HOSPADM

## 2024-11-28 RX ORDER — LORAZEPAM 0.5 MG/1
0.5 TABLET ORAL 2 TIMES DAILY
Status: DISCONTINUED | OUTPATIENT
Start: 2024-11-28 | End: 2024-11-29 | Stop reason: HOSPADM

## 2024-11-28 RX ADMIN — HYDRALAZINE HYDROCHLORIDE 5 MG: 20 INJECTION INTRAMUSCULAR; INTRAVENOUS at 16:21

## 2024-11-28 RX ADMIN — APIXABAN 2.5 MG: 2.5 TABLET, FILM COATED ORAL at 17:20

## 2024-11-28 RX ADMIN — AZELASTINE HYDROCHLORIDE 1 SPRAY: 137 SPRAY, METERED NASAL at 17:42

## 2024-11-28 RX ADMIN — LISINOPRIL 20 MG: 20 TABLET ORAL at 17:20

## 2024-11-28 RX ADMIN — LORAZEPAM 0.5 MG: 0.5 TABLET ORAL at 17:20

## 2024-11-28 RX ADMIN — LABETALOL HYDROCHLORIDE 200 MG: 200 TABLET, FILM COATED ORAL at 20:22

## 2024-11-28 RX ADMIN — CLONIDINE HYDROCHLORIDE 0.1 MG: 0.1 TABLET ORAL at 17:20

## 2024-11-28 RX ADMIN — LABETALOL HYDROCHLORIDE 200 MG: 200 TABLET, FILM COATED ORAL at 13:53

## 2024-11-28 RX ADMIN — IOHEXOL 100 ML: 350 INJECTION, SOLUTION INTRAVENOUS at 13:45

## 2024-11-28 NOTE — ED ATTENDING ATTESTATION
11/28/2024  I, Betzaida Romero DO, saw and evaluated the patient. I have discussed the patient with the resident/non-physician practitioner and agree with the resident's/non-physician practitioner's findings, Plan of Care, and MDM as documented in the resident's/non-physician practitioner's note, except where noted. All available labs and Radiology studies were reviewed.  I was present for key portions of any procedure(s) performed by the resident/non-physician practitioner and I was immediately available to provide assistance.       At this point I agree with the current assessment done in the Emergency Department.  I have conducted an independent evaluation of this patient a history and physical is as follows:    84-year-old female presents with 2 weeks of low back pain and bilateral hip pain.  She states the pain has been gradually worsening over the past 2 to 3 weeks.  It is worse with walking, bending, twisting.  She is able to ambulate but with difficulty.  Denies abdominal pain.  No radicular symptoms.  Patient states she does have a history of chronic low back pain but not this bad.  Patient also has history of high blood pressure and she states it does not normally come down until she takes her labetalol to treat normally takes at lunchtime.  She did not take that prior to coming in.  Denies trauma, no recent falls.  On exam-no acute distress, heart regular, no respiratory stress, abdomen soft with mild tenderness in the lower abdomen, tenderness with palpation of the hips, tenderness in the lower lumbar spine area.  Plan-patient with significant back pain and given her age, blood pressure and abdominal discomfort will do CTA dissection study.  Will treat pain with Tylenol and give her a dose of labetalol and reassess.    ED Course         Critical Care Time  Procedures

## 2024-11-28 NOTE — ASSESSMENT & PLAN NOTE
"Lab Results   Component Value Date    HGBA1C 5.8 (H) 08/16/2024       No results for input(s): \"POCGLU\" in the last 72 hours.  Continue with insulin  Blood Sugar Average: Last 72 hrs:      "

## 2024-11-28 NOTE — ASSESSMENT & PLAN NOTE
Possibly related to L2 fracture versus pleuritic pain associated with lung consolidation-possible postobstructive process?

## 2024-11-28 NOTE — ED PROVIDER NOTES
Time reflects when diagnosis was documented in both MDM as applicable and the Disposition within this note       Time User Action Codes Description Comment    11/28/2024  3:29 PM Rajesh Bryant Add [S32.029A] L2 vertebral fracture (HCC)     11/28/2024  3:29 PM Rossy Bryantm Add [R91.8] Lung mass     11/28/2024  3:29 PM Lebron Bryantsym Add [J90] Pleural effusion     11/28/2024  3:29 PM Rajesh Bryant Add [E27.8] Adrenal mass (HCC)           ED Disposition       ED Disposition   Admit    Condition   Stable    Date/Time   Thu Nov 28, 2024  3:29 PM    Comment   Case was discussed with BRIT and the patient's admission status was agreed to be Admission Status: inpatient status to the service of Dr. Epperson .               Assessment & Plan       Medical Decision Making  Patient's hypertension, back pain were concerning for potential dissection so proceed with CTA dissection protocol.  Patient had some incidental findings including lung mass with effusion and an L2 spinal fracture.  No emergent findings need acute intervention at this time.  Patient was admitted for further workup of the lung mass adrenal mass and management of the L2 fracture.  Patient stable at the time of admission.    Amount and/or Complexity of Data Reviewed  Labs: ordered.  Radiology: ordered.    Risk  OTC drugs.  Prescription drug management.  Decision regarding hospitalization.             Medications   acetaminophen (TYLENOL) tablet 650 mg (650 mg Oral Not Given 11/28/24 1256)   labetalol (NORMODYNE) tablet 200 mg (200 mg Oral Given 11/28/24 1353)   iohexol (OMNIPAQUE) 350 MG/ML injection (MULTI-DOSE) 100 mL (100 mL Intravenous Given 11/28/24 1345)       ED Risk Strat Scores                           SBIRT 20yo+      Flowsheet Row Most Recent Value   Initial Alcohol Screen: US AUDIT-C     1. How often do you have a drink containing alcohol? 0 Filed at: 11/28/2024 1228   2. How many drinks containing alcohol do you have on a typical day you are  drinking?  0 Filed at: 11/28/2024 1228   3a. Male UNDER 65: How often do you have five or more drinks on one occasion? 0 Filed at: 11/28/2024 1228   3b. FEMALE Any Age, or MALE 65+: How often do you have 4 or more drinks on one occassion? 0 Filed at: 11/28/2024 1228   Audit-C Score 0 Filed at: 11/28/2024 1228   KENA: How many times in the past year have you...    Used an illegal drug or used a prescription medication for non-medical reasons? Never Filed at: 11/28/2024 1228                            History of Present Illness       Chief Complaint   Patient presents with    Back Pain     Pt has been having low back and bilateral hip pain for 2 weeks. Pt walks with walker at home. Pt just getting worse       Past Medical History:   Diagnosis Date    Anxiety     Atrial fibrillation (HCC)     Bowel obstruction (HCC)     Cancer (HCC)     LEFT BREAST CA 22 YEARS AGO     Depression     Diabetes mellitus (HCC)     Disease of thyroid gland     Hypertension     Hypothyroidism       Past Surgical History:   Procedure Laterality Date    ABDOMINAL ADHESION SURGERY N/A 2/4/2018    Procedure: LYSIS ADHESIONS;  Surgeon: Kirk Huang DO;  [facility];  Service: General    BREAST SURGERY      CHOLECYSTECTOMY      COLON SURGERY  2017    EXPLORATORY LAPAROTOMY      JOINT REPLACEMENT      LEFT KNEE REPLACEMENT     LAPAROTOMY N/A 2/4/2018    Procedure: LAPAROTOMY EXPLORATORY,;  Surgeon: Kirk Huang DO;  [facility];  Service: General    MASTECTOMY      MASTECTOMY Left 1995    MASTECTOMY Right 2017    MO BX/EXC LYMPH NODE OPEN SUPERFICIAL Right 1/13/2017    Procedure: SENTINEL LYMPH NODE BIOPSY RIGHT AXILLA;  Surgeon: Dago Thapa MD;  [facility];  Service: General    MO MASTECTOMY SIMPLE COMPLETE Right 1/13/2017    Procedure: MASTECTOMY SIMPLE;  Surgeon: Dago Thapa MD;  [facility];  Service: General    SMALL INTESTINE SURGERY N/A 2/4/2018    Procedure: RESECTION SMALL BOWEL;  Surgeon:  Kirk Huang, DO;  [facility];  Service: General    US GUIDED BREAST BIOPSY RIGHT COMPLETE Right 11/29/2016      Family History   Problem Relation Age of Onset    Cancer Mother     No Known Problems Father       Social History     Tobacco Use    Smoking status: Every Day     Current packs/day: 1.00     Average packs/day: 1 pack/day for 64.9 years (64.9 ttl pk-yrs)     Types: Cigarettes     Start date: 1960    Smokeless tobacco: Never   Vaping Use    Vaping status: Never Used   Substance Use Topics    Alcohol use: Never    Drug use: Never      E-Cigarette/Vaping    E-Cigarette Use Never User       E-Cigarette/Vaping Substances    Nicotine No     Flavoring No       I have reviewed and agree with the history as documented.     Patient is an 84-year-old female complaining of 2 weeks of low back and bilateral hip pain.  She states that the pain has been about the same may be slightly worsening over the past 2 weeks.  She states it is exacerbated by walking, bending, twisting.  Patient states that she is still able to ambulate with her walker and cane.  Denies any numbness or tingling, loss of bowel or bladder, syncope, chest pain, shortness of breath, abdominal pain.  States that she was trying to manage the pain with Motrin and heating pads.  Denies any fevers.      Back Pain  Associated symptoms: no abdominal pain, no chest pain, no dysuria and no fever        Review of Systems   Constitutional:  Negative for fever.   Respiratory:  Negative for cough and shortness of breath.    Cardiovascular:  Negative for chest pain and palpitations.   Gastrointestinal:  Negative for abdominal pain, nausea and vomiting.   Genitourinary:  Negative for dysuria and hematuria.   Musculoskeletal:  Positive for back pain.   Skin:  Negative for rash.   Neurological:  Negative for syncope.   All other systems reviewed and are negative.          Objective       ED Triage Vitals   Temperature Pulse Blood Pressure Respirations SpO2  Patient Position - Orthostatic VS   11/28/24 1227 11/28/24 1227 11/28/24 1227 11/28/24 1227 11/28/24 1227 11/28/24 1227   98 °F (36.7 °C) 74 (!) 237/119 18 94 % Lying      Temp Source Heart Rate Source BP Location FiO2 (%) Pain Score    11/28/24 1227 11/28/24 1430 11/28/24 1227 -- 11/28/24 1227    Oral Monitor Right arm  10 - Worst Possible Pain      Vitals      Date and Time Temp Pulse SpO2 Resp BP Pain Score FACES Pain Rating User   11/28/24 1430 -- 68 96 % 16 185/95 -- -- EM   11/28/24 1353 -- 68 -- -- 210/97 -- -- EM   11/28/24 1256 -- -- -- -- -- 10 - Worst Possible Pain -- BG   11/28/24 1230 -- 62 94 % -- 237/119 pt states that she is due for b/p meds now' 10 - Worst Possible Pain -- BG   11/28/24 1227 98 °F (36.7 °C) 74 94 % 18 237/119 10 - Worst Possible Pain -- BG            Physical Exam  Vitals and nursing note reviewed.   Constitutional:       General: She is not in acute distress.     Appearance: She is well-developed.   HENT:      Head: Normocephalic and atraumatic.   Cardiovascular:      Rate and Rhythm: Normal rate and regular rhythm.   Pulmonary:      Effort: Pulmonary effort is normal. No respiratory distress.      Breath sounds: Normal breath sounds.   Abdominal:      Palpations: Abdomen is soft.      Tenderness: There is no abdominal tenderness.   Musculoskeletal:         General: Tenderness (Tenderness noted on the on the back, no CVA tenderness.  Tenderness with palpation of the iliac crests and pelvis.) present. No swelling.      Cervical back: Neck supple.   Skin:     Capillary Refill: Capillary refill takes less than 2 seconds.   Neurological:      Mental Status: She is alert.   Psychiatric:         Mood and Affect: Mood normal.         Results Reviewed       Procedure Component Value Units Date/Time    Comprehensive metabolic panel [[id number]]  (Abnormal) Collected: 11/28/24 1257    Lab Status: Final result Specimen: Blood from Arm, Left Updated: 11/28/24 1327     Sodium 135 mmol/L       Potassium 4.0 mmol/L      Chloride 98 mmol/L      CO2 30 mmol/L      ANION GAP 7 mmol/L      BUN 26 mg/dL      Creatinine 1.01 mg/dL      Glucose 134 mg/dL      Calcium 9.7 mg/dL      AST 19 U/L      ALT 16 U/L      Alkaline Phosphatase 117 U/L      Total Protein 6.4 g/dL      Albumin 4.0 g/dL      Total Bilirubin 0.96 mg/dL      eGFR 50 ml/min/1.73sq m     Narrative:      [employer] guidelines for Chronic Kidney Disease (CKD):     Stage 1 with normal or high GFR (GFR > 90 mL/min/1.73 square meters)    Stage 2 Mild CKD (GFR = 60-89 mL/min/1.73 square meters)    Stage 3A Moderate CKD (GFR = 45-59 mL/min/1.73 square meters)    Stage 3B Moderate CKD (GFR = 30-44 mL/min/1.73 square meters)    Stage 4 Severe CKD (GFR = 15-29 mL/min/1.73 square meters)    Stage 5 End Stage CKD (GFR <15 mL/min/1.73 square meters)  Note: GFR calculation is accurate only with a steady state creatinine    CBC and differential [[id number]]  (Abnormal) Collected: 11/28/24 1257    Lab Status: Final result Specimen: Blood from Arm, Left Updated: 11/28/24 1307     WBC 9.79 Thousand/uL      RBC 3.39 Million/uL      Hemoglobin 11.0 g/dL      Hematocrit 33.4 %      MCV 99 fL      MCH 32.4 pg      MCHC 32.9 g/dL      RDW 15.1 %      MPV 10.1 fL      Platelets 275 Thousands/uL      nRBC 0 /100 WBCs      Segmented % 82 %      Immature Grans % 1 %      Lymphocytes % 8 %      Monocytes % 8 %      Eosinophils Relative 1 %      Basophils Relative 0 %      Absolute Neutrophils 8.03 Thousands/µL      Absolute Immature Grans 0.10 Thousand/uL      Absolute Lymphocytes 0.80 Thousands/µL      Absolute Monocytes 0.77 Thousand/µL      Eosinophils Absolute 0.06 Thousand/µL      Basophils Absolute 0.03 Thousands/µL             CTA dissection protocol chest/abdomen/pelvis   Final Interpretation by Yadiel Alejo MD (11/28 1447)      No acute aortic/arterial abnormality.   Patent aortoiliac bypass.   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      Dense consolidation within the paramediastinal left upper lobe with associated occluded proximal bronchi.   Mediastinal (up to 1.7 cm) and left hilar adenopathy.   Bronchoscopy recommended to exclude a malignant cause of bronchial obstruction and resulting postobstructive pneumonia. Note of normal white count on today's labs.   Small left pleural effusion.      In comparison to the 2023 prior, there is a new mild compression deformity along the superior endplate of the L2 vertebral body.   Fracture lines are evident, consistent with acute/subacute fracture. Correlate clinically.      Indeterminate right adrenal lesion measuring up to 2.5 cm.   Stable from  comparison, significantly increased from 2019.   Multiphase adrenal protocol CT recommended for further evaluation.      Findings were discussed with Dr. oRmero from the ED at 2:45 p.m. on 2024.                  Workstation performed: [id number]             Procedures    ED Medication and Procedure Management   Prior to Admission Medications   Prescriptions Last Dose Informant Patient Reported? Taking?   LORazepam (ATIVAN) 0.5 mg tablet  Self Yes No   Sig: Take 0.5 mg by mouth 2 (two) times a day   Multiple Vitamins-Minerals (PRESERVISION AREDS 2 PO)  Self Yes No   Sig: Take by mouth 2 (two) times a day     amLODIPine (NORVASC) 5 mg tablet   Yes No   Sig: Take 10 mg by mouth daily   Patient not taking: Reported on 2023   apixaban (ELIQUIS) 2.5 mg   No No   Sig: Take 1 tablet (2.5 mg total) by mouth 2 (two) times a day   ascorbic acid (VITAMIN C) 500 mg tablet  Self Yes No   Sig: Take 500 mg by mouth daily   atorvastatin (LIPITOR) 40 mg tablet  Self Yes No   Sig: Take 40 mg by mouth   azelastine (ASTELIN) 0.1 % nasal spray   No No   Si spray into each nostril 2 (two) times a day Use in each nostril as directed   cholecalciferol (VITAMIN D3) 1,000 units tablet  Self Yes No   Sig: Take 2,000 Units by  mouth daily    cloNIDine (CATAPRES) 0.1 mg tablet   No No   Sig: TAKE 1 TABLET BY MOUTH TWICE A DAY   ferrous sulfate 325 (65 Fe) mg tablet   No No   Sig: Take 1 tablet (325 mg total) by mouth every other day   labetalol (NORMODYNE) 200 mg tablet  Self No No   Sig: Take 1 tablet (200 mg total) by mouth every 12 (twelve) hours   levothyroxine 100 mcg tablet  Self Yes No   Sig: Take 100 mcg by mouth daily   lisinopril (ZESTRIL) 20 mg tablet  Self No No   Sig: TAKE 1 TABLET (20 MG TOTAL) BY MOUTH 2 (TWO) TIMES A DAY   metFORMIN (GLUCOPHAGE) 500 mg tablet   Yes No   Sig: Take 500 mg by mouth 2 (two) times a day with meals. Indications: Type 2 Diabetes   multivitamin (THERAGRAN) TABS  Self Yes No   Sig: Take 1 tablet by mouth daily   spironolactone (ALDACTONE) 25 mg tablet  Self Yes No   Sig: TAKE 1 TABLET (25 MG TOTAL) BY MOUTH DAILY.   Patient not taking: Reported on 4/18/2023      Facility-Administered Medications: None     Patient's Medications   Discharge Prescriptions    No medications on file     No discharge procedures on file.  ED SEPSIS DOCUMENTATION   Time reflects when diagnosis was documented in both MDM as applicable and the Disposition within this note       Time User Action Codes Description Comment    11/28/2024  3:29 PM Rajesh Bryant Add [S32.029A] L2 vertebral fracture (HCC)     11/28/2024  3:29 PM Rajesh Bryant Add [R91.8] Lung mass     11/28/2024  3:29 PM Rajesh Bryant Add [J90] Pleural effusion     11/28/2024  3:29 PM Rajesh Bryant Add [E27.8] Adrenal mass (HCC)                  Rajesh Bryant, DO  11/28/24 1534

## 2024-11-28 NOTE — ASSESSMENT & PLAN NOTE
Continue with supportive care.  Monitor pain symptoms.  Continue with pain manage  Consult neurosurgery regarding L2 fracture  No clinical symptoms of pneumonia.  Patient denies cough fever or shortness of breath.  Primary symptoms is lower back pain and hip discomfort.  Will follow-up on procalcitonin level.    Consult pulmonary regarding postobstructive process/consolidation

## 2024-11-28 NOTE — H&P
"H&P - Hospitalist   Name: Janina Tanner 84 y.o. female I MRN: [id number]  Unit/Bed#: ED 21 I Date of Admission: 11/28/2024   Date of Service: 11/28/2024 I Hospital Day: 0     Assessment & Plan  Back pain  Continue with supportive care.  Monitor pain symptoms.  Continue with pain manage  Consult neurosurgery regarding L2 fracture  No clinical symptoms of pneumonia.  Patient denies cough fever or shortness of breath.  Primary symptoms is lower back pain and hip discomfort.  Will follow-up on procalcitonin level.    Consult pulmonary regarding postobstructive process/consolidation  Essential hypertension  Note patient treated for renovascular hypertension.  Continue with home medications.  Hypothyroidism  Hypothyroidism  Paroxysmal atrial fibrillation (HCC)  Continue with home medications  Type 2 diabetes mellitus, without long-term current use of insulin (HCC)  Lab Results   Component Value Date    HGBA1C 5.8 (H) 08/16/2024       No results for input(s): \"POCGLU\" in the last 72 hours.  Continue with insulin  Blood Sugar Average: Last 72 hrs:      L2 vertebral fracture (HCC)  Consult neurosurgery  Goals of care, counseling/discussion  Level 3 DNR  Patient wishes to get more information for treatment options before definitive decision made regarding goals of care.  Will consult palliative care in addition to neurosurgery and pulmonary.  Preliminarily, she appears to be leaning towards conservative comfort type care.  However, when asked about hospice she says she is \"not ready for that\".    Will admit for further investigation and coordinate with palliative care for pain control and goals of care        Anticipated Length of Stay: Patient will be admitted on an inpatient basis with an anticipated length of stay of greater than 2 midnights secondary to need to monitor symptoms.    History of Present Illness   Chief Complaint: back pain      Janina Tanner is a 84 y.o. female with a PMH of afib,dm,htn,hypothyroidism  " who presents with .  A 2-week history of low back pain and bilateral hip pain.  Patient has been having ongoing symptoms worsening over the past 2 weeks.  She reports worsening pain with walking bending/twisting.  Workup in ED included CTA which showed a dense consolidation within the paramediastinal left upper lobe with associated occluded proximal bronchi, mediastinal and left hilar adenopathy.  Bronchoscopy was recommended.  Patient presents with a mild compression deformity of L2 vertebral body as well.    Review of Systems   Constitutional:  Negative for fatigue and fever.   HENT:  Negative for congestion.    All other systems reviewed and are negative.      Historical Information   Past Medical History:   Diagnosis Date    Anxiety     Atrial fibrillation (HCC)     Bowel obstruction (HCC)     Cancer (HCC)     LEFT BREAST CA 22 YEARS AGO     Depression     Diabetes mellitus (HCC)     Disease of thyroid gland     Hypertension     Hypothyroidism      Past Surgical History:   Procedure Laterality Date    ABDOMINAL ADHESION SURGERY N/A 2/4/2018    Procedure: LYSIS ADHESIONS;  Surgeon: Kirk Huang DO;  [facility];  Service: General    BREAST SURGERY      CHOLECYSTECTOMY      COLON SURGERY  2017    EXPLORATORY LAPAROTOMY      JOINT REPLACEMENT      LEFT KNEE REPLACEMENT     LAPAROTOMY N/A 2/4/2018    Procedure: LAPAROTOMY EXPLORATORY,;  Surgeon: Kirk Huang DO;  [facility];  Service: General    MASTECTOMY      MASTECTOMY Left 1995    MASTECTOMY Right 2017    AL BX/EXC LYMPH NODE OPEN SUPERFICIAL Right 1/13/2017    Procedure: SENTINEL LYMPH NODE BIOPSY RIGHT AXILLA;  Surgeon: Dago Thapa MD;  [facility];  Service: General    AL MASTECTOMY SIMPLE COMPLETE Right 1/13/2017    Procedure: MASTECTOMY SIMPLE;  Surgeon: Dago Thapa MD;  [facility];  Service: General    SMALL INTESTINE SURGERY N/A 2/4/2018    Procedure: RESECTION SMALL BOWEL;  Surgeon: Kirk Huang DO;   [facility];  Service: General    US GUIDED BREAST BIOPSY RIGHT COMPLETE Right 11/29/2016     Social History     Tobacco Use    Smoking status: Every Day     Current packs/day: 1.00     Average packs/day: 1 pack/day for 64.9 years (64.9 ttl pk-yrs)     Types: Cigarettes     Start date: 1960    Smokeless tobacco: Never   Vaping Use    Vaping status: Never Used   Substance and Sexual Activity    Alcohol use: Never    Drug use: Never    Sexual activity: Not Currently     E-Cigarette/Vaping    E-Cigarette Use Never User      E-Cigarette/Vaping Substances    Nicotine No     Flavoring No      Family history non-contributory  Social History:  Marital Status:    Occupation: retired  Patient Pre-hospital Living Situation: Home  Patient Pre-hospital Level of Mobility: walks      Meds/Allergies   I have reviewed home medications with patient personally.  Prior to Admission medications    Medication Sig Start Date End Date Taking? Authorizing Provider   amLODIPine (NORVASC) 5 mg tablet Take 10 mg by mouth daily  Patient not taking: Reported on 4/18/2023 9/29/22   Historical Provider, MD   apixaban (ELIQUIS) 2.5 mg Take 1 tablet (2.5 mg total) by mouth 2 (two) times a day 1/24/23 4/18/23  Elvia Roa DO   ascorbic acid (VITAMIN C) 500 mg tablet Take 500 mg by mouth daily    Historical Provider, MD   atorvastatin (LIPITOR) 40 mg tablet Take 40 mg by mouth 10/23/19   Historical Provider, MD   azelastine (ASTELIN) 0.1 % nasal spray 1 spray into each nostril 2 (two) times a day Use in each nostril as directed 4/6/22 4/18/23  Valerie Daniel PA-C   cholecalciferol (VITAMIN D3) 1,000 units tablet Take 2,000 Units by mouth daily     Historical Provider, MD   cloNIDine (CATAPRES) 0.1 mg tablet TAKE 1 TABLET BY MOUTH TWICE A DAY 3/6/24   RUCHI Goldstein   ferrous sulfate 325 (65 Fe) mg tablet Take 1 tablet (325 mg total) by mouth every other day 1/24/23 4/18/23  Elvia Roa DO   labetalol (NORMODYNE) 200 mg  tablet Take 1 tablet (200 mg total) by mouth every 12 (twelve) hours 7/21/19   Mt Byrne PA-C   levothyroxine 100 mcg tablet Take 100 mcg by mouth daily    Historical Provider, MD   lisinopril (ZESTRIL) 20 mg tablet TAKE 1 TABLET (20 MG TOTAL) BY MOUTH 2 (TWO) TIMES A DAY 3/23/20   Diego Camacho MD   LORazepam (ATIVAN) 0.5 mg tablet Take 0.5 mg by mouth 2 (two) times a day    Historical Provider, MD   metFORMIN (GLUCOPHAGE) 500 mg tablet Take 500 mg by mouth 2 (two) times a day with meals. Indications: Type 2 Diabetes    Historical Provider, MD   Multiple Vitamins-Minerals (PRESERVISION AREDS 2 PO) Take by mouth 2 (two) times a day      Historical Provider, MD   multivitamin (THERAGRAN) TABS Take 1 tablet by mouth daily    Historical Provider, MD   spironolactone (ALDACTONE) 25 mg tablet TAKE 1 TABLET (25 MG TOTAL) BY MOUTH DAILY.  Patient not taking: Reported on 4/18/2023 7/14/22   Historical Provider, MD     Allergies   Allergen Reactions    Meloxicam GI Intolerance    Montelukast Other (See Comments)     headache    Morphine GI Intolerance    Morphine     Penicillins     Percocet [Oxycodone-Acetaminophen]     Sitagliptin      SOB       Objective :  Temp:  [98 °F (36.7 °C)] 98 °F (36.7 °C)  HR:  [62-74] 68  BP: (185-237)/() 185/95  Resp:  [16-18] 16  SpO2:  [94 %-96 %] 96 %  O2 Device: None (Room air)    Physical Exam  Constitutional:       Appearance: Normal appearance.   HENT:      Head: Normocephalic and atraumatic.      Mouth/Throat:      Mouth: Mucous membranes are moist.   Cardiovascular:      Rate and Rhythm: Normal rate and regular rhythm.   Pulmonary:      Breath sounds: No wheezing or rhonchi.   Abdominal:      General: There is no distension.   Musculoskeletal:         General: No swelling. Normal range of motion.   Skin:     General: Skin is warm and dry.   Neurological:      General: No focal deficit present.      Mental Status: She is alert and oriented to person, place, and  time.   Psychiatric:         Mood and Affect: Mood normal.         Behavior: Behavior normal.          Lines/Drains:            Lab Results: I have reviewed the following results:  Results from last 7 days   Lab Units 11/28/24  1257   WBC Thousand/uL 9.79   HEMOGLOBIN g/dL 11.0*   HEMATOCRIT % 33.4*   PLATELETS Thousands/uL 275   SEGS PCT % 82*   LYMPHO PCT % 8*   MONO PCT % 8   EOS PCT % 1     Results from last 7 days   Lab Units 11/28/24  1257   SODIUM mmol/L 135   POTASSIUM mmol/L 4.0   CHLORIDE mmol/L 98   CO2 mmol/L 30   BUN mg/dL 26*   CREATININE mg/dL 1.01   ANION GAP mmol/L 7   CALCIUM mg/dL 9.7   ALBUMIN g/dL 4.0   TOTAL BILIRUBIN mg/dL 0.96   ALK PHOS U/L 117*   ALT U/L 16   AST U/L 19   GLUCOSE RANDOM mg/dL 134             Lab Results   Component Value Date    HGBA1C 5.8 (H) 08/16/2024    HGBA1C 5.8 (H) 04/04/2024    HGBA1C 6.0 (H) 03/28/2023           ** Please Note: This note has been constructed using a voice recognition system. **

## 2024-11-28 NOTE — QUICK NOTE
Discussed case with patient's daughter Geena.  Note findings on CAT scan regarding hilar adenopathy and concern for malignancy.  Discussed consultation to pulmonary.  Family is in agreement to proceed with pulmonary assessment for consideration of biopsy/bronchoscopy as per CT recommendation.  Patient does acknowledge some dyspnea on exertion recently.  Preliminarily, family wishes to have a tissue diagnosis to help articulate prognosis if procedure is not risk prohibitive.  However, they likely would not pursue aggressive interventions or treatments.

## 2024-11-28 NOTE — ASSESSMENT & PLAN NOTE
"Level 3 DNR  Patient wishes to get more information for treatment options before definitive decision made regarding goals of care.  Will consult palliative care in addition to neurosurgery and pulmonary.  Preliminarily, she appears to be leaning towards conservative comfort type care.  However, when asked about hospice she says she is \"not ready for that\".    Will admit for further investigation and coordinate with palliative care for pain control and goals of care  "

## 2024-11-29 ENCOUNTER — HOME HEALTH ADMISSION (OUTPATIENT)
Dept: HOME HEALTH SERVICES | Facility: HOME HEALTHCARE | Age: 84
End: 2024-11-29
Payer: MEDICARE

## 2024-11-29 ENCOUNTER — APPOINTMENT (INPATIENT)
Dept: RADIOLOGY | Facility: HOSPITAL | Age: 84
DRG: 543 | End: 2024-11-29
Payer: MEDICARE

## 2024-11-29 ENCOUNTER — TELEPHONE (OUTPATIENT)
Dept: NEUROSURGERY | Facility: CLINIC | Age: 84
End: 2024-11-29

## 2024-11-29 VITALS
SYSTOLIC BLOOD PRESSURE: 161 MMHG | OXYGEN SATURATION: 93 % | RESPIRATION RATE: 17 BRPM | WEIGHT: 130 LBS | DIASTOLIC BLOOD PRESSURE: 64 MMHG | BODY MASS INDEX: 20.89 KG/M2 | TEMPERATURE: 98.5 F | HEIGHT: 66 IN | HEART RATE: 59 BPM

## 2024-11-29 PROBLEM — Z51.5 PALLIATIVE CARE ENCOUNTER: Status: ACTIVE | Noted: 2024-11-28

## 2024-11-29 PROBLEM — R93.89 ABNORMAL CT OF THE CHEST: Status: ACTIVE | Noted: 2024-11-29

## 2024-11-29 PROCEDURE — 97163 PT EVAL HIGH COMPLEX 45 MIN: CPT

## 2024-11-29 PROCEDURE — 99239 HOSP IP/OBS DSCHRG MGMT >30: CPT | Performed by: NURSE PRACTITIONER

## 2024-11-29 PROCEDURE — 99223 1ST HOSP IP/OBS HIGH 75: CPT | Performed by: INTERNAL MEDICINE

## 2024-11-29 PROCEDURE — 99222 1ST HOSP IP/OBS MODERATE 55: CPT

## 2024-11-29 PROCEDURE — 72100 X-RAY EXAM L-S SPINE 2/3 VWS: CPT

## 2024-11-29 PROCEDURE — 97167 OT EVAL HIGH COMPLEX 60 MIN: CPT

## 2024-11-29 PROCEDURE — 99223 1ST HOSP IP/OBS HIGH 75: CPT | Performed by: NURSE PRACTITIONER

## 2024-11-29 RX ORDER — OXYCODONE HYDROCHLORIDE 5 MG/1
2.5 TABLET ORAL EVERY 6 HOURS PRN
Qty: 7 TABLET | Refills: 0 | Status: SHIPPED | OUTPATIENT
Start: 2024-11-29 | End: 2024-12-06

## 2024-11-29 RX ORDER — SENNOSIDES 8.6 MG
1 TABLET ORAL
Status: DISCONTINUED | OUTPATIENT
Start: 2024-11-29 | End: 2024-11-29 | Stop reason: HOSPADM

## 2024-11-29 RX ORDER — LIDOCAINE 50 MG/G
1 PATCH TOPICAL DAILY
Qty: 30 PATCH | Refills: 0 | Status: SHIPPED | OUTPATIENT
Start: 2024-11-30 | End: 2024-12-06

## 2024-11-29 RX ORDER — NICOTINE 21 MG/24HR
1 PATCH, TRANSDERMAL 24 HOURS TRANSDERMAL DAILY
Qty: 28 PATCH | Refills: 0 | Status: SHIPPED | OUTPATIENT
Start: 2024-11-30

## 2024-11-29 RX ORDER — SENNOSIDES 8.6 MG
8.6 TABLET ORAL
Qty: 30 TABLET | Refills: 0 | Status: SHIPPED | OUTPATIENT
Start: 2024-11-29

## 2024-11-29 RX ORDER — ACETAMINOPHEN 325 MG/1
650 TABLET ORAL EVERY 6 HOURS PRN
Status: DISCONTINUED | OUTPATIENT
Start: 2024-11-29 | End: 2024-11-29 | Stop reason: HOSPADM

## 2024-11-29 RX ORDER — LIDOCAINE 50 MG/G
1 PATCH TOPICAL DAILY
Status: DISCONTINUED | OUTPATIENT
Start: 2024-11-29 | End: 2024-11-29 | Stop reason: HOSPADM

## 2024-11-29 RX ORDER — ACETAMINOPHEN 325 MG/1
650 TABLET ORAL EVERY 6 HOURS PRN
Start: 2024-11-29

## 2024-11-29 RX ORDER — GUAIFENESIN 100 MG/5ML
200 SOLUTION ORAL EVERY 4 HOURS PRN
Status: DISCONTINUED | OUTPATIENT
Start: 2024-11-29 | End: 2024-11-29 | Stop reason: HOSPADM

## 2024-11-29 RX ADMIN — LISINOPRIL 20 MG: 20 TABLET ORAL at 08:39

## 2024-11-29 RX ADMIN — AMLODIPINE BESYLATE 10 MG: 5 TABLET ORAL at 08:39

## 2024-11-29 RX ADMIN — Medication 2000 UNITS: at 08:38

## 2024-11-29 RX ADMIN — ACETAMINOPHEN 650 MG: 325 TABLET, FILM COATED ORAL at 05:19

## 2024-11-29 RX ADMIN — LIDOCAINE 5% 1 PATCH: 700 PATCH TOPICAL at 11:02

## 2024-11-29 RX ADMIN — OXYCODONE HYDROCHLORIDE AND ACETAMINOPHEN 500 MG: 500 TABLET ORAL at 08:39

## 2024-11-29 RX ADMIN — AZELASTINE HYDROCHLORIDE 1 SPRAY: 137 SPRAY, METERED NASAL at 08:39

## 2024-11-29 RX ADMIN — LABETALOL HYDROCHLORIDE 200 MG: 200 TABLET, FILM COATED ORAL at 08:39

## 2024-11-29 RX ADMIN — GUAIFENESIN 200 MG: 200 SOLUTION ORAL at 05:52

## 2024-11-29 RX ADMIN — LORAZEPAM 0.5 MG: 0.5 TABLET ORAL at 08:39

## 2024-11-29 RX ADMIN — CLONIDINE HYDROCHLORIDE 0.1 MG: 0.1 TABLET ORAL at 08:39

## 2024-11-29 RX ADMIN — LEVOTHYROXINE SODIUM 100 MCG: 100 TABLET ORAL at 05:17

## 2024-11-29 NOTE — ASSESSMENT & PLAN NOTE
"Level 3 DNR  Patient wishes to get more information for treatment options before definitive decision made regarding goals of care.  She would prefer to proceed with bx as discussed with pulmonary   Appreciate  palliative care in addition to neurosurgery and pulmonary.    Preliminarily, she appears to be leaning towards conservative comfort type care.  However, when asked about hospice she says she is \"not ready for that\".    Palliative will fu with pt outpt   "

## 2024-11-29 NOTE — PLAN OF CARE
Problem: OCCUPATIONAL THERAPY ADULT  Goal: Performs self-care activities at highest level of function for planned discharge setting.  See evaluation for individualized goals.  Description: Treatment Interventions: ADL retraining, Functional transfer training, Endurance training, Patient/family training, Equipment evaluation/education, Compensatory technique education, Energy conservation, Activityengagement          See flowsheet documentation for full assessment, interventions and recommendations.   Note: Limitation: Decreased ADL status, Decreased endurance, Decreased Safe judgement during ADL, Decreased self-care trans, Decreased high-level ADLs  Prognosis: Good  Assessment: Pt is a 84 y.o. female  admitted 11/28/24 w back pain- was found to have L2 fx- LSO brace ordered and donned for session. Pt also w lung mass. Pt w active OT eval and treat orders. PMH includes  has a past medical history of Anxiety, Atrial fibrillation (HCC), Bowel obstruction (HCC), Cancer (HCC), Depression, Diabetes mellitus (HCC), Disease of thyroid gland, Hypertension, and Hypothyroidism. Pt lives alone in an ind living apartment, has tub shower w tx bench, standard toilet. Pta, pt was independent w/ ADL, rq a for IADL and used spc vs rw for functional mobility. See above for further detail. Currently, pt is Min Ax1 for UB ADL, Mod Ax1 for LB ADL, and completed transfers/FM w Min Ax1. Pt is limited at this time 2* decreased endurance/activtiy tolerance, decreased cognition, decreased ADL/High-level ADL status, decreased self-care trans, decreased safety awareness, limited home support and is a fall risk. This impacts pt's ability to complete UB and LB dressing and bathing, toileting, transfers, functional mobility, community mobility, home and health maintenance, and safe engagement in typical daily routine. The patient's raw score on the AM-PAC Daily Activity inpatient short form is 18, standardized score is 38.66, less than 39.4.  Patients at this level are likely to benefit from discharge to post-acute rehabilitation services. Please refer to the recommendation of the Occupational Therapist for safe discharge planning.  From OT standpoint, pt should D/C to level 1 when medically stable. Pt will benefit from continued acute OT services 2-3 x/wk for 10-14 days to meet goals.     Rehab Resource Intensity Level, OT: I (Maximum Resource Intensity)

## 2024-11-29 NOTE — ASSESSMENT & PLAN NOTE
Patient presented with 3 weeks of lower back pain.  Found to have an L2 fracture.  Supportive care per primary  Neurosurgery consulted

## 2024-11-29 NOTE — PLAN OF CARE

## 2024-11-29 NOTE — ASSESSMENT & PLAN NOTE
L2 compression fracture  C/o back pain x 2 weeks.    Imaging:  CTA dissection protocol CAP, 11/28/24: In comparison to the January 2023 prior, there is a new mild compression deformity along the superior endplate of the L2 vertebral body. Fracture lines are evident, consistent with acute/subacute fracture. Correlate clinically.    Plan:  Continue to monitor neuro exam closely.  Upright lumbar x-rays for baseline pending.  LSO brace when upright > 45 degrees and OOB.  Pain control per primary team.  Mobilize with PT/OT.  DVT ppx: SCDs.    Neurosurgery will review x-rays once completed. Call with questions.

## 2024-11-29 NOTE — CONSULTS
Consultation - Palliative Care   Name: Janina Tanner 84 y.o. female I MRN: [id number]  Unit/Bed#: -01 I Date of Admission: 11/28/2024   Date of Service: 11/29/2024 I Hospital Day: 1   Inpatient consult to Palliative Care  Consult performed by: RUCHI Dimas  Consult ordered by: Maribell Epperson DO        Physician Requesting Evaluation: Pato Scanlon   Reason for Evaluation / Principal Problem: Lung Mass    Assessment & Plan  Back pain  X2 week hx of low back and b/l hip pain  Incidental finding of lung mass and L2 fracture   L2 fracture  NSGY following  Lung mass:  Pulmonology consulted per note  F/u imaging 4-6 weeks  They are deferring bronch due to stable respiratory status   Pain can be moderate/severe, is intermittent, only experiences pain w/ movement. Pain predominately in low back, pain resolved in b/l hips  Current pain regimen:  Start Tylenol 650 mg Q6hrs for mild pain  Start OxyIR 2.5 mg Q4hrs PRN for moderate/severe pain  Start Lidocaine patch  OME's in the past 24 hours: 0 (has been using Tylenol for pain relief)   Other Symptom Management:  Start Senna daily to prevent OIC  Palliative care encounter  Palliative diagnoses: Lung Mass    Goals of care  Level 3 code status  Disease focused care w/ DNR/DNI limits in place    Concerns introduced include:  Introduced palliative care and discussed sx management   Aware about lung mass, stated that she does not want any aggressive treatment/interventions - doesn't want chemo/RT/surgery  Not ready for hospice yet  Will continue discussions regarding GOC as patient's clinical presentation evolves.  Encouraged follow up with Palliative Medicine on an outpatient basis after discharge for continued symptom management.  Our office will contact patient to schedule a hospital follow up.    Social support:  Supportive listening provided  Normalized experience of patient/family  Provided anxiety containment  Provided anticipatory guidance  Encouraged  "self care  Discussed spiritual needs    Follow up  Palliative Care will continue to follow and goals of care discussions will be ongoing.    Please reach out via [employer] Secure Chat if questions or concerns arise.    Care Coordination  Reviewed case with RN    I have reviewed the patient's controlled substance dispensing history in the Prescription Drug Monitoring Program in compliance with the [facility] regulations before prescribing any controlled substances.  Last refills  Filled  Written  ID  Drug  QTY  Days  Prescriber  RX #  Dispenser  Refill  Daily Dose*  Pymt Type      01/03/2023 01/03/2023 1 Lorazepam 0.5 Mg Tablet 180.00 90 Je Mary [id number] Pen ([id number]) 0 1.00 LME Medicare [place]   04/11/2023 04/10/2023 1 Lorazepam 0.5 Mg Tablet 180.00 90 Je Mary [id number] Pen ([id number]) 0 1.00 LME Medicare [place]   07/09/2023 07/07/2023 1 Lorazepam 0.5 Mg Tablet 180.00 90 Je Mary [id number] Pen ([id number])         Decisional apparatus: Patient has capacity on exam today.  If capacity is lost, patient's substitute decision maker would default to adult children by [place] Act 169.    Advance Directive/Living Will, POLST and POA Forms: None on file     ER contacts:  Name Relation Home Work Mobile   Geena Rodriguez Daughter   [phone]   Geena Hill Daughter [phone]     Melody Yap Grandchild   [phone]     We appreciate the invitation to be involved in this patient's care.  We will continue to follow throughout this hospitalization.  Please do not hesitate to reach our on call provider through our clinic answering service at [phone] should you have acute symptom control concerns.      History of Present Illness   HPI: Janina Tanner is a 84 y.o. year old female w/ a PMHx of afib, T2DM, HTN, and hypothyroidism who presented to the [facility] ED on 11/28/24 for further evaluation of a x2 week hx of worsening low back and b/l hip pain. CTA was performed which revealed, \"dense consolidation within the paramediastinal HEIDE w/ associated occluded " "proximal bronchi, mediastinal and left hilar adenopathy. New mild compression deformity along the superior endplate of the L2 vertebral body. Fracture lines are evident, consistent w/ acute/subacute fracture. Indeterminate right adrenal lesion measuring up to 2.5 cm.\" Patient's prior discussions w/ primary team were that patient/family agreeable to tissue diagnosis to help articulate prognosis but they do not want to pursue aggressive interventions or treatments. Palliative was consulted for lung mass.     Met w/ patient at the bedside. No family present. NAD. Stated that she knows that she has a mass on her lungs, stated she doesn't want any aggressive treatment or interventions. Stated she does not want chemo/RT/surgery. Stated she had a b/l mastectomy years ago and never wants to go through that again. Reports that her b/l hip pain has resolved, having low back pain that only comes on when she is moving around. Agreeable to trying low-dose oxycodone PRN, allergy listed for oxycodone to which patient stated that she doesn't have an allergy to oxycodone or tylenol, but sometimes gets minimal GI upset when taking them especially on an empty stomach.     Review of Systems   Constitutional:  Positive for activity change and fatigue.   Musculoskeletal:  Positive for back pain.   All other systems reviewed and are negative.    Objective :  Temp:  [97.9 °F (36.6 °C)-98.5 °F (36.9 °C)] 98.5 °F (36.9 °C)  HR:  [54-74] 59  BP: (161-237)/() 161/64  Resp:  [16-18] 17  SpO2:  [92 %-96 %] 93 %  O2 Device: None (Room air)    Physical Exam  Constitutional:       General: She is awake. She is not in acute distress.  HENT:      Head: Normocephalic and atraumatic.      Mouth/Throat:      Mouth: Mucous membranes are moist.   Eyes:      Pupils: Pupils are equal, round, and reactive to light.   Cardiovascular:      Rate and Rhythm: Normal rate.   Pulmonary:      Effort: Pulmonary effort is normal. No respiratory distress. "   Skin:     General: Skin is warm and dry.      Coloration: Skin is pale.   Neurological:      General: No focal deficit present.      Mental Status: She is alert and oriented to person, place, and time.          Lab Results: I have reviewed the following results:  Lab Results   Component Value Date/Time    SODIUM 135 11/28/2024 12:57 PM    SODIUM 137 04/04/2024 12:45 PM    K 4.0 11/28/2024 12:57 PM    K 4.8 04/04/2024 12:45 PM    BUN 26 (H) 11/28/2024 12:57 PM    BUN 35 (H) 04/04/2024 12:45 PM    CREATININE 1.01 11/28/2024 12:57 PM    CREATININE 1.24 (H) 04/04/2024 12:45 PM    GLUC 134 11/28/2024 12:57 PM    GLUC 97 04/04/2024 12:45 PM    CALCIUM 9.7 11/28/2024 12:57 PM    CALCIUM 9.7 04/04/2024 12:45 PM    AST 19 11/28/2024 12:57 PM    AST 19 04/04/2024 12:45 PM    ALT 16 11/28/2024 12:57 PM    ALT 18 04/04/2024 12:45 PM    ALB 4.0 11/28/2024 12:57 PM    ALB 4.2 04/04/2024 12:45 PM    TP 6.4 11/28/2024 12:57 PM    TP 6.1 (L) 04/04/2024 12:45 PM    EGFR 50 11/28/2024 12:57 PM    EGFR 42 (L) 04/04/2024 12:45 PM    EGFR 51 (L) 06/17/2020 08:06 AM     Lab Results   Component Value Date/Time    HGB 11.0 (L) 11/28/2024 12:57 PM    HGB 11.5 09/16/2022 12:38 PM    WBC 9.79 11/28/2024 12:57 PM     11/28/2024 12:57 PM    INR 1.32 (H) 02/12/2022 11:39 AM    PTT 42 (H) 02/12/2022 11:39 AM     Lab Results   Component Value Date/Time    SOP2COGGAUNC 4.650 (H) 01/22/2023 04:45 AM     Administrative Statements   I have spent a total time of 60 minutes in caring for this patient on the day of the visit/encounter including Risks and benefits of tx options, Instructions for management, Patient and family education, Importance of tx compliance, Risk factor reductions, Counseling / Coordination of care, Documenting in the medical record, Reviewing / ordering tests, medicine, procedures  , Obtaining or reviewing history  , and Communicating with other healthcare professionals . Topics discussed with the patient / family include  symptom assessment and management, medication review, medication adjustment, psychosocial support, goals of care, supportive listening, and anticipatory guidance.    Jasmin Potter

## 2024-11-29 NOTE — CONSULTS
"Consultation - Pulmonology   Name: Janina Tanner 84 y.o. female I MRN: [id number]  Unit/Bed#: -01 I Date of Admission: 11/28/2024   Date of Service: 11/29/2024 I Hospital Day: 1       Inpatient consult to Pulmonology     Date/Time  11/29/2024 10:27 AM     Performed by  Maximo Zaidi DO   Authorized by  Maribell Epperson DO         Physician Requesting Evaluation: Pato Scanlon   Reason for Evaluation / Principal Problem: Left upper lobe consolidation/mass    Assessment & Plan  Back pain  Patient presented with 3 weeks of lower back pain.  Found to have an L2 fracture.  Supportive care per primary  Neurosurgery consulted  Paroxysmal atrial fibrillation (HCC)  Patient is on reduced dose of Eliquis, labetalol.  Continue per primary  Type 2 diabetes mellitus, without long-term current use of insulin (HCC)  Lab Results   Component Value Date    HGBA1C 5.8 (H) 08/16/2024       No results for input(s): \"POCGLU\" in the last 72 hours.    Blood Sugar Average: Last 72 hrs:  On metformin at home  Sliding scale insulin  Blood glucose goals between 140 and 180    Essential hypertension  On amlodipine and lisinopril at home.  Continue per primary  Hypothyroidism  On levothyroxine 100 mcg daily at home.  Continue per primary  L2 vertebral fracture (HCC)  Noted on imaging on presentation.  Supportive care, analgesia  Neurosurgery consulted  Palliative care encounter  Patient is a level 3 DNR/DNI.  Continue with goals of care discussions  Abnormal CT of the chest  Patient initially presented for back pain.  On imaging, it was noted that patient has multiple pulmonary nodules along with mediastinal lymphadenopathy and consolidation.  Patient is currently not in respiratory distress.  Endorses some shortness of breath, but not always.  Still smoking.  No occupational exposures.  Procalcitonin negative.  Patient does not present clinically like pneumonia.  Message sent to office to set the patient up for possible outpatient " EBUS  Will defer bronchoscopy for now given the stability of the patient's respiratory status  Tobacco cessation  Pulmonology service will follow.    History of Present Illness   Janina Tanner is a 84 y.o. female with a past medical history of paroxysmal A-fib, hypertension, diabetes who presents for lower back pain.  On workup, the patient was noted to have multiple pulmonary nodules and mediastinal lymphadenopathy.  Pulmonary was consulted for recommendations and further management.    Review of Systems   Constitutional:  Negative for chills and fatigue.   HENT:  Negative for congestion, rhinorrhea, sinus pressure and sinus pain.    Respiratory:  Negative for cough and shortness of breath.    Cardiovascular:  Negative for chest pain.   Gastrointestinal:  Negative for abdominal distention, abdominal pain, diarrhea and nausea.   Genitourinary:  Negative for dysuria.   Musculoskeletal:  Positive for gait problem.   Skin:  Negative for rash.   Neurological:  Negative for dizziness and headaches.   Psychiatric/Behavioral:  Negative for agitation and confusion.        Historical Information   I have reviewed the patient's PMH, PSH, Social History, Family History, Meds, and Allergies  Tobacco History: Current smoker  Occupational History: Noncontributory  Family History:non-contributory    Objective :  Temp:  [97.9 °F (36.6 °C)-98.5 °F (36.9 °C)] 98.5 °F (36.9 °C)  HR:  [54-74] 59  BP: (161-237)/() 161/64  Resp:  [16-18] 17  SpO2:  [92 %-96 %] 93 %  O2 Device: None (Room air)    Physical Exam  Vitals reviewed.   Constitutional:       General: She is not in acute distress.  HENT:      Head: Normocephalic and atraumatic.      Right Ear: External ear normal.      Left Ear: External ear normal.      Nose: Nose normal.      Mouth/Throat:      Mouth: Mucous membranes are moist.      Pharynx: Oropharynx is clear.   Eyes:      Extraocular Movements: Extraocular movements intact.      Pupils: Pupils are equal, round, and  reactive to light.   Cardiovascular:      Rate and Rhythm: Normal rate and regular rhythm.      Pulses: Normal pulses.      Heart sounds: Normal heart sounds.   Pulmonary:      Effort: Pulmonary effort is normal.      Breath sounds: Normal breath sounds.   Abdominal:      General: Abdomen is flat. Bowel sounds are normal.   Musculoskeletal:      Right lower leg: No edema.      Left lower leg: No edema.   Skin:     General: Skin is warm and dry.      Capillary Refill: Capillary refill takes less than 2 seconds.   Neurological:      General: No focal deficit present.      Mental Status: She is alert and oriented to person, place, and time.   Psychiatric:         Mood and Affect: Mood normal.         Behavior: Behavior normal.         Lab Results: I have reviewed the following results:  .     11/28/24  1257   WBC 9.79   HGB 11.0*   HCT 33.4*      SODIUM 135   K 4.0   CL 98   CO2 30   BUN 26*   CREATININE 1.01   GLUC 134   AST 19   ALT 16   ALB 4.0   TBILI 0.96   ALKPHOS 117*     ABG: No new results in last 24 hours.    Imaging Results Review: I personally reviewed the following image studies in PACS and associated radiology reports: CT chest. My interpretation of the radiology images/reports is: Multiple pulmonary nodules, left-sided consolidation versus mass.  Other Study Results Review: EKG was reviewed.   PFT Results Reviewed: None on file    VTE Prophylaxis: VTE covered by:    None       Administrative Statements   I have spent a total time of 45 minutes in caring for this patient on the day of the visit/encounter including Diagnostic results, Prognosis, and Impressions.    Maximo Zaidi D.O.  PGY-5, Pulmonary/Critical Care  [facility]

## 2024-11-29 NOTE — TREATMENT PLAN
Upright x-rays reviewed.    Stable appearance of acute l2 fracture.    LSO brace as instructed.    Follow up outpatient in 2 weeks with repeat x-rays.    Neurosurgery will sign off. Call with questions.

## 2024-11-29 NOTE — PLAN OF CARE
Problem: PHYSICAL THERAPY ADULT  Goal: Performs mobility at highest level of function for planned discharge setting.  See evaluation for individualized goals.  Description: Treatment/Interventions: Functional transfer training, LE strengthening/ROM, Elevations, Therapeutic exercise, Endurance training, Patient/family training, Equipment eval/education, Bed mobility, Gait training, Spoke to nursing, OT  Equipment Recommended: Walker       See flowsheet documentation for full assessment, interventions and recommendations.  Note: Prognosis: Good  Problem List: Decreased strength, Decreased endurance, Impaired balance, Decreased mobility, Pain, Orthopedic restrictions  Assessment: Pt is 84 y.o. female seen for PT evaluation s/p admit to [facility] on 11/28/2024. Two pt identifiers were used to confirm. Pt presented w/ increased back pain and b/l hip pain.  Pt was admitted with a primary dx of: L2 fx, and other active problems including essential HTN, hypothyroidism, paroxysmal A fib, type 2 DM, palliative care encounter. Neurosx recommending LSO when OOB.  PT now consulted for assessment of mobility and d/c needs. Pt with Up and OOB as tolerated  orders.  Pts current co morbidities affecting treatment include:  has a past medical history of Anxiety, Atrial fibrillation (HCC), Bowel obstruction (HCC), Cancer (HCC), Depression, Diabetes mellitus (HCC), Disease of thyroid gland, Hypertension, and Hypothyroidism. . Pts current clinical presentation is Unstable/ Unpredictable (high complexity) due to Ongoing medical management for primary dx, Decreased activity tolerance compared to baseline, Fall risk, Increased assistance needed from caregiver at current time, Spinal precautions at current time, Continuous pulse oximetry monitoring   . Upon evaluation, pt currently is requiring Min Ax1 for transfers and Min Ax1 for ambulation w/ RW . Pt presents at PT eval functioning below baseline and currently w/ overall  mobility deficits 2* to: BLE weakness, decreased endurance, gait deviations, pain, decreased activity tolerance compared to baseline, fall risk, spinal precautions. Pt currently at a fall risk 2* to impairments listed above.  Based on the aforementioned PT evaluation, pt will continue to benefit from skilled Acute PT interventions to address stated impairments; to maximize functional mobility; for ongoing pt/ family training; and DME needs. At conclusion of PT session pt returned back in chair and chair alarm engaged with phone and call bell within reach. Pt denies any further questions at this time. PT is currently recommending Level I resource intensity. PT will continue to follow during hospital stay.        Rehab Resource Intensity Level, PT: I (Maximum Resource Intensity)    See flowsheet documentation for full assessment.

## 2024-11-29 NOTE — CASE MANAGEMENT
Case Management Discharge Planning Note    Patient name Janina Tanner  Location /-01 MRN [id number]  : 1940 Date 2024       Current Admission Date: 2024  Current Admission Diagnosis:Back pain   Patient Active Problem List    Diagnosis Date Noted Date Diagnosed    Abnormal CT of the chest 2024     L2 vertebral fracture ([facility]) 2024     Palliative care encounter 2024     Pulmonary emphysema ([facility]) 2023     Acute bronchitis 2023     Noncompliance 2023     Urinary tract infection 2023     Acute respiratory failure with hypoxia ([facility]) 2023     Hypoxia      Bronchitis      Closed nondisplaced fracture of fifth left metatarsal bone 2023     Generalized weakness 2022     Chronic anemia 2022     Pain 02/15/2022     Macular degeneration 02/15/2022     Fracture of right tibial plateau 2022     Renal vascular disease 2020     Primary osteoarthritis of right knee 2019     Synovial cyst of right popliteal space 2019     Hemarthrosis of right knee 2019     History of breast cancer 2019     Moderate protein-calorie malnutrition  2019     Asymptomatic bilateral carotid artery stenosis 08/15/2019     High blood pressure      Hypertensive urgency 2019     Hypertension      Hypo-osmolality and hyponatremia 2019     Fall 2019     Chronic hyponatremia 2019     Syncope vs presyncope 2019     Paroxysmal A-fib ([facility]) 2019     Renovascular hypertension 2019     Diabetes ([facility]) 2019     Weight loss 2019     CKD (chronic kidney disease) stage 3, GFR 30-59 ml/min ([facility]) 2019     Iron deficiency anemia due to chronic blood loss 2018     Incisional hernia, without obstruction or gangrene 07/10/2018     Postop check 2018     Delirium 2018     Physical deconditioning 2018     Ambulatory dysfunction 2018     Hx of fall  02/05/2018     Anxiety 02/05/2018     Acute kidney injury (HCC) 02/02/2018     Hordeolum externum of right upper eyelid 03/08/2017     Malignant neoplasm of right breast, stage 1, estrogen receptor positive (HCC) 02/08/2017     Malignant neoplasm of central portion of right female breast (HCC) 01/13/2017     Tobacco abuse 01/11/2017     Heart palpitations 07/11/2016     Nicotine dependence 07/11/2016     Paroxysmal atrial fibrillation (HCC) 07/11/2016     Type 2 diabetes mellitus, without long-term current use of insulin (HCC) 07/11/2016     Essential hypertension 07/11/2016     Hypothyroidism 07/11/2016     Nontraumatic compression fracture of T4 vertebra (HCC) 10/06/2015     Back pain 09/17/2015     Gastroesophageal reflux disease without esophagitis 02/23/2015     Depression 02/23/2015     History of CEA (carotid endarterectomy) 02/23/2015     Hyperlipidemia 02/23/2015     Hypothyroidism due to acquired atrophy of thyroid 02/23/2015     Vitamin D deficiency 02/23/2015     Varicose vein of leg 02/23/2015     Non-seasonal allergic rhinitis due to pollen 02/23/2015       LOS (days): 1  Geometric Mean LOS (GMLOS) (days): 3.5  Days to GMLOS:2.5     OBJECTIVE:  Risk of Unplanned Readmission Score: 17.2         Current admission status: Inpatient   Preferred Pharmacy:   [facility] - [place], [place] - [address]  [address]  [place] [address]  Phone: [phone] Fax: [phone]    Primary Care Provider: Darrel Fall,     Primary Insurance: MEDICARE  Secondary Insurance: BLUE CROSS    DISCHARGE DETAILS:                                     DME Referral Provided  Referral made for DME?: No              Treatment Team Recommendation: Home with Home Health Care  Discharge Destination Plan:: Home with Home Health Care  Transport at Discharge : Family                                      Additional Comments: 85yo female is dx with L2 compression fracture with back pain x2 weeks  and newly found HEIDE density mass in lung. Seen by PT and cleared for home with nursing, PT, LSO brace, and [facility]. [facility] accepted nsg/PT. Pt. is alert and oriented, discussed STR but she is not interested in rehab. Granddaughter and son assist her. Plan home today with granddaughter transporting.

## 2024-11-29 NOTE — PHYSICAL THERAPY NOTE
PHYSICAL THERAPY EVALUATION  NAME:  Janina Tanner  DATE: 11/29/24    AGE:   84 y.o.  Mrn:   [id number]  ADMIT DX:  Back pain [M54.9]  Lung mass [R91.8]  Pleural effusion [J90]  Adrenal mass (HCC) [E27.8]  L2 vertebral fracture (HCC) [S32.029A]    Past Medical History:   Diagnosis Date    Anxiety     Atrial fibrillation (HCC)     Bowel obstruction (HCC)     Cancer (HCC)     LEFT BREAST CA 22 YEARS AGO     Depression     Diabetes mellitus (HCC)     Disease of thyroid gland     Hypertension     Hypothyroidism        Past Surgical History:   Procedure Laterality Date    ABDOMINAL ADHESION SURGERY N/A 2/4/2018    Procedure: LYSIS ADHESIONS;  Surgeon: Kirk Huang DO;  [facility]: [facility];  Service: General    BREAST SURGERY      CHOLECYSTECTOMY      COLON SURGERY  2017    EXPLORATORY LAPAROTOMY      JOINT REPLACEMENT      LEFT KNEE REPLACEMENT     LAPAROTOMY N/A 2/4/2018    Procedure: LAPAROTOMY EXPLORATORY,;  Surgeon: Kirk Huang DO;  [facility];  Service: General    MASTECTOMY      MASTECTOMY Left 1995    MASTECTOMY Right 2017    NM BX/EXC LYMPH NODE OPEN SUPERFICIAL Right 1/13/2017    Procedure: SENTINEL LYMPH NODE BIOPSY RIGHT AXILLA;  Surgeon: Dago Thapa MD;  [facility];  Service: General    NM MASTECTOMY SIMPLE COMPLETE Right 1/13/2017    Procedure: MASTECTOMY SIMPLE;  Surgeon: Dago Thapa MD;  [facility];  Service: General    SMALL INTESTINE SURGERY N/A 2/4/2018    Procedure: RESECTION SMALL BOWEL;  Surgeon: Kirk Huang DO;  [facility];  Service: General    US GUIDED BREAST BIOPSY RIGHT COMPLETE Right 11/29/2016       Length Of Stay: 1    PHYSICAL THERAPY EVALUATION:       11/29/24 1146   Note Type   Note type Evaluation   Pain Assessment   Pain Assessment Tool 0-10   Pain Score 6   Pain Location/Orientation Location: Back   Pain Onset/Description Onset: Ongoing;Frequency: Constant/Continuous;Descriptor: Aching   Patient's Stated Pain Goal No pain    Restrictions/Precautions   Weight Bearing Precautions Per Order No   Braces or Orthoses LSO   Other Precautions Chair Alarm;Bed Alarm;Fall Risk;Pain;Spinal precautions   Home Living   Type of Home Apartment  (Independent Living)   Home Layout One level;Elevator   Home Equipment Walker;Cane   Additional Comments Pt reports living alone. Pt states she has a supportive daughter, but states she lives 30 min away   Prior Function   Level of [place] Independent with functional mobility   Lives With Alone   Receives Help From Family;Friend(s)   Falls in the last 6 months 0   Comments Pt reports the use of a SPC for ambulation PTA. Pt states she occasionally utilizes a RW for community ambulation   General   Family/Caregiver Present No   Cognition   Overall Cognitive Status WFL   Arousal/Participation Alert   Attention Within functional limits   Orientation Level Oriented X4   Memory Within functional limits   Following Commands Follows all commands and directions without difficulty   RUE Assessment   RUE Assessment WFL   LUE Assessment   LUE Assessment WFL   RLE Assessment   RLE Assessment WFL   Strength RLE   RLE Overall Strength 4/5   LLE Assessment   LLE Assessment WFL   Strength LLE   LLE Overall Strength 4/5   Bed Mobility   Additional Comments NA, Pt seated EOB with OT at time of PT eval   Transfers   Sit to Stand 4  Minimal assistance   Additional items Assist x 1;Increased time required;Verbal cues   Stand to Sit 4  Minimal assistance   Additional items Assist x 1;Increased time required;Verbal cues   Ambulation/Elevation   Gait pattern Excessively slow;Short stride;Foward flexed   Gait Assistance 4  Minimal assist   Additional items Assist x 1   Assistive Device Rolling walker   Distance 45ft x 2   Balance   Static Sitting Fair   Static Standing Fair -   Ambulatory Poor +   Endurance Deficit   Endurance Deficit Yes   Endurance Deficit Description fatigue   Activity Tolerance   Activity Tolerance Patient  limited by fatigue   Medical Staff Made Aware Jesi OT; OT present for co evaluation due to pts current medical presentation   Nurse Made Aware Pt appropriate to be seen and mobilize per nsg   Assessment   Prognosis Good   Problem List Decreased strength;Decreased endurance;Impaired balance;Decreased mobility;Pain;Orthopedic restrictions   Assessment Pt is 84 y.o. female seen for PT evaluation s/p admit to [facility] on 11/28/2024. Two pt identifiers were used to confirm. Pt presented w/ increased back pain and b/l hip pain.  Pt was admitted with a primary dx of: L2 fx, and other active problems including essential HTN, hypothyroidism, paroxysmal A fib, type 2 DM, palliative care encounter. Neurosx recommending LSO when OOB.  PT now consulted for assessment of mobility and d/c needs. Pt with Up and OOB as tolerated  orders.  Pts current co morbidities affecting treatment include:  has a past medical history of Anxiety, Atrial fibrillation (HCC), Bowel obstruction (HCC), Cancer (HCC), Depression, Diabetes mellitus (HCC), Disease of thyroid gland, Hypertension, and Hypothyroidism. . Pts current clinical presentation is Unstable/ Unpredictable (high complexity) due to Ongoing medical management for primary dx, Decreased activity tolerance compared to baseline, Fall risk, Increased assistance needed from caregiver at current time, Spinal precautions at current time, Continuous pulse oximetry monitoring   . Upon evaluation, pt currently is requiring Min Ax1 for transfers and Min Ax1 for ambulation w/ RW . Pt presents at PT eval functioning below baseline and currently w/ overall mobility deficits 2* to: BLE weakness, decreased endurance, gait deviations, pain, decreased activity tolerance compared to baseline, fall risk, spinal precautions. Pt currently at a fall risk 2* to impairments listed above.  Based on the aforementioned PT evaluation, pt will continue to benefit from skilled Acute PT interventions to  "address stated impairments; to maximize functional mobility; for ongoing pt/ family training; and DME needs. At conclusion of PT session pt returned back in chair and chair alarm engaged with phone and call bell within reach. Pt denies any further questions at this time. PT is currently recommending Level I resource intensity. PT will continue to follow during hospital stay.   Goals   Patient Goals \" to get better\"   STG Expiration Date 12/13/24   Short Term Goal #1 In 14 days pt will complete: 1) Bed mobility skills with mod I to increase safety and independence as well as decrease caregiver burden. 2) Functional transfers with mod I to promote increased independence, safety, and QOL. 3) Ambulate 300' using least restrictive AD with mod I without LOB and stable vitals so that pt can negotiate previous living environment safely and promote independence with functional mobility and return to PLOF. 4) Stair training up/ down 2 step/s using rail/s with mod I so that pt can enter/negotiate previous living environment safely and decrease fall risk. 5) Improve balance grades by 1/2 grade to increase safety with all mobility and decrease fall risk.  6) Improve BLE strength by 1/2 grade to help increase overall functional mobility and decrease fall risk.   Plan   Treatment/Interventions Functional transfer training;LE strengthening/ROM;Elevations;Therapeutic exercise;Endurance training;Patient/family training;Equipment eval/education;Bed mobility;Gait training;Spoke to nursing;OT   PT Frequency 3-5x/wk   Discharge Recommendation   Rehab Resource Intensity Level, PT I (Maximum Resource Intensity)   Equipment Recommended Walker   Walker Package Recommended Wheeled walker   AM-PAC Basic Mobility Inpatient   Turning in Flat Bed Without Bedrails 3   Lying on Back to Sitting on Edge of Flat Bed Without Bedrails 3   Moving Bed to Chair 3   Standing Up From Chair Using Arms 3   Walk in Room 3   Climb 3-5 Stairs With Railing 2 "   Basic Mobility Inpatient Raw Score 17   Basic Mobility Standardized Score 39.67   [facility] Highest Level Of Mobility   -HL Goal 5: Stand one or more mins   -HLM Achieved 7: Walk 25 feet or more   Modified [place] Scale   Modified [place] Scale 4   Portions of the documentation may have been created using voice recognition software.Occasional wrong word or sound alike substitutions may have occurred due to the inherent limitations of the voice recognition software. Read the chart carefully and recognize, using context, where substitutions have occurred.    Ryan Fitzpatrick, PT, DPT

## 2024-11-29 NOTE — RESTORATIVE TECHNICIAN NOTE
Restorative Technician Note      Patient Name: Janina Tanner     Restorative Tech Visit Date: 11/29/24  Note Type: Bracing, Follow-up fitting  Patient Position Upon Consult: Supine  Brace Applied: -- (Horizon LSO; Fit by PT)  Patient Position When Brace Applied: Seated (EOB for teaching)  Education Provided: Yes  Patient Position at End of Consult: Supine; All needs within reach  Nurse Communication: Nurse aware of consult, application of brace    Please contact [facility] PT Restorative tech on Epic Secure Chat  in regards to bracing instruction and/or adjustment.    Kei Lau, Restorative Technician

## 2024-11-29 NOTE — PLAN OF CARE

## 2024-11-29 NOTE — TELEPHONE ENCOUNTER
11/29/2024- PT IS IN [facility]  12/18/2024- 2 WK HFU W/ AP W/ lumbar x-rays     RUCHI Pfeiffer [place] [facility]  Hello,    Patient needs appointment in 2 weeks with AP. I've ordered lumbar x-rays. Thanks.

## 2024-11-29 NOTE — DISCHARGE SUMMARY
"Discharge Summary - Hospitalist   Name: Janian Tanner 84 y.o. female I MRN: [id number]  Unit/Bed#: -01 I Date of Admission: 11/28/2024   Date of Service: 11/29/2024 I Hospital Day: 1     Assessment & Plan  Back pain  Continue with supportive care.  Monitor pain symptoms.  Continue with pain manage  Discussed with neurosurgery regarding L2 fracture  Upright xrays were ordered and reviewed   LSO brace recommended and pt educated on how to place and remove  Pt will need to fu in 2 weeks with repeat xrays  No clinical symptoms of pneumonia.  Patient denies cough fever or shortness of breath.  Primary symptoms is lower back pain and hip discomfort.   Pocalcitonin level.  Is 0.08  Consult pulmonary regarding postobstructive process/consolidation noted in imaging  Essential hypertension  Note patient treated for renovascular hypertension.  Continue with home medications.  Hypothyroidism  Hypothyroidism  Paroxysmal atrial fibrillation (HCC)  Continue with home medications  Type 2 diabetes mellitus, without long-term current use of insulin (HCC)  Lab Results   Component Value Date    HGBA1C 5.8 (H) 08/16/2024       No results for input(s): \"POCGLU\" in the last 72 hours.  Continue with insulin  Blood Sugar Average: Last 72 hrs:      L2 vertebral fracture (HCC)  Consult neurosurgery  Non-traumatic collapsed vertebra fracture - a/e/b acute L2 fracture in the absence of trauma - in the setting of an elderly, post-menopausal,  female smoker with a history of loss of height and low bone mineral density - requiring analgesics, neurosurgery consult (P), and PT/OT.   Palliative care encounter  Level 3 DNR  Patient wishes to get more information for treatment options before definitive decision made regarding goals of care.  She would prefer to proceed with bx as discussed with pulmonary   Appreciate  palliative care in addition to neurosurgery and pulmonary.    Preliminarily, she appears to be leaning towards " "conservative comfort type care.  However, when asked about hospice she says she is \"not ready for that\".    Palliative will fu with pt outpt   Abnormal CT of the chest       Medical Problems       Resolved Problems  Date Reviewed: 1/22/2023   None       Discharging Physician / Practitioner: RUCHI Mcintyre  PCP: Darrel Fall DO  Admission Date:   Admission Orders (From admission, onward)       Ordered        11/28/24 1529  INPATIENT ADMISSION  Once                          Discharge Date: 11/29/24    Consultations During Hospital Stay:  Neurosurgery - RUCHI Llanes   Palliative  SEDRICKNP - Jasmin Moise     Procedures Performed:   CTA chest abdomen and pelvis 11/28: No acute aortic/arterial abnormality.   Patent aortoiliac bypass.   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   Dense consolidation within the paramediastinal left upper lobe with associated occluded proximal bronchi.   Mediastinal (up to 1.7 cm) and left hilar adenopathy.   Bronchoscopy recommended to exclude a malignant cause of bronchial obstruction and resulting postobstructive pneumonia. Note of normal white count on today's labs.   Small left pleural effusion.   In comparison to the January 2023 prior, there is a new mild compression deformity along the superior endplate of the L2 vertebral body.   Fracture lines are evident, consistent with acute/subacute fracture. Correlate clinically.   Indeterminate right adrenal lesion measuring up to 2.5 cm.   Stable from 2022 comparison, significantly increased from 2019.   Multiphase adrenal protocol CT recommended for further evaluation.   Xray lumbar spine: Stable mild loss of height of the L2 vertebral body. Stable severe chronic loss of height of the T12 vertebral body.     Significant Findings / Test Results:   See above    Incidental Findings:   See above        Test Results Pending at Discharge (will require follow up):   None      Outpatient Tests " Requested:  Pt should call to schedule follow up with pcp in the next week     Complications:  none     Reason for Admission: back pain     Hospital Course:   Janina Tanner is a 84 y.o. female patient who originally presented to the hospital on 11/28/2024 due to back pain.Pt has a past medical hx of afib, htn, hypothyroidism who presented with a 2 week hx of low back pain and bl hip pain. Patient reported pain when walking , bending and twisting. Workup upon arrival to the Ed demonstrated a dense consolidation within the para mediastinal left upper lobe with associated occluded proximal bronchi , mediastinal and left hilar adenopathy. Bronchoscopy was recommended . Pt also noted to have a mild compression deformity of L 2 vertebral body as well.   Pt was seen by neurosurgery who ordered lumbar xrays and uprights that were reviewed. Recommendations were to where LSO brace while ambulating and > than 45 degrees . the patient was fitted with a LSO brace for which she was able to place on by herself, she was also recommended by pt for rehab. Pt adamantly refused rehabilitation stated she will go home. She was however, agreeable to PT /OT as well as healing hands per cm discussion.   Pt was also seen by the pulmonary team for  abnormal chest xt and concern for mass. Pts eliquis was placed on hold for now. They are working on setting up an outpt EBUS. Bronchoscopy will be further discussed outpt due to her current stability. Pt is aware of this she feels she would want biopsy to understand what her options are. She would likely not undergo any aggressive invasive procedures once determined. She reports a hx of breast cancer and recurrence for which she states she previously did well. Pt still continues to smoke it was recommended to try to stop. Pts imaging is highly concerning for a malignancy she is agreeable to outpt EBUS allowing for tissue biopsy and diagnostics as opposed to just simple staging with the current  "thoracentesis. A message was sent to the pulmonary coorinator to schedule this. Discussion was had with the pts daughter who had preference to the patient to go to rehab. On evaluation with the patient she is of sound mind alert and oriented, she definitely was certain she wanted to refuse rehabilitation. Her granddaughter would be picking her up and taking her to her independent living.           Please see above list of diagnoses and related plan for additional information.     Condition at Discharge: fair    Discharge Day Visit / Exam:   Subjective:  Pt reports feeling a little uncomfortable in the brace laying in bed. We discussed wearing it when walking and I have requested rn assist with teach back with her in regard to her brace. Pt reports dont call daughter since she is away in [place] . She states she will be ok to return to her facility and is agreeable to vna   Vitals: Blood Pressure: 161/64 (11/29/24 0840)  Pulse: 59 (11/29/24 0840)  Temperature: 98.5 °F (36.9 °C) (11/29/24 0717)  Temp Source: Oral (11/28/24 2312)  Respirations: 17 (11/29/24 0717)  Height: 5' 6\" (167.6 cm) (11/28/24 1620)  Weight - Scale: 59 kg (130 lb) (11/28/24 1620)  SpO2: 93 % (11/29/24 0840)  Physical Exam  Constitutional:       General: She is not in acute distress.     Appearance: She is not ill-appearing, toxic-appearing or diaphoretic.   HENT:      Head: Normocephalic and atraumatic.      Mouth/Throat:      Pharynx: No oropharyngeal exudate.   Eyes:      General:         Right eye: No discharge.         Left eye: No discharge.   Cardiovascular:      Rate and Rhythm: Normal rate.      Heart sounds: No murmur heard.     No friction rub. No gallop.   Pulmonary:      Effort: No respiratory distress.      Breath sounds: No stridor. No wheezing, rhonchi or rales.   Chest:      Chest wall: No tenderness.   Abdominal:      General: There is no distension.      Tenderness: There is no abdominal tenderness. There is no guarding or " rebound.      Hernia: No hernia is present.   Musculoskeletal:         General: No swelling, tenderness, deformity or signs of injury.      Right lower leg: No edema.      Left lower leg: No edema.   Skin:     Coloration: Skin is not jaundiced or pale.      Findings: No bruising, erythema, lesion or rash.   Neurological:      Mental Status: She is alert and oriented to person, place, and time.      Comments: Has lso brace place    Psychiatric:         Behavior: Behavior normal.          Discussion with Family: Patient declined call to .     Discharge instructions/Information to patient and family:   See after visit summary for information provided to patient and family.      Provisions for Follow-Up Care:  See after visit summary for information related to follow-up care and any pertinent home health orders.      Mobility at time of Discharge:   Basic Mobility Inpatient Raw Score: 24  JH-HLM Goal: 8: Walk 250 feet or more  JH-HLM Achieved: 8: Walk 250 feet ot more  HLM Goal achieved. Continue to encourage appropriate mobility.     Disposition:   Home with VNA Services (Reminder: Complete face to face encounter)    Planned Readmission: No plan for readmission but high risk     Discharge Medications:  See after visit summary for reconciled discharge medications provided to patient and/or family.      Administrative Statements   Discharge Statement:  I have spent a total time of 53 minutes in caring for this patient on the day of the visit/encounter. >30 minutes of time was spent on: Diagnostic results, Prognosis, Patient and family education, Counseling / Coordination of care, Documenting in the medical record, Reviewing / ordering tests, medicine, procedures  , and Communicating with other healthcare professionals .    **Please Note: This note may have been constructed using a voice recognition system**

## 2024-11-29 NOTE — H&P (VIEW-ONLY)
"Consultation - Pulmonology   Name: Janina Tanner 84 y.o. female I MRN: [id number]  Unit/Bed#: -01 I Date of Admission: 11/28/2024   Date of Service: 11/29/2024 I Hospital Day: 1       Inpatient consult to Pulmonology     Date/Time  11/29/2024 10:27 AM     Performed by  Maximo Zaidi DO   Authorized by  Maribell Epperson DO         Physician Requesting Evaluation: Pato Scanlon   Reason for Evaluation / Principal Problem: Left upper lobe consolidation/mass    Assessment & Plan  Back pain  Patient presented with 3 weeks of lower back pain.  Found to have an L2 fracture.  Supportive care per primary  Neurosurgery consulted  Paroxysmal atrial fibrillation (HCC)  Patient is on reduced dose of Eliquis, labetalol.  Continue per primary  Type 2 diabetes mellitus, without long-term current use of insulin (HCC)  Lab Results   Component Value Date    HGBA1C 5.8 (H) 08/16/2024       No results for input(s): \"POCGLU\" in the last 72 hours.    Blood Sugar Average: Last 72 hrs:  On metformin at home  Sliding scale insulin  Blood glucose goals between 140 and 180    Essential hypertension  On amlodipine and lisinopril at home.  Continue per primary  Hypothyroidism  On levothyroxine 100 mcg daily at home.  Continue per primary  L2 vertebral fracture (HCC)  Noted on imaging on presentation.  Supportive care, analgesia  Neurosurgery consulted  Palliative care encounter  Patient is a level 3 DNR/DNI.  Continue with goals of care discussions  Abnormal CT of the chest  Patient initially presented for back pain.  On imaging, it was noted that patient has multiple pulmonary nodules along with mediastinal lymphadenopathy and consolidation.  Patient is currently not in respiratory distress.  Endorses some shortness of breath, but not always.  Still smoking.  No occupational exposures.  Procalcitonin negative.  Patient does not present clinically like pneumonia.  Message sent to office to set the patient up for possible outpatient " EBUS  Will defer bronchoscopy for now given the stability of the patient's respiratory status  Tobacco cessation  Pulmonology service will follow.    History of Present Illness   Janina Tnaner is a 84 y.o. female with a past medical history of paroxysmal A-fib, hypertension, diabetes who presents for lower back pain.  On workup, the patient was noted to have multiple pulmonary nodules and mediastinal lymphadenopathy.  Pulmonary was consulted for recommendations and further management.    Review of Systems   Constitutional:  Negative for chills and fatigue.   HENT:  Negative for congestion, rhinorrhea, sinus pressure and sinus pain.    Respiratory:  Negative for cough and shortness of breath.    Cardiovascular:  Negative for chest pain.   Gastrointestinal:  Negative for abdominal distention, abdominal pain, diarrhea and nausea.   Genitourinary:  Negative for dysuria.   Musculoskeletal:  Positive for gait problem.   Skin:  Negative for rash.   Neurological:  Negative for dizziness and headaches.   Psychiatric/Behavioral:  Negative for agitation and confusion.        Historical Information   I have reviewed the patient's PMH, PSH, Social History, Family History, Meds, and Allergies  Tobacco History: Current smoker  Occupational History: Noncontributory  Family History:non-contributory    Objective :  Temp:  [97.9 °F (36.6 °C)-98.5 °F (36.9 °C)] 98.5 °F (36.9 °C)  HR:  [54-74] 59  BP: (161-237)/() 161/64  Resp:  [16-18] 17  SpO2:  [92 %-96 %] 93 %  O2 Device: None (Room air)    Physical Exam  Vitals reviewed.   Constitutional:       General: She is not in acute distress.  HENT:      Head: Normocephalic and atraumatic.      Right Ear: External ear normal.      Left Ear: External ear normal.      Nose: Nose normal.      Mouth/Throat:      Mouth: Mucous membranes are moist.      Pharynx: Oropharynx is clear.   Eyes:      Extraocular Movements: Extraocular movements intact.      Pupils: Pupils are equal, round, and  reactive to light.   Cardiovascular:      Rate and Rhythm: Normal rate and regular rhythm.      Pulses: Normal pulses.      Heart sounds: Normal heart sounds.   Pulmonary:      Effort: Pulmonary effort is normal.      Breath sounds: Normal breath sounds.   Abdominal:      General: Abdomen is flat. Bowel sounds are normal.   Musculoskeletal:      Right lower leg: No edema.      Left lower leg: No edema.   Skin:     General: Skin is warm and dry.      Capillary Refill: Capillary refill takes less than 2 seconds.   Neurological:      General: No focal deficit present.      Mental Status: She is alert and oriented to person, place, and time.   Psychiatric:         Mood and Affect: Mood normal.         Behavior: Behavior normal.         Lab Results: I have reviewed the following results:  .     11/28/24  1257   WBC 9.79   HGB 11.0*   HCT 33.4*      SODIUM 135   K 4.0   CL 98   CO2 30   BUN 26*   CREATININE 1.01   GLUC 134   AST 19   ALT 16   ALB 4.0   TBILI 0.96   ALKPHOS 117*     ABG: No new results in last 24 hours.    Imaging Results Review: I personally reviewed the following image studies in PACS and associated radiology reports: CT chest. My interpretation of the radiology images/reports is: Multiple pulmonary nodules, left-sided consolidation versus mass.  Other Study Results Review: EKG was reviewed.   PFT Results Reviewed: None on file    VTE Prophylaxis: VTE covered by:    None       Administrative Statements   I have spent a total time of 45 minutes in caring for this patient on the day of the visit/encounter including Diagnostic results, Prognosis, and Impressions.    Maximo Zaidi D.O.  PGY-5, Pulmonary/Critical Care  [facility]

## 2024-11-29 NOTE — ASSESSMENT & PLAN NOTE
"Lab Results   Component Value Date    HGBA1C 5.8 (H) 08/16/2024       No results for input(s): \"POCGLU\" in the last 72 hours.    Blood Sugar Average: Last 72 hrs:  On metformin at home  Sliding scale insulin  Blood glucose goals between 140 and 180    "

## 2024-11-29 NOTE — CONSULTS
"Consultation - Neurosurgery   Name: Janina Tanner 84 y.o. female I MRN: [id number]  Unit/Bed#: -01 I Date of Admission: 11/28/2024   Date of Service: 11/29/2024 I Hospital Day: 1   Inpatient consult to Neurosurgery  Consult performed by: RUCHI Pfeiffer  Consult ordered by: Maribell Epperson DO        Physician Requesting Evaluation: Pato Scanlon   Reason for Evaluation / Principal Problem:     Assessment & Plan  Back pain    Paroxysmal atrial fibrillation (HCC)    Type 2 diabetes mellitus, without long-term current use of insulin (HCC)  Lab Results   Component Value Date    HGBA1C 5.8 (H) 08/16/2024       No results for input(s): \"POCGLU\" in the last 72 hours.    Blood Sugar Average: Last 72 hrs:      Essential hypertension    Hypothyroidism    L2 vertebral fracture (HCC)  L2 compression fracture  C/o back pain x 2 weeks.    Imaging:  CTA dissection protocol CAP, 11/28/24: In comparison to the January 2023 prior, there is a new mild compression deformity along the superior endplate of the L2 vertebral body. Fracture lines are evident, consistent with acute/subacute fracture. Correlate clinically.    Plan:  Continue to monitor neuro exam closely.  Upright lumbar x-rays for baseline pending.  LSO brace when upright > 45 degrees and OOB.  Pain control per primary team.  Mobilize with PT/OT.  DVT ppx: SCDs.    Neurosurgery will review x-rays once completed. Call with questions.  Palliative care encounter    Abnormal CT of the chest    I have discussed the above management plan in detail with the primary service.   Neurosurgery service will follow.  Please contact the SecureChat role for the Neurosurgery service with any questions/concerns.    History of Present Illness   HPI: Janina Tanner is a 84 y.o. year old female with past medical history of A-fib, diabetes, hypertension, hypothyroidism, who presented to [facility] emergency department with complaint of low back pain over the past 2 " weeks.  She had CTA completed which was concerning for left upper lobe dense consolidation with associated adenopathy.  She was also noted with mild compression deformity of L2 that appeared to be acute to subacute.    Currently on exam, she continues to endorse low back pain that is worse with movement but tolerable.  She denies any radiation of pain down her legs.  She denies any numbness or weakness.  She is ambulatory at baseline.    Review of Systems   Constitutional: Negative.    HENT: Negative.     Respiratory: Negative.     Cardiovascular: Negative.    Gastrointestinal: Negative.    Musculoskeletal:  Positive for back pain.   Neurological: Negative.      I have reviewed the patient's PMH, PSH, Social History, Family History, Meds, and Allergies    Objective :  Temp:  [97.9 °F (36.6 °C)-98.5 °F (36.9 °C)] 98.5 °F (36.9 °C)  HR:  [54-74] 59  BP: (161-237)/() 161/64  Resp:  [16-18] 17  SpO2:  [92 %-96 %] 93 %  O2 Device: None (Room air)    Physical Exam  Vitals and nursing note reviewed.   Constitutional:       General: She is not in acute distress.     Appearance: She is well-developed.   HENT:      Head: Normocephalic and atraumatic.   Eyes:      General: Lids are normal.      Extraocular Movements: Extraocular movements intact.      Conjunctiva/sclera: Conjunctivae normal.      Pupils: Pupils are equal, round, and reactive to light.   Cardiovascular:      Rate and Rhythm: Normal rate and regular rhythm.      Heart sounds: No murmur heard.  Pulmonary:      Effort: Pulmonary effort is normal. No respiratory distress.      Breath sounds: Normal breath sounds.   Abdominal:      Palpations: Abdomen is soft.      Tenderness: There is no abdominal tenderness.   Musculoskeletal:         General: No swelling.      Cervical back: Neck supple.   Skin:     General: Skin is warm and dry.      Capillary Refill: Capillary refill takes less than 2 seconds.   Neurological:      General: No focal deficit present.       Mental Status: She is alert and oriented to person, place, and time. Mental status is at baseline.      Motor: Motor strength is normal.  Psychiatric:         Mood and Affect: Mood normal.      Neurological Exam  Mental Status  Alert. Oriented to person, place, and time.    Cranial Nerves  CN II: Visual acuity is normal. Visual fields full to confrontation.  CN III, IV, VI: Extraocular movements intact bilaterally. Normal lids and orbits bilaterally. Pupils equal round and reactive to light bilaterally.  CN V: Facial sensation is normal.  CN VII: Full and symmetric facial movement.  CN VIII: Hearing is normal.  CN IX, X: Palate elevates symmetrically. Normal gag reflex.  CN XI: Shoulder shrug strength is normal.  CN XII: Tongue midline without atrophy or fasciculations.    Motor   Strength is 5/5 throughout all four extremities.        Lab Results: I have reviewed the following results:  Recent Labs     11/28/24  1257   WBC 9.79   HGB 11.0*   HCT 33.4*      SODIUM 135   K 4.0   CL 98   CO2 30   BUN 26*   CREATININE 1.01   GLUC 134   AST 19   ALT 16   ALB 4.0   TBILI 0.96   ALKPHOS 117*       Imaging Results Review: I personally reviewed the following image studies in PACS and associated radiology reports: CT abdomen/pelvis. My interpretation of the radiology images/reports is: as above.  Other Study Results Review: No additional pertinent studies reviewed.    VTE Pharmacologic Prophylaxis: VTE covered by:    None    and Sequential compression device (Venodyne)

## 2024-11-29 NOTE — ASSESSMENT & PLAN NOTE
X2 week hx of low back and b/l hip pain  Incidental finding of lung mass and L2 fracture   L2 fracture  NSGY following  Lung mass:  Pulmonology consulted per note  F/u imaging 4-6 weeks  They are deferring bronch due to stable respiratory status   Pain can be moderate/severe, is intermittent, only experiences pain w/ movement. Pain predominately in low back, pain resolved in b/l hips  Current pain regimen:  Start Tylenol 650 mg Q6hrs for mild pain  Start OxyIR 2.5 mg Q4hrs PRN for moderate/severe pain  Start Lidocaine patch  OME's in the past 24 hours: 0 (has been using Tylenol for pain relief)   Other Symptom Management:  Start Senna daily to prevent OIC

## 2024-11-29 NOTE — DISCHARGE INSTR - AVS FIRST PAGE
Neurosurgery discharge instructions following spine fracture:     LSO brace to be worn when out of bed OR head of bed greater than 45 degrees.  May place brace on while sitting on edge of bed. May be removed for showering.   Do not wear while sleeping or laying flat/reclined.  No bending, twisting or heavy lifting. No strenuous activities. No Driving.   Follow-up as scheduled in two weeks with repeat spine x-rays to be completed 2-3 days prior to visit.  Prescription has been entered electronically.      **Please notify MD immediately if you have increased back or leg pain. New numbness, tingling, weakness in your legs and/or bowel/bladder incontinence**

## 2024-11-29 NOTE — ASSESSMENT & PLAN NOTE
Consult neurosurgery  Non-traumatic collapsed vertebra fracture - a/e/b acute L2 fracture in the absence of trauma - in the setting of an elderly, post-menopausal,  female smoker with a history of loss of height and low bone mineral density - requiring analgesics, neurosurgery consult (P), and PT/OT.

## 2024-11-29 NOTE — ASSESSMENT & PLAN NOTE
Palliative diagnoses: Lung Mass    Goals of care  Level 3 code status  Disease focused care w/ DNR/DNI limits in place    Concerns introduced include:  Introduced palliative care and discussed sx management   Aware about lung mass, stated that she does not want any aggressive treatment/interventions - doesn't want chemo/RT/surgery  Not ready for hospice yet  Will continue discussions regarding GOC as patient's clinical presentation evolves.  Encouraged follow up with Palliative Medicine on an outpatient basis after discharge for continued symptom management.  Our office will contact patient to schedule a hospital follow up.    Social support:  Supportive listening provided  Normalized experience of patient/family  Provided anxiety containment  Provided anticipatory guidance  Encouraged self care  Discussed spiritual needs    Follow up  Palliative Care will continue to follow and goals of care discussions will be ongoing.    Please reach out via [employer] Secure Chat if questions or concerns arise.    Care Coordination  Reviewed case with RN    I have reviewed the patient's controlled substance dispensing history in the Prescription Drug Monitoring Program in compliance with the [facility] regulations before prescribing any controlled substances.  Last refills  Filled  Written  ID  Drug  QTY  Days  Prescriber  RX #  Dispenser  Refill  Daily Dose*  Pymt Type      01/03/2023 01/03/2023 1 Lorazepam 0.5 Mg Tablet 180.00 90 Je Mary [id number] Pen ([id number]) 0 1.00 LME Medicare [place]   04/11/2023 04/10/2023 1 Lorazepam 0.5 Mg Tablet 180.00 90 Je Mary [id number] Pen (3853) 0 1.00 LME Medicare [place]   07/09/2023 07/07/2023 1 Lorazepam 0.5 Mg Tablet 180.00 90 Je Mary [id number] Pen ([id number])         Decisional apparatus: Patient has capacity on exam today.  If capacity is lost, patient's substitute decision maker would default to adult children by [place] Act 169.    Advance Directive/Living Will, POLST and POA Forms: None on file     ER contacts:  Name Relation  Home Work Mobile   AnnGeena robert Daughter   [phone]   SolGeena still Daughter [phone]     Melody Yap Grandchild   [phone]     We appreciate the invitation to be involved in this patient's care.  We will continue to follow throughout this hospitalization.  Please do not hesitate to reach our on call provider through our clinic answering service at [phone] should you have acute symptom control concerns.

## 2024-11-29 NOTE — OCCUPATIONAL THERAPY NOTE
Occupational Therapy Evaluation     Patient Name: Janina Tanner  Today's Date: 11/29/2024  Problem List  Principal Problem:    Back pain  Active Problems:    Paroxysmal atrial fibrillation (HCC)    Type 2 diabetes mellitus, without long-term current use of insulin (HCC)    Essential hypertension    Hypothyroidism    L2 vertebral fracture (HCC)    Palliative care encounter    Abnormal CT of the chest    Past Medical History  Past Medical History:   Diagnosis Date    Anxiety     Atrial fibrillation (HCC)     Bowel obstruction (HCC)     Cancer (HCC)     LEFT BREAST CA 22 YEARS AGO     Depression     Diabetes mellitus (HCC)     Disease of thyroid gland     Hypertension     Hypothyroidism      Past Surgical History  Past Surgical History:   Procedure Laterality Date    ABDOMINAL ADHESION SURGERY N/A 2/4/2018    Procedure: LYSIS ADHESIONS;  Surgeon: Kirk Huang DO;  [facility]: [facility];  Service: General    BREAST SURGERY      CHOLECYSTECTOMY      COLON SURGERY  2017    EXPLORATORY LAPAROTOMY      JOINT REPLACEMENT      LEFT KNEE REPLACEMENT     LAPAROTOMY N/A 2/4/2018    Procedure: LAPAROTOMY EXPLORATORY,;  Surgeon: Kirk Huang DO;  Location: [facility];  Service: General    MASTECTOMY      MASTECTOMY Left 1995    MASTECTOMY Right 2017    SD BX/EXC LYMPH NODE OPEN SUPERFICIAL Right 1/13/2017    Procedure: SENTINEL LYMPH NODE BIOPSY RIGHT AXILLA;  Surgeon: Dago Thapa MD;  Location: [facility];  Service: General    SD MASTECTOMY SIMPLE COMPLETE Right 1/13/2017    Procedure: MASTECTOMY SIMPLE;  Surgeon: Dago Thapa MD;  [facility];  Service: General    SMALL INTESTINE SURGERY N/A 2/4/2018    Procedure: RESECTION SMALL BOWEL;  Surgeon: Kirk Huang DO;  [facility];  Service: General    US GUIDED BREAST BIOPSY RIGHT COMPLETE Right 11/29/2016 11/29/24 1145   OT Last Visit   OT Visit Date 11/29/24   Note Type   Note type Evaluation   Pain Assessment   Pain Assessment Tool  0-10   Pain Score 6   Pain Location/Orientation Location: Back  (w movement)   Patient's Stated Pain Goal No pain   Hospital Pain Intervention(s) Repositioned;Ambulation/increased activity;Emotional support   Restrictions/Precautions   Weight Bearing Precautions Per Order No   Braces or Orthoses LSO   Other Precautions Chair Alarm;Bed Alarm;Fall Risk;Pain;Spinal precautions   Home Living   Type of Home Other (Comment)  (ind living)   Home Layout One level;Elevator   Bathroom Shower/Tub Tub/shower unit   Bathroom Toilet Standard   Bathroom Equipment Tub transfer bench   Bathroom Accessibility Accessible   Home Equipment Walker;Cane  (uses spc within the home, rw if going for walks)   Prior Function   Level of Chambers Independent with ADLs;Independent with functional mobility;Needs assistance with IADLS   Lives With Alone   Receives Help From Family;Friend(s)   IADLs Independent with meal prep;Independent with medication management;Family/Friend/Other provides transportation   Falls in the last 6 months 0   Vocational Retired   Lifestyle   Autonomy Pta, pt was I W ADL, rq A For IADL (dtr drives her and has groceries delivered. pt able to cook/clean). uses spc for mobility within the home, rw outside of the home. - .   Reciprocal Relationships supportive dtr and friends within her ind living   Service to Others retired   Intrinsic Gratification going to dinner w her friends.   ADL   Where Assessed Edge of bed   Eating Assistance 6  Modified independent   Grooming Assistance 5  Supervision/Setup   UB Bathing Assistance 4  Minimal Assistance   LB Bathing Assistance 3  Moderate Assistance   UB Dressing Assistance 4  Minimal Assistance   LB Dressing Assistance 3  Moderate Assistance   Toileting Assistance  3  Moderate Assistance   Functional Assistance 3  Moderate Assistance   Bed Mobility   Supine to Sit 4  Minimal assistance   Additional items Assist x 1;Increased time required;Verbal cues;LE management    Additional Comments pt found in bed and left in chair w all needs in reach and alarm on.   Transfers   Sit to Stand 4  Minimal assistance   Stand to Sit 4  Minimal assistance   Toilet transfer 4  Minimal assistance   Additional items Assist x 1;Increased time required;Standard toilet   Additional Comments x1, rw   Functional Mobility   Functional Mobility 4  Minimal assistance   Additional Comments ax1   Additional items Rolling walker   Balance   Static Sitting Fair +   Dynamic Sitting Fair   Static Standing Fair -   Dynamic Standing Poor +   Ambulatory Poor +   Activity Tolerance   Activity Tolerance Patient tolerated treatment well   Medical Staff Made Aware DPT 2' pts med complexity, comorbidities and regression from baseline.   Nurse Made Aware cleared   RUE Assessment   RUE Assessment WFL   LUE Assessment   LUE Assessment WFL   Hand Function   Gross Motor Coordination Functional   Fine Motor Coordination Functional   Cognition   Overall Cognitive Status WFL   Arousal/Participation Responsive;Alert;Cooperative   Attention Within functional limits   Orientation Level Oriented X4   Memory Within functional limits   Following Commands Follows all commands and directions without difficulty   Comments pt cooperative w G safety awareness and insight to condition t/o session.   Assessment   Limitation Decreased ADL status;Decreased endurance;Decreased Safe judgement during ADL;Decreased self-care trans;Decreased high-level ADLs   Prognosis Good   Assessment Pt is a 84 y.o. female  admitted 11/28/24 w back pain- was found to have L2 fx- LSO brace ordered and donned for session. Pt also w lung mass. Pt w active OT eval and treat orders. PMH includes  has a past medical history of Anxiety, Atrial fibrillation (HCC), Bowel obstruction (HCC), Cancer (HCC), Depression, Diabetes mellitus (HCC), Disease of thyroid gland, Hypertension, and Hypothyroidism. Pt lives alone in an ind living apartment, has tub shower w tx bench,  standard toilet. Pta, pt was independent w/ ADL, rq a for IADL and used spc vs rw for functional mobility. See above for further detail. Currently, pt is Min Ax1 for UB ADL, Mod Ax1 for LB ADL, and completed transfers/FM w Min Ax1. Pt is limited at this time 2* decreased endurance/activtiy tolerance, decreased cognition, decreased ADL/High-level ADL status, decreased self-care trans, decreased safety awareness, limited home support and is a fall risk. This impacts pt's ability to complete UB and LB dressing and bathing, toileting, transfers, functional mobility, community mobility, home and health maintenance, and safe engagement in typical daily routine. The patient's raw score on the AM-PAC Daily Activity inpatient short form is 18, standardized score is 38.66, less than 39.4. Patients at this level are likely to benefit from discharge to post-acute rehabilitation services. Please refer to the recommendation of the Occupational Therapist for safe discharge planning.  From OT standpoint, pt should D/C to level 1 when medically stable. Pt will benefit from continued acute OT services 2-3 x/wk for 10-14 days to meet goals.   Goals   Patient Goals be able to move better and do things for herself.   LTG Time Frame 10-14   Long Term Goal #1 see below   Plan   Treatment Interventions ADL retraining;Functional transfer training;Endurance training;Patient/family training;Equipment evaluation/education;Compensatory technique education;Energy conservation;Activityengagement   Goal Expiration Date 12/13/24   OT Frequency 2-3x/wk   Discharge Recommendation   Rehab Resource Intensity Level, OT I (Maximum Resource Intensity)   AM-PAC Daily Activity Inpatient   Lower Body Dressing 2   Bathing 2   Toileting 3   Upper Body Dressing 3   Grooming 4   Eating 4   Daily Activity Raw Score 18   Daily Activity Standardized Score (Calc for Raw Score >=11) 38.66   AM-PAC Applied Cognition Inpatient   Following a Speech/Presentation 4    Understanding Ordinary Conversation 4   Taking Medications 4   Remembering Where Things Are Placed or Put Away 4   Remembering List of 4-5 Errands 3   Taking Care of Complicated Tasks 3   Applied Cognition Raw Score 22   Applied Cognition Standardized Score 47.83   Modified [place] Scale   Modified [place] Scale 4   End of Consult   Education Provided Yes   Patient Position at End of Consult Bedside chair;All needs within reach;Bed/Chair alarm activated   Nurse Communication Nurse aware of consult       LUIS Saul, OTR/L

## 2024-11-29 NOTE — ASSESSMENT & PLAN NOTE
Patient initially presented for back pain.  On imaging, it was noted that patient has multiple pulmonary nodules along with mediastinal lymphadenopathy and consolidation.  Patient is currently not in respiratory distress.  Endorses some shortness of breath, but not always.  Still smoking.  No occupational exposures.  Procalcitonin negative.  Patient does not present clinically like pneumonia.  Message sent to office to set the patient up for possible outpatient EBUS  Will defer bronchoscopy for now given the stability of the patient's respiratory status  Tobacco cessation

## 2024-11-29 NOTE — ASSESSMENT & PLAN NOTE
Continue with supportive care.  Monitor pain symptoms.  Continue with pain manage  Discussed with neurosurgery regarding L2 fracture  Upright xrays were ordered and reviewed   LSO brace recommended and pt educated on how to place and remove  Pt will need to fu in 2 weeks with repeat xrays  No clinical symptoms of pneumonia.  Patient denies cough fever or shortness of breath.  Primary symptoms is lower back pain and hip discomfort.   Pocalcitonin level.  Is 0.08  Consult pulmonary regarding postobstructive process/consolidation noted in imaging

## 2024-11-29 NOTE — ASSESSMENT & PLAN NOTE
"Lab Results   Component Value Date    HGBA1C 5.8 (H) 08/16/2024       No results for input(s): \"POCGLU\" in the last 72 hours.    Blood Sugar Average: Last 72 hrs:      "

## 2024-12-01 ENCOUNTER — HOME CARE VISIT (OUTPATIENT)
Dept: HOME HEALTH SERVICES | Facility: HOME HEALTHCARE | Age: 84
End: 2024-12-01
Payer: MEDICARE

## 2024-12-01 VITALS
TEMPERATURE: 98.8 F | RESPIRATION RATE: 16 BRPM | DIASTOLIC BLOOD PRESSURE: 72 MMHG | OXYGEN SATURATION: 99 % | HEART RATE: 57 BPM | BODY MASS INDEX: 20.09 KG/M2 | SYSTOLIC BLOOD PRESSURE: 180 MMHG | WEIGHT: 125 LBS | HEIGHT: 66 IN

## 2024-12-01 PROCEDURE — 400013 VN SOC

## 2024-12-01 PROCEDURE — G0299 HHS/HOSPICE OF RN EA 15 MIN: HCPCS

## 2024-12-01 PROCEDURE — 10330081 VN NO-PAY CLAIM PROCEDURE

## 2024-12-01 NOTE — CASE COMMUNICATION
Please fax to Dr. Josse Fall  Medication discrepancies or Major drug interactions  Abnormal clinical findings na  This report is informational only, no response is needed  [facility] has Admitted your patient to Home Health service with the following disciplines: SN and PT  Patient stated goals of care to be independant again   Potential barriers to goal achievement age   Primary focus of home health care:Neurological  Anticip ated visit pattern and next visit date 2w4w  Thank you for allowing us to participate in the care of your patient.      Bren Johnson RN

## 2024-12-02 ENCOUNTER — TELEPHONE (OUTPATIENT)
Dept: PULMONOLOGY | Facility: CLINIC | Age: 84
End: 2024-12-02

## 2024-12-02 ENCOUNTER — HOME CARE VISIT (OUTPATIENT)
Dept: HOME HEALTH SERVICES | Facility: HOME HEALTHCARE | Age: 84
End: 2024-12-02
Payer: MEDICARE

## 2024-12-02 VITALS
HEART RATE: 67 BPM | SYSTOLIC BLOOD PRESSURE: 144 MMHG | RESPIRATION RATE: 20 BRPM | DIASTOLIC BLOOD PRESSURE: 72 MMHG | OXYGEN SATURATION: 97 %

## 2024-12-02 PROCEDURE — G0151 HHCP-SERV OF PT,EA 15 MIN: HCPCS

## 2024-12-02 NOTE — TELEPHONE ENCOUNTER
Patient returned call and stated she does not want to come in at this time as she is recovering from fractured spine and is in a brace for 8-12 weeks. Thank you.

## 2024-12-02 NOTE — TELEPHONE ENCOUNTER
12/5/24 - CALLED AND SPOKE TO PT [place]. PT ASKED ME TO CALL HER DAUGHTER PARAMJIT INFORMING HER OF APT INFO. CALLED PT DAUGHTER PARAMJIT CONFIRMING APT IN [place] W/XR NEEDED PRIOR. PARAMJIT IS AWARE THAT XR ARE NEEDED A FEW DAYS PRIOR TO APT AN THAT THEY ARE A WALK IN SERVICE    12/4/24 - ATTEMPTED TO CALL PT ON HOME AND MOBILE, NO ANSWER AND COULD NOT LEAVE MESSAGE. WILL TRY AGAIN TOMORROW    12/2/24 - PT DISCHARGED TO HOME    11/29/2024- PT IS IN [facility]  12/18/2024- 2 WK HFU W/ AP W/ lumbar x-rays     RUCHI Pfeiffer [facility]  Hello,    Patient needs appointment in 2 weeks with AP. I've ordered lumbar x-rays. Thanks.

## 2024-12-02 NOTE — TELEPHONE ENCOUNTER
LVM for patient regarding scheduling a HFU with one of our doctors in the [place] office. Message originally came from . If patient calls back please schedule with Dyan if there is nothing available with him much sooner please offer appt with another fellow such as Ariel since she has much sooner availability or another fellow.     In Basket Message:  VERNA Marie         Previous Messages       ----- Message -----  From: Maximo Zaidi DO  Sent: 11/29/2024   1:15 PM EST  To: Pulmonary [place] [facility]    [facility],    Can we set this patient up for follow-up after discharge, please?    Thank you,  James Zaidi

## 2024-12-04 ENCOUNTER — TELEPHONE (OUTPATIENT)
Dept: PULMONOLOGY | Facility: CLINIC | Age: 84
End: 2024-12-04

## 2024-12-04 ENCOUNTER — PREP FOR PROCEDURE (OUTPATIENT)
Dept: PULMONOLOGY | Facility: CLINIC | Age: 84
End: 2024-12-04

## 2024-12-04 ENCOUNTER — DOCUMENTATION (OUTPATIENT)
Dept: PULMONOLOGY | Facility: CLINIC | Age: 84
End: 2024-12-04

## 2024-12-04 DIAGNOSIS — R59.0 HILAR ADENOPATHY: Primary | ICD-10-CM

## 2024-12-04 NOTE — TELEPHONE ENCOUNTER
Dr. Zurita will be performing a EBUS on Janina Tanner on 12/09/2024     LOCATION: [facility]    ANTICOAGULATION INSTRUCTIONS: ELIQUIS HOLD FOR 3 DAYS PRIOR TO PROCEDURE     OTHER MEDICATION INSTRUCTIONS: NONE    LABS: (CBC/PT/PTT in last 90 days): 11/28/2024   (If no, labs ordered / to be done at least one day prior to procedure)    LAST OFFICE NOTE/H&P within 30 days of procedure: 11/28/2024 ED    INSTRUCTIONS GIVEN: Spoke with patients daughter Geena advised of procedure date and location. Patient aware she is to be NPO after midnight the night prior and will need a . Patient advised she will receive a call the day before with time of arrival.    Thank You   Sarah Alford

## 2024-12-04 NOTE — PROGRESS NOTES
Interventional Pulmonary/ Imaging review    Janina Tanner84 y.o.female  MRN [id number]    At the request of Dr. Zaidi, imaging was reviewed to determine if appropriate to pursue tissue sampling.     CT of the chest performed on 11/28/2024 shows consolidative/tumor HEIDE with left hilar adenopathy and subcarinal vs level 12L adenopathy, small to moderate left effusion.    Based on imaging, it is reasonable to move forward with bronchoscopy with EBUS guided mediastinal survey and LN sampling.    Patient will be scheduled for next available OR block, tentatively Mondary 12/9/2024.    Anticoagulation: Eliquis - will need to be held 3 days prior to procedure    Diabetes Medications: none    Sal Zurita D.O., FACP

## 2024-12-05 ENCOUNTER — HOME CARE VISIT (OUTPATIENT)
Dept: HOME HEALTH SERVICES | Facility: HOME HEALTHCARE | Age: 84
End: 2024-12-05
Payer: MEDICARE

## 2024-12-05 VITALS
DIASTOLIC BLOOD PRESSURE: 64 MMHG | SYSTOLIC BLOOD PRESSURE: 118 MMHG | OXYGEN SATURATION: 95 % | RESPIRATION RATE: 20 BRPM | HEART RATE: 98 BPM

## 2024-12-05 PROCEDURE — G0151 HHCP-SERV OF PT,EA 15 MIN: HCPCS

## 2024-12-06 ENCOUNTER — TELEMEDICINE (OUTPATIENT)
Dept: GERIATRICS | Facility: OTHER | Age: 84
End: 2024-12-06
Payer: MEDICARE

## 2024-12-06 ENCOUNTER — HOME CARE VISIT (OUTPATIENT)
Dept: HOME HEALTH SERVICES | Facility: HOME HEALTHCARE | Age: 84
End: 2024-12-06
Payer: MEDICARE

## 2024-12-06 VITALS
HEART RATE: 61 BPM | RESPIRATION RATE: 16 BRPM | DIASTOLIC BLOOD PRESSURE: 90 MMHG | TEMPERATURE: 97.5 F | SYSTOLIC BLOOD PRESSURE: 172 MMHG | OXYGEN SATURATION: 98 %

## 2024-12-06 DIAGNOSIS — E11.9 TYPE 2 DIABETES MELLITUS WITHOUT COMPLICATION, WITHOUT LONG-TERM CURRENT USE OF INSULIN (HCC): ICD-10-CM

## 2024-12-06 DIAGNOSIS — S32.020D CLOSED WEDGE COMPRESSION FRACTURE OF L2 VERTEBRA WITH ROUTINE HEALING, SUBSEQUENT ENCOUNTER: Primary | ICD-10-CM

## 2024-12-06 DIAGNOSIS — I48.0 PAROXYSMAL A-FIB (HCC): ICD-10-CM

## 2024-12-06 DIAGNOSIS — R26.2 AMBULATORY DYSFUNCTION: ICD-10-CM

## 2024-12-06 DIAGNOSIS — R93.89 ABNORMAL CT OF THE CHEST: ICD-10-CM

## 2024-12-06 DIAGNOSIS — I10 HYPERTENSION, ESSENTIAL, BENIGN: Chronic | ICD-10-CM

## 2024-12-06 PROCEDURE — 99215 OFFICE O/P EST HI 40 MIN: CPT | Performed by: NURSE PRACTITIONER

## 2024-12-06 PROCEDURE — G0299 HHS/HOSPICE OF RN EA 15 MIN: HCPCS

## 2024-12-06 RX ORDER — LIDOCAINE 4 G/G
1 PATCH TOPICAL DAILY
Qty: 10 PATCH | Refills: 0 | Status: SHIPPED | OUTPATIENT
Start: 2024-12-06 | End: 2024-12-16

## 2024-12-06 RX ORDER — METHOCARBAMOL 500 MG/1
500 TABLET, FILM COATED ORAL 3 TIMES DAILY
Qty: 30 TABLET | Refills: 0 | Status: SHIPPED | OUTPATIENT
Start: 2024-12-06 | End: 2024-12-16

## 2024-12-06 NOTE — PROGRESS NOTES
Virtual Regular Visit  Name: Janina Tanner      : 1940      MRN: [id number]  Encounter Provider: RUCHI Lopez  Encounter Date: 2024   Encounter department: [facility] VIRTUAL      Verification of patient location:  Patient is located at Home in the following state in which I hold an active license [place] :  Assessment & Plan  Closed wedge compression fracture of L2 vertebra with routine healing, subsequent encounter    Orders:    methocarbamol (ROBAXIN) 500 mg tablet; Take 1 tablet (500 mg total) by mouth 3 (three) times a day for 10 days Make take every 8 hrs as needed for back pain.    Lidocaine 4 % PTCH; Apply 1 patch topically in the morning for 10 days    Essential hypertension  BP remains stable today, 172/90  Continue Lisinopril and Amlodipine  Avoid hypotension  Continue to monitor BP and for acute changes in condition  Follow up with PCP/Cardiology          Paroxysmal A-fib (HCC)  Patient currently not on rate control medication  Continue to monitor HR, 61  Continue Eliquis for anticoag  Monitor for s/s of bleeding and acute changes in condition  Follow up with PCP/Cardiology          Type 2 diabetes mellitus without complication, without long-term current use of insulin (HCC)    Lab Results   Component Value Date    HGBA1C 6.6 (H) 2024   Bgs well controlled  Continue current regimen of Metformin 500 mg BID  Continue Accu-cheks   Continue to monitor for acute changes in condition          Ambulatory dysfunction  History of falls  Maintain fall and safety precautions  Encourage use of asst devices  Continue PT/OT services with VNA  Assess for need with assistance with transfers, mobility, and ADLs in/out of home  Patient is at high risk for falls r/t   Continue to monitor for acute changes in condition   Follow up with PCP          Abnormal CT of the chest  Patient with noted abnormal CT chest with pulmonary nodules minus lymphadenopathy  Patient has f/u  "for EBUS on 12/9/24  Follow up with pulmonary             Encounter provider RUCHI Lopez    The patient was identified by name and date of birth. Janina Tanner was informed that this is a telemedicine visit and that the visit is being conducted through the [employer] Teams platform. She agrees to proceed.  My office door was closed. No one else was in the room.  She acknowledged consent and understanding of privacy and security of the video platform. The patient has agreed to participate and understands they can discontinue the visit at any time.    Patient is aware this is a billable service.     History of Present Illness      Janina Tanner is a 84 y.o. female patient, being seen for [facility] program in conjuction with A nurse Poppy Cerna, who was in the home to perform physical assessment.      The patient is being seen and examined today for follow-up on acute and chronic medical conditions s/p most recent hospitalization.     The patient reports she is having a lot of back pain today. Reviewed patient's current medication regimen and patient was not d/c with Rx for Lidocaine 4% patches. Rx sent for lidocaine patches and Robaxin. Patient is wearing LSO brace and reviewed importance of compliance. Patient reports dyspnea with exertion, but at baseline. She did have abnormal CT scan during hospital admission and reports she will be going next week for \"lung procedure\". She reports bgs readings have been stable. Patient has f/u 12/9 EBUS and 12/19 Neurosurgery.             Review of Systems   Constitutional:  Positive for activity change and fatigue. Negative for chills and fever.   HENT:  Negative for ear pain and sore throat.    Eyes:  Negative for pain and visual disturbance.   Respiratory:  Positive for cough and shortness of breath.    Cardiovascular:  Negative for chest pain and palpitations.   Gastrointestinal:  Negative for abdominal pain and vomiting.   Genitourinary:  " Negative for dysuria and hematuria.   Musculoskeletal:  Positive for back pain. Negative for arthralgias.   Skin:  Negative for color change and rash.   Neurological:  Positive for weakness. Negative for seizures and syncope.   All other systems reviewed and are negative.    Pertinent Medical History    Patient originally presented to the hospital on 11/28/2024 due to back pain.Pt has a past medical hx of afib, htn, hypothyroidism who presented with a 2 week hx of low back pain and bl hip pain. Patient reported pain when walking , bending and twisting. Workup upon arrival to the Ed demonstrated a dense consolidation within the para mediastinal left upper lobe with associated occluded proximal bronchi , mediastinal and left hilar adenopathy. Bronchoscopy was recommended . Pt also noted to have a mild compression deformity of L 2 vertebral body as well.   Pt was seen by neurosurgery who ordered lumbar xrays and uprights that were reviewed. Recommendations were to where LSO brace while ambulating and > than 45 degrees . the patient was fitted with a LSO brace for which she was able to place on by herself, she was also recommended by pt for rehab. Pt adamantly refused rehabilitation stated she will go home. She was however, agreeable to PT /OT as well as healing hands per cm discussion.   Pt was also seen by the pulmonary team for  abnormal chest xt and concern for mass. Pts eliquis was placed on hold for now. They are working on setting up an outpt EBUS. Bronchoscopy will be further discussed outpt due to her current stability. Pt is aware of this she feels she would want biopsy to understand what her options are. She would likely not undergo any aggressive invasive procedures once determined. She reports a hx of breast cancer and recurrence for which she states she previously did well. Pt still continues to smoke it was recommended to try to stop. Pts imaging is highly concerning for a malignancy she is agreeable to  outpt EBUS allowing for tissue biopsy and diagnostics as opposed to just simple staging with the current thoracentesis. A message was sent to the pulmonary coorinator to schedule this. Discussion was had with the pts daughter who had preference to the patient to go to rehab. On evaluation with the patient she is of sound mind alert and oriented, she definitely was certain she wanted to refuse rehabilitation. Her granddaughter would be picking her up and taking her to her independent living.    Objective   CTA - CHEST, ABDOMEN AND PELVIS - WITHOUT AND WITH IV CONTRAST     INDICATION: back pain and abdominal pain  htn 240/120.     COMPARISON:  CTA chest, PE study, 1/22/2023.  CT abdomen pelvis with contrast 4/5/2022.  CT chest without contrast 9/27/2019  CT chest abdomen pelvis 3/14/2019     TECHNIQUE: CT examination of the chest, abdomen and pelvis was performed both prior to and after the administration of intravenous contrast. The noncontrast portion of this examination was performed utilizing low radiation dose technique.  Thin section   angiographic arterial phase post contrast technique was used in order to evaluate for aortic dissection. 3D reformatted images and volume rendering were performed on an independent workstation. Multiplanar 2D reformatted images were created from the   source data.     Radiation dose length product (DLP) for this visit: 1813.47 mGy-cm . This examination, like all CT scans performed in the [facility], was performed utilizing techniques to minimize radiation dose exposure, including the use of iterative   reconstruction and automated exposure control.     IV Contrast: 100 mL of iohexol (OMNIPAQUE)  Enteric Contrast: Not administered.     FINDINGS:     AORTA:  Diffuse aortic/arterial atherosclerotic disease.  No acute aortic/vascular abnormality.  Severe proximal celiac artery stenosis, in a configuration most consistent with compression from the median arcuate  ligament.  Severe left renal artery ostial stenosis accounting for asymmetric left renal atrophy.     Patent aortobiiliac bypass.        CHEST     LUNGS:  2 mm right upper lobe nodule (6:29)  6 mm left upper lobe nodule (6:65), stable dating back to 2019 and therefore presumed benign.     Dense consolidation within the paramediastinal left upper lobe (4:126)  Associated left upper lobe bronchus is occluded proximally.     Trace wall adherent tracheal secretions.     Diffuse emphysematous changes.     PLEURA: Small left pleural effusion.     HEART/PULMONARY ARTERIAL TREE: Coronary calcifications no pulmonary embolism.     MEDIASTINUM AND JEFFREY:  Enlarged subcarinal node measuring 1.7 cm (3:63)  Left hilar adenopathy.     CHEST WALL AND LOWER NECK: Postsurgical change in the left axilla.     ABDOMEN     LIVER/BILIARY TREE: Unremarkable.     GALLBLADDER: Status post cholecystectomy     SPLEEN: Unremarkable.     PANCREAS: Unremarkable.     ADRENAL GLANDS:  A right adrenal nodule containing calcifications measures 2.5 x 1.9 cm in maximal axial dimension (3:105)  Grossly unchanged in size dating back to April 2022 comparison, increased from 2019 comparison, measuring up to 1.8 cm.  Indeterminate internal density.     KIDNEYS/URETERS:  Asymmetric left renal atrophy.  Bilateral renal cysts and subcentimeter hypodensities too small to characterize.     STOMACH AND BOWEL:  Moderate colonic stool burden.  Right upper quadrant small bowel anastomosis with fecalization.  Stomach unremarkable.     APPENDIX: No findings to suggest appendicitis.     ABDOMINOPELVIC CAVITY: No ascites. No pneumoperitoneum. No lymphadenopathy.     PELVIS     REPRODUCTIVE ORGANS: Numerous calcified fibroids.     URINARY BLADDER: Unremarkable.     ABDOMINAL WALL/INGUINAL REGIONS: Rectus diastases containing unremarkable large and small bowel. Postsurgical changes in the ventral abdominal wall.     BONES:  New superior endplate irregularity with mild  compression deformity at L2 (602:109)  Stable severe compression deformity at T12.        IMPRESSION:     No acute aortic/arterial abnormality.  Patent aortoiliac bypass.  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     Dense consolidation within the paramediastinal left upper lobe with associated occluded proximal bronchi.  Mediastinal (up to 1.7 cm) and left hilar adenopathy.  Bronchoscopy recommended to exclude a malignant cause of bronchial obstruction and resulting postobstructive pneumonia. Note of normal white count on today's labs.  Small left pleural effusion.     In comparison to the January 2023 prior, there is a new mild compression deformity along the superior endplate of the L2 vertebral body.  Fracture lines are evident, consistent with acute/subacute fracture. Correlate clinically.     Indeterminate right adrenal lesion measuring up to 2.5 cm.  Stable from 2022 comparison, significantly increased from 2019.  Multiphase adrenal protocol CT recommended for further evaluation.     Findings were discussed with Dr. Romero from the ED at 2:45 p.m. on 11/28/2024.    Narrative & Impression   XR SPINE LUMBAR 2 OR 3 VIEWS INJURY     INDICATION: L2 fracture.     COMPARISON: 11/28/2024     FINDINGS:     Stable severe loss of height of the T12 vertebral body. Mild loss of height of the L2 vertebral body is unchanged. Intact pedicles.     Five non-rib-bearing lumbar vertebral bodies.     Normal alignment.     There is multilevel disc space narrowing. There is multilevel ventral osteophytosis.     There are atherosclerotic changes of the aorta.     IMPRESSION:     Stable mild loss of height of the L2 vertebral body. Stable severe chronic loss of height of the T12 vertebral body.        Computerized Assisted Algorithm (CAA) may have been used to analyze all applicable images.        Workstation performed: [id number]        Vitals    Vitals 12/6/2024 10:03 AM   BP (!) 172/90   Resp 16   SpO2  98 %   Pulse 61   Temp 97.5 °F (36.4 °C)     Physical Exam  Vitals and nursing note reviewed.   Constitutional:       General: She is not in acute distress.     Appearance: She is well-developed.   HENT:      Head: Normocephalic and atraumatic.   Eyes:      Conjunctiva/sclera: Conjunctivae normal.   Cardiovascular:      Rate and Rhythm: Normal rate and regular rhythm.      Heart sounds: No murmur heard.  Pulmonary:      Effort: Pulmonary effort is normal. No respiratory distress.      Breath sounds: Normal breath sounds. No wheezing, rhonchi or rales.   Abdominal:      General: Bowel sounds are normal. There is no distension.      Palpations: Abdomen is soft.      Tenderness: There is no abdominal tenderness.   Musculoskeletal:         General: No swelling.      Cervical back: Neck supple.   Skin:     General: Skin is warm and dry.      Capillary Refill: Capillary refill takes less than 2 seconds.   Neurological:      Mental Status: She is alert.      Motor: Weakness present.      Gait: Gait abnormal.   Psychiatric:         Mood and Affect: Mood normal.         Visit Time  Total Visit Duration: I have spent a total time of 64 minutes in caring for this patient on the day of the visit/encounter including Diagnostic results, Prognosis, Risks and benefits of tx options, Instructions for management, Patient and family education, Importance of tx compliance, Risk factor reductions, Impressions, Counseling / Coordination of care, Documenting in the medical record, Reviewing / ordering tests, medicine, procedures  , Obtaining or reviewing history  , and Communicating with other healthcare professionals .

## 2024-12-09 ENCOUNTER — ANESTHESIA EVENT (OUTPATIENT)
Dept: PERIOP | Facility: HOSPITAL | Age: 84
DRG: 205 | End: 2024-12-09
Payer: MEDICARE

## 2024-12-09 ENCOUNTER — HOSPITAL ENCOUNTER (INPATIENT)
Dept: PERIOP | Facility: HOSPITAL | Age: 84
LOS: 3 days | Discharge: NURSING, REHAB OR LONG-TERM CARE | DRG: 205 | End: 2024-12-13
Attending: INTERNAL MEDICINE | Admitting: STUDENT IN AN ORGANIZED HEALTH CARE EDUCATION/TRAINING PROGRAM
Payer: MEDICARE

## 2024-12-09 ENCOUNTER — ANESTHESIA (OUTPATIENT)
Dept: PERIOP | Facility: HOSPITAL | Age: 84
DRG: 205 | End: 2024-12-09
Payer: MEDICARE

## 2024-12-09 ENCOUNTER — HOME CARE VISIT (OUTPATIENT)
Dept: HOME HEALTH SERVICES | Facility: HOME HEALTHCARE | Age: 84
End: 2024-12-09
Payer: MEDICARE

## 2024-12-09 DIAGNOSIS — F41.9 ANXIETY: ICD-10-CM

## 2024-12-09 DIAGNOSIS — R59.0 HILAR ADENOPATHY: ICD-10-CM

## 2024-12-09 DIAGNOSIS — R93.89 ABNORMAL CT OF THE CHEST: ICD-10-CM

## 2024-12-09 DIAGNOSIS — R09.02 HYPOXIA: Primary | ICD-10-CM

## 2024-12-09 DIAGNOSIS — C34.90 SCLC (SMALL CELL LUNG CARCINOMA) (HCC): ICD-10-CM

## 2024-12-09 DIAGNOSIS — J96.01 ACUTE RESPIRATORY FAILURE WITH HYPOXIA (HCC): ICD-10-CM

## 2024-12-09 DIAGNOSIS — Z85.3 HISTORY OF BREAST CANCER: ICD-10-CM

## 2024-12-09 LAB
EST. AVERAGE GLUCOSE BLD GHB EST-MCNC: 143 MG/DL
GLUCOSE SERPL-MCNC: 116 MG/DL (ref 65–140)
GLUCOSE SERPL-MCNC: 144 MG/DL (ref 65–140)
GLUCOSE SERPL-MCNC: 238 MG/DL (ref 65–140)
HBA1C MFR BLD: 6.6 %
PLATELET # BLD AUTO: 233 THOUSANDS/UL (ref 149–390)
PMV BLD AUTO: 9.8 FL (ref 8.9–12.7)

## 2024-12-09 PROCEDURE — 99223 1ST HOSP IP/OBS HIGH 75: CPT | Performed by: INTERNAL MEDICINE

## 2024-12-09 PROCEDURE — 88341 IMHCHEM/IMCYTCHM EA ADD ANTB: CPT | Performed by: STUDENT IN AN ORGANIZED HEALTH CARE EDUCATION/TRAINING PROGRAM

## 2024-12-09 PROCEDURE — 88185 FLOWCYTOMETRY/TC ADD-ON: CPT | Performed by: INTERNAL MEDICINE

## 2024-12-09 PROCEDURE — 88184 FLOWCYTOMETRY/ TC 1 MARKER: CPT | Performed by: INTERNAL MEDICINE

## 2024-12-09 PROCEDURE — 07D73ZX EXTRACTION OF THORAX LYMPHATIC, PERCUTANEOUS APPROACH, DIAGNOSTIC: ICD-10-PCS | Performed by: INTERNAL MEDICINE

## 2024-12-09 PROCEDURE — 88172 CYTP DX EVAL FNA 1ST EA SITE: CPT | Performed by: STUDENT IN AN ORGANIZED HEALTH CARE EDUCATION/TRAINING PROGRAM

## 2024-12-09 PROCEDURE — 94640 AIRWAY INHALATION TREATMENT: CPT

## 2024-12-09 PROCEDURE — G0379 DIRECT REFER HOSPITAL OBSERV: HCPCS

## 2024-12-09 PROCEDURE — 88305 TISSUE EXAM BY PATHOLOGIST: CPT | Performed by: STUDENT IN AN ORGANIZED HEALTH CARE EDUCATION/TRAINING PROGRAM

## 2024-12-09 PROCEDURE — 83036 HEMOGLOBIN GLYCOSYLATED A1C: CPT | Performed by: INTERNAL MEDICINE

## 2024-12-09 PROCEDURE — 0BJ08ZZ INSPECTION OF TRACHEOBRONCHIAL TREE, VIA NATURAL OR ARTIFICIAL OPENING ENDOSCOPIC: ICD-10-PCS | Performed by: INTERNAL MEDICINE

## 2024-12-09 PROCEDURE — 87077 CULTURE AEROBIC IDENTIFY: CPT | Performed by: INTERNAL MEDICINE

## 2024-12-09 PROCEDURE — 87205 SMEAR GRAM STAIN: CPT | Performed by: INTERNAL MEDICINE

## 2024-12-09 PROCEDURE — 88360 TUMOR IMMUNOHISTOCHEM/MANUAL: CPT | Performed by: STUDENT IN AN ORGANIZED HEALTH CARE EDUCATION/TRAINING PROGRAM

## 2024-12-09 PROCEDURE — 88342 IMHCHEM/IMCYTCHM 1ST ANTB: CPT | Performed by: STUDENT IN AN ORGANIZED HEALTH CARE EDUCATION/TRAINING PROGRAM

## 2024-12-09 PROCEDURE — 87102 FUNGUS ISOLATION CULTURE: CPT | Performed by: INTERNAL MEDICINE

## 2024-12-09 PROCEDURE — 87181 SC STD AGAR DILUTION PER AGT: CPT | Performed by: INTERNAL MEDICINE

## 2024-12-09 PROCEDURE — 87075 CULTR BACTERIA EXCEPT BLOOD: CPT | Performed by: INTERNAL MEDICINE

## 2024-12-09 PROCEDURE — 82948 REAGENT STRIP/BLOOD GLUCOSE: CPT

## 2024-12-09 PROCEDURE — 85049 AUTOMATED PLATELET COUNT: CPT | Performed by: INTERNAL MEDICINE

## 2024-12-09 PROCEDURE — 31652 BRONCH EBUS SAMPLNG 1/2 NODE: CPT | Performed by: INTERNAL MEDICINE

## 2024-12-09 PROCEDURE — 88173 CYTOPATH EVAL FNA REPORT: CPT | Performed by: STUDENT IN AN ORGANIZED HEALTH CARE EDUCATION/TRAINING PROGRAM

## 2024-12-09 PROCEDURE — 87070 CULTURE OTHR SPECIMN AEROBIC: CPT | Performed by: INTERNAL MEDICINE

## 2024-12-09 PROCEDURE — 94664 DEMO&/EVAL PT USE INHALER: CPT

## 2024-12-09 RX ORDER — DEXAMETHASONE SODIUM PHOSPHATE 10 MG/ML
INJECTION, SOLUTION INTRAMUSCULAR; INTRAVENOUS AS NEEDED
Status: DISCONTINUED | OUTPATIENT
Start: 2024-12-09 | End: 2024-12-09

## 2024-12-09 RX ORDER — FENTANYL CITRATE/PF 50 MCG/ML
25 SYRINGE (ML) INJECTION
Status: DISCONTINUED | OUTPATIENT
Start: 2024-12-09 | End: 2024-12-09 | Stop reason: HOSPADM

## 2024-12-09 RX ORDER — GLYCOPYRROLATE 0.2 MG/ML
INJECTION INTRAMUSCULAR; INTRAVENOUS AS NEEDED
Status: DISCONTINUED | OUTPATIENT
Start: 2024-12-09 | End: 2024-12-09

## 2024-12-09 RX ORDER — INSULIN LISPRO 100 [IU]/ML
2 INJECTION, SOLUTION INTRAVENOUS; SUBCUTANEOUS ONCE
Status: COMPLETED | OUTPATIENT
Start: 2024-12-09 | End: 2024-12-09

## 2024-12-09 RX ORDER — ONDANSETRON 2 MG/ML
4 INJECTION INTRAMUSCULAR; INTRAVENOUS ONCE AS NEEDED
Status: DISCONTINUED | OUTPATIENT
Start: 2024-12-09 | End: 2024-12-09 | Stop reason: HOSPADM

## 2024-12-09 RX ORDER — LIDOCAINE HYDROCHLORIDE 20 MG/ML
INJECTION, SOLUTION EPIDURAL; INFILTRATION; INTRACAUDAL; PERINEURAL AS NEEDED
Status: DISCONTINUED | OUTPATIENT
Start: 2024-12-09 | End: 2024-12-13 | Stop reason: HOSPADM

## 2024-12-09 RX ORDER — ENOXAPARIN SODIUM 100 MG/ML
40 INJECTION SUBCUTANEOUS DAILY
Status: DISCONTINUED | OUTPATIENT
Start: 2024-12-10 | End: 2024-12-10

## 2024-12-09 RX ORDER — SODIUM CHLORIDE, SODIUM LACTATE, POTASSIUM CHLORIDE, CALCIUM CHLORIDE 600; 310; 30; 20 MG/100ML; MG/100ML; MG/100ML; MG/100ML
100 INJECTION, SOLUTION INTRAVENOUS CONTINUOUS
Status: DISCONTINUED | OUTPATIENT
Start: 2024-12-09 | End: 2024-12-09

## 2024-12-09 RX ORDER — LORAZEPAM 0.5 MG/1
0.5 TABLET ORAL 2 TIMES DAILY
Status: DISCONTINUED | OUTPATIENT
Start: 2024-12-09 | End: 2024-12-13 | Stop reason: HOSPADM

## 2024-12-09 RX ORDER — LIDOCAINE HYDROCHLORIDE 10 MG/ML
INJECTION, SOLUTION EPIDURAL; INFILTRATION; INTRACAUDAL; PERINEURAL AS NEEDED
Status: DISCONTINUED | OUTPATIENT
Start: 2024-12-09 | End: 2024-12-09

## 2024-12-09 RX ORDER — FENTANYL CITRATE 50 UG/ML
INJECTION, SOLUTION INTRAMUSCULAR; INTRAVENOUS AS NEEDED
Status: DISCONTINUED | OUTPATIENT
Start: 2024-12-09 | End: 2024-12-09

## 2024-12-09 RX ORDER — POLYETHYLENE GLYCOL 3350 17 G/17G
17 POWDER, FOR SOLUTION ORAL DAILY
Status: DISCONTINUED | OUTPATIENT
Start: 2024-12-10 | End: 2024-12-11

## 2024-12-09 RX ORDER — NICOTINE 21 MG/24HR
1 PATCH, TRANSDERMAL 24 HOURS TRANSDERMAL DAILY
Status: DISCONTINUED | OUTPATIENT
Start: 2024-12-10 | End: 2024-12-09

## 2024-12-09 RX ORDER — INSULIN LISPRO 100 [IU]/ML
1-5 INJECTION, SOLUTION INTRAVENOUS; SUBCUTANEOUS
Status: DISCONTINUED | OUTPATIENT
Start: 2024-12-09 | End: 2024-12-13 | Stop reason: HOSPADM

## 2024-12-09 RX ORDER — CLONIDINE HYDROCHLORIDE 0.1 MG/1
0.1 TABLET ORAL 2 TIMES DAILY
Status: DISCONTINUED | OUTPATIENT
Start: 2024-12-09 | End: 2024-12-13 | Stop reason: HOSPADM

## 2024-12-09 RX ORDER — MAGNESIUM HYDROXIDE/ALUMINUM HYDROXICE/SIMETHICONE 120; 1200; 1200 MG/30ML; MG/30ML; MG/30ML
30 SUSPENSION ORAL EVERY 6 HOURS PRN
Status: DISCONTINUED | OUTPATIENT
Start: 2024-12-09 | End: 2024-12-13 | Stop reason: HOSPADM

## 2024-12-09 RX ORDER — LIDOCAINE 50 MG/G
2 PATCH TOPICAL DAILY
Status: DISCONTINUED | OUTPATIENT
Start: 2024-12-09 | End: 2024-12-13 | Stop reason: HOSPADM

## 2024-12-09 RX ORDER — NICOTINE 21 MG/24HR
1 PATCH, TRANSDERMAL 24 HOURS TRANSDERMAL DAILY
Status: DISCONTINUED | OUTPATIENT
Start: 2024-12-10 | End: 2024-12-13 | Stop reason: HOSPADM

## 2024-12-09 RX ORDER — METHOCARBAMOL 500 MG/1
500 TABLET, FILM COATED ORAL 3 TIMES DAILY
Status: DISCONTINUED | OUTPATIENT
Start: 2024-12-09 | End: 2024-12-13 | Stop reason: HOSPADM

## 2024-12-09 RX ORDER — FERROUS SULFATE 325(65) MG
325 TABLET ORAL EVERY OTHER DAY
Status: DISCONTINUED | OUTPATIENT
Start: 2024-12-09 | End: 2024-12-13 | Stop reason: HOSPADM

## 2024-12-09 RX ORDER — AMLODIPINE BESYLATE 10 MG/1
10 TABLET ORAL DAILY
Status: DISCONTINUED | OUTPATIENT
Start: 2024-12-10 | End: 2024-12-13 | Stop reason: HOSPADM

## 2024-12-09 RX ORDER — ASCORBIC ACID 500 MG
500 TABLET ORAL DAILY
Status: DISCONTINUED | OUTPATIENT
Start: 2024-12-10 | End: 2024-12-13 | Stop reason: HOSPADM

## 2024-12-09 RX ORDER — PROPOFOL 10 MG/ML
INJECTION, EMULSION INTRAVENOUS CONTINUOUS PRN
Status: DISCONTINUED | OUTPATIENT
Start: 2024-12-09 | End: 2024-12-09

## 2024-12-09 RX ORDER — LEVOTHYROXINE SODIUM 100 UG/1
100 TABLET ORAL
Status: DISCONTINUED | OUTPATIENT
Start: 2024-12-10 | End: 2024-12-13 | Stop reason: HOSPADM

## 2024-12-09 RX ORDER — ONDANSETRON 2 MG/ML
INJECTION INTRAMUSCULAR; INTRAVENOUS AS NEEDED
Status: DISCONTINUED | OUTPATIENT
Start: 2024-12-09 | End: 2024-12-09

## 2024-12-09 RX ORDER — PROPOFOL 10 MG/ML
INJECTION, EMULSION INTRAVENOUS AS NEEDED
Status: DISCONTINUED | OUTPATIENT
Start: 2024-12-09 | End: 2024-12-09

## 2024-12-09 RX ORDER — ALBUTEROL SULFATE 0.83 MG/ML
2.5 SOLUTION RESPIRATORY (INHALATION) ONCE
Status: COMPLETED | OUTPATIENT
Start: 2024-12-09 | End: 2024-12-09

## 2024-12-09 RX ORDER — ONDANSETRON 2 MG/ML
4 INJECTION INTRAMUSCULAR; INTRAVENOUS EVERY 6 HOURS PRN
Status: DISCONTINUED | OUTPATIENT
Start: 2024-12-09 | End: 2024-12-13 | Stop reason: HOSPADM

## 2024-12-09 RX ORDER — ATORVASTATIN CALCIUM 40 MG/1
40 TABLET, FILM COATED ORAL DAILY
Status: DISCONTINUED | OUTPATIENT
Start: 2024-12-10 | End: 2024-12-13 | Stop reason: HOSPADM

## 2024-12-09 RX ORDER — LABETALOL 200 MG/1
200 TABLET, FILM COATED ORAL EVERY 12 HOURS SCHEDULED
Status: DISCONTINUED | OUTPATIENT
Start: 2024-12-09 | End: 2024-12-13 | Stop reason: HOSPADM

## 2024-12-09 RX ORDER — LISINOPRIL 20 MG/1
20 TABLET ORAL 2 TIMES DAILY
Status: DISCONTINUED | OUTPATIENT
Start: 2024-12-09 | End: 2024-12-13 | Stop reason: HOSPADM

## 2024-12-09 RX ORDER — SENNOSIDES 8.6 MG
8.6 TABLET ORAL
Status: DISCONTINUED | OUTPATIENT
Start: 2024-12-09 | End: 2024-12-11

## 2024-12-09 RX ORDER — HYDROMORPHONE HCL IN WATER/PF 6 MG/30 ML
0.2 PATIENT CONTROLLED ANALGESIA SYRINGE INTRAVENOUS EVERY 4 HOURS PRN
Status: DISCONTINUED | OUTPATIENT
Start: 2024-12-09 | End: 2024-12-13 | Stop reason: HOSPADM

## 2024-12-09 RX ORDER — ACETAMINOPHEN 325 MG/1
650 TABLET ORAL EVERY 6 HOURS PRN
Status: DISCONTINUED | OUTPATIENT
Start: 2024-12-09 | End: 2024-12-13 | Stop reason: HOSPADM

## 2024-12-09 RX ADMIN — NOREPINEPHRINE BITARTRATE 10 MCG/MIN: 1 INJECTION, SOLUTION, CONCENTRATE INTRAVENOUS at 15:41

## 2024-12-09 RX ADMIN — METHOCARBAMOL 500 MG: 500 TABLET ORAL at 21:26

## 2024-12-09 RX ADMIN — LISINOPRIL 20 MG: 20 TABLET ORAL at 18:38

## 2024-12-09 RX ADMIN — FERROUS SULFATE TAB 325 MG (65 MG ELEMENTAL FE) 325 MG: 325 (65 FE) TAB at 18:38

## 2024-12-09 RX ADMIN — NOREPINEPHRINE BITARTRATE 16 MCG: 1 INJECTION, SOLUTION, CONCENTRATE INTRAVENOUS at 15:44

## 2024-12-09 RX ADMIN — ONDANSETRON 4 MG: 2 INJECTION INTRAMUSCULAR; INTRAVENOUS at 15:09

## 2024-12-09 RX ADMIN — GLYCOPYRROLATE 0.1 MG: 0.2 INJECTION, SOLUTION INTRAMUSCULAR; INTRAVENOUS at 15:09

## 2024-12-09 RX ADMIN — SODIUM CHLORIDE, SODIUM LACTATE, POTASSIUM CHLORIDE, AND CALCIUM CHLORIDE 100 ML/HR: .6; .31; .03; .02 INJECTION, SOLUTION INTRAVENOUS at 14:21

## 2024-12-09 RX ADMIN — SODIUM CHLORIDE, SODIUM LACTATE, POTASSIUM CHLORIDE, AND CALCIUM CHLORIDE: .6; .31; .03; .02 INJECTION, SOLUTION INTRAVENOUS at 16:22

## 2024-12-09 RX ADMIN — FENTANYL CITRATE 50 MCG: 50 INJECTION INTRAMUSCULAR; INTRAVENOUS at 15:11

## 2024-12-09 RX ADMIN — SENNOSIDES 8.6 MG: 8.6 TABLET, FILM COATED ORAL at 21:26

## 2024-12-09 RX ADMIN — LIDOCAINE HYDROCHLORIDE 10 ML: 20 INJECTION, SOLUTION EPIDURAL; INFILTRATION; INTRACAUDAL; PERINEURAL at 15:26

## 2024-12-09 RX ADMIN — PROPOFOL 50 MCG/KG/MIN: 10 INJECTION, EMULSION INTRAVENOUS at 15:22

## 2024-12-09 RX ADMIN — DEXAMETHASONE SODIUM PHOSPHATE 10 MG: 10 INJECTION, SOLUTION INTRAMUSCULAR; INTRAVENOUS at 15:37

## 2024-12-09 RX ADMIN — LABETALOL HYDROCHLORIDE 200 MG: 200 TABLET, FILM COATED ORAL at 21:26

## 2024-12-09 RX ADMIN — LIDOCAINE HYDROCHLORIDE 50 MG: 10 INJECTION, SOLUTION EPIDURAL; INFILTRATION; INTRACAUDAL; PERINEURAL at 15:19

## 2024-12-09 RX ADMIN — LORAZEPAM 0.5 MG: 0.5 TABLET ORAL at 18:38

## 2024-12-09 RX ADMIN — PROPOFOL 100 MG: 10 INJECTION, EMULSION INTRAVENOUS at 15:19

## 2024-12-09 RX ADMIN — INSULIN LISPRO 2 UNITS: 100 INJECTION, SOLUTION INTRAVENOUS; SUBCUTANEOUS at 21:55

## 2024-12-09 RX ADMIN — ALBUTEROL SULFATE 2.5 MG: 2.5 SOLUTION RESPIRATORY (INHALATION) at 13:52

## 2024-12-09 NOTE — ASSESSMENT & PLAN NOTE
Continue amlodipine 10 mg p.o. daily, clonidine 0.1 mg twice daily, labetalol 200 mg twice daily, lisinopril 20 mg twice daily  Monitor blood pressures  Avoid hypotension

## 2024-12-09 NOTE — ANESTHESIA POSTPROCEDURE EVALUATION
Post-Op Assessment Note    CV Status:  Stable  Pain Score: 0    Pain management: adequate       Mental Status:  Sleepy   Hydration Status:  Stable   PONV Controlled:  None   Airway Patency:  Patent     Post Op Vitals Reviewed: Yes    No anethesia notable event occurred.    Staff: CRNA       Last Filed PACU Vitals:  Vitals Value Taken Time   Temp 96.9 °F (36.1 °C) 12/09/24 1654   Pulse 96 12/09/24 1655   /77 12/09/24 1654   Resp 21 12/09/24 1655   SpO2 92 % 12/09/24 1655   Vitals shown include unfiled device data.    Modified Eduard:  Activity: 2 (12/9/2024  1:40 PM)  Respiration: 2 (12/9/2024  1:40 PM)  Circulation: 2 (12/9/2024  1:40 PM)  Consciousness: 2 (12/9/2024  1:40 PM)  Oxygen Saturation: 2 (12/9/2024  1:40 PM)  Modified Eduard Score: 10 (12/9/2024  1:40 PM)

## 2024-12-09 NOTE — H&P
H&P - Hospitalist   Name: Janina Tanner 84 y.o. female I MRN: [id number]  Unit/Bed#: -01 I Date of Admission: 12/9/2024   Date of Service: 12/9/2024 I Hospital Day: 0     Assessment & Plan  Abnormal CT of the chest  Patient was noted to have an abnormal CT chest with pulmonary nodules minus lymphadenopathy and has undergone elective EBUS with pulmonary  Patient is being admitted for observation overnight  Follow-up on biopsy results  Type 2 diabetes mellitus, without long-term current use of insulin ([facility])  Lab Results   Component Value Date    HGBA1C 5.8 (H) 08/16/2024       Recent Labs     12/09/24  1326 12/09/24  1710   POCGLU 144* 116       Blood Sugar Average: Last 72 hrs:  (P) 130    Hold hold metformin while inpatient  Monitor Accu-Cheks  Hypoglycemia  Essential hypertension  Continue amlodipine 10 mg p.o. daily, clonidine 0.1 mg twice daily, labetalol 200 mg twice daily, lisinopril 20 mg twice daily  Monitor blood pressures  Avoid hypotension      Hypothyroidism  Continue levothyroxine 100 mcg p.o. daily  Ambulatory dysfunction  Multifactorial  Safe ambulation  Fall precautions  Physical therapy  Paroxysmal A-fib ([facility])  Continue labetalol  Eliquis presently on hold resume when cleared by pulmonary  Diabetes ([facility])  Lab Results   Component Value Date    HGBA1C 5.8 (H) 08/16/2024       Recent Labs     12/09/24  1326 12/09/24  1710   POCGLU 144* 116       Blood Sugar Average: Last 72 hrs:  (P) 130    Tobacco abuse  Smoking cessation encouraged  Moderate protein-calorie malnutrition   Malnutrition Findings:      Body mass index is 20.18 kg/m².     L2 vertebral fracture ([facility])  L2 vertebral fracture  Analgesics  Supportive cares      VTE Pharmacologic Prophylaxis: VTE Score: 5 High Risk (Score >/= 5) - Pharmacological DVT Prophylaxis Ordered: enoxaparin (Lovenox). Sequential Compression Devices Ordered.  Code Status: Level 1 - Full Code     Discussion with family:  Discussed with the patient, daughter and  son-in-law at bedside discussed in detail, questions answered.     Anticipated Length of Stay: Patient will be admitted on an observation basis with an anticipated length of stay of less than 2 midnights secondary to status post EBUS for close monitoring and safe discharge planning.    History of Present Illness   Chief Complaint:     Admitted at the request of pulmonary service for monitoring and safe discharge planning    Janina Tanner is a 84 y.o. female with a PMH of history of diabetes mellitus type 2, hypertension, vertebral fracture, ambulatory dysfunction, tobacco abuse.     Patient was noted to have an abnormal CT chest with pulmonary nodules manage lymphadenopathy, she underwent an elective EBUS today with pulmonary and is being admitted at the request of pulmonary service for monitoring overnight and safe discharge planning.    Patient stays at home alone she also has a recent vertebral fracture and the procedure was late in the day hence patient is being admitted for monitoring    Presently comfortably in bed.  Reports feeling okay.  Denies chest pain shortness of breath palpitations.  History of abdominal pain nausea vomiting diarrhea.  No history of fever chills sweats.  Denies urinary symptoms.    Recent hospital stay reviewed in epic in detail    Review of Systems   All other systems reviewed and are negative.      Historical Information   Past Medical History:   Diagnosis Date    Anxiety     Atrial fibrillation (HCC)     Bowel obstruction (HCC)     Cancer (HCC)     LEFT BREAST CA 22 YEARS AGO     Depression     Diabetes mellitus (HCC)     Disease of thyroid gland     Hypertension     Hypothyroidism      Past Surgical History:   Procedure Laterality Date    ABDOMINAL ADHESION SURGERY N/A 2/4/2018    Procedure: LYSIS ADHESIONS;  Surgeon: Kirk Huang DO;  Location: [facility];  Service: General    BREAST SURGERY      CHOLECYSTECTOMY      COLON SURGERY  2017    EXPLORATORY LAPAROTOMY      JOINT  REPLACEMENT      LEFT KNEE REPLACEMENT     LAPAROTOMY N/A 2/4/2018    Procedure: LAPAROTOMY EXPLORATORY,;  Surgeon: Kirk Huang DO;  Location: [facility];  Service: General    MASTECTOMY      MASTECTOMY Left 1995    MASTECTOMY Right 2017    DE BX/EXC LYMPH NODE OPEN SUPERFICIAL Right 1/13/2017    Procedure: SENTINEL LYMPH NODE BIOPSY RIGHT AXILLA;  Surgeon: Dago Thapa MD;  Location: [facility];  Service: General    DE MASTECTOMY SIMPLE COMPLETE Right 1/13/2017    Procedure: MASTECTOMY SIMPLE;  Surgeon: Dago Thapa MD;  [facility]: [facility];  Service: General    SMALL INTESTINE SURGERY N/A 2/4/2018    Procedure: RESECTION SMALL BOWEL;  Surgeon: Kirk Huang DO;  [facility]: [facility];  Service: General    US GUIDED BREAST BIOPSY RIGHT COMPLETE Right 11/29/2016     Social History     Tobacco Use    Smoking status: Every Day     Current packs/day: 1.00     Average packs/day: 1 pack/day for 64.9 years (64.9 ttl pk-yrs)     Types: Cigarettes     Start date: 1960    Smokeless tobacco: Never   Vaping Use    Vaping status: Never Used   Substance and Sexual Activity    Alcohol use: Never    Drug use: Never    Sexual activity: Not Currently     E-Cigarette/Vaping    E-Cigarette Use Never User      E-Cigarette/Vaping Substances    Nicotine No     Flavoring No      Family History   Problem Relation Age of Onset    Cancer Mother     No Known Problems Father      Social History:  Marital Status:    Occupation:   Patient Pre-hospital Living Situation: Home  Patient Pre-hospital Level of Mobility: walks  Patient Pre-hospital Diet Restrictions: no    Meds/Allergies   I have reviewed home medications with patient family member.  Prior to Admission medications    Medication Sig Start Date End Date Taking? Authorizing Provider   ascorbic acid (VITAMIN C) 500 mg tablet Take 500 mg by mouth daily   Yes Historical Provider, MD   atorvastatin (LIPITOR) 40 mg tablet Take 40 mg by mouth daily 10/23/19  Yes Historical  Provider, MD   cholecalciferol (VITAMIN D3) 1,000 units tablet Take 2,000 Units by mouth daily    Yes Historical Provider, MD   cloNIDine (CATAPRES) 0.1 mg tablet TAKE 1 TABLET BY MOUTH TWICE A DAY 3/6/24  Yes RUCHI Goldstein   labetalol (NORMODYNE) 200 mg tablet Take 1 tablet (200 mg total) by mouth every 12 (twelve) hours 7/21/19  Yes Mt Byrne PA-C   levothyroxine 100 mcg tablet Take 100 mcg by mouth daily   Yes Historical Provider, MD   lisinopril (ZESTRIL) 20 mg tablet TAKE 1 TABLET (20 MG TOTAL) BY MOUTH 2 (TWO) TIMES A DAY 3/23/20  Yes Diego Camacho MD   LORazepam (ATIVAN) 0.5 mg tablet Take 0.5 mg by mouth 2 (two) times a day   Yes Historical Provider, MD   metFORMIN (GLUCOPHAGE) 500 mg tablet Take 500 mg by mouth 2 (two) times a day with meals. Indications: Type 2 Diabetes   Yes Historical Provider, MD   Multiple Vitamins-Minerals (PRESERVISION AREDS 2 PO) Take by mouth 2 (two) times a day     Yes Historical Provider, MD   multivitamin (THERAGRAN) TABS Take 1 tablet by mouth daily   Yes Historical Provider, MD   senna (SENOKOT) 8.6 mg Take 1 tablet (8.6 mg total) by mouth daily at bedtime 11/29/24  Yes RUCHI Mcintyre   acetaminophen (TYLENOL) 325 mg tablet Take 2 tablets (650 mg total) by mouth every 6 (six) hours as needed for mild pain 11/29/24   RUCHI Mcintyre   amLODIPine (NORVASC) 5 mg tablet Take 10 mg by mouth daily 9/29/22   Historical Provider, MD   apixaban (ELIQUIS) 2.5 mg Take 1 tablet (2.5 mg total) by mouth 2 (two) times a day 1/24/23 11/28/24  Elvia Roa DO   apixaban (Eliquis) 2.5 mg Take 2.5 mg by mouth 2 (two) times a day.    Historical Provider, MD   azelastine (ASTELIN) 0.1 % nasal spray 1 spray into each nostril 2 (two) times a day Use in each nostril as directed 4/6/22 4/18/23  Valerie Daniel PA-C   ferrous sulfate 325 (65 Fe) mg tablet Take 1 tablet (325 mg total) by mouth every other day 1/24/23 11/28/24  Elvia Roa DO   Lidocaine 4 % PTC  Apply 1 patch topically in the morning for 10 days 12/6/24 12/16/24  RUCHI Lopez   methocarbamol (ROBAXIN) 500 mg tablet Take 1 tablet (500 mg total) by mouth 3 (three) times a day for 10 days Make take every 8 hrs as needed for back pain. 12/6/24 12/16/24  RUCHI Lopez   nicotine (NICODERM CQ) 21 mg/24 hr TD 24 hr patch Place 1 patch on the skin over 24 hours daily 11/30/24   RUCHI Mcintyre     Allergies   Allergen Reactions    Meloxicam GI Intolerance    Montelukast Other (See Comments)     headache    Morphine GI Intolerance    Morphine     Penicillins     Percocet [Oxycodone-Acetaminophen]     Sitagliptin      SOB       Objective :  Temp:  [96.9 °F (36.1 °C)-98.2 °F (36.8 °C)] 97.9 °F (36.6 °C)  HR:  [90-97] 90  BP: (152-178)/(76-87) 178/85  Resp:  [14-22] 21  SpO2:  [93 %-99 %] 94 %  O2 Device: Nasal cannula  Nasal Cannula O2 Flow Rate (L/min):  [2 L/min-4 L/min] 2 L/min    Physical Exam       Comfortably in bed  Features of protein calorie malnutrition noted  Neck supple  Lungs emphysematous  Diminished breath sounds bilateral  Heart sounds S1-S2 noted  Abdomen soft  Awake follows commands  No pedal edema  No rash      Lines/Drains:            Lab Results: I have reviewed the following results:              Results from last 7 days   Lab Units 12/09/24  1710 12/09/24  1326   POC GLUCOSE mg/dl 116 144*     Lab Results   Component Value Date    HGBA1C 5.8 (H) 08/16/2024    HGBA1C 5.8 (H) 04/04/2024    HGBA1C 6.0 (H) 03/28/2023           Imaging Results Review: I personally reviewed the following image studies/reports in PACS and discussed pertinent findings with Radiology: CT chest and procedure reports. My interpretation of the radiology images/reports is:  .        ** Please Note: This note has been constructed using a voice recognition system. **

## 2024-12-09 NOTE — ASSESSMENT & PLAN NOTE
Patient was noted to have an abnormal CT chest with pulmonary nodules minus lymphadenopathy and has undergone elective EBUS with pulmonary  Patient is being admitted for observation overnight  Follow-up on biopsy results

## 2024-12-09 NOTE — QUICK NOTE
Janina Tanner is a 84 y.o. female with a past medical history of paroxysmal A-fib, hypertension, diabetes who presents for an elective EBUS for further work up of multiple pulmonary nodules and mediastinal lymphadenopathy found during her last hospital stay.  The patient successfully underwent the procedure without complications.  Atypical, small round cells found at station 7 lymph node concerning for lymphoma.  Will await cytology and cultures. Family notified. Requested that the patient be placed in observation overnight given her advanced age, late afternoon anesthesia, and back problems.  From a pulmonary standpoint, coughing, slight fever, and blood-streaked sputum is common for the first 24 to 48 hours.  Pulmonology is available if there are questions.  The patient will be set up for follow-up with our office.    Maximo Zaidi D.O.  PGY-5, Pulmonary/Critical Care  [facility]

## 2024-12-09 NOTE — PROGRESS NOTES
Pt. To PACU. Report given. Course uneventful. VSS. Airway patent. Pt. Continues to be sitting in upright (HOB elevated), due to her back discomfort;  Sleep-y, yet easily arousable.

## 2024-12-09 NOTE — INTERVAL H&P NOTE
H&P reviewed. After examining the patient I find no changes in the patients condition since the H&P had been written. Patient is accompanied by daughter. Patient reports she is mildly short of breath but overall at baseline.     There were no vitals filed for this visit.    Physical exam:  General: sitting upright, NAD  HEENT: NC/AT, pERRLA  Heart: S1, S2, RRR  Lungs: decreased breath sounds HEIDE  Ext: No edema  Neuro: AOA x3    Risks and benefits discussed in detail. Patient agreeable to proceed with bronchoscopy and EBUS with biopsies.     Anay Howard MD  Pulmonary and Critical Care Fellow, PGY-6  [facility]

## 2024-12-09 NOTE — NURSING NOTE
"Admission data base attempted. Daughter at bed and stated \" this can be done tomorrow,\" Pt A&O x 3 c/o neck pain repositioned to comfort. will inform on coming RN  "

## 2024-12-09 NOTE — ASSESSMENT & PLAN NOTE
Lab Results   Component Value Date    HGBA1C 5.8 (H) 08/16/2024       Recent Labs     12/09/24  1326 12/09/24  1710   POCGLU 144* 116       Blood Sugar Average: Last 72 hrs:  (P) 130    Hold hold metformin while inpatient  Monitor Accu-Cheks  Hypoglycemia

## 2024-12-09 NOTE — ASSESSMENT & PLAN NOTE
Lab Results   Component Value Date    HGBA1C 5.8 (H) 08/16/2024       Recent Labs     12/09/24  1326 12/09/24  1710   POCGLU 144* 116       Blood Sugar Average: Last 72 hrs:  (P) 130

## 2024-12-09 NOTE — ANESTHESIA PREPROCEDURE EVALUATION
Review of Systems/Medical History  Patient summary reviewed.  Chart reviewed.      Cardiovascular    Hypertension controlled    Pulmonary       GI/Hepatic       Comment: Now with SBO          Endo/Other   Diabetes well controlled type 2 Oral agent, History of thyroid disease , hypothyroidism,       GYN       Hematology   Musculoskeletal       Neurology   Psychology             Physical Exam    Airway    Mallampati score: I  TM Distance: <3 FB  Neck ROM: limited     Dental   No notable dental hx upper dentures and lower dentures    Cardiovascular  Rhythm: regular, Rate: abnormal    Pulmonary  Pulmonary exam normal Breath sounds clear to auscultation    Other Findings  post-pubertal.      Anesthesia Plan  ASA Score- 3     Anesthesia Type- general with ASA Monitors.         Additional Monitors:     Airway Plan: LMA.           Plan Factors-    Chart reviewed.    Patient summary reviewed.                  Induction- intravenous.    Postoperative Plan- Plan for postoperative opioid use.         Informed Consent- Anesthetic plan and risks discussed with patient and healthcare power of .  I personally reviewed this patient with the CRNA. Discussed and agreed on the Anesthesia Plan with the CRNA..

## 2024-12-10 ENCOUNTER — HOME CARE VISIT (OUTPATIENT)
Dept: HOME HEALTH SERVICES | Facility: HOME HEALTHCARE | Age: 84
End: 2024-12-10
Payer: MEDICARE

## 2024-12-10 ENCOUNTER — APPOINTMENT (OUTPATIENT)
Dept: RADIOLOGY | Facility: HOSPITAL | Age: 84
DRG: 205 | End: 2024-12-10
Payer: MEDICARE

## 2024-12-10 ENCOUNTER — TELEPHONE (OUTPATIENT)
Dept: PULMONOLOGY | Facility: CLINIC | Age: 84
End: 2024-12-10

## 2024-12-10 LAB
ANION GAP SERPL CALCULATED.3IONS-SCNC: 6 MMOL/L (ref 4–13)
BASOPHILS # BLD AUTO: 0.02 THOUSANDS/ÂΜL (ref 0–0.1)
BASOPHILS NFR BLD AUTO: 0 % (ref 0–1)
BUN SERPL-MCNC: 30 MG/DL (ref 5–25)
CALCIUM SERPL-MCNC: 8.6 MG/DL (ref 8.4–10.2)
CHLORIDE SERPL-SCNC: 97 MMOL/L (ref 96–108)
CO2 SERPL-SCNC: 32 MMOL/L (ref 21–32)
CREAT SERPL-MCNC: 1.31 MG/DL (ref 0.6–1.3)
EOSINOPHIL # BLD AUTO: 0.01 THOUSAND/ÂΜL (ref 0–0.61)
EOSINOPHIL NFR BLD AUTO: 0 % (ref 0–6)
ERYTHROCYTE [DISTWIDTH] IN BLOOD BY AUTOMATED COUNT: 15.6 % (ref 11.6–15.1)
GFR SERPL CREATININE-BSD FRML MDRD: 36 ML/MIN/1.73SQ M
GLUCOSE P FAST SERPL-MCNC: 167 MG/DL (ref 65–99)
GLUCOSE SERPL-MCNC: 149 MG/DL (ref 65–140)
GLUCOSE SERPL-MCNC: 167 MG/DL (ref 65–140)
GLUCOSE SERPL-MCNC: 221 MG/DL (ref 65–140)
HCT VFR BLD AUTO: 31.8 % (ref 34.8–46.1)
HGB BLD-MCNC: 10.2 G/DL (ref 11.5–15.4)
IMM GRANULOCYTES # BLD AUTO: 0.07 THOUSAND/UL (ref 0–0.2)
IMM GRANULOCYTES NFR BLD AUTO: 1 % (ref 0–2)
LYMPHOCYTES # BLD AUTO: 0.52 THOUSANDS/ÂΜL (ref 0.6–4.47)
LYMPHOCYTES NFR BLD AUTO: 7 % (ref 14–44)
MCH RBC QN AUTO: 31.9 PG (ref 26.8–34.3)
MCHC RBC AUTO-ENTMCNC: 32.1 G/DL (ref 31.4–37.4)
MCV RBC AUTO: 99 FL (ref 82–98)
MONOCYTES # BLD AUTO: 0.8 THOUSAND/ÂΜL (ref 0.17–1.22)
MONOCYTES NFR BLD AUTO: 11 % (ref 4–12)
NEUTROPHILS # BLD AUTO: 5.77 THOUSANDS/ÂΜL (ref 1.85–7.62)
NEUTS SEG NFR BLD AUTO: 81 % (ref 43–75)
NRBC BLD AUTO-RTO: 1 /100 WBCS
PLATELET # BLD AUTO: 234 THOUSANDS/UL (ref 149–390)
PMV BLD AUTO: 10.4 FL (ref 8.9–12.7)
POTASSIUM SERPL-SCNC: 4.7 MMOL/L (ref 3.5–5.3)
RBC # BLD AUTO: 3.2 MILLION/UL (ref 3.81–5.12)
SODIUM SERPL-SCNC: 135 MMOL/L (ref 135–147)
WBC # BLD AUTO: 7.19 THOUSAND/UL (ref 4.31–10.16)

## 2024-12-10 PROCEDURE — 94668 MNPJ CHEST WALL SBSQ: CPT

## 2024-12-10 PROCEDURE — 97163 PT EVAL HIGH COMPLEX 45 MIN: CPT

## 2024-12-10 PROCEDURE — 99223 1ST HOSP IP/OBS HIGH 75: CPT | Performed by: INTERNAL MEDICINE

## 2024-12-10 PROCEDURE — 80048 BASIC METABOLIC PNL TOTAL CA: CPT | Performed by: INTERNAL MEDICINE

## 2024-12-10 PROCEDURE — 97167 OT EVAL HIGH COMPLEX 60 MIN: CPT

## 2024-12-10 PROCEDURE — 85025 COMPLETE CBC W/AUTO DIFF WBC: CPT | Performed by: INTERNAL MEDICINE

## 2024-12-10 PROCEDURE — 99232 SBSQ HOSP IP/OBS MODERATE 35: CPT | Performed by: PHYSICIAN ASSISTANT

## 2024-12-10 PROCEDURE — 71046 X-RAY EXAM CHEST 2 VIEWS: CPT

## 2024-12-10 PROCEDURE — 82948 REAGENT STRIP/BLOOD GLUCOSE: CPT

## 2024-12-10 RX ORDER — FUROSEMIDE 10 MG/ML
20 INJECTION INTRAMUSCULAR; INTRAVENOUS ONCE
Status: COMPLETED | OUTPATIENT
Start: 2024-12-10 | End: 2024-12-10

## 2024-12-10 RX ADMIN — METHOCARBAMOL 500 MG: 500 TABLET ORAL at 17:19

## 2024-12-10 RX ADMIN — LISINOPRIL 20 MG: 20 TABLET ORAL at 08:43

## 2024-12-10 RX ADMIN — METHOCARBAMOL 500 MG: 500 TABLET ORAL at 20:34

## 2024-12-10 RX ADMIN — Medication 1 TABLET: at 08:43

## 2024-12-10 RX ADMIN — AMLODIPINE BESYLATE 10 MG: 10 TABLET ORAL at 08:43

## 2024-12-10 RX ADMIN — ATORVASTATIN CALCIUM 40 MG: 40 TABLET, FILM COATED ORAL at 08:43

## 2024-12-10 RX ADMIN — CLONIDINE HYDROCHLORIDE 0.1 MG: 0.1 TABLET ORAL at 20:31

## 2024-12-10 RX ADMIN — APIXABAN 2.5 MG: 2.5 TABLET, FILM COATED ORAL at 17:19

## 2024-12-10 RX ADMIN — Medication 2000 UNITS: at 08:43

## 2024-12-10 RX ADMIN — LORAZEPAM 0.5 MG: 0.5 TABLET ORAL at 17:19

## 2024-12-10 RX ADMIN — OXYCODONE HYDROCHLORIDE AND ACETAMINOPHEN 500 MG: 500 TABLET ORAL at 08:43

## 2024-12-10 RX ADMIN — LORAZEPAM 0.5 MG: 0.5 TABLET ORAL at 08:43

## 2024-12-10 RX ADMIN — METHOCARBAMOL 500 MG: 500 TABLET ORAL at 08:43

## 2024-12-10 RX ADMIN — LISINOPRIL 20 MG: 20 TABLET ORAL at 17:19

## 2024-12-10 RX ADMIN — LABETALOL HYDROCHLORIDE 200 MG: 200 TABLET, FILM COATED ORAL at 21:21

## 2024-12-10 RX ADMIN — FUROSEMIDE 20 MG: 10 INJECTION, SOLUTION INTRAMUSCULAR; INTRAVENOUS at 12:23

## 2024-12-10 RX ADMIN — CLONIDINE HYDROCHLORIDE 0.1 MG: 0.1 TABLET ORAL at 08:48

## 2024-12-10 RX ADMIN — APIXABAN 2.5 MG: 2.5 TABLET, FILM COATED ORAL at 08:43

## 2024-12-10 RX ADMIN — LEVOTHYROXINE SODIUM 100 MCG: 100 TABLET ORAL at 05:42

## 2024-12-10 RX ADMIN — INSULIN LISPRO 1 UNITS: 100 INJECTION, SOLUTION INTRAVENOUS; SUBCUTANEOUS at 12:23

## 2024-12-10 RX ADMIN — LABETALOL HYDROCHLORIDE 200 MG: 200 TABLET, FILM COATED ORAL at 08:43

## 2024-12-10 NOTE — ASSESSMENT & PLAN NOTE
Lab Results   Component Value Date    HGBA1C 6.6 (H) 12/09/2024       Recent Labs     12/09/24  1710 12/09/24  2123 12/10/24  0736 12/10/24  1102   POCGLU 116 238* 149* 221*       Blood Sugar Average: Last 72 hrs:  (P) 173.6

## 2024-12-10 NOTE — PHYSICAL THERAPY NOTE
Physical Therapy Evaluation     Patient's Name: Janina Tanner    Admitting Diagnosis  Hilar adenopathy [R59.0]    Problem List  Patient Active Problem List   Diagnosis    Heart palpitations    Nicotine dependence    Paroxysmal atrial fibrillation (HCC)    Type 2 diabetes mellitus, without long-term current use of insulin (HCC)    Essential hypertension    Hypothyroidism    Malignant neoplasm of central portion of right female breast (HCC)    Acute kidney injury (HCC)    Delirium    Physical deconditioning    Ambulatory dysfunction    Hx of fall    Anxiety    Postop check    Incisional hernia, without obstruction or gangrene    Syncope vs presyncope    Paroxysmal A-fib (HCC)    Renovascular hypertension    Diabetes (HCC)    Weight loss    CKD (chronic kidney disease) stage 3, GFR 30-59 ml/min (HCC)    Fall    Chronic hyponatremia    Gastroesophageal reflux disease without esophagitis    Depression    Hordeolum externum of right upper eyelid    History of CEA (carotid endarterectomy)    Hyperlipidemia    Hypo-osmolality and hyponatremia    Hypothyroidism due to acquired atrophy of thyroid    Iron deficiency anemia due to chronic blood loss    Back pain    Malignant neoplasm of right breast, stage 1, estrogen receptor positive (HCC)    Vitamin D deficiency    Varicose vein of leg    Tobacco abuse    Nontraumatic compression fracture of T4 vertebra (HCC)    Non-seasonal allergic rhinitis due to pollen    Hypertension    Hypertensive urgency    High blood pressure    Asymptomatic bilateral carotid artery stenosis    Hemarthrosis of right knee    History of breast cancer    Moderate protein-calorie malnutrition     Primary osteoarthritis of right knee    Synovial cyst of right popliteal space    Renal vascular disease    Fracture of right tibial plateau    Pain    Macular degeneration    Chronic anemia    Generalized weakness    Closed nondisplaced fracture of fifth left metatarsal bone    Acute bronchitis     Noncompliance    Urinary tract infection    Hypoxia    Bronchitis    Acute respiratory failure with hypoxia (HCC)    Pulmonary emphysema (HCC)    L2 vertebral fracture (HCC)    Palliative care encounter    Abnormal CT of the chest       Past Medical History  Past Medical History:   Diagnosis Date    Anxiety     Atrial fibrillation (HCC)     Bowel obstruction (HCC)     Cancer (HCC)     LEFT BREAST CA 22 YEARS AGO     Depression     Diabetes mellitus (HCC)     Disease of thyroid gland     Hypertension     Hypothyroidism        Past Surgical History  Past Surgical History:   Procedure Laterality Date    ABDOMINAL ADHESION SURGERY N/A 2/4/2018    Procedure: LYSIS ADHESIONS;  Surgeon: Kirk Huang DO;  [facility];  Service: General    BREAST SURGERY      CHOLECYSTECTOMY      COLON SURGERY  2017    EXPLORATORY LAPAROTOMY      JOINT REPLACEMENT      LEFT KNEE REPLACEMENT     LAPAROTOMY N/A 2/4/2018    Procedure: LAPAROTOMY EXPLORATORY,;  Surgeon: Kirk Huang DO;  [facility];  Service: General    MASTECTOMY      MASTECTOMY Left 1995    MASTECTOMY Right 2017    AR BX/EXC LYMPH NODE OPEN SUPERFICIAL Right 1/13/2017    Procedure: SENTINEL LYMPH NODE BIOPSY RIGHT AXILLA;  Surgeon: Dago Thapa MD;  [facility];  Service: General    AR MASTECTOMY SIMPLE COMPLETE Right 1/13/2017    Procedure: MASTECTOMY SIMPLE;  Surgeon: Dago Thapa MD;  [facility];  Service: General    SMALL INTESTINE SURGERY N/A 2/4/2018    Procedure: RESECTION SMALL BOWEL;  Surgeon: Kirk Huang DO;  [facility];  Service: General    US GUIDED BREAST BIOPSY RIGHT COMPLETE Right 11/29/2016        12/10/24 1030   PT Last Visit   PT Visit Date 12/10/24   Note Type   Note type Evaluation   Additional Comments 84 y.o. female admitted to [facility] on 12/9/2024 with Abnormal CT of the chest.   Pain Assessment   Pain Assessment Tool 0-10   Pain Score No Pain   Restrictions/Precautions   Weight  "Bearing Precautions Per Order No   Braces or Orthoses (S)  LSO   Other Precautions Chair Alarm;Bed Alarm;O2;Fall Risk;Pain;Multiple lines;Spinal precautions  (LSO, 2L O2)   Home Living   Type of Home Apartment  (1SH (ILF))   Home Layout One level;Performs ADLs on one level;Able to live on main level with bedroom/bathroom;Elevator   Bathroom Shower/Tub Tub/shower unit   Bathroom Toilet Standard   Bathroom Equipment Tub transfer bench   Bathroom Accessibility Accessible   Home Equipment Walker;Cane  (uses SPC in the home and RW in the community)   Additional Comments At baseline, pt resides alone in an apartment (IL) with elevator access and was independent ADLs with use of SPC prior to hospital admission.   Prior Function   Level of [place] Independent with ADLs;Independent with functional mobility;Independent with IADLS   Lives With (S)  Alone   Receives Help From Family  (supportive daughter to assist as needed)   IADLs Family/Friend/Other provides transportation;Independent with medication management;Independent with meal prep   Falls in the last 6 months 0   Vocational Retired   General   Additional Pertinent History Patient  has a past medical history of Anxiety, Atrial fibrillation (HCC), Bowel obstruction (HCC), Cancer (HCC), Depression, Diabetes mellitus (HCC), Disease of thyroid gland, Hypertension, and Hypothyroidism.   Family/Caregiver Present No   Cognition   Overall Cognitive Status WFL   Arousal/Participation Alert   Orientation Level Oriented to person;Oriented to place;Oriented to situation;Disoriented to time   Memory Within functional limits   Following Commands Follows one step commands without difficulty   Comments patient pleasant and cooperative   Subjective   Subjective \"I just haven't been out of bed for a while\"   RLE Assessment   RLE Assessment   (4-/5 grossly)   LLE Assessment   LLE Assessment   (4-/5 grossly)   Bed Mobility   Supine to Sit 4  Minimal assistance   Additional items " Assist x 1;HOB elevated;Bedrails;Increased time required;Verbal cues;LE management   Sit to Supine 4  Minimal assistance   Additional items Assist x 1;HOB elevated;Bedrails;Increased time required;Verbal cues;LE management   Additional Comments patient found and left supine in bed with alarm and all needs met per patient request   Transfers   Sit to Stand 5  Supervision   Additional items Increased time required;Verbal cues   Stand to Sit 5  Supervision   Additional items Increased time required;Verbal cues   Additional Comments rw  (able to don/doff LSO independently seated at EOB)   Ambulation/Elevation   Gait pattern Forward Flexion;Decreased foot clearance;Inconsistent any;Short stride;Step to;Excessively slow   Gait Assistance 5  Supervision   Additional items Verbal cues;Tactile cues   Assistive Device Rolling walker   Distance 100'x2   Stair Management Assistance Not tested   Ambulation/Elevation Additional Comments Patient ambulated 100'x2 with RW, requiring occasional verbal cues for RW management, hallway navigation, and improving stride/step length. Further ambulation limited due to increased feeling of unsteadiness due to generalized weakness and fatigue.   Balance   Static Sitting Good   Dynamic Sitting Fair +   Static Standing Fair   Dynamic Standing Fair -   Ambulatory Fair -   Endurance Deficit   Endurance Deficit Yes   Endurance Deficit Description generalized weakness, fatigue   Activity Tolerance   Activity Tolerance Patient limited by fatigue;Patient tolerated treatment well   Medical Staff Made Aware AJAY Ojeda; This high complexity evaluation was performed with an occupational therapist due to the patient's co-morbidities, clinically unstable presentation, and present impairments which are a regression from the patient's baseline.   Nurse Made Aware RN cleared and updated   Assessment   Prognosis Good   Problem List Decreased strength;Decreased endurance;Impaired balance;Decreased  mobility;Impaired judgement;Decreased safety awareness   Assessment PT completed evaluation of 84 y.o. female admitted to [facility] on 12/9/2024 with Abnormal CT of the chest. Patient's current status instabilities include multiple lines, 2L O2, ongoing pain, continuous O2/HR monitoring, regression in function from baseline. Patient  has a past medical history of Anxiety, Atrial fibrillation (HCC), Bowel obstruction (HCC), Cancer (HCC), Depression, Diabetes mellitus (HCC), Disease of thyroid gland, Hypertension, and Hypothyroidism. At baseline, pt resides alone in an apartment (ILF) with elevator access and was independent ADLs with use of SPC prior to hospital admission.     Patient presents at time of PT evaluation functioning below baseline and currently w/ overall mobility deficits due to: impaired balance, decreased endurance, gait dysfunction, decreased activity tolerance and fall risk. During PT evaluation, patient currently is requiring min assist x1 for bed mobility skills;  supervision for functional transfers and  supervision for ambulation with RW. Patient performed supine to sit to edge of bed requiring HOB elevated, increased time, verbal cues for set up, use of bed rails, and trunk/core support. Patient ambulated 100'x2 with RW, requiring occasional verbal cues for RW management, hallway navigation, and improving stride/step length. Further ambulation limited due to increased feeling of unsteadiness due to generalized weakness and fatigue. Pt left supine in bed with alarm and all needs met.       This patient is functioning below their baseline and is recommended for level III (pending further progress). Patient will benefit from continued skilled PT this admission to achieve maximal function and safety. The patients AM-PAC Basic Mobility Inpatient Short From Raw Score is 17 . Based on AM-PAC scoring and patient presentation, PT currently recommending Level III (Minimum Resource Intensity).  Please also refer to the recommendation of the Physical Therapist for safe discharge planning.   Barriers to Discharge Decreased caregiver support   Goals   Patient Goals to return home   STG Expiration Date 12/24/24   Short Term Goal #1 1) Perform bed mobility mod-I to participate in frequent repositioning and improve skin integrity; 2) Perform functional transfers mod-I to promote I with toileting and OOB mobility; 3) Ambulate 200 feet mod-I with least restrictive device to participate in household and community level mobility; 4) Improve b/l LE strength by 1/2 grade in order to improve efficiency of tranfers; 5) Improve balance by 1 grade to reduce risk for falls; 6) Improve overall activity tolerance to 60 minutes in order to increase patient's ability to engage in mobility tasks   PT Treatment Day 0   Plan   Treatment/Interventions ADL retraining;Functional transfer training;LE strengthening/ROM;Elevations;Therapeutic exercise;Endurance training;Patient/family training;Bed mobility;Gait training;Spoke to nursing;Spoke to case management;OT   PT Frequency 2-3x/wk   Discharge Recommendation   Rehab Resource Intensity Level, PT III (Minimum Resource Intensity)  (pending further progress)   Equipment Recommended Walker   Walker Package Recommended Wheeled walker   Change/add to Walker Package? No   AM-PAC Basic Mobility Inpatient   Turning in Flat Bed Without Bedrails 3   Lying on Back to Sitting on Edge of Flat Bed Without Bedrails 3   Moving Bed to Chair 3   Standing Up From Chair Using Arms 3   Walk in Room 3   Climb 3-5 Stairs With Railing 2   Basic Mobility Inpatient Raw Score 17   Basic Mobility Standardized Score 39.67   Dago Elwin Highest Level Of Mobility   -HLM Goal 5: Stand one or more mins   -HLM Achieved 7: Walk 25 feet or more   End of Consult   Patient Position at End of Consult Supine;Bed/Chair alarm activated;All needs within reach           Millicent Moses, PT, DPT

## 2024-12-10 NOTE — OCCUPATIONAL THERAPY NOTE
Occupational Therapy Evaluation     Patient Name: Janina Tanner  Today's Date: 12/10/2024  Problem List  Principal Problem:    Abnormal CT of the chest  Active Problems:    Type 2 diabetes mellitus, without long-term current use of insulin (HCC)    Essential hypertension    Hypothyroidism    Ambulatory dysfunction    Paroxysmal A-fib (HCC)    Diabetes (HCC)    Tobacco abuse    Moderate protein-calorie malnutrition     L2 vertebral fracture (HCC)    Past Medical History  Past Medical History:   Diagnosis Date    Anxiety     Atrial fibrillation (HCC)     Bowel obstruction (HCC)     Cancer (HCC)     LEFT BREAST CA 22 YEARS AGO     Depression     Diabetes mellitus (HCC)     Disease of thyroid gland     Hypertension     Hypothyroidism      Past Surgical History  Past Surgical History:   Procedure Laterality Date    ABDOMINAL ADHESION SURGERY N/A 2/4/2018    Procedure: LYSIS ADHESIONS;  Surgeon: Kirk Huang DO;  [facility];  Service: General    BREAST SURGERY      CHOLECYSTECTOMY      COLON SURGERY  2017    EXPLORATORY LAPAROTOMY      JOINT REPLACEMENT      LEFT KNEE REPLACEMENT     LAPAROTOMY N/A 2/4/2018    Procedure: LAPAROTOMY EXPLORATORY,;  Surgeon: Kirk Huang DO;  [facility];  Service: General    MASTECTOMY      MASTECTOMY Left 1995    MASTECTOMY Right 2017    GA BX/EXC LYMPH NODE OPEN SUPERFICIAL Right 1/13/2017    Procedure: SENTINEL LYMPH NODE BIOPSY RIGHT AXILLA;  Surgeon: Dago Thapa MD;  [facility];  Service: General    GA MASTECTOMY SIMPLE COMPLETE Right 1/13/2017    Procedure: MASTECTOMY SIMPLE;  Surgeon: Dago Thapa MD;  [facility];  Service: General    SMALL INTESTINE SURGERY N/A 2/4/2018    Procedure: RESECTION SMALL BOWEL;  Surgeon: Kikr Huang DO;  [facility];  Service: General    US GUIDED BREAST BIOPSY RIGHT COMPLETE Right 11/29/2016             12/10/24 1029   OT Last Visit   OT Visit Date 12/10/24   Note Type   Note type  "Evaluation   Pain Assessment   Pain Assessment Tool 0-10   Pain Score No Pain   Restrictions/Precautions   Weight Bearing Precautions Per Order No   Braces or Orthoses (S)  LSO  (when OOB, donned prior to FM)   Other Precautions Bed Alarm;Multiple lines;O2;Fall Risk;Hard of hearing;Spinal precautions   Home Living   Type of Home Apartment  (ILF)   Home Layout One level;Performs ADLs on one level;Elevator   Bathroom Shower/Tub Tub/shower unit   Bathroom Toilet Standard   Bathroom Equipment Tub transfer bench   Bathroom Accessibility Accessible   Home Equipment Walker;Cane  (SPC in apartment, RW for longer distances)   Additional Comments Pt lives in an apt at an [facility], uses a tub shower with bench and standard toilet.   Prior Function   Level of [place] Independent with ADLs;Independent with functional mobility;Independent with IADLS   Lives With Alone   Receives Help From Family  (supportive dtr)   IADLs Family/Friend/Other provides transportation;Independent with meal prep;Independent with medication management   Falls in the last 6 months 0   Vocational Retired   Lifestyle   Autonomy Pta pt I with ADL, IADL and functional mobility, (-) .   Reciprocal Relationships supportive dtr   Service to Others retired- office work   Intrinsic Gratification enjoys watching TV, sewing   Subjective   Subjective \"I am coming back to this bed\"   ADL   Where Assessed Edge of bed   Eating Assistance 5  Supervision/Setup   Grooming Assistance 5  Supervision/Setup   UB Bathing Assistance 5  Supervision/Setup   LB Bathing Assistance 4  Minimal Assistance   UB Dressing Assistance 5  Supervision/Setup   LB Dressing Assistance 4  Minimal Assistance   Toileting Assistance  5  Supervision/Setup   Functional Assistance 5  Supervision/Setup   Bed Mobility   Supine to Sit 4  Minimal assistance   Additional items Assist x 1;Increased time required;Verbal cues   Sit to Supine 4  Minimal assistance   Additional items Assist x 1;Increased " time required;Verbal cues   Additional Comments Pt greeted and left in bed with alarm on and all needs within reach.   Transfers   Sit to Stand 5  Supervision   Additional items Increased time required;Verbal cues   Stand to Sit 5  Supervision   Additional items Increased time required;Verbal cues   Additional Comments with RW   Functional Mobility   Functional Mobility 5  Supervision   Additional Comments Pt performs household distance mobility with supervision with RW   Additional items Rolling walker   Balance   Static Sitting Fair +   Dynamic Sitting Fair   Static Standing Fair   Dynamic Standing Fair -   Ambulatory Fair -   Activity Tolerance   Activity Tolerance Patient limited by fatigue   Medical Staff Made Aware Co-eval with DPT due to medical complexity   Nurse Made Aware RN cleared for therapy   RUE Assessment   RUE Assessment WFL   LUE Assessment   LUE Assessment WFL   Hand Function   Gross Motor Coordination Functional   Fine Motor Coordination Functional   Sensation   Light Touch No apparent deficits   Vision-Basic Assessment   Current Vision Wears glasses all the time  (although reports they do not help much with her MD)   Cognition   Overall Cognitive Status WFL   Arousal/Participation Cooperative;Alert   Attention Within functional limits   Orientation Level Oriented to person;Oriented to place;Oriented to situation   Memory Within functional limits   Following Commands Follows one step commands without difficulty   Comments Pt cooperative to therapy although with some decreased insight to deficits.   Assessment   Limitation Decreased ADL status;Decreased endurance;Decreased self-care trans;Decreased high-level ADLs   Prognosis Good   Assessment Pt is a 84 y.o. female who was admitted to [facility] on 12/9/2024 with Abnormal CT of the chest, s/p elective EBUS for further workup. Pt seen for an OT evaluation per active OT orders.  Pt  has a past medical history of Anxiety, Atrial  fibrillation (HCC), Bowel obstruction (HCC), Cancer (HCC), Depression, Diabetes mellitus (HCC), Disease of thyroid gland, Hypertension, and Hypothyroidism. Pt lives in a 1 level apt with tub shower with bench, standard toilet. Pta, pt was independent w/ ADL/IADL and functional mobility, was (-) driving and was using SPC vs RW at baseline. Currently, pt is Supervision for UB ADL, Min Ax1 for LB ADL, and completed transfers/FM w Supervision with RW. Pt currently presents with impairments in the following categories -limited home support and difficulty performing ADLS activity tolerance and endurance. These impairments, as well as pt's fatigue, SOB, BURNS, and risk for falls  limit pt's ability to safely engage in all baseline areas of occupation, includinggrooming, bathing, dressing, toileting, and functional mobility/transfers.    The patient's raw score on the AM-PAC Daily Activity Inpatient Short Form is 20. A raw score of greater than or equal to 19 suggests the patient may benefit from discharge to home. Please refer to the recommendation of the Occupational Therapist for safe discharge planning. Pt would benefit from continued acute OT services throughout hospital course and following D/C. Plan for OT interventions 2-3x per week. From OT standpoint, recommend home with increased social support, level III services pending progress and support at home upon D/C. Pt was left supine in bed with alarm on and all needs within reach.   Goals   Patient Goals to go home   Plan   Treatment Interventions ADL retraining;Functional transfer training;Endurance training;Patient/family training;Continued evaluation;Energy conservation   Goal Expiration Date 12/24/24   OT Frequency 2-3x/wk   Discharge Recommendation   Rehab Resource Intensity Level, OT III (Minimum Resource Intensity)  (pending progress and support at home)   [facility] Daily Activity Inpatient   Lower Body Dressing 3   Bathing 3   Toileting 3   Upper Body Dressing 3    Grooming 4   Eating 4   Daily Activity Raw Score 20   Daily Activity Standardized Score (Calc for Raw Score >=11) 42.03   AM-PAC Applied Cognition Inpatient   Following a Speech/Presentation 4   Understanding Ordinary Conversation 4   Taking Medications 4   Remembering Where Things Are Placed or Put Away 4   Remembering List of 4-5 Errands 3   Taking Care of Complicated Tasks 3   Applied Cognition Raw Score 22   Applied Cognition Standardized Score 47.83   End of Consult   Education Provided Yes   Patient Position at End of Consult Supine;Bed/Chair alarm activated;All needs within reach   Nurse Communication Nurse aware of consult       OT Goals    - Pt will be Mod I with LB ADL by end of hospital course.    - Pt will be Mod I with UB ADL by end of hospital course.    - Pt will be Mod I with all functional transfers required for patient safety by end of hospital course.    - Pt will be Mod I with functional mobility to/from bathroom for increased independence with toileting tasks.    - Pt will independently recall and implement safety precautions during OT sessions.     - Pt activity tolerance will increase to 30 minutes in order to safely engage in ADL and transfers.     - Pt standing tolerance will increase to 5 minutes  in order to promote sink side ADLs and IADL activities.    - Pt will consistently follow one-step directions with min to no vc or prompting.     - Pt will attend to functional tasks for 10 minutes with min to no vc for attention/redirection.    - Pt will attend to and complete ongoing cognitive assessment in order to inform safe discharge planning.            SABIHA Sorto, OTR/L

## 2024-12-10 NOTE — PROGRESS NOTES
Progress Note - Hospitalist   Name: Janina Tanner 84 y.o. female I MRN: [id number]  Unit/Bed#: -01 I Date of Admission: 12/9/2024   Date of Service: 12/10/2024 I Hospital Day: 0    Assessment & Plan  Abnormal CT of the chest  Patient was noted to have an abnormal CT chest with pulmonary nodules minus lymphadenopathy and has undergone elective EBUS with pulmonary  Patient admitted for observation overnight  Follow-up on biopsy results as outpatient  Hypoxia  Patient was 86% on room air 12/10  Patient does not wear O2 at home  Chest x-ray 12/10 - Worsening consolidation at the peripheral left midlung field likely reflects progression of postobstructive left upper lobe pneumonia/atelectasis in this patient with a central perihilar mass. Small bilateral costophrenic angle effusions   Discussed with pulmonary -will try low-dose of IV diuretic and monitor response.  Will discuss need for formal consultation  Type 2 diabetes mellitus, without long-term current use of insulin ([facility])  Lab Results   Component Value Date    HGBA1C 6.6 (H) 12/09/2024       Recent Labs     12/09/24  1710 12/09/24  2123 12/10/24  0736 12/10/24  1102   POCGLU 116 238* 149* 221*       Blood Sugar Average: Last 72 hrs:  (P) 173.6    Hold hold metformin while inpatient  Monitor Accu-Cheks  Hypoglycemia  Essential hypertension  Continue amlodipine 10 mg p.o. daily, clonidine 0.1 mg twice daily, labetalol 200 mg twice daily, lisinopril 20 mg twice daily  Monitor blood pressures  Avoid hypotension      Hypothyroidism  Continue levothyroxine 100 mcg p.o. daily  Ambulatory dysfunction  Multifactorial  Safe ambulation  Fall precautions  Physical therapy -Home with home health care  Paroxysmal A-fib ([facility])  Continue labetalol  Eliquis presently on hold resume when cleared by pulmonary  Diabetes ([facility])  Lab Results   Component Value Date    HGBA1C 6.6 (H) 12/09/2024       Recent Labs     12/09/24  1710 12/09/24  2123 12/10/24  0736 12/10/24  1102    POCGLU 116 238* 149* 221*       Blood Sugar Average: Last 72 hrs:  (P) 173.6    Tobacco abuse  Smoking cessation encouraged  Moderate protein-calorie malnutrition   Malnutrition Findings:      Body mass index is 20.18 kg/m².     L2 vertebral fracture (HCC)  L2 vertebral fracture  Analgesics  Supportive cares    VTE Pharmacologic Prophylaxis: VTE Score: 5 High Risk (Score >/= 5) - Pharmacological DVT Prophylaxis Ordered: apixaban (Eliquis). Sequential Compression Devices Ordered.    Mobility:   Basic Mobility Inpatient Raw Score: 17  JH-HLM Goal: 5: Stand one or more mins  JH-HLM Achieved: 7: Walk 25 feet or more  JH-HLM Goal achieved. Continue to encourage appropriate mobility.    Patient Centered Rounds: I performed bedside rounds with nursing staff today.   Discussions with Specialists or Other Care Team Provider: case management, Pulmonary    Education and Discussions with Family / Patient: Updated  (daughter) at bedside.    Current Length of Stay: 0 day(s)  Current Patient Status: Inpatient   Certification Statement: The patient, admitted on an observation basis, will now require > 2 midnight hospital stay due to IV lasix  Discharge Plan: Anticipate discharge tomorrow to home with home services.    Code Status: Level 1 - Full Code    Subjective   Offers no complaints.  Nursing reports O2 sat on room air this morning 86%    Objective :  Temp:  [96.9 °F (36.1 °C)-98.4 °F (36.9 °C)] 98.4 °F (36.9 °C)  HR:  [] 68  BP: (116-178)/(66-87) 157/79  Resp:  [14-22] 17  SpO2:  [77 %-99 %] 96 %  O2 Device: Nasal cannula  Nasal Cannula O2 Flow Rate (L/min):  [2 L/min-4 L/min] 2 L/min    Body mass index is 20.18 kg/m².     Input and Output Summary (last 24 hours):     Intake/Output Summary (Last 24 hours) at 12/10/2024 1252  Last data filed at 12/10/2024 0901  Gross per 24 hour   Intake 1240 ml   Output 0 ml   Net 1240 ml       Physical Exam  Vitals and nursing note reviewed.   Constitutional:        General: She is not in acute distress.     Appearance: She is well-developed.   HENT:      Head: Normocephalic and atraumatic.   Cardiovascular:      Rate and Rhythm: Normal rate and regular rhythm.      Heart sounds: Murmur heard.      No friction rub.   Pulmonary:      Effort: Pulmonary effort is normal. No respiratory distress.      Breath sounds: Normal breath sounds. No wheezing.   Abdominal:      General: Bowel sounds are normal. There is no distension.      Palpations: Abdomen is soft.      Tenderness: There is no abdominal tenderness. There is no guarding or rebound.   Skin:     General: Skin is warm and dry.      Findings: No rash.   Neurological:      Mental Status: She is alert and oriented to person, place, and time.      Cranial Nerves: No cranial nerve deficit.           Lines/Drains:              Lab Results: I have reviewed the following results:   Results from last 7 days   Lab Units 12/10/24  0544   WBC Thousand/uL 7.19   HEMOGLOBIN g/dL 10.2*   HEMATOCRIT % 31.8*   PLATELETS Thousands/uL 234   SEGS PCT % 81*   LYMPHO PCT % 7*   MONO PCT % 11   EOS PCT % 0     Results from last 7 days   Lab Units 12/10/24  0544   SODIUM mmol/L 135   POTASSIUM mmol/L 4.7   CHLORIDE mmol/L 97   CO2 mmol/L 32   BUN mg/dL 30*   CREATININE mg/dL 1.31*   ANION GAP mmol/L 6   CALCIUM mg/dL 8.6   GLUCOSE RANDOM mg/dL 167*         Results from last 7 days   Lab Units 12/10/24  1102 12/10/24  0736 12/09/24  2123 12/09/24  1710 12/09/24  1326   POC GLUCOSE mg/dl 221* 149* 238* 116 144*     Results from last 7 days   Lab Units 12/09/24  1940   HEMOGLOBIN A1C % 6.6*           Recent Cultures (last 7 days):   Results from last 7 days   Lab Units 12/09/24  1553   GRAM STAIN RESULT  1+ Mononuclear Cells  No bacteria seen       Imaging Results Review: I reviewed radiology reports from this admission including: chest xray and CT chest.      Last 24 Hours Medication List:     Current Facility-Administered Medications:      acetaminophen (TYLENOL) tablet 650 mg, Q6H PRN    aluminum-magnesium hydroxide-simethicone (MAALOX) oral suspension 30 mL, Q6H PRN    amLODIPine (NORVASC) tablet 10 mg, Daily    apixaban (ELIQUIS) tablet 2.5 mg, BID    ascorbic acid (VITAMIN C) tablet 500 mg, Daily    atorvastatin (LIPITOR) tablet 40 mg, Daily    Cholecalciferol (VITAMIN D3) tablet 2,000 Units, Daily    cloNIDine (CATAPRES) tablet 0.1 mg, BID    ferrous sulfate tablet 325 mg, Every Other Day    HYDROmorphone HCl (DILAUDID) injection 0.2 mg, Q4H PRN    insulin lispro (HumALOG/ADMELOG) 100 units/mL subcutaneous injection 1-5 Units, TID AC **AND** Fingerstick Glucose (POCT), TID AC    labetalol (NORMODYNE) tablet 200 mg, Q12H YADIRA    levothyroxine tablet 100 mcg, Early Morning    lidocaine (LIDODERM) 5 % patch 2 patch, Daily    lidocaine (PF) (XYLOCAINE-MPF) 2 % injection, PRN    lisinopril (ZESTRIL) tablet 20 mg, BID    LORazepam (ATIVAN) tablet 0.5 mg, BID    methocarbamol (ROBAXIN) tablet 500 mg, TID    multivitamin-minerals (CENTRUM) tablet 1 tablet, Daily    nicotine (NICODERM CQ) 14 mg/24hr TD 24 hr patch 1 patch, Daily    ondansetron (ZOFRAN) injection 4 mg, Q6H PRN    polyethylene glycol (MIRALAX) packet 17 g, Daily    senna (SENOKOT) tablet 8.6 mg, HS    Administrative Statements   Today, Patient Was Seen By: Dominique Mcfarlane PA-C      **Please Note: This note may have been constructed using a voice recognition system.**

## 2024-12-10 NOTE — PLAN OF CARE
Problem: PHYSICAL THERAPY ADULT  Goal: Performs mobility at highest level of function for planned discharge setting.  See evaluation for individualized goals.  Description: Treatment/Interventions: ADL retraining, Functional transfer training, LE strengthening/ROM, Elevations, Therapeutic exercise, Endurance training, Patient/family training, Bed mobility, Gait training, Spoke to nursing, Spoke to case management, OT  Equipment Recommended: Walker       See flowsheet documentation for full assessment, interventions and recommendations.  Note: Prognosis: Good  Problem List: Decreased strength, Decreased endurance, Impaired balance, Decreased mobility, Impaired judgement, Decreased safety awareness  Assessment: PT completed evaluation of 84 y.o. female admitted [facility] on [facility]/9/2024 with Abnormal CT of the chest. Patient's current status instabilities include multiple lines, 2L O2, ongoing pain, continuous O2/HR monitoring, regression in function from baseline. Patient  has a past medical history of Anxiety, Atrial fibrillation (HCC), Bowel obstruction (HCC), Cancer (HCC), Depression, Diabetes mellitus (HCC), Disease of thyroid gland, Hypertension, and Hypothyroidism. At baseline, pt resides alone in an apartment (ILF) with elevator access and was independent ADLs with use of SPC prior to hospital admission. Patient presents at time of PT evaluation functioning below baseline and currently w/ overall mobility deficits due to: impaired balance, decreased endurance, gait dysfunction, decreased activity tolerance and fall risk. During PT evaluation, patient currently is requiring min assist x1 for bed mobility skills;  supervision for functional transfers and  supervision for ambulation with RW. Patient performed supine to sit to edge of bed requiring HOB elevated, increased time, verbal cues for set up, use of bed rails, and trunk/core support. Patient ambulated 100'x2 with RW, requiring occasional  verbal cues for RW management, hallway navigation, and improving stride/step length. Further ambulation limited due to increased feeling of unsteadiness due to generalized weakness and fatigue. Pt left supine in bed with alarm and all needs met. This patient is functioning below their baseline and is recommended for level III (pending further progress). Patient will benefit from continued skilled PT this admission to achieve maximal function and safety. The patients AM-PAC Basic Mobility Inpatient Short From Raw Score is 17 . Based on AM-PAC scoring and patient presentation, PT currently recommending Level III (Minimum Resource Intensity). Please also refer to the recommendation of the Physical Therapist for safe discharge planning.  Barriers to Discharge: Decreased caregiver support     Rehab Resource Intensity Level, PT: III (Minimum Resource Intensity) (pending further progress)    See flowsheet documentation for full assessment.

## 2024-12-10 NOTE — CASE MANAGEMENT
Case Management Assessment & Discharge Planning Note    Patient name Janina Tanner  Location /-01 MRN [id number]  : 1940 Date 12/10/2024       Current Admission Date: 2024  Current Admission Diagnosis:Abnormal CT of the chest   Patient Active Problem List    Diagnosis Date Noted Date Diagnosed    Abnormal CT of the chest 2024     L2 vertebral fracture ([facility]) 2024     Palliative care encounter 2024     Pulmonary emphysema ([facility]) 2023     Acute bronchitis 2023     Noncompliance 2023     Urinary tract infection 2023     Acute respiratory failure with hypoxia ([facility]) 2023     Hypoxia      Bronchitis      Closed nondisplaced fracture of fifth left metatarsal bone 2023     Generalized weakness 2022     Chronic anemia 2022     Pain 02/15/2022     Macular degeneration 02/15/2022     Fracture of right tibial plateau 2022     Renal vascular disease 2020     Primary osteoarthritis of right knee 2019     Synovial cyst of right popliteal space 2019     Hemarthrosis of right knee 2019     History of breast cancer 2019     Moderate protein-calorie malnutrition  2019     Asymptomatic bilateral carotid artery stenosis 08/15/2019     High blood pressure      Hypertensive urgency 2019     Hypertension      Hypo-osmolality and hyponatremia 2019     Fall 2019     Chronic hyponatremia 2019     Syncope vs presyncope 2019     Paroxysmal A-fib ([facility]) 2019     Renovascular hypertension 2019     Diabetes ([facility]) 2019     Weight loss 2019     CKD (chronic kidney disease) stage 3, GFR 30-59 ml/min ([facility]) 2019     Iron deficiency anemia due to chronic blood loss 2018     Incisional hernia, without obstruction or gangrene 07/10/2018     Postop check 2018     Delirium 2018     Physical deconditioning 2018     Ambulatory dysfunction  02/05/2018     Hx of fall 02/05/2018     Anxiety 02/05/2018     Acute kidney injury (HCC) 02/02/2018     Hordeolum externum of right upper eyelid 03/08/2017     Malignant neoplasm of right breast, stage 1, estrogen receptor positive (HCC) 02/08/2017     Malignant neoplasm of central portion of right female breast (HCC) 01/13/2017     Tobacco abuse 01/11/2017     Heart palpitations 07/11/2016     Nicotine dependence 07/11/2016     Paroxysmal atrial fibrillation (HCC) 07/11/2016     Type 2 diabetes mellitus, without long-term current use of insulin (HCC) 07/11/2016     Essential hypertension 07/11/2016     Hypothyroidism 07/11/2016     Nontraumatic compression fracture of T4 vertebra (HCC) 10/06/2015     Back pain 09/17/2015     Gastroesophageal reflux disease without esophagitis 02/23/2015     Depression 02/23/2015     History of CEA (carotid endarterectomy) 02/23/2015     Hyperlipidemia 02/23/2015     Hypothyroidism due to acquired atrophy of thyroid 02/23/2015     Vitamin D deficiency 02/23/2015     Varicose vein of leg 02/23/2015     Non-seasonal allergic rhinitis due to pollen 02/23/2015       LOS (days): 0  Geometric Mean LOS (GMLOS) (days):   Days to GMLOS:     OBJECTIVE:     Current admission status: Observation    Preferred Pharmacy:   [facility] - [place], [place] - [address]  [address]  [place] [address]  Phone: [phone] Fax: [phone]    Primary Care Provider: Darrel Fall DO    Primary Insurance: MEDICARE  Secondary Insurance: BLUE CROSS    ASSESSMENT:  Active Health Care Proxies       Geena Hill Health Care Representative - Daughter   Primary Phone: [phone] (Home)             Geena Rodriguez Health Care Representative - Daughter   Primary Phone: [phone] (Mobile)                 Readmission Root Cause  30 Day Readmission: Yes  During your hospital stay, did someone (provider, nurse, ) explain your care to you in a way you  could understand?: Yes  Did you feel medically stable to leave the hospital?: Yes  Were you able to pay for your medication at the pharmacy?: Yes  Did you have reliable transportation to take you to your appointments?: Yes  During previous admission, was a post-acute recommendation made?: Yes  What post-acute resources were offered?: HHC (SL-HHC: SN/PT/OT & Heal at home)  Patient was readmitted due to: Abnormal CT of the chest, bronchoscopy and EBUS with biopsies.    Patient Information  Admitted from:: Home  Mental Status: Alert  During Assessment patient was accompanied by: Not accompanied during assessment  Assessment information provided by:: Patient  Primary Caregiver: Self  Support Systems: Daughter, Family members, Friends/neighbors  Home entry access options. Select all that apply.: No steps to enter home  Type of Current Residence: Apartment  Floor Level: 3 (Elevator access)  Upon entering residence, is there a bedroom on the main floor (no further steps)?: Yes  Upon entering residence, is there a bathroom on the main floor (no further steps)?: Yes  Living Arrangements: Lives Alone    Activities of Daily Living Prior to Admission  Functional Status: Independent  Completes ADLs independently?: Yes  Ambulates independently?: Yes  Does patient use assisted devices?: Yes  Assisted Devices (DME) used: Straight Cane, Other (Comment) (back brace)  Does patient currently own DME?: Yes  What DME does the patient currently own?: Straight Cane, Walker, Other (Comment) (back brace)  Does patient have a history of Outpatient Therapy (PT/OT)?: Yes ( OP therapy)  Does the patient have a history of Short-Term Rehab?: No  Does patient have a history of HHC?: Yes (-C)  Does patient currently have HHC?: Yes    Current Home Health Care  Type of Current Home Care Services: Home PT, Home OT, Nurse visit  Home Health Agency Name:: [facility]  Current Home Health Follow-Up Provider:: PCP    Patient Information  Continued  Does patient have prescription coverage?: Yes  Does patient have a history of substance abuse?: No  Does patient have a history of Mental Health Diagnosis?: No    Means of Transportation  Means of Transport to Appts:: Family transport (Daughter vs uber)          DISCHARGE DETAILS:    Discharge planning discussed with:: pt at bedside    Requested Home Health Care         Home Health Agency Name:: [facility]    Other Referral/Resources/Interventions Provided:  Interventions: HHC  Referral Comments: Initial assessment completed with pt at bedside. Pt resides alone in a 3rd floor apartment with elevator access. No [place] apartment building. Pt reports to be IPTA with use of SPC. Additional DME includes: back brace and RW. no further DME reported. Pt's daughter provides transport to and from all medical appointments/errands, or daughter provides uber rides for pt if daughter is unavailable to transport. Pt reports PTA she was receiving [facility] services through [facility] for SN/PT/OT and heal at home which was arranged on prior admission. Pt requesting [facility] referral as potential discharge plan, pending further after care needs. [facility] referral sent to [facility] via [facility] per pt request. After care needs pending at this time. CM team will continue to follow and remain available for dicharge coordination.

## 2024-12-10 NOTE — ASSESSMENT & PLAN NOTE
Patient was noted to have an abnormal CT chest with pulmonary nodules minus lymphadenopathy and has undergone elective EBUS with pulmonary  Patient admitted for observation overnight  Follow-up on biopsy results as outpatient

## 2024-12-10 NOTE — CONSULTS
Consultation - Pulmonology   Name: Janina Tanner 84 y.o. female I MRN: [id number]  Unit/Bed#: -01 I Date of Admission: 12/9/2024   Date of Service: 12/10/2024 I Hospital Day: 0       Inpatient consult to Pulmonology     Date/Time  12/10/2024 1:13 PM     Performed by  Maximo Zaidi DO   Authorized by  Dominique Mcfarlane PA-C           Physician Requesting Evaluation: Pato Scanlon   Reason for Evaluation / Principal Problem: Mediastinal lymphadenopathy/postobstructive atelectasis    Assessment & Plan  Abnormal CT of the chest  Patient has a history of mediastinal lymphadenopathy, left hilar mass that underwent EBUS sampling on 12/9/2024 and was found to have atypical, small round cells at station 7 that were concerning for lymphoma.  The patient was admitted overnight for observation given her advanced age, timing of anesthesia, and and back problems.  This morning, the patient was noted to be slightly hypoxic with SpO2 in the 80s on room air.  Patient is not short of breath.  The postobstructive atelectasis is likely in the setting of recent sampling and possibly old blood that is there  Incentive spirometry, flutter valve, airway clearance, out of bed to chair  Amatory pulse ox prior to discharge to see if the patient qualifies for oxygen  Can continue diuresis conservatively, there is also cephalization seen on her chest x-ray  Patient would likely benefit from rehab  Tobacco abuse  Patient has an extensive tobacco history.  Smoking cessation advised  Hypoxia  Noted to be hypoxic on room air into the high 80s today after her procedure yesterday.  This is likely a combination of fluid versus atelectasis versus hypoventilation  Incentive spirometry, flutter valve, airway clearance  Out of bed to chair  PT/OT  Ambulatory pulse ox prior to discharge  Patient may benefit from rehab after discharge  Pulmonology service will follow.    History of Present Illness   Janina Tanner is a 84 y.o. female with a  past medical history of paroxysmal A-fib, hypertension, diabetes who presents for an elective EBUS for further work up of multiple pulmonary nodules and mediastinal lymphadenopathy found during her last hospital stay.  The patient successfully underwent the procedure without complications.  Atypical, small round cells found at station 7 lymph node concerning for lymphoma.  Pulmonary consultation placed for recommendations and management of her postobstructive atelectasis.    Review of Systems   Constitutional:  Positive for fatigue. Negative for chills.   HENT:  Negative for congestion, postnasal drip, rhinorrhea and sinus pain.    Respiratory:  Positive for cough. Negative for shortness of breath.    Cardiovascular:  Negative for chest pain.   Gastrointestinal:  Negative for abdominal distention, abdominal pain, diarrhea and nausea.   Genitourinary:  Negative for dysuria.   Musculoskeletal:  Positive for back pain.   Skin:  Negative for rash.   Neurological:  Negative for dizziness and headaches.   Psychiatric/Behavioral:  Negative for agitation and confusion.        Historical Information   I have reviewed the patient's PMH, PSH, Social History, Family History, Meds, and Allergies  Tobacco History: Former smoker  Occupational History: Noncontributory  Family History:non-contributory    Objective :  Temp:  [96.9 °F (36.1 °C)-98.4 °F (36.9 °C)] 98.4 °F (36.9 °C)  HR:  [] 68  BP: (116-178)/(66-87) 157/79  Resp:  [14-22] 17  SpO2:  [77 %-99 %] 96 %  O2 Device: Nasal cannula  Nasal Cannula O2 Flow Rate (L/min):  [2 L/min-4 L/min] 2 L/min    Physical Exam  Vitals reviewed.   Constitutional:       General: She is not in acute distress.  HENT:      Head: Normocephalic and atraumatic.      Right Ear: External ear normal.      Left Ear: External ear normal.      Nose: Nose normal.      Mouth/Throat:      Mouth: Mucous membranes are moist.      Pharynx: Oropharynx is clear.   Eyes:      Extraocular Movements:  Extraocular movements intact.      Pupils: Pupils are equal, round, and reactive to light.   Cardiovascular:      Rate and Rhythm: Normal rate and regular rhythm.      Pulses: Normal pulses.   Pulmonary:      Effort: Pulmonary effort is normal.      Comments: Decreased breath sounds bilaterally  Abdominal:      General: Abdomen is flat. Bowel sounds are normal. There is no distension.      Tenderness: There is no abdominal tenderness.   Musculoskeletal:      Right lower leg: No edema.      Left lower leg: No edema.   Skin:     General: Skin is warm and dry.      Capillary Refill: Capillary refill takes less than 2 seconds.   Neurological:      General: No focal deficit present.      Mental Status: She is alert and oriented to person, place, and time.   Psychiatric:         Mood and Affect: Mood normal.         Behavior: Behavior normal.           Lab Results: I have reviewed the following results:  .     12/10/24  0544   WBC 7.19   HGB 10.2*   HCT 31.8*      SODIUM 135   K 4.7   CL 97   CO2 32   BUN 30*   CREATININE 1.31*   GLUC 167*     ABG: No new results in last 24 hours.    Imaging Results Review: I personally reviewed the following image studies in PACS and associated radiology reports: chest xray. My interpretation of the radiology images/reports is: Cephalization with left-sided atelectasis in the setting of post obstruction.  Other Study Results Review: EKG was reviewed.   PFT Results Reviewed: reviewed    VTE Prophylaxis: VTE covered by:  apixaban, Oral, 2.5 mg at 12/10/24 0843       Administrative Statements   I have spent a total time of 45 minutes in caring for this patient on the day of the visit/encounter including Diagnostic results.    Maximo Zaidi D.O.  PGY-5, Pulmonary/Critical Care  [facility]

## 2024-12-10 NOTE — ASSESSMENT & PLAN NOTE
Patient has a history of mediastinal lymphadenopathy, left hilar mass that underwent EBUS sampling on 12/9/2024 and was found to have atypical, small round cells at station 7 that were concerning for lymphoma.  The patient was admitted overnight for observation given her advanced age, timing of anesthesia, and and back problems.  This morning, the patient was noted to be slightly hypoxic with SpO2 in the 80s on room air.  Patient is not short of breath.  The postobstructive atelectasis is likely in the setting of recent sampling and possibly old blood that is there  Incentive spirometry, flutter valve, airway clearance, out of bed to chair  Amatory pulse ox prior to discharge to see if the patient qualifies for oxygen  Can continue diuresis conservatively, there is also cephalization seen on her chest x-ray  Patient would likely benefit from rehab

## 2024-12-10 NOTE — ASSESSMENT & PLAN NOTE
Lab Results   Component Value Date    HGBA1C 6.6 (H) 12/09/2024       Recent Labs     12/09/24  1710 12/09/24  2123 12/10/24  0736 12/10/24  1102   POCGLU 116 238* 149* 221*       Blood Sugar Average: Last 72 hrs:  (P) 173.6    Hold hold metformin while inpatient  Monitor Accu-Cheks  Hypoglycemia

## 2024-12-10 NOTE — PLAN OF CARE
Problem: SAFETY ADULT  Goal: Patient will remain free of falls  Description: INTERVENTIONS:  - Educate patient/family on patient safety including physical limitations  - Instruct patient to call for assistance with activity   - Consult OT/PT to assist with strengthening/mobility   - Keep Call bell within reach  - Keep bed low and locked with side rails adjusted as appropriate  - Keep care items and personal belongings within reach  - Initiate and maintain comfort rounds  - Make Fall Risk Sign visible to staff  - Offer Toileting every  Hours, in advance of need  - Initiate/Maintain alarm  - Obtain necessary fall risk management equipment:   Problem: DISCHARGE PLANNING  Goal: Discharge to home or other facility with appropriate resources  Description: INTERVENTIONS:  - Identify barriers to discharge w/patient and caregiver  - Arrange for needed discharge resources and transportation as appropriate  - Identify discharge learning needs (meds, wound care, etc.)  - Arrange for interpretive services to assist at discharge as needed  - Refer to Case Management Department for coordinating discharge planning if the patient needs post-hospital services based on physician/advanced practitioner order or complex needs related to functional status, cognitive ability, or social support system  Outcome: Progressing     - Apply yellow socks and bracelet for high fall risk patients  - Consider moving patient to room near nurses station  Outcome: Progressing

## 2024-12-10 NOTE — RESPIRATORY THERAPY NOTE
12/10/24 1430   Respiratory Protocol   Protocol Initiated? Yes   Protocol Selection Airway Clearance   Language Barrier? No   Medical & Social History Reviewed? Yes   Diagnostic Studies Reviewed? Yes   Physical Assessment Performed? Yes   Airway Clearance Plan Flutter   Respiratory Assessment   Assessment Type Assess only   General Appearance Alert   Respiratory Pattern Normal   Chest Assessment Chest expansion symmetrical   Bilateral Breath Sounds Clear;Diminished   Cough Congested;Non-productive   Resp Comments Flutter device requested by pulmonary, initiated by respiratory. well tolerated.   O2 Device ra   Additional Assessments   SpO2 (!) 89 %

## 2024-12-10 NOTE — ASSESSMENT & PLAN NOTE
Patient was 86% on room air 12/10  Patient does not wear O2 at home  Chest x-ray 12/10 - Worsening consolidation at the peripheral left midlung field likely reflects progression of postobstructive left upper lobe pneumonia/atelectasis in this patient with a central perihilar mass. Small bilateral costophrenic angle effusions   Discussed with pulmonary -will try low-dose of IV diuretic and monitor response.  Will discuss need for formal consultation

## 2024-12-10 NOTE — PLAN OF CARE
Problem: OCCUPATIONAL THERAPY ADULT  Goal: Performs self-care activities at highest level of function for planned discharge setting.  See evaluation for individualized goals.  Description: Treatment Interventions: ADL retraining, Functional transfer training, Endurance training, Patient/family training, Continued evaluation, Energy conservation          See flowsheet documentation for full assessment, interventions and recommendations.   Outcome: Progressing  Note: Limitation: Decreased ADL status, Decreased endurance, Decreased self-care trans, Decreased high-level ADLs  Prognosis: Good  Assessment: Pt is a 84 y.o. female who was admitted to [facility] on 12/9/2024 with Abnormal CT of the chest, s/p elective EBUS for further workup. Pt seen for an OT evaluation per active OT orders.  Pt  has a past medical history of Anxiety, Atrial fibrillation (HCC), Bowel obstruction (HCC), Cancer (HCC), Depression, Diabetes mellitus (HCC), Disease of thyroid gland, Hypertension, and Hypothyroidism. Pt lives in a 1 level apt with tub shower with bench, standard toilet. Pta, pt was independent w/ ADL/IADL and functional mobility, was (-) driving and was using SPC vs RW at baseline. Currently, pt is Supervision for UB ADL, Min Ax1 for LB ADL, and completed transfers/FM w Supervision with RW. Pt currently presents with impairments in the following categories -limited home support and difficulty performing ADLS activity tolerance and endurance. These impairments, as well as pt's fatigue, SOB, BURNS, and risk for falls  limit pt's ability to safely engage in all baseline areas of occupation, includinggrooming, bathing, dressing, toileting, and functional mobility/transfers.    The patient's raw score on the -PAC Daily Activity Inpatient Short Form is 20. A raw score of greater than or equal to 19 suggests the patient may benefit from discharge to home. Please refer to the recommendation of the Occupational Therapist for  safe discharge planning. Pt would benefit from continued acute OT services throughout hospital course and following D/C. Plan for OT interventions 2-3x per week. From OT standpoint, recommend home with increased social support, level III services pending progress and support at home upon D/C. Pt was left supine in bed with alarm on and all needs within reach.     Rehab Resource Intensity Level, OT: III (Minimum Resource Intensity) (pending progress and support at home)

## 2024-12-10 NOTE — ASSESSMENT & PLAN NOTE
Noted to be hypoxic on room air into the high 80s today after her procedure yesterday.  This is likely a combination of fluid versus atelectasis versus hypoventilation  Incentive spirometry, flutter valve, airway clearance  Out of bed to chair  PT/OT  Ambulatory pulse ox prior to discharge  Patient may benefit from rehab after discharge

## 2024-12-11 DIAGNOSIS — R91.8 OTHER NONSPECIFIC ABNORMAL FINDING OF LUNG FIELD: ICD-10-CM

## 2024-12-11 DIAGNOSIS — R93.89 ABNORMAL CT OF THE CHEST: Primary | ICD-10-CM

## 2024-12-11 LAB
ANION GAP SERPL CALCULATED.3IONS-SCNC: 5 MMOL/L (ref 4–13)
BUN SERPL-MCNC: 30 MG/DL (ref 5–25)
CALCIUM SERPL-MCNC: 9 MG/DL (ref 8.4–10.2)
CHLORIDE SERPL-SCNC: 100 MMOL/L (ref 96–108)
CO2 SERPL-SCNC: 30 MMOL/L (ref 21–32)
CREAT SERPL-MCNC: 1.25 MG/DL (ref 0.6–1.3)
GFR SERPL CREATININE-BSD FRML MDRD: 39 ML/MIN/1.73SQ M
GLUCOSE SERPL-MCNC: 128 MG/DL (ref 65–140)
GLUCOSE SERPL-MCNC: 129 MG/DL (ref 65–140)
GLUCOSE SERPL-MCNC: 134 MG/DL (ref 65–140)
GLUCOSE SERPL-MCNC: 153 MG/DL (ref 65–140)
POTASSIUM SERPL-SCNC: 4.2 MMOL/L (ref 3.5–5.3)
SCAN RESULT: NORMAL
SODIUM SERPL-SCNC: 135 MMOL/L (ref 135–147)

## 2024-12-11 PROCEDURE — 80048 BASIC METABOLIC PNL TOTAL CA: CPT | Performed by: PHYSICIAN ASSISTANT

## 2024-12-11 PROCEDURE — 99232 SBSQ HOSP IP/OBS MODERATE 35: CPT | Performed by: PHYSICIAN ASSISTANT

## 2024-12-11 PROCEDURE — 94668 MNPJ CHEST WALL SBSQ: CPT

## 2024-12-11 PROCEDURE — 82948 REAGENT STRIP/BLOOD GLUCOSE: CPT

## 2024-12-11 PROCEDURE — 97116 GAIT TRAINING THERAPY: CPT

## 2024-12-11 PROCEDURE — 97530 THERAPEUTIC ACTIVITIES: CPT

## 2024-12-11 PROCEDURE — 97535 SELF CARE MNGMENT TRAINING: CPT

## 2024-12-11 RX ADMIN — OXYCODONE HYDROCHLORIDE AND ACETAMINOPHEN 500 MG: 500 TABLET ORAL at 08:38

## 2024-12-11 RX ADMIN — LIDOCAINE 2 PATCH: 50 PATCH CUTANEOUS at 08:37

## 2024-12-11 RX ADMIN — LORAZEPAM 0.5 MG: 0.5 TABLET ORAL at 16:42

## 2024-12-11 RX ADMIN — POLYETHYLENE GLYCOL 3350 17 G: 17 POWDER, FOR SOLUTION ORAL at 08:37

## 2024-12-11 RX ADMIN — METHOCARBAMOL 500 MG: 500 TABLET ORAL at 08:38

## 2024-12-11 RX ADMIN — CLONIDINE HYDROCHLORIDE 0.1 MG: 0.1 TABLET ORAL at 08:41

## 2024-12-11 RX ADMIN — FERROUS SULFATE TAB 325 MG (65 MG ELEMENTAL FE) 325 MG: 325 (65 FE) TAB at 08:38

## 2024-12-11 RX ADMIN — LABETALOL HYDROCHLORIDE 200 MG: 200 TABLET, FILM COATED ORAL at 08:38

## 2024-12-11 RX ADMIN — Medication 1 TABLET: at 08:38

## 2024-12-11 RX ADMIN — AMLODIPINE BESYLATE 10 MG: 10 TABLET ORAL at 08:38

## 2024-12-11 RX ADMIN — LORAZEPAM 0.5 MG: 0.5 TABLET ORAL at 08:38

## 2024-12-11 RX ADMIN — LISINOPRIL 20 MG: 20 TABLET ORAL at 08:39

## 2024-12-11 RX ADMIN — INSULIN LISPRO 1 UNITS: 100 INJECTION, SOLUTION INTRAVENOUS; SUBCUTANEOUS at 16:45

## 2024-12-11 RX ADMIN — CLONIDINE HYDROCHLORIDE 0.1 MG: 0.1 TABLET ORAL at 16:43

## 2024-12-11 RX ADMIN — APIXABAN 2.5 MG: 2.5 TABLET, FILM COATED ORAL at 16:42

## 2024-12-11 RX ADMIN — ATORVASTATIN CALCIUM 40 MG: 40 TABLET, FILM COATED ORAL at 08:38

## 2024-12-11 RX ADMIN — NICOTINE 1 PATCH: 14 PATCH, EXTENDED RELEASE TRANSDERMAL at 08:37

## 2024-12-11 RX ADMIN — APIXABAN 2.5 MG: 2.5 TABLET, FILM COATED ORAL at 08:38

## 2024-12-11 RX ADMIN — METHOCARBAMOL 500 MG: 500 TABLET ORAL at 21:10

## 2024-12-11 RX ADMIN — LEVOTHYROXINE SODIUM 100 MCG: 100 TABLET ORAL at 06:26

## 2024-12-11 RX ADMIN — LABETALOL HYDROCHLORIDE 200 MG: 200 TABLET, FILM COATED ORAL at 21:10

## 2024-12-11 RX ADMIN — Medication 2000 UNITS: at 08:38

## 2024-12-11 RX ADMIN — METHOCARBAMOL 500 MG: 500 TABLET ORAL at 16:42

## 2024-12-11 RX ADMIN — LISINOPRIL 20 MG: 20 TABLET ORAL at 16:43

## 2024-12-11 NOTE — ASSESSMENT & PLAN NOTE
Lab Results   Component Value Date    HGBA1C 6.6 (H) 12/09/2024       Recent Labs     12/10/24  0736 12/10/24  1102 12/11/24  0751 12/11/24  1213   POCGLU 149* 221* 129 128       Blood Sugar Average: Last 72 hrs:  (P) [id number]

## 2024-12-11 NOTE — ASSESSMENT & PLAN NOTE
Malnutrition Findings:      Body mass index is 22.86 kg/m².     In the setting of poor oral intake, low muscle mass/muscle wasting, an chronic illness

## 2024-12-11 NOTE — PLAN OF CARE
Problem: OCCUPATIONAL THERAPY ADULT  Goal: Performs self-care activities at highest level of function for planned discharge setting.  See evaluation for individualized goals.  Description: Treatment Interventions: ADL retraining, Functional transfer training, Endurance training, Patient/family training, Continued evaluation, Energy conservation          See flowsheet documentation for full assessment, interventions and recommendations.   Outcome: Progressing  Note: Limitation: Decreased ADL status, Decreased endurance, Decreased self-care trans, Decreased high-level ADLs  Prognosis: Good  Assessment: Pt seen for skilled OT treatment session on this date w/ interventions focusing on ADL participation, transfer skills, and fxnl mobility. Pt was agreeable and willing to participate in session. Pt engaged in the following tasks: Min A toileting, STS, functional mobility; Supervision bed mobility and grooming. In comparison to previous session, pt demonstrated improvements in ADL participation. Pt required ocassional safety reminders. Pt continues to be functioning below baseline level as occupational performance remains limited by decreased ADL status, decreased activity tolerance, decreased endurance, decreased standing tolerance, decreased standing balance, decreased transfer skills, decreased fxnl mobility, decreased safety awareness, pain, and generalized weakness . The patient's raw score on the AM-PAC Daily Activity Inpatient Short Form is 18. A raw score of less than 19 suggests the patient may benefit from discharge to post-acute rehabilitation services. Please refer to the recommendation of the Occupational Therapist for safe discharge planning. Spoke w/ Pt regarding change in recommendation and d/c planning- Pt states she is open to post-acute rehab. CM updated.  From OT standpoint, recommend Level II (Moderate Resource Intensity) at time of d/c. Pt will benefit from continued OT treatment while in acute  care to address deficits as defined above and maximize level of functional independence with ADLs and functional mobility. Pt left supine in bed w/ alarm on and all needs within reach at end of session.     Rehab Resource Intensity Level, OT: (S) II (Moderate Resource Intensity) (vs level 3)

## 2024-12-11 NOTE — PLAN OF CARE
Problem: PAIN - ADULT  Goal: Verbalizes/displays adequate comfort level or baseline comfort level  Description: Interventions:  - Encourage patient to monitor pain and request assistance  - Assess pain using appropriate pain scale  - Administer analgesics based on type and severity of pain and evaluate response  - Implement non-pharmacological measures as appropriate and evaluate response  - Consider cultural and social influences on pain and pain management  - Notify physician/advanced practitioner if interventions unsuccessful or patient reports new pain  Outcome: Progressing     Problem: SAFETY ADULT  Goal: Patient will remain free of falls  Description: INTERVENTIONS:  - Educate patient/family on patient safety including physical limitations  - Instruct patient to call for assistance with activity   - Consult OT/PT to assist with strengthening/mobility   - Keep Call bell within reach  - Keep bed low and locked with side rails adjusted as appropriate  - Keep care items and personal belongings within reach  - Initiate and maintain comfort rounds  - Make Fall Risk Sign visible to staff  - Offer Toileting every  Hours, in advance of need  - Initiate/Maintain alarm  - Obtain necessary fall risk management equipment:   - Apply yellow socks and bracelet for high fall risk patients  - Consider moving patient to room near nurses station  Outcome: Progressing  Goal: Maintain or return to baseline ADL function  Description: INTERVENTIONS:  -  Assess patient's ability to carry out ADLs; assess patient's baseline for ADL function and identify physical deficits which impact ability to perform ADLs (bathing, care of mouth/teeth, toileting, grooming, dressing, etc.)  - Assess/evaluate cause of self-care deficits   - Assess range of motion  - Assess patient's mobility; develop plan if impaired  - Assess patient's need for assistive devices and provide as appropriate  - Encourage maximum independence but intervene and supervise  when necessary  - Involve family in performance of ADLs  - Assess for home care needs following discharge   - Consider OT consult to assist with ADL evaluation and planning for discharge  - Provide patient education as appropriate  Outcome: Progressing  Goal: Maintains/Returns to pre admission functional level  Description: INTERVENTIONS:  - Perform AM-PAC 6 Click Basic Mobility/ Daily Activity assessment daily.  - Set and communicate daily mobility goal to care team and patient/family/caregiver.   - Collaborate with rehabilitation services on mobility goals if consulted  - Perform Range of [employer] a day.  - Reposition patient every  hours.  - Dangle patient  times a day  - Stand patient  times a day  - Ambulate patient  times a day  - Out of bed to chair  times a day   - Out of bed for meals  times a day  - Out of bed for toileting  - Record patient progress and toleration of activity level   Outcome: Progressing

## 2024-12-11 NOTE — PHYSICAL THERAPY NOTE
Physical Therapy Progress Note     12/11/24 1505   PT Last Visit   PT Visit Date 12/11/24   Note Type   Note Type Treatment   Pain Assessment   Pain Assessment Tool 0-10   Pain Score 2   Pain Location/Orientation Location: Head   Hospital Pain Intervention(s) Repositioned;Emotional support;Ambulation/increased activity   Restrictions/Precautions   Braces or Orthoses LSO   Other Precautions Chair Alarm;Bed Alarm;Fall Risk;Pain;O2  (Alarm active post session.)   Subjective   Subjective The patient notes that she is tired from just walking, and that the patient in the adjoining room as well as staff kept her up all night.   Bed Mobility   Supine to Sit 5  Supervision   Additional items Increased time required   Sit to Supine 5  Supervision   Additional items Increased time required   Transfers   Sit to Stand 4  Minimal assistance   Additional items Assist x 1;Increased time required   Stand to Sit 4  Minimal assistance   Additional items Assist x 1;Increased time required   Ambulation/Elevation   Gait pattern Excessively slow;Short stride;Inconsistent any;Decreased foot clearance;Forward Flexion   Gait Assistance 4  Minimal assist   Additional items Assist x 1;Verbal cues   Assistive Device SPC   Distance 45 feet.   Balance   Static Sitting Good   Dynamic Sitting Fair +   Static Standing Fair -   Dynamic Standing Poor +   Ambulatory Poor +   Activity Tolerance   Activity Tolerance Patient limited by fatigue;Patient tolerated treatment well   Assessment   Prognosis Good   Problem List Decreased strength;Decreased endurance;Impaired balance;Decreased mobility;Impaired judgement;Decreased safety awareness   Assessment The patient was notably limited due to fatigue today, and she was unable to attain household distance with gait today. She did utilize the cane, but she needed assistance and increased time in order to ambulate. She notes that she does not feel near her baseline, and she is worried about being at home  and feeling as she does now.   Barriers to Discharge Inaccessible home environment;Decreased caregiver support   Goals   Patient Goals To get stronger before going home.   STG Expiration Date 12/24/24   PT Treatment Day 1   Plan   Treatment/Interventions Functional transfer training;LE strengthening/ROM;Therapeutic exercise;Endurance training;Patient/family training;Bed mobility;Gait training   Progress Slow progress, decreased activity tolerance   PT Frequency 2-3x/wk   Discharge Recommendation   Rehab Resource Intensity Level, PT (S)  II (Moderate Resource Intensity)   Equipment Recommended Walker   Walker Package Recommended Wheeled walker   AM-PAC Basic Mobility Inpatient   Turning in Flat Bed Without Bedrails 3   Lying on Back to Sitting on Edge of Flat Bed Without Bedrails 3   Moving Bed to Chair 3   Standing Up From Chair Using Arms 3   Walk in Room 3   Climb 3-5 Stairs With Railing 3   Basic Mobility Inpatient Raw Score 18   Basic Mobility Standardized Score 41.05   [facility] Highest Level Of Mobility   JH-HLM Goal 6: Walk 10 steps or more   JH-HLM Achieved 7: Walk 25 feet or more         An AM-PAC Basic Mobility raw score less than 16 suggests the patient may benefit from discharge to post-acute rehab services.    Mariusz De Oliveira, PTA

## 2024-12-11 NOTE — ASSESSMENT & PLAN NOTE
Patient was 86% on room air 12/10 and 12/11 after IV diuretic. Suspect hypoxia in the setting of post obstructive atelectasis  Patient does not wear O2 at home  Chest x-ray 12/10 - Worsening consolidation at the peripheral left midlung field likely reflects progression of postobstructive left upper lobe pneumonia/atelectasis in this patient with a central perihilar mass. Small bilateral costophrenic angle effusions   Pulmonary input appreciated

## 2024-12-11 NOTE — PLAN OF CARE
Problem: PHYSICAL THERAPY ADULT  Goal: Performs mobility at highest level of function for planned discharge setting.  See evaluation for individualized goals.  Description: Treatment/Interventions: ADL retraining, Functional transfer training, LE strengthening/ROM, Elevations, Therapeutic exercise, Endurance training, Patient/family training, Bed mobility, Gait training, Spoke to nursing, Spoke to case management, OT  Equipment Recommended: Walker       See flowsheet documentation for full assessment, interventions and recommendations.  Outcome: Progressing  Note: Prognosis: Good  Problem List: Decreased strength, Decreased endurance, Impaired balance, Decreased mobility, Impaired judgement, Decreased safety awareness  Assessment: The patient was notably limited due to fatigue today, and she was unable to attain household distance with gait today. She did utilize the cane, but she needed assistance and increased time in order to ambulate. She notes that she does not feel near her baseline, and she is worried about being at home and feeling as she does now.  Barriers to Discharge: Inaccessible home environment, Decreased caregiver support     Rehab Resource Intensity Level, PT: (S) II (Moderate Resource Intensity)    See flowsheet documentation for full assessment.

## 2024-12-11 NOTE — ASSESSMENT & PLAN NOTE
Patient was noted to have an abnormal CT chest with pulmonary nodules minus lymphadenopathy and has undergone elective EBUS with pulmonary 12/9  Patient admitted for observation overnight, but then noted to be hypoxic and in need of safe discharge planning  Follow-up on biopsy results as outpatient  Findings suspicious for cancer. Will place an ambulatory referral for Palliative Care to help with goals of care. Daughter appreciative of referral.

## 2024-12-11 NOTE — OCCUPATIONAL THERAPY NOTE
Occupational Therapy Progress Note     Patient Name: Janina Tanner  Today's Date: 12/11/2024  Problem List  Principal Problem:    Abnormal CT of the chest  Active Problems:    Type 2 diabetes mellitus, without long-term current use of insulin (HCC)    Essential hypertension    Hypothyroidism    Ambulatory dysfunction    Paroxysmal A-fib (HCC)    Diabetes (HCC)    Tobacco abuse    Moderate protein-calorie malnutrition     Hypoxia    L2 vertebral fracture ([facility])       12/11/24 1303   OT Last Visit   OT Visit Date 12/11/24   Note Type   Note Type Treatment   Pain Assessment   Pain Assessment Tool 0-10   Pain Score 3   Pain Location/Orientation Location: Generalized;Location: Hip;Location: Leg;Location: Knee   Patient's Stated Pain Goal No pain   Hospital Pain Intervention(s) Repositioned;Ambulation/increased activity   Restrictions/Precautions   Weight Bearing Precautions Per Order No   Braces or Orthoses (S)  LSO   Other Precautions Chair Alarm;Bed Alarm;Multiple lines;O2;Fall Risk;Pain;Spinal precautions;Hard of hearing  (2L O2)   Lifestyle   Autonomy Pta pt I with ADL, IADL and functional mobility, (-) .   Reciprocal Relationships supportive dtr   Service to Others retired- office work   Intrinsic Gratification enjoys watching TV, sewing   ADL   Grooming Assistance 5  Supervision/Setup   Grooming Deficit Supervision/safety;Increased time to complete;Standing with assistive device;Wash/dry hands  (SPC)   Grooming Comments Pt washes hands standing at sink w/ SPC for support.   Toileting Assistance  4  Minimal Assistance   Toileting Deficit Verbal cueing;Supervison/safety;Increased time to complete;Steadying;Grab bar use;Perineal hygiene;Clothing management up;Clothing management down   Toileting Comments Requires assist to transfer to/from standard toilet. VC for GB use. Pt able to complete perineal hygiene while seated. Min A when standing for clothing management.   Functional Standing Tolerance   Time 2  "minutes   Comments Pt tolerates standing for 2 minutes at sink w/ close supervision using SPC for support   Bed Mobility   Supine to Sit Unable to assess   Sit to Supine 5  Supervision   Additional items Increased time required;Verbal cues   Additional Comments Pt greeted ambulating to bathroom w/ PCA. Pt left supine in bed w/ alarm on and all needs within reach   Transfers   Sit to Stand 4  Minimal assistance   Additional items Assist x 1;Increased time required;Verbal cues   Stand to Sit 5  Supervision   Additional items Increased time required;Bed elevated   Toilet transfer 4  Minimal assistance   Additional items Assist x 1;Increased time required;Verbal cues;Standard toilet   Additional Comments w/ SPC   Functional Mobility   Functional Mobility 4  Minimal assistance   Additional Comments Pt completes short household distance mobility w/ Min A using SPC. Occasionally reaches for hand rails. Pt reports increased fatigue today as she did not sleep well last night.   Additional items SPC   Subjective   Subjective \"I think I'll need to turn around now\"   Cognition   Overall Cognitive Status WFL   Arousal/Participation Alert;Responsive;Cooperative   Attention Within functional limits   Orientation Level Oriented X4   Memory Within functional limits   Following Commands Follows one step commands without difficulty   Comments Pt is pleasant and cooperative. Limited today due to fatigue/lack of sleep. VC for safety.   Activity Tolerance   Activity Tolerance Patient limited by fatigue;Patient limited by pain   Medical Staff Made Aware RN cleared; CM updated   Assessment   Assessment Pt seen for skilled OT treatment session on this date w/ interventions focusing on ADL participation, transfer skills, and fxnl mobility. Pt was agreeable and willing to participate in session. Pt engaged in the following tasks: Min A toileting, STS, functional mobility; Supervision bed mobility and grooming. In comparison to previous " session, pt demonstrated improvements in ADL participation. Pt required ocassional safety reminders. Pt continues to be functioning below baseline level as occupational performance remains limited by decreased ADL status, decreased activity tolerance, decreased endurance, decreased standing tolerance, decreased standing balance, decreased transfer skills, decreased fxnl mobility, decreased safety awareness, pain, and generalized weakness . The patient's raw score on the AM-PAC Daily Activity Inpatient Short Form is 18. A raw score of less than 19 suggests the patient may benefit from discharge to post-acute rehabilitation services. Please refer to the recommendation of the Occupational Therapist for safe discharge planning. Spoke w/ Pt regarding change in recommendation and d/c planning- Pt states she is open to post-acute rehab. CM updated.  From OT standpoint, recommend Level II (Moderate Resource Intensity) at time of d/c. Pt will benefit from continued OT treatment while in acute care to address deficits as defined above and maximize level of functional independence with ADLs and functional mobility. Pt left supine in bed w/ alarm on and all needs within reach at end of session.   Plan   Treatment Interventions ADL retraining;Functional transfer training;Endurance training;Patient/family training;Equipment evaluation/education;Compensatory technique education;Continued evaluation;Energy conservation;Activityengagement   Goal Expiration Date 12/24/24   OT Treatment Day 1   OT Frequency 2-3x/wk   Discharge Recommendation   Rehab Resource Intensity Level, OT (S)  II (Moderate Resource Intensity)  (vs level 3)   Additional Comments  (S)  Change in recommendation, CM updated. Spoke w/ Pt this session regarding d/c options. Explained from OT standpoint and given increased fatigue/weakness this session Pt may benefit from post-acute rehab to address ADL participation, mobility, endurance and strength to ensure Pt's  safety and maintain functional indpendence. Pt open to Level 2 referral, states she would like to talk to medical team and CM before making a decision.   AM-PAC Daily Activity Inpatient   Lower Body Dressing 3   Bathing 2   Toileting 3   Upper Body Dressing 3   Grooming 3   Eating 4   Daily Activity Raw Score 18   Daily Activity Standardized Score (Calc for Raw Score >=11) 38.66   AM-PAC Applied Cognition Inpatient   Following a Speech/Presentation 4   Understanding Ordinary Conversation 4   Taking Medications 4   Remembering Where Things Are Placed or Put Away 4   Remembering List of 4-5 Errands 3   Taking Care of Complicated Tasks 3   Applied Cognition Raw Score 22   Applied Cognition Standardized Score 47.83   End of Consult   Education Provided Yes   Patient Position at End of Consult Supine;Bed/Chair alarm activated;All needs within reach   Nurse Communication Nurse aware of consult     LUIS Manuel, OTR/L

## 2024-12-11 NOTE — ASSESSMENT & PLAN NOTE
Lab Results   Component Value Date    HGBA1C 6.6 (H) 12/09/2024       Recent Labs     12/10/24  0736 12/10/24  1102 12/11/24  0751 12/11/24  1213   POCGLU 149* 221* 129 128       Blood Sugar Average: Last 72 hrs:  (P) 160.8881638050884839    Hold hold metformin while inpatient  Monitor Accu-Cheks  Hypoglycemia

## 2024-12-11 NOTE — PROGRESS NOTES
Progress Note - Hospitalist   Name: Janina Tanner 84 y.o. female I MRN: [id number]  Unit/Bed#: -01 I Date of Admission: 12/9/2024   Date of Service: 12/11/2024 I Hospital Day: 1    Assessment & Plan  Abnormal CT of the chest  Patient was noted to have an abnormal CT chest with pulmonary nodules minus lymphadenopathy and has undergone elective EBUS with pulmonary 12/9  Patient admitted for observation overnight, but then noted to be hypoxic and in need of safe discharge planning  Follow-up on biopsy results as outpatient  Findings suspicious for cancer. Will place an ambulatory referral for Palliative Care to help with goals of care. Daughter appreciative of referral.   Hypoxia  Patient was 86% on room air 12/10 and 12/11 after IV diuretic. Suspect hypoxia in the setting of post obstructive atelectasis  Patient does not wear O2 at home  Chest x-ray 12/10 - Worsening consolidation at the peripheral left midlung field likely reflects progression of postobstructive left upper lobe pneumonia/atelectasis in this patient with a central perihilar mass. Small bilateral costophrenic angle effusions   Pulmonary input appreciated  Ambulatory dysfunction  Multifactorial  Safe ambulation  Fall precautions  PT/OT - rehab - daughter and patient agreeable  Type 2 diabetes mellitus, without long-term current use of insulin (HCC)  Lab Results   Component Value Date    HGBA1C 6.6 (H) 12/09/2024       Recent Labs     12/10/24  0736 12/10/24  1102 12/11/24  0751 12/11/24  1213   POCGLU 149* 221* 129 128       Blood Sugar Average: Last 72 hrs:  (P) 160.9518738094528015    Hold hold metformin while inpatient  Monitor Accu-Cheks  Hypoglycemia  Essential hypertension  Continue amlodipine 10 mg p.o. daily, clonidine 0.1 mg twice daily, labetalol 200 mg twice daily, lisinopril 20 mg twice daily  Monitor blood pressures  Avoid hypotension      Hypothyroidism  Continue levothyroxine 100 mcg p.o. daily  Paroxysmal A-fib (HCC)  Continue  labetalol  Eliquis presently on hold resume when cleared by pulmonary  Diabetes (HCC)  Lab Results   Component Value Date    HGBA1C 6.6 (H) 12/09/2024       Recent Labs     12/10/24  0736 12/10/24  1102 12/11/24  0751 12/11/24  1213   POCGLU 149* 221* 129 128       Blood Sugar Average: Last 72 hrs:  (P) 160.6863477197454986    Tobacco abuse  Smoking cessation encouraged  Moderate protein-calorie malnutrition   Malnutrition Findings:      Body mass index is 22.86 kg/m².     In the setting of poor oral intake, low muscle mass/muscle wasting, an chronic illness  L2 vertebral fracture (HCC)  L2 vertebral fracture  Analgesics  Supportive cares  Brace when OOB    VTE Pharmacologic Prophylaxis: VTE Score: 5 High Risk (Score >/= 5) - Pharmacological DVT Prophylaxis Ordered: apixaban (Eliquis). Sequential Compression Devices Ordered.    Mobility:   Basic Mobility Inpatient Raw Score: 18  JH-HLM Goal: 6: Walk 10 steps or more  JH-HLM Achieved: 6: Walk 10 steps or more  JH-HLM Goal achieved. Continue to encourage appropriate mobility.    Patient Centered Rounds: I performed bedside rounds with nursing staff today.   Discussions with Specialists or Other Care Team Provider: case management    Education and Discussions with Family / Patient: Updated  (daughter) via phone.    Current Length of Stay: 1 day(s)  Current Patient Status: Inpatient   Certification Statement: The patient will continue to require additional inpatient hospital stay due to safe discharge planning  Discharge Plan: Anticipate discharge tomorrow to rehab facility.    Code Status: Level 1 - Full Code    Subjective   Feels tired and weak today    Objective :  Temp:  [98.1 °F (36.7 °C)-98.4 °F (36.9 °C)] 98.1 °F (36.7 °C)  HR:  [54-65] 65  BP: (108-172)/(51-68) 172/68  Resp:  [15-18] 18  SpO2:  [89 %-97 %] 95 %  O2 Device: None (Room air)    Body mass index is 22.86 kg/m².     Input and Output Summary (last 24 hours):     Intake/Output Summary  (Last 24 hours) at 12/11/2024 1423  Last data filed at 12/11/2024 1300  Gross per 24 hour   Intake 840 ml   Output 450 ml   Net 390 ml       Physical Exam  Vitals and nursing note reviewed.   Constitutional:       General: She is not in acute distress.     Appearance: She is well-developed.      Comments: Thin with muscle wasting   HENT:      Head: Normocephalic and atraumatic.   Cardiovascular:      Rate and Rhythm: Normal rate and regular rhythm.      Heart sounds: No murmur heard.     No friction rub.   Pulmonary:      Effort: Pulmonary effort is normal. No respiratory distress.      Breath sounds: Normal breath sounds. No wheezing.   Abdominal:      General: Bowel sounds are normal. There is no distension.      Palpations: Abdomen is soft.      Tenderness: There is no abdominal tenderness. There is no guarding or rebound.   Skin:     General: Skin is warm and dry.      Findings: No rash.   Neurological:      Mental Status: She is alert and oriented to person, place, and time.      Cranial Nerves: No cranial nerve deficit.           Lines/Drains:              Lab Results: I have reviewed the following results:   Results from last 7 days   Lab Units 12/10/24  0544   WBC Thousand/uL 7.19   HEMOGLOBIN g/dL 10.2*   HEMATOCRIT % 31.8*   PLATELETS Thousands/uL 234   SEGS PCT % 81*   LYMPHO PCT % 7*   MONO PCT % 11   EOS PCT % 0     Results from last 7 days   Lab Units 12/11/24  0433   SODIUM mmol/L 135   POTASSIUM mmol/L 4.2   CHLORIDE mmol/L 100   CO2 mmol/L 30   BUN mg/dL 30*   CREATININE mg/dL 1.25   ANION GAP mmol/L 5   CALCIUM mg/dL 9.0   GLUCOSE RANDOM mg/dL 134         Results from last 7 days   Lab Units 12/11/24  1213 12/11/24  0751 12/10/24  1102 12/10/24  0736 12/09/24  2123 12/09/24  1710 12/09/24  1326   POC GLUCOSE mg/dl 128 129 221* 149* 238* 116 144*     Results from last 7 days   Lab Units 12/09/24  1940   HEMOGLOBIN A1C % 6.6*           Recent Cultures (last 7 days):   Results from last 7 days   Lab  Units 12/09/24  1553   GRAM STAIN RESULT  1+ Mononuclear Cells  No bacteria seen       Last 24 Hours Medication List:     Current Facility-Administered Medications:     acetaminophen (TYLENOL) tablet 650 mg, Q6H PRN    aluminum-magnesium hydroxide-simethicone (MAALOX) oral suspension 30 mL, Q6H PRN    amLODIPine (NORVASC) tablet 10 mg, Daily    apixaban (ELIQUIS) tablet 2.5 mg, BID    ascorbic acid (VITAMIN C) tablet 500 mg, Daily    atorvastatin (LIPITOR) tablet 40 mg, Daily    Cholecalciferol (VITAMIN D3) tablet 2,000 Units, Daily    cloNIDine (CATAPRES) tablet 0.1 mg, BID    ferrous sulfate tablet 325 mg, Every Other Day    HYDROmorphone HCl (DILAUDID) injection 0.2 mg, Q4H PRN    insulin lispro (HumALOG/ADMELOG) 100 units/mL subcutaneous injection 1-5 Units, TID AC **AND** Fingerstick Glucose (POCT), TID AC    labetalol (NORMODYNE) tablet 200 mg, Q12H YADIRA    levothyroxine tablet 100 mcg, Early Morning    lidocaine (LIDODERM) 5 % patch 2 patch, Daily    lidocaine (PF) (XYLOCAINE-MPF) 2 % injection, PRN    lisinopril (ZESTRIL) tablet 20 mg, BID    LORazepam (ATIVAN) tablet 0.5 mg, BID    methocarbamol (ROBAXIN) tablet 500 mg, TID    multivitamin-minerals (CENTRUM) tablet 1 tablet, Daily    nicotine (NICODERM CQ) 14 mg/24hr TD 24 hr patch 1 patch, Daily    ondansetron (ZOFRAN) injection 4 mg, Q6H PRN    Administrative Statements   Today, Patient Was Seen By: Dominique Mcfarlane PA-C      **Please Note: This note may have been constructed using a voice recognition system.**

## 2024-12-11 NOTE — PLAN OF CARE
Problem: PAIN - ADULT  Goal: Verbalizes/displays adequate comfort level or baseline comfort level  Description: Interventions:  - Encourage patient to monitor pain and request assistance  - Assess pain using appropriate pain scale  - Administer analgesics based on type and severity of pain and evaluate response  - Implement non-pharmacological measures as appropriate and evaluate response  - Consider cultural and social influences on pain and pain management  - Notify physician/advanced practitioner if interventions unsuccessful or patient reports new pain  Outcome: Progressing     Problem: INFECTION - ADULT  Goal: Absence or prevention of progression during hospitalization  Description: INTERVENTIONS:  - Assess and monitor for signs and symptoms of infection  - Monitor lab/diagnostic results  - Monitor all insertion sites, i.e. indwelling lines, tubes, and drains  - Monitor endotracheal if appropriate and nasal secretions for changes in amount and color  - [place] appropriate cooling/warming therapies per order  - Administer medications as ordered  - Instruct and encourage patient and family to use good hand hygiene technique  - Identify and instruct in appropriate isolation precautions for identified infection/condition  Outcome: Progressing  Goal: Absence of fever/infection during neutropenic period  Description: INTERVENTIONS:  - Monitor WBC    Outcome: Progressing     Problem: SAFETY ADULT  Goal: Patient will remain free of falls  Description: INTERVENTIONS:  - Educate patient/family on patient safety including physical limitations  - Instruct patient to call for assistance with activity   - Consult OT/PT to assist with strengthening/mobility   - Keep Call bell within reach  - Keep bed low and locked with side rails adjusted as appropriate  - Keep care items and personal belongings within reach  - Initiate and maintain comfort rounds  - Make Fall Risk Sign visible to staff  - Offer Toileting every 4 Hours,  in advance of need  - Initiate/Maintain 4alarm  - Obtain necessary fall risk management equipment: 4  - Apply yellow socks and bracelet for high fall risk patients  - Consider moving patient to room near nurses station  Outcome: Progressing  Goal: Maintain or return to baseline ADL function  Description: INTERVENTIONS:  -  Assess patient's ability to carry out ADLs; assess patient's baseline for ADL function and identify physical deficits which impact ability to perform ADLs (bathing, care of mouth/teeth, toileting, grooming, dressing, etc.)  - Assess/evaluate cause of self-care deficits   - Assess range of motion  - Assess patient's mobility; develop plan if impaired  - Assess patient's need for assistive devices and provide as appropriate  - Encourage maximum independence but intervene and supervise when necessary  - Involve family in performance of ADLs  - Assess for home care needs following discharge   - Consider OT consult to assist with ADL evaluation and planning for discharge  - Provide patient education as appropriate  Outcome: Progressing  Goal: Maintains/Returns to pre admission functional level  Description: INTERVENTIONS:  - Perform AM-PAC 6 Click Basic Mobility/ Daily Activity assessment daily.  - Set and communicate daily mobility goal to care team and patient/family/caregiver.   - Collaborate with rehabilitation services on mobility goals if consulted  - Perform Range of Motion 4 times a day.  - Reposition patient every 4 hours.  - Dangle patient 4 times a day  - Stand patient 4 times a day  - Ambulate patient 4 times a day  - Out of bed to chair 4 times a day   - Out of bed for meals 4 times a day  - Out of bed for toileting  - Record patient progress and toleration of activity level   Outcome: Progressing     Problem: DISCHARGE PLANNING  Goal: Discharge to home or other facility with appropriate resources  Description: INTERVENTIONS:  - Identify barriers to discharge w/patient and caregiver  -  Arrange for needed discharge resources and transportation as appropriate  - Identify discharge learning needs (meds, wound care, etc.)  - Arrange for interpretive services to assist at discharge as needed  - Refer to Case Management Department for coordinating discharge planning if the patient needs post-hospital services based on physician/advanced practitioner order or complex needs related to functional status, cognitive ability, or social support system  Outcome: Progressing     Problem: Knowledge Deficit  Goal: Patient/family/caregiver demonstrates understanding of disease process, treatment plan, medications, and discharge instructions  Description: Complete learning assessment and assess knowledge base.  Interventions:  - Provide teaching at level of understanding  - Provide teaching via preferred learning methods  Outcome: Progressing

## 2024-12-11 NOTE — CASE MANAGEMENT
Case Management Discharge Planning Note    Patient name Janina Tanner  Location /-01 MRN [id number]  : 1940 Date 2024       Current Admission Date: 2024  Current Admission Diagnosis:Abnormal CT of the chest   Patient Active Problem List    Diagnosis Date Noted Date Diagnosed    Abnormal CT of the chest 2024     L2 vertebral fracture ([facility]) 2024     Palliative care encounter 2024     Pulmonary emphysema ([facility]) 2023     Acute bronchitis 2023     Noncompliance 2023     Urinary tract infection 2023     Acute respiratory failure with hypoxia ([facility]) 2023     Hypoxia      Bronchitis      Closed nondisplaced fracture of fifth left metatarsal bone 2023     Generalized weakness 2022     Chronic anemia 2022     Pain 02/15/2022     Macular degeneration 02/15/2022     Fracture of right tibial plateau 2022     Renal vascular disease 2020     Primary osteoarthritis of right knee 2019     Synovial cyst of right popliteal space 2019     Hemarthrosis of right knee 2019     History of breast cancer 2019     Moderate protein-calorie malnutrition  2019     Asymptomatic bilateral carotid artery stenosis 08/15/2019     High blood pressure      Hypertensive urgency 2019     Hypertension      Hypo-osmolality and hyponatremia 2019     Fall 2019     Chronic hyponatremia 2019     Syncope vs presyncope 2019     Paroxysmal A-fib ([facility]) 2019     Renovascular hypertension 2019     Diabetes ([facility]) 2019     Weight loss 2019     CKD (chronic kidney disease) stage 3, GFR 30-59 ml/min ([facility]) 2019     Iron deficiency anemia due to chronic blood loss 2018     Incisional hernia, without obstruction or gangrene 07/10/2018     Postop check 2018     Delirium 2018     Physical deconditioning 2018     Ambulatory dysfunction 2018     Hx  of fall 02/05/2018     Anxiety 02/05/2018     Acute kidney injury (HCC) 02/02/2018     Hordeolum externum of right upper eyelid 03/08/2017     Malignant neoplasm of right breast, stage 1, estrogen receptor positive (HCC) 02/08/2017     Malignant neoplasm of central portion of right female breast (HCC) 01/13/2017     Tobacco abuse 01/11/2017     Heart palpitations 07/11/2016     Nicotine dependence 07/11/2016     Paroxysmal atrial fibrillation (HCC) 07/11/2016     Type 2 diabetes mellitus, without long-term current use of insulin (HCC) 07/11/2016     Essential hypertension 07/11/2016     Hypothyroidism 07/11/2016     Nontraumatic compression fracture of T4 vertebra (HCC) 10/06/2015     Back pain 09/17/2015     Gastroesophageal reflux disease without esophagitis 02/23/2015     Depression 02/23/2015     History of CEA (carotid endarterectomy) 02/23/2015     Hyperlipidemia 02/23/2015     Hypothyroidism due to acquired atrophy of thyroid 02/23/2015     Vitamin D deficiency 02/23/2015     Varicose vein of leg 02/23/2015     Non-seasonal allergic rhinitis due to pollen 02/23/2015       LOS (days): 1  Geometric Mean LOS (GMLOS) (days): 3  Days to GMLOS:1.9     OBJECTIVE:  Risk of Unplanned Readmission Score: 18.95         Current admission status: Inpatient   Preferred Pharmacy:   [facility] - [place], [place] - [address]  [address]  [place] [address]  Phone: [phone] Fax: [phone]    Primary Care Provider: Darrel Fall DO    Primary Insurance: MEDICARE  Secondary Insurance: BLUE CROSS    DISCHARGE DETAILS:    Discharge planning discussed with:: pt at bedside, daughter Geena via TC    Contacts  Patient Contacts: daughter Geena Rodriguez  Relationship to Patient:: Family  Contact Method: Phone  Phone Number: [phone]  Reason/Outcome: Referral, Discharge Planning    Other Referral/Resources/Interventions Provided:  Interventions: Short Term Rehab, [facility]  Referral  Comments: Per [facility] communication [facility] accepted for [facility] of SN/PT services at home. Daughter Geena with concerns with home d/c due to limited support and current functional status. Daughter requesting pt attempt STR prior to returning home. Per discussion with therapy pt rec for Level 3 pending home support, STR would be appropriate vs home it pt would be interested. Discussed with pt at bedside. Pt in agreement with STR, daughter updated on same. Pt and daughter requesting referral to [facility] as facility is close to daughters home. Daughter states pt may possibly transition to LTC pending rehab goal outcomes.    Discharge Destination Plan:: Short Term Rehab

## 2024-12-12 LAB
BACTERIA SPEC ANAEROBE CULT: NORMAL
GLUCOSE SERPL-MCNC: 143 MG/DL (ref 65–140)
GLUCOSE SERPL-MCNC: 153 MG/DL (ref 65–140)
GLUCOSE SERPL-MCNC: 202 MG/DL (ref 65–140)
SCAN RESULT: NORMAL

## 2024-12-12 PROCEDURE — 94668 MNPJ CHEST WALL SBSQ: CPT

## 2024-12-12 PROCEDURE — 88172 CYTP DX EVAL FNA 1ST EA SITE: CPT | Performed by: STUDENT IN AN ORGANIZED HEALTH CARE EDUCATION/TRAINING PROGRAM

## 2024-12-12 PROCEDURE — 88342 IMHCHEM/IMCYTCHM 1ST ANTB: CPT | Performed by: STUDENT IN AN ORGANIZED HEALTH CARE EDUCATION/TRAINING PROGRAM

## 2024-12-12 PROCEDURE — 94664 DEMO&/EVAL PT USE INHALER: CPT

## 2024-12-12 PROCEDURE — 88341 IMHCHEM/IMCYTCHM EA ADD ANTB: CPT | Performed by: STUDENT IN AN ORGANIZED HEALTH CARE EDUCATION/TRAINING PROGRAM

## 2024-12-12 PROCEDURE — 88305 TISSUE EXAM BY PATHOLOGIST: CPT | Performed by: STUDENT IN AN ORGANIZED HEALTH CARE EDUCATION/TRAINING PROGRAM

## 2024-12-12 PROCEDURE — 94760 N-INVAS EAR/PLS OXIMETRY 1: CPT

## 2024-12-12 PROCEDURE — 82948 REAGENT STRIP/BLOOD GLUCOSE: CPT

## 2024-12-12 PROCEDURE — 99223 1ST HOSP IP/OBS HIGH 75: CPT | Performed by: INTERNAL MEDICINE

## 2024-12-12 PROCEDURE — 99232 SBSQ HOSP IP/OBS MODERATE 35: CPT | Performed by: INTERNAL MEDICINE

## 2024-12-12 PROCEDURE — 88173 CYTOPATH EVAL FNA REPORT: CPT | Performed by: STUDENT IN AN ORGANIZED HEALTH CARE EDUCATION/TRAINING PROGRAM

## 2024-12-12 PROCEDURE — 99232 SBSQ HOSP IP/OBS MODERATE 35: CPT

## 2024-12-12 PROCEDURE — 88360 TUMOR IMMUNOHISTOCHEM/MANUAL: CPT | Performed by: STUDENT IN AN ORGANIZED HEALTH CARE EDUCATION/TRAINING PROGRAM

## 2024-12-12 RX ORDER — GUAIFENESIN 600 MG/1
600 TABLET, EXTENDED RELEASE ORAL EVERY 12 HOURS SCHEDULED
Status: DISCONTINUED | OUTPATIENT
Start: 2024-12-12 | End: 2024-12-13 | Stop reason: HOSPADM

## 2024-12-12 RX ADMIN — LORAZEPAM 0.5 MG: 0.5 TABLET ORAL at 08:28

## 2024-12-12 RX ADMIN — INSULIN LISPRO 1 UNITS: 100 INJECTION, SOLUTION INTRAVENOUS; SUBCUTANEOUS at 18:41

## 2024-12-12 RX ADMIN — METHOCARBAMOL 500 MG: 500 TABLET ORAL at 08:28

## 2024-12-12 RX ADMIN — Medication 2000 UNITS: at 08:28

## 2024-12-12 RX ADMIN — LABETALOL HYDROCHLORIDE 200 MG: 200 TABLET, FILM COATED ORAL at 22:24

## 2024-12-12 RX ADMIN — ATORVASTATIN CALCIUM 40 MG: 40 TABLET, FILM COATED ORAL at 08:28

## 2024-12-12 RX ADMIN — METHOCARBAMOL 500 MG: 500 TABLET ORAL at 18:41

## 2024-12-12 RX ADMIN — LISINOPRIL 20 MG: 20 TABLET ORAL at 08:28

## 2024-12-12 RX ADMIN — GUAIFENESIN 600 MG: 600 TABLET, EXTENDED RELEASE ORAL at 22:24

## 2024-12-12 RX ADMIN — GUAIFENESIN 600 MG: 600 TABLET, EXTENDED RELEASE ORAL at 01:10

## 2024-12-12 RX ADMIN — LABETALOL HYDROCHLORIDE 200 MG: 200 TABLET, FILM COATED ORAL at 08:28

## 2024-12-12 RX ADMIN — LORAZEPAM 0.5 MG: 0.5 TABLET ORAL at 18:41

## 2024-12-12 RX ADMIN — METHOCARBAMOL 500 MG: 500 TABLET ORAL at 22:24

## 2024-12-12 RX ADMIN — Medication 1 TABLET: at 08:28

## 2024-12-12 RX ADMIN — CLONIDINE HYDROCHLORIDE 0.1 MG: 0.1 TABLET ORAL at 18:41

## 2024-12-12 RX ADMIN — CLONIDINE HYDROCHLORIDE 0.1 MG: 0.1 TABLET ORAL at 08:28

## 2024-12-12 RX ADMIN — GUAIFENESIN 600 MG: 600 TABLET, EXTENDED RELEASE ORAL at 08:37

## 2024-12-12 RX ADMIN — AMLODIPINE BESYLATE 10 MG: 10 TABLET ORAL at 08:28

## 2024-12-12 RX ADMIN — LISINOPRIL 20 MG: 20 TABLET ORAL at 18:40

## 2024-12-12 RX ADMIN — APIXABAN 2.5 MG: 2.5 TABLET, FILM COATED ORAL at 08:28

## 2024-12-12 RX ADMIN — LEVOTHYROXINE SODIUM 100 MCG: 100 TABLET ORAL at 05:41

## 2024-12-12 RX ADMIN — APIXABAN 2.5 MG: 2.5 TABLET, FILM COATED ORAL at 18:41

## 2024-12-12 RX ADMIN — OXYCODONE HYDROCHLORIDE AND ACETAMINOPHEN 500 MG: 500 TABLET ORAL at 08:28

## 2024-12-12 RX ADMIN — INSULIN LISPRO 1 UNITS: 100 INJECTION, SOLUTION INTRAVENOUS; SUBCUTANEOUS at 08:28

## 2024-12-12 NOTE — CONSULTS
[facility]  Consultation Note  Encounter: [id number]     PATIENT INFO     Name: Janina Tanner  YOB: 1940   Age: 84 y.o.   Sex: female   MRN: [id number]  Unit/Bed#: -01     REASON FOR CONSULTATION   *Admitted for back pain/L2 fracture  *CT of the chest abdomen and pelvis indicated new mediastinal lymphadenopathy, left hilar mass status post EBUS (12/9/2024)  *Lymph Node, (Lvl 7): Metastatic carcinoma. High-grade carcinoma with neuroendocrine differentiation, favor small cell carcinoma  *left hilar mass, high-grade carcinoma with neuroendocrine differentiation, favor small cell carcinoma   *Goals of care conversation  *Oncology was consulted regarding treatment options/staging/prognosis.     ASSESSMENT & PLAN     Janina Tanner is a 84 y.o. female with history significant for breast cancer status post bilateral mastectomy, A-fib, hypertension, hypothyroidism, tobacco abuse was admitted on account of progressively worsening back pain and was found to have lumbar fracture.  Further imaging indicated lung mass/mediastinal lymphadenopathy status post EBUS biopsy which indicated small cell lung cancer.  We had a detailed discussion with the patient regarding staging, limited versus extensive stage small cell lung cancer.  Prognosis.  At this point patient decided that she does not want any chemotherapy or radiation and is electing towards hospice care.  Had a discussion with daughter over the phone.  Consult hospice palliative care for further management.       HISTORY OF PRESENT ILLNESS       Janina Tanner is a 84 y.o. 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  Imaging indicated L2 fracture and mediastinal lymphadenopathy, left hilar mass s/p EBUS (12/9/2024) indicated small cell cancer and lymph node [level 7]/left hilar mass.    Oncology was consulted regarding treatment options/staging/prognosis.  Patient currently on supplemental oxygen due to low oxygen saturation.  Currently has ECOG status of 3.  Lives by herself at a senior living facility.  Patient does not currently smoke but has an extensive smoking history in the past.       REVIEW OF SYSTEMS     CONSTITUTIONAL: +weight loss, Denies any fever, chills, rigors  HEENT: No earache or tinnitus, denies hearing loss or visual disturbances  CARDIOVASCULAR: No chest pain or palpitations  RESPIRATORY: +SOB, +cough, + on exertion   GASTROINTESTINAL: no constipation or diarrhea  GENITOURINARY: No problems with urination, denies any hematuria or dysuria  NEUROLOGIC: No dizziness or vertigo, denies headaches   MUSCULOSKELETAL: Denies any muscle or joint pain   SKIN: Denies skin rashes or itching      Historical Information   Past Medical History:   Diagnosis Date    Anxiety     Atrial fibrillation (HCC)     Bowel obstruction (HCC)     Cancer (HCC)     LEFT BREAST CA 22 YEARS AGO     Depression     Diabetes mellitus (HCC)     Disease of thyroid gland     Hypertension     Hypothyroidism      Past Surgical History:   Procedure Laterality Date    ABDOMINAL ADHESION SURGERY N/A 2/4/2018    Procedure: LYSIS ADHESIONS;  Surgeon: Kirk Huang DO;  Location: [facility];  Service: General    BREAST SURGERY      CHOLECYSTECTOMY      COLON SURGERY  2017    EXPLORATORY LAPAROTOMY      JOINT REPLACEMENT      LEFT KNEE REPLACEMENT     LAPAROTOMY N/A 2/4/2018    Procedure: LAPAROTOMY EXPLORATORY,;  Surgeon: Kirk Huang DO;  [facility];  Service: General    MASTECTOMY      MASTECTOMY Left 1995    MASTECTOMY Right 2017    ME BX/EXC LYMPH NODE OPEN SUPERFICIAL  Right 1/13/2017    Procedure: SENTINEL LYMPH NODE BIOPSY RIGHT AXILLA;  Surgeon: Dago Thapa MD;  Location: [facility];  Service: General    KY MASTECTOMY SIMPLE COMPLETE Right 1/13/2017    Procedure: MASTECTOMY SIMPLE;  Surgeon: Dago Thapa MD;  [facility];  Service: General    SMALL INTESTINE SURGERY N/A 2/4/2018    Procedure: RESECTION SMALL BOWEL;  Surgeon: Kirk Huang DO;  [facility];  Service: General    US GUIDED BREAST BIOPSY RIGHT COMPLETE Right 11/29/2016     Social History   Social History     Substance and Sexual Activity   Alcohol Use Never     Social History     Substance and Sexual Activity   Drug Use Never     Social History     Tobacco Use   Smoking Status Every Day    Current packs/day: 1.00    Average packs/day: 1 pack/day for 64.9 years (64.9 ttl pk-yrs)    Types: Cigarettes    Start date: 1960   Smokeless Tobacco Never     Family History   Problem Relation Age of Onset    Cancer Mother     No Known Problems Father         MEDICATIONS & ALLERGIES     Meds/Allergies     Medications Prior to Admission:     ascorbic acid (VITAMIN C) 500 mg tablet    atorvastatin (LIPITOR) 40 mg tablet    cholecalciferol (VITAMIN D3) 1,000 units tablet    cloNIDine (CATAPRES) 0.1 mg tablet    labetalol (NORMODYNE) 200 mg tablet    levothyroxine 100 mcg tablet    lisinopril (ZESTRIL) 20 mg tablet    LORazepam (ATIVAN) 0.5 mg tablet    metFORMIN (GLUCOPHAGE) 500 mg tablet    Multiple Vitamins-Minerals (PRESERVISION AREDS 2 PO)    multivitamin (THERAGRAN) TABS    senna (SENOKOT) 8.6 mg    acetaminophen (TYLENOL) 325 mg tablet    amLODIPine (NORVASC) 5 mg tablet    [Paused] apixaban (ELIQUIS) 2.5 mg    apixaban (Eliquis) 2.5 mg    azelastine (ASTELIN) 0.1 % nasal spray    ferrous sulfate 325 (65 Fe) mg tablet    Lidocaine 4 % PTCH    methocarbamol (ROBAXIN) 500 mg tablet    nicotine (NICODERM CQ) 21 mg/24 hr TD 24 hr patch    Current Facility-Administered Medications:     acetaminophen  "(TYLENOL) tablet 650 mg, Q6H PRN    aluminum-magnesium hydroxide-simethicone (MAALOX) oral suspension 30 mL, Q6H PRN    amLODIPine (NORVASC) tablet 10 mg, Daily    apixaban (ELIQUIS) tablet 2.5 mg, BID    ascorbic acid (VITAMIN C) tablet 500 mg, Daily    atorvastatin (LIPITOR) tablet 40 mg, Daily    Cholecalciferol (VITAMIN D3) tablet 2,000 Units, Daily    cloNIDine (CATAPRES) tablet 0.1 mg, BID    ferrous sulfate tablet 325 mg, Every Other Day    guaiFENesin (MUCINEX) 12 hr tablet 600 mg, Q12H YADIRA    HYDROmorphone HCl (DILAUDID) injection 0.2 mg, Q4H PRN    insulin lispro (HumALOG/ADMELOG) 100 units/mL subcutaneous injection 1-5 Units, TID AC **AND** Fingerstick Glucose (POCT), TID AC    labetalol (NORMODYNE) tablet 200 mg, Q12H YADIRA    levothyroxine tablet 100 mcg, Early Morning    lidocaine (LIDODERM) 5 % patch 2 patch, Daily    lidocaine (PF) (XYLOCAINE-MPF) 2 % injection, PRN    lisinopril (ZESTRIL) tablet 20 mg, BID    LORazepam (ATIVAN) tablet 0.5 mg, BID    methocarbamol (ROBAXIN) tablet 500 mg, TID    multivitamin-minerals (CENTRUM) tablet 1 tablet, Daily    nicotine (NICODERM CQ) 14 mg/24hr TD 24 hr patch 1 patch, Daily    ondansetron (ZOFRAN) injection 4 mg, Q6H PRN  Allergies   Allergen Reactions    Meloxicam GI Intolerance    Montelukast Other (See Comments)     headache    Morphine GI Intolerance    Morphine     Penicillins     Percocet [Oxycodone-Acetaminophen]     Sitagliptin      SOB        PHYSICAL EXAM     Objective   Blood pressure (!) 185/74, pulse 63, temperature 97.5 °F (36.4 °C), resp. rate 18, height 5' 2\" (1.575 m), weight 56.7 kg (125 lb), last menstrual period 01/12/1980, SpO2 98%, not currently breastfeeding. Body mass index is 22.86 kg/m².    Intake/Output Summary (Last 24 hours) at 12/12/2024 1127  Last data filed at 12/12/2024 0900  Gross per 24 hour   Intake 420 ml   Output 200 ml   Net 220 ml     Medication Administration - last 24 hours from 12/11/2024 1127 to 12/12/2024 1127        "  Date/Time Order Dose Route Action Action by     12/12/2024 0828 EST ascorbic acid (VITAMIN C) tablet 500 mg 500 mg Oral Given Paula Maldonado RN     12/12/2024 0828 EST LORazepam (ATIVAN) tablet 0.5 mg 0.5 mg Oral Given Paula Maldonado RN     12/11/2024 1642 EST LORazepam (ATIVAN) tablet 0.5 mg 0.5 mg Oral Given Dago Cooney RN     12/12/2024 0541 EST levothyroxine tablet 100 mcg 100 mcg Oral Given Kavitha Hernandez RN     12/12/2024 0828 EST Cholecalciferol (VITAMIN D3) tablet 2,000 Units 2,000 Units Oral Given Paula Maldonado RN     12/12/2024 0828 EST labetalol (NORMODYNE) tablet 200 mg 200 mg Oral Given Paula Maldonado RN     12/11/2024 2110 EST labetalol (NORMODYNE) tablet 200 mg 200 mg Oral Given Kavitha Hernandez RN     12/12/2024 0828 EST atorvastatin (LIPITOR) tablet 40 mg 40 mg Oral Given Paula Maldonado RN     12/12/2024 0828 EST lisinopril (ZESTRIL) tablet 20 mg 20 mg Oral Given Paula Maldonado RN     12/11/2024 1643 EST lisinopril (ZESTRIL) tablet 20 mg 20 mg Oral Given Dago Cooney RN     12/12/2024 0828 EST amLODIPine (NORVASC) tablet 10 mg 10 mg Oral Given Paula Maldonado RN     12/12/2024 0828 EST cloNIDine (CATAPRES) tablet 0.1 mg 0.1 mg Oral Given Paula Maldonado RN     12/11/2024 1643 EST cloNIDine (CATAPRES) tablet 0.1 mg 0.1 mg Oral Given Dago Cooney RN     12/12/2024 0828 EST methocarbamol (ROBAXIN) tablet 500 mg 500 mg Oral Given Paula Maldonado RN     12/11/2024 2110 EST methocarbamol (ROBAXIN) tablet 500 mg 500 mg Oral Given Kavitha Hernandez RN     12/11/2024 1642 EST methocarbamol (ROBAXIN) tablet 500 mg 500 mg Oral Given Dago Cooney RN     12/12/2024 0828 EST apixaban (ELIQUIS) tablet 2.5 mg 2.5 mg Oral Given Paula Maldonado RN     12/11/2024 1642 EST apixaban (ELIQUIS) tablet 2.5 mg 2.5 mg Oral Given Dago Cooney RN     12/12/2024 0828 EST multivitamin-minerals (CENTRUM) tablet 1 tablet 1 tablet Oral Given Paula Maldonado RN     12/12/2024 0828 EST lidocaine (LIDODERM) 5 % patch 2 patch 2 patch  Topical Not Given Paula Maldonado RN     12/11/2024 2058 EST lidocaine (LIDODERM) 5 % patch 2 patch 2 patch Topical Patch Removed Kavitha Hernandez RN     12/12/2024 0834 EST nicotine (NICODERM CQ) 14 mg/24hr TD 24 hr patch 1 patch 1 patch Transdermal Not Given Paula Maldonado RN     12/12/2024 0833 EST nicotine (NICODERM CQ) 14 mg/24hr TD 24 hr patch 1 patch 1 patch Transdermal Patch Removed Paula Maldonado RN     12/12/2024 0828 EST insulin lispro (HumALOG/ADMELOG) 100 units/mL subcutaneous injection 1-5 Units 1 Units Subcutaneous Given Paula Maldonado RN     12/11/2024 1645 EST insulin lispro (HumALOG/ADMELOG) 100 units/mL subcutaneous injection 1-5 Units 1 Units Subcutaneous Given Dago Cooney RN     12/11/2024 1216 EST insulin lispro (HumALOG/ADMELOG) 100 units/mL subcutaneous injection 1-5 Units -- Subcutaneous Not Given Dago Cooney RN     12/12/2024 0837 EST guaiFENesin (MUCINEX) 12 hr tablet 600 mg 600 mg Oral Given Paula Maldonado RN     12/12/2024 0110 EST guaiFENesin (MUCINEX) 12 hr tablet 600 mg 600 mg Oral Given Kavitha Hernandez RN            General Appearance:   Alert, cooperative, mild to moderate distress due to cough, cachectic, fatigue, ECOG of 3   HEENT:   Normocephalic, atraumatic, anicteric     Lungs:   Equal chest rise, respirations unlabored    Heart:   Regular rate and rhythm   Abdomen:   Soft, non-tender, non-distended; normal bowel sounds; no masses, no organomegaly    Extremities:   No cyanosis, clubbing or edema    Neuro:   Moves all 4 extremities    Skin:   No jaundice, rashes, or lesions      Invasive Devices       Peripheral Intravenous Line  Duration             Peripheral IV 12/09/24 Dorsal (posterior);Left Hand 3 days                     LABORATORY RESULTS     Admission on 12/09/2024   Component Date Value    POC Glucose 12/09/2024 144 (H)     Case Report 12/09/2024                      Value:Non-gynecologic Cytology                          Case: [id number]                                   Authorizing Provider:  Sal Zurita DO   Collected:           12/09/2024 1536              Ordering Location:     [facility]      Received:            12/09/2024 1651                                     Hospital Operating Room                                                      Pathologist:           Cade Rodriguez MD                                                          Specimens:   A) - Lymph Node, Level 7                                                                            B) - Lymph Node, level 7                                                                            C) - Lymph Node, cell block, level 7                                                                D) - Mass, left hilar mass                                                                          E) - Mass, cell block                                                                      Final Diagnosis 12/09/2024                      Value:A-B-C. Lymph Node, Level 7: (ThinPrep, smear and cell block preparations)  Metastatic carcinoma.  High-grade carcinoma with neuroendocrine differentiation, favor small cell carcinoma.     Satisfactory for evaluation.     D-E. Mass, left hilar mass: (ThinPrep and cell block preparations)  Positive for malignancy.  High-grade carcinoma with neuroendocrine differentiation, favor small cell carcinoma.      Satisfactory for evaluation.    Comment: The patient's newly identified hilar lymphadenopathy with left hilar mass identified on bronchoscopy is noted. The current sample is positive for carcinoma with neuroendocrine differentiation (CD56 by flow cytometry and synaptophysin by immunohistochemistry). The Ki-67 proliferation index is increased (~70%), although in a cell block preparation this may be an underestimate of the true proliferation index. The overall, findings are compatible with high-grade carcinoma favoring small cell carcinoma. Clinical correlation is advised.                                Intradepartmental consultation is in agreement (IK/RSP).  Dr. Zaidi and Dr. Zurita are notified of the findings by Dr. Rodriguez via [employer] Secure Chat on 12/12/24.    The specimen is being sent to [email or web address] for MI Profile Testing. Upon completion of testing, [employer] report will be sent directly to the requesting provider, as well as posted in the Media Tab of EPIC for this patient by the Pathology Department.        Note 12/09/2024                      Value:Immunohistochemical stains show the neoplastic cells  Express: AE1/3, CAM5.2, TTF-1 (partial), synaptophysin (partial)  Do not express: Chromogranin-a.  Other: Ki-67 proliferation index is increased ~70%.    Flow Cytometry Analysis - Hilar Mass ([employer]ops # [id number]; please see separate report for further details):      Flow Cytometry Analysis - Level 7, Lymph node  ([employer]ops # [id number] ; please see separate report for further details):          Intraoperative Consultat* 12/09/2024                      Value:B. Adequate, atypical lymphoid  appearing cells recommend flow cytometry. Reviewed by Darrel Cosme M.D., Interpretation performed at [facility], [address] [place] [id number]        Gross Description 12/09/2024                      Value:A. 25mL, dark pink, very cloudy, received in CytoLyt     B. 8 slides received ( 4 alcohol, 4 Diff Quik)     C.  Minimal cell button, pink, Due to the (size and/or consistency) of the specimen, it is questionable whether cell block will survive histological processing.    D. 25mL, colorless, very cloudy, received in CytoLyt     E. Minimal cell button, off white, Due to the (size and/or consistency) of the specimen, it is questionable whether cell block will survive histological processing.         Additional Information 12/09/2024                      Value:[employer] FDA approved ,  and ThinPrep Imaging Duo System are utilized with strict adherence to the  's instruction manual to prepare gynecologic and non-gynecologic cytology specimens for the production of ThinPrep slides as well as for gynecologic ThinPrep imaging. These processes have been validated by our laboratory and/or by the .    These tests were developed and their performance characteristics determined by [facility] or [facility]. They may not be cleared or approved by the U.S. Food and Drug Administration. The FDA has determined that such clearance or approval is not necessary. These tests are used for clinical purposes. They should not be regarded as investigational or for research. This laboratory has been approved by CLIA 88, designated as a high-complexity laboratory and is qualified to perform these tests.    Interpretation performed at [facility], [address] [facility] [address]          Anaerobic Culture 12/09/2024 No anaerobes isolated     Tissue Culture 12/09/2024 Few Colonies of Streptococcus mitis oralis group (A)     Gram Stain Result 12/09/2024 1+ Mononuclear Cells     Gram Stain Result 12/09/2024 No bacteria seen     Scan Result 12/09/2024 SEE WRITTEN REPORT     POC Glucose 12/09/2024 116     Platelets 12/09/2024 233     MPV 12/09/2024 9.8     Hemoglobin A1C 12/09/2024 6.6 (H)     EAG 12/09/2024 143     POC Glucose 12/09/2024 238 (H)     Sodium 12/10/2024 135     Potassium 12/10/2024 4.7     Chloride 12/10/2024 97     CO2 12/10/2024 32     ANION GAP 12/10/2024 6     BUN 12/10/2024 30 (H)     Creatinine 12/10/2024 1.31 (H)     Glucose 12/10/2024 167 (H)     Glucose, Fasting 12/10/2024 167 (H)     Calcium 12/10/2024 8.6     eGFR 12/10/2024 36     WBC 12/10/2024 7.19     RBC 12/10/2024 3.20 (L)     Hemoglobin 12/10/2024 10.2 (L)     Hematocrit 12/10/2024 31.8 (L)     MCV 12/10/2024 99 (H)     MCH 12/10/2024 31.9     MCHC 12/10/2024 32.1     RDW 12/10/2024 15.6 (H)     MPV 12/10/2024 10.4     Platelets 12/10/2024 234     nRBC  12/10/2024 1     Segmented % 12/10/2024 81 (H)     Immature Grans % 12/10/2024 1     Lymphocytes % 12/10/2024 7 (L)     Monocytes % 12/10/2024 11     Eosinophils Relative 12/10/2024 0     Basophils Relative 12/10/2024 0     Absolute Neutrophils 12/10/2024 5.77     Absolute Immature Grans 12/10/2024 0.07     Absolute Lymphocytes 12/10/2024 0.52 (L)     Absolute Monocytes 12/10/2024 0.80     Eosinophils Absolute 12/10/2024 0.01     Basophils Absolute 12/10/2024 0.02     POC Glucose 12/10/2024 149 (H)     POC Glucose 12/10/2024 221 (H)     Sodium 12/11/2024 135     Potassium 12/11/2024 4.2     Chloride 12/11/2024 100     CO2 12/11/2024 30     ANION GAP 12/11/2024 5     BUN 12/11/2024 30 (H)     Creatinine 12/11/2024 1.25     Glucose 12/11/2024 134     Calcium 12/11/2024 9.0     eGFR 12/11/2024 39     POC Glucose 12/11/2024 129     POC Glucose 12/11/2024 128     POC Glucose 12/11/2024 153 (H)     POC Glucose 12/12/2024 153 (H)         IMAGING RESULTS     Endobronchial ultrasound (EBUS)  Result Date: 12/10/2024  Narrative: Table formatting from the original result was not included. [facility] Operating Room [address] [place] [address] [phone] DATE OF SERVICE: 12/09/24 PHYSICIAN(S): Attending: Sal Zurita DO Fellow: MD Maximo Pettit DO INDICATION: Hilar adenopathy POST-OP DIAGNOSIS: See the impression below. PREPROCEDURE: Standard airway preparation completed per respiratory therapy protocol.  Informed consent was obtained. Images reviewed prior to the procedure.  A Time Out was performed. No suspicion or identified risk for TB or other airborne infectious disease; bronchoscopy procedure being performed for diagnostic purposes. PROCEDURE: Bronchoscopy DETAILS OF PROCEDURE: Patient was taken to the procedure room where a time out was performed to confirm correct patient and correct procedure. The patient underwent general anesthesia, which was administered by an  anesthesia professional. The patient's blood pressure, ECG, heart rate, oxygen and respirations were monitored throughout the procedure. The patient's estimated blood loss was minimal (<5 mL). The scope was introduced through the laryngeal mask airway. The procedure was not difficult. The patient tolerated the procedure well. There were no apparent adverse events. ANESTHESIA INFORMATION: ASA: III Anesthesia Type: General FINDINGS: The main kelley, right main stem, right upper lobar bronchus, right apical segment (RB1), right posterior segment (RB2), right anterior segment (RB3), bronchus intermedius, right middle lobar bronchus, right lateral segment (RB4), right medial segment (RB5), right lower lobar bronchus, right superior segment (RB6), right medial basal segment (RB7), right anterior basal segment (RB8), right lateral basal segment (RB9) and right posterior basal segment (RB10) appeared normal. Moderate, generalized cobblestone and erythematous mucosa in the left main stem, left upper lobar bronchus, left apical-posterior segment (LB1+2), left upper anterior segment (LB3), lingular lobar bronchus, superior lingular segment (LB4), inferior lingular segment (LB5), left lower lobar bronchus, left anterior basal segment (LB7+8), left lateral basal segment (LB9) and left posterior basal segment (LB10) Non-pulsatile extrinsic compression with moderate displacement and 51-75% obstruction (traversable) in the left upper lobar bronchus, left apical-posterior segment (LB1+2), left upper anterior segment (LB3), lingular lobar bronchus, superior lingular segment (LB4), inferior lingular segment (LB5), left lower lobar bronchus, left superior segment (LB6), left anterior basal segment (LB7+8), left lateral basal segment (LB9) and left posterior basal segment (LB10) with bleeding after intervention Lymph node observed at the subcarinal station (7) measuring 20 mm x 20 mm. Took 16 passes with 19 gauge and 21 gauge needles.  The sample was adequate. Onsite cytologist was present and cytology results were preliminarily atypical. Lymph node observed at the left interlobar station (11L) measuring 3 mm x 3 mm. Took 12 passes with 19 gauge and 21 gauge needles. Onsite cytologist was present and cytology results were preliminarily atypical. SPECIMENS: ID Type Source Tests Collected by Time Destination 1 : Level 7 FNA Lymph Node FINE NEEDLE ASPIRATION/BIOPSY Sal Zurita,  12/9/2024  3:36 PM  2 : Left hilar mass FNA Mass FINE NEEDLE ASPIRATION/BIOPSY Sal Zurita,  12/9/2024  4:03 PM  A : Level 7 Tissue Lymph Node ANAEROBIC CULTURE AND GRAM STAIN, FUNGAL CULTURE, CULTURE, TISSUE AND GRAM STAIN, AFB CULTURE WITH STAIN Sal Zurita,  12/9/2024  3:53 PM  B : Level 7 Tissue Lymph Node LEUKEMIA/LYMPHOMA FLOW CYTOMETRY Sal Zurita,  12/9/2024  3:56 PM  C : Left Hilar Mass Tissue Mass LEUKEMIA/LYMPHOMA FLOW CYTOMETRY Sal Zurita,  12/9/2024  4:15 PM       Impression: The main kelley, right main stem, right upper lobar bronchus, right apical segment (RB1), right posterior segment (RB2), right anterior segment (RB3), bronchus intermedius, right middle lobar bronchus, right lateral segment (RB4), right medial segment (RB5), right lower lobar bronchus, right superior segment (RB6), right medial basal segment (RB7), right anterior basal segment (RB8), right lateral basal segment (RB9) and right posterior basal segment (RB10) appeared normal. Moderate cobblestone, erythematous mucosa in the left main stem, left upper lobar bronchus, left apical-posterior segment (LB1+2), left upper anterior segment (LB3), lingular lobar bronchus, superior lingular segment (LB4), inferior lingular segment (LB5), left lower lobar bronchus, left anterior basal segment (LB7+8), left lateral basal segment (LB9) and left posterior basal segment (LB10) Extrinsic compression with moderate displacement and 51-75% obstruction in  the left upper lobar bronchus, left apical-posterior segment (LB1+2), left upper anterior segment (LB3), lingular lobar bronchus, superior lingular segment (LB4), inferior lingular segment (LB5), left lower lobar bronchus, left superior segment (LB6), left anterior basal segment (LB7+8), left lateral basal segment (LB9) and left posterior basal segment (LB10) Lymph node observed at subcarinal (7) station. Sampled subcarinal (7) using TBNA. Lymph node observed at left interlobar (11L) station. Sampled left interlobar (11L) using TBNA. RECOMMENDATION: Follow-up culture and cytology. Admit to observation for 1 night. Maximo Zaidi D.O. PGY-5, Pulmonary/Critical Care [facility] _____________________________________________________ Attending Co-signature: Procedure: The patient was take to OR 12, at [facility].  Consent was obtained prior to the procedure.  Induction anesthesia and placement of LMA was performed by the anesthesia service.  Once an appropriate level of anesthesia was achieved a standard bronchoscope was inserted thru the LMA and advanced to the level of the vocal cords. The vocal cords were normal in appearance and demonstrated normal motion. Topical lidocaine was applied.  The bronchoscope was then easily advanced past the vocal cords to the level of the main kelley. The main kelley was normal in appearance and topical lidocaine was applied. An airway survey was completed bilaterally to at least the second segmental level. The anatomy was normal on right however with accessory segment in the RUL and RLL - normal anatomic variant, the mucosa was normal on the right, there were no secretions on the right. The left bronchial tree revealed mixed extrinsic/intrinsic compression of the HEIDE without ability for traversing - see photo and extrinsic compression of the lower lobe bronchi. There was noted to be erythematous and cobblestoned mucosa within the left mainstem and HEIDE/lingula takeoff as  seen in photos. The standard bronchoscope was removed.  Then an EBUS bronchoscope was advanced thru the LMA and easily advanced past the vocal cords to the level of the mid trachea.  Using EBUS guidance, a mediastinal survey was completed. The aorta, pulmonary arteries, and azygos vein were identified.  There was noted to be subcarinal and left hilar mass adenopathy.  Then using a 21g and 19g needle, serial FNA biopsy samples were obtained from the level 7  (subcarinal) lymph node and left hilar mass lesion.  Samples were taken for cytology and flow cytometry.  ANGIE by pathology confirmed small blue cells concerning for possible lymphoma vs small cell tumor for the level 7LN.  Left hilar mass - ANGIE evaluation was not sent and all samples placed into cell block and flow cytometry.  The EBUS bronchoscope was removed.  A standard bronchoscope was then reinserted thru the LMA and easily advanced past the vocal cords.  Hemostasis was demonstrated at all biopsy sites. The bronchoscope was then removed.  Recovery from anesthesia and LMA removal was performed by the anesthesia service. The patient tolerated the procedure well and there were no unplanned events Given age, ambulatory dysfunction, and later start time, the patient was admitted for observation overnight Blood Loss: minimal Recommendations: Return to previous diet once topical analgesia resolved and gag reflex returns.  Standard post bronchoscopy discharge instructions.  Follow up with Dyan in 1-2 weeks for biopsy results.  Sal Zurita, DO     XR chest pa and lateral  Result Date: 12/10/2024  Narrative: XR CHEST PA AND LATERAL INDICATION: Hypoxia status post EBUS and biopsy. COMPARISON: Chest radiograph 1/22/2023 and CTA chest abdomen pelvis 11/20/2024 FINDINGS: Surgical clips in the left axillary region. Worsening consolidation at the peripheral left midlung field. Small bilateral costophrenic angle effusions. No pneumothorax. Normal cardiomediastinal  silhouette. Bandlike linear opacities projecting over the bilateral lung fields secondary to calcification of the chondral cartilages. Normal upper abdomen.     Impression: Worsening consolidation at the peripheral left midlung field likely reflects progression of postobstructive left upper lobe pneumonia/atelectasis in this patient with a central perihilar mass. Small bilateral costophrenic angle effusions. Resident: Angelica Driscoll I, the attending radiologist, have reviewed the images and agree with the final report above. Workstation performed: [id number]     XR spine lumbar 2 or 3 views injury  Result Date: 11/29/2024  Narrative: XR SPINE LUMBAR 2 OR 3 VIEWS INJURY INDICATION: L2 fracture. COMPARISON: 11/28/2024 FINDINGS: Stable severe loss of height of the T12 vertebral body. Mild loss of height of the L2 vertebral body is unchanged. Intact pedicles. Five non-rib-bearing lumbar vertebral bodies. Normal alignment. There is multilevel disc space narrowing. There is multilevel ventral osteophytosis. There are atherosclerotic changes of the aorta.     Impression: Stable mild loss of height of the L2 vertebral body. Stable severe chronic loss of height of the T12 vertebral body. Computerized Assisted Algorithm (CAA) may have been used to analyze all applicable images. Workstation performed: [id number]     CTA dissection protocol chest/abdomen/pelvis  Result Date: 11/28/2024  Narrative: CTA - CHEST, ABDOMEN AND PELVIS - WITHOUT AND WITH IV CONTRAST INDICATION: back pain and abdominal pain  htn 240/120. COMPARISON: CTA chest, PE study, 1/22/2023. CT abdomen pelvis with contrast 4/5/2022. CT chest without contrast 9/27/2019 CT chest abdomen pelvis 3/14/2019 TECHNIQUE: CT examination of the chest, abdomen and pelvis was performed both prior to and after the administration of intravenous contrast. The noncontrast portion of this examination was performed utilizing low radiation dose technique.  Thin section angiographic  arterial phase post contrast technique was used in order to evaluate for aortic dissection. 3D reformatted images and volume rendering were performed on an independent workstation. Multiplanar 2D reformatted images were created from the source data. Radiation dose length product (DLP) for this visit: 1813.47 mGy-cm . This examination, like all CT scans performed in the [facility], was performed utilizing techniques to minimize radiation dose exposure, including the use of iterative reconstruction and automated exposure control. IV Contrast: 100 mL of iohexol (OMNIPAQUE) Enteric Contrast: Not administered. FINDINGS: AORTA: Diffuse aortic/arterial atherosclerotic disease. No acute aortic/vascular abnormality. Severe proximal celiac artery stenosis, in a configuration most consistent with compression from the median arcuate ligament. Severe left renal artery ostial stenosis accounting for asymmetric left renal atrophy. Patent aortobiiliac bypass. CHEST LUNGS: 2 mm right upper lobe nodule (6:29) 6 mm left upper lobe nodule (6:65), stable dating back to 2019 and therefore presumed benign. Dense consolidation within the paramediastinal left upper lobe (4:126) Associated left upper lobe bronchus is occluded proximally. Trace wall adherent tracheal secretions. Diffuse emphysematous changes. PLEURA: Small left pleural effusion. HEART/PULMONARY ARTERIAL TREE: Coronary calcifications no pulmonary embolism. MEDIASTINUM AND JEFFREY: Enlarged subcarinal node measuring 1.7 cm (3:63) Left hilar adenopathy. CHEST WALL AND LOWER NECK: Postsurgical change in the left axilla. ABDOMEN LIVER/BILIARY TREE: Unremarkable. GALLBLADDER: Status post cholecystectomy SPLEEN: Unremarkable. PANCREAS: Unremarkable. ADRENAL GLANDS: A right adrenal nodule containing calcifications measures 2.5 x 1.9 cm in maximal axial dimension (3:105) Grossly unchanged in size dating back to April 2022 comparison, increased from 2019 comparison,  measuring up to 1.8 cm. Indeterminate internal density. KIDNEYS/URETERS: Asymmetric left renal atrophy. Bilateral renal cysts and subcentimeter hypodensities too small to characterize. STOMACH AND BOWEL: Moderate colonic stool burden. Right upper quadrant small bowel anastomosis with fecalization. Stomach unremarkable. APPENDIX: No findings to suggest appendicitis. ABDOMINOPELVIC CAVITY: No ascites. No pneumoperitoneum. No lymphadenopathy. PELVIS REPRODUCTIVE ORGANS: Numerous calcified fibroids. URINARY BLADDER: Unremarkable. ABDOMINAL WALL/INGUINAL REGIONS: Rectus diastases containing unremarkable large and small bowel. Postsurgical changes in the ventral abdominal wall. BONES: New superior endplate irregularity with mild compression deformity at L2 (602:109) Stable severe compression deformity at T12.     Impression: No acute aortic/arterial abnormality. Patent aortoiliac bypass. 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 Dense consolidation within the paramediastinal left upper lobe with associated occluded proximal bronchi. Mediastinal (up to 1.7 cm) and left hilar adenopathy. Bronchoscopy recommended to exclude a malignant cause of bronchial obstruction and resulting postobstructive pneumonia. Note of normal white count on today's labs. Small left pleural effusion. In comparison to the January 2023 prior, there is a new mild compression deformity along the superior endplate of the L2 vertebral body. Fracture lines are evident, consistent with acute/subacute fracture. Correlate clinically. Indeterminate right adrenal lesion measuring up to 2.5 cm. Stable from 2022 comparison, significantly increased from 2019. Multiphase adrenal protocol CT recommended for further evaluation. Findings were discussed with Dr. Romero from the ED at 2:45 p.m. on 11/28/2024. Workstation performed: [id number]     I have personally reviewed pertinent imaging study reports.      Artis Alvarez M.D.  [facility]  [facility]  Division of Hematology & Oncology  Available on Tiger[employer]  [email or web address]    ** Please Note: This note is constructed using a voice recognition dictation system. **

## 2024-12-12 NOTE — PROGRESS NOTES
Progress Note - Hospitalist   Name: Janina Tanner 84 y.o. female I MRN: [id number]  Unit/Bed#: -01 I Date of Admission: 12/9/2024   Date of Service: 12/12/2024 I Hospital Day: 2    Assessment & Plan  Abnormal CT of the chest  Patient was noted to have an abnormal CT chest with pulmonary nodules minus lymphadenopathy and has undergone elective EBUS with pulmonary 12/9  Patient admitted for observation overnight, but then noted to be hypoxic and in need of safe discharge planning  Biopsy results obtained-she was likelihood of SCLC  Oncology consulted-ultimately not a good candidate for chemotherapy or cancer treatment.  Palliative care consulted for further goals of care discussion  Hypoxia  Patient was 86% on room air 12/10 and 12/11 after IV diuretic. Suspect hypoxia in the setting of post obstructive atelectasis  Patient does not wear O2 at home  Chest x-ray 12/10 - Worsening consolidation at the peripheral left midlung field likely reflects progression of postobstructive left upper lobe pneumonia/atelectasis in this patient with a central perihilar mass. Small bilateral costophrenic angle effusions   Pulmonary input appreciated  Ambulatory dysfunction  Multifactorial  Safe ambulation  Fall precautions  PT/OT - rehab - daughter and patient agreeable  Type 2 diabetes mellitus, without long-term current use of insulin (HCC)  Lab Results   Component Value Date    HGBA1C 6.6 (H) 12/09/2024       Recent Labs     12/11/24  1213 12/11/24  1622 12/12/24  0757 12/12/24  1218   POCGLU 128 153* 153* 143*       Blood Sugar Average: Last 72 hrs:  (P) 157.4    Hold hold metformin while inpatient  Monitor Accu-Cheks  Hypoglycemia  Essential hypertension  Continue amlodipine 10 mg p.o. daily, clonidine 0.1 mg twice daily, labetalol 200 mg twice daily, lisinopril 20 mg twice daily  Monitor blood pressures  Avoid hypotension      Hypothyroidism  Continue levothyroxine 100 mcg p.o. daily  Paroxysmal A-fib (HCC)  Continue  labetalol  Eliquis presently on hold resume when cleared by pulmonary  Diabetes (HCC)  Lab Results   Component Value Date    HGBA1C 6.6 (H) 12/09/2024       Recent Labs     12/11/24  1213 12/11/24  1622 12/12/24  0757 12/12/24  1218   POCGLU 128 153* 153* 143*       Blood Sugar Average: Last 72 hrs:  (P) 157.4    Tobacco abuse  Smoking cessation encouraged  Moderate protein-calorie malnutrition   Malnutrition Findings:      Body mass index is 22.86 kg/m².     In the setting of poor oral intake, low muscle mass/muscle wasting, an chronic illness  L2 vertebral fracture (HCC)  L2 vertebral fracture  Analgesics  Supportive cares  Brace when OOB    VTE Pharmacologic Prophylaxis: VTE Score: 5 High Risk (Score >/= 5) - Pharmacological DVT Prophylaxis Ordered: apixaban (Eliquis). Sequential Compression Devices Ordered.    Mobility:   Basic Mobility Inpatient Raw Score: 18  JH-HLM Goal: 6: Walk 10 steps or more  JH-HLM Achieved: 6: Walk 10 steps or more  JH-HLM Goal achieved. Continue to encourage appropriate mobility.    Patient Centered Rounds: I performed bedside rounds with nursing staff today.   Discussions with Specialists or Other Care Team Provider: CM, oncology, palliative, pulmonology    Education and Discussions with Family / Patient:  Discussed with oncology.     Current Length of Stay: 2 day(s)  Current Patient Status: Inpatient   Certification Statement: The patient will continue to require additional inpatient hospital stay due to awaiting safe dispo and further goals of care discussion  Discharge Plan: Anticipate discharge tomorrow to rehab facility.    Code Status: Level 1 - Full Code    Subjective   Patient reports to be feeling relatively well.  Lengthy discussion had with patient and patient would not like to pursue with further treatment in regards to oncologic treatment.  She currently denies any chest pain/pressure, palpitations, lightness, nausea, shortness of breath, or chills.    Objective :  Temp:   [97.5 °F (36.4 °C)-98.1 °F (36.7 °C)] 97.5 °F (36.4 °C)  HR:  [56-63] 63  BP: (166-185)/(69-74) 185/74  Resp:  [18] 18  SpO2:  [96 %-98 %] 98 %  O2 Device: Nasal cannula  Nasal Cannula O2 Flow Rate (L/min):  [2 L/min] 2 L/min    Body mass index is 22.86 kg/m².     Input and Output Summary (last 24 hours):     Intake/Output Summary (Last 24 hours) at 12/12/2024 1532  Last data filed at 12/12/2024 1300  Gross per 24 hour   Intake 300 ml   Output 550 ml   Net -250 ml       Physical Exam  Vitals and nursing note reviewed.   Constitutional:       General: She is not in acute distress.     Appearance: She is normal weight. She is not ill-appearing, toxic-appearing or diaphoretic.   HENT:      Head: Normocephalic.      Nose: Nose normal.      Mouth/Throat:      Mouth: Mucous membranes are moist.      Pharynx: Oropharynx is clear.   Eyes:      General: No scleral icterus.     Conjunctiva/sclera: Conjunctivae normal.      Pupils: Pupils are equal, round, and reactive to light.   Cardiovascular:      Rate and Rhythm: Normal rate and regular rhythm.      Heart sounds: No murmur heard.     No friction rub. No gallop.   Pulmonary:      Effort: Pulmonary effort is normal. No respiratory distress.      Breath sounds: Normal breath sounds. No stridor. No wheezing, rhonchi or rales.   Abdominal:      General: Abdomen is flat.      Palpations: Abdomen is soft.   Musculoskeletal:         General: Normal range of motion.      Cervical back: Normal range of motion and neck supple.      Right lower leg: No edema.      Left lower leg: No edema.   Lymphadenopathy:      Cervical: No cervical adenopathy.   Skin:     General: Skin is warm.      Coloration: Skin is not jaundiced or pale.      Findings: No bruising, erythema or lesion.   Neurological:      General: No focal deficit present.      Mental Status: She is alert and oriented to person, place, and time. Mental status is at baseline.      Cranial Nerves: No cranial nerve deficit.       Motor: No weakness.   Psychiatric:         Mood and Affect: Mood normal.         Behavior: Behavior normal.         Thought Content: Thought content normal.               Lab Results: I have reviewed the following results:   Results from last 7 days   Lab Units 12/10/24  0544   WBC Thousand/uL 7.19   HEMOGLOBIN g/dL 10.2*   HEMATOCRIT % 31.8*   PLATELETS Thousands/uL 234   SEGS PCT % 81*   LYMPHO PCT % 7*   MONO PCT % 11   EOS PCT % 0     Results from last 7 days   Lab Units 12/11/24  0433   SODIUM mmol/L 135   POTASSIUM mmol/L 4.2   CHLORIDE mmol/L 100   CO2 mmol/L 30   BUN mg/dL 30*   CREATININE mg/dL 1.25   ANION GAP mmol/L 5   CALCIUM mg/dL 9.0   GLUCOSE RANDOM mg/dL 134         Results from last 7 days   Lab Units 12/12/24  1218 12/12/24  0757 12/11/24  1622 12/11/24  1213 12/11/24  0751 12/10/24  1102 12/10/24  0736 12/09/24  2123 12/09/24  1710 12/09/24  1326   POC GLUCOSE mg/dl 143* 153* 153* 128 129 221* 149* 238* 116 144*     Results from last 7 days   Lab Units 12/09/24  1940   HEMOGLOBIN A1C % 6.6*           Recent Cultures (last 7 days):   Results from last 7 days   Lab Units 12/09/24  1553   GRAM STAIN RESULT  1+ Mononuclear Cells  No bacteria seen       Imaging Results Review: No pertinent imaging studies reviewed.  Other Study Results Review: No additional pertinent studies reviewed.    Last 24 Hours Medication List:     Current Facility-Administered Medications:     acetaminophen (TYLENOL) tablet 650 mg, Q6H PRN    aluminum-magnesium hydroxide-simethicone (MAALOX) oral suspension 30 mL, Q6H PRN    amLODIPine (NORVASC) tablet 10 mg, Daily    apixaban (ELIQUIS) tablet 2.5 mg, BID    ascorbic acid (VITAMIN C) tablet 500 mg, Daily    atorvastatin (LIPITOR) tablet 40 mg, Daily    Cholecalciferol (VITAMIN D3) tablet 2,000 Units, Daily    cloNIDine (CATAPRES) tablet 0.1 mg, BID    ferrous sulfate tablet 325 mg, Every Other Day    guaiFENesin (MUCINEX) 12 hr tablet 600 mg, Q12H Pending sale to [facility]    HYDROmorphone HCl  (DILAUDID) injection 0.2 mg, Q4H PRN    insulin lispro (HumALOG/ADMELOG) 100 units/mL subcutaneous injection 1-5 Units, TID AC **AND** Fingerstick Glucose (POCT), TID AC    labetalol (NORMODYNE) tablet 200 mg, Q12H YADIRA    levothyroxine tablet 100 mcg, Early Morning    lidocaine (LIDODERM) 5 % patch 2 patch, Daily    lidocaine (PF) (XYLOCAINE-MPF) 2 % injection, PRN    lisinopril (ZESTRIL) tablet 20 mg, BID    LORazepam (ATIVAN) tablet 0.5 mg, BID    methocarbamol (ROBAXIN) tablet 500 mg, TID    multivitamin-minerals (CENTRUM) tablet 1 tablet, Daily    nicotine (NICODERM CQ) 14 mg/24hr TD 24 hr patch 1 patch, Daily    ondansetron (ZOFRAN) injection 4 mg, Q6H PRN    Administrative Statements   Today, Patient Was Seen By: Nico Hinson PA-C  I have spent a total time of 35 minutes in caring for this patient on the day of the visit/encounter including Diagnostic results, Prognosis, Risks and benefits of tx options, Instructions for management, Documenting in the medical record, Reviewing / ordering tests, medicine, procedures  , Obtaining or reviewing history  , and Communicating with other healthcare professionals .    **Please Note: This note may have been constructed using a voice recognition system.**

## 2024-12-12 NOTE — PROGRESS NOTES
Progress Note - Pulmonology   Name: Janina Tanner 84 y.o. female I MRN: [id number]  Unit/Bed#: -01 I Date of Admission: 12/9/2024   Date of Service: 12/12/2024 I Hospital Day: 2     Assessment & Plan  Abnormal CT of the chest  Patient has a history of mediastinal lymphadenopathy, left hilar mass that underwent EBUS sampling on 12/9/2024 and was found to have atypical, small round cells at station 7 that were concerning for lymphoma.  The patient was admitted overnight for observation given her advanced age, timing of anesthesia, and and back problems.  This morning, the patient was noted to be slightly hypoxic with SpO2 in the 80s on room air.  Patient is not short of breath.  The postobstructive atelectasis is likely in the setting of recent sampling and possibly old blood that is there  Sampling coming back for carcinoma, favoring small cell  Recommend heme-onc consult while inpatient, possibly will need induction chemo and MRI of the brain based on how aggressive the patient and her daughter want to be  Incentive spirometry, flutter valve, airway clearance, out of bed to chair  Ambulatory pulse ox prior to discharge to see if the patient qualifies for oxygen  Can continue diuresis conservatively, there is also cephalization seen on her chest x-ray  Patient would likely benefit from rehab  Tobacco abuse  Patient has an extensive tobacco history.  Smoking cessation advised  Hypoxia  Noted to be hypoxic on room air into the high 80s today after her procedure yesterday.  This is likely a combination of fluid versus atelectasis versus hypoventilation  Incentive spirometry, flutter valve, airway clearance  Out of bed to chair  PT/OT  Ambulatory pulse ox prior to discharge  Patient may benefit from rehab after discharge    24 Hour Events : Acute events overnight.  Subjective : Patient resting comfortably in bed.  Still with some shortness of breath and back pain.  All other review systems negative.    Objective  :  Temp:  [97.5 °F (36.4 °C)-98.1 °F (36.7 °C)] 97.5 °F (36.4 °C)  HR:  [56-63] 63  BP: (166-185)/(69-74) 185/74  Resp:  [18] 18  SpO2:  [96 %-98 %] 98 %  O2 Device: Nasal cannula  Nasal Cannula O2 Flow Rate (L/min):  [2 L/min] 2 L/min    Physical Exam  Vitals reviewed.   Constitutional:       Appearance: She is ill-appearing.   HENT:      Head: Normocephalic and atraumatic.      Right Ear: External ear normal.      Left Ear: External ear normal.      Nose: Nose normal.      Mouth/Throat:      Mouth: Mucous membranes are moist.      Pharynx: Oropharynx is clear.   Eyes:      Extraocular Movements: Extraocular movements intact.      Pupils: Pupils are equal, round, and reactive to light.   Cardiovascular:      Rate and Rhythm: Normal rate and regular rhythm.      Pulses: Normal pulses.   Pulmonary:      Effort: Pulmonary effort is normal.      Comments: Decreased breath sounds bilaterally  Abdominal:      General: Abdomen is flat. Bowel sounds are normal. There is no distension.      Tenderness: There is no abdominal tenderness.   Musculoskeletal:      Right lower leg: Edema present.      Left lower leg: Edema present.   Skin:     General: Skin is warm and dry.      Capillary Refill: Capillary refill takes less than 2 seconds.   Neurological:      General: No focal deficit present.      Mental Status: She is alert and oriented to person, place, and time.   Psychiatric:         Mood and Affect: Mood normal.         Behavior: Behavior normal.           Lab Results: I have reviewed the following results:   No new results in last 24 hours.  ABG: No new results in last 24 hours.    Imaging Results Review: I personally reviewed the following image studies in PACS and associated radiology reports: chest xray. My interpretation of the radiology images/reports is: Left hilar mass.  Other Study Results Review: EKG was reviewed.   PFT Results Reviewed: None on file.    Maximo Zaidi D.O.  PGY-5, Pulmonary/Critical  Care  [facility]

## 2024-12-12 NOTE — ASSESSMENT & PLAN NOTE
Patient was noted to have an abnormal CT chest with pulmonary nodules minus lymphadenopathy and has undergone elective EBUS with pulmonary 12/9  Patient admitted for observation overnight, but then noted to be hypoxic and in need of safe discharge planning  Biopsy results obtained-she was likelihood of SCLC  Oncology consulted-ultimately not a good candidate for chemotherapy or cancer treatment.  Palliative care consulted for further goals of care discussion

## 2024-12-12 NOTE — ASSESSMENT & PLAN NOTE
Patient has a history of mediastinal lymphadenopathy, left hilar mass that underwent EBUS sampling on 12/9/2024 and was found to have atypical, small round cells at station 7 that were concerning for lymphoma.  The patient was admitted overnight for observation given her advanced age, timing of anesthesia, and and back problems.  This morning, the patient was noted to be slightly hypoxic with SpO2 in the 80s on room air.  Patient is not short of breath.  The postobstructive atelectasis is likely in the setting of recent sampling and possibly old blood that is there  Sampling coming back for carcinoma, favoring small cell  Recommend heme-onc consult while inpatient, possibly will need induction chemo and MRI of the brain based on how aggressive the patient and her daughter want to be  Incentive spirometry, flutter valve, airway clearance, out of bed to chair  Ambulatory pulse ox prior to discharge to see if the patient qualifies for oxygen  Can continue diuresis conservatively, there is also cephalization seen on her chest x-ray  Patient would likely benefit from rehab

## 2024-12-12 NOTE — ASSESSMENT & PLAN NOTE
Lab Results   Component Value Date    HGBA1C 6.6 (H) 12/09/2024       Recent Labs     12/11/24  1213 12/11/24  1622 12/12/24  0757 12/12/24  1218   POCGLU 128 153* 153* 143*       Blood Sugar Average: Last 72 hrs:  (P) 157.4

## 2024-12-12 NOTE — ASSESSMENT & PLAN NOTE
Lab Results   Component Value Date    HGBA1C 6.6 (H) 12/09/2024       Recent Labs     12/11/24  1213 12/11/24  1622 12/12/24  0757 12/12/24  1218   POCGLU 128 153* 153* 143*       Blood Sugar Average: Last 72 hrs:  (P) 157.4    Hold hold metformin while inpatient  Monitor Accu-Cheks  Hypoglycemia

## 2024-12-12 NOTE — CASE MANAGEMENT
Case Management Discharge Planning Note    Patient name Janina Tanner  Location /-01 MRN [id number]  : 1940 Date 2024       Current Admission Date: 2024  Current Admission Diagnosis:Abnormal CT of the chest   Patient Active Problem List    Diagnosis Date Noted Date Diagnosed    Abnormal CT of the chest 2024     L2 vertebral fracture ([facility]) 2024     Palliative care encounter 2024     Pulmonary emphysema ([facility]) 2023     Acute bronchitis 2023     Noncompliance 2023     Urinary tract infection 2023     Acute respiratory failure with hypoxia ([facility]) 2023     Hypoxia      Bronchitis      Closed nondisplaced fracture of fifth left metatarsal bone 2023     Generalized weakness 2022     Chronic anemia 2022     Pain 02/15/2022     Macular degeneration 02/15/2022     Fracture of right tibial plateau 2022     Renal vascular disease 2020     Primary osteoarthritis of right knee 2019     Synovial cyst of right popliteal space 2019     Hemarthrosis of right knee 2019     History of breast cancer 2019     Moderate protein-calorie malnutrition  2019     Asymptomatic bilateral carotid artery stenosis 08/15/2019     High blood pressure      Hypertensive urgency 2019     Hypertension      Hypo-osmolality and hyponatremia 2019     Fall 2019     Chronic hyponatremia 2019     Syncope vs presyncope 2019     Paroxysmal A-fib ([facility]) 2019     Renovascular hypertension 2019     Diabetes ([facility]) 2019     Weight loss 2019     CKD (chronic kidney disease) stage 3, GFR 30-59 ml/min ([facility]) 2019     Iron deficiency anemia due to chronic blood loss 2018     Incisional hernia, without obstruction or gangrene 07/10/2018     Postop check 2018     Delirium 2018     Physical deconditioning 2018     Ambulatory dysfunction 2018     Hx  of fall 02/05/2018     Anxiety 02/05/2018     Acute kidney injury (HCC) 02/02/2018     Hordeolum externum of right upper eyelid 03/08/2017     Malignant neoplasm of right breast, stage 1, estrogen receptor positive (HCC) 02/08/2017     Malignant neoplasm of central portion of right female breast (HCC) 01/13/2017     Tobacco abuse 01/11/2017     Heart palpitations 07/11/2016     Nicotine dependence 07/11/2016     Paroxysmal atrial fibrillation (HCC) 07/11/2016     Type 2 diabetes mellitus, without long-term current use of insulin (HCC) 07/11/2016     Essential hypertension 07/11/2016     Hypothyroidism 07/11/2016     Nontraumatic compression fracture of T4 vertebra (HCC) 10/06/2015     Back pain 09/17/2015     Gastroesophageal reflux disease without esophagitis 02/23/2015     Depression 02/23/2015     History of CEA (carotid endarterectomy) 02/23/2015     Hyperlipidemia 02/23/2015     Hypothyroidism due to acquired atrophy of thyroid 02/23/2015     Vitamin D deficiency 02/23/2015     Varicose vein of leg 02/23/2015     Non-seasonal allergic rhinitis due to pollen 02/23/2015       LOS (days): 2  Geometric Mean LOS (GMLOS) (days): 3  Days to GMLOS:1     OBJECTIVE:  Risk of Unplanned Readmission Score: 19.17         Current admission status: Inpatient   Preferred Pharmacy:   [facility] - [place], [place] - [address]  [address]  [place] [address]  Phone: [phone] Fax: [phone]    Primary Care Provider: Darrel Fall DO    Primary Insurance: MEDICARE  Secondary Insurance: BLUE CROSS    DISCHARGE DETAILS:    Additional Comments: Patient reviewed during care coordination rounds, pt expected to be medically stable for discharge in 24-48 hours pending Onc follow up. RANDY spoke with pt's daughter via phone who is aware of projected discharge timeline and Ly Yepez was made aware of projected discharge timeline via Aidin communication. CM department to follow.

## 2024-12-12 NOTE — PLAN OF CARE
Problem: PAIN - ADULT  Goal: Verbalizes/displays adequate comfort level or baseline comfort level  Description: Interventions:  - Encourage patient to monitor pain and request assistance  - Assess pain using appropriate pain scale  - Administer analgesics based on type and severity of pain and evaluate response  - Implement non-pharmacological measures as appropriate and evaluate response  - Consider cultural and social influences on pain and pain management  - Notify physician/advanced practitioner if interventions unsuccessful or patient reports new pain  Outcome: Progressing     Problem: INFECTION - ADULT  Goal: Absence or prevention of progression during hospitalization  Description: INTERVENTIONS:  - Assess and monitor for signs and symptoms of infection  - Monitor lab/diagnostic results  - Monitor all insertion sites, i.e. indwelling lines, tubes, and drains  - Monitor endotracheal if appropriate and nasal secretions for changes in amount and color  - Albert [place] appropriate cooling/warming therapies per order  - Administer medications as ordered  - Instruct and encourage patient and family to use good hand hygiene technique  - Identify and instruct in appropriate isolation precautions for identified infection/condition  Outcome: Progressing  Goal: Absence of fever/infection during neutropenic period  Description: INTERVENTIONS:  - Monitor WBC    Outcome: Progressing     Problem: SAFETY ADULT  Goal: Patient will remain free of falls  Description: INTERVENTIONS:  - Educate patient/family on patient safety including physical limitations  - Instruct patient to call for assistance with activity   - Consult OT/PT to assist with strengthening/mobility   - Keep Call bell within reach  - Keep bed low and locked with side rails adjusted as appropriate  - Keep care items and personal belongings within reach  - Initiate and maintain comfort rounds  - Make Fall Risk Sign visible to staff  - Apply yellow socks and bracelet  for high fall risk patients  - Consider moving patient to room near nurses station  Outcome: Progressing  Goal: Maintain or return to baseline ADL function  Description: INTERVENTIONS:  -  Assess patient's ability to carry out ADLs; assess patient's baseline for ADL function and identify physical deficits which impact ability to perform ADLs (bathing, care of mouth/teeth, toileting, grooming, dressing, etc.)  - Assess/evaluate cause of self-care deficits   - Assess range of motion  - Assess patient's mobility; develop plan if impaired  - Assess patient's need for assistive devices and provide as appropriate  - Encourage maximum independence but intervene and supervise when necessary  - Involve family in performance of ADLs  - Assess for home care needs following discharge   - Consider OT consult to assist with ADL evaluation and planning for discharge  - Provide patient education as appropriate  Outcome: Progressing  Goal: Maintains/Returns to pre admission functional level  Description: INTERVENTIONS:  - Perform AM-PAC 6 Click Basic Mobility/ Daily Activity assessment daily.  - Set and communicate daily mobility goal to care team and patient/family/caregiver.   - Collaborate with rehabilitation services on mobility goals if consulted  - Out of bed for toileting  - Record patient progress and toleration of activity level   Outcome: Progressing     Problem: DISCHARGE PLANNING  Goal: Discharge to home or other facility with appropriate resources  Description: INTERVENTIONS:  - Identify barriers to discharge w/patient and caregiver  - Arrange for needed discharge resources and transportation as appropriate  - Identify discharge learning needs (meds, wound care, etc.)  - Arrange for interpretive services to assist at discharge as needed  - Refer to Case Management Department for coordinating discharge planning if the patient needs post-hospital services based on physician/advanced practitioner order or complex needs  related to functional status, cognitive ability, or social support system  Outcome: Progressing     Problem: Knowledge Deficit  Goal: Patient/family/caregiver demonstrates understanding of disease process, treatment plan, medications, and discharge instructions  Description: Complete learning assessment and assess knowledge base.  Interventions:  - Provide teaching at level of understanding  - Provide teaching via preferred learning methods  Outcome: Progressing     Problem: RESPIRATORY - ADULT  Goal: Achieves optimal ventilation and oxygenation  Description: INTERVENTIONS:  - Assess for changes in respiratory status  - Assess for changes in mentation and behavior  - Position to facilitate oxygenation and minimize respiratory effort  - Oxygen administered by appropriate delivery if ordered  - Initiate smoking cessation education as indicated  - Encourage broncho-pulmonary hygiene including cough, deep breathe, Incentive Spirometry  - Assess the need for suctioning and aspirate as needed  - Assess and instruct to report SOB or any respiratory difficulty  - Respiratory Therapy support as indicated  Outcome: Progressing

## 2024-12-12 NOTE — TELEPHONE ENCOUNTER
Pts daughter, Geena returning a call from Leanne.    Office states Leanne if off today.    Jesi Jo asked for call back number which is : [phone]

## 2024-12-13 VITALS
DIASTOLIC BLOOD PRESSURE: 55 MMHG | HEART RATE: 60 BPM | TEMPERATURE: 98.3 F | HEIGHT: 62 IN | SYSTOLIC BLOOD PRESSURE: 140 MMHG | RESPIRATION RATE: 20 BRPM | OXYGEN SATURATION: 95 % | WEIGHT: 125 LBS | BODY MASS INDEX: 23 KG/M2

## 2024-12-13 LAB
BACTERIA TISS AEROBE CULT: ABNORMAL
GLUCOSE SERPL-MCNC: 139 MG/DL (ref 65–140)
GLUCOSE SERPL-MCNC: 209 MG/DL (ref 65–140)
GRAM STN SPEC: ABNORMAL
GRAM STN SPEC: ABNORMAL

## 2024-12-13 PROCEDURE — 99239 HOSP IP/OBS DSCHRG MGMT >30: CPT

## 2024-12-13 PROCEDURE — 82948 REAGENT STRIP/BLOOD GLUCOSE: CPT

## 2024-12-13 PROCEDURE — 94668 MNPJ CHEST WALL SBSQ: CPT

## 2024-12-13 RX ORDER — LORAZEPAM 0.5 MG/1
0.5 TABLET ORAL 2 TIMES DAILY
Qty: 10 TABLET | Refills: 0 | Status: CANCELLED | OUTPATIENT
Start: 2024-12-13 | End: 2024-12-18

## 2024-12-13 RX ORDER — MAGNESIUM HYDROXIDE/ALUMINUM HYDROXICE/SIMETHICONE 120; 1200; 1200 MG/30ML; MG/30ML; MG/30ML
30 SUSPENSION ORAL EVERY 6 HOURS PRN
Status: CANCELLED
Start: 2024-12-13

## 2024-12-13 RX ORDER — GUAIFENESIN 600 MG/1
600 TABLET, EXTENDED RELEASE ORAL EVERY 12 HOURS SCHEDULED
Start: 2024-12-13

## 2024-12-13 RX ORDER — LORAZEPAM 0.5 MG/1
0.5 TABLET ORAL 2 TIMES DAILY
Qty: 10 TABLET | Refills: 0 | Status: SHIPPED | OUTPATIENT
Start: 2024-12-13 | End: 2024-12-18

## 2024-12-13 RX ADMIN — LEVOTHYROXINE SODIUM 100 MCG: 100 TABLET ORAL at 05:35

## 2024-12-13 RX ADMIN — LABETALOL HYDROCHLORIDE 200 MG: 200 TABLET, FILM COATED ORAL at 08:45

## 2024-12-13 RX ADMIN — INSULIN LISPRO 1 UNITS: 100 INJECTION, SOLUTION INTRAVENOUS; SUBCUTANEOUS at 12:29

## 2024-12-13 RX ADMIN — FERROUS SULFATE TAB 325 MG (65 MG ELEMENTAL FE) 325 MG: 325 (65 FE) TAB at 08:44

## 2024-12-13 RX ADMIN — APIXABAN 2.5 MG: 2.5 TABLET, FILM COATED ORAL at 08:44

## 2024-12-13 RX ADMIN — OXYCODONE HYDROCHLORIDE AND ACETAMINOPHEN 500 MG: 500 TABLET ORAL at 08:44

## 2024-12-13 RX ADMIN — GUAIFENESIN 600 MG: 600 TABLET, EXTENDED RELEASE ORAL at 08:51

## 2024-12-13 RX ADMIN — LORAZEPAM 0.5 MG: 0.5 TABLET ORAL at 08:44

## 2024-12-13 RX ADMIN — METHOCARBAMOL 500 MG: 500 TABLET ORAL at 08:44

## 2024-12-13 RX ADMIN — Medication 1 TABLET: at 08:44

## 2024-12-13 RX ADMIN — ATORVASTATIN CALCIUM 40 MG: 40 TABLET, FILM COATED ORAL at 08:44

## 2024-12-13 RX ADMIN — AMLODIPINE BESYLATE 10 MG: 10 TABLET ORAL at 08:45

## 2024-12-13 RX ADMIN — Medication 2000 UNITS: at 08:44

## 2024-12-13 RX ADMIN — LISINOPRIL 20 MG: 20 TABLET ORAL at 08:44

## 2024-12-13 RX ADMIN — CLONIDINE HYDROCHLORIDE 0.1 MG: 0.1 TABLET ORAL at 08:46

## 2024-12-13 NOTE — ASSESSMENT & PLAN NOTE
Patient was noted to have an abnormal CT chest with pulmonary nodules minus lymphadenopathy and has undergone elective EBUS with pulmonary 12/9  Patient admitted for observation overnight, but then noted to be hypoxic and in need of safe discharge planning  Biopsy results obtained-she was likelihood of SCLC  Oncology consulted-ultimately not a good candidate for chemotherapy or cancer treatment.  Made refer for palliative care on discharge.  Patient will go for rehab and plan for transitioning to hospice after rehab.

## 2024-12-13 NOTE — CASE MANAGEMENT
Case Management Discharge Planning Note    Patient name Janina Tanner  Location /-01 MRN [id number]  : 1940 Date 2024       Current Admission Date: 2024  Current Admission Diagnosis:Abnormal CT of the chest   Patient Active Problem List    Diagnosis Date Noted Date Diagnosed    Abnormal CT of the chest 2024     L2 vertebral fracture ([facility]) 2024     Palliative care encounter 2024     Pulmonary emphysema ([facility]) 2023     Acute bronchitis 2023     Noncompliance 2023     Urinary tract infection 2023     Acute respiratory failure with hypoxia ([facility]) 2023     Hypoxia      Bronchitis      Closed nondisplaced fracture of fifth left metatarsal bone 2023     Generalized weakness 2022     Chronic anemia 2022     Pain 02/15/2022     Macular degeneration 02/15/2022     Fracture of right tibial plateau 2022     Renal vascular disease 2020     Primary osteoarthritis of right knee 2019     Synovial cyst of right popliteal space 2019     Hemarthrosis of right knee 2019     History of breast cancer 2019     Moderate protein-calorie malnutrition  2019     Asymptomatic bilateral carotid artery stenosis 08/15/2019     High blood pressure      Hypertensive urgency 2019     Hypertension      Hypo-osmolality and hyponatremia 2019     Fall 2019     Chronic hyponatremia 2019     Syncope vs presyncope 2019     Paroxysmal A-fib ([facility]) 2019     Renovascular hypertension 2019     Diabetes ([facility]) 2019     Weight loss 2019     CKD (chronic kidney disease) stage 3, GFR 30-59 ml/min ([facility]) 2019     Iron deficiency anemia due to chronic blood loss 2018     Incisional hernia, without obstruction or gangrene 07/10/2018     Postop check 2018     Delirium 2018     Physical deconditioning 2018     Ambulatory dysfunction 2018     Hx  of fall 02/05/2018     Anxiety 02/05/2018     Acute kidney injury (HCC) 02/02/2018     Hordeolum externum of right upper eyelid 03/08/2017     Malignant neoplasm of right breast, stage 1, estrogen receptor positive (HCC) 02/08/2017     Malignant neoplasm of central portion of right female breast (HCC) 01/13/2017     Tobacco abuse 01/11/2017     Heart palpitations 07/11/2016     Nicotine dependence 07/11/2016     Paroxysmal atrial fibrillation (HCC) 07/11/2016     Type 2 diabetes mellitus, without long-term current use of insulin (HCC) 07/11/2016     Essential hypertension 07/11/2016     Hypothyroidism 07/11/2016     Nontraumatic compression fracture of T4 vertebra (HCC) 10/06/2015     Back pain 09/17/2015     Gastroesophageal reflux disease without esophagitis 02/23/2015     Depression 02/23/2015     History of CEA (carotid endarterectomy) 02/23/2015     Hyperlipidemia 02/23/2015     Hypothyroidism due to acquired atrophy of thyroid 02/23/2015     Vitamin D deficiency 02/23/2015     Varicose vein of leg 02/23/2015     Non-seasonal allergic rhinitis due to pollen 02/23/2015       LOS (days): 3  Geometric Mean LOS (GMLOS) (days): 3  Days to GMLOS:0.1     OBJECTIVE:  Risk of Unplanned Readmission Score: 17.8         Current admission status: Inpatient   Preferred Pharmacy:   [facility] [place] - [place], [place] - [address]  [address]  [place] [address]  Phone: [phone] Fax: [phone]    Primary Care Provider: Darrel Fall DO    Primary Insurance: MEDICARE  Secondary Insurance: BLUE CROSS    DISCHARGE DETAILS:    Discharge planning discussed with:: Patient and daughter Geena  Freedom of Choice: Yes     CM contacted family/caregiver?: Yes  Were Treatment Team discharge recommendations reviewed with patient/caregiver?: Yes  Did patient/caregiver verbalize understanding of patient care needs?: N/A- going to facility  Were patient/caregiver advised of the risks associated with  not following Treatment Team discharge recommendations?: Yes    Contacts  Patient Contacts: daughter Geena Rodriguez  Relationship to Patient:: Family  Contact Method: Phone  Phone Number: [phone]  Reason/Outcome: Referral, Discharge Planning    Other Referral/Resources/Interventions Provided:  Interventions: Transportation, Short Term Rehab  Referral Comments: Pt and family agreeable to [facility] at this time for STR. CM arranged BLS transport with [facility],  at 1400.  CM updated pt, family, RN, and provider as well as accepting facility.      Treatment Team Recommendation: Short Term Rehab  Discharge Destination Plan:: Short Term Rehab  Transport at Discharge : [facility] Ambulance        Transported by (Company and Unit #): [facility] [employer]  ETA of Transport (Date): 12/13/24  ETA of Transport (Time): 1400        Accepting Facility Name, City & State : [facility]  Receiving Facility/Agency Phone Number: phone:  [phone]  Facility/Agency Fax Number: fax:[phone]

## 2024-12-13 NOTE — PROGRESS NOTES
" Pastoral Care Progress Note          Chaplaincy Interventions Utilized:   Empowerment: Clarified, confirmed, or reviewed information from treatment team  and Encouraged focus on present    Exploration: Explored emotional needs & resources and Explored spiritual needs & resources    Collaboration: Encouraged adherence to treatment plan     Relationship Building: Cultivated a relationship of care and support, Listened empathically, and Hospitality    Ritual:        12/13/24 1200   Clinical Encounter Type   Visited With Patient   Routine Visit Introduction   Referral From Nurse   Referral To              Chaplaincy Outcomes Achieved:  Expressed gratitude     met with the patient in her room where she was in her bed.  Patient needed to talk about her illness and prognosis and a bit about \"why we are here\".   listened empathetically and offered support.   also asked Father Adri to visit for sacraments before the patient leaves today.  Patient expressed gratitude for the visit and for asking Father to visit for sacraments.       Spiritual Coping Strategies Utilized:        remains available.   "

## 2024-12-13 NOTE — DISCHARGE SUMMARY
Discharge Summary - Hospitalist   Name: Janina Tanner 84 y.o. female I MRN: [id number]  Unit/Bed#: -01 I Date of Admission: 12/9/2024   Date of Service: 12/13/2024 I Hospital Day: 3     Assessment & Plan  Abnormal CT of the chest  Patient was noted to have an abnormal CT chest with pulmonary nodules minus lymphadenopathy and has undergone elective EBUS with pulmonary 12/9  Patient admitted for observation overnight, but then noted to be hypoxic and in need of safe discharge planning  Biopsy results obtained-she was likelihood of SCLC  Oncology consulted-ultimately not a good candidate for chemotherapy or cancer treatment.  Made refer for palliative care on discharge.  Patient will go for rehab and plan for transitioning to hospice after rehab.  Hypoxia  Patient was 86% on room air 12/10 and 12/11 after IV diuretic. Suspect hypoxia in the setting of post obstructive atelectasis  Patient does not wear O2 at home  Chest x-ray 12/10 - Worsening consolidation at the peripheral left midlung field likely reflects progression of postobstructive left upper lobe pneumonia/atelectasis in this patient with a central perihilar mass. Small bilateral costophrenic angle effusions   Pulmonology recommending incentive spirometry, flutter valve.  Patient noted to be 90/96% on room air.  My require O2 with ambulation.  Patient can continue using O2 at rehab if needed   Ambulatory dysfunction  Multifactorial  Safe ambulation  Fall precautions  PT/OT - rehab - daughter and patient agreeable.  Plan for discharge to rehab at [facility] today.    Type 2 diabetes mellitus, without long-term current use of insulin ([facility])  Lab Results   Component Value Date    HGBA1C 6.6 (H) 12/09/2024       Recent Labs     12/12/24  1218 12/12/24  1624 12/13/24  0755 12/13/24  1138   POCGLU 143* 202* 139 209*       Blood Sugar Average: Last 72 hrs:  (P) 162.6    Hold hold metformin while inpatient  Monitor Accu-Cheks  Hypoglycemia  Essential  hypertension  Continue amlodipine 10 mg p.o. daily, clonidine 0.1 mg twice daily, labetalol 200 mg twice daily, lisinopril 20 mg twice daily  Monitor blood pressures.  Noted to have high blood pressure in the morning however improved after blood pressure meds.  Avoid hypotension    Hypothyroidism  Continue levothyroxine 100 mcg p.o. daily  Paroxysmal A-fib (HCC)  Continue labetalol  Eliquis presently on hold resume when cleared by pulmonary  Diabetes (HCC)  Lab Results   Component Value Date    HGBA1C 6.6 (H) 12/09/2024       Recent Labs     12/12/24  1218 12/12/24  1624 12/13/24  0755 12/13/24  1138   POCGLU 143* 202* 139 209*       Blood Sugar Average: Last 72 hrs:  (P) 162.6    Tobacco abuse  Smoking cessation encouraged  Moderate protein-calorie malnutrition   Malnutrition Findings:      Body mass index is 22.86 kg/m².     In the setting of poor oral intake, low muscle mass/muscle wasting, an chronic illness  L2 vertebral fracture (HCC)  L2 vertebral fracture  Analgesics  Supportive cares  Brace when OOB     Medical Problems       Resolved Problems  Date Reviewed: 12/9/2024   None       Discharging Physician / Practitioner: Louise Burden MD  PCP: Darrel Fall DO  Admission Date:   Admission Orders (From admission, onward)       Ordered        12/10/24 1244  INPATIENT ADMISSION  Once            12/09/24 1458  Place in Observation  Once                          Discharge Date: 12/13/24    Consultations During Hospital Stay:  Pulmonology, hematology oncology    Procedures Performed:   EBUS s/p biopsy of the left hilar mass positive for malignancy, 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      Small bilateral costophrenic angle effusions.      Resident: Angelica Driscoll      I, the attending radiologist, have reviewed the images and agree with the final report above.      Workstation performed: [id number]           Incidental Findings:   Hilar mass consistent with neuroendocrine carcinoma  I reviewed the above mentioned incidental findings with the patient and/or family and they expressed understanding.    Test Results Pending at Discharge (will require follow up):   none     Outpatient Tests Requested:  none    Complications: Acute hypoxic respiratory failure    Reason for Admission: Elective EBUS that required observation.    Hospital Course:   Janina Tanner is a 84 y.o. female patient who originally presented to the hospital on 12/9/2024 due to abnormal CT chest with pulmonary nodules patient underwent elective EBUS with pulmonary team and was admitted for observation.  Patient developed hypoxia overnight quiring O2 supplementation.  Chest x-ray at that time showed worsening consolidation of the left upper lobe consistent with pneumonia/atelectasis.  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  Hematology oncology was consulted that discussed with the patient and explained poor prognosis of the cancer.  Family and the patient were agreeable with hospice however given the patient has generalized weakness was recommended rehab.  Family and the patient were agreeable to undergo rehab before transitioning to hospice.  Palliative care referral was made on discharge.  Given patient being stable was transition to [facility] rehab center.  Patient was noted to require 2 L O2 on discharge.          Please see above list of diagnoses and related plan for additional information.     Condition at Discharge: stable    Discharge Day Visit / Exam:   Subjective: Patient reports that at this point she does not care  "where she would want to go.  Patient agreeable either with home as well as rehab.  She understands about the prognosis and is okay with palliative care at this point.  Vitals: Blood Pressure: 140/55 (12/13/24 1258)  Pulse: 60 (12/13/24 1258)  Temperature: 98.3 °F (36.8 °C) (12/13/24 1101)  Temp Source: Oral (12/13/24 1101)  Respirations: 20 (12/13/24 1101)  Height: 5' 2\" (157.5 cm) (12/11/24 0728)  Weight - Scale: 56.7 kg (125 lb) (12/11/24 0728)  SpO2: 95 % (12/13/24 1258)  Physical Exam  Vitals and nursing note reviewed.   Constitutional:       General: She is not in acute distress.     Appearance: She is well-developed. She is not ill-appearing.   HENT:      Head: Normocephalic and atraumatic.   Eyes:      Conjunctiva/sclera: Conjunctivae normal.   Cardiovascular:      Rate and Rhythm: Normal rate and regular rhythm.      Heart sounds: No murmur heard.  Pulmonary:      Effort: Pulmonary effort is normal. No respiratory distress.      Breath sounds: Wheezing and rhonchi present.   Abdominal:      Palpations: Abdomen is soft.      Tenderness: There is no abdominal tenderness.   Musculoskeletal:         General: No swelling.      Cervical back: Neck supple.      Right lower leg: No edema.      Left lower leg: No edema.   Skin:     General: Skin is warm and dry.      Capillary Refill: Capillary refill takes less than 2 seconds.   Neurological:      Mental Status: She is alert.   Psychiatric:         Mood and Affect: Mood normal.          Discussion with Family: Updated  (daughter) via phone.    Discharge instructions/Information to patient and family:   See after visit summary for information provided to patient and family.      Provisions for Follow-Up Care:  See after visit summary for information related to follow-up care and any pertinent home health orders.      Mobility at time of Discharge:   Basic Mobility Inpatient Raw Score: 18  JH-HLM Goal: 6: Walk 10 steps or more  JH-HLM Achieved: 6: Walk " 10 steps or more  HLM Goal achieved. Continue to encourage appropriate mobility.     Disposition:   Other Skilled Nursing Facility at [facility]    Planned Readmission: none    Discharge Medications:  See after visit summary for reconciled discharge medications provided to patient and/or family.      Administrative Statements   Discharge Statement:  I have spent a total time of 50 minutes in caring for this patient on the day of the visit/encounter. >30 minutes of time was spent on: Diagnostic results, Prognosis, Risks and benefits of tx options, Instructions for management, Counseling / Coordination of care, Documenting in the medical record, Reviewing / ordering tests, medicine, procedures  , and Communicating with other healthcare professionals .    **Please Note: This note may have been constructed using a voice recognition system**

## 2024-12-13 NOTE — ASSESSMENT & PLAN NOTE
Continue amlodipine 10 mg p.o. daily, clonidine 0.1 mg twice daily, labetalol 200 mg twice daily, lisinopril 20 mg twice daily  Monitor blood pressures.  Noted to have high blood pressure in the morning however improved after blood pressure meds.  Avoid hypotension

## 2024-12-13 NOTE — ASSESSMENT & PLAN NOTE
Patient was 86% on room air 12/10 and 12/11 after IV diuretic. Suspect hypoxia in the setting of post obstructive atelectasis  Patient does not wear O2 at home  Chest x-ray 12/10 - Worsening consolidation at the peripheral left midlung field likely reflects progression of postobstructive left upper lobe pneumonia/atelectasis in this patient with a central perihilar mass. Small bilateral costophrenic angle effusions   Pulmonology recommending incentive spirometry, flutter valve.  Patient noted to be 90/96% on room air.  My require O2 with ambulation.  Patient can continue using O2 at rehab if needed

## 2024-12-13 NOTE — ASSESSMENT & PLAN NOTE
Multifactorial  Safe ambulation  Fall precautions  PT/OT - rehab - daughter and patient agreeable.  Plan for discharge to rehab at [facility] today.

## 2024-12-13 NOTE — ASSESSMENT & PLAN NOTE
Lab Results   Component Value Date    HGBA1C 6.6 (H) 12/09/2024       Recent Labs     12/12/24  1218 12/12/24  1624 12/13/24  0755 12/13/24  1138   POCGLU 143* 202* 139 209*       Blood Sugar Average: Last 72 hrs:  (P) 162.6

## 2024-12-13 NOTE — ASSESSMENT & PLAN NOTE
Lab Results   Component Value Date    HGBA1C 6.6 (H) 12/09/2024       Recent Labs     12/12/24  1218 12/12/24  1624 12/13/24  0755 12/13/24  1138   POCGLU 143* 202* 139 209*       Blood Sugar Average: Last 72 hrs:  (P) 162.6    Hold hold metformin while inpatient  Monitor Accu-Cheks  Hypoglycemia

## 2024-12-15 ENCOUNTER — NURSING HOME VISIT (OUTPATIENT)
Dept: FAMILY MEDICINE CLINIC | Facility: CLINIC | Age: 84
End: 2024-12-15
Payer: MEDICARE

## 2024-12-15 DIAGNOSIS — S32.028A OTHER CLOSED FRACTURE OF SECOND LUMBAR VERTEBRA, INITIAL ENCOUNTER (HCC): ICD-10-CM

## 2024-12-15 DIAGNOSIS — R26.2 AMBULATORY DYSFUNCTION: ICD-10-CM

## 2024-12-15 DIAGNOSIS — R53.81 PHYSICAL DECONDITIONING: ICD-10-CM

## 2024-12-15 DIAGNOSIS — I48.0 PAROXYSMAL A-FIB (HCC): ICD-10-CM

## 2024-12-15 DIAGNOSIS — R53.1 GENERALIZED WEAKNESS: ICD-10-CM

## 2024-12-15 DIAGNOSIS — J96.01 ACUTE RESPIRATORY FAILURE WITH HYPOXIA (HCC): ICD-10-CM

## 2024-12-15 DIAGNOSIS — R93.89 ABNORMAL CT OF THE CHEST: Primary | ICD-10-CM

## 2024-12-15 PROCEDURE — 99305 1ST NF CARE MODERATE MDM 35: CPT | Performed by: FAMILY MEDICINE

## 2024-12-15 NOTE — PROGRESS NOTES
[facility]  [address] [place] [address]  Facility: [facility]    NAME: Janina Tanner  AGE: 84 y.o. SEX: female    DATE OF ENCOUNTER: 12/15/2024    Code status:  DNR w/ Hospitalization    Assessment and Plan     1. Abnormal CT of the chest  2. Generalized weakness  3. Ambulatory dysfunction  4. Physical deconditioning  5. Other closed fracture of second lumbar vertebra, initial encounter ([facility])  6. Acute respiratory failure with hypoxia ([facility])  7. Paroxysmal A-fib ([facility])      All medications and routine orders were reviewed and updated as needed.    Plan discussed with: Family member    Chief Complaint     Seen for admission at Nursing Home    History of Present Illness     84-year-old female here after hospitalization for respiratory failure.  She was noted to have an abnormal CT of the chest.  She had an endobronchial ultrasound and a biopsy which was suspicious for small cell carcinoma of the lung.  She is here due to her physical deconditioning and weakness.  She has a poor appetite.  Her bowels are moving normally.  She feels better with the oxygen in place.  She is considered a good candidate for palliative care and may eventually benefit from hospice services.  She had been seen by oncology in the hospital and was not considered a good candidate for chemotherapy or radiation    HISTORY:  Past Medical History:   Diagnosis Date    Anxiety     Atrial fibrillation (HCC)     Bowel obstruction (HCC)     Cancer (HCC)     LEFT BREAST CA 22 YEARS AGO     Depression     Diabetes mellitus (HCC)     Disease of thyroid gland     Hypertension     Hypothyroidism      Past Surgical History:   Procedure Laterality Date    ABDOMINAL ADHESION SURGERY N/A 2/4/2018    Procedure: LYSIS ADHESIONS;  Surgeon: Kirk Huang DO;  Location: [facility];  Service: General    BREAST SURGERY      CHOLECYSTECTOMY      COLON SURGERY  2017    EXPLORATORY LAPAROTOMY      JOINT REPLACEMENT      LEFT KNEE REPLACEMENT      LAPAROTOMY N/A 2/4/2018    Procedure: LAPAROTOMY EXPLORATORY,;  Surgeon: Kirk Huang DO;  Location: [facility];  Service: General    MASTECTOMY      MASTECTOMY Left 1995    MASTECTOMY Right 2017    SC BX/EXC LYMPH NODE OPEN SUPERFICIAL Right 1/13/2017    Procedure: SENTINEL LYMPH NODE BIOPSY RIGHT AXILLA;  Surgeon: Dago Thapa MD;  Location: [facility];  Service: General    SC MASTECTOMY SIMPLE COMPLETE Right 1/13/2017    Procedure: MASTECTOMY SIMPLE;  Surgeon: Dago Thapa MD;  Location: [facility];  Service: General    SMALL INTESTINE SURGERY N/A 2/4/2018    Procedure: RESECTION SMALL BOWEL;  Surgeon: Kirk Huang DO;  [facility]: [facility];  Service: General    US GUIDED BREAST BIOPSY RIGHT COMPLETE Right 11/29/2016     Family History   Problem Relation Age of Onset    Cancer Mother     No Known Problems Father      Social History     Socioeconomic History    Marital status:      Spouse name: None    Number of children: None    Years of education: None    Highest education level: None   Occupational History    None   Tobacco Use    Smoking status: Every Day     Current packs/day: 1.00     Average packs/day: 1 pack/day for 65.0 years (65.0 ttl pk-yrs)     Types: Cigarettes     Start date: 1960    Smokeless tobacco: Never   Vaping Use    Vaping status: Never Used   Substance and Sexual Activity    Alcohol use: Never    Drug use: Never    Sexual activity: Not Currently   Other Topics Concern    None   Social History Narrative    ** Merged History Encounter **          Social Drivers of Health     Financial Resource Strain: Not on file   Food Insecurity: No Food Insecurity (12/11/2024)    Nursing - Inadequate Food Risk Classification     Worried About Running Out of Food in the Last Year: Not on file     Ran Out of Food in the Last Year: Not on file     Ran Out of Food in the Last Year: 1   Transportation Needs: No Transportation Needs (12/11/2024)    Nursing - Transportation Risk Classification      Lack of Transportation: Not on file     Lack of Transportation: 2   Physical Activity: Not on file   Stress: Not on file   Social Connections: Not on file   Intimate Partner Violence: Unknown (2024)    Nursing IPS     Feels Physically and Emotionally Safe: Not on file     Physically Hurt by Someone: Not on file     Humiliated or Emotionally Abused by Someone: Not on file     Physically Hurt by Someone: 2     Hurt or Threatened by Someone: 2   Housing Stability: Unknown (2024)    Nursing: Inadequate Housing Risk Classification     Has Housing: Not on file     Worried About Losing Housing: Not on file     Unable to Get Utilities: Not on file     Unable to Pay for Housing in the Last Year: 2     Has Housin       Allergies:  Allergies   Allergen Reactions    Meloxicam GI Intolerance    Montelukast Other (See Comments)     headache    Morphine GI Intolerance    Morphine     Penicillins     Percocet [Oxycodone-Acetaminophen]     Sitagliptin      SOB       Review of Systems     Review of Systems   Constitutional:  Negative for activity change, appetite change, chills, diaphoresis, fatigue and unexpected weight change.   HENT:  Negative for congestion, ear discharge, ear pain, hearing loss, nosebleeds and rhinorrhea.    Eyes:  Negative for pain, redness, itching and visual disturbance.   Respiratory:  Positive for cough, shortness of breath and wheezing. Negative for choking and chest tightness.    Cardiovascular:  Negative for chest pain and leg swelling.   Gastrointestinal:  Negative for abdominal pain, blood in stool, constipation, diarrhea and nausea.   Endocrine: Negative for cold intolerance, polydipsia and polyphagia.   Genitourinary:  Negative for dysuria, frequency, hematuria and urgency.   Musculoskeletal:  Positive for back pain and gait problem. Negative for arthralgias, joint swelling, neck pain and neck stiffness.   Skin:  Negative for color change and rash.   Allergic/Immunologic: Negative  for environmental allergies and food allergies.   Neurological:  Positive for weakness. Negative for dizziness, tremors, seizures, speech difficulty, numbness and headaches.   Hematological:  Negative for adenopathy. Does not bruise/bleed easily.   Psychiatric/Behavioral:  Positive for dysphoric mood. Negative for behavioral problems, hallucinations and self-injury.        Medications and orders     All medications reviewed and updated in [facility] E[facility].      Objective     Vitals: per nursing home record    Physical Exam  Constitutional:       Appearance: Normal appearance. She is well-developed.   HENT:      Head: Normocephalic and atraumatic.      Right Ear: External ear normal.      Left Ear: External ear normal.      Mouth/Throat:      Mouth: Mucous membranes are moist.      Pharynx: Oropharynx is clear. No oropharyngeal exudate.   Eyes:      General: No scleral icterus.        Right eye: No discharge.         Left eye: No discharge.      Extraocular Movements: Extraocular movements intact.      Conjunctiva/sclera: Conjunctivae normal.      Pupils: Pupils are equal, round, and reactive to light.   Neck:      Thyroid: No thyromegaly.   Cardiovascular:      Rate and Rhythm: Normal rate. Rhythm irregular.      Heart sounds: Normal heart sounds. No murmur heard.     No gallop.   Pulmonary:      Effort: Pulmonary effort is normal. No respiratory distress.      Breath sounds: Wheezing present. No rales.   Abdominal:      General: Bowel sounds are normal.      Palpations: Abdomen is soft. There is no mass.      Tenderness: There is no guarding or rebound.   Musculoskeletal:         General: No tenderness or deformity. Normal range of motion.      Cervical back: Normal range of motion and neck supple.   Lymphadenopathy:      Cervical: No cervical adenopathy.   Skin:     General: Skin is warm and dry.      Findings: No rash.   Neurological:      Mental Status: She is alert and oriented to person, place, and time.       Cranial Nerves: No cranial nerve deficit.      Motor: Weakness present.      Coordination: Coordination normal.      Deep Tendon Reflexes: Reflexes are normal and symmetric. Reflexes normal.   Psychiatric:         Mood and Affect: Mood normal.         Behavior: Behavior normal.         Pertinent Laboratory/Diagnostic Studies:   The following labs/studies were reviewed please see chart or hospital paperwork for details.  Diagnostic studies from the hospital were reviewed    - Admit for PT OT and medical therapy.  We will monitor her labs.  She may eventually benefit from a palliative care consultation.  In the meantime symptomatic treatment will be offered    Rex Saucedo DO  12/15/2024 1:20 PM

## 2024-12-16 ENCOUNTER — NURSING HOME VISIT (OUTPATIENT)
Dept: FAMILY MEDICINE CLINIC | Facility: CLINIC | Age: 84
End: 2024-12-16
Payer: MEDICARE

## 2024-12-16 ENCOUNTER — TELEPHONE (OUTPATIENT)
Age: 84
End: 2024-12-16

## 2024-12-16 DIAGNOSIS — R53.81 PHYSICAL DECONDITIONING: ICD-10-CM

## 2024-12-16 DIAGNOSIS — J96.01 ACUTE RESPIRATORY FAILURE WITH HYPOXIA (HCC): Primary | ICD-10-CM

## 2024-12-16 DIAGNOSIS — I48.0 PAROXYSMAL A-FIB (HCC): ICD-10-CM

## 2024-12-16 DIAGNOSIS — S32.028A OTHER CLOSED FRACTURE OF SECOND LUMBAR VERTEBRA, INITIAL ENCOUNTER (HCC): ICD-10-CM

## 2024-12-16 DIAGNOSIS — R93.89 ABNORMAL CT OF THE CHEST: ICD-10-CM

## 2024-12-16 LAB — FUNGUS SPEC CULT: NORMAL

## 2024-12-16 PROCEDURE — 99308 SBSQ NF CARE LOW MDM 20: CPT | Performed by: FAMILY MEDICINE

## 2024-12-16 NOTE — PROGRESS NOTES
Father Adri gave a blessing and prayer and Holy Communion.    12/16/24 0800   Clinical Encounter Type   Visited With Patient   Temple Encounters   Temple Needs Prayer   Sacramental Encounters   Communion Given Indicator Yes

## 2024-12-16 NOTE — PROGRESS NOTES
[facility]  [address] [place] [address]  Facility: [facility]    NAME: Janina Tanner  AGE: 84 y.o. SEX: female    DATE OF ENCOUNTER: 12/16/2024    Code status:  DNR w/ Hospitalization    Assessment and Plan     1. Acute respiratory failure with hypoxia (HCC)  2. Paroxysmal A-fib (HCC)  3. Other closed fracture of second lumbar vertebra, initial encounter ([facility])  4. Physical deconditioning  5. Abnormal CT of the chest      All medications and routine orders were reviewed and updated as needed.    Plan discussed with: Family member    Chief Complaint     Interim evaluation    History of Present Illness     The patient was seen by Occupational Therapy earlier today.  She reports that the lidocaine patch is helping her low back.  Her bowels have moved.  She gets dyspnea with any type of exertion.  Her oxygen saturation drops whenever she attempts to ambulate.    The following portions of the patient's history were reviewed and updated as appropriate: current medications, past family history, past medical history, past social history, past surgical history and problem list.    Allergies:  Allergies   Allergen Reactions    Meloxicam GI Intolerance    Montelukast Other (See Comments)     headache    Morphine GI Intolerance    Morphine     Penicillins     Percocet [Oxycodone-Acetaminophen]     Sitagliptin      SOB       Review of Systems     Review of Systems   Constitutional:  Negative for activity change, appetite change, chills, diaphoresis, fatigue and unexpected weight change.   HENT:  Negative for congestion, ear discharge, ear pain, hearing loss, nosebleeds and rhinorrhea.    Eyes:  Negative for pain, redness, itching and visual disturbance.   Respiratory:  Positive for cough and shortness of breath. Negative for choking and chest tightness.    Cardiovascular:  Negative for chest pain and leg swelling.   Gastrointestinal:  Negative for abdominal pain, blood in stool,  constipation, diarrhea and nausea.   Endocrine: Negative for cold intolerance, polydipsia and polyphagia.   Genitourinary:  Negative for dysuria, frequency, hematuria and urgency.   Musculoskeletal:  Positive for back pain. Negative for arthralgias, gait problem, joint swelling, neck pain and neck stiffness.   Skin:  Negative for color change and rash.   Allergic/Immunologic: Negative for environmental allergies and food allergies.   Neurological:  Positive for weakness. Negative for dizziness, tremors, seizures, speech difficulty, numbness and headaches.   Hematological:  Negative for adenopathy. Does not bruise/bleed easily.   Psychiatric/Behavioral:  Negative for behavioral problems, dysphoric mood, hallucinations and self-injury.        Medications and orders     All medications reviewed and updated in [facility] EMR.      Objective     Vitals: per nursing home records    Physical Exam  Constitutional:       Appearance: Normal appearance. She is well-developed.   HENT:      Head: Normocephalic and atraumatic.      Right Ear: External ear normal.      Left Ear: External ear normal.      Mouth/Throat:      Mouth: Mucous membranes are moist.      Pharynx: Oropharynx is clear. No oropharyngeal exudate.   Eyes:      General: No scleral icterus.        Right eye: No discharge.         Left eye: No discharge.      Extraocular Movements: Extraocular movements intact.      Conjunctiva/sclera: Conjunctivae normal.      Pupils: Pupils are equal, round, and reactive to light.   Neck:      Thyroid: No thyromegaly.   Cardiovascular:      Rate and Rhythm: Normal rate and regular rhythm.      Heart sounds: Normal heart sounds. No murmur heard.     No gallop.   Pulmonary:      Effort: Pulmonary effort is normal. No respiratory distress.      Breath sounds: Wheezing present. No rales.   Abdominal:      General: Bowel sounds are normal.      Palpations: Abdomen is soft. There is no mass.      Tenderness: There is no guarding or  rebound.   Musculoskeletal:         General: Tenderness present. No deformity. Normal range of motion.      Cervical back: Normal range of motion and neck supple.   Lymphadenopathy:      Cervical: No cervical adenopathy.   Skin:     General: Skin is warm and dry.      Findings: No rash.   Neurological:      Mental Status: She is alert and oriented to person, place, and time.      Cranial Nerves: No cranial nerve deficit.      Motor: Weakness present.      Coordination: Coordination normal.      Deep Tendon Reflexes: Reflexes are normal and symmetric. Reflexes normal.   Psychiatric:         Mood and Affect: Mood normal.         Behavior: Behavior normal.         Thought Content: Thought content normal.         Judgment: Judgment normal.         Pertinent Laboratory/Diagnostic Studies:     The following studies were reviewed please see chart or hospital paperwork for details.    Space for lab dictation no new diagnostics    - Continue with the current therapy plan and medication regimen    Rex Saucedo DO  12/16/2024 1:27 PM

## 2024-12-16 NOTE — TELEPHONE ENCOUNTER
LV informing patient she will need to obtain xray for appt tomorrow and advised the provider will not be available after 1pm, moved appt to 10:15 and instructed the pt to call back to confirm new appt time is ok.

## 2024-12-16 NOTE — TELEPHONE ENCOUNTER
Anjali called from [facility] to cancel HFU- pt daughter Geena told Anjali to call us to cancel. Anjali states conversation regarding hospice is now being had with family , advised her to call us back if they changed their mind and needed to rebook an appt.

## 2024-12-17 NOTE — TELEPHONE ENCOUNTER
Pt daughter Geena called after reviewing with staff at [facility] and wished to reschedule canceled appt in [place] for an appt in [facility].  Geena will take pt for xray on 1/2/25 and appt is 1/7/25 with Joanne Richardson

## 2024-12-19 ENCOUNTER — NURSING HOME VISIT (OUTPATIENT)
Dept: FAMILY MEDICINE CLINIC | Facility: CLINIC | Age: 84
End: 2024-12-19
Payer: MEDICARE

## 2024-12-19 ENCOUNTER — TELEPHONE (OUTPATIENT)
Age: 84
End: 2024-12-19

## 2024-12-19 DIAGNOSIS — C50.111 MALIGNANT NEOPLASM OF CENTRAL PORTION OF RIGHT FEMALE BREAST, UNSPECIFIED ESTROGEN RECEPTOR STATUS (HCC): ICD-10-CM

## 2024-12-19 DIAGNOSIS — J96.01 ACUTE RESPIRATORY FAILURE WITH HYPOXIA (HCC): Primary | ICD-10-CM

## 2024-12-19 DIAGNOSIS — S32.028D OTHER CLOSED FRACTURE OF SECOND LUMBAR VERTEBRA WITH ROUTINE HEALING, SUBSEQUENT ENCOUNTER: ICD-10-CM

## 2024-12-19 DIAGNOSIS — R93.89 ABNORMAL CT OF THE CHEST: ICD-10-CM

## 2024-12-19 DIAGNOSIS — D50.0 IRON DEFICIENCY ANEMIA DUE TO CHRONIC BLOOD LOSS: ICD-10-CM

## 2024-12-19 PROCEDURE — 99308 SBSQ NF CARE LOW MDM 20: CPT | Performed by: FAMILY MEDICINE

## 2024-12-19 NOTE — TELEPHONE ENCOUNTER
Pt daughter calling as she would like for provider to place an order for home hospice as she was recently diagnosed with terminal cancer. She would like a cb if possible   Finasteride Pregnancy And Lactation Text: This medication is absolutely contraindicated during pregnancy. It is unknown if it is excreted in breast milk.

## 2024-12-19 NOTE — PROGRESS NOTES
[facility]  [address] [place] [address]  Facility: [facility]    NAME: Janina Tanner  AGE: 84 y.o. SEX: female    DATE OF ENCOUNTER: 12/19/2024    Code status:  DNR w/ Hospitalization    Assessment and Plan     1. Acute respiratory failure with hypoxia (HCC)  2. Abnormal CT of the chest  3. Malignant neoplasm of central portion of right female breast, unspecified estrogen receptor status (HCC)  4. Iron deficiency anemia due to chronic blood loss  5. Other closed fracture of second lumbar vertebra with routine healing, subsequent encounter      All medications and routine orders were reviewed and updated as needed.    Plan discussed with: Family member    Chief Complaint     Interim evaluation    History of Present Illness     I spent a fair amount of time with the patient today explaining her prognosis.  She had many questions as she is feeling better from when she came into the facility.  She gets some dyspnea with exertion but does not require oxygen all the time.  Back pain is currently controlled.  Bowels are moving.  She is fearful she can never go home alone.  I agreed with her and stated she would do well to stay here and have some assistance.    The following portions of the patient's history were reviewed and updated as appropriate: current medications, past family history, past medical history, past social history, past surgical history and problem list.    Allergies:  Allergies   Allergen Reactions    Meloxicam GI Intolerance    Montelukast Other (See Comments)     headache    Morphine GI Intolerance    Morphine     Penicillins     Percocet [Oxycodone-Acetaminophen]     Sitagliptin      SOB       Review of Systems     Review of Systems   Constitutional:  Negative for activity change, appetite change, chills, diaphoresis, fatigue and unexpected weight change.   HENT:  Negative for congestion, ear discharge, ear pain, hearing loss, nosebleeds and rhinorrhea.     Eyes:  Negative for pain, redness, itching and visual disturbance.   Respiratory:  Positive for cough and shortness of breath. Negative for choking and chest tightness.    Cardiovascular:  Negative for chest pain and leg swelling.   Gastrointestinal:  Negative for abdominal pain, blood in stool, constipation, diarrhea and nausea.   Endocrine: Negative for cold intolerance, polydipsia and polyphagia.   Genitourinary:  Negative for dysuria, frequency, hematuria and urgency.   Musculoskeletal:  Positive for back pain. Negative for arthralgias, gait problem, joint swelling, neck pain and neck stiffness.   Skin:  Negative for color change and rash.   Allergic/Immunologic: Negative for environmental allergies and food allergies.   Neurological:  Positive for weakness. Negative for dizziness, tremors, seizures, speech difficulty, numbness and headaches.   Hematological:  Negative for adenopathy. Does not bruise/bleed easily.   Psychiatric/Behavioral:  Negative for behavioral problems, dysphoric mood, hallucinations and self-injury.        Medications and orders     All medications reviewed and updated in custodial EMR.      Objective     Vitals: per nursing home records    Physical Exam  Constitutional:       Appearance: Normal appearance. She is well-developed.   HENT:      Head: Normocephalic and atraumatic.      Right Ear: External ear normal.      Left Ear: External ear normal.      Mouth/Throat:      Mouth: Mucous membranes are moist.      Pharynx: Oropharynx is clear. No oropharyngeal exudate.   Eyes:      General: No scleral icterus.        Right eye: No discharge.         Left eye: No discharge.      Extraocular Movements: Extraocular movements intact.      Conjunctiva/sclera: Conjunctivae normal.      Pupils: Pupils are equal, round, and reactive to light.   Neck:      Thyroid: No thyromegaly.   Cardiovascular:      Rate and Rhythm: Normal rate. Rhythm irregular.      Heart sounds: Normal heart sounds. No  murmur heard.     No gallop.   Pulmonary:      Effort: Pulmonary effort is normal. No respiratory distress.      Breath sounds: Wheezing present. No rales.   Abdominal:      General: Bowel sounds are normal.      Palpations: Abdomen is soft. There is no mass.      Tenderness: There is no guarding or rebound.   Musculoskeletal:         General: No tenderness or deformity. Normal range of motion.      Cervical back: Normal range of motion and neck supple.   Lymphadenopathy:      Cervical: No cervical adenopathy.   Skin:     General: Skin is warm and dry.      Findings: No rash.   Neurological:      Mental Status: She is alert and oriented to person, place, and time.      Cranial Nerves: No cranial nerve deficit.      Motor: Weakness present.      Coordination: Coordination normal.      Deep Tendon Reflexes: Reflexes are normal and symmetric. Reflexes normal.   Psychiatric:         Mood and Affect: Mood normal.         Behavior: Behavior normal.         Thought Content: Thought content normal.         Judgment: Judgment normal.         Pertinent Laboratory/Diagnostic Studies:     The following studies were reviewed please see chart or hospital paperwork for details.    Space for lab dictation no new diagnostics    - Continue the current therapy plan and medication regimen.  She should be given palliative care and symptomatic treatment.  She agreed to this approach    Rex Saucedo DO  12/19/2024 2:07 PM

## 2024-12-21 ENCOUNTER — HOME CARE VISIT (OUTPATIENT)
Dept: HOME HEALTH SERVICES | Facility: HOME HEALTHCARE | Age: 84
End: 2024-12-21

## 2024-12-21 DIAGNOSIS — C34.02 SMALL CELL CARCINOMA OF HILUM OF LEFT LUNG (HCC): Primary | ICD-10-CM

## 2024-12-23 ENCOUNTER — NURSING HOME VISIT (OUTPATIENT)
Dept: FAMILY MEDICINE CLINIC | Facility: CLINIC | Age: 84
End: 2024-12-23
Payer: MEDICARE

## 2024-12-23 DIAGNOSIS — S32.028D OTHER CLOSED FRACTURE OF SECOND LUMBAR VERTEBRA WITH ROUTINE HEALING, SUBSEQUENT ENCOUNTER: ICD-10-CM

## 2024-12-23 DIAGNOSIS — N18.30 STAGE 3 CHRONIC KIDNEY DISEASE, UNSPECIFIED WHETHER STAGE 3A OR 3B CKD (HCC): ICD-10-CM

## 2024-12-23 DIAGNOSIS — J96.01 ACUTE RESPIRATORY FAILURE WITH HYPOXIA (HCC): ICD-10-CM

## 2024-12-23 DIAGNOSIS — Z85.3 HISTORY OF BREAST CANCER: ICD-10-CM

## 2024-12-23 DIAGNOSIS — R93.89 ABNORMAL CT OF THE CHEST: ICD-10-CM

## 2024-12-23 DIAGNOSIS — I48.0 PAROXYSMAL ATRIAL FIBRILLATION (HCC): Primary | ICD-10-CM

## 2024-12-23 LAB — FUNGUS SPEC CULT: NORMAL

## 2024-12-23 PROCEDURE — 99308 SBSQ NF CARE LOW MDM 20: CPT | Performed by: FAMILY MEDICINE

## 2024-12-23 NOTE — PROGRESS NOTES
[facility]  [address] [place] [address]  Facility: [facility]    NAME: Janina Tanner  AGE: 84 y.o. SEX: female    DATE OF ENCOUNTER: 12/23/2024    Code status:  DNR w/ Hospitalization    Assessment and Plan     1. Paroxysmal atrial fibrillation (HCC)  2. Acute respiratory failure with hypoxia (HCC)  3. Other closed fracture of second lumbar vertebra with routine healing, subsequent encounter  4. Stage 3 chronic kidney disease, unspecified whether stage 3a or 3b CKD (HCC)  5. History of breast cancer  6. Abnormal CT of the chest      All medications and routine orders were reviewed and updated as needed.    Plan discussed with: Family member    Chief Complaint     Interim evaluation    History of Present Illness     The patient is seen for interim evaluation.  Chest x-ray from last week showed the presence of an infiltrate as well as vascular congestion.  She has received 3 days of Lasix with the reduction in her weight.  She continues to cough.  She will complete a full course of antibiotics for the pneumonia.    The following portions of the patient's history were reviewed and updated as appropriate: current medications, past family history, past medical history, past social history, past surgical history and problem list.    Allergies:  Allergies   Allergen Reactions    Meloxicam GI Intolerance    Montelukast Other (See Comments)     headache    Morphine GI Intolerance    Morphine     Penicillins     Percocet [Oxycodone-Acetaminophen]     Sitagliptin      SOB       Review of Systems     Review of Systems   Constitutional:  Negative for activity change, appetite change, chills, diaphoresis, fatigue and unexpected weight change.   HENT:  Negative for congestion, ear discharge, ear pain, hearing loss, nosebleeds and rhinorrhea.    Eyes:  Negative for pain, redness, itching and visual disturbance.   Respiratory:  Positive for cough and shortness of breath. Negative for  choking and chest tightness.    Cardiovascular:  Negative for chest pain and leg swelling.   Gastrointestinal:  Negative for abdominal pain, blood in stool, constipation, diarrhea and nausea.   Endocrine: Negative for cold intolerance, polydipsia and polyphagia.   Genitourinary:  Negative for dysuria, frequency, hematuria and urgency.   Musculoskeletal:  Negative for arthralgias, back pain, gait problem, joint swelling, neck pain and neck stiffness.   Skin:  Negative for color change and rash.   Allergic/Immunologic: Negative for environmental allergies and food allergies.   Neurological:  Positive for weakness. Negative for dizziness, tremors, seizures, speech difficulty, numbness and headaches.   Hematological:  Negative for adenopathy. Does not bruise/bleed easily.   Psychiatric/Behavioral:  Negative for behavioral problems, dysphoric mood, hallucinations and self-injury.        Medications and orders     All medications reviewed and updated in [facility] EMR.      Objective     Vitals: per nursing home records    Physical Exam  Constitutional:       Appearance: Normal appearance. She is well-developed.   HENT:      Head: Normocephalic and atraumatic.      Right Ear: External ear normal.      Left Ear: External ear normal.      Mouth/Throat:      Mouth: Mucous membranes are moist.      Pharynx: Oropharynx is clear. No oropharyngeal exudate.   Eyes:      General: No scleral icterus.        Right eye: No discharge.         Left eye: No discharge.      Extraocular Movements: Extraocular movements intact.      Conjunctiva/sclera: Conjunctivae normal.      Pupils: Pupils are equal, round, and reactive to light.   Neck:      Thyroid: No thyromegaly.   Cardiovascular:      Rate and Rhythm: Normal rate. Rhythm irregular.      Heart sounds: Normal heart sounds. No murmur heard.     No gallop.   Pulmonary:      Effort: Pulmonary effort is normal. No respiratory distress.      Breath sounds: Wheezing and rales present.    Abdominal:      General: Bowel sounds are normal.      Palpations: Abdomen is soft. There is no mass.      Tenderness: There is no guarding or rebound.   Musculoskeletal:         General: No tenderness or deformity. Normal range of motion.      Cervical back: Normal range of motion and neck supple.      Right lower leg: Edema present.      Left lower leg: Edema present.   Lymphadenopathy:      Cervical: No cervical adenopathy.   Skin:     General: Skin is warm and dry.      Findings: No rash.   Neurological:      Mental Status: She is alert and oriented to person, place, and time.      Cranial Nerves: No cranial nerve deficit.      Motor: Weakness present.      Coordination: Coordination normal.      Deep Tendon Reflexes: Reflexes are normal and symmetric. Reflexes normal.   Psychiatric:         Mood and Affect: Mood normal.         Behavior: Behavior normal.         Thought Content: Thought content normal.         Judgment: Judgment normal.         Pertinent Laboratory/Diagnostic Studies:     The following studies were reviewed please see chart or hospital paperwork for details.    Space for lab dictation chest x-ray with infiltrate and vascular congestion    - She has completed her Lasix.  She will continue with the Damien Saucedo DO  12/23/2024 2:07 PM

## 2024-12-26 ENCOUNTER — NURSING HOME VISIT (OUTPATIENT)
Dept: FAMILY MEDICINE CLINIC | Facility: CLINIC | Age: 84
End: 2024-12-26
Payer: MEDICARE

## 2024-12-26 DIAGNOSIS — E11.9 TYPE 2 DIABETES MELLITUS WITHOUT COMPLICATION, WITHOUT LONG-TERM CURRENT USE OF INSULIN (HCC): ICD-10-CM

## 2024-12-26 DIAGNOSIS — J43.9 PULMONARY EMPHYSEMA, UNSPECIFIED EMPHYSEMA TYPE (HCC): ICD-10-CM

## 2024-12-26 DIAGNOSIS — S32.028D OTHER CLOSED FRACTURE OF SECOND LUMBAR VERTEBRA WITH ROUTINE HEALING, SUBSEQUENT ENCOUNTER: ICD-10-CM

## 2024-12-26 DIAGNOSIS — R93.89 ABNORMAL CT OF THE CHEST: Primary | ICD-10-CM

## 2024-12-26 DIAGNOSIS — J96.01 ACUTE RESPIRATORY FAILURE WITH HYPOXIA (HCC): ICD-10-CM

## 2024-12-26 DIAGNOSIS — I48.0 PAROXYSMAL ATRIAL FIBRILLATION (HCC): ICD-10-CM

## 2024-12-26 DIAGNOSIS — C50.111 MALIGNANT NEOPLASM OF CENTRAL PORTION OF RIGHT FEMALE BREAST, UNSPECIFIED ESTROGEN RECEPTOR STATUS (HCC): ICD-10-CM

## 2024-12-26 DIAGNOSIS — I10 HYPERTENSION, ESSENTIAL, BENIGN: Chronic | ICD-10-CM

## 2024-12-26 PROCEDURE — 99308 SBSQ NF CARE LOW MDM 20: CPT | Performed by: FAMILY MEDICINE

## 2024-12-26 NOTE — TELEPHONE ENCOUNTER
Pt daughter called for fax of xray, faxed to [facility] for her this morning. She is unable to leave facility for xray.  Geena is asking if pt really needs to have an office visit for her HFU , Geena says they are unable to bring pt in, can we coordinate appts with [facility] being virtual?  I advised her also that Anjali at [facility]  is a resource for this pt and may also be reaching out.

## 2024-12-26 NOTE — PROGRESS NOTES
[facility]  [address] [place] [address]  Facility: [facility]    NAME: Janina Tanner  AGE: 84 y.o. SEX: female    DATE OF ENCOUNTER: 12/26/2024    Code status:  DNR w/ Hospitalization    Assessment and Plan     1. Abnormal CT of the chest  2. Paroxysmal atrial fibrillation (HCC)  3. Essential hypertension  4. Acute respiratory failure with hypoxia (HCC)  5. Pulmonary emphysema, unspecified emphysema type (HCC)  6. Type 2 diabetes mellitus without complication, without long-term current use of insulin (HCC)  7. Other closed fracture of second lumbar vertebra with routine healing, subsequent encounter  8. Malignant neoplasm of central portion of right female breast, unspecified estrogen receptor status (HCC)      All medications and routine orders were reviewed and updated as needed.    Plan discussed with: Family member    Chief Complaint     Interim evaluation    History of Present Illness     The patient repeatedly questions how fast the process will happen with her small cell carcinoma.  She feels fairly comfortable and has actually gained some weight due to an increased appetite.  Bowels are moving.  She does not require oxygen.  She has been mobile but does get quite fatigued and short of breath.  Her pain is sufficiently controlled in her back.  Tolerating her antibiotics for the pneumonia    The following portions of the patient's history were reviewed and updated as appropriate: current medications, past family history, past medical history, past social history, past surgical history and problem list.    Allergies:  Allergies   Allergen Reactions    Meloxicam GI Intolerance    Montelukast Other (See Comments)     headache    Morphine GI Intolerance    Morphine     Penicillins     Percocet [Oxycodone-Acetaminophen]     Sitagliptin      SOB       Review of Systems     Review of Systems   Constitutional:  Negative for activity change, appetite change, chills,  diaphoresis, fatigue and unexpected weight change.   HENT:  Negative for congestion, ear discharge, ear pain, hearing loss, nosebleeds and rhinorrhea.    Eyes:  Negative for pain, redness, itching and visual disturbance.   Respiratory:  Positive for cough and shortness of breath. Negative for choking and chest tightness.    Cardiovascular:  Negative for chest pain and leg swelling.   Gastrointestinal:  Negative for abdominal pain, blood in stool, constipation, diarrhea and nausea.   Endocrine: Negative for cold intolerance, polydipsia and polyphagia.   Genitourinary:  Negative for dysuria, frequency, hematuria and urgency.   Musculoskeletal:  Positive for back pain. Negative for arthralgias, gait problem, joint swelling, neck pain and neck stiffness.   Skin:  Negative for color change and rash.   Allergic/Immunologic: Negative for environmental allergies and food allergies.   Neurological:  Positive for weakness. Negative for dizziness, tremors, seizures, speech difficulty, numbness and headaches.   Hematological:  Negative for adenopathy. Does not bruise/bleed easily.   Psychiatric/Behavioral:  Negative for behavioral problems, dysphoric mood, hallucinations and self-injury.        Medications and orders     All medications reviewed and updated in [facility] EMR.      Objective     Vitals: per nursing home records    Physical Exam  Constitutional:       Appearance: Normal appearance. She is well-developed.   HENT:      Head: Normocephalic and atraumatic.      Right Ear: External ear normal.      Left Ear: External ear normal.      Mouth/Throat:      Mouth: Mucous membranes are moist.      Pharynx: Oropharynx is clear. No oropharyngeal exudate.   Eyes:      General: No scleral icterus.        Right eye: No discharge.         Left eye: No discharge.      Extraocular Movements: Extraocular movements intact.      Conjunctiva/sclera: Conjunctivae normal.      Pupils: Pupils are equal, round, and reactive to light.    Neck:      Thyroid: No thyromegaly.   Cardiovascular:      Rate and Rhythm: Normal rate. Rhythm irregular.      Heart sounds: Normal heart sounds. No murmur heard.     No gallop.   Pulmonary:      Effort: Pulmonary effort is normal. No respiratory distress.      Breath sounds: Wheezing present. No rales.   Abdominal:      General: Bowel sounds are normal.      Palpations: Abdomen is soft. There is no mass.      Tenderness: There is no guarding or rebound.   Musculoskeletal:         General: Tenderness present. No deformity. Normal range of motion.      Cervical back: Normal range of motion and neck supple.   Lymphadenopathy:      Cervical: No cervical adenopathy.   Skin:     General: Skin is warm and dry.      Findings: No rash.   Neurological:      Mental Status: She is alert and oriented to person, place, and time.      Cranial Nerves: No cranial nerve deficit.      Motor: Weakness present.      Coordination: Coordination normal.      Deep Tendon Reflexes: Reflexes are normal and symmetric. Reflexes normal.   Psychiatric:         Mood and Affect: Mood normal.         Behavior: Behavior normal.         Thought Content: Thought content normal.         Judgment: Judgment normal.         Pertinent Laboratory/Diagnostic Studies:     The following studies were reviewed please see chart or hospital paperwork for details.    Space for lab dictation no new diagnostics    - Finish the antibiotics.  Continue with neb treatments.    Rex Saucedo DO  12/26/2024 3:04 PM

## 2024-12-27 NOTE — TELEPHONE ENCOUNTER
Anjali called back to say that disc will be sent over by [facility] in 3-5 days. There will be a pass code to receive the disk it is going to be pts  [id number] and that has to be entered as written above.     Anjali's direct line is [phone] to please call to set up the virtual after images are obtained.

## 2024-12-27 NOTE — TELEPHONE ENCOUNTER
12/26/24:  SPOKE TO TORIBIO AT [facility] [phone].        XRAY LSPINE WAS DONE ON 12/26/24 BY [facility] # [phone].      TORIBIO WILL BE CALLING TISH TO SEE IF XRAY CAN BE PUT ON A DISC AND MAILED TO OUR OFFICE. (SHE WILL TRY TO SEE IF THEY CAN GET IT TO US BEFORE 1/7/25)    PER SG WE WILL THEN  CHANGE THIS APPOINTMENT TO A VIRTUAL VISIT.    TORIBIO SHOULD BE CALLING THE OFFICE BACK TO UPDATE US ON THE STATUS

## 2024-12-27 NOTE — TELEPHONE ENCOUNTER
24:  CALLED TORIBIO BACK APPT ON 25 HAS BEEN CHANGED TO VIRTUAL.  PHONE NUMBER AND EMAIL ADDRESS ARE ON THE APPOINTMENT LINE.    WHEN THE DISC IS RECEIVED AT THE [place] OFFICE VIA FED EX - WE ARE REQUIRED TO USE A PASS CODE.      PATIENT  WITH MUST BE ENTERED AS.    1938

## 2024-12-29 PROBLEM — N39.0 URINARY TRACT INFECTION: Status: RESOLVED | Noted: 2023-01-22 | Resolved: 2024-12-29

## 2024-12-30 ENCOUNTER — NURSING HOME VISIT (OUTPATIENT)
Dept: FAMILY MEDICINE CLINIC | Facility: CLINIC | Age: 84
End: 2024-12-30
Payer: MEDICARE

## 2024-12-30 DIAGNOSIS — J96.01 ACUTE RESPIRATORY FAILURE WITH HYPOXIA (HCC): ICD-10-CM

## 2024-12-30 DIAGNOSIS — R53.1 GENERALIZED WEAKNESS: ICD-10-CM

## 2024-12-30 DIAGNOSIS — I48.0 PAROXYSMAL ATRIAL FIBRILLATION (HCC): ICD-10-CM

## 2024-12-30 DIAGNOSIS — C79.9 METASTATIC CARCINOMA (HCC): Primary | ICD-10-CM

## 2024-12-30 DIAGNOSIS — Z17.0 MALIGNANT NEOPLASM OF RIGHT BREAST, STAGE 1, ESTROGEN RECEPTOR POSITIVE (HCC): ICD-10-CM

## 2024-12-30 DIAGNOSIS — C50.911 MALIGNANT NEOPLASM OF RIGHT BREAST, STAGE 1, ESTROGEN RECEPTOR POSITIVE (HCC): ICD-10-CM

## 2024-12-30 DIAGNOSIS — R93.89 ABNORMAL CT OF THE CHEST: Primary | ICD-10-CM

## 2024-12-30 DIAGNOSIS — S32.028D OTHER CLOSED FRACTURE OF SECOND LUMBAR VERTEBRA WITH ROUTINE HEALING, SUBSEQUENT ENCOUNTER: ICD-10-CM

## 2024-12-30 DIAGNOSIS — E44.0 MODERATE PROTEIN-CALORIE MALNUTRITION (HCC): ICD-10-CM

## 2024-12-30 LAB — FUNGUS SPEC CULT: NORMAL

## 2024-12-30 PROCEDURE — 99308 SBSQ NF CARE LOW MDM 20: CPT | Performed by: FAMILY MEDICINE

## 2024-12-30 NOTE — PROGRESS NOTES
[facility]  [address] [place] [address]  Facility: [facility]    NAME: Janina Tanner  AGE: 84 y.o. SEX: female    DATE OF ENCOUNTER: 12/30/2024    Code status:  DNR w/ Hospitalization    Assessment and Plan     1. Abnormal CT of the chest  2. Moderate protein-calorie malnutrition   3. Generalized weakness  4. Malignant neoplasm of right breast, stage 1, estrogen receptor positive (HCC)  5. Other closed fracture of second lumbar vertebra with routine healing, subsequent encounter  6. Acute respiratory failure with hypoxia (HCC)  7. Paroxysmal atrial fibrillation (HCC)      All medications and routine orders were reviewed and updated as needed.    Plan discussed with: Family member    Chief Complaint     Interim evaluation    History of Present Illness     Patient is seen for interim evaluation.  She notes that she is comfortable.  He does get dyspnea whenever she exerts herself.  She has a persistent cough.  Her back pain is slowly improving.  Bowel habits are stable.  Denies any current dysuria    The following portions of the patient's history were reviewed and updated as appropriate: current medications, past family history, past medical history, past social history, past surgical history and problem list.    Allergies:  Allergies   Allergen Reactions    Meloxicam GI Intolerance    Montelukast Other (See Comments)     headache    Morphine GI Intolerance    Morphine     Penicillins     Percocet [Oxycodone-Acetaminophen]     Sitagliptin      SOB       Review of Systems     Review of Systems   Constitutional:  Negative for activity change, appetite change, chills, diaphoresis, fatigue and unexpected weight change.   HENT:  Negative for congestion, ear discharge, ear pain, hearing loss, nosebleeds and rhinorrhea.    Eyes:  Negative for pain, redness, itching and visual disturbance.   Respiratory:  Positive for shortness of breath. Negative for cough, choking and chest  tightness.    Cardiovascular:  Negative for chest pain and leg swelling.   Gastrointestinal:  Negative for abdominal pain, blood in stool, constipation, diarrhea and nausea.   Endocrine: Negative for cold intolerance, polydipsia and polyphagia.   Genitourinary:  Negative for dysuria, frequency, hematuria and urgency.   Musculoskeletal:  Positive for back pain. Negative for arthralgias, gait problem, joint swelling, neck pain and neck stiffness.   Skin:  Negative for color change and rash.   Allergic/Immunologic: Negative for environmental allergies and food allergies.   Neurological:  Positive for weakness. Negative for dizziness, tremors, seizures, speech difficulty, numbness and headaches.   Hematological:  Negative for adenopathy. Does not bruise/bleed easily.   Psychiatric/Behavioral:  Negative for behavioral problems, dysphoric mood, hallucinations and self-injury. The patient is nervous/anxious.        Medications and orders     All medications reviewed and updated in [facility] EMR.      Objective     Vitals: per nursing home records    Physical Exam  Constitutional:       Appearance: Normal appearance. She is well-developed.   HENT:      Head: Normocephalic and atraumatic.      Right Ear: External ear normal.      Left Ear: External ear normal.      Mouth/Throat:      Mouth: Mucous membranes are moist.      Pharynx: Oropharynx is clear. No oropharyngeal exudate.   Eyes:      General: No scleral icterus.        Right eye: No discharge.         Left eye: No discharge.      Extraocular Movements: Extraocular movements intact.      Conjunctiva/sclera: Conjunctivae normal.      Pupils: Pupils are equal, round, and reactive to light.   Neck:      Thyroid: No thyromegaly.   Cardiovascular:      Rate and Rhythm: Normal rate. Rhythm irregular.      Heart sounds: Normal heart sounds. No murmur heard.     No gallop.   Pulmonary:      Effort: Pulmonary effort is normal. No respiratory distress.      Breath sounds:  Wheezing present. No rales.   Abdominal:      General: Bowel sounds are normal.      Palpations: Abdomen is soft. There is no mass.      Tenderness: There is no guarding or rebound.   Musculoskeletal:         General: No tenderness or deformity. Normal range of motion.      Cervical back: Normal range of motion and neck supple.   Lymphadenopathy:      Cervical: No cervical adenopathy.   Skin:     General: Skin is warm and dry.      Findings: No rash.   Neurological:      Mental Status: She is alert and oriented to person, place, and time.      Cranial Nerves: No cranial nerve deficit.      Motor: Weakness present.      Coordination: Coordination normal.      Deep Tendon Reflexes: Reflexes are normal and symmetric. Reflexes normal.   Psychiatric:         Mood and Affect: Mood normal.         Behavior: Behavior normal.         Pertinent Laboratory/Diagnostic Studies:     The following studies were reviewed please see chart or hospital paperwork for details.    Space for lab dictation labs are stable    - Continue with a palliative approach    Rex Saucedo DO  12/30/2024 1:55 PM

## 2025-01-02 ENCOUNTER — NURSING HOME VISIT (OUTPATIENT)
Dept: FAMILY MEDICINE CLINIC | Facility: CLINIC | Age: 85
End: 2025-01-02
Payer: MEDICARE

## 2025-01-02 DIAGNOSIS — J96.01 ACUTE RESPIRATORY FAILURE WITH HYPOXIA (HCC): ICD-10-CM

## 2025-01-02 DIAGNOSIS — R93.89 ABNORMAL CT OF THE CHEST: Primary | ICD-10-CM

## 2025-01-02 DIAGNOSIS — R53.1 GENERALIZED WEAKNESS: ICD-10-CM

## 2025-01-02 DIAGNOSIS — C50.111 MALIGNANT NEOPLASM OF CENTRAL PORTION OF RIGHT FEMALE BREAST, UNSPECIFIED ESTROGEN RECEPTOR STATUS (HCC): ICD-10-CM

## 2025-01-02 PROCEDURE — 99308 SBSQ NF CARE LOW MDM 20: CPT | Performed by: FAMILY MEDICINE

## 2025-01-02 NOTE — ASSESSMENT & PLAN NOTE
Lab Results   Component Value Date    HGBA1C 6.6 (H) 12/09/2024   Bgs well controlled  Continue current regimen of Metformin 500 mg BID  Continue Accu-cheks   Continue to monitor for acute changes in condition

## 2025-01-02 NOTE — ASSESSMENT & PLAN NOTE
Patient with noted abnormal CT chest with pulmonary nodules minus lymphadenopathy  Patient has f/u for EBUS on 12/9/24  Follow up with pulmonary

## 2025-01-02 NOTE — ASSESSMENT & PLAN NOTE
History of falls  Maintain fall and safety precautions  Encourage use of asst devices  Continue PT/OT services with [facility]  Assess for need with assistance with transfers, mobility, and ADLs in/out of home  Patient is at high risk for falls r/t   Continue to monitor for acute changes in condition   Follow up with PCP

## 2025-01-02 NOTE — ASSESSMENT & PLAN NOTE
BP remains stable today, 172/90  Continue Lisinopril and Amlodipine  Avoid hypotension  Continue to monitor BP and for acute changes in condition  Follow up with PCP/Cardiology

## 2025-01-02 NOTE — ASSESSMENT & PLAN NOTE
Patient currently not on rate control medication  Continue to monitor HR, 61  Continue Eliquis for anticoag  Monitor for s/s of bleeding and acute changes in condition  Follow up with PCP/Cardiology

## 2025-01-02 NOTE — PROGRESS NOTES
[facility]  [address] [place] [address]  Facility: [facility]    NAME: Janina Tanner  AGE: 84 y.o. SEX: female    DATE OF ENCOUNTER: 1/2/2025    Code status:  DNR w/ Hospitalization    Assessment and Plan     1. Abnormal CT of the chest  2. Generalized weakness  3. Malignant neoplasm of central portion of right female breast, unspecified estrogen receptor status (HCC)  4. Acute respiratory failure with hypoxia (HCC)      All medications and routine orders were reviewed and updated as needed.    Plan discussed with: Patient    Chief Complaint     Interim evaluation    History of Present Illness     The patient is seen for interim evaluation.  She reports feeling fairly well.  She completed her course of antibiotics.  She continues to use oxygen on an as-needed basis.  Performing well in therapy.  Bowels are moving    The following portions of the patient's history were reviewed and updated as appropriate: current medications, past family history, past medical history, past social history, past surgical history and problem list.    Allergies:  Allergies   Allergen Reactions    Meloxicam GI Intolerance    Montelukast Other (See Comments)     headache    Morphine GI Intolerance    Morphine     Penicillins     Percocet [Oxycodone-Acetaminophen]     Sitagliptin      SOB       Review of Systems     Review of Systems   Constitutional:  Negative for activity change, appetite change, chills, diaphoresis, fatigue and unexpected weight change.   HENT:  Negative for congestion, ear discharge, ear pain, hearing loss, nosebleeds and rhinorrhea.    Eyes:  Negative for pain, redness, itching and visual disturbance.   Respiratory:  Positive for cough and shortness of breath. Negative for choking and chest tightness.    Cardiovascular:  Negative for chest pain and leg swelling.   Gastrointestinal:  Negative for abdominal pain, blood in stool, constipation, diarrhea and nausea.   Endocrine:  Negative for cold intolerance, polydipsia and polyphagia.   Genitourinary:  Negative for dysuria, frequency, hematuria and urgency.   Musculoskeletal:  Positive for gait problem. Negative for arthralgias, back pain, joint swelling, neck pain and neck stiffness.   Skin:  Negative for color change and rash.   Allergic/Immunologic: Negative for environmental allergies and food allergies.   Neurological:  Positive for weakness. Negative for dizziness, tremors, seizures, speech difficulty, numbness and headaches.   Hematological:  Negative for adenopathy. Does not bruise/bleed easily.   Psychiatric/Behavioral:  Negative for behavioral problems, dysphoric mood, hallucinations and self-injury.        Medications and orders     All medications reviewed and updated in [facility] EMR.      Objective     Vitals: per nursing home records    Physical Exam  Constitutional:       Appearance: Normal appearance. She is well-developed.   HENT:      Head: Normocephalic and atraumatic.      Right Ear: External ear normal.      Left Ear: External ear normal.      Mouth/Throat:      Mouth: Mucous membranes are moist.      Pharynx: Oropharynx is clear. No oropharyngeal exudate.   Eyes:      General: No scleral icterus.        Right eye: No discharge.         Left eye: No discharge.      Extraocular Movements: Extraocular movements intact.      Conjunctiva/sclera: Conjunctivae normal.      Pupils: Pupils are equal, round, and reactive to light.   Neck:      Thyroid: No thyromegaly.   Cardiovascular:      Rate and Rhythm: Normal rate. Rhythm irregular.      Heart sounds: Normal heart sounds. No murmur heard.     No gallop.   Pulmonary:      Effort: Pulmonary effort is normal. No respiratory distress.      Breath sounds: Rhonchi present. No wheezing or rales.   Abdominal:      General: Bowel sounds are normal.      Palpations: Abdomen is soft. There is no mass.      Tenderness: There is no guarding or rebound.   Musculoskeletal:          General: No tenderness or deformity. Normal range of motion.      Cervical back: Normal range of motion and neck supple.   Lymphadenopathy:      Cervical: No cervical adenopathy.   Skin:     General: Skin is warm and dry.      Findings: No rash.   Neurological:      Mental Status: She is alert and oriented to person, place, and time.      Cranial Nerves: No cranial nerve deficit.      Motor: Weakness present.      Coordination: Coordination normal.      Deep Tendon Reflexes: Reflexes are normal and symmetric. Reflexes normal.   Psychiatric:         Mood and Affect: Mood normal.         Behavior: Behavior normal.         Thought Content: Thought content normal.         Judgment: Judgment normal.         Pertinent Laboratory/Diagnostic Studies:     The following studies were reviewed please see chart or hospital paperwork for details.    Space for lab dictation no new diagnostics    - Continue the current therapy plan and medication regimen    Rex Saucedo DO  1/2/2025 3:54 PM

## 2025-01-02 NOTE — ASSESSMENT & PLAN NOTE
Orders:    methocarbamol (ROBAXIN) 500 mg tablet; Take 1 tablet (500 mg total) by mouth 3 (three) times a day for 10 days Make take every 8 hrs as needed for back pain.    Lidocaine 4 % PTCH; Apply 1 patch topically in the morning for 10 days

## 2025-01-03 ENCOUNTER — TELEPHONE (OUTPATIENT)
Dept: NEUROSURGERY | Facility: CLINIC | Age: 85
End: 2025-01-03

## 2025-01-03 ENCOUNTER — HOME CARE VISIT (OUTPATIENT)
Dept: HOME HOSPICE | Facility: HOSPICE | Age: 85
End: 2025-01-03

## 2025-01-03 NOTE — TELEPHONE ENCOUNTER
Unable to load disc, DICOM wrapped    Called Ly, spoke to Manish, Unit Manager at [facility], they will request new disc to be sent to office.    She is aware if not received prior, will need to reschedule.    (Current disc left on Marilu's desk at )

## 2025-01-03 NOTE — TELEPHONE ENCOUNTER
Anjali from [facility] called to advise they will not have a new disk for 1/7/25 appt, they require 3 business days to provide.  Pt will discharge from [facility] on 1/9/25 and this appt will be noted on that paper work for family.   Appt moved to next avialable 1/17/25.

## 2025-01-03 NOTE — TELEPHONE ENCOUNTER
1/3/25 - [facility] DROPPED OFF [facility] DISC FOR PT. GIVEN TO VERNA MORENO TO UPLOAD.

## 2025-01-06 LAB — FUNGUS SPEC CULT: NORMAL

## 2025-01-08 LAB
CARIS GENOMIC LOH - EXOME: NORMAL
CARIS HER2/NEU: NEGATIVE
CARIS HLA-A: NORMAL
CARIS HLA-B: NORMAL
CARIS HLA-C: NORMAL
CARIS MSI - EXOME: NORMAL
CARIS PD-L1 (22C3): NEGATIVE
CARIS TMB - EXOME: NORMAL

## 2025-01-13 LAB — FUNGUS SPEC CULT: NORMAL

## (undated) DEVICE — SUT MONOCRYL 4-0 PS-2 18 IN Y496G

## (undated) DEVICE — SUT SILK 2-0 SH 30 IN K833H

## (undated) DEVICE — INTENDED FOR TISSUE SEPARATION, AND OTHER PROCEDURES THAT REQUIRE A SHARP SURGICAL BLADE TO PUNCTURE OR CUT.: Brand: BARD-PARKER SAFETY BLADES SIZE 15, STERILE

## (undated) DEVICE — PROXIMATE PLUS MD MULTI-DIRECTIONAL RELEASE SKIN STAPLERS CONTAINS 35 STAINLESS STEEL STAPLES APPROXIMATE CLOSED DIMENSIONS: 6.9MM X 3.9MM WIDE: Brand: PROXIMATE

## (undated) DEVICE — SUT VICRYL 2-0 SHB 27 IN JB417

## (undated) DEVICE — JP PERF DRN SIL FLT 10MM FULL: Brand: CARDINAL HEALTH

## (undated) DEVICE — 3000CC GUARDIAN II: Brand: GUARDIAN

## (undated) DEVICE — CHLORAPREP HI-LITE 26ML ORANGE

## (undated) DEVICE — PLUMEPEN PRO 10FT

## (undated) DEVICE — SUT SILK 3-0 SH 30 IN K832H

## (undated) DEVICE — SUT VICRYL 2-0 REEL 54 IN J286G

## (undated) DEVICE — INTENDED FOR TISSUE SEPARATION, AND OTHER PROCEDURES THAT REQUIRE A SHARP SURGICAL BLADE TO PUNCTURE OR CUT.: Brand: BARD-PARKER SAFETY BLADES SIZE 10, STERILE

## (undated) DEVICE — PROXIMATE LINEAR CUTTER RELOAD, BLUE, 75MM: Brand: PROXIMATE

## (undated) DEVICE — GLOVE SRG BIOGEL 7.5

## (undated) DEVICE — GLOVE SRG BIOGEL ORTHOPEDIC 7.5

## (undated) DEVICE — 3M™ IOBAN™ 2 ANTIMICROBIAL INCISE DRAPE 6650EZ: Brand: IOBAN™ 2

## (undated) DEVICE — REM POLYHESIVE ADULT PATIENT RETURN ELECTRODE: Brand: VALLEYLAB

## (undated) DEVICE — LIGACLIP MCA MULTIPLE CLIP APPLIERS, 20 MEDIUM CLIPS: Brand: LIGACLIP

## (undated) DEVICE — SUT SILK 2-0 TIES 144 IN LA55G

## (undated) DEVICE — SUT VICRYL 3-0 SH 27 IN J416H

## (undated) DEVICE — NEEDLE 25G X 1 1/2

## (undated) DEVICE — DRAPE PROBE NEO-PROBE/ULTRASOUND

## (undated) DEVICE — PROXIMATE RELOADABLE LINEAR CUTTER WITH SAFETY LOCK-OUT, 75MM: Brand: PROXIMATE

## (undated) DEVICE — HEMOCLIP CARTRIDGE MED

## (undated) DEVICE — JACKSON-PRATT 100CC BULB RESERVOIR: Brand: CARDINAL HEALTH

## (undated) DEVICE — POOLE SUCTION HANDLE: Brand: CARDINAL HEALTH

## (undated) DEVICE — DRESSING MEPILEX AG BORDER 4 X 8 IN

## (undated) DEVICE — ADHESIVE SKN CLSR HISTOACRYL FLEX 0.5ML LF

## (undated) DEVICE — CHEST/BREAST DRAPE: Brand: CONVERTORS

## (undated) DEVICE — CONMED ACCESSORY ELECTRODE, FLAT BLADE WITH EXTENDED INSULATION: Brand: CONMED

## (undated) DEVICE — SYRINGE 5ML LL

## (undated) DEVICE — STERILE UNIVERSAL BREAST PACK: Brand: CARDINAL HEALTH

## (undated) DEVICE — GLOVE INDICATOR PI UNDERGLOVE SZ 7.5 BLUE

## (undated) DEVICE — TOWEL SET X-RAY

## (undated) DEVICE — STERILE MAJOR GENERAL PACK: Brand: CARDINAL HEALTH

## (undated) DEVICE — GLOVE INDICATOR PI UNDERGLOVE SZ 8 BLUE

## (undated) DEVICE — SUT SILK 0 30 IN A306H

## (undated) DEVICE — SPECIMEN CONTAINER STERILE PEEL PACK

## (undated) DEVICE — TRAY FOLEY 16FR URIMETER SURESTEP